# Patient Record
Sex: FEMALE | Race: WHITE | NOT HISPANIC OR LATINO | Employment: OTHER | URBAN - NONMETROPOLITAN AREA
[De-identification: names, ages, dates, MRNs, and addresses within clinical notes are randomized per-mention and may not be internally consistent; named-entity substitution may affect disease eponyms.]

---

## 2017-01-05 ENCOUNTER — PROCEDURE VISIT (OUTPATIENT)
Dept: CARDIOLOGY | Facility: CLINIC | Age: 47
End: 2017-01-05

## 2017-01-05 PROCEDURE — 93224 XTRNL ECG REC UP TO 48 HRS: CPT | Performed by: INTERNAL MEDICINE

## 2017-01-18 ENCOUNTER — OFFICE VISIT (OUTPATIENT)
Dept: CARDIOLOGY | Facility: CLINIC | Age: 47
End: 2017-01-18

## 2017-01-18 VITALS
BODY MASS INDEX: 41.78 KG/M2 | WEIGHT: 260 LBS | SYSTOLIC BLOOD PRESSURE: 168 MMHG | HEART RATE: 75 BPM | HEIGHT: 66 IN | OXYGEN SATURATION: 97 % | DIASTOLIC BLOOD PRESSURE: 110 MMHG

## 2017-01-18 DIAGNOSIS — I25.10 CORONARY ARTERY DISEASE INVOLVING NATIVE CORONARY ARTERY OF NATIVE HEART WITHOUT ANGINA PECTORIS: Primary | ICD-10-CM

## 2017-01-18 DIAGNOSIS — R00.1 SINUS BRADYCARDIA: ICD-10-CM

## 2017-01-18 DIAGNOSIS — F17.200 SMOKER: ICD-10-CM

## 2017-01-18 PROCEDURE — 99214 OFFICE O/P EST MOD 30 MIN: CPT | Performed by: INTERNAL MEDICINE

## 2017-01-18 RX ORDER — LISINOPRIL 20 MG/1
20 TABLET ORAL DAILY
COMMUNITY
End: 2019-02-28

## 2017-01-18 RX ORDER — CARVEDILOL 12.5 MG/1
12.5 TABLET ORAL 2 TIMES DAILY WITH MEALS
COMMUNITY
End: 2017-08-24 | Stop reason: DRUGHIGH

## 2017-01-18 NOTE — PROGRESS NOTES
"Cardiovascular Medicine   Yair Velarde M.D., Ph.D., [facility]      History of Present Illness  This is a 46 y.o. female with:    1. Sinus bradycardia, possibly symptomatic  A. Dr. Tucker recommended PPM  2. HTN  3. HLD  4. DM2  5. ASCAD  A. PCI, 2008 2nd inferior STEMI  6. RAYRAY  A. CPAP  7. Tobacco use      Bradycardia  Patient has a history of bradycardia.  She was seen by  an outside cardiologist.    She tells me that he recommended a PPM.  Unfortunately, she believes that he was \"rude\" and wanted to seek a second opinion. She did wear a Holter monitor. She isn't very clear about what reasons she might need a PPM. I do not actually see any history of bradycardia. I do not have her cardiologist's notes. She has no cardiac awareness. She did have a sleep study. She may have had some changes on that that was concerning.  I sent her for an echocardiogram, treadmill stress test and Holter monitor. TTE with severe LVH. Normal LVEF. Holter showed sinus.     ASCAD  Concerning her coronary artery disease, she is status post PCI in 2008 secondary to an acute STEMI of the inferior wall.  This was an RCA lesion. She is on ASA and Atorvastatin, Coreg.     HTN  Concerning the patient's hypertension, the patient is currently taking amlodipine and lisinopril.  The patient is medication compliant.  Denies side effects.  Patient's laboratory evaluations are followed closely by the PCP.  She saw nephrology today. They increased her Lisinopril and started her on Coreg.     HLD  Concerning the patient's hyperlipidemia, the patient remains on a statin.  They are tolerating this very well.  Denies side effects.  Specifically, the patient denies any prior history of asymptomatic LFT elevation, myositis or myalgias.  Patient's laboratory evaluations are followed closely by their PCP.    Review of Systems - History obtained from chart review and the patient  General ROS: negative  Respiratory ROS: positive for - " dyspnea  Cardiovascular ROS: no chest pain or dyspnea on exertion    family history includes COPD in her mother; Cancer in her mother; Coronary artery disease in her father and mother; Diabetes in her mother, other, and paternal aunt; Early death in her father; Hyperlipidemia in her other; Hypertension in her other; Kidney disease in her mother.     reports that she has been smoking.  She has never used smokeless tobacco. She reports that she uses illicit drugs. She reports that she does not drink alcohol.    Allergies   Allergen Reactions   • Penicillins Swelling     Of the throat   • Adhesive Tape Other (See Comments)     Silk tape- blisters   • Coconut Flavor Swelling   • Latex Hives         Current Outpatient Prescriptions:   •  amLODIPine (NORVASC) 10 MG tablet, , Disp: , Rfl:   •  aspirin 81 MG chewable tablet, Chew 81 mg daily., Disp: , Rfl:   •  atorvastatin (LIPITOR) 10 MG tablet, Take 10 mg by mouth daily. Half a tab q day, Disp: , Rfl:   •  Blood Glucose Monitoring Suppl (FREESTYLE LITE) device, , Disp: , Rfl:   •  FREESTYLE LITE test strip, , Disp: , Rfl:   •  gabapentin (NEURONTIN) 100 MG capsule, prn, Disp: , Rfl:   •  glipiZIDE (GLUCOTROL) 5 MG ER tablet, daily, Disp: , Rfl:   •  lisinopril (PRINIVIL,ZESTRIL) 5 MG tablet, , Disp: , Rfl:   •  sertraline (ZOLOFT) 50 MG tablet, daily, Disp: , Rfl:   •  thyroid 90 MG PO tablet, Take 1 tablet by mouth 2 (Two) Times a Day. Take one pill in the am and one around 4 pm, Disp: 60 tablet, Rfl: 11  •  traMADol (ULTRAM) 50 MG tablet, prn, Disp: , Rfl:   •  vitamin D (ERGOCALCIFEROL) 20170 UNITS capsule capsule, 1 time weekly, Disp: , Rfl:     Physical Exam:  Vitals:    01/18/17 1445   BP: (!) 168/110   Pulse: 75   SpO2: 97%     Body mass index is 41.97 kg/(m^2).    GEN: alert, appears stated age and cooperative  Body Habitus: well-nourished  HEENT: Head: Normocephalic, no lesions, without obvious abnormality.  Neck / Thyroid: Supple, no masses, nodes, nodules or  enlargement. No arcus senilis, xanthelasma or xanthomas. PERRL. Normal external ears. No drainage. No thyromegaly. Neck supple. No LAD. Trachea midline. Nose, normal.  JVP: 7 cm of water at 45 degrees HJR: absent      Carotid:  Upstroke: easily palpated bilaterally Volume: Normal.    Carotid Bruit:  None  Subclavian Bruit: Not present.    Lymph: No overt LAD.   Back: Normal.  Chest:  Normal Excursion: Good    I:E: Good  Pulmonary:clear to auscultation, no wheezes, rales or rhonchi, symmetric air entry. Equal chest excursion. Chest physical exam is normal. No tenderness.        Precordium:  No palpable heaves or thrusts. P2 is not palpable.   Peak:  normal size and placement Palpable S4: Not present.   Heart rate: normal  Heart Rhythm: regular     Heart Sounds: S1: normal intensity  S2: normal intensity  S3: absent   S4: absent  Opening Snap: absent  A2-OS:  N/A  Pericardial rub: absent    Ejection click: None      Murmurs: Systolic: none  Diastolic: none  Extremity: no edema, cyanosis  Trophic changes: None   Pallor on elevation: Absent.    Rubor on dependency: None  Neuro: CN II-XII grossly intact.     DATA REVIEWED:   ProceduresTTE, 6/2015  Normal LVEF, LVH, DD    TTE, 2016, normal  Holter, 2016, normal      Assessment/Plan     1. Sinus bradycardia, asymptomatic. No current indication for PPM.   Monitor for development of symptoms    2. HTN, essential.  HD BP noted to be moderately elevated today in office.  DASH; medication compliance  Continue current medications with increased Lisinopril; Start Coreg 12.5 mg PO BID    3. HLD.  Statin    4. ASCAD, s/p PCI in 2008. CCS 0.   ASA, Lipitor  Coreg    5. Cardiac Risk Assessment: ASCAD  Recommended ASA/Statin    6.  Tobacco status: Smoking  Urged cessation: 4 mins.      Plan for follow-up: in 6 months

## 2017-01-18 NOTE — PATIENT INSTRUCTIONS
Steps to Quit Smoking   Smoking tobacco can be harmful to your health and can affect almost every organ in your body. Smoking puts you, and those around you, at risk for developing many serious chronic diseases. Quitting smoking is difficult, but it is one of the best things that you can do for your health. It is never too late to quit.  WHAT ARE THE BENEFITS OF QUITTING SMOKING?  When you quit smoking, you lower your risk of developing serious diseases and conditions, such as:  · Lung cancer or lung disease, such as COPD.  · Heart disease.  · Stroke.  · Heart attack.  · Infertility.  · Osteoporosis and bone fractures.  Additionally, symptoms such as coughing, wheezing, and shortness of breath may get better when you quit. You may also find that you get sick less often because your body is stronger at fighting off colds and infections. If you are pregnant, quitting smoking can help to reduce your chances of having a baby of low birth weight.  HOW DO I GET READY TO QUIT?  When you decide to quit smoking, create a plan to make sure that you are successful. Before you quit:  · Pick a date to quit. Set a date within the next two weeks to give you time to prepare.  · Write down the reasons why you are quitting. Keep this list in places where you will see it often, such as on your bathroom mirror or in your car or wallet.  · Identify the people, places, things, and activities that make you want to smoke (triggers) and avoid them. Make sure to take these actions:    Throw away all cigarettes at home, at work, and in your car.    Throw away smoking accessories, such as ashtrays and lighters.    Clean your car and make sure to empty the ashtray.    Clean your home, including curtains and carpets.  · Tell your family, friends, and coworkers that you are quitting. Support from your loved ones can make quitting easier.  · Talk with your health care provider about your options for quitting smoking.  · Find out what treatment  "options are covered by your health insurance.  WHAT STRATEGIES CAN I USE TO QUIT SMOKING?   Talk with your healthcare provider about different strategies to quit smoking. Some strategies include:  · Quitting smoking altogether instead of gradually lessening how much you smoke over a period of time. Research shows that quitting \"cold turkey\" is more successful than gradually quitting.  · Attending in-person counseling to help you build problem-solving skills. You are more likely to have success in quitting if you attend several counseling sessions. Even short sessions of 10 minutes can be effective.  · Finding resources and support systems that can help you to quit smoking and remain smoke-free after you quit. These resources are most helpful when you use them often. They can include:    Online chats with a counselor.    Telephone quitlines.    Printed self-help materials.    Support groups or group counseling.    Text messaging programs.    Mobile phone applications.  · Taking medicines to help you quit smoking. (If you are pregnant or breastfeeding, talk with your health care provider first.) Some medicines contain nicotine and some do not. Both types of medicines help with cravings, but the medicines that include nicotine help to relieve withdrawal symptoms. Your health care provider may recommend:    Nicotine patches, gum, or lozenges.    Nicotine inhalers or sprays.    Non-nicotine medicine that is taken by mouth.  Talk with your health care provider about combining strategies, such as taking medicines while you are also receiving in-person counseling. Using these two strategies together makes you more likely to succeed in quitting than if you used either strategy on its own.  If you are pregnant or breastfeeding, talk with your health care provider about finding counseling or other support strategies to quit smoking. Do not take medicine to help you quit smoking unless told to do so by your health care " provider.  WHAT THINGS CAN I DO TO MAKE IT EASIER TO QUIT?  Quitting smoking might feel overwhelming at first, but there is a lot that you can do to make it easier. Take these important actions:  · Reach out to your family and friends and ask that they support and encourage you during this time. Call telephone quitlines, reach out to support groups, or work with a counselor for support.  · Ask people who smoke to avoid smoking around you.  · Avoid places that trigger you to smoke, such as bars, parties, or smoke-break areas at work.  · Spend time around people who do not smoke.  · Lessen stress in your life, because stress can be a smoking trigger for some people. To lessen stress, try:    Exercising regularly.    Deep-breathing exercises.    Yoga.    Meditating.    Performing a body scan. This involves closing your eyes, scanning your body from head to toe, and noticing which parts of your body are particularly tense. Purposefully relax the muscles in those areas.  · Download or purchase mobile phone or tablet apps (applications) that can help you stick to your quit plan by providing reminders, tips, and encouragement. There are many free apps, such as QuitGuide from the CDC (Centers for Disease Control and Prevention). You can find other support for quitting smoking (smoking cessation) through smokefree.gov and other websites.  HOW WILL I FEEL WHEN I QUIT SMOKING?  Within the first 24 hours of quitting smoking, you may start to feel some withdrawal symptoms. These symptoms are usually most noticeable 2-3 days after quitting, but they usually do not last beyond 2-3 weeks. Changes or symptoms that you might experience include:  · Mood swings.  · Restlessness, anxiety, or irritation.  · Difficulty concentrating.  · Dizziness.  · Strong cravings for sugary foods in addition to nicotine.  · Mild weight gain.  · Constipation.  · Nausea.  · Coughing or a sore throat.  · Changes in how your medicines work in your  body.  · A depressed mood.  · Difficulty sleeping (insomnia).  After the first 2-3 weeks of quitting, you may start to notice more positive results, such as:  · Improved sense of smell and taste.  · Decreased coughing and sore throat.  · Slower heart rate.  · Lower blood pressure.  · Clearer skin.  · The ability to breathe more easily.  · Fewer sick days.  Quitting smoking is very challenging for most people. Do not get discouraged if you are not successful the first time. Some people need to make many attempts to quit before they achieve long-term success. Do your best to stick to your quit plan, and talk with your health care provider if you have any questions or concerns.     This information is not intended to replace advice given to you by your health care provider. Make sure you discuss any questions you have with your health care provider.     Document Released: 12/12/2002 Document Revised: 05/03/2016 Document Reviewed: 05/03/2016  [employer] Interactive Patient Education ©2016 [employer]

## 2017-01-24 DIAGNOSIS — M51.36 DDD (DEGENERATIVE DISC DISEASE), LUMBAR: Primary | ICD-10-CM

## 2017-01-24 DIAGNOSIS — M54.16 LUMBAR RADICULOPATHY: ICD-10-CM

## 2017-01-26 ENCOUNTER — OFFICE VISIT (OUTPATIENT)
Dept: ENDOCRINOLOGY | Facility: CLINIC | Age: 47
End: 2017-01-26

## 2017-01-26 VITALS
HEART RATE: 71 BPM | BODY MASS INDEX: 41.78 KG/M2 | SYSTOLIC BLOOD PRESSURE: 128 MMHG | HEIGHT: 66 IN | WEIGHT: 260 LBS | DIASTOLIC BLOOD PRESSURE: 82 MMHG

## 2017-01-26 DIAGNOSIS — I10 ESSENTIAL HYPERTENSION: ICD-10-CM

## 2017-01-26 DIAGNOSIS — E11.9 CONTROLLED TYPE 2 DIABETES MELLITUS WITHOUT COMPLICATION, WITHOUT LONG-TERM CURRENT USE OF INSULIN (HCC): ICD-10-CM

## 2017-01-26 DIAGNOSIS — E89.0 POSTSURGICAL HYPOTHYROIDISM: Primary | ICD-10-CM

## 2017-01-26 DIAGNOSIS — Z72.0 TOBACCO ABUSE: ICD-10-CM

## 2017-01-26 DIAGNOSIS — E55.9 VITAMIN D DEFICIENCY: ICD-10-CM

## 2017-01-26 DIAGNOSIS — E78.49 OTHER HYPERLIPIDEMIA: ICD-10-CM

## 2017-01-26 DIAGNOSIS — E53.8 B12 DEFICIENCY: ICD-10-CM

## 2017-01-26 PROCEDURE — 99214 OFFICE O/P EST MOD 30 MIN: CPT | Performed by: NURSE PRACTITIONER

## 2017-01-26 RX ORDER — LEVOTHYROXINE AND LIOTHYRONINE 76; 18 UG/1; UG/1
120 TABLET ORAL 2 TIMES DAILY
Qty: 60 TABLET | Refills: 11 | Status: SHIPPED | OUTPATIENT
Start: 2017-01-26 | End: 2017-05-24 | Stop reason: DRUGHIGH

## 2017-01-26 NOTE — MR AVS SNAPSHOT
Rhea Sutton   1/26/2017 1:45 PM   Office Visit    Dept Phone:  [phone]   Encounter #:  [id number]    Provider:  DUC Pressley   Department:  [facility] ENDOCRINOLOGY                Your Full Care Plan              Today's Medication Changes          These changes are accurate as of: 1/26/17  2:01 PM.  If you have any questions, ask your nurse or doctor.               Medication(s)that have changed:     * Thyroid 90 MG tablet   Commonly known as:  ARMOUR   Take 1 tablet by mouth 2 (Two) Times a Day. Take one pill in the am and one around 4 pm   What changed:  Another medication with the same name was added. Make sure you understand how and when to take each.   Changed by:  DUC Pressley       * Thyroid 120 MG tablet   Commonly known as:  ARMOUR   Take 1 tablet by mouth 2 (Two) Times a Day.   What changed:  You were already taking a medication with the same name, and this prescription was added. Make sure you understand how and when to take each.   Changed by:  DUC Pressley       * Notice:  This list has 2 medication(s) that are the same as other medications prescribed for you. Read the directions carefully, and ask your doctor or other care provider to review them with you.         Where to Get Your Medications      These medications were sent to [facility] [address] [place] [address] [phone] [facility] [phone] [address] [address]     Phone:  [phone]     Thyroid 120 MG tablet                  Your Updated Medication List          This list is accurate as of: 1/26/17  2:01 PM.  Always use your most recent med list.                amLODIPine 10 MG tablet   Commonly known as:  NORVASC       aspirin 81 MG chewable tablet       carvedilol 12.5 MG tablet   Commonly known as:  COREG       FREESTYLE LITE device       FREESTYLE LITE test strip   Generic drug:  glucose  blood       gabapentin 100 MG capsule   Commonly known as:  NEURONTIN       glipiZIDE 5 MG ER tablet   Commonly known as:  GLUCOTROL       LIPITOR 10 MG tablet   Generic drug:  atorvastatin       lisinopril 20 MG tablet   Commonly known as:  PRINIVIL,ZESTRIL       sertraline 50 MG tablet   Commonly known as:  ZOLOFT       * Thyroid 90 MG tablet   Commonly known as:  ARMOUR   Take 1 tablet by mouth 2 (Two) Times a Day. Take one pill in the am and one around 4 pm       * Thyroid 120 MG tablet   Commonly known as:  ARMOUR   Take 1 tablet by mouth 2 (Two) Times a Day.       traMADol 50 MG tablet   Commonly known as:  ULTRAM       vitamin D 97050 UNITS capsule capsule   Commonly known as:  ERGOCALCIFEROL       * Notice:  This list has 2 medication(s) that are the same as other medications prescribed for you. Read the directions carefully, and ask your doctor or other care provider to review them with you.            We Performed the Following     CBC & Differential     Comprehensive Metabolic Panel     Hemoglobin A1c     TSH     Vitamin B12     Vitamin D 25 Hydroxy       You Were Diagnosed With        Codes Comments    Postsurgical hypothyroidism    -  Primary ICD-10-CM: E89.0  ICD-9-CM: 244.0     Essential hypertension     ICD-10-CM: I10  ICD-9-CM: 401.9     Vitamin D deficiency     ICD-10-CM: E55.9  ICD-9-CM: 268.9     Other hyperlipidemia     ICD-10-CM: E78.4  ICD-9-CM: 272.4     B12 deficiency     ICD-10-CM: E53.8  ICD-9-CM: 266.2     Controlled type 2 diabetes mellitus without complication, without long-term current use of insulin     ICD-10-CM: E11.9  ICD-9-CM: 250.00     Tobacco abuse     ICD-10-CM: Z72.0  ICD-9-CM: 305.1       Instructions     None    Patient Instructions History      Upcoming Appointments     Visit Type Date Time Department    FOLLOW UP 1/26/2017  1:45 PM [facility] ENDOCRINOLOGY [facility]    FOLLOW UP 2/20/2017  9:20 AM [facility] PAIN [facility]    OFFICE VISIT 7/19/2017  1:15 PM [facility] CARDIOLOGY [facility]      [facility]  "Signup     Our records indicate that you have declined [facility] signup. If you would like to sign up for [place], please email [email or web address] or call [phone] to obtain an activation code.             Other Info from Your Visit           Your Appointments     Feb 20, 2017  9:20 AM CST   Follow Up with Alfonso Bruce MD   [facility] PAIN MANAGEMENT (--)    [address]  [address] [place] [id number] [phone]           Arrive 15 minutes prior to appointment.            Jul 19, 2017  1:15 PM CDT   Office Visit with Yair Velarde MD PhD   [facility] CARDIOLOGY (--)    [address]   [facility] [address] [phone]           Arrive 15 minutes prior to appointment.              Allergies     Penicillins  Swelling    Of the throat    Adhesive Tape  Other (See Comments)    Silk tape- blisters    Coconut Flavor  Swelling    Latex  Hives      Reason for Visit     Hypothyroidism suzanna      Vital Signs     Blood Pressure Pulse Height Weight Body Mass Index Smoking Status    128/82 (BP Location: Left arm, Patient Position: Sitting, Cuff Size: Adult) 71 66\" (167.6 cm) 260 lb (118 kg) 41.97 kg/m2 Current Every Day Smoker      Problems and Diagnoses Noted     Controlled type 2 diabetes mellitus without complication, without long-term current use of insulin    High blood pressure    High cholesterol or triglycerides    Underactive thyroid    Vitamin D deficiency    Vitamin B12 deficiency        Tobacco abuse            "

## 2017-01-26 NOTE — PROGRESS NOTES
Subjective    Rhea Lesley Lesley is a 46 y.o. female. she is here today for follow-up.    History of Present Illness       45 year old female comes for follow up         #1 Hypothyroidism     Duration - 2013  ====================================  Timing - constant  ====================================  Quality - uncontrolled  ====================================  ====================================  Alleviating Factors      was on levothyroxine  ====================================  Aggravating Factor - noncompliance and now ran out off it for more than 1 month  ====================================  Severity - moderate     ================================================================     #2 Goiter---Total thyroidectomy JUly 14, 2015     Context - During physical exam , goiter was appreciated by Dr. Lockett  ====================================  Duration - May 2013  ====================================  Timing - not constant  ====================================  Quality - controlled  ====================================  Severity - moderate   ====================================  Symptoms - refers pressure sensation / dyspnea when supine, denies dysphagia.     US dated May 2012     Right Lobe 7.9 cm x 2.6 cm x 2.8 cm   Left Lobe 6.5 cm x 2.7 cm x 2.9 cm     Report states that there is a multinodular goiter        I personally reviewed US with Ms. Gallegos.  There are no nodules, Gland is heterogeneous characteristic of Hashimoto's     symptoms were progressing patient had a total thyroidectomy July 14, 2015     she is doing fine, still some hoarseness and tenderness     ==============================================================  Chronic Tobacco use  timing constant  quality states that now only smoking 3 cigarettes   severity - high associated CAD , COPD  aggravating factors -stress     HTN resolved             Evaluation history:  TSH   Date Value Ref Range Status   01/11/2017 234.00 (H) 0.46 - 4.68  uIU/ml Final     FREE T4   Date Value Ref Range Status   01/11/2017 0.14 (L) 0.78 - 2.19 ng/dl Final       Current medications:  Current Outpatient Prescriptions   Medication Sig Dispense Refill   • amLODIPine (NORVASC) 10 MG tablet      • aspirin 81 MG chewable tablet Chew 81 mg daily.     • atorvastatin (LIPITOR) 10 MG tablet Take 10 mg by mouth daily. Half a tab q day     • Blood Glucose Monitoring Suppl (FREESTYLE LITE) device      • carvedilol (COREG) 12.5 MG tablet Take 12.5 mg by mouth 2 (Two) Times a Day With Meals.     • FREESTYLE LITE test strip      • gabapentin (NEURONTIN) 100 MG capsule prn     • glipiZIDE (GLUCOTROL) 5 MG ER tablet daily     • lisinopril (PRINIVIL,ZESTRIL) 20 MG tablet Take 20 mg by mouth Daily.     • sertraline (ZOLOFT) 50 MG tablet daily     • Thyroid 120 MG PO tablet Take 1 tablet by mouth 2 (Two) Times a Day. 60 tablet 11   • thyroid 90 MG PO tablet Take 1 tablet by mouth 2 (Two) Times a Day. Take one pill in the am and one around 4 pm 60 tablet 11   • traMADol (ULTRAM) 50 MG tablet prn     • vitamin D (ERGOCALCIFEROL) 36786 UNITS capsule capsule 1 time weekly       No current facility-administered medications for this visit.        The following portions of the patient's history were reviewed and updated as appropriate:   Past Medical History   Diagnosis Date   • Acquired hypothyroidism    • Acute pharyngitis    • Encounter for health maintenance examination    • Essential hypertension    • Goiter      - total thyroidectomy July 2015   • Hashimoto's thyroiditis    • Headache    • Heart disease    • Hyperglycemia      , unspecified   • Kidney failure    • Low back pain    • Malaise and fatigue    • Otitis externa    • Postoperative hypothyroidism    • Tobacco dependence syndrome    • URI (upper respiratory infection)      Past Surgical History   Procedure Laterality Date   • Appendectomy     • Bowel resection       meckel resection   • Cholecystectomy     • Coronary stent placement      • Dilatation and curettage     • Total abdominal hysterectomy with salpingo oophorectomy     • Total thyroidectomy     • Cardiac catheterization       Family History   Problem Relation Age of Onset   • Diabetes Other    • Hyperlipidemia Other    • Hypertension Other    • Cancer Mother    • Diabetes Mother    • Coronary artery disease Mother    • Kidney disease Mother    • COPD Mother    • Coronary artery disease Father    • Early death Father    • Diabetes Paternal Aunt      OB History     No data available        Allergies   Allergen Reactions   • Penicillins Swelling     Of the throat   • Adhesive Tape Other (See Comments)     Silk tape- blisters   • Coconut Flavor Swelling   • Latex Hives     Social History     Social History   • Marital status: Legally      Spouse name: N/A   • Number of children: N/A   • Years of education: N/A     Social History Main Topics   • Smoking status: Current Every Day Smoker   • Smokeless tobacco: Never Used      Comment: 4 daily   • Alcohol use No   • Drug use: Yes      Comment: tramadol   • Sexual activity: Defer     Other Topics Concern   • None     Social History Narrative       Review of Systems  Review of Systems   Constitutional: Positive for fatigue and unexpected weight change. Negative for activity change, appetite change and diaphoresis.   HENT: Negative for congestion, dental problem, drooling, ear discharge, ear pain, facial swelling, sneezing, sore throat, tinnitus, trouble swallowing and voice change.    Eyes: Negative for photophobia, pain, discharge, redness, itching and visual disturbance.   Respiratory: Negative for apnea, cough, choking, chest tightness and shortness of breath.    Cardiovascular: Negative for chest pain, palpitations and leg swelling.   Gastrointestinal: Negative for abdominal distention, abdominal pain, constipation, diarrhea, nausea and vomiting.   Endocrine: Negative for cold intolerance, heat intolerance, polydipsia, polyphagia  "and polyuria.   Genitourinary: Negative for difficulty urinating, dysuria, frequency, hematuria and urgency.   Musculoskeletal: Negative for arthralgias, back pain, gait problem, joint swelling, myalgias, neck pain and neck stiffness.   Skin: Negative for color change, pallor, rash and wound.   Allergic/Immunologic: Negative for environmental allergies, food allergies and immunocompromised state.   Neurological: Negative for dizziness, tremors, facial asymmetry, weakness, light-headedness, numbness and headaches.   Hematological: Negative for adenopathy. Does not bruise/bleed easily.   Psychiatric/Behavioral: Negative for agitation, behavioral problems, confusion, decreased concentration and sleep disturbance.        Objective      Visit Vitals   • /82 (BP Location: Left arm, Patient Position: Sitting, Cuff Size: Adult)   • Pulse 71   • Ht 66\" (167.6 cm)   • Wt 260 lb (118 kg)   • BMI 41.97 kg/m2     Physical Exam   Constitutional: She is oriented to person, place, and time. She appears well-developed and well-nourished. No distress.   HENT:   Head: Normocephalic and atraumatic.   Right Ear: External ear normal.   Left Ear: External ear normal.   Nose: Nose normal.   Eyes: Conjunctivae and EOM are normal. Pupils are equal, round, and reactive to light.   Neck: Normal range of motion. Neck supple. No tracheal deviation present.   Thyroid surgically absent   Cardiovascular: Normal rate, regular rhythm and normal heart sounds.    No murmur heard.  Pulmonary/Chest: Effort normal and breath sounds normal. No respiratory distress. She has no wheezes.   Abdominal: Soft. Bowel sounds are normal. There is no tenderness. There is no rebound and no guarding.   Musculoskeletal: Normal range of motion. She exhibits no edema, tenderness or deformity.   Neurological: She is alert and oriented to person, place, and time. No cranial nerve deficit.   Skin: Skin is warm and dry. No rash noted.   Psychiatric: She has a normal " mood and affect. Her behavior is normal. Judgment and thought content normal.       Lab Review  Lab Results   Component Value Date    .00 (H) 01/11/2017     Lab Results   Component Value Date    FREET4 0.14 (L) 01/11/2017        Assessment/Plan      1. Postsurgical hypothyroidism    2. Essential hypertension    3. Vitamin D deficiency    4. Other hyperlipidemia    5. B12 deficiency    6. Controlled type 2 diabetes mellitus without complication, without long-term current use of insulin    7. Tobacco abuse    . This diagnosis was discussed and reviewed with the patient including the advantages of drug therapy.     1. Orders placed during this encounter include:  Orders Placed This Encounter   Procedures   • Comprehensive Metabolic Panel   • TSH   • Hemoglobin A1c   • Vitamin D 25 Hydroxy   • Vitamin B12       Medications prescribed:  Outpatient Encounter Prescriptions as of 1/26/2017   Medication Sig Dispense Refill   • amLODIPine (NORVASC) 10 MG tablet      • aspirin 81 MG chewable tablet Chew 81 mg daily.     • atorvastatin (LIPITOR) 10 MG tablet Take 10 mg by mouth daily. Half a tab q day     • Blood Glucose Monitoring Suppl (FREESTYLE LITE) device      • carvedilol (COREG) 12.5 MG tablet Take 12.5 mg by mouth 2 (Two) Times a Day With Meals.     • FREESTYLE LITE test strip      • gabapentin (NEURONTIN) 100 MG capsule prn     • glipiZIDE (GLUCOTROL) 5 MG ER tablet daily     • lisinopril (PRINIVIL,ZESTRIL) 20 MG tablet Take 20 mg by mouth Daily.     • sertraline (ZOLOFT) 50 MG tablet daily     • Thyroid 120 MG PO tablet Take 1 tablet by mouth 2 (Two) Times a Day. 60 tablet 11   • thyroid 90 MG PO tablet Take 1 tablet by mouth 2 (Two) Times a Day. Take one pill in the am and one around 4 pm 60 tablet 11   • traMADol (ULTRAM) 50 MG tablet prn     • vitamin D (ERGOCALCIFEROL) 95567 UNITS capsule capsule 1 time weekly       No facility-administered encounter medications on file as of 1/26/2017.          Plan  Details        Hashimoto's Thyroiditis leading to Hypothyroidism and Goiter---now postoperative hypothyroid     a. Hypothyroidism        I emphasized that if she misses one tablet one day she can take 2 tabs the next day.     Take on an empty stomach        taking 150mcgs one daily      Oct. 2015      TSH --267     states taking every day but taking with other medications      take on empty stomach , at least one hour before      need to change to brand name synthroid due to variable TSH levels      now on generic 300mcgs daily     insurance will not cover brand name     March 2016     TSH - 310     increase to 450mcgs one daily     will try for brand name again  check for celiac   states takes everyday does not miss  discussed with Dr. Pearce     June 2016     TSH - 288     states takes everyday does not miss a dose      keep brand name synthroid     discussed with Dr. Pearce      increase to 600mcgs daily      Oct. 2016     TSH - 282   FT4 - 0.07     Discussed with  and change to [place] thyroid 90 mcg one po BID     Jan. 2017    TSH - 234    Discussed with Dr. Pearce Keep armour thyroid increase to 120 mg BID     If does not work will try tirosint      total thyroidectomy     b. Goiter--removed due to enlarging gland and compressive symptoms      cytology - hashimoto's thyroiditis extensive, bilateral           -----------     May 2016     Creatinine 1.9     following with nephrology     Following with         ------     HTN -      On amlodipine     On lisinopril/hctz    On coreg      Following with cardiology      ===========     May 2016     bg 183     check HgbA1c      June 2016     HgbA1c% 6.5     Started on glyburide 5mg daily --by primary provider     No lows      Oct. 2016     HgbA1c % 6.2 from Oct. 2016      ===============     Sleep study was done -- she does have obstructive sleep apnea      Will be on CPAP this week     Preventive care:    Smoking    Discussed smoking cessation                  4. Return in about 10 weeks (around 4/6/2017) for Recheck.

## 2017-02-13 PROCEDURE — 80050 GENERAL HEALTH PANEL: CPT | Performed by: NURSE PRACTITIONER

## 2017-02-13 PROCEDURE — 85007 BL SMEAR W/DIFF WBC COUNT: CPT | Performed by: NURSE PRACTITIONER

## 2017-02-13 PROCEDURE — 82607 VITAMIN B-12: CPT | Performed by: NURSE PRACTITIONER

## 2017-02-13 PROCEDURE — 83036 HEMOGLOBIN GLYCOSYLATED A1C: CPT | Performed by: NURSE PRACTITIONER

## 2017-02-13 PROCEDURE — 36415 COLL VENOUS BLD VENIPUNCTURE: CPT | Performed by: NURSE PRACTITIONER

## 2017-02-13 PROCEDURE — 82306 VITAMIN D 25 HYDROXY: CPT | Performed by: NURSE PRACTITIONER

## 2017-02-14 ENCOUNTER — TRANSCRIBE ORDERS (OUTPATIENT)
Dept: LAB | Facility: CLINIC | Age: 47
End: 2017-02-14

## 2017-02-14 ENCOUNTER — LAB (OUTPATIENT)
Dept: LAB | Facility: CLINIC | Age: 47
End: 2017-02-14

## 2017-02-14 DIAGNOSIS — E11.9 DIABETES MELLITUS WITHOUT COMPLICATION (HCC): ICD-10-CM

## 2017-02-14 DIAGNOSIS — E55.9 UNSPECIFIED VITAMIN D DEFICIENCY: ICD-10-CM

## 2017-02-14 DIAGNOSIS — E55.9 UNSPECIFIED VITAMIN D DEFICIENCY: Primary | ICD-10-CM

## 2017-02-14 PROCEDURE — 82043 UR ALBUMIN QUANTITATIVE: CPT | Performed by: FAMILY MEDICINE

## 2017-02-14 PROCEDURE — 80061 LIPID PANEL: CPT | Performed by: FAMILY MEDICINE

## 2017-02-15 LAB
25(OH)D3 SERPL-MCNC: 19.8 NG/ML (ref 30–100)
ALBUMIN SERPL-MCNC: 3.7 G/DL (ref 3.4–4.8)
ALBUMIN UR-MCNC: 2.1 MG/L
ALBUMIN/GLOB SERPL: 1.2 G/DL (ref 1.1–1.8)
ALP SERPL-CCNC: 73 U/L (ref 38–126)
ALT SERPL W P-5'-P-CCNC: 45 U/L (ref 9–52)
ANION GAP SERPL CALCULATED.3IONS-SCNC: 7 MMOL/L (ref 5–15)
ARTICHOKE IGE QN: 191 MG/DL (ref 1–129)
AST SERPL-CCNC: 49 U/L (ref 14–36)
BASOPHILS # BLD AUTO: 0.08 10*3/MM3 (ref 0–0.2)
BASOPHILS NFR BLD AUTO: 0.7 % (ref 0–2)
BILIRUB SERPL-MCNC: 0.3 MG/DL (ref 0.2–1.3)
BUN BLD-MCNC: 13 MG/DL (ref 7–21)
BUN/CREAT SERPL: 9.2 (ref 7–25)
CALCIUM SPEC-SCNC: 8.5 MG/DL (ref 8.4–10.2)
CHLORIDE SERPL-SCNC: 102 MMOL/L (ref 95–110)
CHOLEST SERPL-MCNC: 295 MG/DL (ref 0–199)
CO2 SERPL-SCNC: 30 MMOL/L (ref 22–31)
CREAT BLD-MCNC: 1.42 MG/DL (ref 0.5–1)
DEPRECATED RDW RBC AUTO: 48 FL (ref 36.4–46.3)
EOSINOPHIL # BLD AUTO: 0.33 10*3/MM3 (ref 0–0.7)
EOSINOPHIL NFR BLD AUTO: 3.1 % (ref 0–7)
ERYTHROCYTE [DISTWIDTH] IN BLOOD BY AUTOMATED COUNT: 13.5 % (ref 11.5–14.5)
GFR SERPL CREATININE-BSD FRML MDRD: 40 ML/MIN/1.73 (ref 60–135)
GLOBULIN UR ELPH-MCNC: 3.1 GM/DL (ref 2.3–3.5)
GLUCOSE BLD-MCNC: 133 MG/DL (ref 60–100)
HBA1C MFR BLD: 6.38 % (ref 4–5.6)
HCT VFR BLD AUTO: 39.2 % (ref 35–45)
HDLC SERPL-MCNC: 68 MG/DL (ref 60–200)
HGB BLD-MCNC: 12.4 G/DL (ref 12–15.5)
IMM GRANULOCYTES # BLD: 0.16 10*3/MM3 (ref 0–0.02)
IMM GRANULOCYTES NFR BLD: 1.5 % (ref 0–0.5)
LDLC/HDLC SERPL: 3.05 {RATIO} (ref 0–3.22)
LYMPHOCYTES # BLD AUTO: 2.79 10*3/MM3 (ref 0.6–4.2)
LYMPHOCYTES NFR BLD AUTO: 25.8 % (ref 10–50)
MCH RBC QN AUTO: 30.5 PG (ref 26.5–34)
MCHC RBC AUTO-ENTMCNC: 31.6 G/DL (ref 31.4–36)
MCV RBC AUTO: 96.6 FL (ref 80–98)
MONOCYTES # BLD AUTO: 0.71 10*3/MM3 (ref 0–0.9)
MONOCYTES NFR BLD AUTO: 6.6 % (ref 0–12)
NEUTROPHILS # BLD AUTO: 6.73 10*3/MM3 (ref 2–8.6)
NEUTROPHILS NFR BLD AUTO: 62.3 % (ref 37–80)
PLAT MORPH BLD: NORMAL
PLATELET # BLD AUTO: 332 10*3/MM3 (ref 150–450)
PMV BLD AUTO: 10.8 FL (ref 8–12)
POTASSIUM BLD-SCNC: 4.1 MMOL/L (ref 3.5–5.1)
PROT SERPL-MCNC: 6.8 G/DL (ref 6.3–8.6)
RBC # BLD AUTO: 4.06 10*6/MM3 (ref 3.77–5.16)
RBC MORPH BLD: NORMAL
SODIUM BLD-SCNC: 139 MMOL/L (ref 137–145)
TRIGL SERPL-MCNC: 98 MG/DL (ref 20–199)
TSH SERPL DL<=0.05 MIU/L-ACNC: 232 MIU/ML (ref 0.46–4.68)
VIT B12 BLD-MCNC: 344 PG/ML (ref 239–931)
WBC MORPH BLD: NORMAL
WBC NRBC COR # BLD: 10.8 10*3/MM3 (ref 3.2–9.8)

## 2017-02-17 DIAGNOSIS — E55.9 VITAMIN D DEFICIENCY: Primary | ICD-10-CM

## 2017-02-17 DIAGNOSIS — R10.84 GENERALIZED ABDOMINAL PAIN: ICD-10-CM

## 2017-02-17 DIAGNOSIS — E89.0 POSTOPERATIVE HYPOTHYROIDISM: ICD-10-CM

## 2017-02-20 ENCOUNTER — OFFICE VISIT (OUTPATIENT)
Dept: PAIN MEDICINE | Facility: CLINIC | Age: 47
End: 2017-02-20

## 2017-02-20 VITALS
HEIGHT: 66 IN | DIASTOLIC BLOOD PRESSURE: 84 MMHG | BODY MASS INDEX: 41.75 KG/M2 | WEIGHT: 259.8 LBS | SYSTOLIC BLOOD PRESSURE: 138 MMHG

## 2017-02-20 DIAGNOSIS — M51.36 DDD (DEGENERATIVE DISC DISEASE), LUMBAR: ICD-10-CM

## 2017-02-20 DIAGNOSIS — M54.16 LUMBAR RADICULOPATHY: Primary | ICD-10-CM

## 2017-02-20 DIAGNOSIS — M54.2 CERVICALGIA: ICD-10-CM

## 2017-02-20 PROCEDURE — 99213 OFFICE O/P EST LOW 20 MIN: CPT | Performed by: NURSE PRACTITIONER

## 2017-02-20 RX ORDER — LEVOTHYROXINE, LIOTHYRONINE 38; 9 UG/1; UG/1
TABLET ORAL
COMMUNITY
Start: 2017-01-27 | End: 2017-05-24 | Stop reason: DRUGHIGH

## 2017-02-20 NOTE — PROGRESS NOTES
"Rhea Sutton is a 46 y.o. female.   1970    HPI:   Location: neck and lower back  Quality: stabbing and sharp  Severity: 10/10  Timing: constant  Alleviating: injection and biofreeze and otc patches  Aggravating: ambulating , increased activity and weather     Pt reports \"LESI was not covered from last visit related to lack of Physical Therapy\". Pt has been in Physical Therapy [facility] since FEB 14 ( finished next week). Pt sees her Nephrologists (Dr. Escalante)--Borderline Stage III Kidney disease. Informed patient, to discuss with Nephrologist steroid injections performed at pain management. Currently in Physical Therapy at [facility].  Patient will complete physical therapy as scheduled within the next week or week and a half.  Patient schedule return to pain management in 3 weeks approximately for reevaluation and we will discuss ordering lumbar epidural at the present time.      The following portions of the patient's history were reviewed by me and updated as appropriate: allergies, current medications, past family history, past medical history, past social history, past surgical history and problem list.    Past Medical History   Diagnosis Date   • Acquired hypothyroidism    • Acute pharyngitis    • Encounter for health maintenance examination    • Essential hypertension    • Goiter      - total thyroidectomy July 2015   • Hashimoto's thyroiditis    • Headache    • Heart disease    • Hyperglycemia      , unspecified   • Kidney failure    • Low back pain    • Malaise and fatigue    • Otitis externa    • Postoperative hypothyroidism    • Tobacco dependence syndrome    • URI (upper respiratory infection)        Social History     Social History   • Marital status: Legally      Spouse name: N/A   • Number of children: N/A   • Years of education: N/A     Occupational History   • Not on file.     Social History Main Topics   • Smoking status: Current Every Day Smoker   • " Smokeless tobacco: Never Used      Comment: 4 daily-- smoking cessation   • Alcohol use No   • Drug use: Yes      Comment: tramadol   • Sexual activity: Defer     Other Topics Concern   • Not on file     Social History Narrative       Family History   Problem Relation Age of Onset   • Diabetes Other    • Hyperlipidemia Other    • Hypertension Other    • Cancer Mother    • Diabetes Mother    • Coronary artery disease Mother    • Kidney disease Mother    • COPD Mother    • Coronary artery disease Father    • Early death Father    • Diabetes Paternal Aunt          Current Outpatient Prescriptions:   •  amLODIPine (NORVASC) 10 MG tablet, , Disp: , Rfl:   •  aspirin 81 MG chewable tablet, Chew 81 mg daily., Disp: , Rfl:   •  atorvastatin (LIPITOR) 10 MG tablet, Take 10 mg by mouth daily. Half a tab q day, Disp: , Rfl:   •  Blood Glucose Monitoring Suppl (FREESTYLE LITE) device, , Disp: , Rfl:   •  carvedilol (COREG) 12.5 MG tablet, Take 12.5 mg by mouth 2 (Two) Times a Day With Meals., Disp: , Rfl:   •  FREESTYLE LITE test strip, , Disp: , Rfl:   •  gabapentin (NEURONTIN) 100 MG capsule, prn, Disp: , Rfl:   •  glipiZIDE (GLUCOTROL) 5 MG ER tablet, daily, Disp: , Rfl:   •  lisinopril (PRINIVIL,ZESTRIL) 20 MG tablet, Take 20 mg by mouth Daily., Disp: , Rfl:   •  sertraline (ZOLOFT) 50 MG tablet, daily, Disp: , Rfl:   •  thyroid (ARMOUR THYROID) 180 MG tablet, Take 1 tablet by mouth 2 (Two) Times a Day., Disp: 60 tablet, Rfl: 6  •  Thyroid 120 MG PO tablet, Take 1 tablet by mouth 2 (Two) Times a Day., Disp: 60 tablet, Rfl: 11  •  thyroid 90 MG PO tablet, Take 1 tablet by mouth 2 (Two) Times a Day. Take one pill in the am and one around 4 pm, Disp: 60 tablet, Rfl: 11  •  traMADol (ULTRAM) 50 MG tablet, prn, Disp: , Rfl:   •  vitamin D (ERGOCALCIFEROL) 01288 UNITS capsule capsule, 1 time weekly, Disp: , Rfl:   •  NP THYROID 60 MG tablet, , Disp: , Rfl:     Allergies   Allergen Reactions   • Penicillins Swelling     Of the  throat   • Adhesive Tape Other (See Comments)     Silk tape- blisters   • Coconut Flavor Swelling   • Latex Hives         Review of Systems   Musculoskeletal: Positive for back pain and neck pain.     10 system review of systems was reviewed and negative except for above.    Physical Exam   Constitutional: She is oriented to person, place, and time. She appears well-developed and well-nourished.   Eyes: EOM are normal. Pupils are equal, round, and reactive to light.   Neck: Normal range of motion. No muscular tenderness present.   Cardiovascular: Normal heart sounds.    Musculoskeletal:        Lumbar back: She exhibits decreased range of motion ( flex 60 deg and ext 10 ext with miild loading bilateral ) and tenderness.   Neurological: She is alert and oriented to person, place, and time. No sensory deficit. Gait normal.   Reflex Scores:       Tricep reflexes are 2+ on the right side and 2+ on the left side.       Bicep reflexes are 2+ on the right side and 2+ on the left side.       Brachioradialis reflexes are 2+ on the right side and 2+ on the left side.       Patellar reflexes are 1+ on the right side and 1+ on the left side.       Achilles reflexes are 1+ on the right side and 1+ on the left side.  Skin: Skin is warm and dry.   Bronze skin coloration   Psychiatric: She has a normal mood and affect. Her behavior is normal. Judgment normal. She expresses no homicidal and no suicidal ideation. She expresses no suicidal plans and no homicidal plans.   Nursing note and vitals reviewed.      Rhea was seen today for neck pain and back pain.    Diagnoses and all orders for this visit:    Lumbar radiculopathy    DDD (degenerative disc disease), lumbar    Cervicalgia        Medication: None at this time. Upper and middle muscular tenderness diminished with Physical therapy. Pt will return to pain management after Physical completion to evaluate for LESI. DEANNA discussed and seek emergency treatment.     Interventional:  "Pt reports \"LESI was not covered from last visit related to lack of Physical Therapy\". Pt has been in Physical Therapy [facility] since FEB 14 ( finished next week). Pt sees her Nephrologists (Dr. Escalante)--Borderline Stage III Kidney disease. Informed patient, to discuss with Nephrologist steroid injections performed at pain management    Rehab: Currently in Physical Therapy at [facility].  Patient will complete physical therapy as scheduled within the next week or week and a half.  Patient schedule return to pain management in 3 weeks approximately for reevaluation and we will discuss ordering lumbar epidural at the present time.    Behavioral: No aberrant behavior noted. [facility] Report # [id number]  was reviewed and is consistent with stated history    Urine drug screen None at this time          This document has been electronically signed by DUC Luu on February 20, 2017 4:16 PM          This document has been electronically signed by DUC Luu on February 20, 2017 4:16 PM     "

## 2017-05-24 ENCOUNTER — OFFICE VISIT (OUTPATIENT)
Dept: PAIN MEDICINE | Facility: CLINIC | Age: 47
End: 2017-05-24

## 2017-05-24 VITALS
DIASTOLIC BLOOD PRESSURE: 90 MMHG | HEIGHT: 66 IN | SYSTOLIC BLOOD PRESSURE: 130 MMHG | WEIGHT: 266 LBS | BODY MASS INDEX: 42.75 KG/M2

## 2017-05-24 DIAGNOSIS — M51.36 DDD (DEGENERATIVE DISC DISEASE), LUMBAR: Primary | ICD-10-CM

## 2017-05-24 DIAGNOSIS — M54.16 LUMBAR RADICULOPATHY: ICD-10-CM

## 2017-05-24 PROCEDURE — 99212 OFFICE O/P EST SF 10 MIN: CPT | Performed by: PAIN MEDICINE

## 2017-06-08 ENCOUNTER — LAB (OUTPATIENT)
Dept: LAB | Facility: CLINIC | Age: 47
End: 2017-06-08

## 2017-06-08 ENCOUNTER — HOSPITAL ENCOUNTER (OUTPATIENT)
Dept: INTERVENTIONAL RADIOLOGY/VASCULAR | Facility: HOSPITAL | Age: 47
Discharge: HOME | End: 2017-06-08
Attending: PAIN MEDICINE | Admitting: PAIN MEDICINE

## 2017-06-08 ENCOUNTER — OFFICE VISIT (OUTPATIENT)
Dept: ENDOCRINOLOGY | Facility: CLINIC | Age: 47
End: 2017-06-08

## 2017-06-08 VITALS
SYSTOLIC BLOOD PRESSURE: 180 MMHG | DIASTOLIC BLOOD PRESSURE: 120 MMHG | RESPIRATION RATE: 20 BRPM | HEART RATE: 78 BPM | OXYGEN SATURATION: 96 %

## 2017-06-08 VITALS
HEIGHT: 66 IN | HEART RATE: 78 BPM | SYSTOLIC BLOOD PRESSURE: 132 MMHG | DIASTOLIC BLOOD PRESSURE: 78 MMHG | WEIGHT: 266 LBS | BODY MASS INDEX: 42.75 KG/M2

## 2017-06-08 DIAGNOSIS — E89.0 POSTSURGICAL HYPOTHYROIDISM: Primary | ICD-10-CM

## 2017-06-08 DIAGNOSIS — I10 ESSENTIAL HYPERTENSION: ICD-10-CM

## 2017-06-08 DIAGNOSIS — E11.65 UNCONTROLLED TYPE 2 DIABETES MELLITUS WITH COMPLICATION, UNSPECIFIED LONG TERM INSULIN USE STATUS: ICD-10-CM

## 2017-06-08 DIAGNOSIS — E11.8 UNCONTROLLED TYPE 2 DIABETES MELLITUS WITH COMPLICATION, UNSPECIFIED LONG TERM INSULIN USE STATUS: ICD-10-CM

## 2017-06-08 DIAGNOSIS — E89.0 POSTSURGICAL HYPOTHYROIDISM: ICD-10-CM

## 2017-06-08 DIAGNOSIS — M51.36 DDD (DEGENERATIVE DISC DISEASE), LUMBAR: ICD-10-CM

## 2017-06-08 DIAGNOSIS — E78.49 OTHER HYPERLIPIDEMIA: ICD-10-CM

## 2017-06-08 DIAGNOSIS — E55.9 VITAMIN D DEFICIENCY: ICD-10-CM

## 2017-06-08 DIAGNOSIS — M54.16 LUMBAR RADICULOPATHY: ICD-10-CM

## 2017-06-08 LAB
T3 SERPL-MCNC: 24.1 NG/DL (ref 97–169)
T4 FREE SERPL-MCNC: <0.07 NG/DL (ref 0.78–2.19)
TSH SERPL DL<=0.05 MIU/L-ACNC: 245 MIU/ML (ref 0.46–4.68)

## 2017-06-08 PROCEDURE — 25010000002 METHYLPREDNISOLONE PER 80 MG: Performed by: PAIN MEDICINE

## 2017-06-08 PROCEDURE — 62323 NJX INTERLAMINAR LMBR/SAC: CPT | Performed by: PAIN MEDICINE

## 2017-06-08 PROCEDURE — 82306 VITAMIN D 25 HYDROXY: CPT | Performed by: NURSE PRACTITIONER

## 2017-06-08 PROCEDURE — 84480 ASSAY TRIIODOTHYRONINE (T3): CPT | Performed by: NURSE PRACTITIONER

## 2017-06-08 PROCEDURE — 80050 GENERAL HEALTH PANEL: CPT | Performed by: NURSE PRACTITIONER

## 2017-06-08 PROCEDURE — 99214 OFFICE O/P EST MOD 30 MIN: CPT | Performed by: NURSE PRACTITIONER

## 2017-06-08 PROCEDURE — 83516 IMMUNOASSAY NONANTIBODY: CPT | Performed by: NURSE PRACTITIONER

## 2017-06-08 PROCEDURE — 82607 VITAMIN B-12: CPT | Performed by: NURSE PRACTITIONER

## 2017-06-08 PROCEDURE — 0 IOPAMIDOL 41 % SOLUTION: Performed by: PAIN MEDICINE

## 2017-06-08 PROCEDURE — 84439 ASSAY OF FREE THYROXINE: CPT | Performed by: NURSE PRACTITIONER

## 2017-06-08 RX ORDER — LIDOCAINE HYDROCHLORIDE 20 MG/ML
INJECTION, SOLUTION INFILTRATION; PERINEURAL
Status: COMPLETED | OUTPATIENT
Start: 2017-06-08 | End: 2017-06-08

## 2017-06-08 RX ORDER — METHYLPREDNISOLONE ACETATE 80 MG/ML
INJECTION, SUSPENSION INTRA-ARTICULAR; INTRALESIONAL; INTRAMUSCULAR; SOFT TISSUE
Status: COMPLETED | OUTPATIENT
Start: 2017-06-08 | End: 2017-06-08

## 2017-06-08 RX ADMIN — IOPAMIDOL 2 ML: 408 INJECTION, SOLUTION INTRATHECAL at 14:43

## 2017-06-08 RX ADMIN — METHYLPREDNISOLONE ACETATE 80 MG: 80 INJECTION, SUSPENSION INTRA-ARTICULAR; INTRALESIONAL; INTRAMUSCULAR; SOFT TISSUE at 14:43

## 2017-06-08 RX ADMIN — LIDOCAINE HYDROCHLORIDE 2 ML: 20 INJECTION, SOLUTION INFILTRATION; PERINEURAL at 14:42

## 2017-06-08 NOTE — PROGRESS NOTES
Subjective    Rhea Lesley is a 46 y.o. female. she is here today for follow-up.    History of Present Illness     History of Present Illness         45 year old female comes for follow up         #1 Hypothyroidism     Duration - 2013  ====================================  Timing - constant  ====================================  Quality - uncontrolled  ====================================  ====================================  Alleviating Factors      was on levothyroxine  ====================================  Aggravating Factor - noncompliance and now ran out off it for more than 1 month  ====================================  Severity - moderate     ================================================================     #2 Goiter---Total thyroidectomy JUly 14, 2015     Context - During physical exam , goiter was appreciated by Dr. Lockett  ====================================  Duration - May 2013  ====================================  Timing - not constant  ====================================  Quality - controlled  ====================================  Severity - moderate   ====================================  Symptoms - refers pressure sensation / dyspnea when supine, denies dysphagia.     US dated May 2012     Right Lobe 7.9 cm x 2.6 cm x 2.8 cm   Left Lobe 6.5 cm x 2.7 cm x 2.9 cm     Report states that there is a multinodular goiter        I personally reviewed US with Ms. Gallegos.  There are no nodules, Gland is heterogeneous characteristic of Hashimoto's     symptoms were progressing patient had a total thyroidectomy July 14, 2015     she is doing fine, still some hoarseness and tenderness     ==============================================================  Chronic Tobacco use  timing constant  quality states that now only smoking 3 cigarettes   severity - high associated CAD , COPD  aggravating factors -stress     HTN resolved        The following portions of the patient's history were reviewed and updated  as appropriate:   Past Medical History:   Diagnosis Date   • Acquired hypothyroidism    • Acute pharyngitis    • Encounter for health maintenance examination    • Essential hypertension    • Goiter     - total thyroidectomy July 2015   • Hashimoto's thyroiditis    • Headache    • Heart disease    • Hyperglycemia     , unspecified   • Kidney failure    • Low back pain    • Malaise and fatigue    • Otitis externa    • Postoperative hypothyroidism    • Tobacco dependence syndrome    • URI (upper respiratory infection)      Past Surgical History:   Procedure Laterality Date   • APPENDECTOMY     • CARDIAC CATHETERIZATION     • CHOLECYSTECTOMY     • COLON RESECTION      meckel resection   • CORONARY STENT PLACEMENT     • DILATATION AND CURETTAGE     • TOTAL ABDOMINAL HYSTERECTOMY WITH SALPINGO OOPHORECTOMY     • TOTAL THYROIDECTOMY       Family History   Problem Relation Age of Onset   • Diabetes Other    • Hyperlipidemia Other    • Hypertension Other    • Cancer Mother    • Diabetes Mother    • Coronary artery disease Mother    • Kidney disease Mother    • COPD Mother    • Coronary artery disease Father    • Early death Father    • Diabetes Paternal Aunt      OB History     No data available        Current Outpatient Prescriptions   Medication Sig Dispense Refill   • amLODIPine (NORVASC) 10 MG tablet      • aspirin 81 MG chewable tablet Chew 81 mg daily.     • atorvastatin (LIPITOR) 10 MG tablet Take 10 mg by mouth daily. Half a tab q day     • Blood Glucose Monitoring Suppl (FREESTYLE LITE) device      • carvedilol (COREG) 12.5 MG tablet Take 12.5 mg by mouth 2 (Two) Times a Day With Meals.     • FREESTYLE LITE test strip      • glipiZIDE (GLUCOTROL) 5 MG ER tablet daily     • lisinopril (PRINIVIL,ZESTRIL) 20 MG tablet Take 20 mg by mouth Daily.     • sertraline (ZOLOFT) 50 MG tablet daily     • thyroid (ARMOUR THYROID) 180 MG tablet Take 1 tablet by mouth 2 (Two) Times a Day. 60 tablet 6   • traMADol (ULTRAM) 50 MG  tablet prn       No current facility-administered medications for this visit.      Allergies   Allergen Reactions   • Penicillins Swelling     Of the throat   • Adhesive Tape Other (See Comments)     Silk tape- blisters   • Coconut Flavor Swelling   • Latex Hives     Social History     Social History   • Marital status: Legally      Spouse name: N/A   • Number of children: N/A   • Years of education: N/A     Social History Main Topics   • Smoking status: Current Every Day Smoker     Types: Cigarettes   • Smokeless tobacco: Never Used      Comment: 4 daily-- smoking cessation   • Alcohol use No   • Drug use: Yes      Comment: tramadol   • Sexual activity: Defer     Other Topics Concern   • None     Social History Narrative       Review of Systems  Review of Systems   Constitutional: Negative for activity change, appetite change, chills, diaphoresis and fatigue.   HENT: Negative for congestion, dental problem, drooling, ear discharge, ear pain, facial swelling, sneezing, sore throat, tinnitus, trouble swallowing and voice change.    Eyes: Negative for photophobia, pain, discharge, redness, itching and visual disturbance.   Respiratory: Negative for apnea, cough, choking, chest tightness and shortness of breath.    Cardiovascular: Negative for chest pain, palpitations and leg swelling.   Gastrointestinal: Negative for abdominal distention, abdominal pain, constipation, diarrhea, nausea and vomiting.   Endocrine: Negative for cold intolerance, heat intolerance, polydipsia, polyphagia and polyuria.   Genitourinary: Negative for difficulty urinating, dysuria, frequency, hematuria and urgency.   Musculoskeletal: Negative for arthralgias, back pain, gait problem, joint swelling, myalgias, neck pain and neck stiffness.   Skin: Negative for color change, pallor, rash and wound.   Allergic/Immunologic: Negative for environmental allergies, food allergies and immunocompromised state.   Neurological: Negative for  "dizziness, tremors, facial asymmetry, weakness, light-headedness, numbness and headaches.   Hematological: Negative for adenopathy. Does not bruise/bleed easily.   Psychiatric/Behavioral: Negative for agitation, behavioral problems, confusion, decreased concentration and sleep disturbance.        Objective    /78 (BP Location: Left arm, Patient Position: Sitting, Cuff Size: Adult)  Pulse 78  Ht 66\" (167.6 cm)  Wt 266 lb (121 kg)  BMI 42.93 kg/m2  Physical Exam   Constitutional: She is oriented to person, place, and time. She appears well-developed and well-nourished. No distress.   HENT:   Head: Normocephalic and atraumatic.   Right Ear: External ear normal.   Left Ear: External ear normal.   Nose: Nose normal.   Eyes: Conjunctivae and EOM are normal. Pupils are equal, round, and reactive to light.   Neck: Normal range of motion. Neck supple. No tracheal deviation present.   Thyroid surgically absent   Cardiovascular: Normal rate, regular rhythm and normal heart sounds.    No murmur heard.  Pulmonary/Chest: Effort normal and breath sounds normal. No respiratory distress. She has no wheezes.   Abdominal: Soft. Bowel sounds are normal. There is no tenderness. There is no rebound and no guarding.   Musculoskeletal: Normal range of motion. She exhibits no edema, tenderness or deformity.   Neurological: She is alert and oriented to person, place, and time. No cranial nerve deficit.   Skin: Skin is warm and dry. No rash noted.   Psychiatric: She has a normal mood and affect. Her behavior is normal. Judgment and thought content normal.       Lab Review  Glucose   Date Value   02/13/2017 133 mg/dL (H)   01/11/2017 127 mg/dl (H)   05/31/2016 183 mg/dl (H)   07/09/2015 173 mg/dl (H)     Sodium (mmol/L)   Date Value   02/13/2017 139   01/11/2017 141   05/31/2016 140   07/09/2015 138     Potassium (mmol/L)   Date Value   02/13/2017 4.1   01/11/2017 4.2   05/31/2016 3.9   07/09/2015 3.9     Chloride (mmol/L)   Date " Value   02/13/2017 102   01/11/2017 100   05/31/2016 97   07/09/2015 100     CO2 (mmol/L)   Date Value   02/13/2017 30.0   01/11/2017 29   05/31/2016 27   07/09/2015 27     BUN   Date Value   02/13/2017 13 mg/dL   01/11/2017 13 mg/dl   05/31/2016 12 mg/dl   07/09/2015 9 mg/dl     Creatinine   Date Value   02/13/2017 1.42 mg/dL (H)   01/11/2017 1.3 mg/dl (H)   05/31/2016 1.9 mg/dl (H)   07/09/2015 0.8 mg/dl     Hemoglobin A1C   Date Value   02/13/2017 6.38 % (H)   10/06/2016 6.2 %TotHgb (H)     Triglycerides (mg/dL)   Date Value   02/14/2017 98       Assessment/Plan      1. Postsurgical hypothyroidism    2. Uncontrolled type 2 diabetes mellitus with complication, unspecified long term insulin use status    3. Vitamin D deficiency    4. Essential hypertension    5. Other hyperlipidemia    .    Medications prescribed:  Outpatient Encounter Prescriptions as of 6/8/2017   Medication Sig Dispense Refill   • amLODIPine (NORVASC) 10 MG tablet      • aspirin 81 MG chewable tablet Chew 81 mg daily.     • atorvastatin (LIPITOR) 10 MG tablet Take 10 mg by mouth daily. Half a tab q day     • Blood Glucose Monitoring Suppl (FREESTYLE LITE) device      • carvedilol (COREG) 12.5 MG tablet Take 12.5 mg by mouth 2 (Two) Times a Day With Meals.     • FREESTYLE LITE test strip      • glipiZIDE (GLUCOTROL) 5 MG ER tablet daily     • lisinopril (PRINIVIL,ZESTRIL) 20 MG tablet Take 20 mg by mouth Daily.     • sertraline (ZOLOFT) 50 MG tablet daily     • thyroid (ARMOUR THYROID) 180 MG tablet Take 1 tablet by mouth 2 (Two) Times a Day. 60 tablet 6   • traMADol (ULTRAM) 50 MG tablet prn       No facility-administered encounter medications on file as of 6/8/2017.        Orders placed during this encounter include:  Orders Placed This Encounter   Procedures   • Comprehensive Metabolic Panel   • Hemoglobin A1c   • TSH   • Vitamin D 25 Hydroxy   • CBC & Differential     Order Specific Question:   Manual Differential     Answer:   No          Plan Details        Hashimoto's Thyroiditis leading to Hypothyroidism and Goiter---now postoperative hypothyroid     a. Hypothyroidism        I emphasized that if she misses one tablet one day she can take 2 tabs the next day.     Take on an empty stomach        taking 150mcgs one daily      Oct. 2015      TSH --267     states taking every day but taking with other medications      take on empty stomach , at least one hour before      need to change to brand name synthroid due to variable TSH levels      now on generic 300mcgs daily     insurance will not cover brand name     March 2016     TSH - 310     increase to 450mcgs one daily     will try for brand name again  check for celiac   states takes everyday does not miss  discussed with Dr. Pearce     June 2016     TSH - 288     states takes everyday does not miss a dose      keep brand name synthroid     discussed with Dr. Pearce      increase to 600mcgs daily      Oct. 2016     TSH - 282   FT4 - 0.07     Discussed with  and change to [place] thyroid 90 mcg one po BID      Jan. 2017     TSH - 234     Discussed with Dr. Pearce Keep armour thyroid increase to 120 mg BID      If does not work will try tirosint      total thyroidectomy     b. Goiter--removed due to enlarging gland and compressive symptoms      cytology - hashimoto's thyroiditis extensive, bilateral           Lab Results   Component Value Date    .000 (H) 02/13/2017     States has not been taking medicaiton s  -----------     May 2016     Creatinine 1.9     following with nephrology     Following with         ------     HTN -      On amlodipine     On lisinopril/hctz     On coreg      Following with cardiology      ===========     May 2016     bg 183     check HgbA1c      June 2016     HgbA1c% 6.5     Started on glyburide 5mg daily --by primary provider     No lows      Oct. 2016     HgbA1c % 6.2 from Oct. 2016     Lab Results   Component Value Date    HGBA1C 6.38 (H)  02/13/2017          ===============     Sleep study was done -- she does have obstructive sleep apnea      Now on CPAP     Preventive care:     Smoking     Discussed smoking cessation--trying to cut back             labs today and I will call with results        4. Follow-up: Return in about 2 months (around 8/8/2017) for Recheck.

## 2017-06-09 LAB
GLIADIN PEPTIDE IGA SER-ACNC: 4 UNITS (ref 0–19)
IGA SERPL-MCNC: 280 MG/DL (ref 87–352)
TTG IGA SER-ACNC: <2 U/ML (ref 0–3)

## 2017-06-12 ENCOUNTER — TELEPHONE (OUTPATIENT)
Dept: ENDOCRINOLOGY | Facility: CLINIC | Age: 47
End: 2017-06-12

## 2017-06-13 ENCOUNTER — TELEPHONE (OUTPATIENT)
Dept: PAIN MEDICINE | Facility: CLINIC | Age: 47
End: 2017-06-13

## 2017-06-14 ENCOUNTER — TELEPHONE (OUTPATIENT)
Dept: PAIN MEDICINE | Facility: CLINIC | Age: 47
End: 2017-06-14

## 2017-06-14 NOTE — TELEPHONE ENCOUNTER
Called yesterday and today regarding a message that the pt sent in and no one answered the phone either time.

## 2017-07-31 ENCOUNTER — OFFICE VISIT (OUTPATIENT)
Dept: PAIN MEDICINE | Facility: CLINIC | Age: 47
End: 2017-07-31

## 2017-07-31 VITALS
DIASTOLIC BLOOD PRESSURE: 82 MMHG | WEIGHT: 268.9 LBS | SYSTOLIC BLOOD PRESSURE: 138 MMHG | BODY MASS INDEX: 43.21 KG/M2 | HEIGHT: 66 IN

## 2017-07-31 DIAGNOSIS — M79.18 MYOFASCIAL PAIN: ICD-10-CM

## 2017-07-31 DIAGNOSIS — M54.16 LUMBAR RADICULOPATHY: Primary | ICD-10-CM

## 2017-07-31 DIAGNOSIS — M47.817 LUMBOSACRAL SPONDYLOSIS WITHOUT MYELOPATHY: ICD-10-CM

## 2017-07-31 PROCEDURE — 99213 OFFICE O/P EST LOW 20 MIN: CPT | Performed by: PAIN MEDICINE

## 2017-07-31 RX ORDER — TIZANIDINE 2 MG/1
2 TABLET ORAL 3 TIMES DAILY PRN
Qty: 90 TABLET | Refills: 1 | Status: SHIPPED | OUTPATIENT
Start: 2017-07-31 | End: 2017-08-30

## 2017-07-31 NOTE — PROGRESS NOTES
Rhea Sutton is a 46 y.o. female.   1970    HPI:   Location: neck and lower back  Quality: stabbing and sharp  Severity: 10/10  Timing: constant  Alleviating: injection and biofreeze and otc patches  Aggravating: ambulating , increased activity and weather     LESI did not help much.  Low back pain still bothersome.       The following portions of the patient's history were reviewed by me and updated as appropriate: allergies, current medications, past family history, past medical history, past social history, past surgical history and problem list.    Past Medical History:   Diagnosis Date   • Acquired hypothyroidism    • Acute pharyngitis    • Encounter for health maintenance examination    • Essential hypertension    • Goiter     - total thyroidectomy July 2015   • Hashimoto's thyroiditis    • Headache    • Heart disease    • Hyperglycemia     , unspecified   • Kidney failure    • Low back pain    • Malaise and fatigue    • Otitis externa    • Postoperative hypothyroidism    • Tobacco dependence syndrome    • URI (upper respiratory infection)        Social History     Social History   • Marital status:      Spouse name: N/A   • Number of children: N/A   • Years of education: N/A     Occupational History   • Not on file.     Social History Main Topics   • Smoking status: Current Every Day Smoker     Types: Cigarettes   • Smokeless tobacco: Never Used      Comment: 4 daily-- smoking cessation   • Alcohol use No   • Drug use: Yes      Comment: tramadol   • Sexual activity: Defer     Other Topics Concern   • Not on file     Social History Narrative       Family History   Problem Relation Age of Onset   • Diabetes Other    • Hyperlipidemia Other    • Hypertension Other    • Cancer Mother    • Diabetes Mother    • Coronary artery disease Mother    • Kidney disease Mother    • COPD Mother    • Coronary artery disease Father    • Early death Father    • Diabetes Paternal Aunt          Current Outpatient  Prescriptions:   •  amLODIPine (NORVASC) 10 MG tablet, , Disp: , Rfl:   •  aspirin 81 MG chewable tablet, Chew 81 mg daily., Disp: , Rfl:   •  atorvastatin (LIPITOR) 10 MG tablet, Take 10 mg by mouth daily. Half a tab q day, Disp: , Rfl:   •  Blood Glucose Monitoring Suppl (FREESTYLE LITE) device, , Disp: , Rfl:   •  carvedilol (COREG) 12.5 MG tablet, Take 12.5 mg by mouth 2 (Two) Times a Day With Meals., Disp: , Rfl:   •  FREESTYLE LITE test strip, , Disp: , Rfl:   •  glipiZIDE (GLUCOTROL) 5 MG ER tablet, daily, Disp: , Rfl:   •  lisinopril (PRINIVIL,ZESTRIL) 20 MG tablet, Take 20 mg by mouth Daily., Disp: , Rfl:   •  sertraline (ZOLOFT) 50 MG tablet, daily, Disp: , Rfl:   •  thyroid (ARMOUR THYROID) 180 MG tablet, Take 1 tablet by mouth 2 (Two) Times a Day., Disp: 60 tablet, Rfl: 6  •  traMADol (ULTRAM) 50 MG tablet, prn, Disp: , Rfl:   •  tiZANidine (ZANAFLEX) 2 MG tablet, Take 1 tablet by mouth 3 (Three) Times a Day As Needed for Muscle Spasms for up to 30 days., Disp: 90 tablet, Rfl: 1    Allergies   Allergen Reactions   • Penicillins Swelling     Of the throat   • Adhesive Tape Other (See Comments)     Silk tape- blisters   • Coconut Flavor Swelling   • Latex Hives       Review of Systems   Musculoskeletal: Positive for back pain and neck pain.   All other systems reviewed and are negative.    All systems reviewed and negative except for above.    Physical Exam   Constitutional: She appears well-developed and well-nourished. No distress.   Abdominal:   obese   Musculoskeletal:        Lumbar back: She exhibits tenderness.   Neurological: She is alert. She has normal strength.   Mod slr b   Psychiatric: She has a normal mood and affect. Her behavior is normal. Judgment normal.       Rhea was seen today for neck pain and back pain.    Diagnoses and all orders for this visit:    Lumbar radiculopathy    Myofascial pain  -     Inject Trigger Points, > 3    Lumbosacral spondylosis without myelopathy    Other  orders  -     tiZANidine (ZANAFLEX) 2 MG tablet; Take 1 tablet by mouth 3 (Three) Times a Day As Needed for Muscle Spasms for up to 30 days.        Medication: I'll start her on Zanaflex.  2 mg by mouth 3 times a day when necessary.  She was instructed to take only at night for a few times she takes to gauge side effects.    Interventional:  I have discussed the risks and benefits of the procedure with the patient.  Trigger point injections of the lumbar paraspinous musculature.  As well as the gluteus musculature.    Rehab: none at this time.  States she continues to do exercises learned in physical therapy.    Behavioral: No aberrant behavior noted. [facility] Report #[id number]  was reviewed and is consistent with stated history    Urine drug screen None at this time          This document has been electronically signed by Alfonso Bruce MD on July 31, 2017 11:16 AM

## 2017-08-17 LAB
25(OH)D3 SERPL-MCNC: 17.6 NG/ML (ref 30–100)
ALBUMIN SERPL-MCNC: 4 G/DL (ref 3.4–4.8)
ALBUMIN/GLOB SERPL: 1.4 G/DL (ref 1.1–1.8)
ALP SERPL-CCNC: 73 U/L (ref 38–126)
ALT SERPL W P-5'-P-CCNC: 46 U/L (ref 9–52)
ANION GAP SERPL CALCULATED.3IONS-SCNC: 10 MMOL/L (ref 5–15)
AST SERPL-CCNC: 45 U/L (ref 14–36)
BASOPHILS # BLD AUTO: 0.05 10*3/MM3 (ref 0–0.2)
BASOPHILS NFR BLD AUTO: 0.5 % (ref 0–2)
BILIRUB SERPL-MCNC: 0.5 MG/DL (ref 0.2–1.3)
BUN BLD-MCNC: 13 MG/DL (ref 7–21)
BUN/CREAT SERPL: 8.6 (ref 7–25)
CALCIUM SPEC-SCNC: 8.5 MG/DL (ref 8.4–10.2)
CHLORIDE SERPL-SCNC: 98 MMOL/L (ref 95–110)
CO2 SERPL-SCNC: 29 MMOL/L (ref 22–31)
CREAT BLD-MCNC: 1.52 MG/DL (ref 0.5–1)
DEPRECATED RDW RBC AUTO: 50.1 FL (ref 36.4–46.3)
EOSINOPHIL # BLD AUTO: 0.25 10*3/MM3 (ref 0–0.7)
EOSINOPHIL NFR BLD AUTO: 2.4 % (ref 0–7)
ERYTHROCYTE [DISTWIDTH] IN BLOOD BY AUTOMATED COUNT: 14.8 % (ref 11.5–14.5)
GFR SERPL CREATININE-BSD FRML MDRD: 37 ML/MIN/1.73 (ref 58–135)
GLOBULIN UR ELPH-MCNC: 2.9 GM/DL (ref 2.3–3.5)
GLUCOSE BLD-MCNC: 186 MG/DL (ref 60–100)
HBA1C MFR BLD: 6.6 % (ref 4–5.6)
HCT VFR BLD AUTO: 38.7 % (ref 35–45)
HGB BLD-MCNC: 12.9 G/DL (ref 12–15.5)
IMM GRANULOCYTES # BLD: 0.12 10*3/MM3 (ref 0–0.02)
IMM GRANULOCYTES NFR BLD: 1.2 % (ref 0–0.5)
LYMPHOCYTES # BLD AUTO: 2.8 10*3/MM3 (ref 0.6–4.2)
LYMPHOCYTES NFR BLD AUTO: 27.4 % (ref 10–50)
MCH RBC QN AUTO: 30.9 PG (ref 26.5–34)
MCHC RBC AUTO-ENTMCNC: 33.3 G/DL (ref 31.4–36)
MCV RBC AUTO: 92.6 FL (ref 80–98)
MONOCYTES # BLD AUTO: 0.32 10*3/MM3 (ref 0–0.9)
MONOCYTES NFR BLD AUTO: 3.1 % (ref 0–12)
NEUTROPHILS # BLD AUTO: 6.69 10*3/MM3 (ref 2–8.6)
NEUTROPHILS NFR BLD AUTO: 65.4 % (ref 37–80)
PLATELET # BLD AUTO: 284 10*3/MM3 (ref 150–450)
PMV BLD AUTO: 10.2 FL (ref 8–12)
POTASSIUM BLD-SCNC: 4 MMOL/L (ref 3.5–5.1)
PROT SERPL-MCNC: 6.9 G/DL (ref 6.3–8.6)
RBC # BLD AUTO: 4.18 10*6/MM3 (ref 3.77–5.16)
SODIUM BLD-SCNC: 137 MMOL/L (ref 137–145)
TSH SERPL DL<=0.05 MIU/L-ACNC: 237 MIU/ML (ref 0.46–4.68)
WBC NRBC COR # BLD: 10.23 10*3/MM3 (ref 3.2–9.8)

## 2017-08-17 PROCEDURE — 85025 COMPLETE CBC W/AUTO DIFF WBC: CPT | Performed by: NURSE PRACTITIONER

## 2017-08-17 PROCEDURE — 80053 COMPREHEN METABOLIC PANEL: CPT | Performed by: NURSE PRACTITIONER

## 2017-08-17 PROCEDURE — 80050 GENERAL HEALTH PANEL: CPT | Performed by: NURSE PRACTITIONER

## 2017-08-17 PROCEDURE — 82306 VITAMIN D 25 HYDROXY: CPT | Performed by: NURSE PRACTITIONER

## 2017-08-17 PROCEDURE — 36415 COLL VENOUS BLD VENIPUNCTURE: CPT | Performed by: NURSE PRACTITIONER

## 2017-08-17 PROCEDURE — 83036 HEMOGLOBIN GLYCOSYLATED A1C: CPT | Performed by: NURSE PRACTITIONER

## 2017-08-24 ENCOUNTER — OFFICE VISIT (OUTPATIENT)
Dept: ENDOCRINOLOGY | Facility: CLINIC | Age: 47
End: 2017-08-24

## 2017-08-24 VITALS
BODY MASS INDEX: 43.39 KG/M2 | HEIGHT: 66 IN | WEIGHT: 270 LBS | DIASTOLIC BLOOD PRESSURE: 88 MMHG | HEART RATE: 66 BPM | SYSTOLIC BLOOD PRESSURE: 142 MMHG

## 2017-08-24 DIAGNOSIS — E11.9 CONTROLLED TYPE 2 DIABETES MELLITUS WITHOUT COMPLICATION, WITHOUT LONG-TERM CURRENT USE OF INSULIN (HCC): ICD-10-CM

## 2017-08-24 DIAGNOSIS — E78.49 OTHER HYPERLIPIDEMIA: ICD-10-CM

## 2017-08-24 DIAGNOSIS — E55.9 VITAMIN D DEFICIENCY: ICD-10-CM

## 2017-08-24 DIAGNOSIS — I10 ESSENTIAL HYPERTENSION: ICD-10-CM

## 2017-08-24 DIAGNOSIS — E89.0 POSTSURGICAL HYPOTHYROIDISM: Primary | ICD-10-CM

## 2017-08-24 PROCEDURE — 99214 OFFICE O/P EST MOD 30 MIN: CPT | Performed by: NURSE PRACTITIONER

## 2017-08-24 RX ORDER — ATORVASTATIN CALCIUM 40 MG/1
40 TABLET, FILM COATED ORAL NIGHTLY
COMMUNITY
Start: 2017-08-18 | End: 2021-07-19

## 2017-08-24 RX ORDER — CARVEDILOL 25 MG/1
12.5 TABLET ORAL 2 TIMES DAILY
COMMUNITY
Start: 2017-08-03 | End: 2019-02-28

## 2017-08-24 NOTE — PROGRESS NOTES
Subjective    Rhea Lesley is a 46 y.o. female. she is here today for follow-up.    History of Present Illness       45 year old female comes for follow up         #1 Hypothyroidism     Duration - 2013  ====================================  Timing - constant  ====================================  Quality - uncontrolled  ====================================  ====================================  Alleviating Factors      was on levothyroxine  ====================================  Aggravating Factor - noncompliance and now ran out off it for more than 1 month  ====================================  Severity - moderate     ================================================================     #2 Goiter---Total thyroidectomy JUly 14, 2015     Context - During physical exam , goiter was appreciated by Dr. Lockett  ====================================  Duration - May 2013  ====================================  Timing - not constant  ====================================  Quality - controlled  ====================================  Severity - moderate   ====================================  Symptoms - refers pressure sensation / dyspnea when supine, denies dysphagia.     US dated May 2012     Right Lobe 7.9 cm x 2.6 cm x 2.8 cm   Left Lobe 6.5 cm x 2.7 cm x 2.9 cm     Report states that there is a multinodular goiter        I personally reviewed US with Ms. Gallegos.  There are no nodules, Gland is heterogeneous characteristic of Hashimoto's     symptoms were progressing patient had a total thyroidectomy July 14, 2015     she is doing fine, still some hoarseness and tenderness     ==============================================================  Chronic Tobacco use  timing constant  quality states that now only smoking 3 cigarettes   severity - high associated CAD , COPD  aggravating factors -stress     HTN resolved         The following portions of the patient's history were reviewed and updated as appropriate:   Past Medical  History:   Diagnosis Date   • Acquired hypothyroidism    • Acute pharyngitis    • Encounter for health maintenance examination    • Essential hypertension    • Goiter     - total thyroidectomy July 2015   • Hashimoto's thyroiditis    • Headache    • Heart disease    • Hyperglycemia     , unspecified   • Kidney failure    • Low back pain    • Malaise and fatigue    • Otitis externa    • Postoperative hypothyroidism    • Tobacco dependence syndrome    • URI (upper respiratory infection)      Past Surgical History:   Procedure Laterality Date   • APPENDECTOMY     • CARDIAC CATHETERIZATION     • CHOLECYSTECTOMY     • COLON RESECTION      meckel resection   • CORONARY STENT PLACEMENT     • DILATATION AND CURETTAGE     • TOTAL ABDOMINAL HYSTERECTOMY WITH SALPINGO OOPHORECTOMY     • TOTAL THYROIDECTOMY       Family History   Problem Relation Age of Onset   • Diabetes Other    • Hyperlipidemia Other    • Hypertension Other    • Cancer Mother    • Diabetes Mother    • Coronary artery disease Mother    • Kidney disease Mother    • COPD Mother    • Coronary artery disease Father    • Early death Father    • Diabetes Paternal Aunt      OB History     No data available        Current Outpatient Prescriptions   Medication Sig Dispense Refill   • amLODIPine (NORVASC) 10 MG tablet      • aspirin 81 MG chewable tablet Chew 81 mg daily.     • atorvastatin (LIPITOR) 10 MG tablet Take 10 mg by mouth daily. Half a tab q day     • atorvastatin (LIPITOR) 40 MG tablet      • Blood Glucose Monitoring Suppl (FREESTYLE LITE) device      • carvedilol (COREG) 25 MG tablet      • cholecalciferol (VITAMIN D3) 46794 units capsule Take 5 capsules by mouth 1 (One) Time Per Week. 50,000 units once weekly 4 capsule 11   • FREESTYLE LITE test strip      • glipiZIDE (GLUCOTROL) 5 MG ER tablet daily     • lisinopril (PRINIVIL,ZESTRIL) 20 MG tablet Take 20 mg by mouth Daily.     • sertraline (ZOLOFT) 50 MG tablet daily     • thyroid (ARMOUR THYROID) 180  MG tablet Take 1 tablet by mouth 2 (Two) Times a Day. 60 tablet 6   • tiZANidine (ZANAFLEX) 2 MG tablet Take 1 tablet by mouth 3 (Three) Times a Day As Needed for Muscle Spasms for up to 30 days. 90 tablet 1   • traMADol (ULTRAM) 50 MG tablet prn       No current facility-administered medications for this visit.      Allergies   Allergen Reactions   • Penicillins Swelling     Of the throat   • Adhesive Tape Other (See Comments)     Silk tape- blisters   • Coconut Flavor Swelling   • Latex Hives     Social History     Social History   • Marital status:      Spouse name: N/A   • Number of children: N/A   • Years of education: N/A     Social History Main Topics   • Smoking status: Current Every Day Smoker     Types: Cigarettes   • Smokeless tobacco: Never Used      Comment: 4 daily-- smoking cessation   • Alcohol use No   • Drug use: Yes      Comment: tramadol   • Sexual activity: Defer     Other Topics Concern   • None     Social History Narrative       Review of Systems  Review of Systems   Constitutional: Negative for activity change, appetite change, diaphoresis and fatigue.   HENT: Negative for congestion, dental problem, drooling, facial swelling, sneezing, sore throat, tinnitus, trouble swallowing and voice change.    Eyes: Negative for photophobia, pain, discharge, redness, itching and visual disturbance.   Respiratory: Negative for apnea, cough, choking, chest tightness and shortness of breath.    Cardiovascular: Negative for chest pain, palpitations and leg swelling.   Gastrointestinal: Negative for abdominal distention, abdominal pain, constipation, diarrhea, nausea and vomiting.   Endocrine: Negative for cold intolerance, heat intolerance, polydipsia, polyphagia and polyuria.   Genitourinary: Negative for difficulty urinating, dysuria, frequency, hematuria and urgency.   Musculoskeletal: Negative for arthralgias, back pain, gait problem, joint swelling, myalgias, neck pain and neck stiffness.  "  Skin: Negative for color change, pallor, rash and wound.   Allergic/Immunologic: Negative for environmental allergies, food allergies and immunocompromised state.   Neurological: Negative for dizziness, tremors, facial asymmetry, weakness, light-headedness, numbness and headaches.   Hematological: Negative for adenopathy. Does not bruise/bleed easily.   Psychiatric/Behavioral: Negative for agitation, behavioral problems, confusion and sleep disturbance.        Objective    /88 (BP Location: Left arm, Patient Position: Sitting, Cuff Size: Adult)  Pulse 66  Ht 66\" (167.6 cm)  Wt 270 lb (122 kg)  BMI 43.58 kg/m2  Physical Exam   Constitutional: She is oriented to person, place, and time. She appears well-developed and well-nourished. No distress.   HENT:   Head: Normocephalic and atraumatic.   Right Ear: External ear normal.   Left Ear: External ear normal.   Nose: Nose normal.   Eyes: Conjunctivae and EOM are normal. Pupils are equal, round, and reactive to light.   Neck: Normal range of motion. Neck supple. No tracheal deviation present. No thyromegaly present.   Cardiovascular: Normal rate, regular rhythm and normal heart sounds.    No murmur heard.  Pulmonary/Chest: Effort normal and breath sounds normal. No respiratory distress. She has no wheezes.   Abdominal: Soft. Bowel sounds are normal. There is no tenderness. There is no rebound and no guarding.   Musculoskeletal: Normal range of motion. She exhibits no edema, tenderness or deformity.   Neurological: She is alert and oriented to person, place, and time. No cranial nerve deficit.   Skin: Skin is warm and dry. No rash noted.   Psychiatric: She has a normal mood and affect. Her behavior is normal. Judgment and thought content normal.       Lab Review  Glucose (mg/dL)   Date Value   08/17/2017 186 (H)   06/08/2017 123 (H)   02/13/2017 133 (H)     Sodium (mmol/L)   Date Value   08/17/2017 137   06/08/2017 139   02/13/2017 139     Potassium (mmol/L) "   Date Value   08/17/2017 4.0   06/08/2017 3.9   02/13/2017 4.1     Chloride (mmol/L)   Date Value   08/17/2017 98   06/08/2017 98   02/13/2017 102     CO2 (mmol/L)   Date Value   08/17/2017 29.0   06/08/2017 30.0   02/13/2017 30.0     BUN (mg/dL)   Date Value   08/17/2017 13   06/08/2017 13   02/13/2017 13     Creatinine (mg/dL)   Date Value   08/17/2017 1.52 (H)   06/08/2017 1.51 (H)   02/13/2017 1.42 (H)     Hemoglobin A1C   Date Value   08/17/2017 6.6 % (H)   02/13/2017 6.38 % (H)   10/06/2016 6.2 %TotHgb (H)     Triglycerides (mg/dL)   Date Value   02/14/2017 98       Assessment/Plan      1. Postsurgical hypothyroidism    2. Controlled type 2 diabetes mellitus without complication, without long-term current use of insulin    3. Essential hypertension    4. Vitamin D deficiency    5. Other hyperlipidemia    .    Medications prescribed:  Outpatient Encounter Prescriptions as of 8/24/2017   Medication Sig Dispense Refill   • amLODIPine (NORVASC) 10 MG tablet      • aspirin 81 MG chewable tablet Chew 81 mg daily.     • atorvastatin (LIPITOR) 10 MG tablet Take 10 mg by mouth daily. Half a tab q day     • atorvastatin (LIPITOR) 40 MG tablet      • Blood Glucose Monitoring Suppl (FREESTYLE LITE) device      • carvedilol (COREG) 25 MG tablet      • cholecalciferol (VITAMIN D3) 11142 units capsule Take 5 capsules by mouth 1 (One) Time Per Week. 50,000 units once weekly 4 capsule 11   • FREESTYLE LITE test strip      • glipiZIDE (GLUCOTROL) 5 MG ER tablet daily     • lisinopril (PRINIVIL,ZESTRIL) 20 MG tablet Take 20 mg by mouth Daily.     • sertraline (ZOLOFT) 50 MG tablet daily     • thyroid (ARMOUR THYROID) 180 MG tablet Take 1 tablet by mouth 2 (Two) Times a Day. 60 tablet 6   • tiZANidine (ZANAFLEX) 2 MG tablet Take 1 tablet by mouth 3 (Three) Times a Day As Needed for Muscle Spasms for up to 30 days. 90 tablet 1   • traMADol (ULTRAM) 50 MG tablet prn     • [DISCONTINUED] carvedilol (COREG) 12.5 MG tablet Take 12.5  mg by mouth 2 (Two) Times a Day With Meals.       No facility-administered encounter medications on file as of 8/24/2017.        Orders placed during this encounter include:  Orders Placed This Encounter   Procedures   • Comprehensive Metabolic Panel   • TSH   • Celiac Panel Reflex To Titer   • CBC & Differential     Order Specific Question:   Manual Differential     Answer:   No     Plan Details        Hashimoto's Thyroiditis leading to Hypothyroidism and Goiter---now postoperative hypothyroid     a. Hypothyroidism        I emphasized that if she misses one tablet one day she can take 2 tabs the next day.     Take on an empty stomach        taking 150mcgs one daily      Oct. 2015      TSH --267     states taking every day but taking with other medications      take on empty stomach , at least one hour before      need to change to brand name synthroid due to variable TSH levels      now on generic 300mcgs daily     insurance will not cover brand name     March 2016     TSH - 310     increase to 450mcgs one daily     will try for brand name again  check for celiac   states takes everyday does not miss  discussed with Dr. Pearce     June 2016     TSH - 288     states takes everyday does not miss a dose      keep brand name synthroid     discussed with Dr. Pearce      increase to 600mcgs daily      Oct. 2016     TSH - 282   FT4 - 0.07     Discussed with  and change to [place] thyroid 90 mcg one po BID      Jan. 2017     TSH - 234     Discussed with Dr. Pearce Keep armour thyroid increase to 120 mg BID      If does not work will try tirosint      total thyroidectomy     b. Goiter--removed due to enlarging gland and compressive symptoms      cytology - hashimoto's thyroiditis extensive, bilateral                 Lab Results   Component Value Date     .000 (H) 02/13/2017      States has not been taking medicaiton s      Lab Results   Component Value Date    .000 (H) 08/17/2017     Does not want to stop  the armour    Explained importance of taking medication each day    Will continue armour 180 mg bid     If still no improvement she does agree to go back to the synthroid   -----------     May 2016     Creatinine 1.9     following with nephrology     Following with         ------     HTN -      On amlodipine     On lisinopril/hctz     On coreg 25 mg BID     Following with cardiology      ===========     May 2016     bg 183     check HgbA1c      June 2016     HgbA1c% 6.5     Started on glyburide 5mg daily --by primary provider     No lows      Oct. 2016     HgbA1c % 6.2 from Oct. 2016            Lab Results   Component Value Date     HGBA1C 6.38 (H) 02/13/2017            ===============     Sleep study was done -- she does have obstructive sleep apnea      Now on CPAP     Preventive care:     Smoking     Discussed smoking cessation--trying to cut back         Vitamin d def      August 2017    Vitamin d 17    Taking 50,000 units weekly           4. Follow-up: Return in about 7 weeks (around 10/12/2017).

## 2017-08-25 ENCOUNTER — DOCUMENTATION (OUTPATIENT)
Dept: PAIN MEDICINE | Facility: CLINIC | Age: 47
End: 2017-08-25

## 2017-08-25 NOTE — PROGRESS NOTES
"Pt procedure cancelled due to elevated BP and pt states \"I have a lot going on at home\".  Pt instructed to contact PCP if continues pt states she took BP medication today.  "

## 2017-09-05 ENCOUNTER — PROCEDURE VISIT (OUTPATIENT)
Dept: PAIN MEDICINE | Facility: CLINIC | Age: 47
End: 2017-09-05

## 2017-09-05 VITALS
DIASTOLIC BLOOD PRESSURE: 92 MMHG | BODY MASS INDEX: 43.18 KG/M2 | HEIGHT: 66 IN | WEIGHT: 268.7 LBS | SYSTOLIC BLOOD PRESSURE: 140 MMHG

## 2017-09-05 DIAGNOSIS — M79.18 MYOFASCIAL PAIN: Primary | ICD-10-CM

## 2017-09-05 PROCEDURE — 20553 NJX 1/MLT TRIGGER POINTS 3/>: CPT | Performed by: PAIN MEDICINE

## 2017-09-05 NOTE — PROGRESS NOTES
Trigger Point Injection     Pre-operative diagnosis: Myofascial Pain Syndrome.    Post-operative diagnosis: Myofacial Pain Syndrome    After risks and benefits were explained including bleeding, infection, worsening of the pain, damage to the area being injected, weakness, allergic reaction to medications, vascular injection, and nerve damage, signed consent was obtained.  All questions were answered.      The area of the trigger point was identified and the skin prepped chloroprep and allowed to dry.  Next, a 22 gauge 1.5 inch needle was placed in the area of the trigger point.  Once reproduction of the pain was elicited and negative aspiration confirmed, each trigger point was injected with 1ml of the medication listed below and the needle removed.      The patient tolerated the procedure well and there were no complications.      Medication used: 4mg of methylprednisolone per 1 ml of 1% lidocaine.    Trigger points injected: 6      Trigger point(s) location(s):   Left gluteus ×2, right gluteus ×3, right lumbar paraspinous ×1    ndc#[id number]

## 2017-09-28 ENCOUNTER — OFFICE VISIT (OUTPATIENT)
Dept: PAIN MEDICINE | Facility: CLINIC | Age: 47
End: 2017-09-28

## 2017-09-28 VITALS
WEIGHT: 272.1 LBS | HEIGHT: 66 IN | DIASTOLIC BLOOD PRESSURE: 88 MMHG | BODY MASS INDEX: 43.73 KG/M2 | SYSTOLIC BLOOD PRESSURE: 140 MMHG

## 2017-09-28 DIAGNOSIS — M54.50 CHRONIC BILATERAL LOW BACK PAIN WITHOUT SCIATICA: Primary | ICD-10-CM

## 2017-09-28 DIAGNOSIS — M79.18 MYOFASCIAL PAIN: ICD-10-CM

## 2017-09-28 DIAGNOSIS — G89.29 CHRONIC BILATERAL LOW BACK PAIN WITHOUT SCIATICA: Primary | ICD-10-CM

## 2017-09-28 PROCEDURE — 99213 OFFICE O/P EST LOW 20 MIN: CPT | Performed by: PAIN MEDICINE

## 2017-09-28 NOTE — PROGRESS NOTES
Rhea Sutton is a 46 y.o. female.   1970    HPI:   Location: neck and lower back  Quality: stabbing and sharp  Severity: 10/10  Timing: constant  Alleviating: biofreeze and otc patches  Aggravating: ambulating , increased activity and weather     Patient not complaining of leg pain much any longer.  Still having significant back pain.  States trigger point injections didn't help much.  Still there is anything else that can be done.  I reviewed the records and there is no plain film on record.  There is an MRI however.      The following portions of the patient's history were reviewed by me and updated as appropriate: allergies, current medications, past family history, past medical history, past social history, past surgical history and problem list.    Past Medical History:   Diagnosis Date   • Acquired hypothyroidism    • Acute pharyngitis    • Encounter for health maintenance examination    • Essential hypertension    • Goiter     - total thyroidectomy July 2015   • Hashimoto's thyroiditis    • Headache    • Heart disease    • Hyperglycemia     , unspecified   • Kidney failure    • Low back pain    • Malaise and fatigue    • Otitis externa    • Postoperative hypothyroidism    • Tobacco dependence syndrome    • URI (upper respiratory infection)        Social History     Social History   • Marital status:      Spouse name: N/A   • Number of children: N/A   • Years of education: N/A     Occupational History   • Not on file.     Social History Main Topics   • Smoking status: Current Every Day Smoker     Types: Cigarettes   • Smokeless tobacco: Never Used      Comment: 4 daily-- smoking cessation   • Alcohol use No   • Drug use: Yes      Comment: tramadol   • Sexual activity: Defer     Other Topics Concern   • Not on file     Social History Narrative       Family History   Problem Relation Age of Onset   • Diabetes Other    • Hyperlipidemia Other    • Hypertension Other    • Cancer Mother    •  Diabetes Mother    • Coronary artery disease Mother    • Kidney disease Mother    • COPD Mother    • Coronary artery disease Father    • Early death Father    • Diabetes Paternal Aunt          Current Outpatient Prescriptions:   •  amLODIPine (NORVASC) 10 MG tablet, , Disp: , Rfl:   •  aspirin 81 MG chewable tablet, Chew 81 mg daily., Disp: , Rfl:   •  atorvastatin (LIPITOR) 10 MG tablet, Take 10 mg by mouth daily. Half a tab q day, Disp: , Rfl:   •  atorvastatin (LIPITOR) 40 MG tablet, , Disp: , Rfl:   •  Blood Glucose Monitoring Suppl (FREESTYLE LITE) device, , Disp: , Rfl:   •  carvedilol (COREG) 25 MG tablet, 2 (Two) Times a Day., Disp: , Rfl:   •  cholecalciferol (VITAMIN D3) 52950 units capsule, Take 5 capsules by mouth 1 (One) Time Per Week. 50,000 units once weekly (Patient taking differently: Take 5,000 Units by mouth Daily. 50,000 units once weekly), Disp: 4 capsule, Rfl: 11  •  FREESTYLE LITE test strip, , Disp: , Rfl:   •  glipiZIDE (GLUCOTROL) 5 MG ER tablet, daily, Disp: , Rfl:   •  lisinopril (PRINIVIL,ZESTRIL) 20 MG tablet, Take 20 mg by mouth Daily., Disp: , Rfl:   •  sertraline (ZOLOFT) 50 MG tablet, daily, Disp: , Rfl:   •  thyroid (ARMOUR THYROID) 180 MG tablet, Take 1 tablet by mouth 2 (Two) Times a Day., Disp: 60 tablet, Rfl: 6  •  traMADol (ULTRAM) 50 MG tablet, prn, Disp: , Rfl:     Allergies   Allergen Reactions   • Penicillins Swelling     Of the throat   • Adhesive Tape Other (See Comments)     Silk tape- blisters   • Coconut Flavor Swelling   • Latex Hives       Review of Systems   Musculoskeletal: Positive for back pain (lower) and neck pain.   All other systems reviewed and are negative.    All systems reviewed and negative except for above.    Physical Exam   Constitutional: She appears well-developed and well-nourished. No distress.   Abdominal:   obese   Musculoskeletal:        Lumbar back: She exhibits decreased range of motion (45 degrees of flexion with 5° of extension with  bilateral facet loading today.) and tenderness.   Neurological: She is alert. She has normal strength.   Negative slr b   Psychiatric: She has a normal mood and affect. Her behavior is normal. Judgment normal.       Rhea was seen today for neck pain and back pain.    Diagnoses and all orders for this visit:    Chronic bilateral low back pain without sciatica  -     XR Spine Lumbar 4+ View; Future    Myofascial pain        Medication: None at this time.  I will order a compounded cream to apply to the affected area.  I will do not ibuprofen as patient states that she wants to avoid this as much as possible.  This is due to her kidney issues.    Interventional: none at this time.  I would like to review the low back plain films.  If there is any evidence of joint pathology is a treatable perform lumbar medial branch blocks.  We know seem to be able get much relief with trigger point injections or epidurals.    Rehab: none at this time has undergone physical therapy for her low back pain previously without much relief.  She states she still does these exercises at home.    Behavioral: No aberrant behavior noted. [facility] Report #[id number] 5/2/70  was reviewed and is consistent with stated history    Urine drug screen None at this time          This document has been electronically signed by Alfonso Bruce MD on September 28, 2017 8:24 AM

## 2017-10-12 ENCOUNTER — HOSPITAL ENCOUNTER (INPATIENT)
Facility: HOSPITAL | Age: 47
LOS: 3 days | Discharge: HOME | End: 2017-10-15
Attending: FAMILY MEDICINE | Admitting: FAMILY MEDICINE

## 2017-10-12 PROBLEM — I21.4 NSTEMI (NON-ST ELEVATED MYOCARDIAL INFARCTION) (HCC): Status: ACTIVE | Noted: 2017-10-12

## 2017-10-12 LAB
ALBUMIN SERPL-MCNC: 4 G/DL (ref 3.4–4.8)
ALBUMIN/GLOB SERPL: 1.2 G/DL (ref 1.1–1.8)
ALP SERPL-CCNC: 93 U/L (ref 38–126)
ALT SERPL W P-5'-P-CCNC: 144 U/L (ref 9–52)
AMPHET+METHAMPHET UR QL: NEGATIVE
ANION GAP SERPL CALCULATED.3IONS-SCNC: 10 MMOL/L (ref 5–15)
ARTICHOKE IGE QN: 127 MG/DL (ref 1–129)
AST SERPL-CCNC: 162 U/L (ref 14–36)
BARBITURATES UR QL SCN: NEGATIVE
BASOPHILS # BLD AUTO: 0.05 10*3/MM3 (ref 0–0.2)
BASOPHILS NFR BLD AUTO: 0.6 % (ref 0–2)
BENZODIAZ UR QL SCN: NEGATIVE
BILIRUB SERPL-MCNC: 0.5 MG/DL (ref 0.2–1.3)
BILIRUB UR QL STRIP: NEGATIVE
BUN BLD-MCNC: 11 MG/DL (ref 7–21)
BUN/CREAT SERPL: 10.6 (ref 7–25)
CALCIUM SPEC-SCNC: 8.5 MG/DL (ref 8.4–10.2)
CANNABINOIDS SERPL QL: NEGATIVE
CHLORIDE SERPL-SCNC: 102 MMOL/L (ref 95–110)
CHOLEST SERPL-MCNC: 208 MG/DL (ref 0–199)
CLARITY UR: CLEAR
CO2 SERPL-SCNC: 29 MMOL/L (ref 22–31)
COCAINE UR QL: NEGATIVE
COLOR UR: YELLOW
CREAT BLD-MCNC: 1.04 MG/DL (ref 0.5–1)
D-DIMER, QUANTITATIVE (MAD,POW, STR): 433 NG/ML (FEU) (ref 0–470)
DEPRECATED RDW RBC AUTO: 45.8 FL (ref 36.4–46.3)
EOSINOPHIL # BLD AUTO: 0.26 10*3/MM3 (ref 0–0.7)
EOSINOPHIL NFR BLD AUTO: 2.9 % (ref 0–7)
ERYTHROCYTE [DISTWIDTH] IN BLOOD BY AUTOMATED COUNT: 13.7 % (ref 11.5–14.5)
GFR SERPL CREATININE-BSD FRML MDRD: 57 ML/MIN/1.73 (ref 58–135)
GLOBULIN UR ELPH-MCNC: 3.3 GM/DL (ref 2.3–3.5)
GLUCOSE BLD-MCNC: 102 MG/DL (ref 60–100)
GLUCOSE BLDC GLUCOMTR-MCNC: 121 MG/DL (ref 70–130)
GLUCOSE UR STRIP-MCNC: NEGATIVE MG/DL
HCT VFR BLD AUTO: 35.9 % (ref 35–45)
HDLC SERPL-MCNC: 53 MG/DL (ref 60–200)
HGB BLD-MCNC: 11.8 G/DL (ref 12–15.5)
HGB UR QL STRIP.AUTO: NEGATIVE
HOLD SPECIMEN: NORMAL
IMM GRANULOCYTES # BLD: 0.08 10*3/MM3 (ref 0–0.02)
IMM GRANULOCYTES NFR BLD: 0.9 % (ref 0–0.5)
KETONES UR QL STRIP: NEGATIVE
LDLC/HDLC SERPL: 2.62 {RATIO} (ref 0–3.22)
LEUKOCYTE ESTERASE UR QL STRIP.AUTO: NEGATIVE
LYMPHOCYTES # BLD AUTO: 2.45 10*3/MM3 (ref 0.6–4.2)
LYMPHOCYTES NFR BLD AUTO: 27 % (ref 10–50)
MCH RBC QN AUTO: 30.3 PG (ref 26.5–34)
MCHC RBC AUTO-ENTMCNC: 32.9 G/DL (ref 31.4–36)
MCV RBC AUTO: 92.1 FL (ref 80–98)
METHADONE UR QL SCN: NEGATIVE
MONOCYTES # BLD AUTO: 0.86 10*3/MM3 (ref 0–0.9)
MONOCYTES NFR BLD AUTO: 9.5 % (ref 0–12)
NEUTROPHILS # BLD AUTO: 5.36 10*3/MM3 (ref 2–8.6)
NEUTROPHILS NFR BLD AUTO: 59.1 % (ref 37–80)
NITRITE UR QL STRIP: NEGATIVE
NT-PROBNP SERPL-MCNC: 512 PG/ML (ref 0–450)
OPIATES UR QL: POSITIVE
OXYCODONE UR QL SCN: NEGATIVE
PH UR STRIP.AUTO: 5.5 [PH] (ref 5–9)
PLATELET # BLD AUTO: 224 10*3/MM3 (ref 150–450)
PMV BLD AUTO: 9.8 FL (ref 8–12)
POTASSIUM BLD-SCNC: 3.7 MMOL/L (ref 3.5–5.1)
PROT SERPL-MCNC: 7.3 G/DL (ref 6.3–8.6)
PROT UR QL STRIP: NEGATIVE
RBC # BLD AUTO: 3.9 10*6/MM3 (ref 3.77–5.16)
SODIUM BLD-SCNC: 141 MMOL/L (ref 137–145)
SP GR UR STRIP: 1.02 (ref 1–1.03)
TRIGL SERPL-MCNC: 82 MG/DL (ref 20–199)
TROPONIN I SERPL-MCNC: 0.04 NG/ML
TROPONIN I SERPL-MCNC: 0.06 NG/ML
UROBILINOGEN UR QL STRIP: NORMAL
WBC NRBC COR # BLD: 9.06 10*3/MM3 (ref 3.2–9.8)
WHOLE BLOOD HOLD SPECIMEN: NORMAL

## 2017-10-12 PROCEDURE — 80307 DRUG TEST PRSMV CHEM ANLYZR: CPT | Performed by: FAMILY MEDICINE

## 2017-10-12 PROCEDURE — 81003 URINALYSIS AUTO W/O SCOPE: CPT | Performed by: FAMILY MEDICINE

## 2017-10-12 PROCEDURE — 85025 COMPLETE CBC W/AUTO DIFF WBC: CPT | Performed by: FAMILY MEDICINE

## 2017-10-12 PROCEDURE — 25010000002 HYDRALAZINE PER 20 MG: Performed by: FAMILY MEDICINE

## 2017-10-12 PROCEDURE — 93005 ELECTROCARDIOGRAM TRACING: CPT | Performed by: FAMILY MEDICINE

## 2017-10-12 PROCEDURE — 80061 LIPID PANEL: CPT | Performed by: FAMILY MEDICINE

## 2017-10-12 PROCEDURE — 85379 FIBRIN DEGRADATION QUANT: CPT | Performed by: FAMILY MEDICINE

## 2017-10-12 PROCEDURE — 93010 ELECTROCARDIOGRAM REPORT: CPT | Performed by: INTERNAL MEDICINE

## 2017-10-12 PROCEDURE — 25010000002 ONDANSETRON PER 1 MG: Performed by: FAMILY MEDICINE

## 2017-10-12 PROCEDURE — 82962 GLUCOSE BLOOD TEST: CPT

## 2017-10-12 PROCEDURE — 25010000002 HEPARIN (PORCINE) PER 1000 UNITS: Performed by: FAMILY MEDICINE

## 2017-10-12 PROCEDURE — 80053 COMPREHEN METABOLIC PANEL: CPT | Performed by: FAMILY MEDICINE

## 2017-10-12 PROCEDURE — 84484 ASSAY OF TROPONIN QUANT: CPT | Performed by: FAMILY MEDICINE

## 2017-10-12 PROCEDURE — 83880 ASSAY OF NATRIURETIC PEPTIDE: CPT | Performed by: FAMILY MEDICINE

## 2017-10-12 RX ORDER — SODIUM CHLORIDE 0.9 % (FLUSH) 0.9 %
1-10 SYRINGE (ML) INJECTION AS NEEDED
Status: DISCONTINUED | OUTPATIENT
Start: 2017-10-12 | End: 2017-10-15 | Stop reason: HOSPADM

## 2017-10-12 RX ORDER — NICOTINE POLACRILEX 4 MG
15 LOZENGE BUCCAL
Status: DISCONTINUED | OUTPATIENT
Start: 2017-10-12 | End: 2017-10-15 | Stop reason: HOSPADM

## 2017-10-12 RX ORDER — ONDANSETRON 2 MG/ML
4 INJECTION INTRAMUSCULAR; INTRAVENOUS EVERY 6 HOURS PRN
Status: DISCONTINUED | OUTPATIENT
Start: 2017-10-12 | End: 2017-10-15 | Stop reason: HOSPADM

## 2017-10-12 RX ORDER — MELATONIN
1000 DAILY
COMMUNITY

## 2017-10-12 RX ORDER — HEPARIN SODIUM 5000 [USP'U]/ML
5000 INJECTION, SOLUTION INTRAVENOUS; SUBCUTANEOUS EVERY 8 HOURS SCHEDULED
Status: DISCONTINUED | OUTPATIENT
Start: 2017-10-12 | End: 2017-10-15 | Stop reason: HOSPADM

## 2017-10-12 RX ORDER — PANTOPRAZOLE SODIUM 40 MG/1
40 TABLET, DELAYED RELEASE ORAL
Status: DISCONTINUED | OUTPATIENT
Start: 2017-10-13 | End: 2017-10-15 | Stop reason: HOSPADM

## 2017-10-12 RX ORDER — LISINOPRIL 20 MG/1
20 TABLET ORAL DAILY
Status: DISCONTINUED | OUTPATIENT
Start: 2017-10-12 | End: 2017-10-15 | Stop reason: HOSPADM

## 2017-10-12 RX ORDER — NICOTINE 21 MG/24HR
1 PATCH, TRANSDERMAL 24 HOURS TRANSDERMAL EVERY 24 HOURS
Status: DISCONTINUED | OUTPATIENT
Start: 2017-10-12 | End: 2017-10-15 | Stop reason: HOSPADM

## 2017-10-12 RX ORDER — HYDRALAZINE HYDROCHLORIDE 20 MG/ML
10 INJECTION INTRAMUSCULAR; INTRAVENOUS ONCE
Status: COMPLETED | OUTPATIENT
Start: 2017-10-12 | End: 2017-10-12

## 2017-10-12 RX ORDER — NALOXONE HCL 0.4 MG/ML
0.4 VIAL (ML) INJECTION
Status: DISCONTINUED | OUTPATIENT
Start: 2017-10-12 | End: 2017-10-14

## 2017-10-12 RX ORDER — MORPHINE SULFATE 10 MG/ML
1 INJECTION INTRAMUSCULAR; INTRAVENOUS; SUBCUTANEOUS EVERY 4 HOURS PRN
Status: DISCONTINUED | OUTPATIENT
Start: 2017-10-12 | End: 2017-10-14

## 2017-10-12 RX ORDER — DEXTROSE MONOHYDRATE 25 G/50ML
25 INJECTION, SOLUTION INTRAVENOUS
Status: DISCONTINUED | OUTPATIENT
Start: 2017-10-12 | End: 2017-10-15 | Stop reason: HOSPADM

## 2017-10-12 RX ORDER — SODIUM CHLORIDE 9 MG/ML
100 INJECTION, SOLUTION INTRAVENOUS CONTINUOUS
Status: DISCONTINUED | OUTPATIENT
Start: 2017-10-12 | End: 2017-10-13 | Stop reason: DRUGHIGH

## 2017-10-12 RX ORDER — ATORVASTATIN CALCIUM 40 MG/1
40 TABLET, FILM COATED ORAL NIGHTLY
Status: DISCONTINUED | OUTPATIENT
Start: 2017-10-12 | End: 2017-10-15 | Stop reason: HOSPADM

## 2017-10-12 RX ORDER — AMLODIPINE BESYLATE 10 MG/1
10 TABLET ORAL
Status: DISCONTINUED | OUTPATIENT
Start: 2017-10-12 | End: 2017-10-15 | Stop reason: HOSPADM

## 2017-10-12 RX ORDER — LEVOTHYROXINE AND LIOTHYRONINE 19; 4.5 UG/1; UG/1
180 TABLET ORAL 2 TIMES DAILY
Status: DISCONTINUED | OUTPATIENT
Start: 2017-10-12 | End: 2017-10-15 | Stop reason: HOSPADM

## 2017-10-12 RX ORDER — ASPIRIN 81 MG/1
81 TABLET, CHEWABLE ORAL DAILY
Status: DISCONTINUED | OUTPATIENT
Start: 2017-10-13 | End: 2017-10-15 | Stop reason: HOSPADM

## 2017-10-12 RX ORDER — MELATONIN
1000 DAILY
Status: DISCONTINUED | OUTPATIENT
Start: 2017-10-12 | End: 2017-10-15 | Stop reason: HOSPADM

## 2017-10-12 RX ORDER — NITROGLYCERIN 0.4 MG/1
0.4 TABLET SUBLINGUAL
Status: DISCONTINUED | OUTPATIENT
Start: 2017-10-12 | End: 2017-10-15 | Stop reason: HOSPADM

## 2017-10-12 RX ORDER — CARVEDILOL 25 MG/1
25 TABLET ORAL EVERY 12 HOURS SCHEDULED
Status: DISCONTINUED | OUTPATIENT
Start: 2017-10-12 | End: 2017-10-15 | Stop reason: HOSPADM

## 2017-10-12 RX ADMIN — LISINOPRIL 20 MG: 20 TABLET ORAL at 15:10

## 2017-10-12 RX ADMIN — SODIUM CHLORIDE 100 ML/HR: 9 INJECTION, SOLUTION INTRAVENOUS at 14:48

## 2017-10-12 RX ADMIN — HYDRALAZINE HYDROCHLORIDE 10 MG: 20 INJECTION INTRAMUSCULAR; INTRAVENOUS at 16:51

## 2017-10-12 RX ADMIN — CARVEDILOL 25 MG: 25 TABLET, FILM COATED ORAL at 20:11

## 2017-10-12 RX ADMIN — ATORVASTATIN CALCIUM 40 MG: 40 TABLET, FILM COATED ORAL at 20:11

## 2017-10-12 RX ADMIN — HEPARIN SODIUM 5000 UNITS: 5000 INJECTION, SOLUTION INTRAVENOUS; SUBCUTANEOUS at 14:51

## 2017-10-12 RX ADMIN — AMLODIPINE BESYLATE 10 MG: 10 TABLET ORAL at 15:10

## 2017-10-12 RX ADMIN — ONDANSETRON 4 MG: 2 INJECTION INTRAMUSCULAR; INTRAVENOUS at 19:06

## 2017-10-12 RX ADMIN — THYROID, PORCINE 180 MG: 30 TABLET ORAL at 17:34

## 2017-10-12 RX ADMIN — VITAMIN D, TAB 1000IU (100/BT) 1000 UNITS: 25 TAB at 15:10

## 2017-10-12 NOTE — PLAN OF CARE
Problem: Patient Care Overview (Adult)  Goal: Plan of Care Review  Outcome: Ongoing (interventions implemented as appropriate)    10/12/17 1735   Coping/Psychosocial Response Interventions   Plan Of Care Reviewed With patient   Patient Care Overview   Progress improving   Outcome Evaluation   Outcome Summary/Follow up Plan no c/o chest pain since admission       Goal: Adult Individualization and Mutuality  Outcome: Ongoing (interventions implemented as appropriate)    10/12/17 4885   Individualization   Patient Specific Preferences pt likes grape juice   Patient Specific Goals to go home asap   Patient Specific Interventions pt requires reassurance,          Problem: Acute Coronary Syndrome (ACS) (Adult)  Goal: Signs and Symptoms of Listed Potential Problems Will be Absent or Manageable (Acute Coronary Syndrome)  Outcome: Ongoing (interventions implemented as appropriate)    Problem: Pain, Chronic (Adult)  Goal: Identify Related Risk Factors and Signs and Symptoms  Outcome: Ongoing (interventions implemented as appropriate)  Goal: Acceptable Pain Control/Comfort Level  Outcome: Ongoing (interventions implemented as appropriate)

## 2017-10-12 NOTE — PAYOR COMM NOTE
"Rhea Davies (46 y.o. Female)     Date of Birth Social Security Number Address Home Phone MRN    1970  [address] [place] [address] [phone] [id number]    Shinto Marital Status          Catholic        Admission Date Admission Type Admitting Provider Attending Provider Department, Room/Bed    10/12/17 Urgent Matthew Shaw MD Iqbal, Syed Javed, MD [facility] STEPDOWN UNIT, 318/1    Discharge Date Discharge Disposition Discharge Destination                      Attending Provider: Matthew Shaw MD     Allergies:  Penicillins, Adhesive Tape, Coconut Flavor, Latex    Isolation:  None   Infection:  None   Code Status:  FULL    Ht:  67\" (170.2 cm)   Wt:  269 lb (122 kg)    Admission Cmt:  None   Principal Problem:  None                Active Insurance as of 10/12/2017     Primary Coverage     Payor Plan Insurance Group Employer/Plan Group    [employer] [place] [employer] [place]ICA[place]      Payor Plan Address Payor Plan Phone Number Effective From Effective To    [address] [address] [phone] 8/11/2016     [place] [address]       Subscriber Name Subscriber Birth Date Member ID       RHEA DAVIES 1970 [id number]                 Emergency Contacts      (Rel.) Home Phone Work Phone Mobile Phone    Danielle Machuca (Grandparent) [phone] -- --            History & Physical     No notes of this type exist for this encounter.        Emergency Department Notes     No notes of this type exist for this encounter.        Hospital Medications (active)       Dose Frequency Start End    amLODIPine (NORVASC) tablet 10 mg 10 mg Every 24 Hours Scheduled 10/12/2017     Sig - Route: Take 1 tablet by mouth Daily. - Oral    aspirin chewable tablet 81 mg 81 mg Daily 10/13/2017     Sig - Route: Chew 1 tablet Daily. - Oral    atorvastatin (LIPITOR) tablet 40 mg 40 mg Nightly 10/12/2017     Sig - Route: Take 1 tablet by mouth Every Night. - Oral    " "carvedilol (COREG) tablet 25 mg 25 mg Every 12 Hours Scheduled 10/12/2017     Sig - Route: Take 1 tablet by mouth Every 12 (Twelve) Hours. - Oral    cholecalciferol (VITAMIN D3) tablet 1,000 Units 1,000 Units Daily 10/12/2017     Sig - Route: Take 1 tablet by mouth Daily. - Oral    dextrose (D50W) solution 25 g 25 g Every 15 Minutes PRN 10/12/2017     Sig - Route: Infuse 50 mL into a venous catheter Every 15 (Fifteen) Minutes As Needed for Low Blood Sugar (Blood Sugar Less Than 70, Patient Has IV Access - Unresponsive, NPO or Unable To Safely Swallow). - Intravenous    dextrose (GLUTOSE) oral gel 15 g 15 g Every 15 Minutes PRN 10/12/2017     Sig - Route: Take 15 g by mouth Every 15 (Fifteen) Minutes As Needed for Low Blood Sugar (Blood Sugar Less Than 70, Patient Alert, Is Not NPO & Can Safely Swallow). - Oral    glucagon (human recombinant) (GLUCAGEN DIAGNOSTIC) injection 1 mg 1 mg Every 15 Minutes PRN 10/12/2017     Sig - Route: Inject 1 mg under the skin Every 15 (Fifteen) Minutes As Needed (Blood Glucose Less Than 70 - Patient Without IV Access - Unresponsive, NPO or Unable To Safely Swallow). - Subcutaneous    heparin (porcine) 5000 UNIT/ML injection 5,000 Units 5,000 Units Every 8 Hours Scheduled 10/12/2017     Sig - Route: Inject 1 mL under the skin Every 8 (Eight) Hours. - Subcutaneous    insulin aspart (novoLOG) injection 0-7 Units 0-7 Units 4 Times Daily Before Meals & Nightly 10/12/2017     Sig - Route: Inject 0-7 Units under the skin 4 (Four) Times a Day Before Meals & at Bedtime. - Subcutaneous    lisinopril (PRINIVIL,ZESTRIL) tablet 20 mg 20 mg Daily 10/12/2017     Sig - Route: Take 1 tablet by mouth Daily. - Oral    Morphine injection 1 mg 1 mg Every 4 Hours PRN 10/12/2017 10/22/2017    Sig - Route: Infuse 0.1 mL into a venous catheter Every 4 (Four) Hours As Needed for Moderate Pain . - Intravenous    Linked Group 1:  \"And\" Linked Group Details        naloxone (NARCAN) injection 0.4 mg 0.4 mg Every 5 " "Minutes PRN 10/12/2017     Sig - Route: Infuse 1 mL into a venous catheter Every 5 (Five) Minutes As Needed for Respiratory Depression. - Intravenous    Linked Group 1:  \"And\" Linked Group Details        nicotine (NICODERM CQ) 21 MG/24HR patch 1 patch 1 patch Every 24 Hours 10/12/2017     Sig - Route: Place 1 patch on the skin Daily. - Transdermal    nitroglycerin (NITROSTAT) SL tablet 0.4 mg 0.4 mg Every 5 Minutes PRN 10/12/2017     Sig - Route: Place 1 tablet under the tongue Every 5 (Five) Minutes As Needed for Chest Pain. - Sublingual    pantoprazole (PROTONIX) EC tablet 40 mg 40 mg Every Early Morning 10/13/2017     Sig - Route: Take 1 tablet by mouth Every Morning. - Oral    sodium chloride 0.9 % flush 1-10 mL 1-10 mL As Needed 10/12/2017     Sig - Route: Infuse 1-10 mL into a venous catheter As Needed for Line Care. - Intravenous    sodium chloride 0.9 % flush 1-10 mL 1-10 mL As Needed 10/12/2017     Sig - Route: Infuse 1-10 mL into a venous catheter As Needed for Line Care. - Intravenous    sodium chloride 0.9 % infusion 100 mL/hr Continuous 10/12/2017     Sig - Route: Infuse 100 mL/hr into a venous catheter Continuous. - Intravenous    Thyroid (ARMOUR) tablet 180 mg 180 mg 2 Times Daily 10/12/2017     Sig - Route: Take 6 tablets by mouth 2 (Two) Times a Day. - Oral          Orders (last 24 hrs)     Start     Ordered    10/13/17 0900  aspirin chewable tablet 81 mg  Daily      10/12/17 1415    10/13/17 0600  Magnesium  Morning Draw      10/12/17 1415    10/13/17 0600  pantoprazole (PROTONIX) EC tablet 40 mg  Every Early Morning      10/12/17 1415    10/12/17 2100  atorvastatin (LIPITOR) tablet 40 mg  Nightly      10/12/17 1415    10/12/17 2100  carvedilol (COREG) tablet 25 mg  Every 12 Hours Scheduled      10/12/17 1415    10/12/17 1800  Thyroid (ARMOUR) tablet 180 mg  2 Times Daily      10/12/17 1415    10/12/17 1730  insulin aspart (novoLOG) injection 0-7 Units  4 Times Daily Before Meals & Nightly      " 10/12/17 1415    10/12/17 1700  POC Glucose Fingerstick  4 Times Daily Before Meals & at Bedtime      10/12/17 1415    10/12/17 1604  POC Glucose Fingerstick  Once      10/12/17 1603    10/12/17 1600  Vital Signs  Every 4 Hours      10/12/17 1415    10/12/17 1527  NPO Diet  Diet Effective Now      10/12/17 1526    10/12/17 1504  D-dimer, Quantitative  STAT      10/12/17 1503    10/12/17 1500  amLODIPine (NORVASC) tablet 10 mg  Every 24 Hours Scheduled      10/12/17 1415    10/12/17 1500  lisinopril (PRINIVIL,ZESTRIL) tablet 20 mg  Daily      10/12/17 1415    10/12/17 1500  cholecalciferol (VITAMIN D3) tablet 1,000 Units  Daily      10/12/17 1415    10/12/17 1500  sodium chloride 0.9 % infusion  Continuous      10/12/17 1415    10/12/17 1500  heparin (porcine) 5000 UNIT/ML injection 5,000 Units  Every 8 Hours Scheduled      10/12/17 1415    10/12/17 1500  Strict Intake and Output  Every Hour      10/12/17 1415    10/12/17 1500  nicotine (NICODERM CQ) 21 MG/24HR patch 1 patch  Every 24 Hours      10/12/17 1415    10/12/17 1441  Extra Tubes  Once      10/12/17 1440    10/12/17 1441  Light Blue Top  PROCEDURE ONCE      10/12/17 1440    10/12/17 1441  Gold Top - SST  PROCEDURE ONCE      10/12/17 1440    10/12/17 1416  Remove & Replace Compression Stockings (TEDS) Daily  Daily      10/12/17 1415    10/12/17 1416  Daily Weights  Daily      10/12/17 1415    10/12/17 1416  CBC & Differential  Daily      10/12/17 1415    10/12/17 1416  Basic Metabolic Panel  Daily      10/12/17 1415    10/12/17 1416  CBC Auto Differential  PROCEDURE ONCE,   Status:  Canceled      10/12/17 1415    10/12/17 1416  BNP  STAT      10/12/17 1416    10/12/17 1416  ECG 12 Lead  STAT      10/12/17 1416    10/12/17 1415  CBC Auto Differential  STAT      10/12/17 1415    10/12/17 1415  Lipid Panel  STAT      10/12/17 1415    10/12/17 1415  Troponin  STAT,   Status:  Canceled      10/12/17 1415    10/12/17 1415  Urine Drug Screen - Urine, Clean Catch   STAT      10/12/17 1415    10/12/17 1415  Urinalysis With / Microscopic If Indicated - Urine, Clean Catch  STAT      10/12/17 1415    10/12/17 1415  Comprehensive Metabolic Panel  STAT      10/12/17 1415    10/12/17 1414  Morphine injection 1 mg  Every 4 Hours PRN      10/12/17 1415    10/12/17 1414  naloxone (NARCAN) injection 0.4 mg  Every 5 Minutes PRN      10/12/17 1415    10/12/17 1414  Place Sequential Compression Device  Once      10/12/17 1415    10/12/17 1414  Maintain Sequential Compression Device  Continuous      10/12/17 1415    10/12/17 1414  Place Venous Foot Pump  Once      10/12/17 1415    10/12/17 1414  Maintain Venous Foot Pump  Continuous      10/12/17 1415    10/12/17 1414  Cardiac Monitoring  Continuous      10/12/17 1415    10/12/17 1413  VTE Risk Assessment - Low Risk  Once      10/12/17 1415    10/12/17 1413  Full Code  Continuous      10/12/17 1415    10/12/17 1413  Place Compression Stockings (TEDs)  Once      10/12/17 1415    10/12/17 1412  Do NOT Hold Basal Insulin When Patient is NPO, Hold Bolus Dose if NPO  Continuous      10/12/17 1415    10/12/17 1412  Follow [facility] Hypoglycemia Protocol For Blood Glucose Less Than 70 mg/dL  Until Discontinued      10/12/17 1415    10/12/17 1412  Hypoglycemia Treatment - Alert Patient That is Not NPO and Can Safely Swallow  Until Discontinued     Comments:  Administer 4 oz Fruit Juice OR 4 oz Regular Soda OR 8 oz Milk OR 15-30 grams (1 tube) of Glucose Gel  Recheck Blood Glucose 15 Minutes After Ingestion, Repeat Treatment & Continue to Recheck Blood Sugar Every 15 Minutes Until Blood Glucose is 70 or Higher  Once Blood Glucose is 70 or Higher, Give Snack (Peanut Butter & Crackers) if Next Meal Will Be Given in More Than 60 Minutes, Provide Meal Tray As Soon As Possible    10/12/17 1415    10/12/17 1412  Hypoglycemia Treatment - Patient Has IV Access - Unresponsive, NPO or Unable To Safely Swallow  Until Discontinued     Comments:  Administer 25g D50W IV  Push (1 Whole Vial)  Recheck Blood Glucose 15 Minutes After Administration, if Blood Glucose Remains Less Than 70, Repeat Treatment   Recheck Blood Glucose 15 Minutes After 2nd Administration, if Blood Glucose Remains Less Than 70 After 2nd Dose of D50W Contact Provider for Further Treatment Orders & Consider Adding IVF With D5 for Maintenance    10/12/17 1415    10/12/17 1412  Hypoglycemia Treatment - Patient Without IV Access - Unresponsive, NPO or Unable To Safely Swallow  Until Discontinued     Comments:  Administer 1mg Glucagon SQ & Establish IV Access  Turn Patient on Side - Nausea / Vomiting May Occur  Recheck Blood Glucose 15 Minutes After Administration  If IV Access Has Not Been Established & Blood Glucose Remains Less Than 70, Repeat Treatment With Glucagon  If IV Access Established, Administer 25g D50W IV Push (1 Whole Vial)  Recheck Blood Glucose 15 Minutes After Administration of 2nd Medication, if Blood Glucose Remains Less Than 70, Contact Provider for Further Treatment Orders & Consider Adding IVF With D5 for Maintenance    10/12/17 1415    10/12/17 1412  Notify Physician For Unrelieved Chest Pain  Until Discontinued      10/12/17 1415    10/12/17 1412  Oxygen Therapy-  Continuous      10/12/17 1415    10/12/17 1412  Diet Regular; Cardiac  Diet Effective Now,   Status:  Canceled      10/12/17 1415    10/12/17 1412  Troponin  Now Then Every 6 Hours     Comments:  Perform 6 Hours After Last Troponin (If Done)    10/12/17 1415    10/12/17 1412  Nurse to Order Troponin As Needed For Unrelieved or New Chest Pain  Continuous     Comments:  Nurse to Order Troponin As Needed For Unrelieved or New Chest Pain    10/12/17 1415    10/12/17 1412  BNP  Once,   Status:  Canceled      10/12/17 1415    10/12/17 1412  Insert Peripheral IV  Once      10/12/17 1415    10/12/17 1412  Saline Lock & Maintain IV Access  Continuous      10/12/17 1415    10/12/17 1412  Insert Peripheral IV  Once      10/12/17 1415     10/12/17 1412  Saline Lock & Maintain IV Access  Continuous,   Status:  Canceled      10/12/17 1415    10/12/17 1412  Inpatient Admission  Once      10/12/17 1415    10/12/17 1411  nitroglycerin (NITROSTAT) SL tablet 0.4 mg  Every 5 Minutes PRN      10/12/17 1415    10/12/17 1411  dextrose (GLUTOSE) oral gel 15 g  Every 15 Minutes PRN      10/12/17 1415    10/12/17 1411  dextrose (D50W) solution 25 g  Every 15 Minutes PRN      10/12/17 1415    10/12/17 1411  glucagon (human recombinant) (GLUCAGEN DIAGNOSTIC) injection 1 mg  Every 15 Minutes PRN      10/12/17 1415    10/12/17 1411  sodium chloride 0.9 % flush 1-10 mL  As Needed      10/12/17 1415    10/12/17 1411  sodium chloride 0.9 % flush 1-10 mL  As Needed      10/12/17 1415    Unscheduled  ECG 12 Lead  As Needed     Comments:  Nurse to Release ECG Order for Unrelieved or New Chest Pain    10/12/17 1415    --  cholecalciferol (VITAMIN D3) 1000 units tablet  Daily      10/12/17 1354    --  Baclofen 2 % cream  3 Times Daily      10/12/17 1354          Physician Progress Notes (last 24 hours) (Notes from 10/11/2017  4:20 PM through 10/12/2017  4:20 PM)     No notes of this type exist for this encounter.        Consult Notes (last 24 hours) (Notes from 10/11/2017  4:20 PM through 10/12/2017  4:20 PM)     No notes of this type exist for this encounter.      Marybeth Olson RN [facility]  [phone]  Fax [phone]

## 2017-10-12 NOTE — H&P
[facility] Medicine Admission      Date of Admission: 10/12/2017      Primary Care Physician: [place] Abbott MD      No chief complaint on file.      HPI:  Patient is 46 years old with PMH of CAD , complains of chest pain that started last night . Pain is substernal in location, radiates to her L arm. Pain alleviated with nitroglycerin .  Patient denies fever or chills, headache , dizziness, visual changes, change in appetite ,palpitations, difficulty in breathing or cough, abdominal pain , N/V/D , blood in the stool or urine or urinary symptoms, weakness numbness or tingling in the extremities.    Past Medical History:   Past Medical History:   Diagnosis Date   • Acquired hypothyroidism    • Acute pharyngitis    • Encounter for health maintenance examination    • Essential hypertension    • Goiter     - total thyroidectomy July 2015   • Hashimoto's thyroiditis    • Headache    • Heart disease    • Hyperglycemia     , unspecified   • Kidney failure stage III   • Low back pain    • Malaise and fatigue    • Otitis externa    • Postoperative hypothyroidism    • Tobacco dependence syndrome    • URI (upper respiratory infection)        Past Surgical History:   Past Surgical History:   Procedure Laterality Date   • APPENDECTOMY     • CARDIAC CATHETERIZATION     • CHOLECYSTECTOMY     • COLON RESECTION      meckel resection   • CORONARY STENT PLACEMENT     • DILATATION AND CURETTAGE     • TOTAL ABDOMINAL HYSTERECTOMY WITH SALPINGO OOPHORECTOMY     • TOTAL THYROIDECTOMY         Family History:   Family History   Problem Relation Age of Onset   • Diabetes Other    • Hyperlipidemia Other    • Hypertension Other    • Cancer Mother    • Diabetes Mother    • Coronary artery disease Mother    • Kidney disease Mother    • COPD Mother    • Coronary artery disease Father    • Early death Father    • Diabetes Paternal Aunt        Social History:   Social History     Social History   •  Marital status:      Spouse name: N/A   • Number of children: N/A   • Years of education: N/A     Social History Main Topics   • Smoking status: Current Every Day Smoker     Packs/day: 0.50     Types: Cigarettes     Start date: 8/12/2015   • Smokeless tobacco: Never Used   • Alcohol use No   • Drug use: No   • Sexual activity: Defer     Other Topics Concern   • None     Social History Narrative       Allergies:   Allergies   Allergen Reactions   • Penicillins Swelling     Of the throat   • Adhesive Tape Other (See Comments)     Silk tape- blisters   • Coconut Flavor Swelling   • Latex Hives       Medications:   Prior to Admission medications    Medication Sig Start Date End Date Taking? Authorizing Provider   amLODIPine (NORVASC) 10 MG tablet  10/22/16  Yes Historical Provider, MD   aspirin 81 MG chewable tablet Chew 81 mg daily.   Yes Historical Provider, MD   atorvastatin (LIPITOR) 10 MG tablet Take 10 mg by mouth daily. Half a tab q day   Yes Historical Provider, MD   atorvastatin (LIPITOR) 40 MG tablet  8/18/17  Yes Historical Provider, MD   Baclofen 2 % cream Apply  topically 3 (Three) Times a Day. Baclofen, Neurontin, and Lidocaine compound cream to lower back TID for chronic back pain   Yes Historical Provider, MD   Blood Glucose Monitoring Suppl (FREESTYLE LITE) device  8/23/16  Yes Historical Provider, MD   carvedilol (COREG) 25 MG tablet 2 (Two) Times a Day. 8/3/17  Yes Historical Provider, MD   cholecalciferol (VITAMIN D3) 1000 units tablet Take 1,000 Units by mouth Daily.   Yes Historical Provider, MD   FREESTYLE LITE test strip  10/22/16  Yes Historical Provider, MD   glipiZIDE (GLUCOTROL) 5 MG ER tablet daily 10/22/16  Yes Historical Provider, MD   lisinopril (PRINIVIL,ZESTRIL) 20 MG tablet Take 20 mg by mouth Daily.   Yes Historical Provider, MD   thyroid (ARMOUR THYROID) 180 MG tablet Take 1 tablet by mouth 2 (Two) Times a Day. 2/17/17  Yes DUC [place]   cholecalciferol  (VITAMIN D3) 96856 units capsule Take 5 capsules by mouth 1 (One) Time Per Week. 50,000 units once weekly  Patient taking differently: Take 5,000 Units by mouth Daily. 50,000 units once weekly 8/24/17 8/24/18  DUC [place]   sertraline (ZOLOFT) 50 MG tablet As Needed. daily 10/22/16   Historical Provider, MD   traMADol (ULTRAM) 50 MG tablet prn 10/27/16   Historical Provider, MD       Review of Systems:    -Constitutional: Negative for activity change, appetite change, chills, diaphoresis, fatigue, fever and unexpected weight change.   -HENT: Negative for congestion, ear discharge, ear pain, hearing loss, rhinorrhea, sneezing, sore throat and trouble swallowing.    Eyes: Negative for photophobia, pain, discharge and visual disturbance.   -Respiratory: Negative for cough, chest tightness, shortness of breath and wheezing.    -Cardiovascular: Negative for chest pain and palpitations.   -Gastrointestinal: Negative for abdominal pain, blood in stool, diarrhea, nausea and vomiting.   -Endocrine: Negative for polydipsia and polyuria.   -Genitourinary: Negative for difficulty urinating, dysuria, frequency, hematuria and urgency.   -Skin: Negative for rash.   -Neurological: Negative for dizziness, syncope, weakness, light-headedness, numbness and headaches.   -Psychiatric/Behavioral: Negative for suicidal ideas.     Otherwise complete ROS is negative except as mentioned above.    Physical Exam:    Physical Exam     Temp:  [96.2 °F (35.7 °C)] 96.2 °F (35.7 °C)  Heart Rate:  [72-74] 72  Resp:  [16] 16  BP: (192)/(99) 192/99    -General:Patient is oriented to person, place, and time. Patient appears well-developed and well-nourished. No distress.   -HEENT: Head: Normocephalic and atraumatic. Right Ear: External ear normal. Left Ear: External ear normal. Nose: Nose normal. Mouth/Throat: Oropharynx is clear and moist.   Eyes: Conjunctivae and EOM are normal. Pupils are equal, round, and reactive to light. Right  eye exhibits no discharge. Left eye exhibits no discharge.   Neck: Normal range of motion. Neck supple. No JVD present. No tracheal deviation present. No thyromegaly present.   -CVS: Normal rate, regular rhythm, normal heart sounds and intact distal pulses.  Exam reveals no gallop and no friction rub.    No murmur heard.  -Pulmonary: Effort normal and breath sounds normal. No stridor. No respiratory distress. He has no wheezes. No rales or tenderness.   -Abdominal: Soft, Bowel sounds are normal. No distension and no mass. There is no tenderness. There is no rebound and no guarding. No hernia.   -Musc: No Cyanosis clubbing or edema.  -Lymph: No cervical adenopathy.   -Neuro: patient is alert and oriented to person, place, and time.Patient has normal reflexes. No cranial nerve deficit. Patient exhibits normal muscle tone , strength and sensation bilaterally. Coordination normal.   -Skin: Skin is warm. No rash noted. Patient is not diaphoretic. No erythema. No pallor.   -Psych: Patient  has a normal mood and affect. behavior is normal. Judgment and thought content normal. Denies suicidal ideations or plans.    Nursing note and vitals reviewed.    Results Reviewed:  I have personally reviewed current lab, radiology, and data and agree with results.  Lab Results (last 24 hours)     Procedure Component Value Units Date/Time    Troponin [[id number]] Collected:  10/12/17 1423    Specimen:  Blood Updated:  10/12/17 1440    Lipid Panel [[id number]] Collected:  10/12/17 1423    Specimen:  Blood Updated:  10/12/17 1440    BNP [[id number]] Collected:  10/12/17 1423    Specimen:  Blood Updated:  10/12/17 1440    Light Blue Top [[id number]] Collected:  10/12/17 1423    Specimen:  Blood Updated:  10/12/17 1440    Extra Tubes [[id number]] Collected:  10/12/17 1423    Specimen:  Blood from Blood, Venous Line Updated:  10/12/17 1441    Narrative:       The following orders were created for panel order Extra Tubes.  Procedure                                Abnormality         Status                     ---------                               -----------         ------                     Light Blue Top[[id number]]                                   In process                 Gold Top - SST[[id number]]                                   In process                   Please view results for these tests on the individual orders.    [facility] [[id number]] Collected:  10/12/17 1423    Specimen:  Blood Updated:  10/12/17 1441    CBC Auto Differential [[id number]]  (Abnormal) Collected:  10/12/17 1424    Specimen:  Blood Updated:  10/12/17 1450     WBC 9.06 10*3/mm3      RBC 3.90 10*6/mm3      Hemoglobin 11.8 (L) g/dL      Hematocrit 35.9 %      MCV 92.1 fL      MCH 30.3 pg      MCHC 32.9 g/dL      RDW 13.7 %      RDW-SD 45.8 fl      MPV 9.8 fL      Platelets 224 10*3/mm3      Neutrophil % 59.1 %      Lymphocyte % 27.0 %      Monocyte % 9.5 %      Eosinophil % 2.9 %      Basophil % 0.6 %      Immature Grans % 0.9 (H) %      Neutrophils, Absolute 5.36 10*3/mm3      Lymphocytes, Absolute 2.45 10*3/mm3      Monocytes, Absolute 0.86 10*3/mm3      Eosinophils, Absolute 0.26 10*3/mm3      Basophils, Absolute 0.05 10*3/mm3      Immature Grans, Absolute 0.08 (H) 10*3/mm3     Comprehensive Metabolic Panel [[id number]]  (Abnormal) Collected:  10/12/17 1423    Specimen:  Blood Updated:  10/12/17 1504     Glucose 102 (H) mg/dL      BUN 11 mg/dL      Creatinine 1.04 (H) mg/dL      Sodium 141 mmol/L      Potassium 3.7 mmol/L      Chloride 102 mmol/L      CO2 29.0 mmol/L      Calcium 8.5 mg/dL      Total Protein 7.3 g/dL      Albumin 4.00 g/dL      ALT (SGPT) 144 (H) U/L      AST (SGOT) 162 (H) U/L      Alkaline Phosphatase 93 U/L      Total Bilirubin 0.5 mg/dL      eGFR Non African Amer 57 (L) mL/min/1.73      Globulin 3.3 gm/dL      A/G Ratio 1.2 g/dL      BUN/Creatinine Ratio 10.6     Anion Gap 10.0 mmol/L         Imaging Results (last 24 hours)     ** No results found for  the last 24 hours. **          Assessment/Plan         Hospital Problem List     NSTEMI (non-ST elevated myocardial infarction)          Assessment / Plan  --chest pain   Trop 0.02--0.06 ( at [facility] ) 3rd trop at [facility] 0.05   EKG not acute changes   Serial trops  Tele  Cardiology consult ( input appreciated )    --hx of CAD  ASA ,lisnopril, and coreg    --HTN  Continue home meds :Norvasc, lisnopril, and coreg    --preDM  Home meds held  Insulin sliding scale    --hypothyrodisim  [place]     --depression  stable  Denies suicidal ideations or plans  Has been off medications     --tobacco smoking   Nicoderm patch    --obesity     --DVT prophlaxis   Heparin SC    I discussed the patients findings and my recommendations with the patient, and the patient verbalized understanding of the plan, risks and benefits of the plan.    This document has been electronically signed by Raúl Lemus MD on October 12, 2017 3:04 PM

## 2017-10-13 ENCOUNTER — PREP FOR SURGERY (OUTPATIENT)
Dept: OTHER | Facility: HOSPITAL | Age: 47
End: 2017-10-13

## 2017-10-13 DIAGNOSIS — I20.0 UNSTABLE ANGINA (HCC): Primary | ICD-10-CM

## 2017-10-13 LAB
ANION GAP SERPL CALCULATED.3IONS-SCNC: 10 MMOL/L (ref 5–15)
BACTERIA UR QL AUTO: ABNORMAL /HPF
BASOPHILS # BLD AUTO: 0.04 10*3/MM3 (ref 0–0.2)
BASOPHILS NFR BLD AUTO: 0.4 % (ref 0–2)
BILIRUB UR QL STRIP: NEGATIVE
BUN BLD-MCNC: 14 MG/DL (ref 7–21)
BUN/CREAT SERPL: 11.8 (ref 7–25)
CALCIUM SPEC-SCNC: 7.4 MG/DL (ref 8.4–10.2)
CHLORIDE SERPL-SCNC: 104 MMOL/L (ref 95–110)
CLARITY UR: CLEAR
CO2 SERPL-SCNC: 27 MMOL/L (ref 22–31)
COLOR UR: YELLOW
CREAT BLD-MCNC: 1.19 MG/DL (ref 0.5–1)
DEPRECATED RDW RBC AUTO: 47.4 FL (ref 36.4–46.3)
EOSINOPHIL # BLD AUTO: 0.19 10*3/MM3 (ref 0–0.7)
EOSINOPHIL NFR BLD AUTO: 2.1 % (ref 0–7)
ERYTHROCYTE [DISTWIDTH] IN BLOOD BY AUTOMATED COUNT: 13.8 % (ref 11.5–14.5)
GFR SERPL CREATININE-BSD FRML MDRD: 49 ML/MIN/1.73 (ref 58–135)
GLUCOSE BLD-MCNC: 138 MG/DL (ref 60–100)
GLUCOSE BLDC GLUCOMTR-MCNC: 159 MG/DL (ref 70–130)
GLUCOSE BLDC GLUCOMTR-MCNC: 173 MG/DL (ref 70–130)
GLUCOSE UR STRIP-MCNC: NEGATIVE MG/DL
HCT VFR BLD AUTO: 34 % (ref 35–45)
HGB BLD-MCNC: 11 G/DL (ref 12–15.5)
HGB UR QL STRIP.AUTO: ABNORMAL
HYALINE CASTS UR QL AUTO: ABNORMAL /LPF
IMM GRANULOCYTES # BLD: 0.07 10*3/MM3 (ref 0–0.02)
IMM GRANULOCYTES NFR BLD: 0.8 % (ref 0–0.5)
KETONES UR QL STRIP: NEGATIVE
LEUKOCYTE ESTERASE UR QL STRIP.AUTO: NEGATIVE
LYMPHOCYTES # BLD AUTO: 2.66 10*3/MM3 (ref 0.6–4.2)
LYMPHOCYTES NFR BLD AUTO: 29.4 % (ref 10–50)
MAGNESIUM SERPL-MCNC: 2 MG/DL (ref 1.6–2.3)
MCH RBC QN AUTO: 30.2 PG (ref 26.5–34)
MCHC RBC AUTO-ENTMCNC: 32.4 G/DL (ref 31.4–36)
MCV RBC AUTO: 93.4 FL (ref 80–98)
MONOCYTES # BLD AUTO: 0.75 10*3/MM3 (ref 0–0.9)
MONOCYTES NFR BLD AUTO: 8.3 % (ref 0–12)
NEUTROPHILS # BLD AUTO: 5.33 10*3/MM3 (ref 2–8.6)
NEUTROPHILS NFR BLD AUTO: 59 % (ref 37–80)
NITRITE UR QL STRIP: NEGATIVE
PH UR STRIP.AUTO: 5.5 [PH] (ref 5–9)
PLATELET # BLD AUTO: 221 10*3/MM3 (ref 150–450)
PMV BLD AUTO: 10.2 FL (ref 8–12)
POTASSIUM BLD-SCNC: 3.9 MMOL/L (ref 3.5–5.1)
PROT UR QL STRIP: NEGATIVE
RBC # BLD AUTO: 3.64 10*6/MM3 (ref 3.77–5.16)
RBC # UR: ABNORMAL /HPF
REF LAB TEST METHOD: ABNORMAL
SODIUM BLD-SCNC: 141 MMOL/L (ref 137–145)
SP GR UR STRIP: 1.06 (ref 1–1.03)
SQUAMOUS #/AREA URNS HPF: ABNORMAL /HPF
TROPONIN I SERPL-MCNC: 0.03 NG/ML
TROPONIN I SERPL-MCNC: 0.16 NG/ML
UROBILINOGEN UR QL STRIP: ABNORMAL
WBC NRBC COR # BLD: 9.04 10*3/MM3 (ref 3.2–9.8)
WBC UR QL AUTO: ABNORMAL /HPF

## 2017-10-13 PROCEDURE — 027034Z DILATION OF CORONARY ARTERY, ONE ARTERY WITH DRUG-ELUTING INTRALUMINAL DEVICE, PERCUTANEOUS APPROACH: ICD-10-PCS | Performed by: INTERNAL MEDICINE

## 2017-10-13 PROCEDURE — C1725 CATH, TRANSLUMIN NON-LASER: HCPCS | Performed by: INTERNAL MEDICINE

## 2017-10-13 PROCEDURE — 81001 URINALYSIS AUTO W/SCOPE: CPT | Performed by: INTERNAL MEDICINE

## 2017-10-13 PROCEDURE — 25010000002 BIVALIRUDIN PER 1 MG: Performed by: INTERNAL MEDICINE

## 2017-10-13 PROCEDURE — 4A023N7 MEASUREMENT OF CARDIAC SAMPLING AND PRESSURE, LEFT HEART, PERCUTANEOUS APPROACH: ICD-10-PCS | Performed by: INTERNAL MEDICINE

## 2017-10-13 PROCEDURE — C9600 PERC DRUG-EL COR STENT SING: HCPCS | Performed by: INTERNAL MEDICINE

## 2017-10-13 PROCEDURE — 99254 IP/OBS CNSLTJ NEW/EST MOD 60: CPT | Performed by: INTERNAL MEDICINE

## 2017-10-13 PROCEDURE — 0 IOPAMIDOL PER 1 ML: Performed by: INTERNAL MEDICINE

## 2017-10-13 PROCEDURE — 25010000002 MORPHINE PER 10 MG: Performed by: FAMILY MEDICINE

## 2017-10-13 PROCEDURE — C1874 STENT, COATED/COV W/DEL SYS: HCPCS | Performed by: INTERNAL MEDICINE

## 2017-10-13 PROCEDURE — 84484 ASSAY OF TROPONIN QUANT: CPT | Performed by: FAMILY MEDICINE

## 2017-10-13 PROCEDURE — 25010000002 HYDROMORPHONE PER 4 MG: Performed by: INTERNAL MEDICINE

## 2017-10-13 PROCEDURE — 25010000002 EPTIFIBATIDE PER 5 MG

## 2017-10-13 PROCEDURE — C1769 GUIDE WIRE: HCPCS | Performed by: INTERNAL MEDICINE

## 2017-10-13 PROCEDURE — 83735 ASSAY OF MAGNESIUM: CPT | Performed by: FAMILY MEDICINE

## 2017-10-13 PROCEDURE — 80048 BASIC METABOLIC PNL TOTAL CA: CPT | Performed by: FAMILY MEDICINE

## 2017-10-13 PROCEDURE — 82962 GLUCOSE BLOOD TEST: CPT

## 2017-10-13 PROCEDURE — C1894 INTRO/SHEATH, NON-LASER: HCPCS

## 2017-10-13 PROCEDURE — 85025 COMPLETE CBC W/AUTO DIFF WBC: CPT | Performed by: FAMILY MEDICINE

## 2017-10-13 PROCEDURE — C1887 CATHETER, GUIDING: HCPCS | Performed by: INTERNAL MEDICINE

## 2017-10-13 PROCEDURE — 25010000002 ONDANSETRON PER 1 MG: Performed by: INTERNAL MEDICINE

## 2017-10-13 PROCEDURE — 25010000002 MIDAZOLAM PER 1 MG: Performed by: INTERNAL MEDICINE

## 2017-10-13 PROCEDURE — B2011ZZ PLAIN RADIOGRAPHY OF MULTIPLE CORONARY ARTERIES USING LOW OSMOLAR CONTRAST: ICD-10-PCS | Performed by: INTERNAL MEDICINE

## 2017-10-13 PROCEDURE — 93458 L HRT ARTERY/VENTRICLE ANGIO: CPT | Performed by: INTERNAL MEDICINE

## 2017-10-13 PROCEDURE — C1894 INTRO/SHEATH, NON-LASER: HCPCS | Performed by: INTERNAL MEDICINE

## 2017-10-13 DEVICE — XIENCE ALPINE EVEROLIMUS ELUTING CORONARY STENT SYSTEM 3.00 MM X 38 MM / RAPID-EXCHANGE
Type: IMPLANTABLE DEVICE | Site: CORONARY | Status: FUNCTIONAL
Brand: XIENCE ALPINE

## 2017-10-13 RX ORDER — SODIUM CHLORIDE 9 MG/ML
INJECTION, SOLUTION INTRAVENOUS
Status: DISPENSED
Start: 2017-10-13 | End: 2017-10-13

## 2017-10-13 RX ORDER — ISOSORBIDE MONONITRATE 30 MG/1
30 TABLET, EXTENDED RELEASE ORAL
Status: DISCONTINUED | OUTPATIENT
Start: 2017-10-13 | End: 2017-10-15 | Stop reason: HOSPADM

## 2017-10-13 RX ORDER — ONDANSETRON 4 MG/1
4 TABLET, ORALLY DISINTEGRATING ORAL EVERY 6 HOURS PRN
Status: DISCONTINUED | OUTPATIENT
Start: 2017-10-13 | End: 2017-10-15 | Stop reason: HOSPADM

## 2017-10-13 RX ORDER — SODIUM CHLORIDE 0.9 % (FLUSH) 0.9 %
1-10 SYRINGE (ML) INJECTION AS NEEDED
Status: CANCELLED | OUTPATIENT
Start: 2017-10-13

## 2017-10-13 RX ORDER — NITROGLYCERIN 5 MG/ML
INJECTION, SOLUTION INTRAVENOUS AS NEEDED
Status: DISCONTINUED | OUTPATIENT
Start: 2017-10-13 | End: 2017-10-13 | Stop reason: HOSPADM

## 2017-10-13 RX ORDER — ONDANSETRON 4 MG/1
4 TABLET, FILM COATED ORAL EVERY 6 HOURS PRN
Status: DISCONTINUED | OUTPATIENT
Start: 2017-10-13 | End: 2017-10-15 | Stop reason: HOSPADM

## 2017-10-13 RX ORDER — MIDAZOLAM HYDROCHLORIDE 1 MG/ML
INJECTION INTRAMUSCULAR; INTRAVENOUS AS NEEDED
Status: DISCONTINUED | OUTPATIENT
Start: 2017-10-13 | End: 2017-10-13 | Stop reason: HOSPADM

## 2017-10-13 RX ORDER — SODIUM CHLORIDE 9 MG/ML
125 INJECTION, SOLUTION INTRAVENOUS CONTINUOUS
Status: DISPENSED | OUTPATIENT
Start: 2017-10-13 | End: 2017-10-14

## 2017-10-13 RX ORDER — ONDANSETRON 2 MG/ML
INJECTION INTRAMUSCULAR; INTRAVENOUS AS NEEDED
Status: DISCONTINUED | OUTPATIENT
Start: 2017-10-13 | End: 2017-10-13 | Stop reason: HOSPADM

## 2017-10-13 RX ORDER — LIDOCAINE HYDROCHLORIDE 20 MG/ML
INJECTION, SOLUTION INFILTRATION; PERINEURAL AS NEEDED
Status: DISCONTINUED | OUTPATIENT
Start: 2017-10-13 | End: 2017-10-13 | Stop reason: HOSPADM

## 2017-10-13 RX ORDER — ONDANSETRON 2 MG/ML
4 INJECTION INTRAMUSCULAR; INTRAVENOUS EVERY 6 HOURS PRN
Status: DISCONTINUED | OUTPATIENT
Start: 2017-10-13 | End: 2017-10-15 | Stop reason: HOSPADM

## 2017-10-13 RX ORDER — NALOXONE HCL 0.4 MG/ML
0.4 VIAL (ML) INJECTION
Status: DISCONTINUED | OUTPATIENT
Start: 2017-10-13 | End: 2017-10-14

## 2017-10-13 RX ORDER — SODIUM CHLORIDE 9 MG/ML
125 INJECTION, SOLUTION INTRAVENOUS CONTINUOUS
Status: CANCELLED | OUTPATIENT
Start: 2017-10-13

## 2017-10-13 RX ADMIN — TICAGRELOR 90 MG: 90 TABLET ORAL at 17:29

## 2017-10-13 RX ADMIN — THYROID, PORCINE 180 MG: 30 TABLET ORAL at 17:29

## 2017-10-13 RX ADMIN — SODIUM CHLORIDE 125 ML/HR: 900 INJECTION, SOLUTION INTRAVENOUS at 10:41

## 2017-10-13 RX ADMIN — CARVEDILOL 25 MG: 25 TABLET, FILM COATED ORAL at 10:36

## 2017-10-13 RX ADMIN — SODIUM CHLORIDE 125 ML/HR: 900 INJECTION, SOLUTION INTRAVENOUS at 15:17

## 2017-10-13 RX ADMIN — ISOSORBIDE MONONITRATE 30 MG: 30 TABLET, EXTENDED RELEASE ORAL at 12:01

## 2017-10-13 RX ADMIN — HYDROMORPHONE HYDROCHLORIDE 0.5 MG: 1 INJECTION, SOLUTION INTRAMUSCULAR; INTRAVENOUS; SUBCUTANEOUS at 12:30

## 2017-10-13 RX ADMIN — MORPHINE SULFATE 1 MG: 10 INJECTION INTRAVENOUS at 10:37

## 2017-10-13 RX ADMIN — ATORVASTATIN CALCIUM 40 MG: 40 TABLET, FILM COATED ORAL at 20:27

## 2017-10-13 RX ADMIN — LISINOPRIL 20 MG: 20 TABLET ORAL at 10:36

## 2017-10-13 RX ADMIN — AMLODIPINE BESYLATE 10 MG: 10 TABLET ORAL at 10:37

## 2017-10-13 RX ADMIN — CARVEDILOL 25 MG: 25 TABLET, FILM COATED ORAL at 20:27

## 2017-10-13 NOTE — CONSULTS
CARDIOLOGY CONSULTATION NOTE    Referring Provider: Matthew Shaw MD    Reason for Consultation: Evaluation of chest pain    Rhea Sutton  1970  46 y.o. female      HPI    Ms. Sutton is a 46-year-old  lady with history of hypertension, hyperlipidemia, hyperglycemia, hypothyroidism, obesity with previous history of coronary artery disease with inferior wall myocardial infarction in 2008 when she received a stent to RCA in [place].  She has long-standing history of tobacco abuse.  She has followed up with Dr. Velarde and last saw him about a year ago.  On the day of admission she woke up at about 5:30 in the morning with chest pain which was sharp and pressure-like in character and associated with some degree of shortness of breath.  She went [facility] where her troponins were noted to be borderline elevated and hence she was transferred to [facility] for further management.  She was given 3 sublingual nitroglycerin tablets after which her pain abated and she was pain-free at the time of my examination today morning.  Troponins have been borderline elevated and today it was 0.15.  The patient claims compliance with her medications.  She unfortunately continues to smoke.  We had a discussion about smoking cessation.  She denied any definite exertional chest pain but has had chronic back pain for which she is seen at the pain clinic.  She has been noted to have abnormal LFTs in the past and her AST and ALT were noted be elevated on this admission.  She denied any nausea, vomiting or diarrhea and there is no previous history of hepatitis.    SUBJECTIVE    Past Medical History:   Diagnosis Date   • Acquired hypothyroidism    • Acute pharyngitis    • Encounter for health maintenance examination    • Essential hypertension    • Goiter     - total thyroidectomy July 2015   • Hashimoto's thyroiditis    • Headache    • Heart disease    • Hyperglycemia     , unspecified   •  Kidney failure stage III   • Low back pain    • Malaise and fatigue    • Otitis externa    • Postoperative hypothyroidism    • Tobacco dependence syndrome    • URI (upper respiratory infection)          Past Surgical History:   Procedure Laterality Date   • APPENDECTOMY     • CARDIAC CATHETERIZATION     • CHOLECYSTECTOMY     • COLON RESECTION      meckel resection   • CORONARY STENT PLACEMENT     • DILATATION AND CURETTAGE     • TOTAL ABDOMINAL HYSTERECTOMY WITH SALPINGO OOPHORECTOMY     • TOTAL THYROIDECTOMY           Family History   Problem Relation Age of Onset   • Diabetes Other    • Hyperlipidemia Other    • Hypertension Other    • Cancer Mother    • Diabetes Mother    • Coronary artery disease Mother    • Kidney disease Mother    • COPD Mother    • Coronary artery disease Father    • Early death Father    • Diabetes Paternal Aunt          Social History     Social History   • Marital status:      Spouse name: N/A   • Number of children: N/A   • Years of education: N/A     Occupational History   • Not on file.     Social History Main Topics   • Smoking status: Current Every Day Smoker     Packs/day: 0.50     Types: Cigarettes     Start date: 8/12/2015   • Smokeless tobacco: Never Used   • Alcohol use No   • Drug use: No   • Sexual activity: Defer     Other Topics Concern   • Not on file     Social History Narrative         Current Facility-Administered Medications   Medication Dose Route Frequency Provider Last Rate Last Dose   • amLODIPine (NORVASC) tablet 10 mg  10 mg Oral Q24H Raúl Lemus MD   10 mg at 10/12/17 1510   • aspirin chewable tablet 81 mg  81 mg Oral Daily Raúl Lemus MD       • atorvastatin (LIPITOR) tablet 40 mg  40 mg Oral Nightly Raúl Lemus MD   40 mg at 10/12/17 2011   • carvedilol (COREG) tablet 25 mg  25 mg Oral Q12H Raúl Lemus MD   25 mg at 10/12/17 2011   • cholecalciferol (VITAMIN D3) tablet 1,000 Units  1,000 Units Oral Daily Raúl Lemus MD   1,000 Units at 10/12/17  "1510   • dextrose (D50W) solution 25 g  25 g Intravenous Q15 Min PRN Raúl Lemus MD       • dextrose (GLUTOSE) oral gel 15 g  15 g Oral Q15 Min PRN Raúl Lemus MD       • glucagon (human recombinant) (GLUCAGEN DIAGNOSTIC) injection 1 mg  1 mg Subcutaneous Q15 Min PRN Raúl Lemus MD       • heparin (porcine) 5000 UNIT/ML injection 5,000 Units  5,000 Units Subcutaneous Q8H Raúl Lemus MD   5,000 Units at 10/12/17 1451   • insulin aspart (novoLOG) injection 0-7 Units  0-7 Units Subcutaneous 4x Daily AC & at Bedtime Raúl Lemus MD       • lisinopril (PRINIVIL,ZESTRIL) tablet 20 mg  20 mg Oral Daily Raúl Lemus MD   20 mg at 10/12/17 1510   • Morphine injection 1 mg  1 mg Intravenous Q4H PRN Raúl Lemus MD        And   • naloxone (NARCAN) injection 0.4 mg  0.4 mg Intravenous Q5 Min PRN Raúl Lemus MD       • nicotine (NICODERM CQ) 21 MG/24HR patch 1 patch  1 patch Transdermal Q24H Raúl Lemus MD       • nitroglycerin (NITROSTAT) SL tablet 0.4 mg  0.4 mg Sublingual Q5 Min PRN Raúl Lemus MD       • ondansetron (ZOFRAN) injection 4 mg  4 mg Intravenous Q6H PRN Lenin Dickerson MD   4 mg at 10/12/17 1906   • pantoprazole (PROTONIX) EC tablet 40 mg  40 mg Oral Q AM Raúl Lemus MD       • sodium chloride 0.9 % flush 1-10 mL  1-10 mL Intravenous PRN Raúl Lemus MD       • sodium chloride 0.9 % flush 1-10 mL  1-10 mL Intravenous PRN Raúl Lemus MD       • sodium chloride 0.9 % infusion  100 mL/hr Intravenous Continuous Raúl Lemus  mL/hr at 10/12/17 1448 100 mL/hr at 10/12/17 1448   • Thyroid (ARMOUR) tablet 180 mg  180 mg Oral BID Raúl Lemus MD   180 mg at 10/12/17 1734         OBJECTIVE    /68 (BP Location: Right arm, Patient Position: Lying)  Pulse 77  Temp 97 °F (36.1 °C) (Temporal Artery )   Resp 18  Ht 67\" (170.2 cm)  Wt 270 lb 11.2 oz (123 kg)  LMP Comment: hysterectomy 2004  SpO2 96%  Breastfeeding? No  BMI 42.4 kg/m2      Review of Systems     Constitutional:  Denies " recent weight loss, weight gain, fever or chills, no change in exercise tolerance     HENT:  Denies any hearing loss, epistaxis, hoarseness, or difficulty speaking.     Eyes: Wears eyeglasses or contact lenses     Respiratory:  Dyspnea with exertion,no cough, wheezing, or hemoptysis.     Cardiovascular: See history of present illness    Gastrointestinal:  Denies change in bowel habits, dyspepsia, ulcer disease, hematochezia, or melena.  history of abnormal LFTs    Endocrine: Negative for cold intolerance, heat intolerance, polydipsia, polyphagia and polyuria.   Genitourinary: Negative.      Musculoskeletal: Chronic back pain    Skin:  Denies any change in hair or nails, rashes, or skin lesions.     Allergic/Immunologic: Negative.  Negative for environmental allergies, food allergies and immunocompromised state.     Neurological:  Denies any history of recurrent headaches, strokes, TIA, or seizure disorder.     Hematological: Denies any food allergies, seasonal allergies, bleeding disorders, or lymphadenopathy.     Psychiatric/Behavioral: Denies any history of depression or change in cognitive function.       Physical Exam     Constitutional: Cooperative, alert and oriented, well-developed,  in no acute distress.     HENT:   Head: Normocephalic, normal hair patterns, no masses or tenderness.  Ears, Nose, and Throat: No gross abnormalities. No pallor or cyanosis.   Eyes: EOMS intact, PERRL, conjunctivae and lids unremarkable. Fundoscopic exam and visual fields not performed.   Neck: No palpable masses or adenopathy, no thyromegaly, no JVD, carotid pulses are full and equal bilaterally and without  Bruits.     Cardiovascular: Regular rhythm, S1 and S2 normal, no S3 or S4.  No murmurs, gallops, or rubs detected.     Pulmonary/Chest: Chest: normal symmetry, no tenderness to palpation, normal respiratory excursion,no use of accessory muscles.            Pulmonary: Normal breath sounds. No rales or ronchi.    Abdominal:  Abdomen soft, bowel sounds normoactive, no masses, no hepatosplenomegaly, non-tender, no bruits.     Musculoskeletal: No deformities, clubbing, cyanosis, erythema, or edema observed. There are no spinal abnormalities noted.     Neurological: No gross motor or sensory deficits noted, Cranial nerves 2-12 normal. affect appropriate, oriented to time, person, place.     Skin: Warm and dry to the touch, no apparent skin lesions or masses noted.     Psychiatric: Normal mood and affect. Behavior is normal. Judgment and thought content normal.     RESULTS  Lab Results (last 24 hours)     Procedure Component Value Units Date/Time    CBC Auto Differential [[id number]]  (Abnormal) Collected:  10/12/17 1424    Specimen:  Blood Updated:  10/12/17 1450     WBC 9.06 10*3/mm3      RBC 3.90 10*6/mm3      Hemoglobin 11.8 (L) g/dL      Hematocrit 35.9 %      MCV 92.1 fL      MCH 30.3 pg      MCHC 32.9 g/dL      RDW 13.7 %      RDW-SD 45.8 fl      MPV 9.8 fL      Platelets 224 10*3/mm3      Neutrophil % 59.1 %      Lymphocyte % 27.0 %      Monocyte % 9.5 %      Eosinophil % 2.9 %      Basophil % 0.6 %      Immature Grans % 0.9 (H) %      Neutrophils, Absolute 5.36 10*3/mm3      Lymphocytes, Absolute 2.45 10*3/mm3      Monocytes, Absolute 0.86 10*3/mm3      Eosinophils, Absolute 0.26 10*3/mm3      Basophils, Absolute 0.05 10*3/mm3      Immature Grans, Absolute 0.08 (H) 10*3/mm3     Comprehensive Metabolic Panel [[id number]]  (Abnormal) Collected:  10/12/17 1423    Specimen:  Blood Updated:  10/12/17 1504     Glucose 102 (H) mg/dL      BUN 11 mg/dL      Creatinine 1.04 (H) mg/dL      Sodium 141 mmol/L      Potassium 3.7 mmol/L      Chloride 102 mmol/L      CO2 29.0 mmol/L      Calcium 8.5 mg/dL      Total Protein 7.3 g/dL      Albumin 4.00 g/dL      ALT (SGPT) 144 (H) U/L      AST (SGOT) 162 (H) U/L      Alkaline Phosphatase 93 U/L      Total Bilirubin 0.5 mg/dL      eGFR Non African Amer 57 (L) mL/min/1.73      Globulin 3.3 gm/dL      A/G  Ratio 1.2 g/dL      BUN/Creatinine Ratio 10.6     Anion Gap 10.0 mmol/L     Lipid Panel [[id number]]  (Abnormal) Collected:  10/12/17 1423    Specimen:  Blood Updated:  10/12/17 1514     Total Cholesterol 208 (H) mg/dL      Triglycerides 82 mg/dL      HDL Cholesterol 53 (L) mg/dL      LDL Cholesterol  127 mg/dL      LDL/HDL Ratio 2.62    Troponin [[id number]]  (Abnormal) Collected:  10/12/17 1423    Specimen:  Blood Updated:  10/12/17 1515     Troponin I 0.056 (H) ng/mL     BNP [[id number]]  (Abnormal) Collected:  10/12/17 1423    Specimen:  Blood Updated:  10/12/17 1515     proBNP 512.0 (H) pg/mL     Extra Tubes [[id number]] Collected:  10/12/17 1423    Specimen:  Blood from Blood, Venous Line Updated:  10/12/17 1531    Narrative:       The following orders were created for panel order Extra Tubes.  Procedure                               Abnormality         Status                     ---------                               -----------         ------                     Light Blue Top[[id number]]                                   Final result               Gold Top - SST[[id number]]                                   Final result                 Please view results for these tests on the individual orders.    Light Blue Top [[id number]] Collected:  10/12/17 1423    Specimen:  Blood Updated:  10/12/17 1531     Extra Tube hold for add-on      Auto resulted       Gold Top - SST [[id number]] Collected:  10/12/17 1423    Specimen:  Blood Updated:  10/12/17 1531     Extra Tube Hold for add-ons.      Auto resulted.       D-dimer, Quantitative [[id number]]  (Normal) Collected:  10/12/17 1423    Specimen:  Blood Updated:  10/12/17 1533     D-Dimer, Quantitative 433 ng/mL (FEU)     Narrative:       Dimer values <500 ng/ml FEU are FDA approved as aid in diagnosis of deep venous thrombosis and pulmonary embolism.  This test should not be used in an exclusion strategy with pretest probability alone.    A recent guideline regarding  diagnosis for pulmonary thomboembolism recommends an adjusted exclusion criterion of age x 10 ng/ml FEU for patients >50 years of age ([employer] Intern Med 2015; 163: 701-711).    POC Glucose Fingerstick [[id number]]  (Normal) Collected:  10/12/17 1541    Specimen:  Blood Updated:  10/12/17 1603     Glucose 121 mg/dL       RN NotifiedMeter: [id number]: [id number] MAGGIE CAUSEY       Urinalysis With / Microscopic If Indicated - Urine, Clean Catch [[id number]]  (Normal) Collected:  10/12/17 1844    Specimen:  Urine from Urine, Clean Catch Updated:  10/12/17 1850     Color, UA Yellow     Appearance, UA Clear     pH, UA 5.5     Specific Gravity, UA 1.018     Glucose, UA Negative     Ketones, UA Negative     Bilirubin, UA Negative     Blood, UA Negative     Protein, UA Negative     Leuk Esterase, UA Negative     Nitrite, UA Negative     Urobilinogen, UA 0.2 E.U./dL    Narrative:       Urine microscopic not indicated.    Urine Drug Screen - Urine, Clean Catch [[id number]]  (Abnormal) Collected:  10/12/17 1844    Specimen:  Urine from Urine, Clean Catch Updated:  10/12/17 1912     Amphetamine Screen, Urine Negative     Barbiturates Screen, Urine Negative     Benzodiazepine Screen, Urine Negative     Cocaine Screen, Urine Negative     Methadone Screen, Urine Negative     Opiate Screen Positive (A)     Oxycodone Screen, Urine Negative     THC, Screen, Urine Negative    Narrative:       Negative Thresholds For Drugs Screened in Urine:     Amphetamines          500 ng/ml  Barbiturates          200 ng/ml  Benzodiazepines       200 ng/ml  Cocaine               150 ng/ml  Methadone             300 ng/mL  Opiates               300 ng/mL  Oxycodone             100 ng/mL  THC                   20 ng/mL    The normal value for all drugs tested is negative. This report includes final unconfirmed screening results.  A positive result by this assay can be, at your request, sent to the Reference Lab for confirmation by gas  chromatography. Unconfirmed results must not be used for non-medical purposes, such as employment or legal testing. Clinical consideration should be applied to any drug of abuse test result, particularly when unconfirmed results are used.    Troponin [[id number]]  (Abnormal) Collected:  10/12/17 1935    Specimen:  Blood Updated:  10/12/17 2025     Troponin I 0.041 (H) ng/mL     Troponin [[id number]]  (Normal) Collected:  10/13/17 0156    Specimen:  Blood Updated:  10/13/17 0310     Troponin I 0.031 ng/mL     Troponin [[id number]] Collected:  10/13/17 0623    Specimen:  Blood Updated:  10/13/17 0706    CBC & Differential [[id number]] Collected:  10/13/17 0623    Specimen:  Blood Updated:  10/13/17 0712    Narrative:       The following orders were created for panel order CBC & Differential.  Procedure                               Abnormality         Status                     ---------                               -----------         ------                     CBC Auto Differential[[id number]]        Abnormal            Final result                 Please view results for these tests on the individual orders.    CBC Auto Differential [[id number]]  (Abnormal) Collected:  10/13/17 0623    Specimen:  Blood Updated:  10/13/17 0712     WBC 9.04 10*3/mm3      RBC 3.64 (L) 10*6/mm3      Hemoglobin 11.0 (L) g/dL      Hematocrit 34.0 (L) %      MCV 93.4 fL      MCH 30.2 pg      MCHC 32.4 g/dL      RDW 13.8 %      RDW-SD 47.4 (H) fl      MPV 10.2 fL      Platelets 221 10*3/mm3      Neutrophil % 59.0 %      Lymphocyte % 29.4 %      Monocyte % 8.3 %      Eosinophil % 2.1 %      Basophil % 0.4 %      Immature Grans % 0.8 (H) %      Neutrophils, Absolute 5.33 10*3/mm3      Lymphocytes, Absolute 2.66 10*3/mm3      Monocytes, Absolute 0.75 10*3/mm3      Eosinophils, Absolute 0.19 10*3/mm3      Basophils, Absolute 0.04 10*3/mm3      Immature Grans, Absolute 0.07 (H) 10*3/mm3     Magnesium [[id number]]  (Normal) Collected:  10/13/17 0623     Specimen:  Blood Updated:  10/13/17 0728     Magnesium 2.0 mg/dL     Basic Metabolic Panel [[id number]]  (Abnormal) Collected:  10/13/17 0623    Specimen:  Blood Updated:  10/13/17 0728     Glucose 138 (H) mg/dL      BUN 14 mg/dL      Creatinine 1.19 (H) mg/dL      Sodium 141 mmol/L      Potassium 3.9 mmol/L      Chloride 104 mmol/L      CO2 27.0 mmol/L      Calcium 7.4 (L) mg/dL      eGFR Non African Amer 49 (L) mL/min/1.73      BUN/Creatinine Ratio 11.8     Anion Gap 10.0 mmol/L               ASSESSMENT AND PLAN    Ms. Sutton has multiple risk factors for coronary artery disease and has presented with chest pain that is suspicious for unstable angina.  A small and STEMI cannot be ruled out.  The patient is pain-free at the present time and EKG showed sinus rhythm with evidence of old inferior wall myocardial infarction.  I discussed the different treatment options with her and the plan would be to proceed with a cardiac catheterization for definitive evaluation.  All risks benefits and alternate treatments have been explained to her and she will be an ASA class II and Mallampati class II.  Smoking cessation has been stressed.  Further recommendations will follow.    Mian Montana MD  10/13/2017  7:33 AM

## 2017-10-13 NOTE — H&P (VIEW-ONLY)
CARDIOLOGY CONSULTATION NOTE    Referring Provider: Matthew Shaw MD    Reason for Consultation: Evaluation of chest pain    Rhea Sutton  1970  46 y.o. female      HPI    Ms. Sutton is a 46-year-old  lady with history of hypertension, hyperlipidemia, hyperglycemia, hypothyroidism, obesity with previous history of coronary artery disease with inferior wall myocardial infarction in 2008 when she received a stent to RCA in [place].  She has long-standing history of tobacco abuse.  She has followed up with Dr. Velarde and last saw him about a year ago.  On the day of admission she woke up at about 5:30 in the morning with chest pain which was sharp and pressure-like in character and associated with some degree of shortness of breath.  She went [facility] where her troponins were noted to be borderline elevated and hence she was transferred to [facility] for further management.  She was given 3 sublingual nitroglycerin tablets after which her pain abated and she was pain-free at the time of my examination today morning.  Troponins have been borderline elevated and today it was 0.15.  The patient claims compliance with her medications.  She unfortunately continues to smoke.  We had a discussion about smoking cessation.  She denied any definite exertional chest pain but has had chronic back pain for which she is seen at the pain clinic.  She has been noted to have abnormal LFTs in the past and her AST and ALT were noted be elevated on this admission.  She denied any nausea, vomiting or diarrhea and there is no previous history of hepatitis.    SUBJECTIVE    Past Medical History:   Diagnosis Date   • Acquired hypothyroidism    • Acute pharyngitis    • Encounter for health maintenance examination    • Essential hypertension    • Goiter     - total thyroidectomy July 2015   • Hashimoto's thyroiditis    • Headache    • Heart disease    • Hyperglycemia     , unspecified   •  Kidney failure stage III   • Low back pain    • Malaise and fatigue    • Otitis externa    • Postoperative hypothyroidism    • Tobacco dependence syndrome    • URI (upper respiratory infection)          Past Surgical History:   Procedure Laterality Date   • APPENDECTOMY     • CARDIAC CATHETERIZATION     • CHOLECYSTECTOMY     • COLON RESECTION      meckel resection   • CORONARY STENT PLACEMENT     • DILATATION AND CURETTAGE     • TOTAL ABDOMINAL HYSTERECTOMY WITH SALPINGO OOPHORECTOMY     • TOTAL THYROIDECTOMY           Family History   Problem Relation Age of Onset   • Diabetes Other    • Hyperlipidemia Other    • Hypertension Other    • Cancer Mother    • Diabetes Mother    • Coronary artery disease Mother    • Kidney disease Mother    • COPD Mother    • Coronary artery disease Father    • Early death Father    • Diabetes Paternal Aunt          Social History     Social History   • Marital status:      Spouse name: N/A   • Number of children: N/A   • Years of education: N/A     Occupational History   • Not on file.     Social History Main Topics   • Smoking status: Current Every Day Smoker     Packs/day: 0.50     Types: Cigarettes     Start date: 8/12/2015   • Smokeless tobacco: Never Used   • Alcohol use No   • Drug use: No   • Sexual activity: Defer     Other Topics Concern   • Not on file     Social History Narrative         Current Facility-Administered Medications   Medication Dose Route Frequency Provider Last Rate Last Dose   • amLODIPine (NORVASC) tablet 10 mg  10 mg Oral Q24H Raúl Lemus MD   10 mg at 10/12/17 1510   • aspirin chewable tablet 81 mg  81 mg Oral Daily Raúl Lemus MD       • atorvastatin (LIPITOR) tablet 40 mg  40 mg Oral Nightly Raúl Lemus MD   40 mg at 10/12/17 2011   • carvedilol (COREG) tablet 25 mg  25 mg Oral Q12H Raúl Lemus MD   25 mg at 10/12/17 2011   • cholecalciferol (VITAMIN D3) tablet 1,000 Units  1,000 Units Oral Daily Raúl Lemus MD   1,000 Units at 10/12/17  "1510   • dextrose (D50W) solution 25 g  25 g Intravenous Q15 Min PRN Raúl Lemus MD       • dextrose (GLUTOSE) oral gel 15 g  15 g Oral Q15 Min PRN Raúl Lemus MD       • glucagon (human recombinant) (GLUCAGEN DIAGNOSTIC) injection 1 mg  1 mg Subcutaneous Q15 Min PRN Raúl Lemus MD       • heparin (porcine) 5000 UNIT/ML injection 5,000 Units  5,000 Units Subcutaneous Q8H Raúl Lemus MD   5,000 Units at 10/12/17 1451   • insulin aspart (novoLOG) injection 0-7 Units  0-7 Units Subcutaneous 4x Daily AC & at Bedtime Raúl Lemus MD       • lisinopril (PRINIVIL,ZESTRIL) tablet 20 mg  20 mg Oral Daily Raúl Lemus MD   20 mg at 10/12/17 1510   • Morphine injection 1 mg  1 mg Intravenous Q4H PRN Raúl Lemus MD        And   • naloxone (NARCAN) injection 0.4 mg  0.4 mg Intravenous Q5 Min PRN Raúl Lemus MD       • nicotine (NICODERM CQ) 21 MG/24HR patch 1 patch  1 patch Transdermal Q24H Raúl Lemus MD       • nitroglycerin (NITROSTAT) SL tablet 0.4 mg  0.4 mg Sublingual Q5 Min PRN Raúl Lemus MD       • ondansetron (ZOFRAN) injection 4 mg  4 mg Intravenous Q6H PRN Lenin Dickerson MD   4 mg at 10/12/17 1906   • pantoprazole (PROTONIX) EC tablet 40 mg  40 mg Oral Q AM Raúl Lemus MD       • sodium chloride 0.9 % flush 1-10 mL  1-10 mL Intravenous PRN Raúl Lemus MD       • sodium chloride 0.9 % flush 1-10 mL  1-10 mL Intravenous PRN Raúl Lemus MD       • sodium chloride 0.9 % infusion  100 mL/hr Intravenous Continuous Raúl Lemus  mL/hr at 10/12/17 1448 100 mL/hr at 10/12/17 1448   • Thyroid (ARMOUR) tablet 180 mg  180 mg Oral BID Raúl Lemus MD   180 mg at 10/12/17 1734         OBJECTIVE    /68 (BP Location: Right arm, Patient Position: Lying)  Pulse 77  Temp 97 °F (36.1 °C) (Temporal Artery )   Resp 18  Ht 67\" (170.2 cm)  Wt 270 lb 11.2 oz (123 kg)  LMP Comment: hysterectomy 2004  SpO2 96%  Breastfeeding? No  BMI 42.4 kg/m2      Review of Systems     Constitutional:  Denies " recent weight loss, weight gain, fever or chills, no change in exercise tolerance     HENT:  Denies any hearing loss, epistaxis, hoarseness, or difficulty speaking.     Eyes: Wears eyeglasses or contact lenses     Respiratory:  Dyspnea with exertion,no cough, wheezing, or hemoptysis.     Cardiovascular: See history of present illness    Gastrointestinal:  Denies change in bowel habits, dyspepsia, ulcer disease, hematochezia, or melena.  history of abnormal LFTs    Endocrine: Negative for cold intolerance, heat intolerance, polydipsia, polyphagia and polyuria.   Genitourinary: Negative.      Musculoskeletal: Chronic back pain    Skin:  Denies any change in hair or nails, rashes, or skin lesions.     Allergic/Immunologic: Negative.  Negative for environmental allergies, food allergies and immunocompromised state.     Neurological:  Denies any history of recurrent headaches, strokes, TIA, or seizure disorder.     Hematological: Denies any food allergies, seasonal allergies, bleeding disorders, or lymphadenopathy.     Psychiatric/Behavioral: Denies any history of depression or change in cognitive function.       Physical Exam     Constitutional: Cooperative, alert and oriented, well-developed,  in no acute distress.     HENT:   Head: Normocephalic, normal hair patterns, no masses or tenderness.  Ears, Nose, and Throat: No gross abnormalities. No pallor or cyanosis.   Eyes: EOMS intact, PERRL, conjunctivae and lids unremarkable. Fundoscopic exam and visual fields not performed.   Neck: No palpable masses or adenopathy, no thyromegaly, no JVD, carotid pulses are full and equal bilaterally and without  Bruits.     Cardiovascular: Regular rhythm, S1 and S2 normal, no S3 or S4.  No murmurs, gallops, or rubs detected.     Pulmonary/Chest: Chest: normal symmetry, no tenderness to palpation, normal respiratory excursion,no use of accessory muscles.            Pulmonary: Normal breath sounds. No rales or ronchi.    Abdominal:  Abdomen soft, bowel sounds normoactive, no masses, no hepatosplenomegaly, non-tender, no bruits.     Musculoskeletal: No deformities, clubbing, cyanosis, erythema, or edema observed. There are no spinal abnormalities noted.     Neurological: No gross motor or sensory deficits noted, Cranial nerves 2-12 normal. affect appropriate, oriented to time, person, place.     Skin: Warm and dry to the touch, no apparent skin lesions or masses noted.     Psychiatric: Normal mood and affect. Behavior is normal. Judgment and thought content normal.     RESULTS  Lab Results (last 24 hours)     Procedure Component Value Units Date/Time    CBC Auto Differential [[id number]]  (Abnormal) Collected:  10/12/17 1424    Specimen:  Blood Updated:  10/12/17 1450     WBC 9.06 10*3/mm3      RBC 3.90 10*6/mm3      Hemoglobin 11.8 (L) g/dL      Hematocrit 35.9 %      MCV 92.1 fL      MCH 30.3 pg      MCHC 32.9 g/dL      RDW 13.7 %      RDW-SD 45.8 fl      MPV 9.8 fL      Platelets 224 10*3/mm3      Neutrophil % 59.1 %      Lymphocyte % 27.0 %      Monocyte % 9.5 %      Eosinophil % 2.9 %      Basophil % 0.6 %      Immature Grans % 0.9 (H) %      Neutrophils, Absolute 5.36 10*3/mm3      Lymphocytes, Absolute 2.45 10*3/mm3      Monocytes, Absolute 0.86 10*3/mm3      Eosinophils, Absolute 0.26 10*3/mm3      Basophils, Absolute 0.05 10*3/mm3      Immature Grans, Absolute 0.08 (H) 10*3/mm3     Comprehensive Metabolic Panel [[id number]]  (Abnormal) Collected:  10/12/17 1423    Specimen:  Blood Updated:  10/12/17 1504     Glucose 102 (H) mg/dL      BUN 11 mg/dL      Creatinine 1.04 (H) mg/dL      Sodium 141 mmol/L      Potassium 3.7 mmol/L      Chloride 102 mmol/L      CO2 29.0 mmol/L      Calcium 8.5 mg/dL      Total Protein 7.3 g/dL      Albumin 4.00 g/dL      ALT (SGPT) 144 (H) U/L      AST (SGOT) 162 (H) U/L      Alkaline Phosphatase 93 U/L      Total Bilirubin 0.5 mg/dL      eGFR Non African Amer 57 (L) mL/min/1.73      Globulin 3.3 gm/dL      A/G  Ratio 1.2 g/dL      BUN/Creatinine Ratio 10.6     Anion Gap 10.0 mmol/L     Lipid Panel [[id number]]  (Abnormal) Collected:  10/12/17 1423    Specimen:  Blood Updated:  10/12/17 1514     Total Cholesterol 208 (H) mg/dL      Triglycerides 82 mg/dL      HDL Cholesterol 53 (L) mg/dL      LDL Cholesterol  127 mg/dL      LDL/HDL Ratio 2.62    Troponin [[id number]]  (Abnormal) Collected:  10/12/17 1423    Specimen:  Blood Updated:  10/12/17 1515     Troponin I 0.056 (H) ng/mL     BNP [[id number]]  (Abnormal) Collected:  10/12/17 1423    Specimen:  Blood Updated:  10/12/17 1515     proBNP 512.0 (H) pg/mL     Extra Tubes [[id number]] Collected:  10/12/17 1423    Specimen:  Blood from Blood, Venous Line Updated:  10/12/17 1531    Narrative:       The following orders were created for panel order Extra Tubes.  Procedure                               Abnormality         Status                     ---------                               -----------         ------                     Light Blue Top[[id number]]                                   Final result               Gold Top - SST[[id number]]                                   Final result                 Please view results for these tests on the individual orders.    Light Blue Top [[id number]] Collected:  10/12/17 1423    Specimen:  Blood Updated:  10/12/17 1531     Extra Tube hold for add-on      Auto resulted       Gold Top - SST [[id number]] Collected:  10/12/17 1423    Specimen:  Blood Updated:  10/12/17 1531     Extra Tube Hold for add-ons.      Auto resulted.       D-dimer, Quantitative [[id number]]  (Normal) Collected:  10/12/17 1423    Specimen:  Blood Updated:  10/12/17 1533     D-Dimer, Quantitative 433 ng/mL (FEU)     Narrative:       Dimer values <500 ng/ml FEU are FDA approved as aid in diagnosis of deep venous thrombosis and pulmonary embolism.  This test should not be used in an exclusion strategy with pretest probability alone.    A recent guideline regarding  diagnosis for pulmonary thomboembolism recommends an adjusted exclusion criterion of age x 10 ng/ml FEU for patients >50 years of age ([employer]ari Intern Med 2015; 163: 701-711).    POC Glucose Fingerstick [[id number]]  (Normal) Collected:  10/12/17 1541    Specimen:  Blood Updated:  10/12/17 1603     Glucose 121 mg/dL       RN NotifiedMeter: [id number]: [id number] MAGGIE CAUSEY       Urinalysis With / Microscopic If Indicated - Urine, Clean Catch [[id number]]  (Normal) Collected:  10/12/17 1844    Specimen:  Urine from Urine, Clean Catch Updated:  10/12/17 1850     Color, UA Yellow     Appearance, UA Clear     pH, UA 5.5     Specific Gravity, UA 1.018     Glucose, UA Negative     Ketones, UA Negative     Bilirubin, UA Negative     Blood, UA Negative     Protein, UA Negative     Leuk Esterase, UA Negative     Nitrite, UA Negative     Urobilinogen, UA 0.2 E.U./dL    Narrative:       Urine microscopic not indicated.    Urine Drug Screen - Urine, Clean Catch [[id number]]  (Abnormal) Collected:  10/12/17 1844    Specimen:  Urine from Urine, Clean Catch Updated:  10/12/17 1912     Amphetamine Screen, Urine Negative     Barbiturates Screen, Urine Negative     Benzodiazepine Screen, Urine Negative     Cocaine Screen, Urine Negative     Methadone Screen, Urine Negative     Opiate Screen Positive (A)     Oxycodone Screen, Urine Negative     THC, Screen, Urine Negative    Narrative:       Negative Thresholds For Drugs Screened in Urine:     Amphetamines          500 ng/ml  Barbiturates          200 ng/ml  Benzodiazepines       200 ng/ml  Cocaine               150 ng/ml  Methadone             300 ng/mL  Opiates               300 ng/mL  Oxycodone             100 ng/mL  THC                   20 ng/mL    The normal value for all drugs tested is negative. This report includes final unconfirmed screening results.  A positive result by this assay can be, at your request, sent to the Reference Lab for confirmation by gas  chromatography. Unconfirmed results must not be used for non-medical purposes, such as employment or legal testing. Clinical consideration should be applied to any drug of abuse test result, particularly when unconfirmed results are used.    Troponin [[id number]]  (Abnormal) Collected:  10/12/17 1935    Specimen:  Blood Updated:  10/12/17 2025     Troponin I 0.041 (H) ng/mL     Troponin [[id number]]  (Normal) Collected:  10/13/17 0156    Specimen:  Blood Updated:  10/13/17 0310     Troponin I 0.031 ng/mL     Troponin [[id number]] Collected:  10/13/17 0623    Specimen:  Blood Updated:  10/13/17 0706    CBC & Differential [[id number]] Collected:  10/13/17 0623    Specimen:  Blood Updated:  10/13/17 0712    Narrative:       The following orders were created for panel order CBC & Differential.  Procedure                               Abnormality         Status                     ---------                               -----------         ------                     CBC Auto Differential[[id number]]        Abnormal            Final result                 Please view results for these tests on the individual orders.    CBC Auto Differential [[id number]]  (Abnormal) Collected:  10/13/17 0623    Specimen:  Blood Updated:  10/13/17 0712     WBC 9.04 10*3/mm3      RBC 3.64 (L) 10*6/mm3      Hemoglobin 11.0 (L) g/dL      Hematocrit 34.0 (L) %      MCV 93.4 fL      MCH 30.2 pg      MCHC 32.4 g/dL      RDW 13.8 %      RDW-SD 47.4 (H) fl      MPV 10.2 fL      Platelets 221 10*3/mm3      Neutrophil % 59.0 %      Lymphocyte % 29.4 %      Monocyte % 8.3 %      Eosinophil % 2.1 %      Basophil % 0.4 %      Immature Grans % 0.8 (H) %      Neutrophils, Absolute 5.33 10*3/mm3      Lymphocytes, Absolute 2.66 10*3/mm3      Monocytes, Absolute 0.75 10*3/mm3      Eosinophils, Absolute 0.19 10*3/mm3      Basophils, Absolute 0.04 10*3/mm3      Immature Grans, Absolute 0.07 (H) 10*3/mm3     Magnesium [[id number]]  (Normal) Collected:  10/13/17 0623     Specimen:  Blood Updated:  10/13/17 0728     Magnesium 2.0 mg/dL     Basic Metabolic Panel [[id number]]  (Abnormal) Collected:  10/13/17 0623    Specimen:  Blood Updated:  10/13/17 0728     Glucose 138 (H) mg/dL      BUN 14 mg/dL      Creatinine 1.19 (H) mg/dL      Sodium 141 mmol/L      Potassium 3.9 mmol/L      Chloride 104 mmol/L      CO2 27.0 mmol/L      Calcium 7.4 (L) mg/dL      eGFR Non African Amer 49 (L) mL/min/1.73      BUN/Creatinine Ratio 11.8     Anion Gap 10.0 mmol/L               ASSESSMENT AND PLAN    Ms. Sutton has multiple risk factors for coronary artery disease and has presented with chest pain that is suspicious for unstable angina.  A small and STEMI cannot be ruled out.  The patient is pain-free at the present time and EKG showed sinus rhythm with evidence of old inferior wall myocardial infarction.  I discussed the different treatment options with her and the plan would be to proceed with a cardiac catheterization for definitive evaluation.  All risks benefits and alternate treatments have been explained to her and she will be an ASA class II and Mallampati class II.  Smoking cessation has been stressed.  Further recommendations will follow.    Mian Montana MD  10/13/2017  7:33 AM

## 2017-10-13 NOTE — PAYOR COMM NOTE
"Chris Davies (46 y.o. Female)     Date of Birth Social Security Number Address Home Phone MRN    1970  [address] [place] [address] [phone] [id number]    Rastafarian Marital Status          Tenriism        Admission Date Admission Type Admitting Provider Attending Provider Department, Room/Bed    10/12/17 Urgent Matthew Shaw MD Iqbal, Syed Javed, MD [facility] STEPDOWN UNIT, 318/1    Discharge Date Discharge Disposition Discharge Destination                      Attending Provider: Matthew Shaw MD     Allergies:  Penicillins, Adhesive Tape, Coconut Flavor, Latex    Isolation:  None   Infection:  None   Code Status:  FULL    Ht:  67.01\" (170.2 cm)   Wt:  271 lb 2.7 oz (123 kg)    Admission Cmt:  None   Principal Problem:  None                Active Insurance as of 10/12/2017     Primary Coverage     Payor Plan Insurance Group Employer/Plan Group    [employer] [place] [employer] [place]ICA[place]      Payor Plan Address Payor Plan Phone Number Effective From Effective To    [address] [address] [phone] 8/11/2016     [place] [address]       Subscriber Name Subscriber Birth Date Member ID       CHRIS DAVIES 1970 [id number]                 Emergency Contacts      (Rel.) Home Phone Work Phone Mobile Phone    Danielle Machuca (Grandparent) [phone] -- --               History & Physical      Raúl Lemus MD at 10/12/2017  3:04 PM                [facility] Admission      Date of Admission: 10/12/2017      Primary Care Physician: Anderson Abbott MD      No chief complaint on file.      HPI:  Patient is 46 years old with PMH of CAD , complains of chest pain that started last night . Pain is substernal in location, radiates to her L arm. Pain alleviated with nitroglycerin .  Patient denies fever or chills, headache , dizziness, visual changes, change in appetite ,palpitations, difficulty in " breathing or cough, abdominal pain , N/V/D , blood in the stool or urine or urinary symptoms, weakness numbness or tingling in the extremities.    Past Medical History:   Past Medical History:   Diagnosis Date   • Acquired hypothyroidism    • Acute pharyngitis    • Encounter for health maintenance examination    • Essential hypertension    • Goiter     - total thyroidectomy July 2015   • Hashimoto's thyroiditis    • Headache    • Heart disease    • Hyperglycemia     , unspecified   • Kidney failure stage III   • Low back pain    • Malaise and fatigue    • Otitis externa    • Postoperative hypothyroidism    • Tobacco dependence syndrome    • URI (upper respiratory infection)        Past Surgical History:   Past Surgical History:   Procedure Laterality Date   • APPENDECTOMY     • CARDIAC CATHETERIZATION     • CHOLECYSTECTOMY     • COLON RESECTION      meckel resection   • CORONARY STENT PLACEMENT     • DILATATION AND CURETTAGE     • TOTAL ABDOMINAL HYSTERECTOMY WITH SALPINGO OOPHORECTOMY     • TOTAL THYROIDECTOMY         Family History:   Family History   Problem Relation Age of Onset   • Diabetes Other    • Hyperlipidemia Other    • Hypertension Other    • Cancer Mother    • Diabetes Mother    • Coronary artery disease Mother    • Kidney disease Mother    • COPD Mother    • Coronary artery disease Father    • Early death Father    • Diabetes Paternal Aunt        Social History:   Social History     Social History   • Marital status:      Spouse name: N/A   • Number of children: N/A   • Years of education: N/A     Social History Main Topics   • Smoking status: Current Every Day Smoker     Packs/day: 0.50     Types: Cigarettes     Start date: 8/12/2015   • Smokeless tobacco: Never Used   • Alcohol use No   • Drug use: No   • Sexual activity: Defer     Other Topics Concern   • None     Social History Narrative       Allergies:   Allergies   Allergen Reactions   • Penicillins Swelling     Of the throat   •  Adhesive Tape Other (See Comments)     Silk tape- blisters   • Coconut Flavor Swelling   • Latex Hives       Medications:   Prior to Admission medications    Medication Sig Start Date End Date Taking? Authorizing Provider   amLODIPine (NORVASC) 10 MG tablet  10/22/16  Yes Historical Provider, MD   aspirin 81 MG chewable tablet Chew 81 mg daily.   Yes Historical Provider, MD   atorvastatin (LIPITOR) 10 MG tablet Take 10 mg by mouth daily. Half a tab q day   Yes Historical Provider, MD   atorvastatin (LIPITOR) 40 MG tablet  8/18/17  Yes Historical Provider, MD   Baclofen 2 % cream Apply  topically 3 (Three) Times a Day. Baclofen, Neurontin, and Lidocaine compound cream to lower back TID for chronic back pain   Yes Historical Provider, MD   Blood Glucose Monitoring Suppl (FREESTYLE LITE) device  8/23/16  Yes Historical Provider, MD   carvedilol (COREG) 25 MG tablet 2 (Two) Times a Day. 8/3/17  Yes Historical Provider, MD   cholecalciferol (VITAMIN D3) 1000 units tablet Take 1,000 Units by mouth Daily.   Yes Historical Provider, MD   FREESTYLE LITE test strip  10/22/16  Yes Historical Provider, MD   glipiZIDE (GLUCOTROL) 5 MG ER tablet daily 10/22/16  Yes Historical Provider, MD   lisinopril (PRINIVIL,ZESTRIL) 20 MG tablet Take 20 mg by mouth Daily.   Yes Historical Provider, MD   thyroid (ARMOUR THYROID) 180 MG tablet Take 1 tablet by mouth 2 (Two) Times a Day. 2/17/17  Yes DUC Pressley   cholecalciferol (VITAMIN D3) 60040 units capsule Take 5 capsules by mouth 1 (One) Time Per Week. 50,000 units once weekly  Patient taking differently: Take 5,000 Units by mouth Daily. 50,000 units once weekly 8/24/17 8/24/18  DUC Pressley   sertraline (ZOLOFT) 50 MG tablet As Needed. daily 10/22/16   Historical Provider, MD   traMADol (ULTRAM) 50 MG tablet prn 10/27/16   Historical Provider, MD       Review of Systems:    -Constitutional: Negative for activity change, appetite change, chills, diaphoresis,  fatigue, fever and unexpected weight change.   -HENT: Negative for congestion, ear discharge, ear pain, hearing loss, rhinorrhea, sneezing, sore throat and trouble swallowing.    Eyes: Negative for photophobia, pain, discharge and visual disturbance.   -Respiratory: Negative for cough, chest tightness, shortness of breath and wheezing.    -Cardiovascular: Negative for chest pain and palpitations.   -Gastrointestinal: Negative for abdominal pain, blood in stool, diarrhea, nausea and vomiting.   -Endocrine: Negative for polydipsia and polyuria.   -Genitourinary: Negative for difficulty urinating, dysuria, frequency, hematuria and urgency.   -Skin: Negative for rash.   -Neurological: Negative for dizziness, syncope, weakness, light-headedness, numbness and headaches.   -Psychiatric/Behavioral: Negative for suicidal ideas.     Otherwise complete ROS is negative except as mentioned above.    Physical Exam:    Physical Exam     Temp:  [96.2 °F (35.7 °C)] 96.2 °F (35.7 °C)  Heart Rate:  [72-74] 72  Resp:  [16] 16  BP: (192)/(99) 192/99    -General:Patient is oriented to person, place, and time. Patient appears well-developed and well-nourished. No distress.   -HEENT: Head: Normocephalic and atraumatic. Right Ear: External ear normal. Left Ear: External ear normal. Nose: Nose normal. Mouth/Throat: Oropharynx is clear and moist.   Eyes: Conjunctivae and EOM are normal. Pupils are equal, round, and reactive to light. Right eye exhibits no discharge. Left eye exhibits no discharge.   Neck: Normal range of motion. Neck supple. No JVD present. No tracheal deviation present. No thyromegaly present.   -CVS: Normal rate, regular rhythm, normal heart sounds and intact distal pulses.  Exam reveals no gallop and no friction rub.    No murmur heard.  -Pulmonary: Effort normal and breath sounds normal. No stridor. No respiratory distress. He has no wheezes. No rales or tenderness.   -Abdominal: Soft, Bowel sounds are normal. No  distension and no mass. There is no tenderness. There is no rebound and no guarding. No hernia.   -Musc: No Cyanosis clubbing or edema.  -Lymph: No cervical adenopathy.   -Neuro: patient is alert and oriented to person, place, and time.Patient has normal reflexes. No cranial nerve deficit. Patient exhibits normal muscle tone , strength and sensation bilaterally. Coordination normal.   -Skin: Skin is warm. No rash noted. Patient is not diaphoretic. No erythema. No pallor.   -Psych: Patient  has a normal mood and affect. behavior is normal. Judgment and thought content normal. Denies suicidal ideations or plans.    Nursing note and vitals reviewed.    Results Reviewed:  I have personally reviewed current lab, radiology, and data and agree with results.  Lab Results (last 24 hours)     Procedure Component Value Units Date/Time    Troponin [[id number]] Collected:  10/12/17 1423    Specimen:  Blood Updated:  10/12/17 1440    Lipid Panel [[id number]] Collected:  10/12/17 1423    Specimen:  Blood Updated:  10/12/17 1440    BNP [[id number]] Collected:  10/12/17 1423    Specimen:  Blood Updated:  10/12/17 1440    Light Blue Top [[id number]] Collected:  10/12/17 1423    Specimen:  Blood Updated:  10/12/17 1440    Extra Tubes [[id number]] Collected:  10/12/17 1423    Specimen:  Blood from Blood, Venous Line Updated:  10/12/17 1441    Narrative:       The following orders were created for panel order Extra Tubes.  Procedure                               Abnormality         Status                     ---------                               -----------         ------                     Light Blue Top[[id number]]                                   In process                 Gold Top - SST[[id number]]                                   In process                   Please view results for these tests on the individual orders.    Gold Top - SST [[id number]] Collected:  10/12/17 1423    Specimen:  Blood Updated:  10/12/17 1441    CBC Auto  Differential [[id number]]  (Abnormal) Collected:  10/12/17 1424    Specimen:  Blood Updated:  10/12/17 1450     WBC 9.06 10*3/mm3      RBC 3.90 10*6/mm3      Hemoglobin 11.8 (L) g/dL      Hematocrit 35.9 %      MCV 92.1 fL      MCH 30.3 pg      MCHC 32.9 g/dL      RDW 13.7 %      RDW-SD 45.8 fl      MPV 9.8 fL      Platelets 224 10*3/mm3      Neutrophil % 59.1 %      Lymphocyte % 27.0 %      Monocyte % 9.5 %      Eosinophil % 2.9 %      Basophil % 0.6 %      Immature Grans % 0.9 (H) %      Neutrophils, Absolute 5.36 10*3/mm3      Lymphocytes, Absolute 2.45 10*3/mm3      Monocytes, Absolute 0.86 10*3/mm3      Eosinophils, Absolute 0.26 10*3/mm3      Basophils, Absolute 0.05 10*3/mm3      Immature Grans, Absolute 0.08 (H) 10*3/mm3     Comprehensive Metabolic Panel [[id number]]  (Abnormal) Collected:  10/12/17 1423    Specimen:  Blood Updated:  10/12/17 1504     Glucose 102 (H) mg/dL      BUN 11 mg/dL      Creatinine 1.04 (H) mg/dL      Sodium 141 mmol/L      Potassium 3.7 mmol/L      Chloride 102 mmol/L      CO2 29.0 mmol/L      Calcium 8.5 mg/dL      Total Protein 7.3 g/dL      Albumin 4.00 g/dL      ALT (SGPT) 144 (H) U/L      AST (SGOT) 162 (H) U/L      Alkaline Phosphatase 93 U/L      Total Bilirubin 0.5 mg/dL      eGFR Non African Amer 57 (L) mL/min/1.73      Globulin 3.3 gm/dL      A/G Ratio 1.2 g/dL      BUN/Creatinine Ratio 10.6     Anion Gap 10.0 mmol/L         Imaging Results (last 24 hours)     ** No results found for the last 24 hours. **          Assessment/Plan         Hospital Problem List     NSTEMI (non-ST elevated myocardial infarction)          Assessment / Plan  --chest pain   Trop 0.02--0.06 ( at [facility] ) 3rd trop at [facility] 0.05   EKG not acute changes   Serial trops  Tele  Cardiology consult ( input appreciated )    --hx of CAD  ASA ,lisnopril, and coreg    --HTN  Continue home meds :Norvasc, lisnopril, and coreg    --preDM  Home meds held  Insulin sliding  scale    --hypothyrodisim  McConnell     --depression  stable  Denies suicidal ideations or plans  Has been off medications     --tobacco smoking   Nicoderm patch    --obesity     --DVT prophlaxis   Heparin SC    I discussed the patients findings and my recommendations with the patient, and the patient verbalized understanding of the plan, risks and benefits of the plan.    This document has been electronically signed by Raúl Lemus MD on October 12, 2017 3:04 PM       Electronically signed by Raúl Lemus MD at 10/12/2017  4:49 PM      Mian Montana MD at 10/13/2017  7:33 AM          CARDIOLOGY CONSULTATION NOTE    Referring Provider: Matthew Shaw MD    Reason for Consultation: Evaluation of chest pain    Rhea Sutton  1970  46 y.o. female      HPI    Ms. Sutton is a 46-year-old  lady with history of hypertension, hyperlipidemia, hyperglycemia, hypothyroidism, obesity with previous history of coronary artery disease with inferior wall myocardial infarction in 2008 when she received a stent to RCA in [place].  She has long-standing history of tobacco abuse.  She has followed up with Dr. Velarde and last saw him about a year ago.  On the day of admission she woke up at about 5:30 in the morning with chest pain which was sharp and pressure-like in character and associated with some degree of shortness of breath.  She went [facility] where her troponins were noted to be borderline elevated and hence she was transferred to [facility] for further management.  She was given 3 sublingual nitroglycerin tablets after which her pain abated and she was pain-free at the time of my examination today morning.  Troponins have been borderline elevated and today it was 0.15.  The patient claims compliance with her medications.  She unfortunately continues to smoke.  We had a discussion about smoking cessation.  She denied any definite exertional chest pain but has had  chronic back pain for which she is seen at the pain clinic.  She has been noted to have abnormal LFTs in the past and her AST and ALT were noted be elevated on this admission.  She denied any nausea, vomiting or diarrhea and there is no previous history of hepatitis.    SUBJECTIVE    Past Medical History:   Diagnosis Date   • Acquired hypothyroidism    • Acute pharyngitis    • Encounter for health maintenance examination    • Essential hypertension    • Goiter     - total thyroidectomy July 2015   • Hashimoto's thyroiditis    • Headache    • Heart disease    • Hyperglycemia     , unspecified   • Kidney failure stage III   • Low back pain    • Malaise and fatigue    • Otitis externa    • Postoperative hypothyroidism    • Tobacco dependence syndrome    • URI (upper respiratory infection)          Past Surgical History:   Procedure Laterality Date   • APPENDECTOMY     • CARDIAC CATHETERIZATION     • CHOLECYSTECTOMY     • COLON RESECTION      meckel resection   • CORONARY STENT PLACEMENT     • DILATATION AND CURETTAGE     • TOTAL ABDOMINAL HYSTERECTOMY WITH SALPINGO OOPHORECTOMY     • TOTAL THYROIDECTOMY           Family History   Problem Relation Age of Onset   • Diabetes Other    • Hyperlipidemia Other    • Hypertension Other    • Cancer Mother    • Diabetes Mother    • Coronary artery disease Mother    • Kidney disease Mother    • COPD Mother    • Coronary artery disease Father    • Early death Father    • Diabetes Paternal Aunt          Social History     Social History   • Marital status:      Spouse name: N/A   • Number of children: N/A   • Years of education: N/A     Occupational History   • Not on file.     Social History Main Topics   • Smoking status: Current Every Day Smoker     Packs/day: 0.50     Types: Cigarettes     Start date: 8/12/2015   • Smokeless tobacco: Never Used   • Alcohol use No   • Drug use: No   • Sexual activity: Defer     Other Topics Concern   • Not on file     Social History  Narrative         Current Facility-Administered Medications   Medication Dose Route Frequency Provider Last Rate Last Dose   • amLODIPine (NORVASC) tablet 10 mg  10 mg Oral Q24H Raúl Lemus MD   10 mg at 10/12/17 1510   • aspirin chewable tablet 81 mg  81 mg Oral Daily Raúl Lemus MD       • atorvastatin (LIPITOR) tablet 40 mg  40 mg Oral Nightly Raúl Lemus MD   40 mg at 10/12/17 2011   • carvedilol (COREG) tablet 25 mg  25 mg Oral Q12H Raúl Lemus MD   25 mg at 10/12/17 2011   • cholecalciferol (VITAMIN D3) tablet 1,000 Units  1,000 Units Oral Daily Raúl Lemus MD   1,000 Units at 10/12/17 1510   • dextrose (D50W) solution 25 g  25 g Intravenous Q15 Min PRN Raúl Lemus MD       • dextrose (GLUTOSE) oral gel 15 g  15 g Oral Q15 Min PRN Raúl Lemus MD       • glucagon (human recombinant) (GLUCAGEN DIAGNOSTIC) injection 1 mg  1 mg Subcutaneous Q15 Min PRN Raúl Lemus MD       • heparin (porcine) 5000 UNIT/ML injection 5,000 Units  5,000 Units Subcutaneous Q8H Raúl Lemus MD   5,000 Units at 10/12/17 1451   • insulin aspart (novoLOG) injection 0-7 Units  0-7 Units Subcutaneous 4x Daily AC & at Bedtime Raúl Lemus MD       • lisinopril (PRINIVIL,ZESTRIL) tablet 20 mg  20 mg Oral Daily Raúl Lemus MD   20 mg at 10/12/17 1510   • Morphine injection 1 mg  1 mg Intravenous Q4H PRN Raúl Lemus MD        And   • naloxone (NARCAN) injection 0.4 mg  0.4 mg Intravenous Q5 Min PRN Raúl Lemus MD       • nicotine (NICODERM CQ) 21 MG/24HR patch 1 patch  1 patch Transdermal Q24H Raúl Lemus MD       • nitroglycerin (NITROSTAT) SL tablet 0.4 mg  0.4 mg Sublingual Q5 Min PRN Raúl Lemus MD       • ondansetron (ZOFRAN) injection 4 mg  4 mg Intravenous Q6H PRN Lenin Dickerson MD   4 mg at 10/12/17 1906   • pantoprazole (PROTONIX) EC tablet 40 mg  40 mg Oral Q AM Raúl Lemus MD       • sodium chloride 0.9 % flush 1-10 mL  1-10 mL Intravenous PRN Raúl Lemus MD       • sodium chloride 0.9 % flush 1-10 mL   "1-10 mL Intravenous PRN Raúl Lemus MD       • sodium chloride 0.9 % infusion  100 mL/hr Intravenous Continuous Raúl Lemus  mL/hr at 10/12/17 1448 100 mL/hr at 10/12/17 1448   • Thyroid (ARMOUR) tablet 180 mg  180 mg Oral BID Raúl Lemus MD   180 mg at 10/12/17 1734         OBJECTIVE    /68 (BP Location: Right arm, Patient Position: Lying)  Pulse 77  Temp 97 °F (36.1 °C) (Temporal Artery )   Resp 18  Ht 67\" (170.2 cm)  Wt 270 lb 11.2 oz (123 kg)  LMP Comment: hysterectomy 2004  SpO2 96%  Breastfeeding? No  BMI 42.4 kg/m2      Review of Systems     Constitutional:  Denies recent weight loss, weight gain, fever or chills, no change in exercise tolerance     HENT:  Denies any hearing loss, epistaxis, hoarseness, or difficulty speaking.     Eyes: Wears eyeglasses or contact lenses     Respiratory:  Dyspnea with exertion,no cough, wheezing, or hemoptysis.     Cardiovascular: See history of present illness    Gastrointestinal:  Denies change in bowel habits, dyspepsia, ulcer disease, hematochezia, or melena.  history of abnormal LFTs    Endocrine: Negative for cold intolerance, heat intolerance, polydipsia, polyphagia and polyuria.   Genitourinary: Negative.      Musculoskeletal: Chronic back pain    Skin:  Denies any change in hair or nails, rashes, or skin lesions.     Allergic/Immunologic: Negative.  Negative for environmental allergies, food allergies and immunocompromised state.     Neurological:  Denies any history of recurrent headaches, strokes, TIA, or seizure disorder.     Hematological: Denies any food allergies, seasonal allergies, bleeding disorders, or lymphadenopathy.     Psychiatric/Behavioral: Denies any history of depression or change in cognitive function.       Physical Exam     Constitutional: Cooperative, alert and oriented, well-developed,  in no acute distress.     HENT:   Head: Normocephalic, normal hair patterns, no masses or tenderness.  Ears, Nose, and Throat: No " gross abnormalities. No pallor or cyanosis.   Eyes: EOMS intact, PERRL, conjunctivae and lids unremarkable. Fundoscopic exam and visual fields not performed.   Neck: No palpable masses or adenopathy, no thyromegaly, no JVD, carotid pulses are full and equal bilaterally and without  Bruits.     Cardiovascular: Regular rhythm, S1 and S2 normal, no S3 or S4.  No murmurs, gallops, or rubs detected.     Pulmonary/Chest: Chest: normal symmetry, no tenderness to palpation, normal respiratory excursion,no use of accessory muscles.            Pulmonary: Normal breath sounds. No rales or ronchi.    Abdominal: Abdomen soft, bowel sounds normoactive, no masses, no hepatosplenomegaly, non-tender, no bruits.     Musculoskeletal: No deformities, clubbing, cyanosis, erythema, or edema observed. There are no spinal abnormalities noted.     Neurological: No gross motor or sensory deficits noted, Cranial nerves 2-12 normal. affect appropriate, oriented to time, person, place.     Skin: Warm and dry to the touch, no apparent skin lesions or masses noted.     Psychiatric: Normal mood and affect. Behavior is normal. Judgment and thought content normal.     RESULTS  Lab Results (last 24 hours)     Procedure Component Value Units Date/Time    CBC Auto Differential [[id number]]  (Abnormal) Collected:  10/12/17 1424    Specimen:  Blood Updated:  10/12/17 1450     WBC 9.06 10*3/mm3      RBC 3.90 10*6/mm3      Hemoglobin 11.8 (L) g/dL      Hematocrit 35.9 %      MCV 92.1 fL      MCH 30.3 pg      MCHC 32.9 g/dL      RDW 13.7 %      RDW-SD 45.8 fl      MPV 9.8 fL      Platelets 224 10*3/mm3      Neutrophil % 59.1 %      Lymphocyte % 27.0 %      Monocyte % 9.5 %      Eosinophil % 2.9 %      Basophil % 0.6 %      Immature Grans % 0.9 (H) %      Neutrophils, Absolute 5.36 10*3/mm3      Lymphocytes, Absolute 2.45 10*3/mm3      Monocytes, Absolute 0.86 10*3/mm3      Eosinophils, Absolute 0.26 10*3/mm3      Basophils, Absolute 0.05 10*3/mm3       Immature Grans, Absolute 0.08 (H) 10*3/mm3     Comprehensive Metabolic Panel [[id number]]  (Abnormal) Collected:  10/12/17 1423    Specimen:  Blood Updated:  10/12/17 1504     Glucose 102 (H) mg/dL      BUN 11 mg/dL      Creatinine 1.04 (H) mg/dL      Sodium 141 mmol/L      Potassium 3.7 mmol/L      Chloride 102 mmol/L      CO2 29.0 mmol/L      Calcium 8.5 mg/dL      Total Protein 7.3 g/dL      Albumin 4.00 g/dL      ALT (SGPT) 144 (H) U/L      AST (SGOT) 162 (H) U/L      Alkaline Phosphatase 93 U/L      Total Bilirubin 0.5 mg/dL      eGFR Non African Amer 57 (L) mL/min/1.73      Globulin 3.3 gm/dL      A/G Ratio 1.2 g/dL      BUN/Creatinine Ratio 10.6     Anion Gap 10.0 mmol/L     Lipid Panel [[id number]]  (Abnormal) Collected:  10/12/17 1423    Specimen:  Blood Updated:  10/12/17 1514     Total Cholesterol 208 (H) mg/dL      Triglycerides 82 mg/dL      HDL Cholesterol 53 (L) mg/dL      LDL Cholesterol  127 mg/dL      LDL/HDL Ratio 2.62    Troponin [[id number]]  (Abnormal) Collected:  10/12/17 1423    Specimen:  Blood Updated:  10/12/17 1515     Troponin I 0.056 (H) ng/mL     BNP [[id number]]  (Abnormal) Collected:  10/12/17 1423    Specimen:  Blood Updated:  10/12/17 1515     proBNP 512.0 (H) pg/mL     Extra Tubes [[id number]] Collected:  10/12/17 1423    Specimen:  Blood from Blood, Venous Line Updated:  10/12/17 1531    Narrative:       The following orders were created for panel order Extra Tubes.  Procedure                               Abnormality         Status                     ---------                               -----------         ------                     Light Blue Top[[id number]]                                   Final result               Gold Top - SST[[id number]]                                   Final result                 Please view results for these tests on the individual orders.    Light Blue Top [[id number]] Collected:  10/12/17 1423    Specimen:  Blood Updated:  10/12/17 1531     Extra Tube  hold for add-on      Auto resulted       Gold Top - SST [[id number]] Collected:  10/12/17 1423    Specimen:  Blood Updated:  10/12/17 1531     Extra Tube Hold for add-ons.      Auto resulted.       D-dimer, Quantitative [[id number]]  (Normal) Collected:  10/12/17 1423    Specimen:  Blood Updated:  10/12/17 1533     D-Dimer, Quantitative 433 ng/mL (FEU)     Narrative:       Dimer values <500 ng/ml FEU are FDA approved as aid in diagnosis of deep venous thrombosis and pulmonary embolism.  This test should not be used in an exclusion strategy with pretest probability alone.    A recent guideline regarding diagnosis for pulmonary thomboembolism recommends an adjusted exclusion criterion of age x 10 ng/ml FEU for patients >50 years of age ([employer] Intern Med 2015; 163: 701-711).    POC Glucose Fingerstick [[id number]]  (Normal) Collected:  10/12/17 1541    Specimen:  Blood Updated:  10/12/17 1603     Glucose 121 mg/dL       RN NotifiedMeter: [id number]: [id number] MAGGIE CAUSEY       Urinalysis With / Microscopic If Indicated - Urine, Clean Catch [[id number]]  (Normal) Collected:  10/12/17 1844    Specimen:  Urine from Urine, Clean Catch Updated:  10/12/17 1850     Color, UA Yellow     Appearance, UA Clear     pH, UA 5.5     Specific Gravity, UA 1.018     Glucose, UA Negative     Ketones, UA Negative     Bilirubin, UA Negative     Blood, UA Negative     Protein, UA Negative     Leuk Esterase, UA Negative     Nitrite, UA Negative     Urobilinogen, UA 0.2 E.U./dL    Narrative:       Urine microscopic not indicated.    Urine Drug Screen - Urine, Clean Catch [[id number]]  (Abnormal) Collected:  10/12/17 1844    Specimen:  Urine from Urine, Clean Catch Updated:  10/12/17 1912     Amphetamine Screen, Urine Negative     Barbiturates Screen, Urine Negative     Benzodiazepine Screen, Urine Negative     Cocaine Screen, Urine Negative     Methadone Screen, Urine Negative     Opiate Screen Positive (A)     Oxycodone Screen, Urine  Negative     THC, Screen, Urine Negative    Narrative:       Negative Thresholds For Drugs Screened in Urine:     Amphetamines          500 ng/ml  Barbiturates          200 ng/ml  Benzodiazepines       200 ng/ml  Cocaine               150 ng/ml  Methadone             300 ng/mL  Opiates               300 ng/mL  Oxycodone             100 ng/mL  THC                   20 ng/mL    The normal value for all drugs tested is negative. This report includes final unconfirmed screening results.  A positive result by this assay can be, at your request, sent to the Reference Lab for confirmation by gas chromatography. Unconfirmed results must not be used for non-medical purposes, such as employment or legal testing. Clinical consideration should be applied to any drug of abuse test result, particularly when unconfirmed results are used.    Troponin [[id number]]  (Abnormal) Collected:  10/12/17 1935    Specimen:  Blood Updated:  10/12/17 2025     Troponin I 0.041 (H) ng/mL     Troponin [[id number]]  (Normal) Collected:  10/13/17 0156    Specimen:  Blood Updated:  10/13/17 0310     Troponin I 0.031 ng/mL     Troponin [[id number]] Collected:  10/13/17 0623    Specimen:  Blood Updated:  10/13/17 0706    CBC & Differential [[id number]] Collected:  10/13/17 0623    Specimen:  Blood Updated:  10/13/17 0712    Narrative:       The following orders were created for panel order CBC & Differential.  Procedure                               Abnormality         Status                     ---------                               -----------         ------                     CBC Auto Differential[[id number]]        Abnormal            Final result                 Please view results for these tests on the individual orders.    CBC Auto Differential [[id number]]  (Abnormal) Collected:  10/13/17 0623    Specimen:  Blood Updated:  10/13/17 0712     WBC 9.04 10*3/mm3      RBC 3.64 (L) 10*6/mm3      Hemoglobin 11.0 (L) g/dL      Hematocrit 34.0 (L) %       MCV 93.4 fL      MCH 30.2 pg      MCHC 32.4 g/dL      RDW 13.8 %      RDW-SD 47.4 (H) fl      MPV 10.2 fL      Platelets 221 10*3/mm3      Neutrophil % 59.0 %      Lymphocyte % 29.4 %      Monocyte % 8.3 %      Eosinophil % 2.1 %      Basophil % 0.4 %      Immature Grans % 0.8 (H) %      Neutrophils, Absolute 5.33 10*3/mm3      Lymphocytes, Absolute 2.66 10*3/mm3      Monocytes, Absolute 0.75 10*3/mm3      Eosinophils, Absolute 0.19 10*3/mm3      Basophils, Absolute 0.04 10*3/mm3      Immature Grans, Absolute 0.07 (H) 10*3/mm3     Magnesium [[id number]]  (Normal) Collected:  10/13/17 0623    Specimen:  Blood Updated:  10/13/17 0728     Magnesium 2.0 mg/dL     Basic Metabolic Panel [[id number]]  (Abnormal) Collected:  10/13/17 0623    Specimen:  Blood Updated:  10/13/17 0728     Glucose 138 (H) mg/dL      BUN 14 mg/dL      Creatinine 1.19 (H) mg/dL      Sodium 141 mmol/L      Potassium 3.9 mmol/L      Chloride 104 mmol/L      CO2 27.0 mmol/L      Calcium 7.4 (L) mg/dL      eGFR Non African Amer 49 (L) mL/min/1.73      BUN/Creatinine Ratio 11.8     Anion Gap 10.0 mmol/L               ASSESSMENT AND PLAN    Ms. Sutton has multiple risk factors for coronary artery disease and has presented with chest pain that is suspicious for unstable angina.  A small and STEMI cannot be ruled out.  The patient is pain-free at the present time and EKG showed sinus rhythm with evidence of old inferior wall myocardial infarction.  I discussed the different treatment options with her and the plan would be to proceed with a cardiac catheterization for definitive evaluation.  All risks benefits and alternate treatments have been explained to her and she will be an ASA class II and Mallampati class II.  Smoking cessation has been stressed.  Further recommendations will follow.    Mian Montana MD  10/13/2017  7:33 AM                 Electronically signed by Mian Montana MD at 10/13/2017  7:44 AM      Mian Marroquin  MD Rubén at 10/13/2017  7:44 AM            H&P reviewed. The patient was examined and there are no changes to the H&P.         Electronically signed by Mian Montana MD at 10/13/2017  7:44 AM    Source Note             CARDIOLOGY CONSULTATION NOTE    Referring Provider: Matthew Shaw MD    Reason for Consultation: Evaluation of chest pain    Rhea Sutton  1970  46 y.o. female      HPI    Ms. Sutton is a 46-year-old  lady with history of hypertension, hyperlipidemia, hyperglycemia, hypothyroidism, obesity with previous history of coronary artery disease with inferior wall myocardial infarction in 2008 when she received a stent to RCA in [place].  She has long-standing history of tobacco abuse.  She has followed up with Dr. Velarde and last saw him about a year ago.  On the day of admission she woke up at about 5:30 in the morning with chest pain which was sharp and pressure-like in character and associated with some degree of shortness of breath.  She went [facility] where her troponins were noted to be borderline elevated and hence she was transferred to [facility] for further management.  She was given 3 sublingual nitroglycerin tablets after which her pain abated and she was pain-free at the time of my examination today morning.  Troponins have been borderline elevated and today it was 0.15.  The patient claims compliance with her medications.  She unfortunately continues to smoke.  We had a discussion about smoking cessation.  She denied any definite exertional chest pain but has had chronic back pain for which she is seen at the pain clinic.  She has been noted to have abnormal LFTs in the past and her AST and ALT were noted be elevated on this admission.  She denied any nausea, vomiting or diarrhea and there is no previous history of hepatitis.    SUBJECTIVE    Past Medical History:   Diagnosis Date   • Acquired hypothyroidism    • Acute  pharyngitis    • Encounter for health maintenance examination    • Essential hypertension    • Goiter     - total thyroidectomy July 2015   • Hashimoto's thyroiditis    • Headache    • Heart disease    • Hyperglycemia     , unspecified   • Kidney failure stage III   • Low back pain    • Malaise and fatigue    • Otitis externa    • Postoperative hypothyroidism    • Tobacco dependence syndrome    • URI (upper respiratory infection)          Past Surgical History:   Procedure Laterality Date   • APPENDECTOMY     • CARDIAC CATHETERIZATION     • CHOLECYSTECTOMY     • COLON RESECTION      meckel resection   • CORONARY STENT PLACEMENT     • DILATATION AND CURETTAGE     • TOTAL ABDOMINAL HYSTERECTOMY WITH SALPINGO OOPHORECTOMY     • TOTAL THYROIDECTOMY           Family History   Problem Relation Age of Onset   • Diabetes Other    • Hyperlipidemia Other    • Hypertension Other    • Cancer Mother    • Diabetes Mother    • Coronary artery disease Mother    • Kidney disease Mother    • COPD Mother    • Coronary artery disease Father    • Early death Father    • Diabetes Paternal Aunt          Social History     Social History   • Marital status:      Spouse name: N/A   • Number of children: N/A   • Years of education: N/A     Occupational History   • Not on file.     Social History Main Topics   • Smoking status: Current Every Day Smoker     Packs/day: 0.50     Types: Cigarettes     Start date: 8/12/2015   • Smokeless tobacco: Never Used   • Alcohol use No   • Drug use: No   • Sexual activity: Defer     Other Topics Concern   • Not on file     Social History Narrative         Current Facility-Administered Medications   Medication Dose Route Frequency Provider Last Rate Last Dose   • amLODIPine (NORVASC) tablet 10 mg  10 mg Oral Q24H Raúl Lemus MD   10 mg at 10/12/17 1510   • aspirin chewable tablet 81 mg  81 mg Oral Daily Raúl Lemus MD       • atorvastatin (LIPITOR) tablet 40 mg  40 mg Oral Nightly Raúl Lemus  MD   40 mg at 10/12/17 2011   • carvedilol (COREG) tablet 25 mg  25 mg Oral Q12H Raúl Lemus MD   25 mg at 10/12/17 2011   • cholecalciferol (VITAMIN D3) tablet 1,000 Units  1,000 Units Oral Daily Raúl Lemus MD   1,000 Units at 10/12/17 1510   • dextrose (D50W) solution 25 g  25 g Intravenous Q15 Min PRN Raúl Lemus MD       • dextrose (GLUTOSE) oral gel 15 g  15 g Oral Q15 Min PRN Raúl Lemus MD       • glucagon (human recombinant) (GLUCAGEN DIAGNOSTIC) injection 1 mg  1 mg Subcutaneous Q15 Min PRN Raúl Lemus MD       • heparin (porcine) 5000 UNIT/ML injection 5,000 Units  5,000 Units Subcutaneous Q8H Raúl Lemus MD   5,000 Units at 10/12/17 1451   • insulin aspart (novoLOG) injection 0-7 Units  0-7 Units Subcutaneous 4x Daily AC & at Bedtime Raúl Lemus MD       • lisinopril (PRINIVIL,ZESTRIL) tablet 20 mg  20 mg Oral Daily Raúl Lemus MD   20 mg at 10/12/17 1510   • Morphine injection 1 mg  1 mg Intravenous Q4H PRN Raúl Lemus MD        And   • naloxone (NARCAN) injection 0.4 mg  0.4 mg Intravenous Q5 Min PRN Raúl Lemus MD       • nicotine (NICODERM CQ) 21 MG/24HR patch 1 patch  1 patch Transdermal Q24H Raúl Lemus MD       • nitroglycerin (NITROSTAT) SL tablet 0.4 mg  0.4 mg Sublingual Q5 Min PRN Raúl Lemus MD       • ondansetron (ZOFRAN) injection 4 mg  4 mg Intravenous Q6H PRN Lenin Dickerson MD   4 mg at 10/12/17 1906   • pantoprazole (PROTONIX) EC tablet 40 mg  40 mg Oral Q AM Raúl Lemus MD       • sodium chloride 0.9 % flush 1-10 mL  1-10 mL Intravenous PRN Raúl Lemus MD       • sodium chloride 0.9 % flush 1-10 mL  1-10 mL Intravenous PRN Raúl Lemus MD       • sodium chloride 0.9 % infusion  100 mL/hr Intravenous Continuous Raúl Lemus  mL/hr at 10/12/17 1448 100 mL/hr at 10/12/17 1448   • Thyroid (ARMOUR) tablet 180 mg  180 mg Oral BID Raúl Lemus MD   180 mg at 10/12/17 1734         OBJECTIVE    /68 (BP Location: Right arm, Patient Position: Lying)   "Pulse 77  Temp 97 °F (36.1 °C) (Temporal Artery )   Resp 18  Ht 67\" (170.2 cm)  Wt 270 lb 11.2 oz (123 kg)  LMP Comment: hysterectomy 2004  SpO2 96%  Breastfeeding? No  BMI 42.4 kg/m2      Review of Systems     Constitutional:  Denies recent weight loss, weight gain, fever or chills, no change in exercise tolerance     HENT:  Denies any hearing loss, epistaxis, hoarseness, or difficulty speaking.     Eyes: Wears eyeglasses or contact lenses     Respiratory:  Dyspnea with exertion,no cough, wheezing, or hemoptysis.     Cardiovascular: See history of present illness    Gastrointestinal:  Denies change in bowel habits, dyspepsia, ulcer disease, hematochezia, or melena.  history of abnormal LFTs    Endocrine: Negative for cold intolerance, heat intolerance, polydipsia, polyphagia and polyuria.   Genitourinary: Negative.      Musculoskeletal: Chronic back pain    Skin:  Denies any change in hair or nails, rashes, or skin lesions.     Allergic/Immunologic: Negative.  Negative for environmental allergies, food allergies and immunocompromised state.     Neurological:  Denies any history of recurrent headaches, strokes, TIA, or seizure disorder.     Hematological: Denies any food allergies, seasonal allergies, bleeding disorders, or lymphadenopathy.     Psychiatric/Behavioral: Denies any history of depression or change in cognitive function.       Physical Exam     Constitutional: Cooperative, alert and oriented, well-developed,  in no acute distress.     HENT:   Head: Normocephalic, normal hair patterns, no masses or tenderness.  Ears, Nose, and Throat: No gross abnormalities. No pallor or cyanosis.   Eyes: EOMS intact, PERRL, conjunctivae and lids unremarkable. Fundoscopic exam and visual fields not performed.   Neck: No palpable masses or adenopathy, no thyromegaly, no JVD, carotid pulses are full and equal bilaterally and without  Bruits.     Cardiovascular: Regular rhythm, S1 and S2 normal, no S3 or S4.  No " murmurs, gallops, or rubs detected.     Pulmonary/Chest: Chest: normal symmetry, no tenderness to palpation, normal respiratory excursion,no use of accessory muscles.            Pulmonary: Normal breath sounds. No rales or ronchi.    Abdominal: Abdomen soft, bowel sounds normoactive, no masses, no hepatosplenomegaly, non-tender, no bruits.     Musculoskeletal: No deformities, clubbing, cyanosis, erythema, or edema observed. There are no spinal abnormalities noted.     Neurological: No gross motor or sensory deficits noted, Cranial nerves 2-12 normal. affect appropriate, oriented to time, person, place.     Skin: Warm and dry to the touch, no apparent skin lesions or masses noted.     Psychiatric: Normal mood and affect. Behavior is normal. Judgment and thought content normal.     RESULTS  Lab Results (last 24 hours)     Procedure Component Value Units Date/Time    CBC Auto Differential [[id number]]  (Abnormal) Collected:  10/12/17 1424    Specimen:  Blood Updated:  10/12/17 1450     WBC 9.06 10*3/mm3      RBC 3.90 10*6/mm3      Hemoglobin 11.8 (L) g/dL      Hematocrit 35.9 %      MCV 92.1 fL      MCH 30.3 pg      MCHC 32.9 g/dL      RDW 13.7 %      RDW-SD 45.8 fl      MPV 9.8 fL      Platelets 224 10*3/mm3      Neutrophil % 59.1 %      Lymphocyte % 27.0 %      Monocyte % 9.5 %      Eosinophil % 2.9 %      Basophil % 0.6 %      Immature Grans % 0.9 (H) %      Neutrophils, Absolute 5.36 10*3/mm3      Lymphocytes, Absolute 2.45 10*3/mm3      Monocytes, Absolute 0.86 10*3/mm3      Eosinophils, Absolute 0.26 10*3/mm3      Basophils, Absolute 0.05 10*3/mm3      Immature Grans, Absolute 0.08 (H) 10*3/mm3     Comprehensive Metabolic Panel [[id number]]  (Abnormal) Collected:  10/12/17 1423    Specimen:  Blood Updated:  10/12/17 1504     Glucose 102 (H) mg/dL      BUN 11 mg/dL      Creatinine 1.04 (H) mg/dL      Sodium 141 mmol/L      Potassium 3.7 mmol/L      Chloride 102 mmol/L      CO2 29.0 mmol/L      Calcium 8.5 mg/dL       Total Protein 7.3 g/dL      Albumin 4.00 g/dL      ALT (SGPT) 144 (H) U/L      AST (SGOT) 162 (H) U/L      Alkaline Phosphatase 93 U/L      Total Bilirubin 0.5 mg/dL      eGFR Non African Amer 57 (L) mL/min/1.73      Globulin 3.3 gm/dL      A/G Ratio 1.2 g/dL      BUN/Creatinine Ratio 10.6     Anion Gap 10.0 mmol/L     Lipid Panel [[id number]]  (Abnormal) Collected:  10/12/17 1423    Specimen:  Blood Updated:  10/12/17 1514     Total Cholesterol 208 (H) mg/dL      Triglycerides 82 mg/dL      HDL Cholesterol 53 (L) mg/dL      LDL Cholesterol  127 mg/dL      LDL/HDL Ratio 2.62    Troponin [[id number]]  (Abnormal) Collected:  10/12/17 1423    Specimen:  Blood Updated:  10/12/17 1515     Troponin I 0.056 (H) ng/mL     BNP [[id number]]  (Abnormal) Collected:  10/12/17 1423    Specimen:  Blood Updated:  10/12/17 1515     proBNP 512.0 (H) pg/mL     Extra Tubes [[id number]] Collected:  10/12/17 1423    Specimen:  Blood from Blood, Venous Line Updated:  10/12/17 1531    Narrative:       The following orders were created for panel order Extra Tubes.  Procedure                               Abnormality         Status                     ---------                               -----------         ------                     Light Blue Top[[id number]]                                   Final result               Gold Top - SST[[id number]]                                   Final result                 Please view results for these tests on the individual orders.    Light Blue Top [[id number]] Collected:  10/12/17 1423    Specimen:  Blood Updated:  10/12/17 1531     Extra Tube hold for add-on      Auto resulted       Gold Top - SST [[id number]] Collected:  10/12/17 1423    Specimen:  Blood Updated:  10/12/17 1531     Extra Tube Hold for add-ons.      Auto resulted.       D-dimer, Quantitative [[id number]]  (Normal) Collected:  10/12/17 1423    Specimen:  Blood Updated:  10/12/17 1533     D-Dimer, Quantitative 433 ng/mL (FEU)     Narrative:        Dimer values <500 ng/ml FEU are FDA approved as aid in diagnosis of deep venous thrombosis and pulmonary embolism.  This test should not be used in an exclusion strategy with pretest probability alone.    A recent guideline regarding diagnosis for pulmonary thomboembolism recommends an adjusted exclusion criterion of age x 10 ng/ml FEU for patients >50 years of age ([employer] Intern Med 2015; 163: 701-711).    POC Glucose Fingerstick [[id number]]  (Normal) Collected:  10/12/17 1541    Specimen:  Blood Updated:  10/12/17 1603     Glucose 121 mg/dL       RN NotifiedMeter: [id number]: [id number] MAGGIE CAUSEY       Urinalysis With / Microscopic If Indicated - Urine, Clean Catch [[id number]]  (Normal) Collected:  10/12/17 1844    Specimen:  Urine from Urine, Clean Catch Updated:  10/12/17 1850     Color, UA Yellow     Appearance, UA Clear     pH, UA 5.5     Specific Gravity, UA 1.018     Glucose, UA Negative     Ketones, UA Negative     Bilirubin, UA Negative     Blood, UA Negative     Protein, UA Negative     Leuk Esterase, UA Negative     Nitrite, UA Negative     Urobilinogen, UA 0.2 E.U./dL    Narrative:       Urine microscopic not indicated.    Urine Drug Screen - Urine, Clean Catch [[id number]]  (Abnormal) Collected:  10/12/17 1844    Specimen:  Urine from Urine, Clean Catch Updated:  10/12/17 1912     Amphetamine Screen, Urine Negative     Barbiturates Screen, Urine Negative     Benzodiazepine Screen, Urine Negative     Cocaine Screen, Urine Negative     Methadone Screen, Urine Negative     Opiate Screen Positive (A)     Oxycodone Screen, Urine Negative     THC, Screen, Urine Negative    Narrative:       Negative Thresholds For Drugs Screened in Urine:     Amphetamines          500 ng/ml  Barbiturates          200 ng/ml  Benzodiazepines       200 ng/ml  Cocaine               150 ng/ml  Methadone             300 ng/mL  Opiates               300 ng/mL  Oxycodone             100 ng/mL  THC                   20  ng/mL    The normal value for all drugs tested is negative. This report includes final unconfirmed screening results.  A positive result by this assay can be, at your request, sent to the Reference Lab for confirmation by gas chromatography. Unconfirmed results must not be used for non-medical purposes, such as employment or legal testing. Clinical consideration should be applied to any drug of abuse test result, particularly when unconfirmed results are used.    Troponin [[id number]]  (Abnormal) Collected:  10/12/17 1935    Specimen:  Blood Updated:  10/12/17 2025     Troponin I 0.041 (H) ng/mL     Troponin [[id number]]  (Normal) Collected:  10/13/17 0156    Specimen:  Blood Updated:  10/13/17 0310     Troponin I 0.031 ng/mL     Troponin [[id number]] Collected:  10/13/17 0623    Specimen:  Blood Updated:  10/13/17 0706    CBC & Differential [[id number]] Collected:  10/13/17 0623    Specimen:  Blood Updated:  10/13/17 0712    Narrative:       The following orders were created for panel order CBC & Differential.  Procedure                               Abnormality         Status                     ---------                               -----------         ------                     CBC Auto Differential[[id number]]        Abnormal            Final result                 Please view results for these tests on the individual orders.    CBC Auto Differential [[id number]]  (Abnormal) Collected:  10/13/17 0623    Specimen:  Blood Updated:  10/13/17 0712     WBC 9.04 10*3/mm3      RBC 3.64 (L) 10*6/mm3      Hemoglobin 11.0 (L) g/dL      Hematocrit 34.0 (L) %      MCV 93.4 fL      MCH 30.2 pg      MCHC 32.4 g/dL      RDW 13.8 %      RDW-SD 47.4 (H) fl      MPV 10.2 fL      Platelets 221 10*3/mm3      Neutrophil % 59.0 %      Lymphocyte % 29.4 %      Monocyte % 8.3 %      Eosinophil % 2.1 %      Basophil % 0.4 %      Immature Grans % 0.8 (H) %      Neutrophils, Absolute 5.33 10*3/mm3      Lymphocytes, Absolute 2.66 10*3/mm3       Monocytes, Absolute 0.75 10*3/mm3      Eosinophils, Absolute 0.19 10*3/mm3      Basophils, Absolute 0.04 10*3/mm3      Immature Grans, Absolute 0.07 (H) 10*3/mm3     Magnesium [[id number]]  (Normal) Collected:  10/13/17 0623    Specimen:  Blood Updated:  10/13/17 0728     Magnesium 2.0 mg/dL     Basic Metabolic Panel [[id number]]  (Abnormal) Collected:  10/13/17 0623    Specimen:  Blood Updated:  10/13/17 0728     Glucose 138 (H) mg/dL      BUN 14 mg/dL      Creatinine 1.19 (H) mg/dL      Sodium 141 mmol/L      Potassium 3.9 mmol/L      Chloride 104 mmol/L      CO2 27.0 mmol/L      Calcium 7.4 (L) mg/dL      eGFR Non African Amer 49 (L) mL/min/1.73      BUN/Creatinine Ratio 11.8     Anion Gap 10.0 mmol/L               ASSESSMENT AND PLAN    Ms. Sutton has multiple risk factors for coronary artery disease and has presented with chest pain that is suspicious for unstable angina.  A small and STEMI cannot be ruled out.  The patient is pain-free at the present time and EKG showed sinus rhythm with evidence of old inferior wall myocardial infarction.  I discussed the different treatment options with her and the plan would be to proceed with a cardiac catheterization for definitive evaluation.  All risks benefits and alternate treatments have been explained to her and she will be an ASA class II and Mallampati class II.  Smoking cessation has been stressed.  Further recommendations will follow.    Mian Montana MD  10/13/2017  7:33 AM                  Electronically signed by Mian Montana MD at 10/13/2017  7:44 AM              Emergency Department Notes     No notes of this type exist for this encounter.        Hospital Medications (active)       Dose Frequency Start End    amLODIPine (NORVASC) tablet 10 mg 10 mg Every 24 Hours Scheduled 10/12/2017     Sig - Route: Take 1 tablet by mouth Daily. - Oral    aspirin chewable tablet 81 mg 81 mg Daily 10/13/2017     Sig - Route: Chew 1 tablet Daily. -  "Oral    atorvastatin (LIPITOR) tablet 40 mg 40 mg Nightly 10/12/2017     Sig - Route: Take 1 tablet by mouth Every Night. - Oral    carvedilol (COREG) tablet 25 mg 25 mg Every 12 Hours Scheduled 10/12/2017     Sig - Route: Take 1 tablet by mouth Every 12 (Twelve) Hours. - Oral    cholecalciferol (VITAMIN D3) tablet 1,000 Units 1,000 Units Daily 10/12/2017     Sig - Route: Take 1 tablet by mouth Daily. - Oral    dextrose (D50W) solution 25 g 25 g Every 15 Minutes PRN 10/12/2017     Sig - Route: Infuse 50 mL into a venous catheter Every 15 (Fifteen) Minutes As Needed for Low Blood Sugar (Blood Sugar Less Than 70, Patient Has IV Access - Unresponsive, NPO or Unable To Safely Swallow). - Intravenous    dextrose (GLUTOSE) oral gel 15 g 15 g Every 15 Minutes PRN 10/12/2017     Sig - Route: Take 15 g by mouth Every 15 (Fifteen) Minutes As Needed for Low Blood Sugar (Blood Sugar Less Than 70, Patient Alert, Is Not NPO & Can Safely Swallow). - Oral    glucagon (human recombinant) (GLUCAGEN DIAGNOSTIC) injection 1 mg 1 mg Every 15 Minutes PRN 10/12/2017     Sig - Route: Inject 1 mg under the skin Every 15 (Fifteen) Minutes As Needed (Blood Glucose Less Than 70 - Patient Without IV Access - Unresponsive, NPO or Unable To Safely Swallow). - Subcutaneous    heparin (porcine) 5000 UNIT/ML injection 5,000 Units 5,000 Units Every 8 Hours Scheduled 10/12/2017     Sig - Route: Inject 1 mL under the skin Every 8 (Eight) Hours. - Subcutaneous    hydrALAZINE (APRESOLINE) injection 10 mg 10 mg Once 10/12/2017 10/12/2017    Sig - Route: Infuse 0.5 mL into a venous catheter 1 (One) Time. - Intravenous    HYDROmorphone (DILAUDID) injection 0.5 mg 0.5 mg Every 2 Hours PRN 10/13/2017 10/23/2017    Sig - Route: Infuse 0.5 mL into a venous catheter Every 2 (Two) Hours As Needed for Severe Pain . - Intravenous    Linked Group 1:  \"And\" Linked Group Details        insulin aspart (novoLOG) injection 0-7 Units 0-7 Units 4 Times Daily Before Meals & " "Nightly 10/12/2017     Sig - Route: Inject 0-7 Units under the skin 4 (Four) Times a Day Before Meals & at Bedtime. - Subcutaneous    isosorbide mononitrate (IMDUR) 24 hr tablet 30 mg 30 mg Every 24 Hours Scheduled 10/13/2017     Sig - Route: Take 1 tablet by mouth Daily. - Oral    lisinopril (PRINIVIL,ZESTRIL) tablet 20 mg 20 mg Daily 10/12/2017     Sig - Route: Take 1 tablet by mouth Daily. - Oral    Morphine injection 1 mg 1 mg Every 4 Hours PRN 10/12/2017 10/22/2017    Sig - Route: Infuse 0.1 mL into a venous catheter Every 4 (Four) Hours As Needed for Moderate Pain . - Intravenous    Linked Group 2:  \"And\" Linked Group Details        naloxone (NARCAN) injection 0.4 mg 0.4 mg Every 5 Minutes PRN 10/12/2017     Sig - Route: Infuse 1 mL into a venous catheter Every 5 (Five) Minutes As Needed for Respiratory Depression. - Intravenous    Linked Group 2:  \"And\" Linked Group Details        naloxone (NARCAN) injection 0.4 mg 0.4 mg Every 5 Minutes PRN 10/13/2017     Sig - Route: Infuse 1 mL into a venous catheter Every 5 (Five) Minutes As Needed for Respiratory Depression. - Intravenous    Linked Group 1:  \"And\" Linked Group Details        nicotine (NICODERM CQ) 21 MG/24HR patch 1 patch 1 patch Every 24 Hours 10/12/2017     Sig - Route: Place 1 patch on the skin Daily. - Transdermal    nitroglycerin (NITROSTAT) SL tablet 0.4 mg 0.4 mg Every 5 Minutes PRN 10/12/2017     Sig - Route: Place 1 tablet under the tongue Every 5 (Five) Minutes As Needed for Chest Pain. - Sublingual    ondansetron (ZOFRAN) injection 4 mg 4 mg Every 6 Hours PRN 10/12/2017     Sig - Route: Infuse 2 mL into a venous catheter Every 6 (Six) Hours As Needed for Nausea or Vomiting. - Intravenous    ondansetron (ZOFRAN) injection 4 mg 4 mg Every 6 Hours PRN 10/13/2017     Sig - Route: Infuse 2 mL into a venous catheter Every 6 (Six) Hours As Needed for Nausea or Vomiting. - Intravenous    Linked Group 3:  \"Or\" Linked Group Details        ondansetron " "(ZOFRAN) tablet 4 mg 4 mg Every 6 Hours PRN 10/13/2017     Sig - Route: Take 1 tablet by mouth Every 6 (Six) Hours As Needed for Nausea or Vomiting. - Oral    Linked Group 3:  \"Or\" Linked Group Details        ondansetron ODT (ZOFRAN-ODT) disintegrating tablet 4 mg 4 mg Every 6 Hours PRN 10/13/2017     Sig - Route: Take 1 tablet by mouth Every 6 (Six) Hours As Needed for Nausea or Vomiting. - Oral    Linked Group 3:  \"Or\" Linked Group Details        pantoprazole (PROTONIX) EC tablet 40 mg 40 mg Every Early Morning 10/13/2017     Sig - Route: Take 1 tablet by mouth Every Morning. - Oral    sodium chloride 0.9 % flush 1-10 mL 1-10 mL As Needed 10/12/2017     Sig - Route: Infuse 1-10 mL into a venous catheter As Needed for Line Care. - Intravenous    sodium chloride 0.9 % flush 1-10 mL 1-10 mL As Needed 10/12/2017     Sig - Route: Infuse 1-10 mL into a venous catheter As Needed for Line Care. - Intravenous    sodium chloride 0.9 % infusion 125 mL/hr Continuous 10/13/2017 10/14/2017    Sig - Route: Infuse 125 mL/hr into a venous catheter Continuous. - Intravenous    Thyroid (ARMOUR) tablet 180 mg 180 mg 2 Times Daily 10/12/2017     Sig - Route: Take 6 tablets by mouth 2 (Two) Times a Day. - Oral    ticagrelor (BRILINTA) tablet 90 mg 90 mg 2 Times Daily 10/13/2017     Sig - Route: Take 1 tablet by mouth 2 (Two) Times a Day. - Oral    bivalirudin (ANGIOMAX) 250 mg in sodium chloride 0.9 % 50 mL (5 mg/mL) infusion (Discontinued) 250 mg Continuous PRN 10/13/2017 10/13/2017    Si mg Continuous As Needed.    Reason for Discontinue: Patient Discharge    bivalirudin (ANGIOMAX) bolus from bag (Discontinued)  As Needed 10/13/2017 10/13/2017    Sig: As Needed.    Reason for Discontinue: Patient Discharge    eptifibatide (INTEGRILIN) bolus from bottle (Discontinued)  As Needed 10/13/2017 10/13/2017    Sig: As Needed.    Reason for Discontinue: Patient Discharge    HYDROmorphone (DILAUDID) injection (Discontinued)  As Needed " 10/13/2017 10/13/2017    Sig: As Needed.    Reason for Discontinue: Patient Discharge    iopamidol (ISOVUE-370) 76 % injection (Discontinued)  As Needed 10/13/2017 10/13/2017    Sig: As Needed.    Reason for Discontinue: Patient Discharge    lidocaine (XYLOCAINE) 2% injection (Discontinued)  As Needed 10/13/2017 10/13/2017    Sig: As Needed.    Reason for Discontinue: Patient Discharge    midazolam (VERSED) injection (Discontinued)  As Needed 10/13/2017 10/13/2017    Sig: As Needed.    Reason for Discontinue: Patient Discharge    nitroglycerin (TRIDIL) injection (Discontinued)  As Needed 10/13/2017 10/13/2017    Sig: As Needed.    Reason for Discontinue: Patient Discharge    ondansetron (ZOFRAN) injection (Discontinued)  As Needed 10/13/2017 10/13/2017    Sig: As Needed.    Reason for Discontinue: Patient Discharge    sodium chloride 0.9 % infusion (Discontinued) 100 mL/hr Continuous 10/12/2017 10/13/2017    Sig - Route: Infuse 100 mL/hr into a venous catheter Continuous. - Intravenous    Reason for Discontinue: Dose adjustment    ticagrelor (BRILINTA) tablet (Discontinued)  As Needed 10/13/2017 10/13/2017    Sig: As Needed.    Reason for Discontinue: Patient Discharge          Orders (last 24 hrs)     Start     Ordered    10/14/17 0600  CBC (No Diff)  Morning Draw      10/13/17 0931    10/14/17 0600  Basic Metabolic Panel  Morning Draw      10/13/17 0931    10/14/17 0600  Hemoglobin A1c  Morning Draw      10/13/17 0931    10/14/17 0600  Lipid Panel  Morning Draw     Comments:  Fasting    10/13/17 0931    10/13/17 1800  ticagrelor (BRILINTA) tablet 90 mg  2 Times Daily      10/13/17 0931    10/13/17 1158  POC Glucose Fingerstick  Once      10/13/17 1157    10/13/17 1033  Urinalysis, Microscopic Only - Urine, Clean Catch  Once      10/13/17 1032    10/13/17 1015  sodium chloride 0.9 % infusion  Continuous      10/13/17 0931    10/13/17 1015  isosorbide mononitrate (IMDUR) 24 hr tablet 30 mg  Every 24 Hours Scheduled       10/13/17 0931    10/13/17 0932  Full Code  Continuous      10/13/17 0931    10/13/17 0932  Obtain STAT EKG during chest pain. Notify MD of any change in rhythm, ST segments or complaints of chest pain.  Until Discontinued      10/13/17 0931    10/13/17 0932  Encourage fluids  Until Discontinued      10/13/17 0931    10/13/17 0932  Verify Discontinuation of enoxaparin (LOVENOX) and / or heparin  Once      10/13/17 0931    10/13/17 0932  Notify MD if platelet count is less than 100,000, is less than 1/2 baseline, or if Hgb drops by more than 3mg/dl.  Until Discontinued      10/13/17 0931    10/13/17 0932  Notify MD of hypotension (SBP less than 95), bleeding, or dysrythmia and follow Sheath Removal Policy if needed.  Continuous      10/13/17 0931    10/13/17 0932  Oxygen Therapy- Nasal Cannula; Titrate for SPO2: equal to or greater than, 92%, per policy  Continuous      10/13/17 0931    10/13/17 0932  Diet Regular; Consistent Carbohydrate, Cardiac  Diet Effective Now      10/13/17 0931    10/13/17 0932  Advance Diet as Tolerated  Until Discontinued      10/13/17 0931    10/13/17 0932  Assess Puncture Site, Vital Signs, Distal Pulses & Observe Site for Bleeding or Swelling  Per Hospital Policy     Comments:  Every 15 Minutes x4, Every 30 Minutes x4, & Every 1 Hour x2    10/13/17 0931    10/13/17 0932  Remove Femoral / Brachial Sheaths  Once      10/13/17 0931    10/13/17 0932  Post Sheath Removal - Keep Patient Flat with Affected Extremity Straight, HOB May be Elevated 30 Degrees or Less After:  Until Discontinued      10/13/17 0931    10/13/17 0932  Strict Bedrest  Until Discontinued      10/13/17 0931    10/13/17 0931  HYDROmorphone (DILAUDID) injection 0.5 mg  Every 2 Hours PRN      10/13/17 0931    10/13/17 0931  naloxone (NARCAN) injection 0.4 mg  Every 5 Minutes PRN      10/13/17 0931    10/13/17 0931  ondansetron (ZOFRAN) tablet 4 mg  Every 6 Hours PRN      10/13/17 0931    10/13/17 0931  ondansetron ODT  (ZOFRAN-ODT) disintegrating tablet 4 mg  Every 6 Hours PRN      10/13/17 0931    10/13/17 0931  ondansetron (ZOFRAN) injection 4 mg  Every 6 Hours PRN      10/13/17 0931    10/13/17 0930  Urinalysis With / Culture If Indicated - Urine, Clean Catch  STAT      10/13/17 0932    10/13/17 0916  ticagrelor (BRILINTA) tablet  As Needed,   Status:  Discontinued      10/13/17 0916    10/13/17 0912  iopamidol (ISOVUE-370) 76 % injection  As Needed,   Status:  Discontinued      10/13/17 0914    10/13/17 0905  ondansetron (ZOFRAN) injection  As Needed,   Status:  Discontinued      10/13/17 0905    10/13/17 0903  nitroglycerin (TRIDIL) injection  As Needed,   Status:  Discontinued      10/13/17 0903    10/13/17 0900  aspirin chewable tablet 81 mg  Daily      10/12/17 1415    10/13/17 0845  eptifibatide (INTEGRILIN) bolus from bottle  As Needed,   Status:  Discontinued      10/13/17 0848    10/13/17 0840  bivalirudin (ANGIOMAX) 250 mg in sodium chloride 0.9 % 50 mL (5 mg/mL) infusion  Continuous PRN,   Status:  Discontinued      10/13/17 0848    10/13/17 0838  bivalirudin (ANGIOMAX) bolus from bag  As Needed,   Status:  Discontinued      10/13/17 0848    10/13/17 0817  lidocaine (XYLOCAINE) 2% injection  As Needed,   Status:  Discontinued      10/13/17 0818    10/13/17 0817  HYDROmorphone (DILAUDID) injection  As Needed,   Status:  Discontinued      10/13/17 0817    10/13/17 0817  midazolam (VERSED) injection  As Needed,   Status:  Discontinued      10/13/17 0817    10/13/17 0600  Magnesium  Morning Draw      10/12/17 1415    10/13/17 0600  pantoprazole (PROTONIX) EC tablet 40 mg  Every Early Morning      10/12/17 1415    10/13/17 0600  Troponin  Morning Draw      10/12/17 1648    10/13/17 0600  CBC Auto Differential  PROCEDURE ONCE      10/13/17 0001    10/13/17 0000  Adult Transthoracic Echo Complete W/ Cont if Necessary Per Protocol  Once      10/12/17 1648    10/12/17 2100  atorvastatin (LIPITOR) tablet 40 mg  Nightly       10/12/17 1415    10/12/17 2100  carvedilol (COREG) tablet 25 mg  Every 12 Hours Scheduled      10/12/17 1415    10/12/17 1857  ondansetron (ZOFRAN) injection 4 mg  Every 6 Hours PRN      10/12/17 1857    10/12/17 1800  Thyroid (ARMOUR) tablet 180 mg  2 Times Daily      10/12/17 1415    10/12/17 1758  Inpatient Consult to Cardiology  Once     Specialty:  Cardiology  Provider:  Mian Montana MD    10/12/17 1801    10/12/17 1730  insulin aspart (novoLOG) injection 0-7 Units  4 Times Daily Before Meals & Nightly      10/12/17 1415    10/12/17 1730  hydrALAZINE (APRESOLINE) injection 10 mg  Once      10/12/17 1645    10/12/17 1700  POC Glucose Fingerstick  4 Times Daily Before Meals & at Bedtime      10/12/17 1415    10/12/17 1604  POC Glucose Fingerstick  Once      10/12/17 1603    10/12/17 1600  Vital Signs  Every 4 Hours      10/12/17 1415    10/12/17 1527  NPO Diet  Diet Effective Now,   Status:  Canceled      10/12/17 1526    10/12/17 1504  D-dimer, Quantitative  STAT      10/12/17 1503    10/12/17 1500  amLODIPine (NORVASC) tablet 10 mg  Every 24 Hours Scheduled      10/12/17 1415    10/12/17 1500  lisinopril (PRINIVIL,ZESTRIL) tablet 20 mg  Daily      10/12/17 1415    10/12/17 1500  cholecalciferol (VITAMIN D3) tablet 1,000 Units  Daily      10/12/17 1415    10/12/17 1500  sodium chloride 0.9 % infusion  Continuous,   Status:  Discontinued      10/12/17 1415    10/12/17 1500  heparin (porcine) 5000 UNIT/ML injection 5,000 Units  Every 8 Hours Scheduled      10/12/17 1415    10/12/17 1500  Strict Intake and Output  Every Hour      10/12/17 1415    10/12/17 1500  nicotine (NICODERM CQ) 21 MG/24HR patch 1 patch  Every 24 Hours      10/12/17 1415    10/12/17 1441  Extra Tubes  Once      10/12/17 1440    10/12/17 1441  Light Blue Top  PROCEDURE ONCE      10/12/17 1440    10/12/17 1441  Gold Top - SST  PROCEDURE ONCE      10/12/17 1440    10/12/17 1416  Remove & Replace Compression Stockings (TEDS) Daily   Daily      10/12/17 1415    10/12/17 1416  Daily Weights  Daily      10/12/17 1415    10/12/17 1416  CBC & Differential  Daily      10/12/17 1415    10/12/17 1416  Basic Metabolic Panel  Daily      10/12/17 1415    10/12/17 1416  CBC Auto Differential  PROCEDURE ONCE,   Status:  Canceled      10/12/17 1415    10/12/17 1416  BNP  STAT      10/12/17 1416    10/12/17 1416  ECG 12 Lead  STAT      10/12/17 1416    10/12/17 1415  CBC Auto Differential  STAT      10/12/17 1415    10/12/17 1415  Lipid Panel  STAT      10/12/17 1415    10/12/17 1415  Troponin  STAT,   Status:  Canceled      10/12/17 1415    10/12/17 1415  Urine Drug Screen - Urine, Clean Catch  STAT      10/12/17 1415    10/12/17 1415  Urinalysis With / Microscopic If Indicated - Urine, Clean Catch  STAT      10/12/17 1415    10/12/17 1415  Comprehensive Metabolic Panel  STAT      10/12/17 1415    10/12/17 1414  Morphine injection 1 mg  Every 4 Hours PRN      10/12/17 1415    10/12/17 1414  naloxone (NARCAN) injection 0.4 mg  Every 5 Minutes PRN      10/12/17 1415    10/12/17 1414  Place Sequential Compression Device  Once      10/12/17 1415    10/12/17 1414  Maintain Sequential Compression Device  Continuous      10/12/17 1415    10/12/17 1414  Place Venous Foot Pump  Once      10/12/17 1415    10/12/17 1414  Maintain Venous Foot Pump  Continuous      10/12/17 1415    10/12/17 1414  Cardiac Monitoring  Continuous      10/12/17 1415    10/12/17 1413  VTE Risk Assessment - Low Risk  Once      10/12/17 1415    10/12/17 1413  Full Code  Continuous,   Status:  Canceled      10/12/17 1415    10/12/17 1413  Place Compression Stockings (TEDs)  Once      10/12/17 1415    10/12/17 1412  Do NOT Hold Basal Insulin When Patient is NPO, Hold Bolus Dose if NPO  Continuous      10/12/17 1415    10/12/17 1412  Follow [facility] Hypoglycemia Protocol For Blood Glucose Less Than 70 mg/dL  Until Discontinued      10/12/17 1415    10/12/17 1412  Hypoglycemia Treatment - Alert Patient  That is Not NPO and Can Safely Swallow  Until Discontinued     Comments:  Administer 4 oz Fruit Juice OR 4 oz Regular Soda OR 8 oz Milk OR 15-30 grams (1 tube) of Glucose Gel  Recheck Blood Glucose 15 Minutes After Ingestion, Repeat Treatment & Continue to Recheck Blood Sugar Every 15 Minutes Until Blood Glucose is 70 or Higher  Once Blood Glucose is 70 or Higher, Give Snack (Peanut Butter & Crackers) if Next Meal Will Be Given in More Than 60 Minutes, Provide Meal Tray As Soon As Possible    10/12/17 1415    10/12/17 1412  Hypoglycemia Treatment - Patient Has IV Access - Unresponsive, NPO or Unable To Safely Swallow  Until Discontinued     Comments:  Administer 25g D50W IV Push (1 Whole Vial)  Recheck Blood Glucose 15 Minutes After Administration, if Blood Glucose Remains Less Than 70, Repeat Treatment   Recheck Blood Glucose 15 Minutes After 2nd Administration, if Blood Glucose Remains Less Than 70 After 2nd Dose of D50W Contact Provider for Further Treatment Orders & Consider Adding IVF With D5 for Maintenance    10/12/17 1415    10/12/17 1412  Hypoglycemia Treatment - Patient Without IV Access - Unresponsive, NPO or Unable To Safely Swallow  Until Discontinued     Comments:  Administer 1mg Glucagon SQ & Establish IV Access  Turn Patient on Side - Nausea / Vomiting May Occur  Recheck Blood Glucose 15 Minutes After Administration  If IV Access Has Not Been Established & Blood Glucose Remains Less Than 70, Repeat Treatment With Glucagon  If IV Access Established, Administer 25g D50W IV Push (1 Whole Vial)  Recheck Blood Glucose 15 Minutes After Administration of 2nd Medication, if Blood Glucose Remains Less Than 70, Contact Provider for Further Treatment Orders & Consider Adding IVF With D5 for Maintenance    10/12/17 1415    10/12/17 1412  Notify Physician For Unrelieved Chest Pain  Until Discontinued      10/12/17 1415    10/12/17 1412  Oxygen Therapy-  Continuous      10/12/17 1415    10/12/17 1412  Diet  Regular; Cardiac  Diet Effective Now,   Status:  Canceled      10/12/17 1415    10/12/17 1412  Troponin  Now Then Every 6 Hours     Comments:  Perform 6 Hours After Last Troponin (If Done)    10/12/17 1415    10/12/17 1412  Nurse to Order Troponin As Needed For Unrelieved or New Chest Pain  Continuous     Comments:  Nurse to Order Troponin As Needed For Unrelieved or New Chest Pain    10/12/17 1415    10/12/17 1412  BNP  Once,   Status:  Canceled      10/12/17 1415    10/12/17 1412  Insert Peripheral IV  Once      10/12/17 1415    10/12/17 1412  Saline Lock & Maintain IV Access  Continuous      10/12/17 1415    10/12/17 1412  Insert Peripheral IV  Once      10/12/17 1415    10/12/17 1412  Saline Lock & Maintain IV Access  Continuous,   Status:  Canceled      10/12/17 1415    10/12/17 1412  Inpatient Admission  Once      10/12/17 1415    10/12/17 1411  nitroglycerin (NITROSTAT) SL tablet 0.4 mg  Every 5 Minutes PRN      10/12/17 1415    10/12/17 1411  dextrose (GLUTOSE) oral gel 15 g  Every 15 Minutes PRN      10/12/17 1415    10/12/17 1411  dextrose (D50W) solution 25 g  Every 15 Minutes PRN      10/12/17 1415    10/12/17 1411  glucagon (human recombinant) (GLUCAGEN DIAGNOSTIC) injection 1 mg  Every 15 Minutes PRN      10/12/17 1415    10/12/17 1411  sodium chloride 0.9 % flush 1-10 mL  As Needed      10/12/17 1415    10/12/17 1411  sodium chloride 0.9 % flush 1-10 mL  As Needed      10/12/17 1415    Unscheduled  ECG 12 Lead  As Needed     Comments:  Nurse to Release ECG Order for Unrelieved or New Chest Pain    10/12/17 1415    Unscheduled  Vital Signs  As Needed      10/13/17 0931    Unscheduled  Check distal extremity for warmth, color, sensation and pulses with each vital sign and site check.  As Needed      10/13/17 0931    Unscheduled  Change site dressing  As Needed      10/13/17 0931    Unscheduled  Apply Femostop  As Needed     Comments:  For Groin Bleeding, Notify MD or PA If Applied    10/13/17 0931    --   cholecalciferol (VITAMIN D3) 1000 units tablet  Daily      10/12/17 1354    --  Baclofen 2 % cream  3 Times Daily      10/12/17 1354          Physician Progress Notes (last 24 hours) (Notes from 10/12/2017  1:12 PM through 10/13/2017  1:12 PM)     No notes of this type exist for this encounter.           Consult Notes (last 24 hours) (Notes from 10/12/2017  1:12 PM through 10/13/2017  1:12 PM)      Mian Montana MD at 10/13/2017  7:33 AM  Version 1 of 1     Consult Orders:    1. Inpatient Consult to Cardiology [[id number]] ordered by Raúl Lemus MD at 10/12/17 1801                CARDIOLOGY CONSULTATION NOTE    Referring Provider: Matthew Shaw MD    Reason for Consultation: Evaluation of chest pain    Rhea Sutton  1970  46 y.o. female      HPI    Ms. Sutton is a 46-year-old  lady with history of hypertension, hyperlipidemia, hyperglycemia, hypothyroidism, obesity with previous history of coronary artery disease with inferior wall myocardial infarction in 2008 when she received a stent to RCA in [place].  She has long-standing history of tobacco abuse.  She has followed up with Dr. Velarde and last saw him about a year ago.  On the day of admission she woke up at about 5:30 in the morning with chest pain which was sharp and pressure-like in character and associated with some degree of shortness of breath.  She went Psychiatric where her troponins were noted to be borderline elevated and hence she was transferred to [facility] for [facility] management.  She was given 3 sublingual nitroglycerin tablets after which her pain abated and she was pain-free at the time of my examination today morning.  Troponins have been borderline elevated and today it was 0.15.  The patient claims compliance with her medications.  She unfortunately continues to smoke.  We had a discussion about smoking cessation.  She denied any definite exertional chest pain but has had  "1-10 mL Intravenous PRN Raúl Lemus MD       • sodium chloride 0.9 % infusion  100 mL/hr Intravenous Continuous Raúl Lemsu  mL/hr at 10/12/17 1448 100 mL/hr at 10/12/17 1448   • Thyroid (ARMOUR) tablet 180 mg  180 mg Oral BID Raúl Lemus MD   180 mg at 10/12/17 1734         OBJECTIVE    /68 (BP Location: Right arm, Patient Position: Lying)  Pulse 77  Temp 97 °F (36.1 °C) (Temporal Artery )   Resp 18  Ht 67\" (170.2 cm)  Wt 270 lb 11.2 oz (123 kg)  LMP Comment: hysterectomy 2004  SpO2 96%  Breastfeeding? No  BMI 42.4 kg/m2      Review of Systems     Constitutional:  Denies recent weight loss, weight gain, fever or chills, no change in exercise tolerance     HENT:  Denies any hearing loss, epistaxis, hoarseness, or difficulty speaking.     Eyes: Wears eyeglasses or contact lenses     Respiratory:  Dyspnea with exertion,no cough, wheezing, or hemoptysis.     Cardiovascular: See history of present illness    Gastrointestinal:  Denies change in bowel habits, dyspepsia, ulcer disease, hematochezia, or melena.  history of abnormal LFTs    Endocrine: Negative for cold intolerance, heat intolerance, polydipsia, polyphagia and polyuria.   Genitourinary: Negative.      Musculoskeletal: Chronic back pain    Skin:  Denies any change in hair or nails, rashes, or skin lesions.     Allergic/Immunologic: Negative.  Negative for environmental allergies, food allergies and immunocompromised state.     Neurological:  Denies any history of recurrent headaches, strokes, TIA, or seizure disorder.     Hematological: Denies any food allergies, seasonal allergies, bleeding disorders, or lymphadenopathy.     Psychiatric/Behavioral: Denies any history of depression or change in cognitive function.       Physical Exam     Constitutional: Cooperative, alert and oriented, well-developed,  in no acute distress.     HENT:   Head: Normocephalic, normal hair patterns, no masses or tenderness.  Ears, Nose, and Throat: No " gross abnormalities. No pallor or cyanosis.   Eyes: EOMS intact, PERRL, conjunctivae and lids unremarkable. Fundoscopic exam and visual fields not performed.   Neck: No palpable masses or adenopathy, no thyromegaly, no JVD, carotid pulses are full and equal bilaterally and without  Bruits.     Cardiovascular: Regular rhythm, S1 and S2 normal, no S3 or S4.  No murmurs, gallops, or rubs detected.     Pulmonary/Chest: Chest: normal symmetry, no tenderness to palpation, normal respiratory excursion,no use of accessory muscles.            Pulmonary: Normal breath sounds. No rales or ronchi.    Abdominal: Abdomen soft, bowel sounds normoactive, no masses, no hepatosplenomegaly, non-tender, no bruits.     Musculoskeletal: No deformities, clubbing, cyanosis, erythema, or edema observed. There are no spinal abnormalities noted.     Neurological: No gross motor or sensory deficits noted, Cranial nerves 2-12 normal. affect appropriate, oriented to time, person, place.     Skin: Warm and dry to the touch, no apparent skin lesions or masses noted.     Psychiatric: Normal mood and affect. Behavior is normal. Judgment and thought content normal.     RESULTS  Lab Results (last 24 hours)     Procedure Component Value Units Date/Time    CBC Auto Differential [[id number]]  (Abnormal) Collected:  10/12/17 1424    Specimen:  Blood Updated:  10/12/17 1450     WBC 9.06 10*3/mm3      RBC 3.90 10*6/mm3      Hemoglobin 11.8 (L) g/dL      Hematocrit 35.9 %      MCV 92.1 fL      MCH 30.3 pg      MCHC 32.9 g/dL      RDW 13.7 %      RDW-SD 45.8 fl      MPV 9.8 fL      Platelets 224 10*3/mm3      Neutrophil % 59.1 %      Lymphocyte % 27.0 %      Monocyte % 9.5 %      Eosinophil % 2.9 %      Basophil % 0.6 %      Immature Grans % 0.9 (H) %      Neutrophils, Absolute 5.36 10*3/mm3      Lymphocytes, Absolute 2.45 10*3/mm3      Monocytes, Absolute 0.86 10*3/mm3      Eosinophils, Absolute 0.26 10*3/mm3      Basophils, Absolute 0.05 10*3/mm3       Immature Grans, Absolute 0.08 (H) 10*3/mm3     Comprehensive Metabolic Panel [[id number]]  (Abnormal) Collected:  10/12/17 1423    Specimen:  Blood Updated:  10/12/17 1504     Glucose 102 (H) mg/dL      BUN 11 mg/dL      Creatinine 1.04 (H) mg/dL      Sodium 141 mmol/L      Potassium 3.7 mmol/L      Chloride 102 mmol/L      CO2 29.0 mmol/L      Calcium 8.5 mg/dL      Total Protein 7.3 g/dL      Albumin 4.00 g/dL      ALT (SGPT) 144 (H) U/L      AST (SGOT) 162 (H) U/L      Alkaline Phosphatase 93 U/L      Total Bilirubin 0.5 mg/dL      eGFR Non African Amer 57 (L) mL/min/1.73      Globulin 3.3 gm/dL      A/G Ratio 1.2 g/dL      BUN/Creatinine Ratio 10.6     Anion Gap 10.0 mmol/L     Lipid Panel [[id number]]  (Abnormal) Collected:  10/12/17 1423    Specimen:  Blood Updated:  10/12/17 1514     Total Cholesterol 208 (H) mg/dL      Triglycerides 82 mg/dL      HDL Cholesterol 53 (L) mg/dL      LDL Cholesterol  127 mg/dL      LDL/HDL Ratio 2.62    Troponin [[id number]]  (Abnormal) Collected:  10/12/17 1423    Specimen:  Blood Updated:  10/12/17 1515     Troponin I 0.056 (H) ng/mL     BNP [[id number]]  (Abnormal) Collected:  10/12/17 1423    Specimen:  Blood Updated:  10/12/17 1515     proBNP 512.0 (H) pg/mL     Extra Tubes [[id number]] Collected:  10/12/17 1423    Specimen:  Blood from Blood, Venous Line Updated:  10/12/17 1531    Narrative:       The following orders were created for panel order Extra Tubes.  Procedure                               Abnormality         Status                     ---------                               -----------         ------                     Light Blue Top[[id number]]                                   Final result               Gold Top - SST[[id number]]                                   Final result                 Please view results for these tests on the individual orders.    Light Blue Top [[id number]] Collected:  10/12/17 1423    Specimen:  Blood Updated:  10/12/17 1531     Extra Tube  hold for add-on      Auto resulted       Gold Top - SST [[id number]] Collected:  10/12/17 1423    Specimen:  Blood Updated:  10/12/17 1531     Extra Tube Hold for add-ons.      Auto resulted.       D-dimer, Quantitative [[id number]]  (Normal) Collected:  10/12/17 1423    Specimen:  Blood Updated:  10/12/17 1533     D-Dimer, Quantitative 433 ng/mL (FEU)     Narrative:       Dimer values <500 ng/ml FEU are FDA approved as aid in diagnosis of deep venous thrombosis and pulmonary embolism.  This test should not be used in an exclusion strategy with pretest probability alone.    A recent guideline regarding diagnosis for pulmonary thomboembolism recommends an adjusted exclusion criterion of age x 10 ng/ml FEU for patients >50 years of age ([employer] Intern Med 2015; 163: 701-711).    POC Glucose Fingerstick [[id number]]  (Normal) Collected:  10/12/17 1541    Specimen:  Blood Updated:  10/12/17 1603     Glucose 121 mg/dL       RN NotifiedMeter: [id number]: [id number] MAGGIE CAUSEY       Urinalysis With / Microscopic If Indicated - Urine, Clean Catch [[id number]]  (Normal) Collected:  10/12/17 1844    Specimen:  Urine from Urine, Clean Catch Updated:  10/12/17 1850     Color, UA Yellow     Appearance, UA Clear     pH, UA 5.5     Specific Gravity, UA 1.018     Glucose, UA Negative     Ketones, UA Negative     Bilirubin, UA Negative     Blood, UA Negative     Protein, UA Negative     Leuk Esterase, UA Negative     Nitrite, UA Negative     Urobilinogen, UA 0.2 E.U./dL    Narrative:       Urine microscopic not indicated.    Urine Drug Screen - Urine, Clean Catch [[id number]]  (Abnormal) Collected:  10/12/17 1844    Specimen:  Urine from Urine, Clean Catch Updated:  10/12/17 1912     Amphetamine Screen, Urine Negative     Barbiturates Screen, Urine Negative     Benzodiazepine Screen, Urine Negative     Cocaine Screen, Urine Negative     Methadone Screen, Urine Negative     Opiate Screen Positive (A)     Oxycodone Screen, Urine  Negative     THC, Screen, Urine Negative    Narrative:       Negative Thresholds For Drugs Screened in Urine:     Amphetamines          500 ng/ml  Barbiturates          200 ng/ml  Benzodiazepines       200 ng/ml  Cocaine               150 ng/ml  Methadone             300 ng/mL  Opiates               300 ng/mL  Oxycodone             100 ng/mL  THC                   20 ng/mL    The normal value for all drugs tested is negative. This report includes final unconfirmed screening results.  A positive result by this assay can be, at your request, sent to the Reference Lab for confirmation by gas chromatography. Unconfirmed results must not be used for non-medical purposes, such as employment or legal testing. Clinical consideration should be applied to any drug of abuse test result, particularly when unconfirmed results are used.    Troponin [[id number]]  (Abnormal) Collected:  10/12/17 1935    Specimen:  Blood Updated:  10/12/17 2025     Troponin I 0.041 (H) ng/mL     Troponin [[id number]]  (Normal) Collected:  10/13/17 0156    Specimen:  Blood Updated:  10/13/17 0310     Troponin I 0.031 ng/mL     Troponin [[id number]] Collected:  10/13/17 0623    Specimen:  Blood Updated:  10/13/17 0706    CBC & Differential [[id number]] Collected:  10/13/17 0623    Specimen:  Blood Updated:  10/13/17 0712    Narrative:       The following orders were created for panel order CBC & Differential.  Procedure                               Abnormality         Status                     ---------                               -----------         ------                     CBC Auto Differential[[id number]]        Abnormal            Final result                 Please view results for these tests on the individual orders.    CBC Auto Differential [[id number]]  (Abnormal) Collected:  10/13/17 0623    Specimen:  Blood Updated:  10/13/17 0712     WBC 9.04 10*3/mm3      RBC 3.64 (L) 10*6/mm3      Hemoglobin 11.0 (L) g/dL      Hematocrit 34.0 (L) %       MCV 93.4 fL      MCH 30.2 pg      MCHC 32.4 g/dL      RDW 13.8 %      RDW-SD 47.4 (H) fl      MPV 10.2 fL      Platelets 221 10*3/mm3      Neutrophil % 59.0 %      Lymphocyte % 29.4 %      Monocyte % 8.3 %      Eosinophil % 2.1 %      Basophil % 0.4 %      Immature Grans % 0.8 (H) %      Neutrophils, Absolute 5.33 10*3/mm3      Lymphocytes, Absolute 2.66 10*3/mm3      Monocytes, Absolute 0.75 10*3/mm3      Eosinophils, Absolute 0.19 10*3/mm3      Basophils, Absolute 0.04 10*3/mm3      Immature Grans, Absolute 0.07 (H) 10*3/mm3     Magnesium [[id number]]  (Normal) Collected:  10/13/17 0623    Specimen:  Blood Updated:  10/13/17 0728     Magnesium 2.0 mg/dL     Basic Metabolic Panel [[id number]]  (Abnormal) Collected:  10/13/17 0623    Specimen:  Blood Updated:  10/13/17 0728     Glucose 138 (H) mg/dL      BUN 14 mg/dL      Creatinine 1.19 (H) mg/dL      Sodium 141 mmol/L      Potassium 3.9 mmol/L      Chloride 104 mmol/L      CO2 27.0 mmol/L      Calcium 7.4 (L) mg/dL      eGFR Non African Amer 49 (L) mL/min/1.73      BUN/Creatinine Ratio 11.8     Anion Gap 10.0 mmol/L               ASSESSMENT AND PLAN    Ms. Sutton has multiple risk factors for coronary artery disease and has presented with chest pain that is suspicious for unstable angina.  A small and STEMI cannot be ruled out.  The patient is pain-free at the present time and EKG showed sinus rhythm with evidence of old inferior wall myocardial infarction.  I discussed the different treatment options with her and the plan would be to proceed with a cardiac catheterization for definitive evaluation.  All risks benefits and alternate treatments have been explained to her and she will be an ASA class II and Mallampati class II.  Smoking cessation has been stressed.  Further recommendations will follow.    Mian Montana MD  10/13/2017  7:33 AM                 Electronically signed by Mian Montana MD at 10/13/2017  7:44 AM      Marybeth Olson RN  ASH  [facility]  [phone]  Fax [phone]

## 2017-10-13 NOTE — PLAN OF CARE
Problem: Acute Coronary Syndrome (ACS) (Adult)  Goal: Signs and Symptoms of Listed Potential Problems Will be Absent or Manageable (Acute Coronary Syndrome)  Outcome: Ongoing (interventions implemented as appropriate)    10/12/17 5214   Acute Coronary Syndrome (ACS)   Problems Assessed (Acute Coronary Syndrome (ACS)) all   Problems Present (Acute Coronary Syndrome (ACS)) situational response

## 2017-10-13 NOTE — PLAN OF CARE
Problem: Patient Care Overview (Adult)  Goal: Plan of Care Review  Outcome: Ongoing (interventions implemented as appropriate)  Making Lifestyle Choices for a Healthier Weight used to provide Wt Loss diet ed and diet copy given.    10/13/17 1222   Coping/Psychosocial Response Interventions   Plan Of Care Reviewed With patient   Patient Care Overview   Progress no change   Outcome Evaluation   Outcome Summary/Follow up Plan initial assessment

## 2017-10-14 ENCOUNTER — APPOINTMENT (OUTPATIENT)
Dept: CARDIOLOGY | Facility: HOSPITAL | Age: 47
End: 2017-10-14
Attending: INTERNAL MEDICINE

## 2017-10-14 LAB
ANION GAP SERPL CALCULATED.3IONS-SCNC: 8 MMOL/L (ref 5–15)
ARTICHOKE IGE QN: 79 MG/DL (ref 1–129)
BASOPHILS # BLD AUTO: 0.03 10*3/MM3 (ref 0–0.2)
BASOPHILS NFR BLD AUTO: 0.2 % (ref 0–2)
BUN BLD-MCNC: 13 MG/DL (ref 7–21)
BUN/CREAT SERPL: 11.5 (ref 7–25)
CALCIUM SPEC-SCNC: 7 MG/DL (ref 8.4–10.2)
CHLORIDE SERPL-SCNC: 106 MMOL/L (ref 95–110)
CHOLEST SERPL-MCNC: 141 MG/DL (ref 0–199)
CO2 SERPL-SCNC: 26 MMOL/L (ref 22–31)
CREAT BLD-MCNC: 1.13 MG/DL (ref 0.5–1)
DEPRECATED RDW RBC AUTO: 47.8 FL (ref 36.4–46.3)
EOSINOPHIL # BLD AUTO: 0.2 10*3/MM3 (ref 0–0.7)
EOSINOPHIL NFR BLD AUTO: 1.6 % (ref 0–7)
ERYTHROCYTE [DISTWIDTH] IN BLOOD BY AUTOMATED COUNT: 14 % (ref 11.5–14.5)
GFR SERPL CREATININE-BSD FRML MDRD: 52 ML/MIN/1.73 (ref 58–135)
GLUCOSE BLD-MCNC: 138 MG/DL (ref 60–100)
GLUCOSE BLDC GLUCOMTR-MCNC: 118 MG/DL (ref 70–130)
GLUCOSE BLDC GLUCOMTR-MCNC: 162 MG/DL (ref 70–130)
GLUCOSE BLDC GLUCOMTR-MCNC: 181 MG/DL (ref 70–130)
HCT VFR BLD AUTO: 32 % (ref 35–45)
HDLC SERPL-MCNC: 38 MG/DL (ref 60–200)
HGB BLD-MCNC: 10.3 G/DL (ref 12–15.5)
IMM GRANULOCYTES # BLD: 0.09 10*3/MM3 (ref 0–0.02)
IMM GRANULOCYTES NFR BLD: 0.7 % (ref 0–0.5)
LDLC/HDLC SERPL: 2.37 {RATIO} (ref 0–3.22)
LYMPHOCYTES # BLD AUTO: 2.67 10*3/MM3 (ref 0.6–4.2)
LYMPHOCYTES NFR BLD AUTO: 21.4 % (ref 10–50)
MCH RBC QN AUTO: 30.1 PG (ref 26.5–34)
MCHC RBC AUTO-ENTMCNC: 32.2 G/DL (ref 31.4–36)
MCV RBC AUTO: 93.6 FL (ref 80–98)
MONOCYTES # BLD AUTO: 1.05 10*3/MM3 (ref 0–0.9)
MONOCYTES NFR BLD AUTO: 8.4 % (ref 0–12)
NEUTROPHILS # BLD AUTO: 8.42 10*3/MM3 (ref 2–8.6)
NEUTROPHILS NFR BLD AUTO: 67.7 % (ref 37–80)
PLATELET # BLD AUTO: 177 10*3/MM3 (ref 150–450)
PMV BLD AUTO: 9.8 FL (ref 8–12)
POTASSIUM BLD-SCNC: 3.5 MMOL/L (ref 3.5–5.1)
RBC # BLD AUTO: 3.42 10*6/MM3 (ref 3.77–5.16)
SODIUM BLD-SCNC: 140 MMOL/L (ref 137–145)
TRIGL SERPL-MCNC: 64 MG/DL (ref 20–199)
WBC NRBC COR # BLD: 12.46 10*3/MM3 (ref 3.2–9.8)

## 2017-10-14 PROCEDURE — 83036 HEMOGLOBIN GLYCOSYLATED A1C: CPT | Performed by: INTERNAL MEDICINE

## 2017-10-14 PROCEDURE — 80048 BASIC METABOLIC PNL TOTAL CA: CPT | Performed by: FAMILY MEDICINE

## 2017-10-14 PROCEDURE — 80061 LIPID PANEL: CPT | Performed by: INTERNAL MEDICINE

## 2017-10-14 PROCEDURE — 82962 GLUCOSE BLOOD TEST: CPT

## 2017-10-14 PROCEDURE — 85025 COMPLETE CBC W/AUTO DIFF WBC: CPT | Performed by: FAMILY MEDICINE

## 2017-10-14 PROCEDURE — 93306 TTE W/DOPPLER COMPLETE: CPT

## 2017-10-14 RX ORDER — HYDROCODONE BITARTRATE AND ACETAMINOPHEN 10; 325 MG/1; MG/1
1 TABLET ORAL EVERY 6 HOURS PRN
Status: DISCONTINUED | OUTPATIENT
Start: 2017-10-14 | End: 2017-10-15 | Stop reason: HOSPADM

## 2017-10-14 RX ADMIN — ISOSORBIDE MONONITRATE 30 MG: 30 TABLET, EXTENDED RELEASE ORAL at 09:02

## 2017-10-14 RX ADMIN — ATORVASTATIN CALCIUM 40 MG: 40 TABLET, FILM COATED ORAL at 20:12

## 2017-10-14 RX ADMIN — THYROID, PORCINE 180 MG: 30 TABLET ORAL at 09:01

## 2017-10-14 RX ADMIN — CARVEDILOL 25 MG: 25 TABLET, FILM COATED ORAL at 20:12

## 2017-10-14 RX ADMIN — LISINOPRIL 20 MG: 20 TABLET ORAL at 09:01

## 2017-10-14 RX ADMIN — HYDROCODONE BITARTRATE AND ACETAMINOPHEN 1 TABLET: 10; 325 TABLET ORAL at 20:12

## 2017-10-14 RX ADMIN — TICAGRELOR 90 MG: 90 TABLET ORAL at 17:51

## 2017-10-14 RX ADMIN — CARVEDILOL 25 MG: 25 TABLET, FILM COATED ORAL at 09:02

## 2017-10-14 RX ADMIN — VITAMIN D, TAB 1000IU (100/BT) 1000 UNITS: 25 TAB at 09:04

## 2017-10-14 RX ADMIN — HYDROCODONE BITARTRATE AND ACETAMINOPHEN 1 TABLET: 10; 325 TABLET ORAL at 00:14

## 2017-10-14 RX ADMIN — ASPIRIN 81 MG 81 MG: 81 TABLET ORAL at 09:01

## 2017-10-14 RX ADMIN — TICAGRELOR 90 MG: 90 TABLET ORAL at 09:02

## 2017-10-14 RX ADMIN — THYROID, PORCINE 180 MG: 30 TABLET ORAL at 17:51

## 2017-10-14 RX ADMIN — AMLODIPINE BESYLATE 10 MG: 10 TABLET ORAL at 09:02

## 2017-10-14 NOTE — PLAN OF CARE
Problem: Patient Care Overview (Adult)  Goal: Plan of Care Review  Outcome: Ongoing (interventions implemented as appropriate)    10/14/17 1702   Outcome Evaluation   Outcome Summary/Follow up Plan PTCI - stent to circumflex X 1; post op day 1. Pt has exhibited no s/s of ACS and site of arterial access appears to be WDL. Pt waiting to be seen by cardiology, I would anticipate patient to be discharged this evening or in AM once evaluated.       Goal: Adult Individualization and Mutuality  Outcome: Ongoing (interventions implemented as appropriate)  Goal: Discharge Needs Assessment  Outcome: Ongoing (interventions implemented as appropriate)    10/13/17 1914   Discharge Needs Assessment   Concerns To Be Addressed no discharge needs identified   Readmission Within The Last 30 Days no previous admission in last 30 days   Equipment Needed After Discharge none   Discharge Disposition still a patient   Current Health   Anticipated Changes Related to Illness none   Self-Care   Equipment Currently Used at Home none   Living Environment   Transportation Available car         Problem: Acute Coronary Syndrome (ACS) (Adult)  Goal: Signs and Symptoms of Listed Potential Problems Will be Absent or Manageable (Acute Coronary Syndrome)  Outcome: Ongoing (interventions implemented as appropriate)    10/14/17 1702   Acute Coronary Syndrome (ACS)   Problems Assessed (Acute Coronary Syndrome (ACS)) all   Problems Present (Acute Coronary Syndrome (ACS)) none         Problem: Pain, Chronic (Adult)  Goal: Acceptable Pain Control/Comfort Level  Outcome: Ongoing (interventions implemented as appropriate)    10/14/17 1702   Pain, Chronic (Adult)   Acceptable Pain Control/Comfort Level making progress toward outcome

## 2017-10-14 NOTE — PROGRESS NOTES
[facility] Medicine Services  INPATIENT PROGRESS NOTE    Length of Stay: 2  Date of Admission: 10/12/2017  Primary Care Physician: Anderson Abbott MD    Subjective   Chief Complaint: Chest pain   HPI:  Patient states no overnight events. No CP or SOA reported.     Review of Systems   Constitutional: Negative for chills and fever.   Respiratory: Negative for shortness of breath.    Cardiovascular: Negative for chest pain.   Gastrointestinal: Negative for abdominal pain, diarrhea, nausea and vomiting.   Genitourinary: Negative for dysuria.   Neurological: Negative for dizziness.        All pertinent negatives and positives are as above. All other systems have been reviewed and are negative unless otherwise stated.     Objective    Temp:  [97 °F (36.1 °C)-100.2 °F (37.9 °C)] 97.1 °F (36.2 °C)  Heart Rate:  [70-81] 74  Resp:  [16-20] 18  BP: ()/(55-71) 106/66    Physical Exam   Constitutional: She is oriented to person, place, and time. She appears well-developed and well-nourished.   Cardiovascular: Normal rate, regular rhythm and normal heart sounds.  Exam reveals no gallop and no friction rub.    No murmur heard.  Pulmonary/Chest: Effort normal and breath sounds normal. No respiratory distress. She has no wheezes. She has no rales.   Abdominal: Soft. Bowel sounds are normal. There is no tenderness.   Musculoskeletal: Normal range of motion. She exhibits no edema.   Neurological: She is alert and oriented to person, place, and time.   Skin: Skin is warm and dry.   Psychiatric: She has a normal mood and affect. Her behavior is normal.   Vitals reviewed.          Results Review:  I have reviewed the labs, radiology results, and diagnostic studies.    Laboratory Data:     Results from last 7 days  Lab Units 10/14/17  0655 10/13/17  0623 10/12/17  1423   SODIUM mmol/L 140 141 141   POTASSIUM mmol/L 3.5 3.9 3.7   CHLORIDE mmol/L 106 104 102   CO2 mmol/L 26.0 27.0 29.0    BUN mg/dL 13 14 11   CREATININE mg/dL 1.13* 1.19* 1.04*   GLUCOSE mg/dL 138* 138* 102*   CALCIUM mg/dL 7.0* 7.4* 8.5   BILIRUBIN mg/dL  --   --  0.5   ALK PHOS U/L  --   --  93   ALT (SGPT) U/L  --   --  144*   AST (SGOT) U/L  --   --  162*   ANION GAP mmol/L 8.0 10.0 10.0     Estimated Creatinine Clearance: 84.7 mL/min (by C-G formula based on Cr of 1.13).    Results from last 7 days  Lab Units 10/13/17  0623   MAGNESIUM mg/dL 2.0           Results from last 7 days  Lab Units 10/14/17  0655 10/13/17  0623 10/12/17  1424   WBC 10*3/mm3 12.46* 9.04 9.06   HEMOGLOBIN g/dL 10.3* 11.0* 11.8*   HEMATOCRIT % 32.0* 34.0* 35.9   PLATELETS 10*3/mm3 177 221 224           Culture Data:   No results found for: BLOODCX  No results found for: URINECX  No results found for: RESPCX  No results found for: WOUNDCX  No results found for: STOOLCX  No components found for: BODYFLD    Radiology Data:   Imaging Results (last 24 hours)     ** No results found for the last 24 hours. **          I have reviewed the patient current medications.     Assessment/Plan     Hospital Problem List     NSTEMI (non-ST elevated myocardial infarction)        1) NSTEMI - s/p cardiac cath. Continue Brilinta, ASA, Lipitor, Coreg, Lisinopril, Imdur. Encourage to get OOB to ambulate when able.   2) Type II Diabetes - Insulin sliding scale               Discharge Planning: I expect patient to be discharged to home in 1-2 days.    Erasmo Anderson MD   10/14/17   5:27 PM

## 2017-10-14 NOTE — PLAN OF CARE
Problem: Patient Care Overview (Adult)  Goal: Plan of Care Review  Outcome: Ongoing (interventions implemented as appropriate)    10/13/17 1914   Coping/Psychosocial Response Interventions   Plan Of Care Reviewed With patient   Patient Care Overview   Progress improving   Outcome Evaluation   Outcome Summary/Follow up Plan pt underwent PTCI - stent to circumflex X 1. Patient's arterial sheath discontinued at approximately 1300 after 25 minutes of manual pressure. Pt has completed bedrest and has ambulated in hallway with no symptoms noted after activity. Patient started on Brilinta and given coupon for prescription as well as stent card and patient information on stent placed. continue to monitor for s/s of ACS and monitor site of arterial access for s/s of infection.       Goal: Adult Individualization and Mutuality  Outcome: Ongoing (interventions implemented as appropriate)    10/12/17 1736   Individualization   Patient Specific Preferences pt likes grape juice       Goal: Discharge Needs Assessment  Outcome: Ongoing (interventions implemented as appropriate)    10/13/17 1914   Discharge Needs Assessment   Concerns To Be Addressed no discharge needs identified   Readmission Within The Last 30 Days no previous admission in last 30 days   Equipment Needed After Discharge none   Discharge Disposition still a patient   Current Health   Anticipated Changes Related to Illness none   Self-Care   Equipment Currently Used at Home none   Living Environment   Transportation Available car         Problem: Acute Coronary Syndrome (ACS) (Adult)  Goal: Signs and Symptoms of Listed Potential Problems Will be Absent or Manageable (Acute Coronary Syndrome)  Outcome: Ongoing (interventions implemented as appropriate)    10/13/17 1914   Acute Coronary Syndrome (ACS)   Problems Assessed (Acute Coronary Syndrome (ACS)) all   Problems Present (Acute Coronary Syndrome (ACS)) none         Problem: Pain, Chronic (Adult)  Goal: Identify  Related Risk Factors and Signs and Symptoms  Outcome: Outcome(s) achieved Date Met:  10/13/17  Goal: Acceptable Pain Control/Comfort Level  Outcome: Ongoing (interventions implemented as appropriate)    10/13/17 1914   Pain, Chronic (Adult)   Acceptable Pain Control/Comfort Level making progress toward outcome

## 2017-10-14 NOTE — PROGRESS NOTES
[facility] Medicine Services  INPATIENT PROGRESS NOTE    Length of Stay: 1  Date of Admission: 10/12/2017  Primary Care Physician: Anderson Abbott MD    Subjective   Chief Complaint: Chest pain   HPI:  Patient underwent chest pain with stent today. Doing better. No current issues.     Review of Systems   Constitutional: Negative for chills and fever.   Respiratory: Negative for shortness of breath.    Cardiovascular: Negative for chest pain.   Gastrointestinal: Negative for abdominal pain, diarrhea, nausea and vomiting.   Genitourinary: Negative for dysuria.   Neurological: Negative for dizziness.        All pertinent negatives and positives are as above. All other systems have been reviewed and are negative unless otherwise stated.     Objective    Temp:  [97 °F (36.1 °C)-97.2 °F (36.2 °C)] 97.2 °F (36.2 °C)  Heart Rate:  [64-98] 78  Resp:  [18-20] 18  BP: ()/(36-85) 102/56  Arterial Line BP: (110-160)/(64-88) 110/64    Physical Exam   Constitutional: She is oriented to person, place, and time. She appears well-developed and well-nourished.   Cardiovascular: Normal rate, regular rhythm and normal heart sounds.  Exam reveals no gallop and no friction rub.    No murmur heard.  Pulmonary/Chest: Effort normal and breath sounds normal. No respiratory distress. She has no wheezes. She has no rales.   Abdominal: Soft. Bowel sounds are normal. There is no tenderness.   Musculoskeletal: Normal range of motion. She exhibits no edema.   Neurological: She is alert and oriented to person, place, and time.   Skin: Skin is warm and dry.   Psychiatric: She has a normal mood and affect. Her behavior is normal.   Vitals reviewed.          Results Review:  I have reviewed the labs, radiology results, and diagnostic studies.    Laboratory Data:     Results from last 7 days  Lab Units 10/13/17  0623 10/12/17  1423   SODIUM mmol/L 141 141   POTASSIUM mmol/L 3.9 3.7   CHLORIDE mmol/L  104 102   CO2 mmol/L 27.0 29.0   BUN mg/dL 14 11   CREATININE mg/dL 1.19* 1.04*   GLUCOSE mg/dL 138* 102*   CALCIUM mg/dL 7.4* 8.5   BILIRUBIN mg/dL  --  0.5   ALK PHOS U/L  --  93   ALT (SGPT) U/L  --  144*   AST (SGOT) U/L  --  162*   ANION GAP mmol/L 10.0 10.0     Estimated Creatinine Clearance: 80.4 mL/min (by C-G formula based on Cr of 1.19).    Results from last 7 days  Lab Units 10/13/17  0623   MAGNESIUM mg/dL 2.0           Results from last 7 days  Lab Units 10/13/17  0623 10/12/17  1424   WBC 10*3/mm3 9.04 9.06   HEMOGLOBIN g/dL 11.0* 11.8*   HEMATOCRIT % 34.0* 35.9   PLATELETS 10*3/mm3 221 224           Culture Data:   No results found for: BLOODCX  No results found for: URINECX  No results found for: RESPCX  No results found for: WOUNDCX  No results found for: STOOLCX  No components found for: BODYFLD    Radiology Data:   Imaging Results (last 24 hours)     ** No results found for the last 24 hours. **          I have reviewed the patient current medications.     Assessment/Plan     Hospital Problem List     NSTEMI (non-ST elevated myocardial infarction)        1) NSTEMI - s/p cardiac cath. Continue Brilinta, ASA, Lipitor, Coreg, Lisinopril, Imdur.   2) Type II Diabetes - Insulin sliding scale               Discharge Planning: I expect patient to be discharged to home in 1-2 days.    Erasmo Anderson MD   10/13/17   7:24 PM

## 2017-10-14 NOTE — PLAN OF CARE
Problem: Acute Coronary Syndrome (ACS) (Adult)  Goal: Signs and Symptoms of Listed Potential Problems Will be Absent or Manageable (Acute Coronary Syndrome)  Outcome: Ongoing (interventions implemented as appropriate)    10/13/17 1914   Acute Coronary Syndrome (ACS)   Problems Assessed (Acute Coronary Syndrome (ACS)) all   Problems Present (Acute Coronary Syndrome (ACS)) none

## 2017-10-15 VITALS
BODY MASS INDEX: 42.53 KG/M2 | HEIGHT: 67 IN | SYSTOLIC BLOOD PRESSURE: 107 MMHG | OXYGEN SATURATION: 97 % | HEART RATE: 70 BPM | RESPIRATION RATE: 18 BRPM | WEIGHT: 271 LBS | DIASTOLIC BLOOD PRESSURE: 65 MMHG | TEMPERATURE: 96.8 F

## 2017-10-15 LAB
ANION GAP SERPL CALCULATED.3IONS-SCNC: 10 MMOL/L (ref 5–15)
BASOPHILS # BLD AUTO: 0.03 10*3/MM3 (ref 0–0.2)
BASOPHILS NFR BLD AUTO: 0.2 % (ref 0–2)
BH CV ECHO MEAS - ACS: 2.1 CM
BH CV ECHO MEAS - AO MAX PG (FULL): 4.6 MMHG
BH CV ECHO MEAS - AO MAX PG: 12.7 MMHG
BH CV ECHO MEAS - AO MEAN PG (FULL): 3 MMHG
BH CV ECHO MEAS - AO MEAN PG: 8.1 MMHG
BH CV ECHO MEAS - AO ROOT AREA: 7.3 CM^2
BH CV ECHO MEAS - AO ROOT DIAM: 3 CM
BH CV ECHO MEAS - AO V2 MAX: 178.4 CM/SEC
BH CV ECHO MEAS - AO V2 MEAN: 135.9 CM/SEC
BH CV ECHO MEAS - AO V2 VTI: 32.1 CM
BH CV ECHO MEAS - AVA(I,A): 2.5 CM^2
BH CV ECHO MEAS - AVA(I,D): 2.5 CM^2
BH CV ECHO MEAS - AVA(V,A): 2.3 CM^2
BH CV ECHO MEAS - AVA(V,D): 2.3 CM^2
BH CV ECHO MEAS - EDV(CUBED): 136.5 ML
BH CV ECHO MEAS - EDV(TEICH): 126.6 ML
BH CV ECHO MEAS - EF(CUBED): 69.4 %
BH CV ECHO MEAS - EF(TEICH): 60.7 %
BH CV ECHO MEAS - ESV(CUBED): 41.8 ML
BH CV ECHO MEAS - ESV(TEICH): 49.8 ML
BH CV ECHO MEAS - FS: 32.6 %
BH CV ECHO MEAS - IVS/LVPW: 0.94
BH CV ECHO MEAS - IVSD: 0.86 CM
BH CV ECHO MEAS - LA DIMENSION: 3.9 CM
BH CV ECHO MEAS - LA/AO: 1.3
BH CV ECHO MEAS - LV MASS(C)D: 163.4 GRAMS
BH CV ECHO MEAS - LV MAX PG: 8.1 MMHG
BH CV ECHO MEAS - LV MEAN PG: 5.2 MMHG
BH CV ECHO MEAS - LV V1 MAX: 142.2 CM/SEC
BH CV ECHO MEAS - LV V1 MEAN: 106.9 CM/SEC
BH CV ECHO MEAS - LV V1 VTI: 27.9 CM
BH CV ECHO MEAS - LVIDD: 5.1 CM
BH CV ECHO MEAS - LVIDS: 3.5 CM
BH CV ECHO MEAS - LVOT AREA (M): 2.8 CM^2
BH CV ECHO MEAS - LVOT AREA: 2.9 CM^2
BH CV ECHO MEAS - LVOT DIAM: 1.9 CM
BH CV ECHO MEAS - LVPWD: 0.92 CM
BH CV ECHO MEAS - MR MAX PG: 60.6 MMHG
BH CV ECHO MEAS - MR MAX VEL: 389.3 CM/SEC
BH CV ECHO MEAS - MV A MAX VEL: 71.7 CM/SEC
BH CV ECHO MEAS - MV DEC SLOPE: 803.9 CM/SEC^2
BH CV ECHO MEAS - MV E MAX VEL: 96.3 CM/SEC
BH CV ECHO MEAS - MV E/A: 1.3
BH CV ECHO MEAS - MV MAX PG: 5.5 MMHG
BH CV ECHO MEAS - MV MEAN PG: 2.4 MMHG
BH CV ECHO MEAS - MV P1/2T MAX VEL: 114.7 CM/SEC
BH CV ECHO MEAS - MV P1/2T: 41.8 MSEC
BH CV ECHO MEAS - MV V2 MAX: 117.4 CM/SEC
BH CV ECHO MEAS - MV V2 MEAN: 74.1 CM/SEC
BH CV ECHO MEAS - MV V2 VTI: 33.2 CM
BH CV ECHO MEAS - MVA P1/2T LCG: 1.9 CM^2
BH CV ECHO MEAS - MVA(P1/2T): 5.3 CM^2
BH CV ECHO MEAS - MVA(VTI): 2.4 CM^2
BH CV ECHO MEAS - PA MAX PG: 3 MMHG
BH CV ECHO MEAS - PA V2 MAX: 86.9 CM/SEC
BH CV ECHO MEAS - RAP SYSTOLE: 5 MMHG
BH CV ECHO MEAS - RVDD: 2.6 CM
BH CV ECHO MEAS - RVSP: 29.3 MMHG
BH CV ECHO MEAS - SV(AO): 232.8 ML
BH CV ECHO MEAS - SV(CUBED): 94.8 ML
BH CV ECHO MEAS - SV(LVOT): 81.2 ML
BH CV ECHO MEAS - SV(TEICH): 76.8 ML
BH CV ECHO MEAS - TR MAX VEL: 245.8 CM/SEC
BUN BLD-MCNC: 13 MG/DL (ref 7–21)
BUN/CREAT SERPL: 12.7 (ref 7–25)
CALCIUM SPEC-SCNC: 7.3 MG/DL (ref 8.4–10.2)
CHLORIDE SERPL-SCNC: 106 MMOL/L (ref 95–110)
CO2 SERPL-SCNC: 25 MMOL/L (ref 22–31)
CREAT BLD-MCNC: 1.02 MG/DL (ref 0.5–1)
DEPRECATED RDW RBC AUTO: 45.8 FL (ref 36.4–46.3)
EOSINOPHIL # BLD AUTO: 0.22 10*3/MM3 (ref 0–0.7)
EOSINOPHIL NFR BLD AUTO: 1.8 % (ref 0–7)
ERYTHROCYTE [DISTWIDTH] IN BLOOD BY AUTOMATED COUNT: 13.7 % (ref 11.5–14.5)
GFR SERPL CREATININE-BSD FRML MDRD: 58 ML/MIN/1.73 (ref 58–135)
GLUCOSE BLD-MCNC: 133 MG/DL (ref 60–100)
GLUCOSE BLDC GLUCOMTR-MCNC: 207 MG/DL (ref 70–130)
HCT VFR BLD AUTO: 32.2 % (ref 35–45)
HGB BLD-MCNC: 10.4 G/DL (ref 12–15.5)
IMM GRANULOCYTES # BLD: 0.12 10*3/MM3 (ref 0–0.02)
IMM GRANULOCYTES NFR BLD: 1 % (ref 0–0.5)
LYMPHOCYTES # BLD AUTO: 2.29 10*3/MM3 (ref 0.6–4.2)
LYMPHOCYTES NFR BLD AUTO: 19 % (ref 10–50)
MCH RBC QN AUTO: 29.8 PG (ref 26.5–34)
MCHC RBC AUTO-ENTMCNC: 32.3 G/DL (ref 31.4–36)
MCV RBC AUTO: 92.3 FL (ref 80–98)
MONOCYTES # BLD AUTO: 1.04 10*3/MM3 (ref 0–0.9)
MONOCYTES NFR BLD AUTO: 8.6 % (ref 0–12)
NEUTROPHILS # BLD AUTO: 8.34 10*3/MM3 (ref 2–8.6)
NEUTROPHILS NFR BLD AUTO: 69.4 % (ref 37–80)
PLATELET # BLD AUTO: 178 10*3/MM3 (ref 150–450)
PMV BLD AUTO: 10 FL (ref 8–12)
POTASSIUM BLD-SCNC: 3.4 MMOL/L (ref 3.5–5.1)
RBC # BLD AUTO: 3.49 10*6/MM3 (ref 3.77–5.16)
SODIUM BLD-SCNC: 141 MMOL/L (ref 137–145)
WBC NRBC COR # BLD: 12.04 10*3/MM3 (ref 3.2–9.8)

## 2017-10-15 PROCEDURE — 82962 GLUCOSE BLOOD TEST: CPT

## 2017-10-15 PROCEDURE — 85025 COMPLETE CBC W/AUTO DIFF WBC: CPT | Performed by: FAMILY MEDICINE

## 2017-10-15 PROCEDURE — 80048 BASIC METABOLIC PNL TOTAL CA: CPT | Performed by: FAMILY MEDICINE

## 2017-10-15 RX ORDER — POTASSIUM CHLORIDE 750 MG/1
40 CAPSULE, EXTENDED RELEASE ORAL ONCE
Status: COMPLETED | OUTPATIENT
Start: 2017-10-15 | End: 2017-10-15

## 2017-10-15 RX ORDER — ISOSORBIDE MONONITRATE 30 MG/1
30 TABLET, EXTENDED RELEASE ORAL
Qty: 30 TABLET | Refills: 0 | Status: SHIPPED | OUTPATIENT
Start: 2017-10-16 | End: 2017-11-15 | Stop reason: SDUPTHER

## 2017-10-15 RX ADMIN — HYDROCODONE BITARTRATE AND ACETAMINOPHEN 1 TABLET: 10; 325 TABLET ORAL at 09:19

## 2017-10-15 RX ADMIN — VITAMIN D, TAB 1000IU (100/BT) 1000 UNITS: 25 TAB at 09:19

## 2017-10-15 RX ADMIN — ISOSORBIDE MONONITRATE 30 MG: 30 TABLET, EXTENDED RELEASE ORAL at 09:19

## 2017-10-15 RX ADMIN — LISINOPRIL 20 MG: 20 TABLET ORAL at 09:19

## 2017-10-15 RX ADMIN — ASPIRIN 81 MG 81 MG: 81 TABLET ORAL at 09:19

## 2017-10-15 RX ADMIN — TICAGRELOR 90 MG: 90 TABLET ORAL at 09:19

## 2017-10-15 RX ADMIN — THYROID, PORCINE 180 MG: 30 TABLET ORAL at 09:18

## 2017-10-15 RX ADMIN — AMLODIPINE BESYLATE 10 MG: 10 TABLET ORAL at 09:19

## 2017-10-15 RX ADMIN — POTASSIUM CHLORIDE 40 MEQ: 750 CAPSULE, EXTENDED RELEASE ORAL at 10:30

## 2017-10-15 RX ADMIN — CARVEDILOL 25 MG: 25 TABLET, FILM COATED ORAL at 09:19

## 2017-10-15 NOTE — PLAN OF CARE
Problem: Patient Care Overview (Adult)  Goal: Plan of Care Review  Outcome: Ongoing (interventions implemented as appropriate)    10/15/17 0608   Coping/Psychosocial Response Interventions   Plan Of Care Reviewed With patient   Patient Care Overview   Progress no change       Goal: Adult Individualization and Mutuality  Outcome: Ongoing (interventions implemented as appropriate)  Goal: Discharge Needs Assessment  Outcome: Ongoing (interventions implemented as appropriate)    Problem: Acute Coronary Syndrome (ACS) (Adult)  Goal: Signs and Symptoms of Listed Potential Problems Will be Absent or Manageable (Acute Coronary Syndrome)  Outcome: Ongoing (interventions implemented as appropriate)    Problem: Pain, Chronic (Adult)  Goal: Acceptable Pain Control/Comfort Level  Outcome: Ongoing (interventions implemented as appropriate)

## 2017-10-15 NOTE — DISCHARGE INSTR - APPOINTMENTS
Office will call with scheduled appointment time  Follow up with Anderson Abbott MD  Family Medicine  [address] [address] [phone]    Office will call with scheduled appointment time  Follow up with SUDHA Montana MD  Cardiology  [address], [address]  [place] [address] [phone]

## 2017-10-15 NOTE — DISCHARGE SUMMARY
"    [facility] Medicine Services  DISCHARGE SUMMARY       Date of Admission: 10/12/2017  Date of Discharge:  10/15/2017  Primary Care Physician: Anderson Abbott MD    Presenting Problem/History of Present Illness:  NSTEMI (non-ST elevated myocardial infarction) [I21.4]  Unstable angina [I20.0]       Final Discharge Diagnoses:  Hospital Problem List     NSTEMI (non-ST elevated myocardial infarction)          Consults:   Consults     Date and Time Order Name Status Description    10/12/2017 1801 Inpatient Consult to Cardiology Completed           Procedures Performed: Procedure(s):  Left Heart Cath, PCI if indicated  Percutaneous Coronary Intervention                Pertinent Test Results: None    Chief Complaint on Day of Discharge: Chest pain     Hospital Course:  The patient is a 46 y.o. female who presented to [facility] with chest pain, found to have NSTEMI. Patient was started on guideline-direct medical therapy, and Cardiology was consulted. She was taken for a cardiac catheterization with stent placement. After stent placement, patient was feeling better. She was ambulating well without issues. No reports of SOA or CP. On day of discharge, her vital signs were stable and she would be ready for discharge home.       Condition on Discharge: Good     Physical Exam on Discharge:  /65 (BP Location: Right arm)  Pulse 70  Temp 96.8 °F (36 °C) (Temporal Artery )   Resp 18  Ht 67.01\" (170.2 cm)  Wt 271 lb (123 kg)  LMP Comment: hysterectomy 2004  SpO2 97%  Breastfeeding? No  BMI 42.43 kg/m2  Physical Exam   Constitutional: She is oriented to person, place, and time. She appears well-developed and well-nourished.   Cardiovascular: Normal rate, regular rhythm and normal heart sounds.  Exam reveals no gallop and no friction rub.    No murmur heard.  Pulmonary/Chest: Effort normal and breath sounds normal. No respiratory distress. She has no " wheezes. She has no rales.   Abdominal: Soft. Bowel sounds are normal. There is no tenderness.   Musculoskeletal: Normal range of motion. She exhibits no edema.   Neurological: She is alert and oriented to person, place, and time.   Skin: Skin is warm and dry.   Psychiatric: She has a normal mood and affect. Her behavior is normal.   Vitals reviewed.        Discharge Disposition:  Home or Self Care    Discharge Medications:   Rhea Sutton   Home Medication Instructions HUANG:[id number]    Printed on:10/15/17 6381   Medication Information                      amLODIPine (NORVASC) 10 MG tablet               aspirin 81 MG chewable tablet  Chew 81 mg daily.             atorvastatin (LIPITOR) 10 MG tablet  Take 10 mg by mouth daily. Half a tab q day             atorvastatin (LIPITOR) 40 MG tablet               Baclofen 2 % cream  Apply  topically 3 (Three) Times a Day. Baclofen, Neurontin, and Lidocaine compound cream to lower back TID for chronic back pain             Blood Glucose Monitoring Suppl (FREESTYLE LITE) device               carvedilol (COREG) 25 MG tablet  2 (Two) Times a Day.             cholecalciferol (VITAMIN D3) 1000 units tablet  Take 1,000 Units by mouth Daily.             cholecalciferol (VITAMIN D3) 03729 units capsule  Take 5 capsules by mouth 1 (One) Time Per Week. 50,000 units once weekly             FREESTYLE LITE test strip               glipiZIDE (GLUCOTROL) 5 MG ER tablet  daily             isosorbide mononitrate (IMDUR) 30 MG 24 hr tablet  Take 1 tablet by mouth Daily.             lisinopril (PRINIVIL,ZESTRIL) 20 MG tablet  Take 20 mg by mouth Daily.             sertraline (ZOLOFT) 50 MG tablet  As Needed. daily             thyroid (ARMOUR THYROID) 180 MG tablet  Take 1 tablet by mouth 2 (Two) Times a Day.             ticagrelor (BRILINTA) 90 MG tablet tablet  Take 1 tablet by mouth 2 (Two) Times a Day.             traMADol (ULTRAM) 50 MG tablet  prn                 Discharge Diet:    Diet Instructions     Diet: Cardiac       Discharge Diet:  Cardiac                 Activity at Discharge:   Activity Instructions     Activity as Tolerated               Additional Activity Instructions:      Refer to Coronary Angiogram with stent, care after worksheet for activity restrictions/guidelines                  Discharge Care Plan/Instructions: F/u with cardiology in 4 weeks    Follow-up Appointments:   Future Appointments  Date Time Provider Department Center   10/24/2017 11:10 AM Alfonso Bruce MD [facility] None   11/2/2017 10:15 AM DUC [facility] None       Test Results Pending at Discharge:  Order Current Status    Hemoglobin A1c In process          Erasmo Anderson MD  10/15/17  1:43 PM    Time: Greater than 30 minutes spent on discharge planning.

## 2017-10-16 LAB — HBA1C MFR BLD: 6.7 % (ref 4–5.6)

## 2017-10-24 ENCOUNTER — APPOINTMENT (OUTPATIENT)
Dept: GENERAL RADIOLOGY | Facility: HOSPITAL | Age: 47
End: 2017-10-24

## 2017-10-24 ENCOUNTER — APPOINTMENT (OUTPATIENT)
Dept: CT IMAGING | Facility: HOSPITAL | Age: 47
End: 2017-10-24

## 2017-10-24 ENCOUNTER — HOSPITAL ENCOUNTER (EMERGENCY)
Facility: HOSPITAL | Age: 47
Discharge: LEFT BEFORE DISCHARGE | End: 2017-10-24
Attending: EMERGENCY MEDICINE | Admitting: EMERGENCY MEDICINE

## 2017-10-24 VITALS
BODY MASS INDEX: 40.34 KG/M2 | TEMPERATURE: 97.8 F | HEIGHT: 66 IN | SYSTOLIC BLOOD PRESSURE: 129 MMHG | HEART RATE: 83 BPM | DIASTOLIC BLOOD PRESSURE: 84 MMHG | RESPIRATION RATE: 16 BRPM | WEIGHT: 251 LBS | OXYGEN SATURATION: 95 %

## 2017-10-24 DIAGNOSIS — R11.10 VOMITING, INTRACTABILITY OF VOMITING NOT SPECIFIED, PRESENCE OF NAUSEA NOT SPECIFIED, UNSPECIFIED VOMITING TYPE: ICD-10-CM

## 2017-10-24 DIAGNOSIS — R79.89 ELEVATED LFTS: ICD-10-CM

## 2017-10-24 DIAGNOSIS — R55 SYNCOPE AND COLLAPSE: Primary | ICD-10-CM

## 2017-10-24 LAB
ALBUMIN SERPL-MCNC: 3.8 G/DL (ref 3.4–4.8)
ALBUMIN/GLOB SERPL: 1 G/DL (ref 1.1–1.8)
ALP SERPL-CCNC: 87 U/L (ref 38–126)
ALT SERPL W P-5'-P-CCNC: 90 U/L (ref 9–52)
ANION GAP SERPL CALCULATED.3IONS-SCNC: 14 MMOL/L (ref 5–15)
AST SERPL-CCNC: 88 U/L (ref 14–36)
BACTERIA UR QL AUTO: ABNORMAL /HPF
BASOPHILS # BLD AUTO: 0.04 10*3/MM3 (ref 0–0.2)
BASOPHILS NFR BLD AUTO: 0.4 % (ref 0–2)
BILIRUB SERPL-MCNC: 0.3 MG/DL (ref 0.2–1.3)
BILIRUB UR QL STRIP: NEGATIVE
BUN BLD-MCNC: 16 MG/DL (ref 7–21)
BUN/CREAT SERPL: 16 (ref 7–25)
CALCIUM SPEC-SCNC: 9.5 MG/DL (ref 8.4–10.2)
CHLORIDE SERPL-SCNC: 102 MMOL/L (ref 95–110)
CK MB SERPL-CCNC: 0.31 NG/ML (ref 0–5)
CK SERPL-CCNC: 55 U/L (ref 30–135)
CLARITY UR: CLEAR
CO2 SERPL-SCNC: 26 MMOL/L (ref 22–31)
COLOR UR: YELLOW
CREAT BLD-MCNC: 1 MG/DL (ref 0.5–1)
D-DIMER, QUANTITATIVE (MAD,POW, STR): 826 NG/ML (FEU) (ref 0–470)
DEPRECATED RDW RBC AUTO: 42.8 FL (ref 36.4–46.3)
EOSINOPHIL # BLD AUTO: 0.18 10*3/MM3 (ref 0–0.7)
EOSINOPHIL NFR BLD AUTO: 1.6 % (ref 0–7)
ERYTHROCYTE [DISTWIDTH] IN BLOOD BY AUTOMATED COUNT: 13 % (ref 11.5–14.5)
GFR SERPL CREATININE-BSD FRML MDRD: 60 ML/MIN/1.73 (ref 58–135)
GLOBULIN UR ELPH-MCNC: 3.8 GM/DL (ref 2.3–3.5)
GLUCOSE BLD-MCNC: 194 MG/DL (ref 60–100)
GLUCOSE UR STRIP-MCNC: NEGATIVE MG/DL
HCG SERPL QL: NEGATIVE
HCT VFR BLD AUTO: 33.5 % (ref 35–45)
HGB BLD-MCNC: 11.1 G/DL (ref 12–15.5)
HGB UR QL STRIP.AUTO: ABNORMAL
HOLD SPECIMEN: NORMAL
HOLD SPECIMEN: NORMAL
HYALINE CASTS UR QL AUTO: ABNORMAL /LPF
IMM GRANULOCYTES # BLD: 0.15 10*3/MM3 (ref 0–0.02)
IMM GRANULOCYTES NFR BLD: 1.4 % (ref 0–0.5)
KETONES UR QL STRIP: NEGATIVE
LEUKOCYTE ESTERASE UR QL STRIP.AUTO: NEGATIVE
LIPASE SERPL-CCNC: 161 U/L (ref 23–300)
LYMPHOCYTES # BLD AUTO: 2.45 10*3/MM3 (ref 0.6–4.2)
LYMPHOCYTES NFR BLD AUTO: 22.2 % (ref 10–50)
MCH RBC QN AUTO: 29.6 PG (ref 26.5–34)
MCHC RBC AUTO-ENTMCNC: 33.1 G/DL (ref 31.4–36)
MCV RBC AUTO: 89.3 FL (ref 80–98)
MONOCYTES # BLD AUTO: 0.87 10*3/MM3 (ref 0–0.9)
MONOCYTES NFR BLD AUTO: 7.9 % (ref 0–12)
NEUTROPHILS # BLD AUTO: 7.34 10*3/MM3 (ref 2–8.6)
NEUTROPHILS NFR BLD AUTO: 66.5 % (ref 37–80)
NITRITE UR QL STRIP: NEGATIVE
PH UR STRIP.AUTO: 6 [PH] (ref 5–9)
PLATELET # BLD AUTO: 376 10*3/MM3 (ref 150–450)
PMV BLD AUTO: 9.8 FL (ref 8–12)
POTASSIUM BLD-SCNC: 4.2 MMOL/L (ref 3.5–5.1)
PROT SERPL-MCNC: 7.6 G/DL (ref 6.3–8.6)
PROT UR QL STRIP: NEGATIVE
RBC # BLD AUTO: 3.75 10*6/MM3 (ref 3.77–5.16)
RBC # UR: ABNORMAL /HPF
REF LAB TEST METHOD: ABNORMAL
SODIUM BLD-SCNC: 142 MMOL/L (ref 137–145)
SP GR UR STRIP: 1.02 (ref 1–1.03)
SQUAMOUS #/AREA URNS HPF: ABNORMAL /HPF
TROPONIN I SERPL-MCNC: <0.012 NG/ML
UROBILINOGEN UR QL STRIP: ABNORMAL
WBC NRBC COR # BLD: 11.03 10*3/MM3 (ref 3.2–9.8)
WBC UR QL AUTO: ABNORMAL /HPF
WHOLE BLOOD HOLD SPECIMEN: NORMAL
WHOLE BLOOD HOLD SPECIMEN: NORMAL

## 2017-10-24 PROCEDURE — 93005 ELECTROCARDIOGRAM TRACING: CPT

## 2017-10-24 PROCEDURE — 99285 EMERGENCY DEPT VISIT HI MDM: CPT

## 2017-10-24 PROCEDURE — 0 IOPAMIDOL PER 1 ML: Performed by: EMERGENCY MEDICINE

## 2017-10-24 PROCEDURE — 96361 HYDRATE IV INFUSION ADD-ON: CPT

## 2017-10-24 PROCEDURE — 85379 FIBRIN DEGRADATION QUANT: CPT | Performed by: EMERGENCY MEDICINE

## 2017-10-24 PROCEDURE — 83690 ASSAY OF LIPASE: CPT | Performed by: EMERGENCY MEDICINE

## 2017-10-24 PROCEDURE — 82550 ASSAY OF CK (CPK): CPT | Performed by: EMERGENCY MEDICINE

## 2017-10-24 PROCEDURE — 84703 CHORIONIC GONADOTROPIN ASSAY: CPT | Performed by: EMERGENCY MEDICINE

## 2017-10-24 PROCEDURE — 82553 CREATINE MB FRACTION: CPT | Performed by: EMERGENCY MEDICINE

## 2017-10-24 PROCEDURE — 96360 HYDRATION IV INFUSION INIT: CPT

## 2017-10-24 PROCEDURE — 80053 COMPREHEN METABOLIC PANEL: CPT | Performed by: EMERGENCY MEDICINE

## 2017-10-24 PROCEDURE — 85025 COMPLETE CBC W/AUTO DIFF WBC: CPT | Performed by: EMERGENCY MEDICINE

## 2017-10-24 PROCEDURE — 81015 MICROSCOPIC EXAM OF URINE: CPT | Performed by: EMERGENCY MEDICINE

## 2017-10-24 PROCEDURE — 71010 HC CHEST PA OR AP: CPT

## 2017-10-24 PROCEDURE — 81003 URINALYSIS AUTO W/O SCOPE: CPT | Performed by: EMERGENCY MEDICINE

## 2017-10-24 PROCEDURE — 93010 ELECTROCARDIOGRAM REPORT: CPT | Performed by: INTERNAL MEDICINE

## 2017-10-24 PROCEDURE — 84484 ASSAY OF TROPONIN QUANT: CPT | Performed by: EMERGENCY MEDICINE

## 2017-10-24 PROCEDURE — 71275 CT ANGIOGRAPHY CHEST: CPT

## 2017-10-24 PROCEDURE — 87086 URINE CULTURE/COLONY COUNT: CPT | Performed by: EMERGENCY MEDICINE

## 2017-10-24 RX ORDER — LEVOFLOXACIN 500 MG/1
500 TABLET, FILM COATED ORAL ONCE
Status: DISCONTINUED | OUTPATIENT
Start: 2017-10-24 | End: 2017-10-24

## 2017-10-24 RX ORDER — SODIUM CHLORIDE 0.9 % (FLUSH) 0.9 %
10 SYRINGE (ML) INJECTION AS NEEDED
Status: DISCONTINUED | OUTPATIENT
Start: 2017-10-24 | End: 2017-10-24 | Stop reason: HOSPADM

## 2017-10-24 RX ORDER — SODIUM CHLORIDE 9 MG/ML
125 INJECTION, SOLUTION INTRAVENOUS CONTINUOUS
Status: DISCONTINUED | OUTPATIENT
Start: 2017-10-24 | End: 2017-10-24 | Stop reason: HOSPADM

## 2017-10-24 RX ORDER — ONDANSETRON 4 MG/1
4 TABLET, ORALLY DISINTEGRATING ORAL EVERY 8 HOURS PRN
Qty: 10 TABLET | Refills: 0 | Status: SHIPPED | OUTPATIENT
Start: 2017-10-24 | End: 2017-11-13

## 2017-10-24 RX ADMIN — SODIUM CHLORIDE 125 ML/HR: 9 INJECTION, SOLUTION INTRAVENOUS at 16:06

## 2017-10-24 RX ADMIN — IOPAMIDOL 72 ML: 755 INJECTION, SOLUTION INTRAVENOUS at 16:27

## 2017-10-24 RX ADMIN — SODIUM CHLORIDE 1000 ML: 9 INJECTION, SOLUTION INTRAVENOUS at 14:28

## 2017-10-24 NOTE — DISCHARGE INSTRUCTIONS
Return ED headache, chest pain, shortness of air, syncope, palpitations, worse condition, other concerns

## 2017-10-24 NOTE — ED PROVIDER NOTES
Subjective   HPI Comments: 45yo female pmh significant htn/hyperlipidemia/cad/hypothyroid presents ED c/o witnessed syncopal episode.  Pt reports felt lightheaded/warm then sustained syncopal episode while in seated position with nausea/vomitus x1 upon resumed consciousness.  Pt denies physical c/o at time of exam.  ROS neg headache/chest pain/soa/abd pain/seizure.    Patient is a 46 y.o. female presenting with syncope.   Syncope   Episode history:  Single  Most recent episode:  Today  Chronicity:  New  Witnessed: yes    Relieved by:  Nothing  Worsened by:  Nothing  Associated symptoms: nausea and vomiting    Associated symptoms: no chest pain and no confusion        Review of Systems   Constitutional: Negative.    HENT: Negative.    Eyes: Negative.    Respiratory: Negative.    Cardiovascular: Positive for syncope. Negative for chest pain.   Gastrointestinal: Positive for nausea and vomiting.   Genitourinary: Negative for dysuria.   Musculoskeletal: Negative.    Neurological: Positive for syncope.   Hematological: Negative for adenopathy.   Psychiatric/Behavioral: Negative for confusion.       Past Medical History:   Diagnosis Date   • Acquired hypothyroidism    • Acute pharyngitis    • Encounter for health maintenance examination    • Essential hypertension    • Goiter     - total thyroidectomy July 2015   • Hashimoto's thyroiditis    • Headache    • Heart disease    • Hyperglycemia     , unspecified   • Kidney failure stage III   • Low back pain    • Malaise and fatigue    • Otitis externa    • Postoperative hypothyroidism    • Tobacco dependence syndrome    • URI (upper respiratory infection)        Allergies   Allergen Reactions   • Penicillins Swelling     Of the throat   • Adhesive Tape Other (See Comments)     Silk tape- blisters   • Coconut Flavor Swelling   • Latex Hives       Past Surgical History:   Procedure Laterality Date   • APPENDECTOMY     • CARDIAC CATHETERIZATION     • CARDIAC CATHETERIZATION N/A  10/13/2017    Procedure: Left Heart Cath, PCI if indicated;  Surgeon: Mian Montana MD;  Location: [facility] CATH INVASIVE LOCATION;  Service:    • CHOLECYSTECTOMY     • COLON RESECTION      meckel resection   • CORONARY STENT PLACEMENT     • DILATATION AND CURETTAGE     • VT RT/LT HEART CATHETERS N/A 10/13/2017    Procedure: Percutaneous Coronary Intervention;  Surgeon: Mian Montana MD;  Location: [facility] CATH INVASIVE LOCATION;  Service: Cardiovascular   • TOTAL ABDOMINAL HYSTERECTOMY WITH SALPINGO OOPHORECTOMY     • TOTAL THYROIDECTOMY         Family History   Problem Relation Age of Onset   • Diabetes Other    • Hyperlipidemia Other    • Hypertension Other    • Cancer Mother    • Diabetes Mother    • Coronary artery disease Mother    • Kidney disease Mother    • COPD Mother    • Coronary artery disease Father    • Early death Father    • Diabetes Paternal Aunt        Social History     Social History   • Marital status:      Spouse name: N/A   • Number of children: N/A   • Years of education: N/A     Social History Main Topics   • Smoking status: Former Smoker     Packs/day: 0.00     Types: Cigarettes     Start date: 8/12/2015   • Smokeless tobacco: Never Used      Comment: quit on 10/11/17   • Alcohol use No   • Drug use: No   • Sexual activity: Defer     Other Topics Concern   • None     Social History Narrative   • None           Objective   Physical Exam   Constitutional: She is oriented to person, place, and time. She appears well-developed and well-nourished.   HENT:   Head: Normocephalic and atraumatic.   Right Ear: External ear normal.   Left Ear: External ear normal.   Nose: Nose normal.   Mouth/Throat: Oropharynx is clear and moist.   Eyes: EOM are normal. Pupils are equal, round, and reactive to light.   Neck: Neck supple. No JVD present. No tracheal deviation present.   Cardiovascular: Normal rate, regular rhythm, normal heart sounds and intact distal pulses.  Exam  reveals no gallop and no friction rub.    No murmur heard.  Pulmonary/Chest: Effort normal and breath sounds normal. She has no wheezes. She has no rales.   Abdominal: Soft. Bowel sounds are normal. There is no tenderness. There is no rebound and no guarding.   Musculoskeletal: She exhibits no edema or tenderness.   Lymphadenopathy:     She has no cervical adenopathy.   Neurological: She is alert and oriented to person, place, and time. She has normal strength. No cranial nerve deficit or sensory deficit. Coordination normal. GCS eye subscore is 4. GCS verbal subscore is 5. GCS motor subscore is 6.   Skin: Skin is warm and dry.   Nursing note and vitals reviewed.      ECG 12 Lead    Date/Time: 10/24/2017 3:51 PM  Performed by: MORTEZA BACH  Authorized by: MORTEZA BACH   Interpreted by physician  Rhythm: sinus rhythm  Rate: normal  BPM: 79  QRS axis: normal  Conduction: conduction normal  ST Segments: ST segments normal  T depression: III and aVF  Clinical impression: abnormal ECG               ED Course  ED Course   Comment By Time   Pt requests discharge AMA. Risks discussed including arrhythmia/mi/death. Pt verbalized understanding. Demonstrates decisional capacity. Stable discharge. Morteza Bach MD 10/24 1723      Labs Reviewed   COMPREHENSIVE METABOLIC PANEL - Abnormal; Notable for the following:        Result Value    Glucose 194 (*)     ALT (SGPT) 90 (*)     AST (SGOT) 88 (*)     Globulin 3.8 (*)     A/G Ratio 1.0 (*)     All other components within normal limits   URINALYSIS W/ CULTURE IF INDICATED - Abnormal; Notable for the following:     Blood, UA Trace (*)     All other components within normal limits   CBC WITH AUTO DIFFERENTIAL - Abnormal; Notable for the following:     WBC 11.03 (*)     RBC 3.75 (*)     Hemoglobin 11.1 (*)     Hematocrit 33.5 (*)     Immature Grans % 1.4 (*)     Immature Grans, Absolute 0.15 (*)     All other components within normal limits   D-DIMER, QUANTITATIVE - Abnormal;  Notable for the following:     D-Dimer, Quantitative 826 (*)     All other components within normal limits    Narrative:     Dimer values <500 ng/ml FEU are FDA approved as aid in diagnosis of deep venous thrombosis and pulmonary embolism.  This test should not be used in an exclusion strategy with pretest probability alone.    A recent guideline regarding diagnosis for pulmonary thomboembolism recommends an adjusted exclusion criterion of age x 10 ng/ml FEU for patients >50 years of age (Chari Intern Med 2015; 163: 701-711).   URINALYSIS, MICROSCOPIC ONLY - Abnormal; Notable for the following:     RBC, UA 0-2 (*)     Bacteria, UA Trace (*)     All other components within normal limits   LIPASE - Normal   HCG, SERUM, QUALITATIVE - Normal   TROPONIN (IN-HOUSE) - Normal   CK - Normal   CK MB - Normal   URINE CULTURE   RAINBOW DRAW    Narrative:     The following orders were created for panel order [place] Draw.  Procedure                               Abnormality         Status                     ---------                               -----------         ------                     Light Blue Top[[id number]]                                   Final result               Green Top (Gel)[[id number]]                                  Final result               Lavender Top[[id number]]                                     Final result               Gold Top - SST[[id number]]                                   Final result                 Please view results for these tests on the individual orders.   TROPONIN (IN-HOUSE)   CBC AND DIFFERENTIAL    Narrative:     The following orders were created for panel order CBC & Differential.  Procedure                               Abnormality         Status                     ---------                               -----------         ------                     CBC Auto Differential[[id number]]        Abnormal            Final result                 Please view results for these tests on the  individual orders.   LIGHT BLUE TOP   GREEN TOP   LAVENDER TOP   GOLD TOP - SST     Xr Chest 1 View    Result Date: 10/24/2017  Narrative: Chest single view. CLINICAL INDICATION: Shortness of breath. Nausea vomiting, weakness COMPARISON: None FINDINGS: Cardiac silhouette is normal in size. Pulmonary vascularity is unremarkable. No focal infiltrate or consolidation.  No pleural effusion.  No pneumothorax.     Impression: CONCLUSION: No evidence of active disease. Electronically signed by:  Barrie Little MD  10/24/2017 2:00 PM CDT Workstation: [id number]    Ct Angiogram Chest With Contrast    Result Date: 10/24/2017  Narrative: CT chest with contrast. CTA thorax. Pulmonary embolism study. CLINICAL INDICATION: Syncope. Elevated d-dimer.   COMPARISON: Rest x-ray   TECHNIQUE: Nonionic IV contrast. 72 mL of Isovue-370 Helical scanning with axial and coronal reformations. Soft tissue, lung, liver, and bone windows reviewed. Computer generated 3-D images CT angiography including MIP images are obtained. This exam was performed according to our departmental dose-optimization program, which includes automated exposure control, adjustment of the mA and/or kV according to patient size and/or use of iterative reconstruction technique. CHEST CT FINDINGS:  No evidence for pulmonary emboli.    No evidence of pathologically enlarged nodes. No dense consolidation or masses.    No evidence of pleural fluid. The adrenal glands are normal size. Remainder of visualized upper abdomen is grossly unremarkable. . Bones unremarkable for age.     Impression: CONCLUSION: No evidence for pulmonary emboli. Normal exam.    Electronically signed by:  Barrie Little MD  10/24/2017 5:01 PM CDT Workstation: [id number]    Xr Spine Lumbar 4+ View    Result Date: 9/29/2017  Narrative: LUMBAR SPINE SERIES INDICATION FOR PROCEDURE:  back pain, M54.5 Low back pain G89.29 Other chronic pain COMPARISON:  None available TECHNIQUE:  AP, lateral, bilateral oblique and  cone-down radiographs of the lumbar spine are submitted for interpretation. FINDINGS:  Patient has four nonrib-bearing lumbar vertebral bodies.  There is a normal lumbar lordosis. Alignment is normal. The lumbar vertebral bodies have normal height. There is no evidence of acute fracture. There is disc space narrowing at L5-S1. The remaining vertebral disc spaces appear to be within normal limits.. The sacroiliac joints are within normal limits. There are surgical clips in the right upper quadrant of the abdomen.     Impression: CONCLUSION: Degenerative disc space narrowing at L5-S1. Electronically signed by:  Igor Hart MD  9/29/2017 6:04 PM CDT Workstation: [id number]                  MDM    Final diagnoses:   Syncope and collapse   Vomiting, intractability of vomiting not specified, presence of nausea not specified, unspecified vomiting type   Elevated LFTs            Matthew Lyon MD  10/24/17 9445

## 2017-10-25 LAB
ALBUMIN SERPL-MCNC: 3.8 G/DL (ref 3.4–4.8)
ALBUMIN/GLOB SERPL: 1.2 G/DL (ref 1.1–1.8)
ALP SERPL-CCNC: 88 U/L (ref 38–126)
ALT SERPL W P-5'-P-CCNC: 84 U/L (ref 9–52)
ANION GAP SERPL CALCULATED.3IONS-SCNC: 16 MMOL/L (ref 5–15)
AST SERPL-CCNC: 52 U/L (ref 14–36)
BACTERIA SPEC AEROBE CULT: NORMAL
BASOPHILS # BLD AUTO: 0.03 10*3/MM3 (ref 0–0.2)
BASOPHILS NFR BLD AUTO: 0.3 % (ref 0–2)
BILIRUB SERPL-MCNC: 0.2 MG/DL (ref 0.2–1.3)
BUN BLD-MCNC: 15 MG/DL (ref 7–21)
BUN/CREAT SERPL: 17.2 (ref 7–25)
CALCIUM SPEC-SCNC: 9.6 MG/DL (ref 8.4–10.2)
CHLORIDE SERPL-SCNC: 103 MMOL/L (ref 95–110)
CO2 SERPL-SCNC: 24 MMOL/L (ref 22–31)
CREAT BLD-MCNC: 0.87 MG/DL (ref 0.5–1)
DEPRECATED RDW RBC AUTO: 44.6 FL (ref 36.4–46.3)
EOSINOPHIL # BLD AUTO: 0.19 10*3/MM3 (ref 0–0.7)
EOSINOPHIL NFR BLD AUTO: 1.9 % (ref 0–7)
ERYTHROCYTE [DISTWIDTH] IN BLOOD BY AUTOMATED COUNT: 13.4 % (ref 11.5–14.5)
GFR SERPL CREATININE-BSD FRML MDRD: 70 ML/MIN/1.73 (ref 58–135)
GLOBULIN UR ELPH-MCNC: 3.3 GM/DL (ref 2.3–3.5)
GLUCOSE BLD-MCNC: 207 MG/DL (ref 60–100)
HCT VFR BLD AUTO: 35.9 % (ref 35–45)
HGB BLD-MCNC: 11.4 G/DL (ref 12–15.5)
IMM GRANULOCYTES # BLD: 0.09 10*3/MM3 (ref 0–0.02)
IMM GRANULOCYTES NFR BLD: 0.9 % (ref 0–0.5)
LYMPHOCYTES # BLD AUTO: 1.74 10*3/MM3 (ref 0.6–4.2)
LYMPHOCYTES NFR BLD AUTO: 17.1 % (ref 10–50)
MCH RBC QN AUTO: 29.2 PG (ref 26.5–34)
MCHC RBC AUTO-ENTMCNC: 31.8 G/DL (ref 31.4–36)
MCV RBC AUTO: 92.1 FL (ref 80–98)
MONOCYTES # BLD AUTO: 0.56 10*3/MM3 (ref 0–0.9)
MONOCYTES NFR BLD AUTO: 5.5 % (ref 0–12)
NEUTROPHILS # BLD AUTO: 7.54 10*3/MM3 (ref 2–8.6)
NEUTROPHILS NFR BLD AUTO: 74.3 % (ref 37–80)
NRBC BLD MANUAL-RTO: 0 /100 WBC (ref 0–0)
PLATELET # BLD AUTO: 369 10*3/MM3 (ref 150–450)
PMV BLD AUTO: 10.1 FL (ref 8–12)
POTASSIUM BLD-SCNC: 4.7 MMOL/L (ref 3.5–5.1)
PROT SERPL-MCNC: 7.1 G/DL (ref 6.3–8.6)
RBC # BLD AUTO: 3.9 10*6/MM3 (ref 3.77–5.16)
SODIUM BLD-SCNC: 143 MMOL/L (ref 137–145)
TSH SERPL DL<=0.05 MIU/L-ACNC: 0.37 MIU/ML (ref 0.46–4.68)
WBC NRBC COR # BLD: 10.15 10*3/MM3 (ref 3.2–9.8)

## 2017-10-25 PROCEDURE — 83516 IMMUNOASSAY NONANTIBODY: CPT | Performed by: NURSE PRACTITIONER

## 2017-10-25 PROCEDURE — 36415 COLL VENOUS BLD VENIPUNCTURE: CPT | Performed by: FAMILY MEDICINE

## 2017-10-25 PROCEDURE — 80050 GENERAL HEALTH PANEL: CPT | Performed by: NURSE PRACTITIONER

## 2017-10-26 LAB
GLIADIN PEPTIDE IGA SER-ACNC: 5 UNITS (ref 0–19)
IGA SERPL-MCNC: 291 MG/DL (ref 87–352)
TTG IGA SER-ACNC: <2 U/ML (ref 0–3)

## 2017-11-13 ENCOUNTER — OFFICE VISIT (OUTPATIENT)
Dept: ENDOCRINOLOGY | Facility: CLINIC | Age: 47
End: 2017-11-13

## 2017-11-13 ENCOUNTER — OFFICE VISIT (OUTPATIENT)
Dept: PAIN MEDICINE | Facility: CLINIC | Age: 47
End: 2017-11-13

## 2017-11-13 VITALS
SYSTOLIC BLOOD PRESSURE: 150 MMHG | OXYGEN SATURATION: 98 % | BODY MASS INDEX: 39.29 KG/M2 | HEIGHT: 66 IN | DIASTOLIC BLOOD PRESSURE: 82 MMHG | WEIGHT: 244.5 LBS | HEART RATE: 86 BPM

## 2017-11-13 VITALS
WEIGHT: 244.9 LBS | BODY MASS INDEX: 39.36 KG/M2 | DIASTOLIC BLOOD PRESSURE: 84 MMHG | SYSTOLIC BLOOD PRESSURE: 124 MMHG | HEIGHT: 66 IN

## 2017-11-13 DIAGNOSIS — M79.18 MYOFASCIAL PAIN: ICD-10-CM

## 2017-11-13 DIAGNOSIS — E78.49 OTHER HYPERLIPIDEMIA: ICD-10-CM

## 2017-11-13 DIAGNOSIS — E11.9 CONTROLLED TYPE 2 DIABETES MELLITUS WITHOUT COMPLICATION, WITHOUT LONG-TERM CURRENT USE OF INSULIN (HCC): ICD-10-CM

## 2017-11-13 DIAGNOSIS — G89.29 CHRONIC BILATERAL LOW BACK PAIN WITHOUT SCIATICA: Primary | ICD-10-CM

## 2017-11-13 DIAGNOSIS — M54.50 CHRONIC BILATERAL LOW BACK PAIN WITHOUT SCIATICA: Primary | ICD-10-CM

## 2017-11-13 DIAGNOSIS — E55.9 VITAMIN D DEFICIENCY: ICD-10-CM

## 2017-11-13 DIAGNOSIS — I10 ESSENTIAL HYPERTENSION: ICD-10-CM

## 2017-11-13 DIAGNOSIS — E89.0 POSTSURGICAL HYPOTHYROIDISM: Primary | ICD-10-CM

## 2017-11-13 PROCEDURE — 99213 OFFICE O/P EST LOW 20 MIN: CPT | Performed by: PAIN MEDICINE

## 2017-11-13 PROCEDURE — 99214 OFFICE O/P EST MOD 30 MIN: CPT | Performed by: NURSE PRACTITIONER

## 2017-11-13 RX ORDER — SULFAMETHOXAZOLE AND TRIMETHOPRIM 800; 160 MG/1; MG/1
1 TABLET ORAL 2 TIMES DAILY
COMMUNITY
End: 2017-11-15

## 2017-11-13 RX ORDER — METRONIDAZOLE 500 MG/1
TABLET ORAL
COMMUNITY
Start: 2017-11-02 | End: 2017-11-15

## 2017-11-13 RX ORDER — HYDROCODONE BITARTRATE AND ACETAMINOPHEN 5; 325 MG/1; MG/1
1 TABLET ORAL EVERY 6 HOURS PRN
COMMUNITY
End: 2019-02-28

## 2017-11-13 NOTE — PROGRESS NOTES
Rhea Sutton is a 47 y.o. female.   1970    HPI:   Location: neck and lower back  Quality: stabbing and sharp  Severity: 10/10  Timing: constant  Alleviating: biofreeze and otc patches  Aggravating: standing , increased activity and weather     Pt passed out last visit prior to being seen.  EMS was activated and she was taken to ED.  Had Cath/stent the week before that.  They thought she had an dysrhythmia.  A week later she was admitted with diverticulitis.        The following portions of the patient's history were reviewed by me and updated as appropriate: allergies, current medications, past family history, past medical history, past social history, past surgical history and problem list.    Past Medical History:   Diagnosis Date   • Acquired hypothyroidism    • Acute pharyngitis    • Encounter for health maintenance examination    • Essential hypertension    • Goiter     - total thyroidectomy July 2015   • Hashimoto's thyroiditis    • Headache    • Heart disease    • Hyperglycemia     , unspecified   • Kidney failure stage III   • Low back pain    • Malaise and fatigue    • Otitis externa    • Postoperative hypothyroidism    • Tobacco dependence syndrome    • URI (upper respiratory infection)        Social History     Social History   • Marital status:      Spouse name: N/A   • Number of children: N/A   • Years of education: N/A     Occupational History   • Not on file.     Social History Main Topics   • Smoking status: Former Smoker     Packs/day: 0.00     Types: Cigarettes     Start date: 8/12/2015   • Smokeless tobacco: Never Used      Comment: quit on 10/11/17   • Alcohol use No   • Drug use: No   • Sexual activity: Defer     Other Topics Concern   • Not on file     Social History Narrative       Family History   Problem Relation Age of Onset   • Diabetes Other    • Hyperlipidemia Other    • Hypertension Other    • Cancer Mother    • Diabetes Mother    • Coronary artery disease Mother     • Kidney disease Mother    • COPD Mother    • Coronary artery disease Father    • Early death Father    • Diabetes Paternal Aunt          Current Outpatient Prescriptions:   •  sulfamethoxazole-trimethoprim (BACTRIM DS,SEPTRA DS) 800-160 MG per tablet, Take 1 tablet by mouth 2 (Two) Times a Day., Disp: , Rfl:   •  amLODIPine (NORVASC) 10 MG tablet, , Disp: , Rfl:   •  aspirin 81 MG chewable tablet, Chew 81 mg daily., Disp: , Rfl:   •  atorvastatin (LIPITOR) 10 MG tablet, Take 10 mg by mouth daily. Half a tab q day, Disp: , Rfl:   •  atorvastatin (LIPITOR) 40 MG tablet, , Disp: , Rfl:   •  Baclofen 2 % cream, Apply  topically 3 (Three) Times a Day. Baclofen, Neurontin, and Lidocaine compound cream to lower back TID for chronic back pain, Disp: , Rfl:   •  Blood Glucose Monitoring Suppl (FREESTYLE LITE) device, , Disp: , Rfl:   •  carvedilol (COREG) 25 MG tablet, 12.5 mg 2 (Two) Times a Day., Disp: , Rfl:   •  cholecalciferol (VITAMIN D3) 1000 units tablet, Take 1,000 Units by mouth Daily. 5 capsules daily, Disp: , Rfl:   •  cholecalciferol (VITAMIN D3) 84780 units capsule, Take 5 capsules by mouth 1 (One) Time Per Week. 50,000 units once weekly (Patient taking differently: Take 5,000 Units by mouth Daily. 50,000 units once weekly), Disp: 4 capsule, Rfl: 11  •  FREESTYLE LITE test strip, , Disp: , Rfl:   •  glipiZIDE (GLUCOTROL) 5 MG ER tablet, Take 5 mg by mouth Daily. daily, Disp: , Rfl:   •  isosorbide mononitrate (IMDUR) 30 MG 24 hr tablet, Take 1 tablet by mouth Daily., Disp: 30 tablet, Rfl: 0  •  lisinopril (PRINIVIL,ZESTRIL) 20 MG tablet, Take 20 mg by mouth Daily., Disp: , Rfl:   •  metroNIDAZOLE (FLAGYL) 500 MG tablet, , Disp: , Rfl:   •  ondansetron ODT (ZOFRAN-ODT) 4 MG disintegrating tablet, Take 1 tablet by mouth Every 8 (Eight) Hours As Needed for Nausea or Vomiting., Disp: 10 tablet, Rfl: 0  •  sertraline (ZOLOFT) 50 MG tablet, As Needed. daily, Disp: , Rfl:   •  thyroid (ARMOUR THYROID) 180 MG  tablet, Take 1 tablet by mouth 2 (Two) Times a Day., Disp: 60 tablet, Rfl: 6  •  ticagrelor (BRILINTA) 90 MG tablet tablet, Take 1 tablet by mouth 2 (Two) Times a Day., Disp: 60 tablet, Rfl: 0  •  traMADol (ULTRAM) 50 MG tablet, prn, Disp: , Rfl:     Allergies   Allergen Reactions   • Penicillins Swelling     Of the throat   • Adhesive Tape Other (See Comments)     Silk tape- blisters   • Coconut Flavor Swelling   • Latex Hives       Review of Systems   Musculoskeletal: Positive for back pain (lower) and neck pain.   All other systems reviewed and are negative.    All systems reviewed and negative except for above.    Physical Exam   Constitutional: She appears well-developed and well-nourished. No distress.   Abdominal:   obese   Musculoskeletal:        Lumbar back: She exhibits decreased range of motion (30 degrees of flexion with 5° of extension with bilateral facet loading) and tenderness.   Neurological: She is alert. She has normal strength.   Negative slr b   Psychiatric: She has a normal mood and affect. Her behavior is normal. Judgment normal.       Rhea was seen today for neck pain and back pain.    Diagnoses and all orders for this visit:    Chronic bilateral low back pain without sciatica    Myofascial pain        Medication: None at this time.  Compounded cream not helping.     Interventional: none at this time, injections no help for back pain, did help leg pain however.     Rehab: none at this time    Behavioral: No aberrant behavior noted. [facility] Report #[id number]  was reviewed and is consistent with stated history.  Pt has quit smoking.  Losing wt while trying.     Urine drug screen None at this time          This document has been electronically signed by Alfonso Bruce MD on November 13, 2017 10:26 AM

## 2017-11-13 NOTE — PROGRESS NOTES
Subjective    Rhea Migdalia Sutton is a 47 y.o. female. she is here today for follow-up.    History of Present Illness           47 year old female comes for follow up         #1 Hypothyroidism     Duration - 2013  ====================================  Timing - constant  ====================================  Quality - uncontrolled  ====================================  ====================================  Alleviating Factors      was on levothyroxine  ====================================  Aggravating Factor - noncompliance and now ran out off it for more than 1 month  ====================================  Severity - moderate     ================================================================     #2 Goiter---Total thyroidectomy JUly 14, 2015     Context - During physical exam , goiter was appreciated by Dr. Lockett  ====================================  Duration - May 2013  ====================================  Timing - not constant  ====================================  Quality - controlled  ====================================  Severity - moderate   ====================================  Symptoms - refers pressure sensation / dyspnea when supine, denies dysphagia.     US dated May 2012     Right Lobe 7.9 cm x 2.6 cm x 2.8 cm   Left Lobe 6.5 cm x 2.7 cm x 2.9 cm     Report states that there is a multinodular goiter        I personally reviewed US with Ms. Gallegos.  There are no nodules, Gland is heterogeneous characteristic of Hashimoto's     symptoms were progressing patient had a total thyroidectomy July 14, 2015     she is doing fine, still some hoarseness and tenderness     ==============================================================  Chronic Tobacco use  timing constant  quality states that now only smoking 3 cigarettes   severity - high associated CAD , COPD  aggravating factors -stress     HTN resolved        ---------------------    Patient has had a stent placed since last visit          The following  portions of the patient's history were reviewed and updated as appropriate:   Past Medical History:   Diagnosis Date   • Acquired hypothyroidism    • Acute pharyngitis    • Encounter for health maintenance examination    • Essential hypertension    • Goiter     - total thyroidectomy July 2015   • Hashimoto's thyroiditis    • Headache    • Heart disease    • Hyperglycemia     , unspecified   • Kidney failure stage III   • Low back pain    • Malaise and fatigue    • Otitis externa    • Postoperative hypothyroidism    • Tobacco dependence syndrome    • URI (upper respiratory infection)      Past Surgical History:   Procedure Laterality Date   • APPENDECTOMY     • CARDIAC CATHETERIZATION     • CARDIAC CATHETERIZATION N/A 10/13/2017    Procedure: Left Heart Cath, PCI if indicated;  Surgeon: Mian Montana MD;  Location: [facility] INVASIVE LOCATION;  Service:    • CHOLECYSTECTOMY     • COLON RESECTION      meckel resection   • CORONARY STENT PLACEMENT     • DILATATION AND CURETTAGE     • FL RT/LT HEART CATHETERS N/A 10/13/2017    Procedure: Percutaneous Coronary Intervention;  Surgeon: Mian Montana MD;  Location:  [facility] INVASIVE LOCATION;  Service: Cardiovascular   • TOTAL ABDOMINAL HYSTERECTOMY WITH SALPINGO OOPHORECTOMY     • TOTAL THYROIDECTOMY       Family History   Problem Relation Age of Onset   • Diabetes Other    • Hyperlipidemia Other    • Hypertension Other    • Cancer Mother    • Diabetes Mother    • Coronary artery disease Mother    • Kidney disease Mother    • COPD Mother    • Coronary artery disease Father    • Early death Father    • Diabetes Paternal Aunt      OB History     No data available        Current Outpatient Prescriptions   Medication Sig Dispense Refill   • aspirin 81 MG chewable tablet Chew 81 mg daily.     • atorvastatin (LIPITOR) 40 MG tablet      • Baclofen 2 % cream Apply  topically 3 (Three) Times a Day. Baclofen, Neurontin, and Lidocaine compound cream to  lower back TID for chronic back pain     • Blood Glucose Monitoring Suppl (FREESTYLE LITE) device      • carvedilol (COREG) 25 MG tablet 12.5 mg 2 (Two) Times a Day.     • cholecalciferol (VITAMIN D3) 1000 units tablet Take 1,000 Units by mouth Daily. 5 capsules daily     • FREESTYLE LITE test strip      • glipiZIDE (GLUCOTROL) 5 MG ER tablet Take 5 mg by mouth Daily. daily     • HYDROcodone-acetaminophen (NORCO) 5-325 MG per tablet Take 1 tablet by mouth Every 6 (Six) Hours As Needed.     • isosorbide mononitrate (IMDUR) 30 MG 24 hr tablet Take 1 tablet by mouth Daily. 30 tablet 0   • lisinopril (PRINIVIL,ZESTRIL) 20 MG tablet Take 20 mg by mouth Daily.     • metroNIDAZOLE (FLAGYL) 500 MG tablet      • sertraline (ZOLOFT) 50 MG tablet As Needed. daily     • sulfamethoxazole-trimethoprim (BACTRIM DS,SEPTRA DS) 800-160 MG per tablet Take 1 tablet by mouth 2 (Two) Times a Day.     • thyroid (ARMOUR THYROID) 180 MG tablet Take 1 tablet by mouth 2 (Two) Times a Day. 60 tablet 6   • ticagrelor (BRILINTA) 90 MG tablet tablet Take 1 tablet by mouth 2 (Two) Times a Day. 60 tablet 0     No current facility-administered medications for this visit.      Allergies   Allergen Reactions   • Penicillins Swelling     Of the throat   • Adhesive Tape Other (See Comments)     Silk tape- blisters   • Ciprofloxacin    • Coconut Flavor Swelling   • Latex Hives     Social History     Social History   • Marital status:      Spouse name: N/A   • Number of children: N/A   • Years of education: N/A     Social History Main Topics   • Smoking status: Former Smoker     Packs/day: 0.00     Types: Cigarettes     Start date: 8/12/2015   • Smokeless tobacco: Never Used      Comment: quit on 10/11/17   • Alcohol use No   • Drug use: No   • Sexual activity: Defer     Other Topics Concern   • None     Social History Narrative       Review of Systems  Review of Systems   Constitutional: Negative for activity change, appetite change, diaphoresis  "and fatigue.   HENT: Negative for congestion, dental problem, drooling, facial swelling, sneezing, sore throat, tinnitus, trouble swallowing and voice change.    Eyes: Negative for photophobia, pain, discharge, redness, itching and visual disturbance.   Respiratory: Negative for apnea, cough, choking, chest tightness and shortness of breath.    Cardiovascular: Negative for chest pain, palpitations and leg swelling.   Gastrointestinal: Negative for abdominal distention, abdominal pain, constipation, diarrhea, nausea and vomiting.   Endocrine: Negative for cold intolerance, heat intolerance, polydipsia, polyphagia and polyuria.   Genitourinary: Negative for difficulty urinating, dysuria, frequency, hematuria and urgency.   Musculoskeletal: Negative for arthralgias, back pain, gait problem, joint swelling, myalgias, neck pain and neck stiffness.   Skin: Negative for color change, pallor, rash and wound.   Allergic/Immunologic: Negative for environmental allergies, food allergies and immunocompromised state.   Neurological: Negative for dizziness, tremors, facial asymmetry, weakness, light-headedness, numbness and headaches.   Hematological: Negative for adenopathy. Does not bruise/bleed easily.   Psychiatric/Behavioral: Negative for agitation, behavioral problems, confusion and sleep disturbance.        Objective    /82 (BP Location: Left arm, Patient Position: Sitting)  Pulse 86  Ht 66\" (167.6 cm)  Wt 244 lb 8 oz (111 kg)  SpO2 98%  BMI 39.46 kg/m2  Physical Exam   Constitutional: She is oriented to person, place, and time. She appears well-developed and well-nourished. No distress.   HENT:   Head: Normocephalic and atraumatic.   Right Ear: External ear normal.   Left Ear: External ear normal.   Nose: Nose normal.   Eyes: Conjunctivae and EOM are normal. Pupils are equal, round, and reactive to light.   Neck: Normal range of motion. Neck supple. No tracheal deviation present.   Thyroid surgically absent "   Cardiovascular: Normal rate, regular rhythm and normal heart sounds.    No murmur heard.  Pulmonary/Chest: Effort normal and breath sounds normal. No respiratory distress. She has no wheezes.   Abdominal: Soft. Bowel sounds are normal. There is no tenderness. There is no rebound and no guarding.   Musculoskeletal: Normal range of motion. She exhibits no edema, tenderness or deformity.   Neurological: She is alert and oriented to person, place, and time. No cranial nerve deficit.   Skin: Skin is warm and dry. No rash noted.   Psychiatric: She has a normal mood and affect. Her behavior is normal. Judgment and thought content normal.       Lab Review  Glucose (mg/dL)   Date Value   10/25/2017 207 (H)   10/24/2017 194 (H)   10/15/2017 133 (H)     Sodium (mmol/L)   Date Value   10/25/2017 143   10/24/2017 142   10/15/2017 141     Potassium (mmol/L)   Date Value   10/25/2017 4.7   10/24/2017 4.2   10/15/2017 3.4 (L)     Chloride (mmol/L)   Date Value   10/25/2017 103   10/24/2017 102   10/15/2017 106     CO2 (mmol/L)   Date Value   10/25/2017 24.0   10/24/2017 26.0   10/15/2017 25.0     BUN (mg/dL)   Date Value   10/25/2017 15   10/24/2017 16   10/15/2017 13     Creatinine (mg/dL)   Date Value   10/25/2017 0.87   10/24/2017 1.00   10/15/2017 1.02 (H)     Hemoglobin A1C (%)   Date Value   10/14/2017 6.7 (H)   08/17/2017 6.6 (H)   02/13/2017 6.38 (H)     Triglycerides (mg/dL)   Date Value   10/14/2017 64   10/12/2017 82   02/14/2017 98       Assessment/Plan      1. Postsurgical hypothyroidism    2. Controlled type 2 diabetes mellitus without complication, without long-term current use of insulin    3. Vitamin D deficiency    4. Essential hypertension    5. Other hyperlipidemia    .    Medications prescribed:  Outpatient Encounter Prescriptions as of 11/13/2017   Medication Sig Dispense Refill   • aspirin 81 MG chewable tablet Chew 81 mg daily.     • atorvastatin (LIPITOR) 40 MG tablet      • Baclofen 2 % cream Apply   topically 3 (Three) Times a Day. Baclofen, Neurontin, and Lidocaine compound cream to lower back TID for chronic back pain     • Blood Glucose Monitoring Suppl (FREESTYLE LITE) device      • carvedilol (COREG) 25 MG tablet 12.5 mg 2 (Two) Times a Day.     • cholecalciferol (VITAMIN D3) 1000 units tablet Take 1,000 Units by mouth Daily. 5 capsules daily     • FREESTYLE LITE test strip      • glipiZIDE (GLUCOTROL) 5 MG ER tablet Take 5 mg by mouth Daily. daily     • HYDROcodone-acetaminophen (NORCO) 5-325 MG per tablet Take 1 tablet by mouth Every 6 (Six) Hours As Needed.     • isosorbide mononitrate (IMDUR) 30 MG 24 hr tablet Take 1 tablet by mouth Daily. 30 tablet 0   • lisinopril (PRINIVIL,ZESTRIL) 20 MG tablet Take 20 mg by mouth Daily.     • metroNIDAZOLE (FLAGYL) 500 MG tablet      • sertraline (ZOLOFT) 50 MG tablet As Needed. daily     • sulfamethoxazole-trimethoprim (BACTRIM DS,SEPTRA DS) 800-160 MG per tablet Take 1 tablet by mouth 2 (Two) Times a Day.     • thyroid (ARMOUR THYROID) 180 MG tablet Take 1 tablet by mouth 2 (Two) Times a Day. 60 tablet 6   • ticagrelor (BRILINTA) 90 MG tablet tablet Take 1 tablet by mouth 2 (Two) Times a Day. 60 tablet 0   • [DISCONTINUED] amLODIPine (NORVASC) 10 MG tablet      • [DISCONTINUED] atorvastatin (LIPITOR) 10 MG tablet Take 10 mg by mouth daily. Half a tab q day     • [DISCONTINUED] cholecalciferol (VITAMIN D3) 66975 units capsule Take 5 capsules by mouth 1 (One) Time Per Week. 50,000 units once weekly (Patient taking differently: Take 5,000 Units by mouth Daily. 50,000 units once weekly) 4 capsule 11   • [DISCONTINUED] ondansetron ODT (ZOFRAN-ODT) 4 MG disintegrating tablet Take 1 tablet by mouth Every 8 (Eight) Hours As Needed for Nausea or Vomiting. 10 tablet 0   • [DISCONTINUED] traMADol (ULTRAM) 50 MG tablet prn       No facility-administered encounter medications on file as of 11/13/2017.        Orders placed during this encounter include:  Orders Placed This  Encounter   Procedures   • TSH     Plan Details        Hashimoto's Thyroiditis leading to Hypothyroidism and Goiter---now postoperative hypothyroid     a. Hypothyroidism        I emphasized that if she misses one tablet one day she can take 2 tabs the next day.     Take on an empty stomach        taking 150mcgs one daily      Oct. 2015      TSH --267     states taking every day but taking with other medications      take on empty stomach , at least one hour before      need to change to brand name synthroid due to variable TSH levels      now on generic 300mcgs daily     insurance will not cover brand name     March 2016     TSH - 310     increase to 450mcgs one daily     will try for brand name again  check for celiac   states takes everyday does not miss  discussed with Dr. Pearce     June 2016     TSH - 288     states takes everyday does not miss a dose      keep brand name synthroid     discussed with Dr. Pearce      increase to 600mcgs daily      Oct. 2016     TSH - 282   FT4 - 0.07     Discussed with  and change to Todd thyroid 90 mcg one po BID      Jan. 2017     TSH - 234     Discussed with Dr. Pearce Keep armour thyroid increase to 120 mg BID      If does not work will try tirosint      total thyroidectomy     b. Goiter--removed due to enlarging gland and compressive symptoms      cytology - hashimoto's thyroiditis extensive, bilateral                     Lab Results   Component Value Date     .000 (H) 02/13/2017      States has not been taking medicaiton s              Lab Results   Component Value Date     .000 (H) 08/17/2017      Does not want to stop the armour     Explained importance of taking medication each day     Will continue armour 180 mg bid      If still no improvement she does agree to go back to the synthroid       Lab Results   Component Value Date    TSH 0.370 (L) 10/25/2017     Keep armour thyroid 180 mg BID except Sunday take one in the am and 1/2 in the  afternoon    Labs in 4 weeks     -----     May 2016     Creatinine 1.9     following with nephrology     Following with     Oct. 2017    Creatinine 0.87      ------     HTN -      On amlodipine     On lisinopril/hctz     On coreg 25 mg BID     Following with cardiology      ===========     May 2016     bg 183     check HgbA1c      June 2016     HgbA1c% 6.5     Started on glyburide 5mg daily --by primary provider     No lows              Lab Results   Component Value Date    HGBA1C 6.7 (H) 10/14/2017          ===============     Sleep study was done -- she does have obstructive sleep apnea      Now on CPAP     Preventive care:     Smoking     Discussed smoking cessation--trying to cut back         Vitamin d def        August 2017     Vitamin d 17     Taking 50,000 units weekly         4. Follow-up: Return in about 3 months (around 2/13/2018) for Recheck.

## 2017-11-15 ENCOUNTER — OFFICE VISIT (OUTPATIENT)
Dept: CARDIOLOGY | Facility: CLINIC | Age: 47
End: 2017-11-15

## 2017-11-15 VITALS
SYSTOLIC BLOOD PRESSURE: 130 MMHG | DIASTOLIC BLOOD PRESSURE: 80 MMHG | HEIGHT: 67 IN | BODY MASS INDEX: 38.61 KG/M2 | WEIGHT: 246 LBS | HEART RATE: 82 BPM

## 2017-11-15 DIAGNOSIS — I20.0 UNSTABLE ANGINA (HCC): ICD-10-CM

## 2017-11-15 DIAGNOSIS — I21.4 NSTEMI (NON-ST ELEVATED MYOCARDIAL INFARCTION) (HCC): ICD-10-CM

## 2017-11-15 DIAGNOSIS — I10 ESSENTIAL HYPERTENSION: Primary | ICD-10-CM

## 2017-11-15 DIAGNOSIS — E78.49 OTHER HYPERLIPIDEMIA: ICD-10-CM

## 2017-11-15 PROCEDURE — 99214 OFFICE O/P EST MOD 30 MIN: CPT | Performed by: INTERNAL MEDICINE

## 2017-11-15 RX ORDER — ISOSORBIDE MONONITRATE 30 MG/1
30 TABLET, EXTENDED RELEASE ORAL
Qty: 30 TABLET | Refills: 6 | Status: SHIPPED | OUTPATIENT
Start: 2017-11-15 | End: 2019-01-07 | Stop reason: HOSPADM

## 2017-11-15 RX ORDER — NITROGLYCERIN 0.4 MG/1
TABLET SUBLINGUAL
Qty: 30 TABLET | Refills: 3 | Status: SHIPPED | OUTPATIENT
Start: 2017-11-15 | End: 2020-12-23 | Stop reason: SDUPTHER

## 2017-11-15 RX ORDER — NITROGLYCERIN 0.4 MG/1
TABLET SUBLINGUAL
Qty: 30 TABLET | Refills: 11 | Status: SHIPPED | OUTPATIENT
Start: 2017-11-15 | End: 2017-11-15 | Stop reason: SDUPTHER

## 2017-11-15 NOTE — PROGRESS NOTES
Rhea Sutton  47 y.o. female    11/15/2017  1. Essential hypertension    2. Other hyperlipidemia    3. NSTEMI (non-ST elevated myocardial infarction)    4. Unstable angina        History of Present Illness  Ms. Sutton is a 47-year-old  lady who was admitted to [facility] with chest pain and ruled in for a non-ST segment elevation myocardial infarction.  She underwent cardiac catheterization which showed the following findings:    #1 Critical lesion noted in the mid circumflex coronary artery (90% followed by 80%) and successful PCI with PTCA and placement of 3.0 x 38 mm xience Alpine stent and reduction in stenosis to 0%  #2 Previously placed stent in the right coronary artery was patent but multiple mild to moderate lesions noted in the ostial, proximal and mid Right Coronary Artery and 80% stenosis noted in a small caliber PDA less than 2 mm in diameter.  #3 LAD was a small to medium caliber vessel with mild to moderate disease which was diffuse.  #4.  Overall normal LV systolic function with an EF of 55% with mild inferior wall hypokinesis    The patient has a reasonably well from a cardiac standpoint but did have an episode of dizziness which she was seen in the emergency room at [facility] and was seen for diverticulitis at [facility].  These issues seem to have resolved.  Her blood pressure was in the normal range today.  She has not had any recurrence of chest pain.  She has quit smoking and it is a great achievement for.  Clinical exam today did not reveal any signs of angina or congestive heart failure.  She has been compliant with her medications.            SUBJECTIVE    Allergies   Allergen Reactions   • Penicillins Swelling     Of the throat   • Adhesive Tape Other (See Comments)     Silk tape- blisters   • Ciprofloxacin    • Coconut Flavor Swelling   • Latex Hives         Past Medical History:   Diagnosis Date   • Acquired hypothyroidism    • Acute pharyngitis     • Encounter for health maintenance examination    • Essential hypertension    • Goiter     - total thyroidectomy July 2015   • Hashimoto's thyroiditis    • Headache    • Heart disease    • Hyperglycemia     , unspecified   • Kidney failure stage III   • Low back pain    • Malaise and fatigue    • Otitis externa    • Postoperative hypothyroidism    • Tobacco dependence syndrome    • URI (upper respiratory infection)          Past Surgical History:   Procedure Laterality Date   • APPENDECTOMY     • CARDIAC CATHETERIZATION     • CARDIAC CATHETERIZATION N/A 10/13/2017    Procedure: Left Heart Cath, PCI if indicated;  Surgeon: Mian Montana MD;  Location: [facility] INVASIVE LOCATION;  Service:    • CHOLECYSTECTOMY     • COLON RESECTION      meckel resection   • CORONARY STENT PLACEMENT     • DILATATION AND CURETTAGE     • MI RT/LT HEART CATHETERS N/A 10/13/2017    Procedure: Percutaneous Coronary Intervention;  Surgeon: Mian Montana MD;  Location: [facility] INVASIVE LOCATION;  Service: Cardiovascular   • TOTAL ABDOMINAL HYSTERECTOMY WITH SALPINGO OOPHORECTOMY     • TOTAL THYROIDECTOMY           Family History   Problem Relation Age of Onset   • Diabetes Other    • Hyperlipidemia Other    • Hypertension Other    • Cancer Mother    • Diabetes Mother    • Coronary artery disease Mother    • Kidney disease Mother    • COPD Mother    • Coronary artery disease Father    • Early death Father    • Diabetes Paternal Aunt          Social History     Social History   • Marital status:      Spouse name: N/A   • Number of children: N/A   • Years of education: N/A     Occupational History   • Not on file.     Social History Main Topics   • Smoking status: Former Smoker     Packs/day: 0.00     Types: Cigarettes     Start date: 8/12/2015   • Smokeless tobacco: Never Used      Comment: quit on 10/11/17   • Alcohol use No   • Drug use: No   • Sexual activity: Defer     Other Topics Concern   • Not  "on file     Social History Narrative         Current Outpatient Prescriptions   Medication Sig Dispense Refill   • aspirin 81 MG chewable tablet Chew 81 mg daily.     • atorvastatin (LIPITOR) 40 MG tablet      • Baclofen 2 % cream Apply  topically 3 (Three) Times a Day. Baclofen, Neurontin, and Lidocaine compound cream to lower back TID for chronic back pain     • Blood Glucose Monitoring Suppl (FREESTYLE LITE) device      • carvedilol (COREG) 25 MG tablet 12.5 mg 2 (Two) Times a Day.     • cholecalciferol (VITAMIN D3) 1000 units tablet Take 1,000 Units by mouth Daily. 5 capsules daily     • FREESTYLE LITE test strip      • glipiZIDE (GLUCOTROL) 5 MG ER tablet Take 5 mg by mouth Daily. daily     • HYDROcodone-acetaminophen (NORCO) 5-325 MG per tablet Take 1 tablet by mouth Every 6 (Six) Hours As Needed.     • isosorbide mononitrate (IMDUR) 30 MG 24 hr tablet Take 1 tablet by mouth Daily. 30 tablet 6   • lisinopril (PRINIVIL,ZESTRIL) 20 MG tablet Take 20 mg by mouth Daily.     • nitroglycerin (NITROSTAT) 0.4 MG SL tablet 1 under the tongue as needed for angina, may repeat q5mins for up three doses 30 tablet 3   • sertraline (ZOLOFT) 50 MG tablet As Needed. daily     • thyroid (ARMOUR THYROID) 180 MG tablet Take 1 tablet by mouth 2 (Two) Times a Day. 60 tablet 6   • ticagrelor (BRILINTA) 90 MG tablet tablet Take 1 tablet by mouth 2 (Two) Times a Day. 60 tablet 6     No current facility-administered medications for this visit.          OBJECTIVE    /80  Pulse 82  Ht 67\" (170.2 cm)  Wt 246 lb (112 kg)  BMI 38.53 kg/m2        Review of Systems     Constitutional:  Denies recent weight loss, weight gain, fever or chills, no change in exercise tolerance     HENT:  Denies any hearing loss, epistaxis, hoarseness, or difficulty speaking.     Eyes: Wears eyeglasses or contact lenses     Respiratory:  Denies dyspnea with exertion,no cough, wheezing, or hemoptysis.     Cardiovascular: Negative for palpations, chest " pain, orthopnea    Gastrointestinal: Recent history of diverticulitis.  Had an allergic reaction to Levaquin.    Endocrine: Negative for cold intolerance, heat intolerance, polydipsia, polyphagia and polyuria.     Genitourinary: Negative.      Musculoskeletal: Denies any history of arthritic symptoms or back problems     Skin:  Denies any change in hair or nails, rashes, or skin lesions.     Allergic/Immunologic: Negative.  Negative for environmental allergies, food allergies and immunocompromised state.     Neurological:  Denies any history of recurrent headaches, strokes, TIA, or seizure disorder.     Hematological: Denies any food allergies, seasonal allergies, bleeding disorders, or lymphadenopathy.     Psychiatric/Behavioral: Denies any history of depression, substance abuse, or change in cognitive function.         Physical Exam     Constitutional: Cooperative, alert and oriented, well-developed,  in no acute distress.     HENT:   Head: Normocephalic, normal hair patterns, no masses or tenderness.  Ears, Nose, and Throat: No gross abnormalities. No pallor or cyanosis.   Eyes: EOMS intact, PERRL, conjunctivae and lids unremarkable. Fundoscopic exam and visual fields not performed.   Neck: No palpable masses or adenopathy, no thyromegaly, no JVD, carotid pulses are full and equal bilaterally and without  Bruits.     Cardiovascular: Regular rhythm, S1 and S2 normal, no S3 or S4.  No murmurs, gallops, or rubs detected.     Pulmonary/Chest: Chest: normal symmetry, no tenderness to palpation, normal respiratory excursion, no use of accessory muscles.            Pulmonary: Normal breath sounds. No rales or ronchi.    Abdominal: Abdomen soft, bowel sounds normoactive, no masses, no hepatosplenomegaly, non-tender, no bruits.     Musculoskeletal: No deformities, clubbing, cyanosis, erythema, or edema observed. Normal muscle strength and tone. Pulses full and equal in all extremities, no bruits auscultated.      Neurological: No gross motor or sensory deficits noted, affect appropriate, oriented to time, person, place.     Skin: Warm and dry to the touch, no apparent skin lesions or masses noted.     Psychiatric: She has a normal mood and affect. Her behavior is normal. Judgment and thought content normal.         Procedures      Lab Results   Component Value Date    WBC 10.15 (H) 10/25/2017    HGB 11.4 (L) 10/25/2017    HCT 35.9 10/25/2017    MCV 92.1 10/25/2017     10/25/2017     Lab Results   Component Value Date    GLUCOSE 207 (H) 10/25/2017    BUN 15 10/25/2017    CREATININE 0.87 10/25/2017    EGFRIFNONA 70 10/25/2017    BCR 17.2 10/25/2017    CO2 24.0 10/25/2017    CALCIUM 9.6 10/25/2017    ALBUMIN 3.80 10/25/2017    LABIL2 1.2 10/25/2017    AST 52 (H) 10/25/2017    ALT 84 (H) 10/25/2017     Lab Results   Component Value Date    CHOL 141 10/14/2017    CHOL 208 (H) 10/12/2017    CHOL 295 (H) 02/14/2017     Lab Results   Component Value Date    TRIG 64 10/14/2017    TRIG 82 10/12/2017    TRIG 98 02/14/2017     Lab Results   Component Value Date    HDL 38 (L) 10/14/2017    HDL 53 (L) 10/12/2017    HDL 68 02/14/2017     No results found for: LDLCALC  No results found for: LDL  No results found for: HDLLDLRATIO  No components found for: CHOLHDL  Lab Results   Component Value Date    HGBA1C 6.7 (H) 10/14/2017     Lab Results   Component Value Date    TSH 0.370 (L) 10/25/2017    [id number] 24.1 (L) 06/08/2017           ASSESSMENT AND PLAN    Ms. Sutton is stable with regards to her heart with no definite evidence of angina, arrhythmia or congestive heart failure.  Her present antiplatelet therapy with aspirin and Alimta, lipid-lowering therapy with atorvastatin, antianginal therapy with isosorbide mononitrate, antihypertensive therapy with lisinopril and Coreg have been continued.    Diagnoses and all orders for this visit:    Essential hypertension    Other hyperlipidemia    NSTEMI (non-ST elevated myocardial  infarction)    Unstable angina        Mian Montana MD  11/15/2017  11:18 AM

## 2018-03-05 ENCOUNTER — TRANSCRIBE ORDERS (OUTPATIENT)
Dept: LAB | Facility: CLINIC | Age: 48
End: 2018-03-05

## 2018-03-05 ENCOUNTER — LAB (OUTPATIENT)
Dept: LAB | Facility: CLINIC | Age: 48
End: 2018-03-05

## 2018-03-05 DIAGNOSIS — E11.9 DIABETES MELLITUS WITHOUT COMPLICATION (HCC): Primary | ICD-10-CM

## 2018-03-05 DIAGNOSIS — E11.9 DIABETES MELLITUS WITHOUT COMPLICATION (HCC): ICD-10-CM

## 2018-03-05 PROCEDURE — 80061 LIPID PANEL: CPT | Performed by: FAMILY MEDICINE

## 2018-03-05 PROCEDURE — 83036 HEMOGLOBIN GLYCOSYLATED A1C: CPT | Performed by: FAMILY MEDICINE

## 2018-03-05 PROCEDURE — 80050 GENERAL HEALTH PANEL: CPT | Performed by: FAMILY MEDICINE

## 2018-03-05 PROCEDURE — 36415 COLL VENOUS BLD VENIPUNCTURE: CPT | Performed by: FAMILY MEDICINE

## 2018-03-06 LAB
ALBUMIN SERPL-MCNC: 4.4 G/DL (ref 3.4–4.8)
ALBUMIN/GLOB SERPL: 1.2 G/DL (ref 1.1–1.8)
ALP SERPL-CCNC: 91 U/L (ref 38–126)
ALT SERPL W P-5'-P-CCNC: 65 U/L (ref 9–52)
ANION GAP SERPL CALCULATED.3IONS-SCNC: 15 MMOL/L (ref 5–15)
AST SERPL-CCNC: 76 U/L (ref 14–36)
BASOPHILS # BLD AUTO: 0.07 10*3/MM3 (ref 0–0.2)
BASOPHILS NFR BLD AUTO: 0.7 % (ref 0–2)
BILIRUB SERPL-MCNC: 0.2 MG/DL (ref 0.2–1.3)
BUN BLD-MCNC: 11 MG/DL (ref 7–21)
BUN/CREAT SERPL: 7.1 (ref 7–25)
CALCIUM SPEC-SCNC: 9.5 MG/DL (ref 8.4–10.2)
CHLORIDE SERPL-SCNC: 96 MMOL/L (ref 95–110)
CHOLEST SERPL-MCNC: 382 MG/DL (ref 0–199)
CO2 SERPL-SCNC: 31 MMOL/L (ref 22–31)
CREAT BLD-MCNC: 1.54 MG/DL (ref 0.5–1)
DEPRECATED RDW RBC AUTO: 57.1 FL (ref 36.4–46.3)
EOSINOPHIL # BLD AUTO: 0.41 10*3/MM3 (ref 0–0.7)
EOSINOPHIL NFR BLD AUTO: 3.8 % (ref 0–7)
ERYTHROCYTE [DISTWIDTH] IN BLOOD BY AUTOMATED COUNT: 17.3 % (ref 11.5–14.5)
GFR SERPL CREATININE-BSD FRML MDRD: 36 ML/MIN/1.73 (ref 58–135)
GLOBULIN UR ELPH-MCNC: 3.8 GM/DL (ref 2.3–3.5)
GLUCOSE BLD-MCNC: 135 MG/DL (ref 60–100)
HBA1C MFR BLD: 8.1 % (ref 4–5.6)
HCT VFR BLD AUTO: 42.4 % (ref 35–45)
HDLC SERPL-MCNC: 81 MG/DL (ref 60–200)
HGB BLD-MCNC: 13.5 G/DL (ref 12–15.5)
IMM GRANULOCYTES # BLD: 0.1 10*3/MM3 (ref 0–0.02)
IMM GRANULOCYTES NFR BLD: 0.9 % (ref 0–0.5)
LDLC SERPL CALC-MCNC: 270 MG/DL (ref 0–129)
LDLC/HDLC SERPL: 3.34 {RATIO} (ref 0–3.22)
LYMPHOCYTES # BLD AUTO: 3.19 10*3/MM3 (ref 0.6–4.2)
LYMPHOCYTES NFR BLD AUTO: 29.9 % (ref 10–50)
MCH RBC QN AUTO: 28.4 PG (ref 26.5–34)
MCHC RBC AUTO-ENTMCNC: 31.8 G/DL (ref 31.4–36)
MCV RBC AUTO: 89.3 FL (ref 80–98)
MONOCYTES # BLD AUTO: 0.53 10*3/MM3 (ref 0–0.9)
MONOCYTES NFR BLD AUTO: 5 % (ref 0–12)
NEUTROPHILS # BLD AUTO: 6.38 10*3/MM3 (ref 2–8.6)
NEUTROPHILS NFR BLD AUTO: 59.7 % (ref 37–80)
PLATELET # BLD AUTO: 263 10*3/MM3 (ref 150–450)
PMV BLD AUTO: 10.9 FL (ref 8–12)
POTASSIUM BLD-SCNC: 4.1 MMOL/L (ref 3.5–5.1)
PROT SERPL-MCNC: 8.2 G/DL (ref 6.3–8.6)
RBC # BLD AUTO: 4.75 10*6/MM3 (ref 3.77–5.16)
SODIUM BLD-SCNC: 142 MMOL/L (ref 137–145)
TRIGL SERPL-MCNC: 154 MG/DL (ref 20–199)
TSH SERPL DL<=0.05 MIU/L-ACNC: >100 MIU/ML (ref 0.46–4.68)
VLDLC SERPL-MCNC: 30.8 MG/DL
WBC NRBC COR # BLD: 10.68 10*3/MM3 (ref 3.2–9.8)

## 2018-03-13 ENCOUNTER — OFFICE VISIT (OUTPATIENT)
Dept: ENDOCRINOLOGY | Facility: CLINIC | Age: 48
End: 2018-03-13

## 2018-03-13 VITALS
BODY MASS INDEX: 44.2 KG/M2 | DIASTOLIC BLOOD PRESSURE: 90 MMHG | HEIGHT: 66 IN | SYSTOLIC BLOOD PRESSURE: 138 MMHG | HEART RATE: 72 BPM | WEIGHT: 275 LBS

## 2018-03-13 DIAGNOSIS — E11.8 UNCONTROLLED TYPE 2 DIABETES MELLITUS WITH COMPLICATION, UNSPECIFIED LONG TERM INSULIN USE STATUS: ICD-10-CM

## 2018-03-13 DIAGNOSIS — E55.9 VITAMIN D DEFICIENCY: ICD-10-CM

## 2018-03-13 DIAGNOSIS — E78.49 OTHER HYPERLIPIDEMIA: ICD-10-CM

## 2018-03-13 DIAGNOSIS — E89.0 POSTSURGICAL HYPOTHYROIDISM: Primary | ICD-10-CM

## 2018-03-13 DIAGNOSIS — I10 ESSENTIAL HYPERTENSION: ICD-10-CM

## 2018-03-13 DIAGNOSIS — E11.65 UNCONTROLLED TYPE 2 DIABETES MELLITUS WITH COMPLICATION, UNSPECIFIED LONG TERM INSULIN USE STATUS: ICD-10-CM

## 2018-03-13 PROCEDURE — 99214 OFFICE O/P EST MOD 30 MIN: CPT | Performed by: NURSE PRACTITIONER

## 2018-03-13 NOTE — PROGRESS NOTES
Subjective    Rheaabbi Sutton is a 47 y.o. female. she is here today for follow-up.    History of Present Illness       47 year old female comes for follow up     Patient has been out of all medications for 2 months         #1 Hypothyroidism     Duration - 2013  ====================================  Timing - constant  ====================================  Quality - uncontrolled  ====================================  ====================================  Alleviating Factors      was on levothyroxine  ====================================  Aggravating Factor - noncompliance and now ran out off it for more than 1 month  ====================================  Severity - moderate     ================================================================     #2 Goiter---Total thyroidectomy JUly 14, 2015     Context - During physical exam , goiter was appreciated by Dr. Lockett  ====================================  Duration - May 2013  ====================================  Timing - not constant  ====================================  Quality - controlled  ====================================  Severity - moderate   ====================================  Symptoms - refers pressure sensation / dyspnea when supine, denies dysphagia.     US dated May 2012     Right Lobe 7.9 cm x 2.6 cm x 2.8 cm   Left Lobe 6.5 cm x 2.7 cm x 2.9 cm     Report states that there is a multinodular goiter        I personally reviewed US with Ms. Gallegos.  There are no nodules, Gland is heterogeneous characteristic of Hashimoto's     symptoms were progressing patient had a total thyroidectomy July 14, 2015     she is doing fine, still some hoarseness and tenderness     ==============================================================  Chronic Tobacco use  timing constant  quality states that now only smoking 3 cigarettes   severity - high associated CAD , COPD  aggravating factors -stress     HTN resolved           ---------------------     Patient has had  a stent placed since last visit            The following portions of the patient's history were reviewed and updated as appropriate:   Past Medical History:   Diagnosis Date   • Acquired hypothyroidism    • Acute pharyngitis    • Encounter for health maintenance examination    • Essential hypertension    • Goiter     - total thyroidectomy July 2015   • Hashimoto's thyroiditis    • Headache    • Heart disease    • Hyperglycemia     , unspecified   • Kidney failure stage III   • Low back pain    • Malaise and fatigue    • Otitis externa    • Postoperative hypothyroidism    • Tobacco dependence syndrome    • URI (upper respiratory infection)      Past Surgical History:   Procedure Laterality Date   • APPENDECTOMY     • CARDIAC CATHETERIZATION     • CARDIAC CATHETERIZATION N/A 10/13/2017    Procedure: Left Heart Cath, PCI if indicated;  Surgeon: Mian Montana MD;  Location: [facility] INVASIVE LOCATION;  Service:    • CHOLECYSTECTOMY     • COLON RESECTION      meckel resection   • CORONARY STENT PLACEMENT     • DILATATION AND CURETTAGE     • WA RT/LT HEART CATHETERS N/A 10/13/2017    Procedure: Percutaneous Coronary Intervention;  Surgeon: Mian Montana MD;  Location: [facility] INVASIVE LOCATION;  Service: Cardiovascular   • TOTAL ABDOMINAL HYSTERECTOMY WITH SALPINGO OOPHORECTOMY     • TOTAL THYROIDECTOMY       Family History   Problem Relation Age of Onset   • Diabetes Other    • Hyperlipidemia Other    • Hypertension Other    • Cancer Mother    • Diabetes Mother    • Coronary artery disease Mother    • Kidney disease Mother    • COPD Mother    • Coronary artery disease Father    • Early death Father    • Diabetes Paternal Aunt      OB History     No data available        Current Outpatient Prescriptions   Medication Sig Dispense Refill   • aspirin 81 MG chewable tablet Chew 81 mg daily.     • atorvastatin (LIPITOR) 40 MG tablet      • Baclofen 2 % cream Apply  topically 3 (Three) Times a  Day. Baclofen, Neurontin, and Lidocaine compound cream to lower back TID for chronic back pain     • Blood Glucose Monitoring Suppl (FREESTYLE LITE) device      • carvedilol (COREG) 25 MG tablet 12.5 mg 2 (Two) Times a Day.     • cholecalciferol (VITAMIN D3) 1000 units tablet Take 1,000 Units by mouth Daily. 5 capsules daily     • FREESTYLE LITE test strip      • glipiZIDE (GLUCOTROL) 5 MG ER tablet Take 5 mg by mouth Daily. daily     • HYDROcodone-acetaminophen (NORCO) 5-325 MG per tablet Take 1 tablet by mouth Every 6 (Six) Hours As Needed.     • isosorbide mononitrate (IMDUR) 30 MG 24 hr tablet Take 1 tablet by mouth Daily. 30 tablet 6   • lisinopril (PRINIVIL,ZESTRIL) 20 MG tablet Take 20 mg by mouth Daily.     • nitroglycerin (NITROSTAT) 0.4 MG SL tablet 1 under the tongue as needed for angina, may repeat q5mins for up three doses 30 tablet 3   • sertraline (ZOLOFT) 50 MG tablet As Needed. daily     • thyroid (ARMOUR THYROID) 180 MG tablet Take 1 tablet by mouth 2 (Two) Times a Day. 60 tablet 6   • ticagrelor (BRILINTA) 90 MG tablet tablet Take 1 tablet by mouth 2 (Two) Times a Day. 60 tablet 6     No current facility-administered medications for this visit.      Allergies   Allergen Reactions   • Penicillins Swelling     Of the throat   • Adhesive Tape Other (See Comments)     Silk tape- blisters   • Ciprofloxacin    • Coconut Flavor Swelling   • Latex Hives     Social History     Social History   • Marital status:      Social History Main Topics   • Smoking status: Former Smoker     Packs/day: 0.00     Types: Cigarettes     Start date: 8/12/2015   • Smokeless tobacco: Never Used      Comment: quit on 10/11/17   • Alcohol use No   • Drug use: No   • Sexual activity: Defer     Other Topics Concern   • Not on file       Review of Systems  Review of Systems   Constitutional: Negative for activity change, appetite change, diaphoresis and fatigue.   HENT: Negative for facial swelling, sneezing, sore throat,  "tinnitus, trouble swallowing and voice change.    Eyes: Negative for photophobia, pain, discharge, redness, itching and visual disturbance.   Respiratory: Negative for apnea, cough, choking, chest tightness and shortness of breath.    Cardiovascular: Negative for chest pain, palpitations and leg swelling.   Gastrointestinal: Negative for abdominal distention, abdominal pain, constipation, diarrhea, nausea and vomiting.   Endocrine: Negative for cold intolerance, heat intolerance, polydipsia, polyphagia and polyuria.   Genitourinary: Negative for difficulty urinating, dysuria, frequency, hematuria and urgency.   Musculoskeletal: Negative for arthralgias, back pain, gait problem, joint swelling, myalgias, neck pain and neck stiffness.   Skin: Negative for color change, pallor, rash and wound.   Neurological: Negative for dizziness, tremors, weakness, light-headedness, numbness and headaches.   Hematological: Negative for adenopathy. Does not bruise/bleed easily.   Psychiatric/Behavioral: Negative for behavioral problems, confusion and sleep disturbance.        Objective    /90 (BP Location: Right arm, Patient Position: Sitting, Cuff Size: Adult)   Pulse 72   Ht 167.6 cm (66\")   Wt 125 kg (275 lb)   BMI 44.39 kg/m²   Physical Exam   Constitutional: She is oriented to person, place, and time. She appears well-developed and well-nourished. No distress.   HENT:   Head: Normocephalic and atraumatic.   Right Ear: External ear normal.   Left Ear: External ear normal.   Nose: Nose normal.   Eyes: Conjunctivae and EOM are normal. Pupils are equal, round, and reactive to light.   Neck: Normal range of motion. Neck supple. No tracheal deviation present.   Thyroid surgically absent   Cardiovascular: Normal rate, regular rhythm and normal heart sounds.    No murmur heard.  Pulmonary/Chest: Effort normal and breath sounds normal. No respiratory distress. She has no wheezes.   Abdominal: Soft. Bowel sounds are normal. " There is no tenderness. There is no rebound and no guarding.   Musculoskeletal: Normal range of motion. She exhibits no edema, tenderness or deformity.   Neurological: She is alert and oriented to person, place, and time. No cranial nerve deficit.   Skin: Skin is warm and dry. No rash noted.   Psychiatric: She has a normal mood and affect. Her behavior is normal. Judgment and thought content normal.       Lab Review  Glucose (mg/dL)   Date Value   03/05/2018 135 (H)   10/25/2017 207 (H)   10/24/2017 194 (H)     Sodium (mmol/L)   Date Value   03/05/2018 142   10/25/2017 143   10/24/2017 142     Potassium (mmol/L)   Date Value   03/05/2018 4.1   10/25/2017 4.7   10/24/2017 4.2     Chloride (mmol/L)   Date Value   03/05/2018 96   10/25/2017 103   10/24/2017 102     CO2 (mmol/L)   Date Value   03/05/2018 31.0   10/25/2017 24.0   10/24/2017 26.0     BUN (mg/dL)   Date Value   03/05/2018 11   10/25/2017 15   10/24/2017 16     Creatinine (mg/dL)   Date Value   03/05/2018 1.54 (H)   10/25/2017 0.87   10/24/2017 1.00     Hemoglobin A1C (%)   Date Value   03/05/2018 8.1 (H)   10/14/2017 6.7 (H)   08/17/2017 6.6 (H)     Triglycerides (mg/dL)   Date Value   03/05/2018 154   10/14/2017 64   10/12/2017 82     LDL Cholesterol  (mg/dL)   Date Value   03/05/2018 270 (H)   10/14/2017 79   10/12/2017 127   02/14/2017 191 (H)       Assessment/Plan      1. Postsurgical hypothyroidism    2. Uncontrolled type 2 diabetes mellitus with complication, unspecified long term insulin use status    3. Vitamin D deficiency    4. Essential hypertension    5. Other hyperlipidemia    .    Medications prescribed:  Outpatient Encounter Prescriptions as of 3/13/2018   Medication Sig Dispense Refill   • aspirin 81 MG chewable tablet Chew 81 mg daily.     • atorvastatin (LIPITOR) 40 MG tablet      • Baclofen 2 % cream Apply  topically 3 (Three) Times a Day. Baclofen, Neurontin, and Lidocaine compound cream to lower back TID for chronic back pain     •  Blood Glucose Monitoring Suppl (FREESTYLE LITE) device      • carvedilol (COREG) 25 MG tablet 12.5 mg 2 (Two) Times a Day.     • cholecalciferol (VITAMIN D3) 1000 units tablet Take 1,000 Units by mouth Daily. 5 capsules daily     • FREESTYLE LITE test strip      • glipiZIDE (GLUCOTROL) 5 MG ER tablet Take 5 mg by mouth Daily. daily     • HYDROcodone-acetaminophen (NORCO) 5-325 MG per tablet Take 1 tablet by mouth Every 6 (Six) Hours As Needed.     • isosorbide mononitrate (IMDUR) 30 MG 24 hr tablet Take 1 tablet by mouth Daily. 30 tablet 6   • lisinopril (PRINIVIL,ZESTRIL) 20 MG tablet Take 20 mg by mouth Daily.     • nitroglycerin (NITROSTAT) 0.4 MG SL tablet 1 under the tongue as needed for angina, may repeat q5mins for up three doses 30 tablet 3   • sertraline (ZOLOFT) 50 MG tablet As Needed. daily     • thyroid (ARMOUR THYROID) 180 MG tablet Take 1 tablet by mouth 2 (Two) Times a Day. 60 tablet 6   • ticagrelor (BRILINTA) 90 MG tablet tablet Take 1 tablet by mouth 2 (Two) Times a Day. 60 tablet 6     No facility-administered encounter medications on file as of 3/13/2018.        Orders placed during this encounter include:  Orders Placed This Encounter   Procedures   • TSH     Plan Details       Out of all medications      Hashimoto's Thyroiditis leading to Hypothyroidism and Goiter---now postoperative hypothyroid     a. Hypothyroidism        I emphasized that if she misses one tablet one day she can take 2 tabs the next day.             Oct. 2016     TSH - 282   FT4 - 0.07     Discussed with  and change to [place] thyroid 90 mcg one po BID      Jan. 2017     TSH - 234     Discussed with Dr. Pearce Keep armour thyroid increase to 120 mg BID      If does not work will try tirosint      total thyroidectomy     b. Goiter--removed due to enlarging gland and compressive symptoms      cytology - hashimoto's thyroiditis extensive, bilateral                     Lab Results   Component Value Date     .000 (H)  02/13/2017      States has not been taking medicaiton s                  Lab Results   Component Value Date     .000 (H) 08/17/2017      Does not want to stop the armour     Explained importance of taking medication each day     Will continue armour 180 mg bid      If still no improvement she does agree to go back to the synthroid               Lab Results   Component Value Date     TSH 0.370 (L) 10/25/2017      Keep armour thyroid 180 mg BID except Sunday take one in the am and 1/2 in the afternoon     Lab Results   Component Value Date    TSH >100.000 (H) 03/05/2018     Out of medications     Repeat in 2 months   -----     May 2016     Creatinine 1.9     following with nephrology     Following with      Oct. 2017     Creatinine 0.87      ------     HTN -      On amlodipine     On lisinopril/hctz     On coreg 25 mg BID     Following with cardiology      ===========     May 2016     bg 183     check HgbA1c      June 2016     HgbA1c% 6.5     Started on glyburide 5mg daily --by primary provider     No lows                     Lab Results   Component Value Date     HGBA1C 6.7 (H) 10/14/2017         Lab Results   Component Value Date    HGBA1C 8.1 (H) 03/05/2018     Out of medication     Restart   ===============     Sleep study was done -- she does have obstructive sleep apnea      Now on CPAP     Preventive care:     Smoking     Discussed smoking cessation--trying to cut back         Vitamin d def        August 2017     Vitamin d 17     Taking 50,000 units weekly         Restart all medications -- labs in 6 to 8 weeks and I will call     4. Follow-up: Return in about 4 months (around 7/13/2018).

## 2018-03-27 RX ORDER — THYROID 180 MG
TABLET ORAL
Qty: 60 TABLET | Refills: 6 | Status: SHIPPED | OUTPATIENT
Start: 2018-03-27 | End: 2019-01-07 | Stop reason: HOSPADM

## 2018-06-06 ENCOUNTER — LAB (OUTPATIENT)
Dept: LAB | Facility: HOSPITAL | Age: 48
End: 2018-06-06

## 2018-06-06 ENCOUNTER — DOCUMENTATION (OUTPATIENT)
Dept: CARDIOLOGY | Facility: CLINIC | Age: 48
End: 2018-06-06

## 2018-06-06 ENCOUNTER — OFFICE VISIT (OUTPATIENT)
Dept: CARDIOLOGY | Facility: CLINIC | Age: 48
End: 2018-06-06

## 2018-06-06 VITALS
HEART RATE: 67 BPM | WEIGHT: 278.1 LBS | BODY MASS INDEX: 44.69 KG/M2 | SYSTOLIC BLOOD PRESSURE: 148 MMHG | OXYGEN SATURATION: 99 % | DIASTOLIC BLOOD PRESSURE: 100 MMHG | HEIGHT: 66 IN

## 2018-06-06 DIAGNOSIS — E78.49 OTHER HYPERLIPIDEMIA: ICD-10-CM

## 2018-06-06 DIAGNOSIS — I10 ESSENTIAL HYPERTENSION: Primary | ICD-10-CM

## 2018-06-06 DIAGNOSIS — I21.4 NSTEMI (NON-ST ELEVATED MYOCARDIAL INFARCTION) (HCC): ICD-10-CM

## 2018-06-06 LAB
ARTICHOKE IGE QN: 184 MG/DL (ref 1–129)
CHOLEST SERPL-MCNC: 278 MG/DL (ref 0–199)
HDLC SERPL-MCNC: 67 MG/DL (ref 60–200)
LDLC/HDLC SERPL: 2.7 {RATIO} (ref 0–3.22)
TRIGL SERPL-MCNC: 149 MG/DL (ref 20–199)

## 2018-06-06 PROCEDURE — 99214 OFFICE O/P EST MOD 30 MIN: CPT | Performed by: INTERNAL MEDICINE

## 2018-06-06 PROCEDURE — 80061 LIPID PANEL: CPT

## 2018-06-06 PROCEDURE — 36415 COLL VENOUS BLD VENIPUNCTURE: CPT

## 2018-06-06 NOTE — PROGRESS NOTES
Pt given repatha shot per MD order.  All instructions reviewed with pt and instruction sheet given.  Alcohol swabs given.  Pt appropriately cleaned site and injected medication into abdomen.  Date placed on box and explained pt to give self shot every two weeks.

## 2018-06-06 NOTE — PROGRESS NOTES
Rhea Sutton  47 y.o. female    06/06/2018  1. Essential hypertension    2. Other hyperlipidemia    3. NSTEMI (non-ST elevated myocardial infarction)        History of Present Illness    Ms. Sutton is a 47-year-old  lady who was admitted to [facility] with [facility] pain and ruled in for a non-ST segment elevation myocardial infarction In October 2017.  She underwent cardiac catheterization which showed the following findings:  #1 Critical lesion noted in the mid circumflex coronary artery (90% followed by 80%) and successful PCI with PTCA and placement of 3.0 x 38 mm xience Alpine stent and reduction in stenosis to 0%  #2 Previously placed stent in the right coronary artery was patent but multiple mild to moderate lesions noted in the ostial, proximal and mid Right Coronary Artery and 80% stenosis noted in a small caliber PDA less than 2 mm in diameter.  #3 LAD was a small to medium caliber vessel with mild to moderate disease which was diffuse.  #4.  Overall normal LV systolic function with an EF of 55% with mild inferior wall hypokinesis     The patient denied any chest pain or shortness of breath and has been compliant with her medications.  Her blood pressure was noted to be elevated initially but when I rechecked it was 130/85 mmHg.  She had lab work in March 2018 which showed a total cholesterol of 382 and LDL of 270.  Clinical exam did not reveal any signs of congestive heart failure.    SUBJECTIVE    Allergies   Allergen Reactions   • Penicillins Swelling     Of the throat   • Adhesive Tape Other (See Comments)     Silk tape- blisters   • Ciprofloxacin    • Coconut Flavor Swelling   • Latex Hives         Past Medical History:   Diagnosis Date   • Acquired hypothyroidism    • Acute pharyngitis    • Encounter for health maintenance examination    • Essential hypertension    • Goiter     - total thyroidectomy July 2015   • Hashimoto's thyroiditis    • Headache    • Heart disease    •  Hyperglycemia     , unspecified   • Kidney failure stage III   • Low back pain    • Malaise and fatigue    • Otitis externa    • Postoperative hypothyroidism    • Tobacco dependence syndrome    • URI (upper respiratory infection)          Past Surgical History:   Procedure Laterality Date   • APPENDECTOMY     • CARDIAC CATHETERIZATION     • CARDIAC CATHETERIZATION N/A 10/13/2017    Procedure: Left Heart Cath, PCI if indicated;  Surgeon: Mian Montana MD;  Location: [facility] INVASIVE LOCATION;  Service:    • CHOLECYSTECTOMY     • COLON RESECTION      meckel resection   • CORONARY STENT PLACEMENT     • DILATATION AND CURETTAGE     • HI RT/LT HEART CATHETERS N/A 10/13/2017    Procedure: Percutaneous Coronary Intervention;  Surgeon: Mian Montana MD;  Location: [facility] INVASIVE LOCATION;  Service: Cardiovascular   • TOTAL ABDOMINAL HYSTERECTOMY WITH SALPINGO OOPHORECTOMY     • TOTAL THYROIDECTOMY           Family History   Problem Relation Age of Onset   • Diabetes Other    • Hyperlipidemia Other    • Hypertension Other    • Cancer Mother    • Diabetes Mother    • Coronary artery disease Mother    • Kidney disease Mother    • COPD Mother    • Coronary artery disease Father    • Early death Father    • Diabetes Paternal Aunt          Social History     Social History   • Marital status:      Spouse name: N/A   • Number of children: N/A   • Years of education: N/A     Occupational History   • Not on file.     Social History Main Topics   • Smoking status: Former Smoker     Packs/day: 0.00     Types: Cigarettes     Start date: 8/12/2015   • Smokeless tobacco: Never Used      Comment: quit on 10/11/17   • Alcohol use No   • Drug use: No   • Sexual activity: Defer     Other Topics Concern   • Not on file     Social History Narrative   • No narrative on file         Current Outpatient Prescriptions   Medication Sig Dispense Refill   • ARMOUR THYROID 180 MG tablet TAKE ONE TABLET BY MOUTH  "TWICE DAILY 60 tablet 6   • aspirin 81 MG chewable tablet Chew 81 mg daily.     • atorvastatin (LIPITOR) 40 MG tablet      • Baclofen 2 % cream Apply  topically 3 (Three) Times a Day. Baclofen, Neurontin, and Lidocaine compound cream to lower back TID for chronic back pain     • Blood Glucose Monitoring Suppl (FREESTYLE LITE) device      • carvedilol (COREG) 25 MG tablet 12.5 mg 2 (Two) Times a Day.     • cholecalciferol (VITAMIN D3) 1000 units tablet Take 1,000 Units by mouth Daily. 5 capsules daily     • glipiZIDE (GLUCOTROL) 5 MG ER tablet Take 5 mg by mouth Daily. daily     • HYDROcodone-acetaminophen (NORCO) 5-325 MG per tablet Take 1 tablet by mouth Every 6 (Six) Hours As Needed.     • isosorbide mononitrate (IMDUR) 30 MG 24 hr tablet Take 1 tablet by mouth Daily. 30 tablet 6   • lisinopril (PRINIVIL,ZESTRIL) 20 MG tablet Take 20 mg by mouth Daily.     • nitroglycerin (NITROSTAT) 0.4 MG SL tablet 1 under the tongue as needed for angina, may repeat q5mins for up three doses 30 tablet 3   • sertraline (ZOLOFT) 50 MG tablet As Needed. daily     • thyroid (ARMOUR THYROID) 180 MG tablet Take one tablet mon thru sat twide a day and half tab on sunday 60 tablet 5   • ticagrelor (BRILINTA) 90 MG tablet tablet Take 1 tablet by mouth 2 (Two) Times a Day. 60 tablet 6   • FREESTYLE LITE test strip        No current facility-administered medications for this visit.          OBJECTIVE    /100 (BP Location: Left arm, Patient Position: Sitting)   Pulse 67   Ht 167.6 cm (65.98\")   Wt 126 kg (278 lb 1.6 oz)   SpO2 99%   BMI 44.91 kg/m²         Review of Systems     Constitutional:  Denies recent weight loss, weight gain, fever or chills, no change in exercise tolerance     HENT:  Denies any hearing loss, epistaxis, hoarseness, or difficulty speaking.     Eyes: Wears eyeglasses or contact lenses     Respiratory:  Denies dyspnea with exertion,no cough, wheezing, or hemoptysis.     Cardiovascular: Negative for " palpations, chest pain, orthopnea, PND, peripheral edema, syncope, or claudication.     Gastrointestinal:  Denies change in bowel habits, dyspepsia, ulcer disease, hematochezia, or melena.     Endocrine: Negative for cold intolerance, heat intolerance, polydipsia, polyphagia and polyuria.     Genitourinary: Negative.      Musculoskeletal: Denies any history of arthritic symptoms or back problems     Skin:  Denies any change in hair or nails, rashes, or skin lesions.     Allergic/Immunologic: Negative.  Negative for environmental allergies, food allergies and immunocompromised state.     Neurological:  Denies any history of recurrent headaches, strokes, TIA, or seizure disorder.     Hematological: Denies any food allergies, seasonal allergies, bleeding disorders, or lymphadenopathy.     Psychiatric/Behavioral: Denies any history of depression, substance abuse, or change in cognitive function.         Physical Exam     Constitutional: Cooperative, alert and oriented, in no acute distress.     HENT:   Head: Normocephalic, normal hair patterns, no masses or tenderness.  Ears, Nose, and Throat: No gross abnormalities. No pallor or cyanosis.  Eyes: EOMS intact, PERRL, conjunctivae and lids unremarkable. Fundoscopic exam and visual fields not performed.   Neck: No palpable masses or adenopathy, no thyromegaly, no JVD, carotid pulses are full and equal bilaterally and without  Bruits.     Cardiovascular: Regular rhythm, S1 and S2 normal, no S3 or S4. No murmurs, gallops, or rubs detected.     Pulmonary/Chest: Chest: normal symmetry, normal respiratory excursion, no intercostal retraction, no use of accessory muscles.            Pulmonary: Normal breath sounds. No rales or ronchi.    Abdominal: Abdomen soft, bowel sounds normoactive, no masses, no hepatosplenomegaly, non-tender, no bruits.     Musculoskeletal: No deformities, clubbing, cyanosis, erythema, or edema observed.     Neurological: No gross motor or sensory  deficits noted, affect appropriate, oriented to time, person, place.     Skin: Warm and dry to the touch, no apparent skin lesions or masses noted.     Psychiatric: She has a normal mood and affect. Her behavior is normal. Judgment and thought content normal.         Procedures      Lab Results   Component Value Date    WBC 10.68 (H) 03/05/2018    HGB 13.5 03/05/2018    HCT 42.4 03/05/2018    MCV 89.3 03/05/2018     03/05/2018     Lab Results   Component Value Date    GLUCOSE 135 (H) 03/05/2018    BUN 11 03/05/2018    CREATININE 1.54 (H) 03/05/2018    EGFRIFNONA 36 (L) 03/05/2018    BCR 7.1 03/05/2018    CO2 31.0 03/05/2018    CALCIUM 9.5 03/05/2018    ALBUMIN 4.40 03/05/2018    LABIL2 1.2 03/05/2018    AST 76 (H) 03/05/2018    ALT 65 (H) 03/05/2018     Lab Results   Component Value Date    CHOL 382 (H) 03/05/2018    CHOL 141 10/14/2017    CHOL 208 (H) 10/12/2017     Lab Results   Component Value Date    TRIG 154 03/05/2018    TRIG 64 10/14/2017    TRIG 82 10/12/2017     Lab Results   Component Value Date    HDL 81 03/05/2018    HDL 38 (L) 10/14/2017    HDL 53 (L) 10/12/2017     No components found for: LDLCALC  Lab Results   Component Value Date     (H) 03/05/2018    LDL 79 10/14/2017     10/12/2017     No results found for: HDLLDLRATIO  No components found for: CHOLHDL  Lab Results   Component Value Date    HGBA1C 8.1 (H) 03/05/2018     Lab Results   Component Value Date    TSH >100.000 (H) 03/05/2018    W5XWGCA 24.1 (L) 06/08/2017           ASSESSMENT AND PLAN  Ms. Sutton has no clinical evidence of progression of coronary artery disease.  No signs of arrhythmia or congestive heart failure was noted.  Her LDL is markedly elevated in spite of being on statins and I believe that she would benefit from Repatha.  As has been recommended and we will give her test dose today.  Her TSH is also markedly elevated and she would benefit from seeing an Endocrinologist.  Antiplatelet therapy with aspirin  and Brilinta, antihypertensive therapy with Coreg, lisinopril and antianginal therapy with isosorbide mononitrate have been continued.    Rhea was seen today for follow-up.    Diagnoses and all orders for this visit:    Essential hypertension    Other hyperlipidemia    NSTEMI (non-ST elevated myocardial infarction)        Mian Montana MD  6/6/2018  9:13 AM

## 2018-07-17 PROCEDURE — 84443 ASSAY THYROID STIM HORMONE: CPT | Performed by: NURSE PRACTITIONER

## 2018-07-17 PROCEDURE — 36415 COLL VENOUS BLD VENIPUNCTURE: CPT | Performed by: FAMILY MEDICINE

## 2018-07-18 LAB — TSH SERPL DL<=0.05 MIU/L-ACNC: >100 MIU/ML (ref 0.46–4.68)

## 2018-07-23 ENCOUNTER — OFFICE VISIT (OUTPATIENT)
Dept: ENDOCRINOLOGY | Facility: CLINIC | Age: 48
End: 2018-07-23

## 2018-07-23 VITALS
DIASTOLIC BLOOD PRESSURE: 98 MMHG | HEIGHT: 66 IN | BODY MASS INDEX: 44.52 KG/M2 | SYSTOLIC BLOOD PRESSURE: 158 MMHG | HEART RATE: 91 BPM | WEIGHT: 277 LBS

## 2018-07-23 DIAGNOSIS — E11.9 CONTROLLED TYPE 2 DIABETES MELLITUS WITHOUT COMPLICATION, WITHOUT LONG-TERM CURRENT USE OF INSULIN (HCC): ICD-10-CM

## 2018-07-23 DIAGNOSIS — E78.49 OTHER HYPERLIPIDEMIA: ICD-10-CM

## 2018-07-23 DIAGNOSIS — E89.0 POSTSURGICAL HYPOTHYROIDISM: Primary | ICD-10-CM

## 2018-07-23 DIAGNOSIS — I10 ESSENTIAL HYPERTENSION: ICD-10-CM

## 2018-07-23 DIAGNOSIS — E55.9 VITAMIN D DEFICIENCY: ICD-10-CM

## 2018-07-23 PROCEDURE — 99214 OFFICE O/P EST MOD 30 MIN: CPT | Performed by: NURSE PRACTITIONER

## 2018-07-23 NOTE — PROGRESS NOTES
Subjective    Rheaabbi Sutton is a 47 y.o. female. she is here today for follow-up.    History of Present Illness       47 year old female comes for follow up      Patient has been out of all medications for 2 months         #1 Hypothyroidism     Duration - 2013  ====================================  Timing - constant  ====================================  Quality - uncontrolled  ====================================  ====================================  Alleviating Factors      was on levothyroxine  ====================================  Aggravating Factor - noncompliance and now ran out off it for more than 1 month  ====================================  Severity - moderate     ================================================================     #2 Goiter---Total thyroidectomy JUly 14, 2015     Context - During physical exam , goiter was appreciated by Dr. Lockett  ====================================  Duration - May 2013  ====================================  Timing - not constant  ====================================  Quality - controlled  ====================================  Severity - moderate   ====================================  Symptoms - refers pressure sensation / dyspnea when supine, denies dysphagia.     US dated May 2012     Right Lobe 7.9 cm x 2.6 cm x 2.8 cm   Left Lobe 6.5 cm x 2.7 cm x 2.9 cm     Report states that there is a multinodular goiter        I personally reviewed US with Ms. Gallegos.  There are no nodules, Gland is heterogeneous characteristic of Hashimoto's     symptoms were progressing patient had a total thyroidectomy July 14, 2015     she is doing fine, still some hoarseness and tenderness     ==============================================================  Chronic Tobacco use  timing constant  quality states that now only smoking 3 cigarettes   severity - high associated CAD , COPD  aggravating factors -stress     HTN resolved           ---------------------  Diabetes Mellitus  type 2    Timing constant    Diagnosed 2016    Quality not controlled                 The following portions of the patient's history were reviewed and updated as appropriate:   Past Medical History:   Diagnosis Date   • Acquired hypothyroidism    • Acute pharyngitis    • Encounter for health maintenance examination    • Essential hypertension    • Goiter     - total thyroidectomy July 2015   • Hashimoto's thyroiditis    • Headache    • Heart disease    • Hyperglycemia     , unspecified   • Kidney failure stage III   • Low back pain    • Malaise and fatigue    • Otitis externa    • Postoperative hypothyroidism    • Tobacco dependence syndrome    • URI (upper respiratory infection)      Past Surgical History:   Procedure Laterality Date   • APPENDECTOMY     • CARDIAC CATHETERIZATION     • CARDIAC CATHETERIZATION N/A 10/13/2017    Procedure: Left Heart Cath, PCI if indicated;  Surgeon: Mian Montana MD;  Location: [facility] INVASIVE LOCATION;  Service:    • CHOLECYSTECTOMY     • COLON RESECTION      meckel resection   • CORONARY STENT PLACEMENT     • DILATATION AND CURETTAGE     • RI RT/LT HEART CATHETERS N/A 10/13/2017    Procedure: Percutaneous Coronary Intervention;  Surgeon: Mian Montana MD;  Location: [facility] INVASIVE LOCATION;  Service: Cardiovascular   • TOTAL ABDOMINAL HYSTERECTOMY WITH SALPINGO OOPHORECTOMY     • TOTAL THYROIDECTOMY       Family History   Problem Relation Age of Onset   • Diabetes Other    • Hyperlipidemia Other    • Hypertension Other    • Cancer Mother    • Diabetes Mother    • Coronary artery disease Mother    • Kidney disease Mother    • COPD Mother    • Coronary artery disease Father    • Early death Father    • Diabetes Paternal Aunt      OB History     No data available        Current Outpatient Prescriptions   Medication Sig Dispense Refill   • ARMOUR THYROID 180 MG tablet TAKE ONE TABLET BY MOUTH TWICE DAILY 60 tablet 6   • aspirin 81 MG chewable tablet  Chew 81 mg daily.     • atorvastatin (LIPITOR) 40 MG tablet      • Baclofen 2 % cream Apply  topically 3 (Three) Times a Day. Baclofen, Neurontin, and Lidocaine compound cream to lower back TID for chronic back pain     • Blood Glucose Monitoring Suppl (FREESTYLE LITE) device      • carvedilol (COREG) 25 MG tablet 12.5 mg 2 (Two) Times a Day.     • cholecalciferol (VITAMIN D3) 1000 units tablet Take 1,000 Units by mouth Daily. 5 capsules daily     • Evolocumab 140 MG/ML solution auto-injector Inject 1 mL under the skin Every 14 (Fourteen) Days. 2 mL 0   • FREESTYLE LITE test strip      • glipiZIDE (GLUCOTROL) 5 MG ER tablet Take 5 mg by mouth Daily. daily     • HYDROcodone-acetaminophen (NORCO) 5-325 MG per tablet Take 1 tablet by mouth Every 6 (Six) Hours As Needed.     • isosorbide mononitrate (IMDUR) 30 MG 24 hr tablet Take 1 tablet by mouth Daily. 30 tablet 6   • lisinopril (PRINIVIL,ZESTRIL) 20 MG tablet Take 20 mg by mouth Daily.     • nitroglycerin (NITROSTAT) 0.4 MG SL tablet 1 under the tongue as needed for angina, may repeat q5mins for up three doses 30 tablet 3   • sertraline (ZOLOFT) 50 MG tablet As Needed. daily     • thyroid (ARMOUR THYROID) 180 MG tablet Take one tablet mon thru sat twide a day and half tab on sunday 60 tablet 5   • ticagrelor (BRILINTA) 90 MG tablet tablet Take 1 tablet by mouth 2 (Two) Times a Day. 60 tablet 6     No current facility-administered medications for this visit.      Allergies   Allergen Reactions   • Penicillins Swelling     Of the throat   • Adhesive Tape Other (See Comments)     Silk tape- blisters   • Ciprofloxacin    • Coconut Flavor Swelling   • Latex Hives     Social History     Social History   • Marital status:      Social History Main Topics   • Smoking status: Former Smoker     Packs/day: 0.00     Types: Cigarettes     Start date: 8/12/2015   • Smokeless tobacco: Never Used      Comment: quit on 10/11/17   • Alcohol use No   • Drug use: No   • Sexual  "activity: Defer     Other Topics Concern   • Not on file       Review of Systems  Review of Systems   Constitutional: Positive for fatigue. Negative for activity change, appetite change and diaphoresis.   HENT: Negative for facial swelling, sneezing, sore throat, tinnitus, trouble swallowing and voice change.    Eyes: Negative for photophobia, pain, discharge, redness, itching and visual disturbance.   Respiratory: Negative for apnea, cough, choking, chest tightness and shortness of breath.    Cardiovascular: Negative for chest pain, palpitations and leg swelling.   Gastrointestinal: Negative for abdominal distention, abdominal pain, constipation, diarrhea, nausea and vomiting.   Endocrine: Negative for cold intolerance, heat intolerance, polydipsia, polyphagia and polyuria.   Genitourinary: Negative for difficulty urinating, dysuria, frequency, hematuria and urgency.   Musculoskeletal: Negative for arthralgias, back pain, gait problem, joint swelling, myalgias, neck pain and neck stiffness.   Skin: Negative for color change, pallor, rash and wound.   Neurological: Negative for dizziness, tremors, weakness, light-headedness, numbness and headaches.   Hematological: Negative for adenopathy. Does not bruise/bleed easily.   Psychiatric/Behavioral: Negative for behavioral problems, confusion and sleep disturbance.        Objective    /98 (BP Location: Left arm, Patient Position: Sitting, Cuff Size: Adult)   Pulse 91   Ht 167.6 cm (66\")   Wt 126 kg (277 lb)   BMI 44.71 kg/m²   Physical Exam   Constitutional: She is oriented to person, place, and time. She appears well-developed and well-nourished. No distress.   HENT:   Head: Normocephalic and atraumatic.   Right Ear: External ear normal.   Left Ear: External ear normal.   Nose: Nose normal.   Eyes: Pupils are equal, round, and reactive to light. Conjunctivae and EOM are normal.   Neck: Normal range of motion. Neck supple. No tracheal deviation present. "   Thyroid surgically absent    Cardiovascular: Normal rate, regular rhythm and normal heart sounds.    No murmur heard.  Pulmonary/Chest: Effort normal and breath sounds normal. No respiratory distress. She has no wheezes.   Abdominal: Soft. Bowel sounds are normal. There is no tenderness. There is no rebound and no guarding.   Musculoskeletal: Normal range of motion. She exhibits no edema, tenderness or deformity.   Neurological: She is alert and oriented to person, place, and time. No cranial nerve deficit.   Skin: Skin is warm and dry. No rash noted.   Psychiatric: She has a normal mood and affect. Her behavior is normal. Judgment and thought content normal.       Lab Review  Glucose (mg/dL)   Date Value   03/05/2018 135 (H)   10/25/2017 207 (H)   10/24/2017 194 (H)     Sodium (mmol/L)   Date Value   03/05/2018 142   10/25/2017 143   10/24/2017 142     Potassium (mmol/L)   Date Value   03/05/2018 4.1   10/25/2017 4.7   10/24/2017 4.2     Chloride (mmol/L)   Date Value   03/05/2018 96   10/25/2017 103   10/24/2017 102     CO2 (mmol/L)   Date Value   03/05/2018 31.0   10/25/2017 24.0   10/24/2017 26.0     BUN (mg/dL)   Date Value   03/05/2018 11   10/25/2017 15   10/24/2017 16     Creatinine (mg/dL)   Date Value   03/05/2018 1.54 (H)   10/25/2017 0.87   10/24/2017 1.00     Hemoglobin A1C (%)   Date Value   03/05/2018 8.1 (H)   10/14/2017 6.7 (H)   08/17/2017 6.6 (H)     Triglycerides (mg/dL)   Date Value   06/06/2018 149   03/05/2018 154   10/14/2017 64     LDL Cholesterol  (mg/dL)   Date Value   06/06/2018 184 (H)   03/05/2018 270 (H)   10/14/2017 79   10/12/2017 127       Assessment/Plan      1. Postsurgical hypothyroidism    2. Controlled type 2 diabetes mellitus without complication, without long-term current use of insulin (CMS/[facility])    3. Vitamin D deficiency    4. Essential hypertension    5. Other hyperlipidemia    .    Medications prescribed:  Outpatient Encounter Prescriptions as of 7/23/2018   Medication  Sig Dispense Refill   • ARMOUR THYROID 180 MG tablet TAKE ONE TABLET BY MOUTH TWICE DAILY 60 tablet 6   • aspirin 81 MG chewable tablet Chew 81 mg daily.     • atorvastatin (LIPITOR) 40 MG tablet      • Baclofen 2 % cream Apply  topically 3 (Three) Times a Day. Baclofen, Neurontin, and Lidocaine compound cream to lower back TID for chronic back pain     • Blood Glucose Monitoring Suppl (FREESTYLE LITE) device      • carvedilol (COREG) 25 MG tablet 12.5 mg 2 (Two) Times a Day.     • cholecalciferol (VITAMIN D3) 1000 units tablet Take 1,000 Units by mouth Daily. 5 capsules daily     • Evolocumab 140 MG/ML solution auto-injector Inject 1 mL under the skin Every 14 (Fourteen) Days. 2 mL 0   • FREESTYLE LITE test strip      • glipiZIDE (GLUCOTROL) 5 MG ER tablet Take 5 mg by mouth Daily. daily     • HYDROcodone-acetaminophen (NORCO) 5-325 MG per tablet Take 1 tablet by mouth Every 6 (Six) Hours As Needed.     • isosorbide mononitrate (IMDUR) 30 MG 24 hr tablet Take 1 tablet by mouth Daily. 30 tablet 6   • lisinopril (PRINIVIL,ZESTRIL) 20 MG tablet Take 20 mg by mouth Daily.     • nitroglycerin (NITROSTAT) 0.4 MG SL tablet 1 under the tongue as needed for angina, may repeat q5mins for up three doses 30 tablet 3   • sertraline (ZOLOFT) 50 MG tablet As Needed. daily     • thyroid (ARMOUR THYROID) 180 MG tablet Take one tablet mon thru sat twide a day and half tab on sunday 60 tablet 5   • ticagrelor (BRILINTA) 90 MG tablet tablet Take 1 tablet by mouth 2 (Two) Times a Day. 60 tablet 6     No facility-administered encounter medications on file as of 7/23/2018.        Orders placed during this encounter include:  Orders Placed This Encounter   Procedures   • TSH   • T4, Free     Plan Details          Hashimoto's Thyroiditis leading to Hypothyroidism and Goiter---now postoperative hypothyroid     a. Hypothyroidism        I emphasized that if she misses one tablet one day she can take 2 tabs the next day.              Oct.  2016     TSH - 282   FT4 - 0.07     Discussed with  and change to [place] thyroid 90 mcg one po BID      Jan. 2017     TSH - 234     Discussed with Dr. Pearce Keep armour thyroid increase to 120 mg BID      If does not work will try tirosint      total thyroidectomy     b. Goiter--removed due to enlarging gland and compressive symptoms      cytology - hashimoto's thyroiditis extensive, bilateral                     Lab Results   Component Value Date     .000 (H) 02/13/2017      States has not been taking medicaiton s                  Lab Results   Component Value Date     .000 (H) 08/17/2017      Does not want to stop the armour     Explained importance of taking medication each day     Will continue armour 180 mg bid      If still no improvement she does agree to go back to the synthroid                   Lab Results   Component Value Date     TSH 0.370 (L) 10/25/2017      Keep armour thyroid 180 mg BID except Sunday take one in the am and 1/2 in the afternoon           Lab Results   Component Value Date     TSH >100.000 (H) 03/05/2018      Out of medications      Repeat in 2 months     Lab Results   Component Value Date    TSH >100.000 (H) 07/17/2018     Repeat in one month    Missing medication  -----     May 2016     Creatinine 1.9     following with nephrology     Following with      Oct. 2017     Creatinine 0.87      ------     HTN -      On amlodipine     On lisinopril/hctz     On coreg 25 mg BID     Following with cardiology     Lipid management     Component      Latest Ref Rng & Units 3/5/2018   Total Cholesterol      0 - 199 mg/dL 382 (H)   Triglycerides      20 - 199 mg/dL 154   HDL Cholesterol      60 - 200 mg/dL 81   LDL Cholesterol       0 - 129 mg/dL 270 (H)   VLDL Cholesterol      mg/dL 30.8   LDL/HDL Ratio      0.00 - 3.22 3.34 (H)      Lipitor 40 mg qhs stopped     Started on repatha by cardiologist      ===========     May 2016     bg 183     check HgbA1c      June  2016     HgbA1c% 6.5     Started on glyburide 5mg daily --by primary provider     No lows                         Lab Results   Component Value Date     HGBA1C 6.7 (H) 10/14/2017               Lab Results   Component Value Date     HGBA1C 8.1 (H) 03/05/2018        ===============     Sleep study was done -- she does have obstructive sleep apnea      Now on CPAP     Preventive care:     Smoking     Discussed smoking cessation--trying to cut back         Vitamin d def        August 2017     Vitamin d 17     Taking 50,000 units weekly               4. Follow-up: Return in about 3 months (around 10/23/2018) for Recheck.

## 2018-07-27 ENCOUNTER — DOCUMENTATION (OUTPATIENT)
Dept: CARDIOLOGY | Facility: CLINIC | Age: 48
End: 2018-07-27

## 2018-07-27 ENCOUNTER — TELEPHONE (OUTPATIENT)
Dept: CARDIOLOGY | Facility: CLINIC | Age: 48
End: 2018-07-27

## 2018-07-27 NOTE — PROGRESS NOTES
Received a PA for Repatha , filled out the questionnaire on covermymeds, states we will have a response in 5 business days.

## 2018-07-27 NOTE — TELEPHONE ENCOUNTER
"7/26/18 1130   Patient had inquired about her Repatha prescription and stated she has not received it. I informed her that I would contact the specialty pharmacy and see what happened. [facility] in [place] stated they could not fill her Repatha due to they could not bill her insurance, [place] Medicaid. I was informed they called and \"talked to someone.\"  I could not find documentation in the chart. Contacted [facility] in [place], [place] and informed them of the situation. [facility] patient information was faxed.     7/27/18 1000     Spoke with [facility] rep today and they are processing the prescription and will call the patient when it is ready. Called patient to let her know her prescription was being processed by [facility] and they will call her to let her know when its ready. Patient stated her last prescription was sent to a [place] pharmacy. No documentation in the chart. Patient will attempt to find out what pharmacy in [place] so we can contact them. Instructed patient to call if anymore questions or issues arise. Patient voiced understanding.  "

## 2018-07-30 ENCOUNTER — DOCUMENTATION (OUTPATIENT)
Dept: CARDIOLOGY | Facility: CLINIC | Age: 48
End: 2018-07-30

## 2018-07-30 NOTE — PROGRESS NOTES
Called to speak to patient , I will need her signature, her repatha was denied and need an approval to start an appeal .

## 2018-08-03 ENCOUNTER — DOCUMENTATION (OUTPATIENT)
Dept: CARDIOLOGY | Facility: CLINIC | Age: 48
End: 2018-08-03

## 2018-08-03 NOTE — PROGRESS NOTES
Called Covermymeds to see the status on PA, they have not viewed the request yet. Said should be up to - business days.

## 2018-08-06 ENCOUNTER — DOCUMENTATION (OUTPATIENT)
Dept: CARDIOLOGY | Facility: CLINIC | Age: 48
End: 2018-08-06

## 2018-12-13 ENCOUNTER — OFFICE VISIT (OUTPATIENT)
Dept: ENDOCRINOLOGY | Facility: CLINIC | Age: 48
End: 2018-12-13

## 2018-12-13 ENCOUNTER — OFFICE VISIT (OUTPATIENT)
Dept: CARDIOLOGY | Facility: CLINIC | Age: 48
End: 2018-12-13

## 2018-12-13 VITALS
BODY MASS INDEX: 42.27 KG/M2 | WEIGHT: 263 LBS | HEART RATE: 71 BPM | DIASTOLIC BLOOD PRESSURE: 84 MMHG | OXYGEN SATURATION: 99 % | SYSTOLIC BLOOD PRESSURE: 142 MMHG | HEIGHT: 66 IN

## 2018-12-13 VITALS
BODY MASS INDEX: 42.27 KG/M2 | WEIGHT: 263 LBS | HEIGHT: 66 IN | HEART RATE: 71 BPM | DIASTOLIC BLOOD PRESSURE: 84 MMHG | SYSTOLIC BLOOD PRESSURE: 142 MMHG

## 2018-12-13 DIAGNOSIS — E78.49 OTHER HYPERLIPIDEMIA: ICD-10-CM

## 2018-12-13 DIAGNOSIS — I10 ESSENTIAL HYPERTENSION: Primary | ICD-10-CM

## 2018-12-13 DIAGNOSIS — E55.9 VITAMIN D DEFICIENCY: ICD-10-CM

## 2018-12-13 DIAGNOSIS — I25.10 CORONARY ARTERY DISEASE INVOLVING NATIVE CORONARY ARTERY OF NATIVE HEART WITHOUT ANGINA PECTORIS: ICD-10-CM

## 2018-12-13 DIAGNOSIS — E89.0 POSTSURGICAL HYPOTHYROIDISM: ICD-10-CM

## 2018-12-13 DIAGNOSIS — IMO0002 UNCONTROLLED TYPE 2 DIABETES MELLITUS WITH COMPLICATION: ICD-10-CM

## 2018-12-13 PROCEDURE — 99214 OFFICE O/P EST MOD 30 MIN: CPT | Performed by: INTERNAL MEDICINE

## 2018-12-13 PROCEDURE — 99214 OFFICE O/P EST MOD 30 MIN: CPT | Performed by: NURSE PRACTITIONER

## 2018-12-13 NOTE — PROGRESS NOTES
Subjective    Rheaabbi Sutton is a 48 y.o. female. she is here today for follow-up.    History of Present Illness       47 year old female comes for follow up      Patient has been out of all medications for 2 months         #1 Hypothyroidism     Duration - 2013  ====================================  Timing - constant  ====================================  Quality - uncontrolled  ====================================  ====================================  Alleviating Factors      was on levothyroxine  ====================================  Aggravating Factor - noncompliance and now ran out off it for more than 1 month  ====================================  Severity - moderate     ================================================================     #2 Goiter---Total thyroidectomy JUly 14, 2015     Context - During physical exam , goiter was appreciated by Dr. Lockett  ====================================  Duration - May 2013  ====================================  Timing - not constant  ====================================  Quality - controlled  ====================================  Severity - moderate   ====================================  Symptoms - refers pressure sensation / dyspnea when supine, denies dysphagia.     US dated May 2012     Right Lobe 7.9 cm x 2.6 cm x 2.8 cm   Left Lobe 6.5 cm x 2.7 cm x 2.9 cm     Report states that there is a multinodular goiter        I personally reviewed US with Ms. Gallegos.  There are no nodules, Gland is heterogeneous characteristic of Hashimoto's     symptoms were progressing patient had a total thyroidectomy July 14, 2015     she is doing fine, still some hoarseness and tenderness     ==============================================================  Chronic Tobacco use  timing constant  quality states that now only smoking 3 cigarettes   severity - high associated CAD , COPD  aggravating factors -stress     HTN resolved           ---------------------  Diabetes Mellitus  type 2     Timing constant     Diagnosed 2016     Quality not controlled          The following portions of the patient's history were reviewed and updated as appropriate:   Past Medical History:   Diagnosis Date   • Acquired hypothyroidism    • Acute pharyngitis    • Encounter for health maintenance examination    • Essential hypertension    • Goiter     - total thyroidectomy July 2015   • Hashimoto's thyroiditis    • Headache    • Heart disease    • Hyperglycemia     , unspecified   • Kidney failure stage III   • Low back pain    • Malaise and fatigue    • Otitis externa    • Postoperative hypothyroidism    • Tobacco dependence syndrome    • URI (upper respiratory infection)      Past Surgical History:   Procedure Laterality Date   • APPENDECTOMY     • CARDIAC CATHETERIZATION     • CHOLECYSTECTOMY     • COLON RESECTION      meckel resection   • CORONARY STENT PLACEMENT     • DILATATION AND CURETTAGE     • TOTAL ABDOMINAL HYSTERECTOMY WITH SALPINGO OOPHORECTOMY     • TOTAL THYROIDECTOMY       Family History   Problem Relation Age of Onset   • Diabetes Other    • Hyperlipidemia Other    • Hypertension Other    • Cancer Mother    • Diabetes Mother    • Coronary artery disease Mother    • Kidney disease Mother    • COPD Mother    • Coronary artery disease Father    • Early death Father    • Diabetes Paternal Aunt      OB History     No data available        Current Outpatient Medications   Medication Sig Dispense Refill   • ARMOUR THYROID 180 MG tablet TAKE ONE TABLET BY MOUTH TWICE DAILY 60 tablet 6   • aspirin 81 MG chewable tablet Chew 81 mg daily.     • atorvastatin (LIPITOR) 40 MG tablet      • Baclofen 2 % cream Apply  topically 3 (Three) Times a Day. Baclofen, Neurontin, and Lidocaine compound cream to lower back TID for chronic back pain     • Blood Glucose Monitoring Suppl (FREESTYLE LITE) device      • carvedilol (COREG) 25 MG tablet 12.5 mg 2 (Two) Times a Day.     • cholecalciferol (VITAMIN D3) 1000 units  tablet Take 1,000 Units by mouth Daily. 5 capsules daily     • Evolocumab 140 MG/ML solution auto-injector Inject 1 mL under the skin into the appropriate area as directed Every 14 (Fourteen) Days. 2 mL 0   • FREESTYLE LITE test strip      • glipiZIDE (GLUCOTROL) 5 MG ER tablet Take 5 mg by mouth Daily. daily     • HYDROcodone-acetaminophen (NORCO) 5-325 MG per tablet Take 1 tablet by mouth Every 6 (Six) Hours As Needed.     • isosorbide mononitrate (IMDUR) 30 MG 24 hr tablet Take 1 tablet by mouth Daily. 30 tablet 6   • lisinopril (PRINIVIL,ZESTRIL) 20 MG tablet Take 20 mg by mouth Daily.     • nitroglycerin (NITROSTAT) 0.4 MG SL tablet 1 under the tongue as needed for angina, may repeat q5mins for up three doses 30 tablet 3   • sertraline (ZOLOFT) 50 MG tablet As Needed. daily     • thyroid (ARMOUR THYROID) 180 MG tablet Take one tablet mon thru sat twide a day and half tab on sunday 60 tablet 5   • ticagrelor (BRILINTA) 90 MG tablet tablet Take 1 tablet by mouth 2 (Two) Times a Day. 60 tablet 6     No current facility-administered medications for this visit.      Allergies   Allergen Reactions   • Penicillins Swelling     Of the throat   • Adhesive Tape Other (See Comments)     Silk tape- blisters   • Ciprofloxacin    • Coconut Flavor Swelling   • Latex Hives     Social History     Socioeconomic History   • Marital status:      Spouse name: Not on file   • Number of children: Not on file   • Years of education: Not on file   • Highest education level: Not on file   Tobacco Use   • Smoking status: Former Smoker     Packs/day: 0.00     Types: Cigarettes     Start date: 8/12/2015   • Smokeless tobacco: Never Used   • Tobacco comment: quit on 10/11/17   Substance and Sexual Activity   • Alcohol use: No   • Drug use: No   • Sexual activity: Defer       Review of Systems  Review of Systems   Constitutional: Negative for activity change, appetite change, diaphoresis and fatigue.   HENT: Negative for facial  "swelling, sneezing, sore throat, tinnitus, trouble swallowing and voice change.    Eyes: Negative for photophobia, pain, discharge, redness, itching and visual disturbance.   Respiratory: Negative for apnea, cough, choking, chest tightness and shortness of breath.    Cardiovascular: Negative for chest pain, palpitations and leg swelling.   Gastrointestinal: Negative for abdominal distention, abdominal pain, constipation, diarrhea, nausea and vomiting.   Endocrine: Negative for cold intolerance, heat intolerance, polydipsia, polyphagia and polyuria.   Genitourinary: Negative for difficulty urinating, dysuria, frequency, hematuria and urgency.   Musculoskeletal: Negative for arthralgias, back pain, gait problem, joint swelling, myalgias, neck pain and neck stiffness.   Skin: Negative for color change, pallor, rash and wound.   Neurological: Negative for dizziness, tremors, weakness, light-headedness, numbness and headaches.   Hematological: Negative for adenopathy. Does not bruise/bleed easily.   Psychiatric/Behavioral: Negative for behavioral problems, confusion and sleep disturbance.        Objective    /84 (BP Location: Left arm, Patient Position: Sitting, Cuff Size: Adult)   Pulse 71   Ht 167.6 cm (66\")   Wt 119 kg (263 lb)   BMI 42.45 kg/m²   Physical Exam   Constitutional: She is oriented to person, place, and time. She appears well-developed and well-nourished. No distress.   HENT:   Head: Normocephalic and atraumatic.   Right Ear: External ear normal.   Left Ear: External ear normal.   Nose: Nose normal.   Eyes: Conjunctivae and EOM are normal. Pupils are equal, round, and reactive to light.   Neck: Normal range of motion. Neck supple. No tracheal deviation present.   Thyroid surgically absent   Cardiovascular: Normal rate, regular rhythm and normal heart sounds.   No murmur heard.  Pulmonary/Chest: Effort normal and breath sounds normal. No respiratory distress. She has no wheezes.   Abdominal: " Soft. Bowel sounds are normal. There is no tenderness. There is no rebound and no guarding.   Musculoskeletal: Normal range of motion. She exhibits no edema, tenderness or deformity.   Neurological: She is alert and oriented to person, place, and time. No cranial nerve deficit.   Skin: Skin is warm and dry. No rash noted.   Psychiatric: She has a normal mood and affect. Her behavior is normal. Judgment and thought content normal.       Lab Review  Glucose (mg/dL)   Date Value   03/05/2018 135 (H)   10/25/2017 207 (H)   10/24/2017 194 (H)     Sodium (mmol/L)   Date Value   03/05/2018 142   10/25/2017 143   10/24/2017 142     Potassium (mmol/L)   Date Value   03/05/2018 4.1   10/25/2017 4.7   10/24/2017 4.2     Chloride (mmol/L)   Date Value   03/05/2018 96   10/25/2017 103   10/24/2017 102     CO2 (mmol/L)   Date Value   03/05/2018 31.0   10/25/2017 24.0   10/24/2017 26.0     BUN (mg/dL)   Date Value   03/05/2018 11   10/25/2017 15   10/24/2017 16     Creatinine (mg/dL)   Date Value   03/05/2018 1.54 (H)   10/25/2017 0.87   10/24/2017 1.00     Hemoglobin A1C (%)   Date Value   03/05/2018 8.1 (H)   10/14/2017 6.7 (H)   08/17/2017 6.6 (H)     Triglycerides (mg/dL)   Date Value   06/06/2018 149   03/05/2018 154   10/14/2017 64     LDL Cholesterol  (mg/dL)   Date Value   06/06/2018 184 (H)   03/05/2018 270 (H)   10/14/2017 79   10/12/2017 127       Assessment/Plan      1. Essential hypertension    2. Other hyperlipidemia    3. Vitamin D deficiency    4. Uncontrolled type 2 diabetes mellitus with complication (CMS/HCC)    5. Postsurgical hypothyroidism    .    Medications prescribed:  Outpatient Encounter Medications as of 12/13/2018   Medication Sig Dispense Refill   • ARMOUR THYROID 180 MG tablet TAKE ONE TABLET BY MOUTH TWICE DAILY 60 tablet 6   • aspirin 81 MG chewable tablet Chew 81 mg daily.     • atorvastatin (LIPITOR) 40 MG tablet      • Baclofen 2 % cream Apply  topically 3 (Three) Times a Day. Baclofen,  Neurontin, and Lidocaine compound cream to lower back TID for chronic back pain     • Blood Glucose Monitoring Suppl (FREESTYLE LITE) device      • carvedilol (COREG) 25 MG tablet 12.5 mg 2 (Two) Times a Day.     • cholecalciferol (VITAMIN D3) 1000 units tablet Take 1,000 Units by mouth Daily. 5 capsules daily     • Evolocumab 140 MG/ML solution auto-injector Inject 1 mL under the skin into the appropriate area as directed Every 14 (Fourteen) Days. 2 mL 0   • FREESTYLE LITE test strip      • glipiZIDE (GLUCOTROL) 5 MG ER tablet Take 5 mg by mouth Daily. daily     • HYDROcodone-acetaminophen (NORCO) 5-325 MG per tablet Take 1 tablet by mouth Every 6 (Six) Hours As Needed.     • isosorbide mononitrate (IMDUR) 30 MG 24 hr tablet Take 1 tablet by mouth Daily. 30 tablet 6   • lisinopril (PRINIVIL,ZESTRIL) 20 MG tablet Take 20 mg by mouth Daily.     • nitroglycerin (NITROSTAT) 0.4 MG SL tablet 1 under the tongue as needed for angina, may repeat q5mins for up three doses 30 tablet 3   • sertraline (ZOLOFT) 50 MG tablet As Needed. daily     • thyroid (ARMOUR THYROID) 180 MG tablet Take one tablet mon thru sat twide a day and half tab on sunday 60 tablet 5   • ticagrelor (BRILINTA) 90 MG tablet tablet Take 1 tablet by mouth 2 (Two) Times a Day. 60 tablet 6     No facility-administered encounter medications on file as of 12/13/2018.        Orders placed during this encounter include:  Orders Placed This Encounter   Procedures   • Comprehensive Metabolic Panel   • Hemoglobin A1c   • Vitamin D 25 Hydroxy   • Vitamin B12   • TSH   • T4, Free   • CBC & Differential     Order Specific Question:   Manual Differential     Answer:   No     Plan Details           Hashimoto's Thyroiditis leading to Hypothyroidism and Goiter---now postoperative hypothyroid     a. Hypothyroidism        I emphasized that if she misses one tablet one day she can take 2 tabs the next day.              Oct. 2016     TSH - 282   FT4 - 0.07     Discussed with   and change to [place] thyroid 90 mcg one po BID      Jan. 2017     TSH - 234     Discussed with Dr. Pearce Keep armour thyroid increase to 120 mg BID      If does not work will try tirosint      total thyroidectomy     b. Goiter--removed due to enlarging gland and compressive symptoms      cytology - hashimoto's thyroiditis extensive, bilateral             Will continue armour 180 mg bid      If still no improvement she does agree to go back to the synthroid                   Lab Results   Component Value Date     TSH 0.370 (L) 10/25/2017      Keep armour thyroid 180 mg BID except Sunday take one in the am and 1/2 in the afternoon               Lab Results   Component Value Date     TSH >100.000 (H) 03/05/2018      Out of medications      Repeat in 2 months            Lab Results   Component Value Date     TSH >100.000 (H) 07/17/2018      Repeat in one month     Missing medication        -----     May 2016     Creatinine 1.9     following with nephrology     Following with      Oct. 2017     Creatinine 0.87      ------     HTN -      On amlodipine     On lisinopril/hctz     On coreg 25 mg BID     Following with cardiology      Lipid management      Component      Latest Ref Rng & Units 3/5/2018   Total Cholesterol      0 - 199 mg/dL 382 (H)   Triglycerides      20 - 199 mg/dL 154   HDL Cholesterol      60 - 200 mg/dL 81   LDL Cholesterol       0 - 129 mg/dL 270 (H)   VLDL Cholesterol      mg/dL 30.8   LDL/HDL Ratio      0.00 - 3.22 3.34 (H)       Lipitor 40 mg qhs stopped      Started on repatha by cardiologist      ===========     Diabetes Mellitus      Started on glyburide 5mg daily --by primary provider     No lows                         Lab Results   Component Value Date     HGBA1C 6.7 (H) 10/14/2017                   Lab Results   Component Value Date     HGBA1C 8.1 (H) 03/05/2018         ===============     Sleep study was done -- she does have obstructive sleep apnea      Now on CPAP      Preventive care:     Smoking     Discussed smoking cessation--trying to cut back         Vitamin d def        August 2017     Vitamin d 17     Taking 50,000 units weekly              Need new labs today     4. Follow-up: Return in about 3 months (around 3/13/2019) for Recheck.

## 2018-12-13 NOTE — PROGRESS NOTES
Rhea Sutton  48 y.o. female    12/13/2018  1. Essential hypertension    2. Other hyperlipidemia    3. Coronary artery disease involving native coronary artery of native heart without angina pectoris        History of Present Illness    Ms. Sutton is a 47-year-old  lady who was admitted to [facility] with chest pain and ruled in for a non-ST segment elevation myocardial infarction In October 2017.  She underwent cardiac catheterization which showed the following findings:  #1 Critical lesion noted in the mid circumflex coronary artery (90% followed by 80%) and successful PCI with PTCA and placement of 3.0 x 38 mm xience Alpine stent and reduction in stenosis to 0%  #2 Previously placed stent in the right coronary artery was patent but multiple mild to moderate lesions noted in the ostial, proximal and mid Right Coronary Artery and 80% stenosis noted in a small caliber PDA less than 2 mm in diameter.  #3 LAD was a small to medium caliber vessel with mild to moderate disease which was diffuse.  #4.  Overall normal LV systolic function with an EF of 55% with mild inferior wall hypokinesis     The patient denied any chest pain or shortness of breath and has been compliant with her medications.  Has been on Repatha hyperlipidemia.  She has tolerated the medication well.  Her last LDL was 184 and this will be rechecked again.  Clinical exam was unremarkable with no signs of congestive heart failure.          SUBJECTIVE    Allergies   Allergen Reactions   • Penicillins Swelling     Of the throat   • Adhesive Tape Other (See Comments)     Silk tape- blisters   • Ciprofloxacin    • Coconut Flavor Swelling   • Latex Hives         Past Medical History:   Diagnosis Date   • Acquired hypothyroidism    • Acute pharyngitis    • Encounter for health maintenance examination    • Essential hypertension    • Goiter     - total thyroidectomy July 2015   • Hashimoto's thyroiditis    • Headache    • Heart disease     • Hyperglycemia     , unspecified   • Kidney failure stage III   • Low back pain    • Malaise and fatigue    • Otitis externa    • Postoperative hypothyroidism    • Tobacco dependence syndrome    • URI (upper respiratory infection)          Past Surgical History:   Procedure Laterality Date   • APPENDECTOMY     • CARDIAC CATHETERIZATION     • CHOLECYSTECTOMY     • COLON RESECTION      meckel resection   • CORONARY STENT PLACEMENT     • DILATATION AND CURETTAGE     • TOTAL ABDOMINAL HYSTERECTOMY WITH SALPINGO OOPHORECTOMY     • TOTAL THYROIDECTOMY           Family History   Problem Relation Age of Onset   • Diabetes Other    • Hyperlipidemia Other    • Hypertension Other    • Cancer Mother    • Diabetes Mother    • Coronary artery disease Mother    • Kidney disease Mother    • COPD Mother    • Coronary artery disease Father    • Early death Father    • Diabetes Paternal Aunt          Social History     Socioeconomic History   • Marital status:      Spouse name: Not on file   • Number of children: Not on file   • Years of education: Not on file   • Highest education level: Not on file   Social Needs   • Financial resource strain: Not on file   • Food insecurity - worry: Not on file   • Food insecurity - inability: Not on file   • Transportation needs - medical: Not on file   • Transportation needs - non-medical: Not on file   Occupational History   • Not on file   Tobacco Use   • Smoking status: Former Smoker     Packs/day: 0.00     Types: Cigarettes     Start date: 8/12/2015   • Smokeless tobacco: Never Used   • Tobacco comment: quit on 10/11/17   Substance and Sexual Activity   • Alcohol use: No   • Drug use: No   • Sexual activity: Defer   Other Topics Concern   • Not on file   Social History Narrative   • Not on file         Current Outpatient Medications   Medication Sig Dispense Refill   • ARMOUR THYROID 180 MG tablet TAKE ONE TABLET BY MOUTH TWICE DAILY 60 tablet 6   • aspirin 81 MG chewable tablet  "Chew 81 mg daily.     • atorvastatin (LIPITOR) 40 MG tablet      • Baclofen 2 % cream Apply  topically 3 (Three) Times a Day. Baclofen, Neurontin, and Lidocaine compound cream to lower back TID for chronic back pain     • Blood Glucose Monitoring Suppl (FREESTYLE LITE) device      • carvedilol (COREG) 25 MG tablet 12.5 mg 2 (Two) Times a Day.     • cholecalciferol (VITAMIN D3) 1000 units tablet Take 1,000 Units by mouth Daily. 5 capsules daily     • Evolocumab 140 MG/ML solution auto-injector Inject 1 mL under the skin into the appropriate area as directed Every 14 (Fourteen) Days. 2 mL 0   • FREESTYLE LITE test strip      • glipiZIDE (GLUCOTROL) 5 MG ER tablet Take 5 mg by mouth Daily. daily     • HYDROcodone-acetaminophen (NORCO) 5-325 MG per tablet Take 1 tablet by mouth Every 6 (Six) Hours As Needed.     • isosorbide mononitrate (IMDUR) 30 MG 24 hr tablet Take 1 tablet by mouth Daily. 30 tablet 6   • lisinopril (PRINIVIL,ZESTRIL) 20 MG tablet Take 20 mg by mouth Daily.     • nitroglycerin (NITROSTAT) 0.4 MG SL tablet 1 under the tongue as needed for angina, may repeat q5mins for up three doses 30 tablet 3   • sertraline (ZOLOFT) 50 MG tablet As Needed. daily     • thyroid (ARMOUR THYROID) 180 MG tablet Take one tablet mon thru sat twide a day and half tab on sunday 60 tablet 5   • ticagrelor (BRILINTA) 90 MG tablet tablet Take 1 tablet by mouth 2 (Two) Times a Day. 60 tablet 6     No current facility-administered medications for this visit.          OBJECTIVE    /84   Pulse 71   Ht 167.6 cm (65.98\")   Wt 119 kg (263 lb)   SpO2 99%   BMI 42.47 kg/m²         Review of Systems     Constitutional:  Denies recent weight loss, weight gain, fever or chills, no change in exercise tolerance     HENT:  Denies any hearing loss, epistaxis, hoarseness, or difficulty speaking.     Eyes: Wears eyeglasses or contact lenses     Respiratory:  Denies dyspnea with exertion,no cough, wheezing, or hemoptysis. "     Cardiovascular: Negative for palpations, chest pain, orthopnea, PND, peripheral edema, syncope, or claudication.     Gastrointestinal:  Denies change in bowel habits, dyspepsia, ulcer disease, hematochezia, or melena.     Endocrine: Negative for cold intolerance, heat intolerance, polydipsia, polyphagia and polyuria.  Hyperlipidemia, diabetes mellitus.    Genitourinary: Negative.      Musculoskeletal: DJD    Skin:  Denies any change in hair or nails, rashes, or skin lesions.     Neurological:  Denies any history of recurrent headaches, strokes, TIA, or seizure disorder.     Hematological: Denies any food allergies, seasonal allergies, bleeding disorders, or lymphadenopathy.     Psychiatric/Behavioral: Denies any history of depression, substance abuse, or change in cognitive function.         Physical Exam     Constitutional: Cooperative, alert and oriented,  in no acute distress.  obese    HENT:   Head: Normocephalic, normal hair patterns, no masses or tenderness.  Ears, Nose, and Throat: No gross abnormalities. No pallor or cyanosis.   Eyes: EOMS intact, PERRL, conjunctivae and lids unremarkable. Fundoscopic exam and visual fields not performed.   Neck: No palpable masses or adenopathy, no thyromegaly, no JVD, carotid pulses are full and equal bilaterally and without  Bruits.     Cardiovascular: Regular rhythm, S1 and S2 normal, no S3 or S4.  No murmurs, gallops, or rubs detected.     Pulmonary/Chest: Chest: normal symmetry,  normal respiratory excursion, no intercostal retraction, no use of accessory muscles.            Pulmonary: Normal breath sounds. No rales or ronchi.    Abdominal: Abdomen soft, bowel sounds normoactive, no masses, no hepatosplenomegaly, non-tender, no bruits.     Musculoskeletal: No deformities, clubbing, cyanosis, erythema, or edema observed.     Neurological: No gross motor or sensory deficits noted, affect appropriate, oriented to time, person, place.     Skin: Warm and dry to the  touch, no apparent skin lesions or masses noted.     Psychiatric: She has a normal mood and affect. Her behavior is normal. Judgment and thought content normal.         Procedures      Lab Results   Component Value Date    WBC 10.68 (H) 03/05/2018    HGB 13.5 03/05/2018    HCT 42.4 03/05/2018    MCV 89.3 03/05/2018     03/05/2018     Lab Results   Component Value Date    GLUCOSE 135 (H) 03/05/2018    BUN 11 03/05/2018    CREATININE 1.54 (H) 03/05/2018    EGFRIFNONA 36 (L) 03/05/2018    BCR 7.1 03/05/2018    CO2 31.0 03/05/2018    CALCIUM 9.5 03/05/2018    ALBUMIN 4.40 03/05/2018    AST 76 (H) 03/05/2018    ALT 65 (H) 03/05/2018     Lab Results   Component Value Date    CHOL 278 (H) 06/06/2018    CHOL 382 (H) 03/05/2018    CHOL 141 10/14/2017     Lab Results   Component Value Date    TRIG 149 06/06/2018    TRIG 154 03/05/2018    TRIG 64 10/14/2017     Lab Results   Component Value Date    HDL 67 06/06/2018    HDL 81 03/05/2018    HDL 38 (L) 10/14/2017     No components found for: LDLCALC  Lab Results   Component Value Date     (H) 06/06/2018     (H) 03/05/2018    LDL 79 10/14/2017     No results found for: HDLLDLRATIO  No components found for: CHOLHDL  Lab Results   Component Value Date    HGBA1C 8.1 (H) 03/05/2018     Lab Results   Component Value Date    TSH >100.000 (H) 07/17/2018    Q0TOWTF 24.1 (L) 06/08/2017           ASSESSMENT AND PLAN  Mr. Sutton has no clinical evidence of progression of coronary artery disease.  As mentioned above, lipid profile is being checked.  She follows up with endocrinology on a regular basis.  I've continued antiplatelet therapy with aspirin and Brilinta, lipid-lowering therapy with Repatha & atorvastatin, antihypertensive therapy with Coreg, lisinopril, antianginal therapy with isosorbide mononitrate.    Rhea was seen today for follow-up.    Diagnoses and all orders for this visit:    Essential hypertension    Other hyperlipidemia  -     Lipid Panel;  Future    Coronary artery disease involving native coronary artery of native heart without angina pectoris        Patient's Body mass index is 42.47 kg/m². BMI is above normal parameters. Recommendations include: exercise counseling and nutrition counseling.        Mian Montana MD  12/13/2018  2:36 PM

## 2019-01-02 ENCOUNTER — DOCUMENTATION (OUTPATIENT)
Dept: CARDIOLOGY | Facility: CLINIC | Age: 49
End: 2019-01-02

## 2019-01-03 ENCOUNTER — HOSPITAL ENCOUNTER (INPATIENT)
Facility: HOSPITAL | Age: 49
LOS: 4 days | Discharge: HOME | End: 2019-01-07
Attending: INTERNAL MEDICINE | Admitting: INTERNAL MEDICINE

## 2019-01-03 DIAGNOSIS — I21.19 AMI INFERIOR WALL (HCC): Primary | ICD-10-CM

## 2019-01-03 DIAGNOSIS — I25.10 CORONARY ARTERY DISEASE INVOLVING NATIVE CORONARY ARTERY OF NATIVE HEART WITHOUT ANGINA PECTORIS: ICD-10-CM

## 2019-01-03 PROCEDURE — C1725 CATH, TRANSLUMIN NON-LASER: HCPCS | Performed by: INTERNAL MEDICINE

## 2019-01-03 PROCEDURE — 93005 ELECTROCARDIOGRAM TRACING: CPT | Performed by: INTERNAL MEDICINE

## 2019-01-03 PROCEDURE — 93454 CORONARY ARTERY ANGIO S&I: CPT | Performed by: INTERNAL MEDICINE

## 2019-01-03 PROCEDURE — 25010000002 PHENYLEPHRINE PER 1 ML: Performed by: INTERNAL MEDICINE

## 2019-01-03 PROCEDURE — 25010000002 MIDAZOLAM PER 1 MG: Performed by: INTERNAL MEDICINE

## 2019-01-03 PROCEDURE — C1874 STENT, COATED/COV W/DEL SYS: HCPCS | Performed by: INTERNAL MEDICINE

## 2019-01-03 PROCEDURE — 99281 EMR DPT VST MAYX REQ PHY/QHP: CPT

## 2019-01-03 PROCEDURE — B2111ZZ FLUOROSCOPY OF MULTIPLE CORONARY ARTERIES USING LOW OSMOLAR CONTRAST: ICD-10-PCS | Performed by: INTERNAL MEDICINE

## 2019-01-03 PROCEDURE — 92921: CPT | Performed by: INTERNAL MEDICINE

## 2019-01-03 PROCEDURE — 92928 PRQ TCAT PLMT NTRAC ST 1 LES: CPT | Performed by: INTERNAL MEDICINE

## 2019-01-03 PROCEDURE — 25010000002 BIVALIRUDIN TRIFLUOROACETATE 250 MG RECONSTITUTED SOLUTION: Performed by: INTERNAL MEDICINE

## 2019-01-03 PROCEDURE — 93010 ELECTROCARDIOGRAM REPORT: CPT | Performed by: INTERNAL MEDICINE

## 2019-01-03 PROCEDURE — 02703ZZ DILATION OF CORONARY ARTERY, ONE ARTERY, PERCUTANEOUS APPROACH: ICD-10-PCS | Performed by: INTERNAL MEDICINE

## 2019-01-03 PROCEDURE — 25010000002 FENTANYL CITRATE (PF) 100 MCG/2ML SOLUTION: Performed by: INTERNAL MEDICINE

## 2019-01-03 PROCEDURE — 027036Z DILATION OF CORONARY ARTERY, ONE ARTERY WITH THREE DRUG-ELUTING INTRALUMINAL DEVICES, PERCUTANEOUS APPROACH: ICD-10-PCS | Performed by: INTERNAL MEDICINE

## 2019-01-03 PROCEDURE — C1887 CATHETER, GUIDING: HCPCS | Performed by: INTERNAL MEDICINE

## 2019-01-03 PROCEDURE — C1769 GUIDE WIRE: HCPCS | Performed by: INTERNAL MEDICINE

## 2019-01-03 PROCEDURE — 0 IOPAMIDOL PER 1 ML: Performed by: INTERNAL MEDICINE

## 2019-01-03 PROCEDURE — 93005 ELECTROCARDIOGRAM TRACING: CPT

## 2019-01-03 PROCEDURE — 99223 1ST HOSP IP/OBS HIGH 75: CPT | Performed by: INTERNAL MEDICINE

## 2019-01-03 PROCEDURE — 25010000002 BIVALIRUDIN TRIFLUOROACETATE 250 MG RECONSTITUTED SOLUTION 1 EACH VIAL: Performed by: INTERNAL MEDICINE

## 2019-01-03 PROCEDURE — C1894 INTRO/SHEATH, NON-LASER: HCPCS | Performed by: INTERNAL MEDICINE

## 2019-01-03 PROCEDURE — C9606 PERC D-E COR REVASC W AMI S: HCPCS | Performed by: INTERNAL MEDICINE

## 2019-01-03 DEVICE — XIENCE SIERRA™ EVEROLIMUS ELUTING CORONARY STENT SYSTEM 3.50 MM X 18 MM / RAPID-EXCHANGE
Type: IMPLANTABLE DEVICE | Status: FUNCTIONAL
Brand: XIENCE SIERRA™

## 2019-01-03 DEVICE — XIENCE SIERRA™ EVEROLIMUS ELUTING CORONARY STENT SYSTEM 2.75 MM X 23 MM / RAPID-EXCHANGE
Type: IMPLANTABLE DEVICE | Status: FUNCTIONAL
Brand: XIENCE SIERRA™

## 2019-01-03 DEVICE — XIENCE SIERRA™ EVEROLIMUS ELUTING CORONARY STENT SYSTEM 3.00 MM X 33 MM / RAPID-EXCHANGE
Type: IMPLANTABLE DEVICE | Status: FUNCTIONAL
Brand: XIENCE SIERRA™

## 2019-01-03 RX ORDER — ASPIRIN 81 MG/1
81 TABLET, CHEWABLE ORAL DAILY
Status: DISCONTINUED | OUTPATIENT
Start: 2019-01-04 | End: 2019-01-03 | Stop reason: SDUPTHER

## 2019-01-03 RX ORDER — NITROGLYCERIN 20 MG/100ML
5-200 INJECTION INTRAVENOUS
Status: DISCONTINUED | OUTPATIENT
Start: 2019-01-04 | End: 2019-01-04

## 2019-01-03 RX ORDER — GLIPIZIDE 5 MG/1
2.5 TABLET ORAL
Status: DISCONTINUED | OUTPATIENT
Start: 2019-01-04 | End: 2019-01-04

## 2019-01-03 RX ORDER — CARVEDILOL 12.5 MG/1
12.5 TABLET ORAL 2 TIMES DAILY WITH MEALS
Status: DISCONTINUED | OUTPATIENT
Start: 2019-01-03 | End: 2019-01-04

## 2019-01-03 RX ORDER — LEVOTHYROXINE AND LIOTHYRONINE 19; 4.5 UG/1; UG/1
180 TABLET ORAL DAILY
Status: DISCONTINUED | OUTPATIENT
Start: 2019-01-04 | End: 2019-01-03 | Stop reason: SDUPTHER

## 2019-01-03 RX ORDER — SODIUM CHLORIDE 9 MG/ML
100 INJECTION, SOLUTION INTRAVENOUS CONTINUOUS
Status: DISCONTINUED | OUTPATIENT
Start: 2019-01-03 | End: 2019-01-07 | Stop reason: HOSPADM

## 2019-01-03 RX ORDER — SODIUM CHLORIDE 9 MG/ML
INJECTION, SOLUTION INTRAVENOUS
Status: COMPLETED
Start: 2019-01-03 | End: 2019-01-03

## 2019-01-03 RX ORDER — ATORVASTATIN CALCIUM 40 MG/1
40 TABLET, FILM COATED ORAL DAILY
Status: DISCONTINUED | OUTPATIENT
Start: 2019-01-04 | End: 2019-01-07 | Stop reason: HOSPADM

## 2019-01-03 RX ORDER — MIDAZOLAM HYDROCHLORIDE 1 MG/ML
INJECTION INTRAMUSCULAR; INTRAVENOUS AS NEEDED
Status: DISCONTINUED | OUTPATIENT
Start: 2019-01-03 | End: 2019-01-03 | Stop reason: HOSPADM

## 2019-01-03 RX ORDER — LIDOCAINE HYDROCHLORIDE 20 MG/ML
INJECTION, SOLUTION INFILTRATION; PERINEURAL AS NEEDED
Status: DISCONTINUED | OUTPATIENT
Start: 2019-01-03 | End: 2019-01-03 | Stop reason: HOSPADM

## 2019-01-03 RX ORDER — LISINOPRIL 20 MG/1
20 TABLET ORAL DAILY
Status: DISCONTINUED | OUTPATIENT
Start: 2019-01-04 | End: 2019-01-04

## 2019-01-03 RX ORDER — FENTANYL CITRATE 50 UG/ML
INJECTION, SOLUTION INTRAMUSCULAR; INTRAVENOUS AS NEEDED
Status: DISCONTINUED | OUTPATIENT
Start: 2019-01-03 | End: 2019-01-03 | Stop reason: HOSPADM

## 2019-01-03 RX ORDER — BIVALIRUDIN 250 MG/5ML
INJECTION, POWDER, LYOPHILIZED, FOR SOLUTION INTRAVENOUS AS NEEDED
Status: DISCONTINUED | OUTPATIENT
Start: 2019-01-03 | End: 2019-01-03 | Stop reason: HOSPADM

## 2019-01-03 RX ORDER — ASPIRIN 81 MG/1
81 TABLET, CHEWABLE ORAL DAILY
Status: DISCONTINUED | OUTPATIENT
Start: 2019-01-04 | End: 2019-01-07 | Stop reason: HOSPADM

## 2019-01-03 RX ORDER — LEVOTHYROXINE AND LIOTHYRONINE 19; 4.5 UG/1; UG/1
180 TABLET ORAL 2 TIMES DAILY
Status: DISCONTINUED | OUTPATIENT
Start: 2019-01-03 | End: 2019-01-04

## 2019-01-03 RX ORDER — HYDROCODONE BITARTRATE AND ACETAMINOPHEN 5; 325 MG/1; MG/1
1 TABLET ORAL EVERY 6 HOURS PRN
Status: DISCONTINUED | OUTPATIENT
Start: 2019-01-03 | End: 2019-01-07 | Stop reason: HOSPADM

## 2019-01-03 RX ADMIN — CARVEDILOL 12.5 MG: 12.5 TABLET, FILM COATED ORAL at 23:03

## 2019-01-03 RX ADMIN — HYDROCODONE BITARTRATE AND ACETAMINOPHEN 1 TABLET: 5; 325 TABLET ORAL at 23:03

## 2019-01-03 RX ADMIN — SERTRALINE HYDROCHLORIDE 50 MG: 50 TABLET ORAL at 23:03

## 2019-01-03 RX ADMIN — SODIUM CHLORIDE 1000 ML: 900 INJECTION, SOLUTION INTRAVENOUS at 23:02

## 2019-01-03 RX ADMIN — SODIUM CHLORIDE 1000 ML: 9 INJECTION, SOLUTION INTRAVENOUS at 23:02

## 2019-01-03 RX ADMIN — THYROID, PORCINE 180 MG: 30 TABLET ORAL at 23:03

## 2019-01-04 ENCOUNTER — APPOINTMENT (OUTPATIENT)
Dept: CARDIOLOGY | Facility: HOSPITAL | Age: 49
End: 2019-01-04
Attending: INTERNAL MEDICINE

## 2019-01-04 LAB
ACT BLD: 148 SECONDS
ALBUMIN SERPL-MCNC: 3.6 G/DL (ref 3.4–4.8)
ALBUMIN/GLOB SERPL: 1.2 G/DL (ref 1.1–1.8)
ALP SERPL-CCNC: 106 U/L (ref 38–126)
ALT SERPL W P-5'-P-CCNC: 85 U/L (ref 9–52)
ANION GAP SERPL CALCULATED.3IONS-SCNC: 10 MMOL/L (ref 5–15)
ARTICHOKE IGE QN: 77 MG/DL (ref 1–129)
AST SERPL-CCNC: 173 U/L (ref 14–36)
BH CV ECHO MEAS - ACS: 2.2 CM
BH CV ECHO MEAS - AO MAX PG (FULL): 3.8 MMHG
BH CV ECHO MEAS - AO MAX PG: 9.7 MMHG
BH CV ECHO MEAS - AO MEAN PG (FULL): 2.3 MMHG
BH CV ECHO MEAS - AO MEAN PG: 6 MMHG
BH CV ECHO MEAS - AO ROOT AREA (BSA CORRECTED): 1.4
BH CV ECHO MEAS - AO ROOT AREA: 8.2 CM^2
BH CV ECHO MEAS - AO ROOT DIAM: 3.2 CM
BH CV ECHO MEAS - AO V2 MAX: 156 CM/SEC
BH CV ECHO MEAS - AO V2 MEAN: 119.4 CM/SEC
BH CV ECHO MEAS - AO V2 VTI: 33.9 CM
BH CV ECHO MEAS - AVA(I,A): 2.1 CM^2
BH CV ECHO MEAS - AVA(I,D): 2.1 CM^2
BH CV ECHO MEAS - AVA(V,A): 2.2 CM^2
BH CV ECHO MEAS - AVA(V,D): 2.2 CM^2
BH CV ECHO MEAS - BSA(HAYCOCK): 2.4 M^2
BH CV ECHO MEAS - BSA: 2.3 M^2
BH CV ECHO MEAS - BZI_BMI: 42.5 KILOGRAMS/M^2
BH CV ECHO MEAS - BZI_METRIC_HEIGHT: 168 CM
BH CV ECHO MEAS - BZI_METRIC_WEIGHT: 120 KG
BH CV ECHO MEAS - EDV(CUBED): 124.8 ML
BH CV ECHO MEAS - EDV(TEICH): 118.1 ML
BH CV ECHO MEAS - EF(CUBED): 63.9 %
BH CV ECHO MEAS - EF(TEICH): 55.2 %
BH CV ECHO MEAS - ESV(CUBED): 45 ML
BH CV ECHO MEAS - ESV(TEICH): 52.9 ML
BH CV ECHO MEAS - FS: 28.8 %
BH CV ECHO MEAS - IVS/LVPW: 0.92
BH CV ECHO MEAS - IVSD: 0.98 CM
BH CV ECHO MEAS - LA DIMENSION: 3.2 CM
BH CV ECHO MEAS - LA/AO: 0.98
BH CV ECHO MEAS - LV MASS(C)D: 187 GRAMS
BH CV ECHO MEAS - LV MASS(C)DI: 82.9 GRAMS/M^2
BH CV ECHO MEAS - LV MAX PG: 6 MMHG
BH CV ECHO MEAS - LV MEAN PG: 3.7 MMHG
BH CV ECHO MEAS - LV V1 MAX: 122 CM/SEC
BH CV ECHO MEAS - LV V1 MEAN: 93.7 CM/SEC
BH CV ECHO MEAS - LV V1 VTI: 24.9 CM
BH CV ECHO MEAS - LVIDD: 5 CM
BH CV ECHO MEAS - LVIDS: 3.6 CM
BH CV ECHO MEAS - LVOT AREA: 2.8 CM^2
BH CV ECHO MEAS - LVOT DIAM: 1.9 CM
BH CV ECHO MEAS - LVPWD: 1.1 CM
BH CV ECHO MEAS - MR MAX PG: 58.6 MMHG
BH CV ECHO MEAS - MR MAX VEL: 382.9 CM/SEC
BH CV ECHO MEAS - MV A MAX VEL: 70.2 CM/SEC
BH CV ECHO MEAS - MV E MAX VEL: 86.4 CM/SEC
BH CV ECHO MEAS - MV E/A: 1.2
BH CV ECHO MEAS - MV MAX PG: 4.1 MMHG
BH CV ECHO MEAS - MV MEAN PG: 1.4 MMHG
BH CV ECHO MEAS - MV P1/2T MAX VEL: 97.1 CM/SEC
BH CV ECHO MEAS - MV V2 MAX: 101 CM/SEC
BH CV ECHO MEAS - MV V2 MEAN: 54.8 CM/SEC
BH CV ECHO MEAS - MV V2 VTI: 33.8 CM
BH CV ECHO MEAS - MVA P1/2T LCG: 2.3 CM^2
BH CV ECHO MEAS - MVA(VTI): 2.1 CM^2
BH CV ECHO MEAS - PA MAX PG: 4 MMHG
BH CV ECHO MEAS - PA V2 MAX: 99.4 CM/SEC
BH CV ECHO MEAS - RAP SYSTOLE: 5 MMHG
BH CV ECHO MEAS - RVDD: 1.8 CM
BH CV ECHO MEAS - RVSP: 17.7 MMHG
BH CV ECHO MEAS - SI(AO): 122.9 ML/M^2
BH CV ECHO MEAS - SI(CUBED): 35.4 ML/M^2
BH CV ECHO MEAS - SI(LVOT): 31.1 ML/M^2
BH CV ECHO MEAS - SI(TEICH): 28.9 ML/M^2
BH CV ECHO MEAS - SV(AO): 277.4 ML
BH CV ECHO MEAS - SV(CUBED): 79.8 ML
BH CV ECHO MEAS - SV(LVOT): 70.2 ML
BH CV ECHO MEAS - SV(TEICH): 65.2 ML
BH CV ECHO MEAS - TR MAX VEL: 178 CM/SEC
BILIRUB SERPL-MCNC: 0.2 MG/DL (ref 0.2–1.3)
BUN BLD-MCNC: 14 MG/DL (ref 7–21)
BUN/CREAT SERPL: 13.7 (ref 7–25)
CALCIUM SPEC-SCNC: 8 MG/DL (ref 8.4–10.2)
CHLORIDE SERPL-SCNC: 102 MMOL/L (ref 95–110)
CHOLEST SERPL-MCNC: 137 MG/DL (ref 0–199)
CO2 SERPL-SCNC: 24 MMOL/L (ref 22–31)
CREAT BLD-MCNC: 1.02 MG/DL (ref 0.5–1)
DEPRECATED RDW RBC AUTO: 42.5 FL (ref 36.4–46.3)
ERYTHROCYTE [DISTWIDTH] IN BLOOD BY AUTOMATED COUNT: 13 % (ref 11.5–14.5)
GFR SERPL CREATININE-BSD FRML MDRD: 58 ML/MIN/1.73 (ref 58–135)
GLOBULIN UR ELPH-MCNC: 3 GM/DL (ref 2.3–3.5)
GLUCOSE BLD-MCNC: 225 MG/DL (ref 60–100)
GLUCOSE BLDC GLUCOMTR-MCNC: 150 MG/DL (ref 70–130)
GLUCOSE BLDC GLUCOMTR-MCNC: 158 MG/DL (ref 70–130)
HBA1C MFR BLD: 12 % (ref 4–5.6)
HCT VFR BLD AUTO: 37 % (ref 35–45)
HDLC SERPL-MCNC: 49 MG/DL (ref 60–200)
HGB BLD-MCNC: 12.6 G/DL (ref 12–15.5)
LDLC/HDLC SERPL: 1.58 {RATIO} (ref 0–3.22)
LV EF 2D ECHO EST: 55 %
MAXIMAL PREDICTED HEART RATE: 172 BPM
MCH RBC QN AUTO: 30.4 PG (ref 26.5–34)
MCHC RBC AUTO-ENTMCNC: 34.1 G/DL (ref 31.4–36)
MCV RBC AUTO: 89.2 FL (ref 80–98)
PLATELET # BLD AUTO: 236 10*3/MM3 (ref 150–450)
PMV BLD AUTO: 10 FL (ref 8–12)
POTASSIUM BLD-SCNC: 4 MMOL/L (ref 3.5–5.1)
PROT SERPL-MCNC: 6.6 G/DL (ref 6.3–8.6)
RBC # BLD AUTO: 4.15 10*6/MM3 (ref 3.77–5.16)
SODIUM BLD-SCNC: 136 MMOL/L (ref 137–145)
STRESS TARGET HR: 146 BPM
TRIGL SERPL-MCNC: 53 MG/DL (ref 20–199)
TROPONIN I SERPL-MCNC: 34.3 NG/ML
TSH SERPL DL<=0.05 MIU/L-ACNC: 79.9 MIU/ML (ref 0.46–4.68)
WBC NRBC COR # BLD: 15.74 10*3/MM3 (ref 3.2–9.8)

## 2019-01-04 PROCEDURE — 84484 ASSAY OF TROPONIN QUANT: CPT | Performed by: INTERNAL MEDICINE

## 2019-01-04 PROCEDURE — 99223 1ST HOSP IP/OBS HIGH 75: CPT | Performed by: NURSE PRACTITIONER

## 2019-01-04 PROCEDURE — 93306 TTE W/DOPPLER COMPLETE: CPT

## 2019-01-04 PROCEDURE — 99232 SBSQ HOSP IP/OBS MODERATE 35: CPT | Performed by: INTERNAL MEDICINE

## 2019-01-04 PROCEDURE — 80050 GENERAL HEALTH PANEL: CPT | Performed by: INTERNAL MEDICINE

## 2019-01-04 PROCEDURE — 82962 GLUCOSE BLOOD TEST: CPT

## 2019-01-04 PROCEDURE — 94760 N-INVAS EAR/PLS OXIMETRY 1: CPT

## 2019-01-04 PROCEDURE — 94799 UNLISTED PULMONARY SVC/PX: CPT

## 2019-01-04 PROCEDURE — 93010 ELECTROCARDIOGRAM REPORT: CPT | Performed by: INTERNAL MEDICINE

## 2019-01-04 PROCEDURE — 93005 ELECTROCARDIOGRAM TRACING: CPT | Performed by: INTERNAL MEDICINE

## 2019-01-04 PROCEDURE — 80061 LIPID PANEL: CPT | Performed by: INTERNAL MEDICINE

## 2019-01-04 PROCEDURE — 83036 HEMOGLOBIN GLYCOSYLATED A1C: CPT | Performed by: NURSE PRACTITIONER

## 2019-01-04 PROCEDURE — 93306 TTE W/DOPPLER COMPLETE: CPT | Performed by: INTERNAL MEDICINE

## 2019-01-04 RX ORDER — CARVEDILOL 6.25 MG/1
6.25 TABLET ORAL 2 TIMES DAILY WITH MEALS
Status: DISCONTINUED | OUTPATIENT
Start: 2019-01-04 | End: 2019-01-07 | Stop reason: HOSPADM

## 2019-01-04 RX ORDER — ISOSORBIDE MONONITRATE 60 MG/1
60 TABLET, EXTENDED RELEASE ORAL
Status: DISCONTINUED | OUTPATIENT
Start: 2019-01-04 | End: 2019-01-04

## 2019-01-04 RX ORDER — LISINOPRIL 20 MG/1
20 TABLET ORAL DAILY
Status: DISCONTINUED | OUTPATIENT
Start: 2019-01-05 | End: 2019-01-07 | Stop reason: HOSPADM

## 2019-01-04 RX ORDER — LISINOPRIL 40 MG/1
40 TABLET ORAL DAILY
Status: DISCONTINUED | OUTPATIENT
Start: 2019-01-04 | End: 2019-01-04

## 2019-01-04 RX ORDER — SODIUM CHLORIDE 0.9 % (FLUSH) 0.9 %
3 SYRINGE (ML) INJECTION EVERY 12 HOURS SCHEDULED
Status: DISCONTINUED | OUTPATIENT
Start: 2019-01-04 | End: 2019-01-07 | Stop reason: HOSPADM

## 2019-01-04 RX ORDER — DEXTROSE MONOHYDRATE 25 G/50ML
25 INJECTION, SOLUTION INTRAVENOUS
Status: DISCONTINUED | OUTPATIENT
Start: 2019-01-04 | End: 2019-01-07 | Stop reason: HOSPADM

## 2019-01-04 RX ORDER — SODIUM CHLORIDE 0.9 % (FLUSH) 0.9 %
5 SYRINGE (ML) INJECTION AS NEEDED
Status: DISCONTINUED | OUTPATIENT
Start: 2019-01-04 | End: 2019-01-07 | Stop reason: HOSPADM

## 2019-01-04 RX ORDER — LEVOTHYROXINE SODIUM 0.15 MG/1
150 TABLET ORAL
Status: DISCONTINUED | OUTPATIENT
Start: 2019-01-05 | End: 2019-01-07 | Stop reason: HOSPADM

## 2019-01-04 RX ORDER — NICOTINE POLACRILEX 4 MG
15 LOZENGE BUCCAL
Status: DISCONTINUED | OUTPATIENT
Start: 2019-01-04 | End: 2019-01-07 | Stop reason: HOSPADM

## 2019-01-04 RX ORDER — ISOSORBIDE MONONITRATE 30 MG/1
30 TABLET, EXTENDED RELEASE ORAL
Status: DISCONTINUED | OUTPATIENT
Start: 2019-01-05 | End: 2019-01-07 | Stop reason: HOSPADM

## 2019-01-04 RX ADMIN — NITROGLYCERIN 5 MCG/MIN: 20 INJECTION INTRAVENOUS at 00:05

## 2019-01-04 RX ADMIN — TICAGRELOR 90 MG: 90 TABLET ORAL at 20:43

## 2019-01-04 RX ADMIN — SODIUM CHLORIDE, PRESERVATIVE FREE 3 ML: 5 INJECTION INTRAVENOUS at 20:43

## 2019-01-04 RX ADMIN — ATORVASTATIN CALCIUM 40 MG: 40 TABLET, FILM COATED ORAL at 08:37

## 2019-01-04 RX ADMIN — Medication 2.5 MG: at 06:12

## 2019-01-04 RX ADMIN — TICAGRELOR 90 MG: 90 TABLET ORAL at 08:39

## 2019-01-04 RX ADMIN — ASPIRIN 81 MG CHEWABLE TABLET 81 MG: 81 TABLET CHEWABLE at 08:36

## 2019-01-04 RX ADMIN — SERTRALINE HYDROCHLORIDE 50 MG: 50 TABLET ORAL at 20:43

## 2019-01-04 RX ADMIN — ISOSORBIDE MONONITRATE 60 MG: 60 TABLET, EXTENDED RELEASE ORAL at 08:37

## 2019-01-04 RX ADMIN — SODIUM CHLORIDE 100 ML/HR: 900 INJECTION, SOLUTION INTRAVENOUS at 18:50

## 2019-01-04 RX ADMIN — Medication: at 11:19

## 2019-01-04 RX ADMIN — SODIUM CHLORIDE 100 ML/HR: 900 INJECTION, SOLUTION INTRAVENOUS at 13:45

## 2019-01-04 RX ADMIN — THYROID, PORCINE 180 MG: 30 TABLET ORAL at 08:39

## 2019-01-04 RX ADMIN — Medication: at 08:38

## 2019-01-04 RX ADMIN — CARVEDILOL 12.5 MG: 12.5 TABLET, FILM COATED ORAL at 08:37

## 2019-01-04 RX ADMIN — LISINOPRIL 40 MG: 40 TABLET ORAL at 08:36

## 2019-01-04 RX ADMIN — SODIUM CHLORIDE, PRESERVATIVE FREE 3 ML: 5 INJECTION INTRAVENOUS at 08:44

## 2019-01-04 RX ADMIN — Medication: at 08:37

## 2019-01-04 NOTE — H&P
[facility] Cardiology  HISTORY AND PHYSICAL  Rhea Sutton  48 y.o. female     Primary cardiologist Dr. Barragan    Chief complaint -chest pain      History of present Illness 47-year-old lady who has history of multiple stents in the right coronary artery, circumflex artery and also diffuse disease in the LAD and last cardiac catheterization was in 2070 October came with sudden onset of chest pain with diaphoresis or shortness of breath was found to have acute inferior wall MI, she has been on medical therapy with statins, PCSK9 Inhibitors, Brilinta.  She had seen Dr. Barragan 2 weeks ago.              Allergies   Allergen Reactions   • Penicillins Swelling     Of the throat   • Adhesive Tape Other (See Comments)     Silk tape- blisters   • Ciprofloxacin    • Coconut Flavor Swelling   • Latex Hives         Past Medical History:   Diagnosis Date   • Acquired hypothyroidism    • Acute pharyngitis    • Encounter for health maintenance examination    • Essential hypertension    • Goiter     - total thyroidectomy July 2015   • Hashimoto's thyroiditis    • Headache    • Heart disease    • Hyperglycemia     , unspecified   • Kidney failure stage III   • Low back pain    • Malaise and fatigue    • Otitis externa    • Postoperative hypothyroidism    • Tobacco dependence syndrome    • URI (upper respiratory infection)          Past Surgical History:   Procedure Laterality Date   • APPENDECTOMY     • CARDIAC CATHETERIZATION     • CARDIAC CATHETERIZATION N/A 10/13/2017    Procedure: Left Heart Cath, PCI if indicated;  Surgeon: Mian Montana MD;  Location: [facility] INVASIVE LOCATION;  Service:    • CHOLECYSTECTOMY     • COLON RESECTION      meckel resection   • CORONARY STENT PLACEMENT     • DILATATION AND CURETTAGE     • PA RT/LT HEART CATHETERS N/A 10/13/2017    Procedure: Percutaneous Coronary Intervention;  Surgeon: Mian Montana MD;  Location: [facility] INVASIVE LOCATION;   Service: Cardiovascular   • TOTAL ABDOMINAL HYSTERECTOMY WITH SALPINGO OOPHORECTOMY     • TOTAL THYROIDECTOMY           Family History   Problem Relation Age of Onset   • Diabetes Other    • Hyperlipidemia Other    • Hypertension Other    • Cancer Mother    • Diabetes Mother    • Coronary artery disease Mother    • Kidney disease Mother    • COPD Mother    • Coronary artery disease Father    • Early death Father    • Diabetes Paternal Aunt          Social History     Socioeconomic History   • Marital status:      Spouse name: Not on file   • Number of children: Not on file   • Years of education: Not on file   • Highest education level: Not on file   Social Needs   • Financial resource strain: Not on file   • Food insecurity - worry: Not on file   • Food insecurity - inability: Not on file   • Transportation needs - medical: Not on file   • Transportation needs - non-medical: Not on file   Occupational History   • Not on file   Tobacco Use   • Smoking status: Former Smoker     Packs/day: 0.00     Types: Cigarettes     Start date: 8/12/2015   • Smokeless tobacco: Never Used   • Tobacco comment: quit on 10/11/17   Substance and Sexual Activity   • Alcohol use: No   • Drug use: No   • Sexual activity: Defer   Other Topics Concern   • Not on file   Social History Narrative   • Not on file         Current Facility-Administered Medications   Medication Dose Route Frequency Provider Last Rate Last Dose   • Bivalirudin Trifluoroacetate (ANGIOMAX) 250 mg in Sodium chloride 0.9 % 50 mL (5 mg/mL) infusion    Continuous PRTony Amador MD   Stopped at 01/03/19 2107   • Bivalirudin Trifluoroacetate (ANGIOMAX) injection    PRTony Amador MD   89.25 mg at 01/03/19 2019   • fentaNYL citrate (PF) (SUBLIMAZE) injection    PRTony Amador MD   25 mcg at 01/03/19 2011   • heparin 1000 units in sodium chloride 0.9% IV infusion    PRTony Amador MD   1,000 mL at 01/03/19 2009   • heparin  5000 units in sodium chloride 0.9% IV infusion    PRN Tony Gonzalez MD   500 mL at 01/03/19 2009   • iopamidol (ISOVUE-370) 76 % injection    PRN Tony Gonzalez MD   266 mL at 01/03/19 2110   • lidocaine (XYLOCAINE) 2% injection    PRN Tony Gonzalez MD   20 mL at 01/03/19 2008   • midazolam (VERSED) injection    PRN Tony Gonzalez MD   1 mg at 01/03/19 2011   • phenylephrine (LEATHA-SYNEPHRINE) injection    PRN Tony Gonzalez MD   100 mcg at 01/03/19 2040       Home medications-glipizide ER 5 mg daily, repatha, Lipitor, Brilinta, Imdur, Zoloft      Review of Systems     Constitution: Denies any fatigue, fever or chills    HENT: Denies any headache, hearing impairment    Eyes: Denies any blurring of vision, or photophobia     Cardivascular - As per history of present illness     Respiratory system-denies any COPD, shortness of breath     Endocrine:   history of hyperlipidemia, diabetes       Musculoskeletal:   history of arthritis with musculoskeletal problems    Gastrointestinal: No nausea, vomiting, or melena    Genitourinary: No dysuria or hematuria    Neurological:   No history of seizure disorder, stroke, memory problems    Psychiatric/Behavioral:        No history of depression, bipolar disorder or schizophrenia     Hematological- no history of easy bruising            OBJECTIVE    There were no vitals taken for this visit.      Physical Exam     Constitutional: is oriented to person, place, and time.     Skin-warm and dry    Well developed and nourished in no acute distress      Head: Normocephalic and atraumatic.     Eyes: Pupils are equal     Neck: Neck supple. No bruit in the carotids,    Cardiovascular: Howell in the fifth intercostal space, regular rate, and  Rhythm,  S1 greater than S2, no S3 or S4, no gallop       Pulmonary/Chest:   Air  Entry is equal on both sides  No wheezing or crackles,      Abdominal: Soft.  No hepatosplenomegaly, bowel sounds are  present    Musculoskeletal: No kyphoscoliosis    Neurological: is alert and oriented to person, place, and time.    cranial nerve are intact .   No motor or sensory deficit    Extremities-no edema, no radial femoral delay      Psychiatric: He has a normal mood and affect.                  His behavior is normal.        Lab Results (last 24 hours)     ** No results found for the last 24 hours. **                 A/P  Acute inferior wall MI with ST elevation in inferior leads discussed with emergency cardiac catheterization and possible PCI, patient consented for PCI.    Hyperlipidemia we'll continue Lipitor and repatha as scheduled.    Hypothyroidism on Synthroid supplements.    Diabetes on glipizide ER.    We will admit her to the ICU after the PCI        Tony Gonzalez MD  1/3/2019  9:18 PM    EMR Dragon/Transcription disclaimer:   Some of this note may be an electronic transcription/translation of spoken language to printed text. The electronic translation of spoken language may permit erroneous, or at times, nonsensical words or phrases to be inadvertently transcribed; Although I have reviewed the note for such errors, some may still exist.

## 2019-01-04 NOTE — PROGRESS NOTES
Discharge Planning Assessment  [facility]     Patient Name: Rhea Sutton  MRN: [id number]  Today's Date: 1/4/2019    Admit Date: 1/3/2019    Discharge Needs Assessment     Row Name 01/04/19 1557       Living Environment    Lives With  grandparent(s)    Current Living Arrangements  home/apartment/condo    Primary Care Provided by  self    Provides Primary Care For  no one    Quality of Family Relationships  supportive    Able to Return to Prior Arrangements  yes    Living Arrangement Comments  Pt resides at home with her grandmother.        Resource/Environmental Concerns    Resource/Environmental Concerns  none    Transportation Concerns  car, none       Transition Planning    Patient/Family Anticipates Transition to  home    Patient/Family Anticipated Services at Transition  none    Transportation Anticipated  family or friend will provide       Discharge Needs Assessment    Concerns to be Addressed  denies needs/concerns at this time;adjustment to diagnosis/illness    Equipment Currently Used at Home  none    Anticipated Changes Related to Illness  none    Current Discharge Risk  chronically ill    Discharge Coordination/Progress  Plans to return home at d/c. Pt denies any needs at this time.         Discharge Plan     Row Name 01/04/19 5235       Plan    Plan Comments  LSW assesment complete. pt resides at home with her grandmother. pt voiced support system. Pt also informed LSW that she was independent prior to hospitalization. Her plan is to return home at d/c and she did not anticipate any needs at this time. LSW/case mgt will follow up as consulted and complete arrangements as ordered.     Row Name 01/04/19 2324       Plan    Plan Comments  LSW attempted to complete LACE assesment however pt was receiving ECHO at bedside. LSW/case mgt will follow up at a later time.         Destination      No service coordination in this encounter.      Durable Medical Equipment      No service coordination in  this encounter.      Dialysis/Infusion      No service coordination in this encounter.      Home Medical Care      No service coordination in this encounter.      Community Resources      No service coordination in this encounter.          Demographic Summary     Row Name 01/04/19 1556       General Information    Admission Type  inpatient    Referral Source  high risk screening    Reason for Consult  discharge planning    Preferred Language  English     Used During This Interaction  no       Contact Information    Contact Information Obtained for          Functional Status     Row Name 01/04/19 1556       Functional Status    Usual Activity Tolerance  good    Current Activity Tolerance  moderate       Functional Status, IADL    Medications  independent    Meal Preparation  independent    Housekeeping  independent    Laundry  independent    Shopping  independent       Mental Status Summary    Recent Changes in Mental Status/Cognitive Functioning  no changes        Psychosocial    No documentation.       Abuse/Neglect    No documentation.       Legal    No documentation.       Substance Abuse    No documentation.       Patient Forms    No documentation.           JEFF Griggs

## 2019-01-04 NOTE — CONSULTS
CVTS CONSULTATION          Patient Care Team:  Anderson Abbott MD as PCP - General (Family Medicine)  Ceci Jj MA as Medical Assistant  Requesting Provider: Mian Montana MD    Chief complaint: CAD      SUBJECTIVE       History of Present Illness:  47-year-old lady who has history of multiple stents in the right coronary artery, circumflex artery and also diffuse disease in the LAD and last cardiac catheterization was in 2070 October came with sudden onset of chest pain with diaphoresis or shortness of breath was found to have acute inferior wall MI, she has been on medical therapy with statins, PCSK9 Inhibitors, Brilinta. She underwent successful PCI to proximal RCA. Additional diffuse disease of the left system and in stent stenosis of PDA.  Dr. Vilchis consulted for surgical revascularization consideration      The following portions of the patient's history were reviewed and updated as appropriate: allergies, current medications, past family history, past medical history, past social history, past surgical history and problem list.  Recent images independently reviewed.  Available laboratory values reviewed.    Review of Systems   Constitutional: Positive for activity change and fatigue.   Eyes: Negative for visual disturbance.   Respiratory: Positive for chest tightness and shortness of breath.    Cardiovascular: Negative for palpitations and leg swelling.   Genitourinary: Negative for difficulty urinating.   Musculoskeletal: Positive for arthralgias and back pain.   Neurological: Positive for headaches. Negative for dizziness and numbness.   Hematological: Does not bruise/bleed easily.   All other systems reviewed and are negative.       Past Medical History:   Diagnosis Date   • Acquired hypothyroidism    • Acute pharyngitis    • Encounter for health maintenance examination    • Essential hypertension    • Goiter     - total thyroidectomy July 2015   • Hashimoto's  thyroiditis    • Headache    • Heart disease    • Hyperglycemia     , unspecified   • Kidney failure stage III   • Low back pain    • Malaise and fatigue    • Otitis externa    • Postoperative hypothyroidism    • Tobacco dependence syndrome    • URI (upper respiratory infection)      Past Surgical History:   Procedure Laterality Date   • APPENDECTOMY     • CARDIAC CATHETERIZATION     • CARDIAC CATHETERIZATION N/A 10/13/2017    Procedure: Left Heart Cath, PCI if indicated;  Surgeon: Mian Montana MD;  Location: [facility] INVASIVE LOCATION;  Service:    • CHOLECYSTECTOMY     • COLON RESECTION      meckel resection   • CORONARY STENT PLACEMENT     • DILATATION AND CURETTAGE     • IA RT/LT HEART CATHETERS N/A 10/13/2017    Procedure: Percutaneous Coronary Intervention;  Surgeon: Mian Montana MD;  Location: [facility] CATH INVASIVE LOCATION;  Service: Cardiovascular   • TOTAL ABDOMINAL HYSTERECTOMY WITH SALPINGO OOPHORECTOMY     • TOTAL THYROIDECTOMY       Family History   Problem Relation Age of Onset   • Diabetes Other    • Hyperlipidemia Other    • Hypertension Other    • Cancer Mother    • Diabetes Mother    • Coronary artery disease Mother    • Kidney disease Mother    • COPD Mother    • Coronary artery disease Father    • Early death Father    • Diabetes Paternal Aunt      Social History     Tobacco Use   • Smoking status: Former Smoker     Packs/day: 0.00     Types: Cigarettes     Start date: 8/12/2015   • Smokeless tobacco: Never Used   • Tobacco comment: quit on 10/11/17   Substance Use Topics   • Alcohol use: No   • Drug use: No     Medications Prior to Admission   Medication Sig Dispense Refill Last Dose   • ARMOUR THYROID 180 MG tablet TAKE ONE TABLET BY MOUTH TWICE DAILY 60 tablet 6 Taking   • aspirin 81 MG chewable tablet Chew 81 mg daily.   Taking   • atorvastatin (LIPITOR) 40 MG tablet    Taking   • Baclofen 2 % cream Apply  topically 3 (Three) Times a Day. Baclofen, Neurontin, and  Lidocaine compound cream to lower back TID for chronic back pain   Taking   • Blood Glucose Monitoring Suppl (FREESTYLE LITE) device    Taking   • carvedilol (COREG) 25 MG tablet 12.5 mg 2 (Two) Times a Day.   Taking   • cholecalciferol (VITAMIN D3) 1000 units tablet Take 1,000 Units by mouth Daily. 5 capsules daily   Taking   • Evolocumab 140 MG/ML solution auto-injector Inject 1 mL under the skin into the appropriate area as directed Every 14 (Fourteen) Days. 2 mL 0 Taking   • FREESTYLE LITE test strip    Taking   • glipiZIDE (GLUCOTROL) 5 MG ER tablet Take 5 mg by mouth Daily. daily   Taking   • HYDROcodone-acetaminophen (NORCO) 5-325 MG per tablet Take 1 tablet by mouth Every 6 (Six) Hours As Needed.   Taking   • isosorbide mononitrate (IMDUR) 30 MG 24 hr tablet Take 1 tablet by mouth Daily. 30 tablet 6 Taking   • lisinopril (PRINIVIL,ZESTRIL) 20 MG tablet Take 20 mg by mouth Daily.   Taking   • nitroglycerin (NITROSTAT) 0.4 MG SL tablet 1 under the tongue as needed for angina, may repeat q5mins for up three doses 30 tablet 3 Taking   • sertraline (ZOLOFT) 50 MG tablet As Needed. daily   Taking   • thyroid (ARMOUR THYROID) 180 MG tablet Take one tablet mon thru sat twide a day and half tab on sunday 60 tablet 5 Taking   • ticagrelor (BRILINTA) 90 MG tablet tablet Take 1 tablet by mouth 2 (Two) Times a Day. 60 tablet 6 Taking       aspirin 81 mg Oral Daily   atorvastatin 40 mg Oral Daily   carvedilol 12.5 mg Oral BID With Meals   glipiZIDE 2.5 mg Oral BID AC   isosorbide mononitrate 60 mg Oral Q24H   lisinopril 40 mg Oral Daily   permethrin  Topical Q3 Days   sertraline 50 mg Oral Nightly   sodium chloride 3 mL Intravenous Q12H   thyroid 180 mg Oral BID   ticagrelor 90 mg Oral BID     Allergies:  Penicillins; Adhesive tape; Ciprofloxacin; Coconut flavor; and Latex    OBJECTIVE        Vital Signs  Temp:  [97.6 °F (36.4 °C)-98 °F (36.7 °C)] 97.6 °F (36.4 °C)  Heart Rate:  [53-77] 60  Resp:  [10-25] 18  BP:  "(109-175)/() 116/74  Arterial Line BP: (137-337)/() 337/336    Flowsheet Rows      First Filed Value   Admission Height  167.6 cm (66\") Documented at 01/03/2019 2215   Admission Weight  118 kg (260 lb) Documented at 01/03/2019 2215        167.6 cm (66\")    Physical Exam   Constitutional: She is oriented to person, place, and time. She appears well-developed. She appears lethargic.   HENT:   Head: Normocephalic.   Eyes: EOM are normal.   Neck: Neck supple. Carotid bruit is not present.   Cardiovascular: Normal rate and normal heart sounds.   Pulses:       Dorsalis pedis pulses are 2+ on the right side, and 2+ on the left side.        Posterior tibial pulses are 2+ on the right side, and 2+ on the left side.   Pulmonary/Chest: Effort normal and breath sounds normal.   Abdominal: Soft. Bowel sounds are normal.   Musculoskeletal: Normal range of motion. She exhibits no edema.   Neurological: She is oriented to person, place, and time. She appears lethargic.   Skin: Skin is warm and dry. Capillary refill takes less than 2 seconds.   Psychiatric: She has a normal mood and affect. Thought content normal.   Vitals reviewed.      Results Review:   Lab Results   Component Value Date    WBC 15.74 (H) 01/04/2019    HGB 12.6 01/04/2019    HCT 37.0 01/04/2019    MCV 89.2 01/04/2019     01/04/2019     Lab Results   Component Value Date    GLUCOSE 225 (H) 01/04/2019    BUN 14 01/04/2019    CREATININE 1.02 (H) 01/04/2019    EGFRIFNONA 58 01/04/2019    BCR 13.7 01/04/2019    K 4.0 01/04/2019    CO2 24.0 01/04/2019    CALCIUM 8.0 (L) 01/04/2019    ALBUMIN 3.60 01/04/2019     (H) 01/04/2019    ALT 85 (H) 01/04/2019   Contains critical data Troponin   Order: [id number]   Status:  Final result   Visible to patient:  No (Not Released)    Ref Range & Units 03:11   Troponin I <=0.034 ng/mL 34.300               Results for orders placed during the hospital encounter of 01/03/19   Adult Transthoracic Echo Complete W/ " Cont if Necessary Per Protocol    Narrative · Mild tricuspid valve regurgitation is present.  · Estimated EF = 55%.  · Left ventricular systolic function is normal.  · Left ventricular diastolic dysfunction (grade I a) consistent with   impaired relaxation.  · Left atrial cavity size is mildly dilated.        Selective coronary angiography: 1/3/19  Dominance: Right dominant     Left Main coronary artery: Large caliber vessel divides and left circumflex artery and LAD     Left anterior descending artery: Diffusely disease that is proximally there is 80-90% stenosis and distal LAD goes all the way to the apex and there is diffuse disease in the LAD     Left circumflex:  Large caliber vessel previously placed in the left circumflex artery is widely patent     Right coronary artery:  Proximal to mid was 100% inside the stent      Percutaneous coronary intervention: After giving Angiomax bolus and drip a right guide was placed in the right coronary artery and a run-through wire was crossed through the lesion and was dilated with a 2.0 X 20 Trek balloon and subsequently distal RCA was stented with a 2.75 x 23 Xience alma stent, mid to distal was stented with a 3.0 x 33 stent and proximally with a 3.5 x 18 Xience alma stent and subsequently postdilated with a 3.0 x 35 noncompliant mozec balloon and subsequently there was a ostial stenosis of the PDA and it was attempted to place a stent but stent was getting stuck in the previously placed stent struts hence /balloon angioplasty was done into the ostial PDA reducing 90% stenosis to 30-40%.        Conclusion : Successful PCI to the proximal to mid right coronary artery reducing 100% stenosis to 0% obtaining BEA 3 flow with increased Xience Alma stents        Plan: Continue Brilinta and aspirin and statins, will need to bring her back and consider PCI to the LAD and the PDA at a later date                ASSESSMENT/PLAN         AMI inferior wall  (CMS/[facility])      Assessment & Plan      Ms. Sutton cardiac cath demonstrates severe CAD and elevated troponin levels, currently chest pain free. Dr. Vilchis independently reviewed cath films regarding options for medical management, PCI, CABG    Coronary Artery Bypass Grafting to be considered following 6 weeks post PCI stabilization.  Carotid Duplex Scan, BLE Vein Mapping, PFT will [facility] prior to office evaluation with Dr. Vilchis.      Thank you for the opportunity to participate in this patient's care.       I discussed the patients findings and my recommendations with Dr. Gonzalez.              This document has been electronically signed by DUC Dash on January 4, 2019 3:55 PM

## 2019-01-04 NOTE — CONSULTS
Adult Nutrition  Assessment    Patient Name:  Rhea Sutton  YOB: 1970  MRN: [id number]  Admit Date:  1/3/2019    Assessment Date:  1/4/2019    Comments:  Pt presents at 200% IBW with a BMI of 42.05 (which is compatible with morbid obesity.  She was admitted with AMI and is s/p PCI with stents X 3 placed.  Pt unavailable for education at this time.  RD will monitor.     Reason for Assessment     Row Name 01/04/19 1319          Reason for Assessment    Reason For Assessment  per organizational policy     Diagnosis  cardiac disease     Identified At Risk by Screening Criteria  BMI         Nutrition/Diet History     Row Name 01/04/19 1319          Nutrition/Diet History    Typical Food/Fluid Intake  Pt in room with staff.  She is being treated for Lice.           Anthropometrics     Row Name 01/04/19 0526          Anthropometrics    Weight  120 kg (264 lb 15.9 oz)         Labs/Tests/Procedures/Meds     Row Name 01/04/19 1320          Labs/Procedures/Meds    Lab Results Reviewed  reviewed, pertinent        Diagnostic Tests/Procedures    Diagnostic Test/Procedure Reviewed  reviewed, pertinent     Diagnostic Test/Procedures Comments  s/p PCI with stent        Medications    Pertinent Medications Reviewed  reviewed, pertinent         Physical Findings     Row Name 01/04/19 1320          Physical Findings    Overall Physical Appearance  obese                     Electronically signed by:  Keyana Juarez RD  01/04/19 1:25 PM

## 2019-01-04 NOTE — PLAN OF CARE
Problem: Patient Care Overview  Goal: Plan of Care Review  Outcome: Ongoing (interventions implemented as appropriate)    Goal: Individualization and Mutuality  Outcome: Ongoing (interventions implemented as appropriate)    Goal: Discharge Needs Assessment  Outcome: Ongoing (interventions implemented as appropriate)    Goal: Interprofessional Rounds/Family Conf  Outcome: Ongoing (interventions implemented as appropriate)      Problem: Cardiac Catheterization (Diagnostic/Interventional) (Adult)  Goal: Signs and Symptoms of Listed Potential Problems Will be Absent, Minimized or Managed (Cardiac Catheterization)  Outcome: Ongoing (interventions implemented as appropriate)    Goal: Anesthesia/Sedation Recovery  Outcome: Ongoing (interventions implemented as appropriate)      Problem: Pain, Chronic (Adult)  Goal: Identify Related Risk Factors and Signs and Symptoms  Outcome: Ongoing (interventions implemented as appropriate)    Goal: Acceptable Pain/Comfort Level and Functional Ability  Outcome: Ongoing (interventions implemented as appropriate)      Problem: Diabetes, Type 2 (Adult)  Goal: Signs and Symptoms of Listed Potential Problems Will be Absent, Minimized or Managed (Diabetes, Type 2)  Outcome: Ongoing (interventions implemented as appropriate)

## 2019-01-04 NOTE — PAYOR COMM NOTE
"Chris Davies (48 y.o. Female)     Date of Birth Social Security Number Address Home Phone MRN    1970  P O  [address] [place] [address] [phone] [id number]    Sikh Marital Status          Christianity        Admission Date Admission Type Admitting Provider Attending Provider Department, Room/Bed    1/3/19 Elective Tony Gonzalez MD Sreekumar, Bhaskaran Nair, MD [facility] CRITICAL CARE, 11/A    Discharge Date Discharge Disposition Discharge Destination                       Attending Provider:  Mian Montana MD    Allergies:  Penicillins, Adhesive Tape, Ciprofloxacin, Coconut Flavor, Latex    Isolation:  Contact   Infection:  Lice (01/04/19)   Code Status:  CPR    Ht:  167.6 cm (66\")   Wt:  120 kg (264 lb 15.9 oz)    Admission Cmt:  None   Principal Problem:  None                Active Insurance as of 1/3/2019     Primary Coverage     Payor Plan Insurance Group Employer/Plan Group    [employer] [place] [employer] [place]      Payor Plan Address Payor Plan Phone Number Payor Plan Fax Number Effective Dates    [address] [address] [phone]  8/11/2016 - None Entered    [facility] [id number]       Subscriber Name Subscriber Birth Date Member ID       CHRIS DAVIES 1970 [id number]                 Emergency Contacts      (Rel.) Home Phone Work Phone Mobile Phone    Danielle Machuca (Grandparent) [phone] -- --               History & Physical      Tony Gonzalez MD at 1/3/2019  9:17 PM            [facility] Cardiology  HISTORY AND PHYSICAL  Chris Davies  48 y.o. female     Primary cardiologist Dr. Barragan    Chief complaint -chest pain      History of present Illness 47-year-old lady who has history of multiple stents in the right coronary artery, circumflex artery and also diffuse disease in the LAD and last cardiac catheterization was in 2070 October came with sudden onset of " chest pain with diaphoresis or shortness of breath was found to have acute inferior wall MI, she has been on medical therapy with statins, PCSK9 Inhibitors, Brilinta.  She had seen Dr. Barragan 2 weeks ago.              Allergies   Allergen Reactions   • Penicillins Swelling     Of the throat   • Adhesive Tape Other (See Comments)     Silk tape- blisters   • Ciprofloxacin    • Coconut Flavor Swelling   • Latex Hives         Past Medical History:   Diagnosis Date   • Acquired hypothyroidism    • Acute pharyngitis    • Encounter for health maintenance examination    • Essential hypertension    • Goiter     - total thyroidectomy July 2015   • Hashimoto's thyroiditis    • Headache    • Heart disease    • Hyperglycemia     , unspecified   • Kidney failure stage III   • Low back pain    • Malaise and fatigue    • Otitis externa    • Postoperative hypothyroidism    • Tobacco dependence syndrome    • URI (upper respiratory infection)          Past Surgical History:   Procedure Laterality Date   • APPENDECTOMY     • CARDIAC CATHETERIZATION     • CARDIAC CATHETERIZATION N/A 10/13/2017    Procedure: Left Heart Cath, PCI if indicated;  Surgeon: Mian Montana MD;  Location: [facility] INVASIVE LOCATION;  Service:    • CHOLECYSTECTOMY     • COLON RESECTION      meckel resection   • CORONARY STENT PLACEMENT     • DILATATION AND CURETTAGE     • [place] RT/LT HEART CATHETERS N/A 10/13/2017    Procedure: Percutaneous Coronary Intervention;  Surgeon: Mian Montana MD;  Location: [facility] INVASIVE LOCATION;  Service: Cardiovascular   • TOTAL ABDOMINAL HYSTERECTOMY WITH SALPINGO OOPHORECTOMY     • TOTAL THYROIDECTOMY           Family History   Problem Relation Age of Onset   • Diabetes Other    • Hyperlipidemia Other    • Hypertension Other    • Cancer Mother    • Diabetes Mother    • Coronary artery disease Mother    • Kidney disease Mother    • COPD Mother    • Coronary artery disease Father    • Early death Father     • Diabetes Paternal Aunt          Social History     Socioeconomic History   • Marital status:      Spouse name: Not on file   • Number of children: Not on file   • Years of education: Not on file   • Highest education level: Not on file   Social Needs   • Financial resource strain: Not on file   • Food insecurity - worry: Not on file   • Food insecurity - inability: Not on file   • Transportation needs - medical: Not on file   • Transportation needs - non-medical: Not on file   Occupational History   • Not on file   Tobacco Use   • Smoking status: Former Smoker     Packs/day: 0.00     Types: Cigarettes     Start date: 8/12/2015   • Smokeless tobacco: Never Used   • Tobacco comment: quit on 10/11/17   Substance and Sexual Activity   • Alcohol use: No   • Drug use: No   • Sexual activity: Defer   Other Topics Concern   • Not on file   Social History Narrative   • Not on file         Current Facility-Administered Medications   Medication Dose Route Frequency Provider Last Rate Last Dose   • Bivalirudin Trifluoroacetate (ANGIOMAX) 250 mg in Sodium chloride 0.9 % 50 mL (5 mg/mL) infusion    Continuous PRTony Amador MD   Stopped at 01/03/19 2107   • Bivalirudin Trifluoroacetate (ANGIOMAX) injection    PRN Tony Gonzalez MD   89.25 mg at 01/03/19 2019   • fentaNYL citrate (PF) (SUBLIMAZE) injection    PRTony Amador MD   25 mcg at 01/03/19 2011   • heparin 1000 units in sodium chloride 0.9% IV infusion    PRTony Amador MD   1,000 mL at 01/03/19 2009   • heparin 5000 units in sodium chloride 0.9% IV infusion    PRTony Amador MD   500 mL at 01/03/19 2009   • iopamidol (ISOVUE-370) 76 % injection    PRN Tony Gonzalez MD   266 mL at 01/03/19 2110   • lidocaine (XYLOCAINE) 2% injection    PRTony Amador MD   20 mL at 01/03/19 2008   • midazolam (VERSED) injection    PRTony Amador MD   1 mg at 01/03/19 2011   • phenylephrine  (LEATHA-SYNEPHRINE) injection    HANNAHN Tony Gonzalez MD   100 mcg at 01/03/19 2040       Home medications-glipizide ER 5 mg daily, repatha, Lipitor, Brilinta, Imdur, Zoloft      Review of Systems     Constitution: Denies any fatigue, fever or chills    HENT: Denies any headache, hearing impairment    Eyes: Denies any blurring of vision, or photophobia     Cardivascular - As per history of present illness     Respiratory system-denies any COPD, shortness of breath     Endocrine:   history of hyperlipidemia, diabetes       Musculoskeletal:   history of arthritis with musculoskeletal problems    Gastrointestinal: No nausea, vomiting, or melena    Genitourinary: No dysuria or hematuria    Neurological:   No history of seizure disorder, stroke, memory problems    Psychiatric/Behavioral:        No history of depression, bipolar disorder or schizophrenia     Hematological- no history of easy bruising            OBJECTIVE    There were no vitals taken for this visit.      Physical Exam     Constitutional: is oriented to person, place, and time.     Skin-warm and dry    Well developed and nourished in no acute distress      Head: Normocephalic and atraumatic.     Eyes: Pupils are equal     Neck: Neck supple. No bruit in the carotids,    Cardiovascular: [place] in the fifth intercostal space, regular rate, and  Rhythm,  S1 greater than S2, no S3 or S4, no gallop       Pulmonary/Chest:   Air  Entry is equal on both sides  No wheezing or crackles,      Abdominal: Soft.  No hepatosplenomegaly, bowel sounds are present    Musculoskeletal: No kyphoscoliosis    Neurological: is alert and oriented to person, place, and time.    cranial nerve are intact .   No motor or sensory deficit    Extremities-no edema, no radial femoral delay      Psychiatric: He has a normal mood and affect.                  His behavior is normal.        Lab Results (last 24 hours)     ** No results found for the last 24 hours. **                 A/P  Acute  inferior wall MI with ST elevation in inferior leads discussed with emergency cardiac catheterization and possible PCI, patient consented for PCI.    Hyperlipidemia we'll continue Lipitor and repatha as scheduled.    Hypothyroidism on Synthroid supplements.    Diabetes on glipizide ER.    We will admit her to the ICU after the PCI        Tony Gonzalez MD  1/3/2019  9:18 PM    EMR Dragon/Transcription disclaimer:   Some of this note may be an electronic transcription/translation of spoken language to printed text. The electronic translation of spoken language may permit erroneous, or at times, nonsensical words or phrases to be inadvertently transcribed; Although I have reviewed the note for such errors, some may still exist.       Electronically signed by Tony Gonzalez MD at 1/3/2019  9:21 PM          Emergency Department Notes      Kati Osman, RN at 1/3/2019  7:54 PM        Pt pulled into ED room 6, EKG obtained et pt sent directly to cath lab       Kati Osman, RN  01/03/19 1956      Electronically signed by Kati Osman, RN at 1/3/2019  7:56 PM       Hospital Medications (all)       Dose Frequency Start End    aspirin chewable tablet 81 mg 81 mg Daily 1/4/2019     Sig - Route: Chew 1 tablet Daily. - Oral    atorvastatin (LIPITOR) tablet 40 mg 40 mg Daily 1/4/2019     Sig - Route: Take 1 tablet by mouth Daily. - Oral    carvedilol (COREG) tablet 12.5 mg 12.5 mg 2 Times Daily With Meals 1/3/2019     Sig - Route: Take 1 tablet by mouth 2 (Two) Times a Day With Meals. - Oral    glipiZIDE (GLUCOTROL) half tablet 2.5 mg 2.5 mg 2 Times Daily Before Meals 1/4/2019     Sig - Route: Take 1 half tablet by mouth 2 (Two) Times a Day Before Meals. - Oral    HYDROcodone-acetaminophen (NORCO) 5-325 MG per tablet 1 tablet 1 tablet Every 6 Hours PRN 1/3/2019     Sig - Route: Take 1 tablet by mouth Every 6 (Six) Hours As Needed for Moderate Pain . - Oral    isosorbide mononitrate (IMDUR) 24 hr tablet 60 mg  60 mg Every 24 Hours Scheduled 1/4/2019     Sig - Route: Take 1 tablet by mouth Daily. - Oral    lisinopril (PRINIVIL,ZESTRIL) tablet 40 mg 40 mg Daily 1/4/2019     Sig - Route: Take 1 tablet by mouth Daily. - Oral    nitroglycerin 50 mg/250 mL (0.2 mg/mL) infusion 5-200 mcg/min Titrated 1/4/2019     Sig - Route: Infuse 5-200 mcg/min into a venous catheter Dose Adjusted By Provider As Needed. - Intravenous    permethrin (NIX) 1 % liquid  Every 3 Days 1/4/2019 1/10/2019    Sig - Route: Apply  topically to the appropriate area as directed Every 3 (Three) Days. - Topical    permethrin (NIX) 1 % liquid  Once 1/4/2019 1/4/2019    Sig - Route: Apply  topically to the appropriate area as directed 1 (One) Time. - Topical    permethrin (NIX) 1 % liquid  Once 1/4/2019     Sig - Route: Apply  topically to the appropriate area as directed 1 (One) Time. - Topical    sertraline (ZOLOFT) tablet 50 mg 50 mg Nightly 1/3/2019     Sig - Route: Take 1 tablet by mouth Every Night. - Oral    sodium chloride 0.9 % flush 3 mL 3 mL Every 12 Hours Scheduled 1/4/2019     Sig - Route: Infuse 3 mL into a venous catheter Every 12 (Twelve) Hours. - Intravenous    sodium chloride 0.9 % flush 5 mL 5 mL As Needed 1/4/2019     Sig - Route: Infuse 5 mL into a venous catheter As Needed for Line Care (After Medication Administration or Blood Draw). - Intravenous    Sodium chloride 0.9 % infusion 100 mL/hr Continuous 1/3/2019     Sig - Route: Infuse 100 mL/hr into a venous catheter Continuous. - Intravenous    Thyroid (ARMOUR) tablet 180 mg 180 mg 2 Times Daily 1/3/2019     Sig - Route: Take 6 tablets by mouth 2 (Two) Times a Day. - Oral    ticagrelor (BRILINTA) tablet 90 mg 90 mg 2 Times Daily 1/3/2019     Sig - Route: Take 1 tablet by mouth 2 (Two) Times a Day. - Oral    aspirin chewable tablet 81 mg (Discontinued) 81 mg Daily 1/4/2019 1/3/2019    Sig - Route: Chew 1 tablet Daily. - Oral    Reason for Discontinue: Duplicate order    Bivalirudin  Trifluoroacetate (ANGIOMAX) 250 mg in Sodium chloride 0.9 % 50 mL (5 mg/mL) infusion (Discontinued)  Continuous PRN 1/3/2019 1/3/2019    Sig: Continuous As Needed.    Reason for Discontinue: Patient Discharge    Bivalirudin Trifluoroacetate (ANGIOMAX) injection (Discontinued)  As Needed 1/3/2019 1/3/2019    Sig: As Needed.    Reason for Discontinue: Patient Discharge    fentaNYL citrate (PF) (SUBLIMAZE) injection (Discontinued)  As Needed 1/3/2019 1/3/2019    Sig: As Needed.    Reason for Discontinue: Patient Discharge    heparin 1000 units in sodium chloride 0.9% IV infusion (Discontinued)  As Needed 1/3/2019 1/3/2019    Sig: As Needed.    Reason for Discontinue: Patient Discharge    heparin 5000 units in sodium chloride 0.9% IV infusion (Discontinued)  As Needed 1/3/2019 1/3/2019    Sig: As Needed.    Reason for Discontinue: Patient Discharge    iopamidol (ISOVUE-370) 76 % injection (Discontinued)  As Needed 1/3/2019 1/3/2019    Sig: As Needed.    Reason for Discontinue: Patient Discharge    lidocaine (XYLOCAINE) 2% injection (Discontinued)  As Needed 1/3/2019 1/3/2019    Sig: As Needed.    Reason for Discontinue: Patient Discharge    lisinopril (PRINIVIL,ZESTRIL) tablet 20 mg (Discontinued) 20 mg Daily 1/4/2019 1/4/2019    Sig - Route: Take 1 tablet by mouth Daily. - Oral    midazolam (VERSED) injection (Discontinued)  As Needed 1/3/2019 1/3/2019    Sig: As Needed.    Reason for Discontinue: Patient Discharge    permethrin (NIX) 1 % liquid (Discontinued)  Once 1/4/2019 1/4/2019    Sig - Route: Apply  topically to the appropriate area as directed 1 (One) Time. - Topical    phenylephrine (LEATHA-SYNEPHRINE) injection (Discontinued)  As Needed 1/3/2019 1/3/2019    Sig: As Needed.    Reason for Discontinue: Patient Discharge    Thyroid (ARMOUR) tablet 180 mg (Discontinued) 180 mg Daily 1/4/2019 1/3/2019    Sig - Route: Take 6 tablets by mouth Daily. - Oral    Reason for Discontinue: Duplicate order    ticagrelor  (BRILINTA) tablet (Discontinued)  As Needed 1/3/2019 1/3/2019    Sig: As Needed.    Reason for Discontinue: Patient Discharge            Lab Results (last 24 hours)     Procedure Component Value Units Date/Time    Troponin [[id number]]  (Abnormal) Collected:  01/04/19 0311    Specimen:  Blood Updated:  01/04/19 0419     Troponin I 34.300 ng/mL     Lipid Panel [[id number]]  (Abnormal) Collected:  01/04/19 0311    Specimen:  Blood Updated:  01/04/19 0358     Total Cholesterol 137 mg/dL      Triglycerides 53 mg/dL      HDL Cholesterol 49 mg/dL      LDL Cholesterol  77 mg/dL      LDL/HDL Ratio 1.58    Comprehensive Metabolic Panel [[id number]]  (Abnormal) Collected:  01/04/19 0311    Specimen:  Blood Updated:  01/04/19 0348     Glucose 225 mg/dL      BUN 14 mg/dL      Creatinine 1.02 mg/dL      Sodium 136 mmol/L      Potassium 4.0 mmol/L      Chloride 102 mmol/L      CO2 24.0 mmol/L      Calcium 8.0 mg/dL      Total Protein 6.6 g/dL      Albumin 3.60 g/dL      ALT (SGPT) 85 U/L      AST (SGOT) 173 U/L      Alkaline Phosphatase 106 U/L      Total Bilirubin 0.2 mg/dL      eGFR Non African Amer 58 mL/min/1.73      Globulin 3.0 gm/dL      A/G Ratio 1.2 g/dL      BUN/Creatinine Ratio 13.7     Anion Gap 10.0 mmol/L     CBC (No Diff) [[id number]]  (Abnormal) Collected:  01/04/19 0311    Specimen:  Blood Updated:  01/04/19 0334     WBC 15.74 10*3/mm3      RBC 4.15 10*6/mm3      Hemoglobin 12.6 g/dL      Hematocrit 37.0 %      MCV 89.2 fL      MCH 30.4 pg      MCHC 34.1 g/dL      RDW 13.0 %      RDW-SD 42.5 fl      MPV 10.0 fL      Platelets 236 10*3/mm3     POC Activated Clotting Time [[id number]]  (Normal) Collected:  01/04/19 0059    Specimen:  Blood Updated:  01/04/19 0059     Activated Clotting Time  148 Seconds         Imaging Results (last 24 hours)     ** No results found for the last 24 hours. **        ECG/EMG Results (last 24 hours)     Procedure Component Value Units Date/Time    ECG 12 Lead [[id number]] Collected:   01/03/19 2216     Updated:  01/03/19 2217    Narrative:       Test Reason : Post-cath  Blood Pressure : **/** mmHG  Vent. Rate : 069 BPM     Atrial Rate : 069 BPM     P-R Int : 158 ms          QRS Dur : 100 ms      QT Int : 432 ms       P-R-T Axes : 028 044 -25 degrees     QTc Int : 462 ms    Normal sinus rhythm  Possible Inferior infarct , age undetermined  Abnormal ECG  When compared with ECG of 03-JAN-2019 19:51,  premature ventricular complexes are no longer present  ST no longer elevated in Inferior leads  T wave inversion now evident in Inferior leads  T wave inversion no longer evident in Lateral leads    Referred By:             Confirmed By:     ECG 12 Lead [[id number]] Collected:  01/03/19 1951     Updated:  01/03/19 2222    Narrative:       Test Reason : chest pain  Blood Pressure : **/** mmHG  Vent. Rate : 061 BPM     Atrial Rate : 061 BPM     P-R Int : 120 ms          QRS Dur : 096 ms      QT Int : 464 ms       P-R-T Axes : 061 048 095 degrees     QTc Int : 467 ms    ** Poor data quality, interpretation may be adversely affected  ** Age and gender specific ECG analysis **  Sinus rhythm with sinus arrhythmia with occasional premature ventricular  complexes  ST elevation, consider inferior injury or acute infarct  ** ** ACUTE MI ** **  Abnormal ECG    Confirmed by NATHALIA NEWMAN, B. N. ([id number]) on 1/3/2019 10:22:38 PM    Referred By:             Confirmed By:SUDHA SLOAN MD    ECG 12 Lead [[id number]] Collected:  01/04/19 0705     Updated:  01/04/19 0705    Narrative:       Test Reason : ami  Blood Pressure : **/** mmHG  Vent. Rate : 058 BPM     Atrial Rate : 058 BPM     P-R Int : 174 ms          QRS Dur : 096 ms      QT Int : 448 ms       P-R-T Axes : 037 025 -43 degrees     QTc Int : 439 ms    Sinus bradycardia  Cannot rule out Anterior infarct , age undetermined  Abnormal ECG  When compared with ECG of 03-JAN-2019 22:16,  No significant change was found    Referred By:             Confirmed By:

## 2019-01-04 NOTE — PROGRESS NOTES
CARDIOLOGY PROGRESS NOTE      LOS: 1 day   Patient Care Team:  Anderson Abbott MD as PCP - General (Family Medicine)  Ceci Jj MA as Medical Assistant    Problems/Diagnoses:   Ms. Sutton is a 48-year-old  lady who was admitted to [facility] with prolonged chest pain with EKG evidence of acute inferior wall myocardial infarction.  She underwent emergent cardiac catheterization with PCI to right coronary artery by Dr. Gonzalez yesterday.  She was also noted to have significant disease in the LAD territory and a CT surgery consultation has been requested.     She presented with chest pain in October 2017 and ruled in for a non-ST segment elevation myocardial infarctions and cardiac catheterization at that time showed   #1 Critical lesion noted in the mid circumflex coronary artery (90% followed by 80%) and successful PCI with PTCA and placement of 3.0 x 38 mm xience Alpine stent and reduction in stenosis to 0%  #2 Previously placed stent in the right coronary artery was patent but multiple mild to moderate lesions noted in the ostial, proximal and mid Right Coronary Artery and 80% stenosis noted in a small caliber PDA less than 2 mm in diameter.  #3 LAD was a small to medium caliber vessel with mild to moderate disease which was diffuse.  #4.  Overall normal LV systolic function with an EF of 55% with mild inferior wall hypokinesis     The patient has had significant hyperlipidemia, diabetes mellitus, hypothyroidism and her LDL has been optimized with PCSK9 inhibitors.    The patient denied any chest pain and clinical exam today did not reveal any signs of congestive heart failure.  She was noted to have head lice and is on treatment for this.    Review of Systems:     Constitutional:  Denies recent weight loss, weight gain, fever or chills    HENT:  Denies any hearing loss, epistaxis, hoarseness, or difficulty speaking.     Eyes: Wears eyeglasses or contact lenses     Respiratory:   Denies dyspnea with exertion,no cough, wheezing, or hemoptysis.     Cardiovascular: Negative for palpitations, chest pain    Gastrointestinal:  Denies change in bowel habits, dyspepsia, ulcer disease, hematochezia, or melena.     Endocrine: Negative for cold intolerance, heat intolerance, polydipsia, polyphagia and polyuria.       Objective     Vital Signs  Temp:  [97.6 °F (36.4 °C)-98 °F (36.7 °C)] 98 °F (36.7 °C)  Heart Rate:  [53-77] 63  Resp:  [10-25] 18  BP: (109-175)/() 118/64  Arterial Line BP: (137-337)/() 337/336    Physical Exam:    Constitutional: Cooperative, alert and oriented, in no acute distress.     HENT:   Head: Normocephalic, normal hair patterns, no masses or tenderness.  Ears, Nose, and Throat: No gross abnormalities. No pallor or cyanosis. Dentition good.   Eyes: EOMS intact, PERRL, conjunctivae and lids unremarkable. Fundoscopic exam and visual fields not performed.   Neck: No palpable masses or adenopathy, no thyromegaly, no JVD, carotid pulses are full and equal bilaterally and without  Bruits.     Cardiovascular: Regular rhythm, S1 and S2 normal, no S3 or S4. No murmurs, gallops, or rubs detected.     Pulmonary/Chest: Chest: normal symmetry,normal respiratory excursion, no intercostal retraction, no use of accessory muscles.            Pulmonary: Normal breath sounds. No rales or rhonchi.    Abdominal: Abdomen soft, bowel sounds normoactive, no masses, no hepatosplenomegaly, non-tender, no bruits.     Musculoskeletal: No deformities, clubbing, cyanosis, erythema, or edema observed.     Results Review:    Lab Results (last 24 hours)     Procedure Component Value Units Date/Time    TSH [[id number]]  (Abnormal) Collected:  01/04/19 0311    Specimen:  Blood Updated:  01/04/19 1652     TSH 79.900 mIU/mL     Hemoglobin A1c [[id number]] Collected:  01/04/19 0311    Specimen:  Blood Updated:  01/04/19 1610    Troponin [[id number]]  (Abnormal) Collected:  01/04/19 0311    Specimen:  Blood  Updated:  01/04/19 0419     Troponin I 34.300 ng/mL     Lipid Panel [[id number]]  (Abnormal) Collected:  01/04/19 0311    Specimen:  Blood Updated:  01/04/19 0358     Total Cholesterol 137 mg/dL      Triglycerides 53 mg/dL      HDL Cholesterol 49 mg/dL      LDL Cholesterol  77 mg/dL      LDL/HDL Ratio 1.58    Comprehensive Metabolic Panel [[id number]]  (Abnormal) Collected:  01/04/19 0311    Specimen:  Blood Updated:  01/04/19 0348     Glucose 225 mg/dL      BUN 14 mg/dL      Creatinine 1.02 mg/dL      Sodium 136 mmol/L      Potassium 4.0 mmol/L      Chloride 102 mmol/L      CO2 24.0 mmol/L      Calcium 8.0 mg/dL      Total Protein 6.6 g/dL      Albumin 3.60 g/dL      ALT (SGPT) 85 U/L      AST (SGOT) 173 U/L      Alkaline Phosphatase 106 U/L      Total Bilirubin 0.2 mg/dL      eGFR Non African Amer 58 mL/min/1.73      Globulin 3.0 gm/dL      A/G Ratio 1.2 g/dL      BUN/Creatinine Ratio 13.7     Anion Gap 10.0 mmol/L     CBC (No Diff) [[id number]]  (Abnormal) Collected:  01/04/19 0311    Specimen:  Blood Updated:  01/04/19 0334     WBC 15.74 10*3/mm3      RBC 4.15 10*6/mm3      Hemoglobin 12.6 g/dL      Hematocrit 37.0 %      MCV 89.2 fL      MCH 30.4 pg      MCHC 34.1 g/dL      RDW 13.0 %      RDW-SD 42.5 fl      MPV 10.0 fL      Platelets 236 10*3/mm3     POC Activated Clotting Time [[id number]]  (Normal) Collected:  01/04/19 0059    Specimen:  Blood Updated:  01/04/19 0059     Activated Clotting Time  148 Seconds             Medication Review:   Current Facility-Administered Medications   Medication Dose Route Frequency Provider Last Rate Last Dose   • aspirin chewable tablet 81 mg  81 mg Oral Daily Tony Gonzalez MD   81 mg at 01/04/19 0836   • atorvastatin (LIPITOR) tablet 40 mg  40 mg Oral Daily Tony Gonzalez MD   40 mg at 01/04/19 0837   • carvedilol (COREG) tablet 6.25 mg  6.25 mg Oral BID With Meals Mian Montana MD       • enoxaparin (LOVENOX) syringe 40 mg  40 mg Subcutaneous Q24H  Mian Montana MD       • glipiZIDE (GLUCOTROL) half tablet 2.5 mg  2.5 mg Oral BID AC Tony Gonzalez MD   2.5 mg at 01/04/19 0612   • HYDROcodone-acetaminophen (NORCO) 5-325 MG per tablet 1 tablet  1 tablet Oral Q6H PRN Tony Gonzalez MD   1 tablet at 01/03/19 2303   • [START ON 1/5/2019] isosorbide mononitrate (IMDUR) 24 hr tablet 30 mg  30 mg Oral Q24H Mian Montana MD       • [START ON 1/5/2019] lisinopril (PRINIVIL,ZESTRIL) tablet 20 mg  20 mg Oral Daily Mian Montana MD       • permethrin (NIX) 1 % liquid   Topical Q3 Days Igor Alvarez MD       • sertraline (ZOLOFT) tablet 50 mg  50 mg Oral Nightly Tony Gonzalez MD   50 mg at 01/03/19 2303   • sodium chloride 0.9 % flush 3 mL  3 mL Intravenous Q12H Mian Montana MD   3 mL at 01/04/19 0844   • sodium chloride 0.9 % flush 5 mL  5 mL Intravenous PRN Mian Montana MD       • Sodium chloride 0.9 % infusion  100 mL/hr Intravenous Continuous Tony Gonzalez  mL/hr at 01/03/19 2304 100 mL/hr at 01/03/19 2304   • Thyroid (ARMOUR) tablet 180 mg  180 mg Oral BID Tony Gonzalez MD   180 mg at 01/04/19 0839   • ticagrelor (BRILINTA) tablet 90 mg  90 mg Oral BID Tony Gonzalez MD   90 mg at 01/04/19 0839         Assessment/Plan     Inferior wall MI s/p PCI to RCA.  Has multivessel CAD.  CT surgery consultation obtained.  Continue present management with aspirin, Brilinta, statins.   Since her blood pressure has been low normal, the dose of Imdur was decreased to 30 mg daily, lisinopril to 20 mg daily and Coreg to 6.25 mg twice a day.  Patient has profound hypothyroidism and is on Synthroid supplements.  Endocrinology consultation will be obtained.  Will review echocardiogram.         AMI inferior wall (CMS/HCC)        Mian Montana MD  01/04/19  5:49 PM

## 2019-01-04 NOTE — ED NOTES
Pt pulled into ED room 6, EKG obtained et pt sent directly to cath lab       Kati Osman RN  01/03/19 1956

## 2019-01-05 PROBLEM — IMO0002 UNCONTROLLED DIABETES MELLITUS: Chronic | Status: ACTIVE | Noted: 2019-01-05

## 2019-01-05 PROBLEM — I25.10 CAD (CORONARY ARTERY DISEASE): Chronic | Status: ACTIVE | Noted: 2018-12-13

## 2019-01-05 PROBLEM — B85.0 HEAD LICE INFESTATION: Chronic | Status: ACTIVE | Noted: 2019-01-05

## 2019-01-05 PROBLEM — E11.9 DIABETES MELLITUS (HCC): Chronic | Status: ACTIVE | Noted: 2019-01-05

## 2019-01-05 LAB
ANION GAP SERPL CALCULATED.3IONS-SCNC: 7 MMOL/L (ref 5–15)
ARTERIAL PATENCY WRIST A: ABNORMAL
ATMOSPHERIC PRESS: 746 MMHG
BASE EXCESS BLDA CALC-SCNC: -0.8 MMOL/L (ref 0–2)
BDY SITE: ABNORMAL
BUN BLD-MCNC: 12 MG/DL (ref 7–21)
BUN/CREAT SERPL: 10.8 (ref 7–25)
CALCIUM SPEC-SCNC: 7.8 MG/DL (ref 8.4–10.2)
CHLORIDE SERPL-SCNC: 104 MMOL/L (ref 95–110)
CO2 SERPL-SCNC: 23 MMOL/L (ref 22–31)
CREAT BLD-MCNC: 1.11 MG/DL (ref 0.5–1)
GFR SERPL CREATININE-BSD FRML MDRD: 52 ML/MIN/1.73 (ref 58–135)
GLUCOSE BLD-MCNC: 163 MG/DL (ref 60–100)
GLUCOSE BLDC GLUCOMTR-MCNC: 127 MG/DL (ref 70–130)
GLUCOSE BLDC GLUCOMTR-MCNC: 139 MG/DL (ref 70–130)
GLUCOSE BLDC GLUCOMTR-MCNC: 145 MG/DL (ref 70–130)
GLUCOSE BLDC GLUCOMTR-MCNC: 146 MG/DL (ref 70–130)
HCO3 BLDA-SCNC: 22.7 MMOL/L (ref 20–26)
HOROWITZ INDEX BLD+IHG-RTO: 21 %
Lab: ABNORMAL
MODALITY: ABNORMAL
MRSA DNA SPEC QL NAA+PROBE: POSITIVE
PCO2 BLDA: 33.2 MM HG (ref 35–45)
PH BLDA: 7.44 PH UNITS (ref 7.35–7.45)
PO2 BLDA: 86.4 MM HG (ref 83–108)
POTASSIUM BLD-SCNC: 3.7 MMOL/L (ref 3.5–5.1)
SAO2 % BLDCOA: 98 % (ref 94–99)
SODIUM BLD-SCNC: 134 MMOL/L (ref 137–145)
TROPONIN I SERPL-MCNC: 4.45 NG/ML
VENTILATOR MODE: ABNORMAL

## 2019-01-05 PROCEDURE — 87641 MR-STAPH DNA AMP PROBE: CPT | Performed by: NURSE PRACTITIONER

## 2019-01-05 PROCEDURE — 82962 GLUCOSE BLOOD TEST: CPT

## 2019-01-05 PROCEDURE — 36600 WITHDRAWAL OF ARTERIAL BLOOD: CPT

## 2019-01-05 PROCEDURE — 25010000002 ENOXAPARIN PER 10 MG: Performed by: INTERNAL MEDICINE

## 2019-01-05 PROCEDURE — 84484 ASSAY OF TROPONIN QUANT: CPT | Performed by: INTERNAL MEDICINE

## 2019-01-05 PROCEDURE — 99232 SBSQ HOSP IP/OBS MODERATE 35: CPT | Performed by: INTERNAL MEDICINE

## 2019-01-05 PROCEDURE — 82803 BLOOD GASES ANY COMBINATION: CPT

## 2019-01-05 PROCEDURE — 80048 BASIC METABOLIC PNL TOTAL CA: CPT | Performed by: INTERNAL MEDICINE

## 2019-01-05 RX ORDER — LEVOTHYROXINE SODIUM 0.15 MG/1
150 TABLET ORAL DAILY
Qty: 30 TABLET | Refills: 11 | Status: SHIPPED | OUTPATIENT
Start: 2019-01-05 | End: 2019-02-07 | Stop reason: SDUPTHER

## 2019-01-05 RX ADMIN — SODIUM CHLORIDE 100 ML/HR: 900 INJECTION, SOLUTION INTRAVENOUS at 12:11

## 2019-01-05 RX ADMIN — SODIUM CHLORIDE 100 ML/HR: 900 INJECTION, SOLUTION INTRAVENOUS at 02:36

## 2019-01-05 RX ADMIN — CARVEDILOL 6.25 MG: 6.25 TABLET, FILM COATED ORAL at 10:06

## 2019-01-05 RX ADMIN — LEVOTHYROXINE SODIUM 150 MCG: 150 TABLET ORAL at 06:02

## 2019-01-05 RX ADMIN — TICAGRELOR 90 MG: 90 TABLET ORAL at 21:23

## 2019-01-05 RX ADMIN — SODIUM CHLORIDE 100 ML/HR: 900 INJECTION, SOLUTION INTRAVENOUS at 23:37

## 2019-01-05 RX ADMIN — CARVEDILOL 6.25 MG: 6.25 TABLET, FILM COATED ORAL at 17:50

## 2019-01-05 RX ADMIN — TICAGRELOR 90 MG: 90 TABLET ORAL at 08:39

## 2019-01-05 RX ADMIN — SODIUM CHLORIDE, PRESERVATIVE FREE 3 ML: 5 INJECTION INTRAVENOUS at 08:51

## 2019-01-05 RX ADMIN — SERTRALINE HYDROCHLORIDE 50 MG: 50 TABLET ORAL at 21:23

## 2019-01-05 RX ADMIN — ENOXAPARIN SODIUM 40 MG: 40 INJECTION SUBCUTANEOUS at 17:51

## 2019-01-05 RX ADMIN — SODIUM CHLORIDE, PRESERVATIVE FREE 3 ML: 5 INJECTION INTRAVENOUS at 21:24

## 2019-01-05 RX ADMIN — ATORVASTATIN CALCIUM 40 MG: 40 TABLET, FILM COATED ORAL at 08:39

## 2019-01-05 RX ADMIN — ASPIRIN 81 MG CHEWABLE TABLET 81 MG: 81 TABLET CHEWABLE at 08:39

## 2019-01-05 NOTE — PROGRESS NOTES
CARDIOLOGY PROGRESS NOTE      LOS: 2 days   Patient Care Team:  Anderson Abbott MD as PCP - General (Family Medicine)  Ceci Jj MA as Medical Assistant    Interval History/Subjective:   Ms. Sutton is a 48-year-old  lady who was admitted to [facility] with prolonged chest pain with EKG evidence of acute inferior wall myocardial infarction.  She underwent emergent cardiac catheterization with PCI to right coronary artery by Dr. Gonzalez 1/3/19.  She was also noted to have significant disease in the LAD territory and a CT surgery consultation has been requested.      She presented with chest pain in October 2017 and ruled in for a non-ST segment elevation myocardial infarctions and cardiac catheterization at that time showed   #1 Critical lesion noted in the mid circumflex coronary artery (90% followed by 80%) and successful PCI with PTCA and placement of 3.0 x 38 mm xience Alpine stent and reduction in stenosis to 0%  #2 Previously placed stent in the right coronary artery was patent but multiple mild to moderate lesions noted in the ostial, proximal and mid Right Coronary Artery and 80% stenosis noted in a small caliber PDA less than 2 mm in diameter.  #3 LAD was a small to medium caliber vessel with mild to moderate disease which was diffuse.  #4.  Overall normal LV systolic function with an EF of 55% with mild inferior wall hypokinesis     The patient has had significant hyperlipidemia, diabetes mellitus, hypothyroidism and her LDL has been optimized with PCSK9 inhibitors.     The patient denied any chest pain and clinical exam today did not reveal any signs of congestive heart failure.  She was noted to have head lice  and has been treated for this as an inpatient and had her head shaved.    Patient is tired she has gotten out of bed and ambulated.    Objective     Vital Signs  Temp:  [97.9 °F (36.6 °C)-98.2 °F (36.8 °C)] 98.2 °F (36.8 °C)  Heart Rate:  [53-72] 58  Resp:   [11-20] 15  BP: (100-156)/(60-93) 151/85    Physical Exam:    Constitutional: Cooperative, alert and oriented, well-developed, well-nourished, in no acute distress.     HENT:   Head: Normocephalic, normal hair patterns, no masses or tenderness.  Ears, Nose, and Throat: No gross abnormalities. No pallor or cyanosis. Dentition good.   Eyes: EOMS intact, PERRL, conjunctivae and lids unremarkable. Fundoscopic exam and visual fields not performed.   Neck: No palpable masses or adenopathy, no thyromegaly, no JVD, carotid pulses are full and equal bilaterally and without  Bruits.     Cardiovascular: Regular rhythm, S1 and S2 normal, no S3 or S4. Apical impulse not displaced. No murmurs, gallops, or rubs detected.     Pulmonary/Chest: Chest: normal symmetry, no tenderness to palpation, normal respiratory excursion, no intercostal retraction, no use of accessory muscles.            Pulmonary: Normal breath sounds. No rales or rhonchi.    Abdominal: Abdomen soft, bowel sounds normoactive, no masses, no hepatosplenomegaly, non-tender, no bruits.     Musculoskeletal: No deformities, clubbing, cyanosis, erythema, or edema observed. There are no spinal abnormalities noted. Normal muscle strength and tone. Pulses full and equal in all extremities, no bruits auscultated.     Neurological: No gross motor or sensory deficits noted, affect appropriate, oriented to time, person, place.     Skin: Warm and dry to the touch, no apparent skin lesions or masses noted.     Psychiatric: She has a normal mood and affect. Her behavior is normal. Judgment and thought content normal.     Results Review:    Lab Results (last 24 hours)     Procedure Component Value Units Date/Time    MRSA Screen, PCR - Swab, Nares [[id number]]  (Abnormal) Collected:  01/05/19 0838    Specimen:  Swab from Nares Updated:  01/05/19 1004     MRSA, PCR Positive    Narrative:       Performed by real-time polymerase chain reaction (qPCR).    Blood Gas, Arterial [[id number]]   (Abnormal) Collected:  01/05/19 0618    Specimen:  Arterial Blood Updated:  01/05/19 0624     Site Right Radial     Kolby's Test N/A     pH, Arterial 7.444 pH units      pCO2, Arterial 33.2 mm Hg      Comment: 84 Value below reference range        pO2, Arterial 86.4 mm Hg      HCO3, Arterial 22.7 mmol/L      Base Excess, Arterial -0.8 mmol/L      Comment: 84 Value below reference range        O2 Saturation, Arterial 98.0 %      Barometric Pressure for Blood Gas 746 mmHg      Modality Room Air     FIO2 21 %      Ventilator Mode NA     Collected by celestina     Comment: Meter: [id number]     :  [id number]       POC Glucose Once [[id number]]  (Abnormal) Collected:  01/04/19 2041    Specimen:  Blood Updated:  01/04/19 2123     Glucose 158 mg/dL      Comment: Sliding Scale AdminOperator: [id number] ISAIAS MICHELLEMeter ID: [id number]       POC Glucose Once [[id number]]  (Abnormal) Collected:  01/04/19 1839    Specimen:  Blood Updated:  01/04/19 1850     Glucose 150 mg/dL      Comment: : [id number] EAGLE JENNIFERMeter ID: [id number]       Hemoglobin A1c [[id number]]  (Abnormal) Collected:  01/04/19 0311    Specimen:  Blood Updated:  01/04/19 1842     Hemoglobin A1C 12.0 %     TSH [[id number]]  (Abnormal) Collected:  01/04/19 0311    Specimen:  Blood Updated:  01/04/19 1652     TSH 79.900 mIU/mL         Imaging Results (last 24 hours)     ** No results found for the last 24 hours. **          Medication Review:   Current Facility-Administered Medications   Medication Dose Route Frequency Provider Last Rate Last Dose   • aspirin chewable tablet 81 mg  81 mg Oral Daily Tony Gonzalez MD   81 mg at 01/05/19 0839   • atorvastatin (LIPITOR) tablet 40 mg  40 mg Oral Daily Tony Gonzalez MD   40 mg at 01/05/19 0839   • carvedilol (COREG) tablet 6.25 mg  6.25 mg Oral BID With Meals Mian Montana MD   6.25 mg at 01/05/19 1006   • dextrose (D50W) 25 g/ 50mL Intravenous Solution 25 g  25  g Intravenous Q15 Min PRN Mian Montana MD       • dextrose (GLUTOSE) oral gel 15 g  15 g Oral Q15 Min PRN Mian Montana MD       • enoxaparin (LOVENOX) syringe 40 mg  40 mg Subcutaneous Q24H Mian Montana MD       • glucagon (human recombinant) (GLUCAGEN DIAGNOSTIC) injection 1 mg  1 mg Subcutaneous PRN Mian Montana MD       • HYDROcodone-acetaminophen (NORCO) 5-325 MG per tablet 1 tablet  1 tablet Oral Q6H PRN Tony Gonzalez MD   1 tablet at 01/03/19 2303   • insulin aspart (novoLOG) injection 0-7 Units  0-7 Units Subcutaneous 4x Daily AC & at Bedtime Mian Montana MD       • isosorbide mononitrate (IMDUR) 24 hr tablet 30 mg  30 mg Oral Q24H Mian Montana MD   Stopped at 01/05/19 1005   • levothyroxine (SYNTHROID, LEVOTHROID) tablet 150 mcg  150 mcg Oral Q AM Aime Aceves MD   150 mcg at 01/05/19 0602   • lisinopril (PRINIVIL,ZESTRIL) tablet 20 mg  20 mg Oral Daily Mian Montana MD   Stopped at 01/05/19 1005   • permethrin (NIX) 1 % liquid   Topical Q3 Days Igor Alvarez MD       • sertraline (ZOLOFT) tablet 50 mg  50 mg Oral Nightly Tony Gonzalez MD   50 mg at 01/04/19 2043   • sodium chloride 0.9 % flush 3 mL  3 mL Intravenous Q12H Mian Montana MD   3 mL at 01/05/19 0851   • sodium chloride 0.9 % flush 5 mL  5 mL Intravenous PRN Mian Montana MD       • Sodium chloride 0.9 % infusion  100 mL/hr Intravenous Continuous Tony Gonzalez  mL/hr at 01/05/19 0236 100 mL/hr at 01/05/19 0236   • ticagrelor (BRILINTA) tablet 90 mg  90 mg Oral BID Tony Gonzalez MD   90 mg at 01/05/19 0839         Assessment/Plan    1. Inferior ST segment elevation myocardial infarction: Status post emergent distal RCA was stented with a 2.75 x 23 Xience alma stent, mid to distal was stented with a 3.0 x 33 stent and proximally with a 3.5 x 18 Xience alma stent and  subsequently postdilated with a 3.0 x 35 noncompliant mozec balloon and subsequently there was a ostial stenosis of the PDA and it was attempted to place a stent but stent was getting stuck in the previously placed stent struts hence balloon angioplasty was done into the ostial PDA reducing 90% stenosis to 30-40%.  On 1/3/2019 by Dr. Gonzalez.  Echo shows that ejection fraction is preserved  -Aspirin 81 mg daily  -Brilinta 90 mg by mouth 2 times a day  -Atorvastatin 40 mg daily  -Coreg 6.25 mg by mouth daily  -Cardiac rehabilitation  -Transfer to the  telemetry    2.  Coronary artery disease: Patient noted to have severe LAD disease on cardiac catheterization.  Given that she has diabetic with a preserved ejection fraction CT surgery consultation was rendered.  Patient has elected to undergo coronary artery bypass grafting approximately 6 weeks after recovering for her acute inferior ST segment myocardial elevation MI.  Appreciate CT surgery input  Presurgery evaluation per CT surgery  Continue aspirin 81 mg daily  Continue atorvastatin 40 mg daily  Continue Coreg 6.25 mg daily    3.  Obesity: Counseled on need for diet exercise and weight loss    4.  Head lice: Patient is undergone treatment and had her head shaved.  The family has asked.  For some time this weekend to delous the home.    5. DM/Hypothyroid/obesity        Isreal Olmstead MD, EMILY, [facility]  Interventional Cardiology  01/05/19  10:35 AM

## 2019-01-05 NOTE — PLAN OF CARE
Problem: Patient Care Overview  Goal: Plan of Care Review   01/04/19 1927   OTHER   Outcome Summary patient vss, requested head shaved due to infestation as well as in support of mother who is undergoing chemo. Remaining in contact precautions. ambulatory in room without issues.

## 2019-01-05 NOTE — PLAN OF CARE
Problem: Patient Care Overview  Goal: Plan of Care Review  Outcome: Ongoing (interventions implemented as appropriate)    Goal: Individualization and Mutuality  Outcome: Ongoing (interventions implemented as appropriate)    Goal: Discharge Needs Assessment  Outcome: Ongoing (interventions implemented as appropriate)    Goal: Interprofessional Rounds/Family Conf  Outcome: Ongoing (interventions implemented as appropriate)      Problem: Cardiac Catheterization (Diagnostic/Interventional) (Adult)  Goal: Signs and Symptoms of Listed Potential Problems Will be Absent, Minimized or Managed (Cardiac Catheterization)  Outcome: Ongoing (interventions implemented as appropriate)    Goal: Anesthesia/Sedation Recovery  Outcome: Ongoing (interventions implemented as appropriate)      Problem: Pain, Chronic (Adult)  Goal: Identify Related Risk Factors and Signs and Symptoms  Outcome: Ongoing (interventions implemented as appropriate)    Goal: Acceptable Pain/Comfort Level and Functional Ability  Outcome: Ongoing (interventions implemented as appropriate)      Problem: Diabetes, Type 2 (Adult)  Goal: Signs and Symptoms of Listed Potential Problems Will be Absent, Minimized or Managed (Diabetes, Type 2)  Outcome: Ongoing (interventions implemented as appropriate)      Problem: Skin Injury Risk (Adult)  Goal: Identify Related Risk Factors and Signs and Symptoms  Outcome: Ongoing (interventions implemented as appropriate)    Goal: Skin Health and Integrity  Outcome: Ongoing (interventions implemented as appropriate)

## 2019-01-05 NOTE — CONSULTS
CONSULT NOTE     Rhea Sutton is a 48 y.o. female who I am being consulted for  evaluation of type 2 diabetes and hypothyroidism       Referring Provider  Dr. Montana    49 yo female patient that I am being consulted for type 2 diabetes and uncontrolled hypothyroidism    Hypothyroidism   Duration, since 2015  Secondary to total thyroidectomy for obstructive goiter  Aggravating I spoke with patient and she now admits to noncompliance.   Alleviating, she is on armour thyroid  Symptoms, fatigue, weakness, malaise    Type 2 Diabetes  Duration    Lab Results   Component Value Date    HGBA1C 12.0 (H) 01/04/2019    HGBA1C 8.1 (H) 03/05/2018    HGBA1C 6.7 (H) 10/14/2017       Dates back to Oct 2017 but now significantly uncontrolled    Inpatient BG near goal since eating hospital diet    Severity , high    Patient was admitted with MI, sp PCI to RCA , has multivessel CAD and is being evaluated by CT surgery     Aggravating, high carb meals at home and lack of exercise     Treatment. Home treatment with glipizide      Allergies   Allergen Reactions   • Penicillins Swelling     Of the throat   • Adhesive Tape Other (See Comments)     Silk tape- blisters   • Ciprofloxacin    • Coconut Flavor Swelling   • Latex Hives       Past Medical History:   Diagnosis Date   • CAD (coronary artery disease)    • Chronic back pain    • Chronic bronchitis (CMS/HCC)    • Diabetes mellitus (CMS/HCC)    • Goiter    • Hashimoto's thyroiditis    • Head lice infestation    • Hyperlipidemia    • Hypertension    • Postoperative hypothyroidism      Family History   Problem Relation Age of Onset   • Coronary artery disease Mother    • Coronary artery disease Father      Social History     Tobacco Use   • Smoking status: Former Smoker     Packs/day: 0.00     Types: Cigarettes     Start date: 8/12/2015   • Smokeless tobacco: Never Used   Substance Use Topics   • Alcohol use: No   • Drug use: No         Current Facility-Administered Medications:    •  aspirin chewable tablet 81 mg, 81 mg, Oral, Daily, Tony Gonzalez MD, 81 mg at 01/05/19 0839  •  atorvastatin (LIPITOR) tablet 40 mg, 40 mg, Oral, Daily, Tony Gonzalez MD, 40 mg at 01/05/19 0839  •  carvedilol (COREG) tablet 6.25 mg, 6.25 mg, Oral, BID With Meals, Mian Montana MD, 6.25 mg at 01/05/19 1006  •  dextrose (D50W) 25 g/ 50mL Intravenous Solution 25 g, 25 g, Intravenous, Q15 Min PRN, Mian Montana MD  •  dextrose (GLUTOSE) oral gel 15 g, 15 g, Oral, Q15 Min PRN, Mian Montana MD  •  enoxaparin (LOVENOX) syringe 40 mg, 40 mg, Subcutaneous, Q24H, Mian Montana MD  •  glucagon (human recombinant) (GLUCAGEN DIAGNOSTIC) injection 1 mg, 1 mg, Subcutaneous, PRN, Mian Montana MD  •  HYDROcodone-acetaminophen (NORCO) 5-325 MG per tablet 1 tablet, 1 tablet, Oral, Q6H PRN, Tony Gonzalez MD, 1 tablet at 01/03/19 2303  •  insulin aspart (novoLOG) injection 0-7 Units, 0-7 Units, Subcutaneous, 4x Daily AC & at Bedtime, Mian Montana MD  •  isosorbide mononitrate (IMDUR) 24 hr tablet 30 mg, 30 mg, Oral, Q24H, Mian Montana MD, Stopped at 01/05/19 1005  •  levothyroxine (SYNTHROID, LEVOTHROID) tablet 150 mcg, 150 mcg, Oral, Q AM, Aime Aceves MD, 150 mcg at 01/05/19 0602  •  lisinopril (PRINIVIL,ZESTRIL) tablet 20 mg, 20 mg, Oral, Daily, Mian Montana MD, Stopped at 01/05/19 1005  •  permethrin (NIX) 1 % liquid, , Topical, Q3 Days, Igor Alvarez MD  •  sertraline (ZOLOFT) tablet 50 mg, 50 mg, Oral, Nightly, Tony Gonzalez MD, 50 mg at 01/04/19 2043  •  sodium chloride 0.9 % flush 3 mL, 3 mL, Intravenous, Q12H, Mian Montana MD, 3 mL at 01/05/19 0851  •  sodium chloride 0.9 % flush 5 mL, 5 mL, Intravenous, PRN, Mian Montana MD  •  Sodium chloride 0.9 % infusion, 100 mL/hr, Intravenous, Continuous, Tony Gonzalez MD, Last Rate: 100  mL/hr at 01/05/19 1211, 100 mL/hr at 01/05/19 1211  •  ticagrelor (BRILINTA) tablet 90 mg, 90 mg, Oral, BID, Tony Gonzalez MD, 90 mg at 01/05/19 0839    No current facility-administered medications on file prior to encounter.      Current Outpatient Medications on File Prior to Encounter   Medication Sig Dispense Refill   • ARMOUR THYROID 180 MG tablet TAKE ONE TABLET BY MOUTH TWICE DAILY 60 tablet 6   • aspirin 81 MG chewable tablet Chew 81 mg daily.     • atorvastatin (LIPITOR) 40 MG tablet      • Baclofen 2 % cream Apply  topically 3 (Three) Times a Day. Baclofen, Neurontin, and Lidocaine compound cream to lower back TID for chronic back pain     • Blood Glucose Monitoring Suppl (FREESTYLE LITE) device      • carvedilol (COREG) 25 MG tablet 12.5 mg 2 (Two) Times a Day.     • cholecalciferol (VITAMIN D3) 1000 units tablet Take 1,000 Units by mouth Daily. 5 capsules daily     • Evolocumab 140 MG/ML solution auto-injector Inject 1 mL under the skin into the appropriate area as directed Every 14 (Fourteen) Days. 2 mL 0   • FREESTYLE LITE test strip      • glipiZIDE (GLUCOTROL) 5 MG ER tablet Take 5 mg by mouth Daily. daily     • HYDROcodone-acetaminophen (NORCO) 5-325 MG per tablet Take 1 tablet by mouth Every 6 (Six) Hours As Needed.     • isosorbide mononitrate (IMDUR) 30 MG 24 hr tablet Take 1 tablet by mouth Daily. 30 tablet 6   • lisinopril (PRINIVIL,ZESTRIL) 20 MG tablet Take 20 mg by mouth Daily.     • nitroglycerin (NITROSTAT) 0.4 MG SL tablet 1 under the tongue as needed for angina, may repeat q5mins for up three doses 30 tablet 3   • sertraline (ZOLOFT) 50 MG tablet As Needed. daily     • thyroid (ARMOUR THYROID) 180 MG tablet Take one tablet mon thru sat twide a day and half tab on sunday 60 tablet 5   • ticagrelor (BRILINTA) 90 MG tablet tablet Take 1 tablet by mouth 2 (Two) Times a Day. 60 tablet 6       Medications Discontinued During This Encounter   Medication Reason   • iopamidol (ISOVUE-370)  76 % injection Patient Discharge   • phenylephrine (LEATHA-SYNEPHRINE) injection Patient Discharge   • Bivalirudin Trifluoroacetate (ANGIOMAX) 250 mg in Sodium chloride 0.9 % 50 mL (5 mg/mL) infusion Patient Discharge   • Bivalirudin Trifluoroacetate (ANGIOMAX) injection Patient Discharge   • fentaNYL citrate (PF) (SUBLIMAZE) injection Patient Discharge   • midazolam (VERSED) injection Patient Discharge   • heparin 5000 units in sodium chloride 0.9% IV infusion Patient Discharge   • heparin 1000 units in sodium chloride 0.9% IV infusion Patient Discharge   • lidocaine (XYLOCAINE) 2% injection Patient Discharge   • ticagrelor (BRILINTA) tablet Patient Discharge   • Thyroid (ARMOUR) tablet 180 mg Duplicate order   • aspirin chewable tablet 81 mg Duplicate order   • lisinopril (PRINIVIL,ZESTRIL) tablet 20 mg    • permethrin (NIX) 1 % liquid    • nitroglycerin 50 mg/250 mL (0.2 mg/mL) infusion    • carvedilol (COREG) tablet 12.5 mg    • isosorbide mononitrate (IMDUR) 24 hr tablet 60 mg    • lisinopril (PRINIVIL,ZESTRIL) tablet 40 mg    • Thyroid (ARMOUR) tablet 180 mg    • glipiZIDE (GLUCOTROL) half tablet 2.5 mg        Review of Systems    Review of Systems   Constitutional: Positive for fatigue. Negative for activity change, appetite change, chills, diaphoresis, fever and unexpected weight change.   HENT: Negative for congestion, dental problem, drooling, ear discharge, ear pain, facial swelling, mouth sores, postnasal drip, rhinorrhea, sinus pressure, sore throat, tinnitus, trouble swallowing and voice change.    Eyes: Negative for photophobia, pain, discharge, redness, itching and visual disturbance.   Respiratory: Positive for shortness of breath. Negative for apnea, cough, choking, chest tightness, wheezing and stridor.    Cardiovascular: Negative for chest pain, palpitations and leg swelling.   Gastrointestinal: Positive for constipation. Negative for abdominal distention, abdominal pain, diarrhea, nausea and  "vomiting.   Endocrine: Positive for cold intolerance. Negative for heat intolerance, polydipsia, polyphagia and polyuria.   Genitourinary: Negative for decreased urine volume, difficulty urinating, dysuria, flank pain, frequency, hematuria and urgency.   Musculoskeletal: Positive for arthralgias and myalgias. Negative for back pain, gait problem, joint swelling, neck pain and neck stiffness.   Skin: Negative for color change, pallor, rash and wound.   Allergic/Immunologic: Negative for immunocompromised state.   Neurological: Positive for weakness. Negative for dizziness, tremors, seizures, syncope, facial asymmetry, speech difficulty, light-headedness, numbness and headaches.   Hematological: Negative for adenopathy.   Psychiatric/Behavioral: Negative for agitation, behavioral problems, confusion, decreased concentration, dysphoric mood, hallucinations, self-injury, sleep disturbance and suicidal ideas. The patient is not nervous/anxious and is not hyperactive.         Objective:   /93 (BP Location: Left arm, Patient Position: Lying)   Pulse 60   Temp 99 °F (37.2 °C) (Oral)   Resp 18   Ht 167.6 cm (66\")   Wt 118 kg (260 lb 9.3 oz)   SpO2 95%   BMI 42.06 kg/m²     Physical Exam   Constitutional: She is oriented to person, place, and time. She appears well-developed.   HENT:   Head: Normocephalic.   Right Ear: External ear normal.   Left Ear: External ear normal.   Nose: Nose normal.   Eyes: Conjunctivae and EOM are normal. No scleral icterus.   Neck: Normal range of motion. Neck supple. No tracheal deviation present. No thyromegaly present.   Cardiovascular: Normal rate, regular rhythm, normal heart sounds and intact distal pulses. Exam reveals no gallop and no friction rub.   No murmur heard.  Pulmonary/Chest: Effort normal and breath sounds normal. No stridor. No respiratory distress. She has no wheezes. She has no rales. She exhibits no tenderness.   Abdominal: Soft. Bowel sounds are normal. She " exhibits no distension and no mass. There is no tenderness. There is no rebound and no guarding.   Musculoskeletal: Normal range of motion. She exhibits no tenderness or deformity.   Lymphadenopathy:     She has no cervical adenopathy.   Neurological: She is alert and oriented to person, place, and time. She displays normal reflexes. She exhibits normal muscle tone. Coordination normal.   Skin: No rash noted. No erythema. No pallor.   Psychiatric: She has a normal mood and affect. Her behavior is normal. Judgment and thought content normal.       Lab Review    Lab Results   Component Value Date    HGBA1C 12.0 (H) 01/04/2019       Lab Results   Component Value Date    GLUCOSE 225 (H) 01/04/2019    CALCIUM 8.0 (L) 01/04/2019     (L) 01/04/2019    K 4.0 01/04/2019    CO2 24.0 01/04/2019     01/04/2019    BUN 14 01/04/2019    CREATININE 1.02 (H) 01/04/2019    EGFRIFNONA 58 01/04/2019    BCR 13.7 01/04/2019    ANIONGAP 10.0 01/04/2019     Lab Results   Component Value Date    GLUCOSE 225 (H) 01/04/2019    BUN 14 01/04/2019    CREATININE 1.02 (H) 01/04/2019    EGFRIFNONA 58 01/04/2019    BCR 13.7 01/04/2019    CO2 24.0 01/04/2019    CALCIUM 8.0 (L) 01/04/2019    ALBUMIN 3.60 01/04/2019     (H) 01/04/2019    ALT 85 (H) 01/04/2019       Lab Results   Component Value Date    WBC 15.74 (H) 01/04/2019    HGB 12.6 01/04/2019    HCT 37.0 01/04/2019    MCV 89.2 01/04/2019     01/04/2019       Lab Results   Component Value Date    TSH 79.900 (H) 01/04/2019           Assessment/Plan       Problem   Type 2 Diabetes Mellitus With Hyperglycemia ([facility])   Postsurgical Hypothyroidism       Hypothyroidism    Patient admits to noncompliance    Dose of armour 180 BID was numerically sufficient had she been taking her medication .  We will not use armour given CAD    Her weight based dose of synthroid should be   2.1 micrograms per 118 kg = 250 mcgs of levothyroxine    Since TSH is 70 , I will start with 150 mcgs  and expect to see her in 2 week to increase dose to 200 mcgs     We will follow Free T4 as a guide to treatment since the intention is to have her near euthyroid for potential upcoming CABG.    We had long discussion that I can only prescribe her the appropriate dose. It is up to her to take it.   Her noncompliance reflects poorly in her health and in me as her physician.   I will help her if she is to be complaint.   She verbalized understanding this.    ====    Type 2 diabetes    Complicated by CAD  Back in March GFR less than 45 but now 58.    Her Aic is 12  Her BG in the hospital are between 80 to 180    This obviously represents high carb diet at home.     The plan is    A. Trulicity 0.75 mg weekly. Since noncompliant , hopefully at least she will be compliant with once weekly regimen  B. Stop Glipizide  C. I will start jardiance 10 mg daily unless Cardiology prefers not to.   D. As long as she eats up to 50 grams of carbohdyrate per meal, she doesn't need novolog.   If she exceeds 50 grams of carbohydrate , I will give her 5 units to 10 units before meals based on carb load.               I reviewed and summarized records from this admission and I reviewed / ordered labs.       Please see my above opinion and suggestions.

## 2019-01-05 NOTE — PLAN OF CARE
Problem: Patient Care Overview  Goal: Plan of Care Review  Outcome: Ongoing (interventions implemented as appropriate)   01/05/19 1151   Coping/Psychosocial   Plan of Care Reviewed With patient   Plan of Care Review   Progress no change   OTHER   Outcome Summary transfer from [facility]cu     Goal: Individualization and Mutuality  Outcome: Ongoing (interventions implemented as appropriate)    Goal: Discharge Needs Assessment  Outcome: Ongoing (interventions implemented as appropriate)    Goal: Interprofessional Rounds/Family Conf  Outcome: Ongoing (interventions implemented as appropriate)      Problem: Cardiac Catheterization (Diagnostic/Interventional) (Adult)  Goal: Signs and Symptoms of Listed Potential Problems Will be Absent, Minimized or Managed (Cardiac Catheterization)  Outcome: Ongoing (interventions implemented as appropriate)    Goal: Anesthesia/Sedation Recovery  Outcome: Outcome(s) achieved Date Met: 01/05/19      Problem: Pain, Chronic (Adult)  Goal: Identify Related Risk Factors and Signs and Symptoms  Outcome: Ongoing (interventions implemented as appropriate)    Goal: Acceptable Pain/Comfort Level and Functional Ability  Outcome: Ongoing (interventions implemented as appropriate)      Problem: Diabetes, Type 2 (Adult)  Goal: Signs and Symptoms of Listed Potential Problems Will be Absent, Minimized or Managed (Diabetes, Type 2)  Outcome: Ongoing (interventions implemented as appropriate)      Problem: Skin Injury Risk (Adult)  Goal: Identify Related Risk Factors and Signs and Symptoms  Outcome: Outcome(s) achieved Date Met: 01/05/19    Goal: Skin Health and Integrity  Outcome: Ongoing (interventions implemented as appropriate)

## 2019-01-05 NOTE — CONSULTS
Consult Note  Nicola Cameron MD  Hospitalist    Referring Provider: Dr. Olmstead - Cardiology    Reason for Consultation: diabetes management / severe hypothyroidism.    Patient Care Team:  Anderson Abbott MD as PCP - General (Family Medicine)    Chief complaint   Chief Complaint   Patient presents with   • Chest Pain     Subjective .     History of present illness: admitted for chest pain / shortness of breath. She was diagnosed with an acute MI requiring coronary angiogram and stenting of her RCA. She has a long history of non compliance with her medications.    History  Past Medical History:   Diagnosis Date   • CAD (coronary artery disease)    • Chronic back pain    • Chronic bronchitis (CMS/HCC)    • Diabetes mellitus (CMS/HCC)    • Goiter    • Hashimoto's thyroiditis    • Head lice infestation    • Hyperlipidemia    • Hypertension    • Postoperative hypothyroidism    ,   Past Surgical History:   Procedure Laterality Date   • CARDIAC CATHETERIZATION N/A 10/13/2017   • CARDIAC CATHETERIZATION N/A 1/3/2019   • CHOLECYSTECTOMY     • COLON RESECTION     • CORONARY STENT PLACEMENT     • MI RT/LT HEART CATHETERS N/A 10/13/2017   • TOTAL ABDOMINAL HYSTERECTOMY WITH SALPINGO OOPHORECTOMY     • TOTAL THYROIDECTOMY      and   Social History     Tobacco Use   • Smoking status: Former Smoker     Packs/day: 0.00     Types: Cigarettes     Start date: 8/12/2015   • Smokeless tobacco: Never Used   Substance Use Topics   • Alcohol use: No   • Drug use: No     Current Facility-Administered Medications   Medication Dose Route Frequency Provider Last Rate Last Dose   • aspirin chewable tablet 81 mg  81 mg Oral Daily Tony Gonzalez MD   81 mg at 01/05/19 0839   • atorvastatin (LIPITOR) tablet 40 mg  40 mg Oral Daily Tony Gonzalze MD   40 mg at 01/05/19 0839   • carvedilol (COREG) tablet 6.25 mg  6.25 mg Oral BID With Meals Mian Montana MD   6.25 mg at 01/05/19 1750   • dextrose (D50W) 25 g/ 50mL  Intravenous Solution 25 g  25 g Intravenous Q15 Min PRN Mian Montana MD       • dextrose (GLUTOSE) oral gel 15 g  15 g Oral Q15 Min PRN Mian Montana MD       • enoxaparin (LOVENOX) syringe 40 mg  40 mg Subcutaneous Q24H Mian Montana MD   40 mg at 01/05/19 1751   • glucagon (human recombinant) (GLUCAGEN DIAGNOSTIC) injection 1 mg  1 mg Subcutaneous PRN Mian Montana MD       • HYDROcodone-acetaminophen (NORCO) 5-325 MG per tablet 1 tablet  1 tablet Oral Q6H PRN Tony Gonzalez MD   1 tablet at 01/03/19 2303   • insulin aspart (novoLOG) injection 0-7 Units  0-7 Units Subcutaneous 4x Daily AC & at Bedtime Mian Montana MD       • isosorbide mononitrate (IMDUR) 24 hr tablet 30 mg  30 mg Oral Q24H Mian Montana MD   Stopped at 01/05/19 1005   • levothyroxine (SYNTHROID, LEVOTHROID) tablet 150 mcg  150 mcg Oral Q AM Pearce Aime Frias MD   150 mcg at 01/05/19 0602   • lisinopril (PRINIVIL,ZESTRIL) tablet 20 mg  20 mg Oral Daily Mian Montana MD   Stopped at 01/05/19 1005   • permethrin (NIX) 1 % liquid   Topical Q3 Days Igor Alvarez MD       • sertraline (ZOLOFT) tablet 50 mg  50 mg Oral Nightly Tony Gonzalez MD   50 mg at 01/04/19 2043   • sodium chloride 0.9 % flush 3 mL  3 mL Intravenous Q12H Mian Monatna MD   3 mL at 01/05/19 0851   • sodium chloride 0.9 % flush 5 mL  5 mL Intravenous PRN Mian Montana MD       • Sodium chloride 0.9 % infusion  100 mL/hr Intravenous Continuous Tony Gonzalez  mL/hr at 01/05/19 1211 100 mL/hr at 01/05/19 1211   • ticagrelor (BRILINTA) tablet 90 mg  90 mg Oral BID Tony Gonzalez MD   90 mg at 01/05/19 0804       Review of Systems  Review of Systems   Constitutional: Positive for fatigue. Negative for fever.   Respiratory: Positive for shortness of breath. Negative for cough, choking, wheezing and stridor.     Cardiovascular: Positive for chest pain. Negative for palpitations and leg swelling.   Gastrointestinal: Negative for abdominal distention, abdominal pain, anal bleeding, constipation, nausea and vomiting.   Genitourinary: Negative for dysuria, enuresis, frequency, hematuria and urgency.   Musculoskeletal: Positive for back pain. Negative for arthralgias and gait problem.   Skin: Positive for pallor. Negative for color change and rash.   Neurological: Positive for weakness. Negative for seizures, syncope and headaches.   Psychiatric/Behavioral: Negative for agitation, behavioral problems and confusion.   All other systems reviewed and are negative.      Objective     Vital Signs   Temp:  [97.9 °F (36.6 °C)-99 °F (37.2 °C)] 98 °F (36.7 °C)  Heart Rate:  [53-73] 58  Resp:  [11-20] 18  BP: (100-161)/(60-97) 140/97    Physical Exam:  Physical Exam   Constitutional: She is oriented to person, place, and time. She appears well-developed and well-nourished. No distress.   HENT:   Head: Normocephalic and atraumatic.   Eyes: EOM are normal. Pupils are equal, round, and reactive to light. No scleral icterus.   Neck: Normal range of motion. Neck supple.   Cardiovascular: Normal rate and regular rhythm.   Pulmonary/Chest: Effort normal and breath sounds normal. No stridor. No respiratory distress.   Abdominal: Soft. Bowel sounds are normal. She exhibits no distension.   Musculoskeletal: Normal range of motion. She exhibits no edema or tenderness.   Neurological: She is alert and oriented to person, place, and time. She displays normal reflexes. No cranial nerve deficit. Coordination normal.   Skin: Skin is warm and dry. No rash noted. No erythema. There is pallor.   Psychiatric: She has a normal mood and affect. Her behavior is normal.   Vitals reviewed.      Results Review:  Lab Results (last 24 hours)     Procedure Component Value Units Date/Time    POC Glucose Once [[id number]]  (Normal) Collected:  01/05/19 3813     Specimen:  Blood Updated:  01/05/19 1731     Glucose 127 mg/dL      Comment: RN NotifiedOperator: [id number] TRISHA BRANDYMeter ID: [id number]       POC Glucose Once [[id number]]  (Abnormal) Collected:  01/05/19 0849    Specimen:  Blood Updated:  01/05/19 1319     Glucose 145 mg/dL      Comment: : [id number] EAGLE ANTONIOFERMeter ID: [id number]       Basic Metabolic Panel [[id number]]  (Abnormal) Collected:  01/05/19 1116    Specimen:  Blood Updated:  01/05/19 1255     Glucose 163 mg/dL      BUN 12 mg/dL      Creatinine 1.11 mg/dL      Sodium 134 mmol/L      Potassium 3.7 mmol/L      Chloride 104 mmol/L      CO2 23.0 mmol/L      Calcium 7.8 mg/dL      eGFR Non African Amer 52 mL/min/1.73      BUN/Creatinine Ratio 10.8     Anion Gap 7.0 mmol/L     POC Glucose Once [[id number]]  (Abnormal) Collected:  01/05/19 1153    Specimen:  Blood Updated:  01/05/19 1205     Glucose 146 mg/dL      Comment: RN NotifiedOperator: [id number] TRISHA BRANDYMeter ID: [id number]       Troponin [[id number]]  (Abnormal) Collected:  01/05/19 1116    Specimen:  Blood Updated:  01/05/19 1203     Troponin I 4.450 ng/mL     MRSA Screen, PCR - Swab, Nares [[id number]]  (Abnormal) Collected:  01/05/19 0838    Specimen:  Swab from Nares Updated:  01/05/19 1004     MRSA, PCR Positive    Narrative:       Performed by real-time polymerase chain reaction (qPCR).    Blood Gas, Arterial [[id number]]  (Abnormal) Collected:  01/05/19 0618    Specimen:  Arterial Blood Updated:  01/05/19 0624     Site Right Radial     Kolby's Test N/A     pH, Arterial 7.444 pH units      pCO2, Arterial 33.2 mm Hg      Comment: 84 Value below reference range        pO2, Arterial 86.4 mm Hg      HCO3, Arterial 22.7 mmol/L      Base Excess, Arterial -0.8 mmol/L      Comment: 84 Value below reference range        O2 Saturation, Arterial 98.0 %      Barometric Pressure for Blood Gas 746 mmHg      Modality Room Air     FIO2 21 %      Ventilator Mode NA     Collected by celestina      Comment: Meter: [id number]     :  [id number]       POC Glucose Once [[id number]]  (Abnormal) Collected:  01/04/19 2041    Specimen:  Blood Updated:  01/04/19 2123     Glucose 158 mg/dL      Comment: Sliding Scale AdminOperator: [id number] ISAIAS CORTESMeter ID: [id number]       POC Glucose Once [[id number]]  (Abnormal) Collected:  01/04/19 1839    Specimen:  Blood Updated:  01/04/19 1850     Glucose 150 mg/dL      Comment: : [id number] EAGLE ANTONIOFERMeter ID: [id number]       Hemoglobin A1c [[id number]]  (Abnormal) Collected:  01/04/19 0311    Specimen:  Blood Updated:  01/04/19 1842     Hemoglobin A1C 12.0 %         Imaging Results (last 24 hours)     ** No results found for the last 24 hours. **            Assessment/Plan       AMI inferior wall (CMS/HCC)    CAD (coronary artery disease)    Postsurgical hypothyroidism    Uncontrolled diabetes mellitus (CMS/HCC)    Hypertension    Head lice infestation    Resume her Synthroid, start regular Insulin sliding scale and Levemir if necessary.    I discussed the patients findings and my recommendations with Dr. Aysha Cameron MD  01/05/19  6:38 PM

## 2019-01-05 NOTE — PLAN OF CARE
Problem: Patient Care Overview  Goal: Plan of Care Review  Outcome: Ongoing (interventions implemented as appropriate)   01/05/19 0657   Coping/Psychosocial   Plan of Care Reviewed With patient   Plan of Care Review   Progress improving   OTHER   Outcome Summary VS WNL and patient remains on room air. Up to bedside commode with standby assist. No complications over the night.     Goal: Individualization and Mutuality  Outcome: Ongoing (interventions implemented as appropriate)    Goal: Discharge Needs Assessment  Outcome: Ongoing (interventions implemented as appropriate)    Goal: Interprofessional Rounds/Family Conf  Outcome: Ongoing (interventions implemented as appropriate)      Problem: Cardiac Catheterization (Diagnostic/Interventional) (Adult)  Goal: Signs and Symptoms of Listed Potential Problems Will be Absent, Minimized or Managed (Cardiac Catheterization)  Outcome: Ongoing (interventions implemented as appropriate)      Problem: Pain, Chronic (Adult)  Goal: Identify Related Risk Factors and Signs and Symptoms  Outcome: Ongoing (interventions implemented as appropriate)    Goal: Acceptable Pain/Comfort Level and Functional Ability  Outcome: Ongoing (interventions implemented as appropriate)      Problem: Diabetes, Type 2 (Adult)  Goal: Signs and Symptoms of Listed Potential Problems Will be Absent, Minimized or Managed (Diabetes, Type 2)  Outcome: Ongoing (interventions implemented as appropriate)      Problem: Skin Injury Risk (Adult)  Goal: Identify Related Risk Factors and Signs and Symptoms  Outcome: Ongoing (interventions implemented as appropriate)    Goal: Skin Health and Integrity  Outcome: Ongoing (interventions implemented as appropriate)

## 2019-01-06 LAB
ANION GAP SERPL CALCULATED.3IONS-SCNC: 10 MMOL/L (ref 5–15)
BUN BLD-MCNC: 11 MG/DL (ref 7–21)
BUN/CREAT SERPL: 10 (ref 7–25)
CALCIUM SPEC-SCNC: 7.8 MG/DL (ref 8.4–10.2)
CHLORIDE SERPL-SCNC: 105 MMOL/L (ref 95–110)
CO2 SERPL-SCNC: 22 MMOL/L (ref 22–31)
CREAT BLD-MCNC: 1.1 MG/DL (ref 0.5–1)
GFR SERPL CREATININE-BSD FRML MDRD: 53 ML/MIN/1.73 (ref 58–135)
GLUCOSE BLD-MCNC: 151 MG/DL (ref 60–100)
GLUCOSE BLDC GLUCOMTR-MCNC: 108 MG/DL (ref 70–130)
GLUCOSE BLDC GLUCOMTR-MCNC: 141 MG/DL (ref 70–130)
GLUCOSE BLDC GLUCOMTR-MCNC: 233 MG/DL (ref 70–130)
POTASSIUM BLD-SCNC: 3.4 MMOL/L (ref 3.5–5.1)
SODIUM BLD-SCNC: 137 MMOL/L (ref 137–145)
T4 FREE SERPL-MCNC: 0.51 NG/DL (ref 0.78–2.19)
WHOLE BLOOD HOLD SPECIMEN: NORMAL

## 2019-01-06 PROCEDURE — 82962 GLUCOSE BLOOD TEST: CPT

## 2019-01-06 PROCEDURE — 99232 SBSQ HOSP IP/OBS MODERATE 35: CPT | Performed by: INTERNAL MEDICINE

## 2019-01-06 PROCEDURE — 84439 ASSAY OF FREE THYROXINE: CPT | Performed by: INTERNAL MEDICINE

## 2019-01-06 PROCEDURE — 80048 BASIC METABOLIC PNL TOTAL CA: CPT | Performed by: INTERNAL MEDICINE

## 2019-01-06 RX ORDER — MAGNESIUM SULFATE HEPTAHYDRATE 40 MG/ML
4 INJECTION, SOLUTION INTRAVENOUS AS NEEDED
Status: DISCONTINUED | OUTPATIENT
Start: 2019-01-06 | End: 2019-01-07 | Stop reason: HOSPADM

## 2019-01-06 RX ORDER — POTASSIUM CHLORIDE 750 MG/1
40 CAPSULE, EXTENDED RELEASE ORAL ONCE
Status: COMPLETED | OUTPATIENT
Start: 2019-01-06 | End: 2019-01-06

## 2019-01-06 RX ORDER — POTASSIUM CHLORIDE 750 MG/1
40 CAPSULE, EXTENDED RELEASE ORAL AS NEEDED
Status: DISCONTINUED | OUTPATIENT
Start: 2019-01-06 | End: 2019-01-07 | Stop reason: HOSPADM

## 2019-01-06 RX ORDER — POTASSIUM CHLORIDE 1.5 G/1.77G
40 POWDER, FOR SOLUTION ORAL AS NEEDED
Status: DISCONTINUED | OUTPATIENT
Start: 2019-01-06 | End: 2019-01-07 | Stop reason: HOSPADM

## 2019-01-06 RX ORDER — MAGNESIUM SULFATE HEPTAHYDRATE 40 MG/ML
2 INJECTION, SOLUTION INTRAVENOUS AS NEEDED
Status: DISCONTINUED | OUTPATIENT
Start: 2019-01-06 | End: 2019-01-07 | Stop reason: HOSPADM

## 2019-01-06 RX ADMIN — HYDROCODONE BITARTRATE AND ACETAMINOPHEN 1 TABLET: 5; 325 TABLET ORAL at 21:26

## 2019-01-06 RX ADMIN — POTASSIUM CHLORIDE 40 MEQ: 750 CAPSULE, EXTENDED RELEASE ORAL at 15:30

## 2019-01-06 RX ADMIN — CARVEDILOL 6.25 MG: 6.25 TABLET, FILM COATED ORAL at 09:13

## 2019-01-06 RX ADMIN — ASPIRIN 81 MG CHEWABLE TABLET 81 MG: 81 TABLET CHEWABLE at 09:14

## 2019-01-06 RX ADMIN — SERTRALINE HYDROCHLORIDE 50 MG: 50 TABLET ORAL at 21:24

## 2019-01-06 RX ADMIN — LISINOPRIL 20 MG: 20 TABLET ORAL at 09:16

## 2019-01-06 RX ADMIN — SODIUM CHLORIDE 100 ML/HR: 900 INJECTION, SOLUTION INTRAVENOUS at 09:16

## 2019-01-06 RX ADMIN — TICAGRELOR 90 MG: 90 TABLET ORAL at 21:24

## 2019-01-06 RX ADMIN — TICAGRELOR 90 MG: 90 TABLET ORAL at 09:13

## 2019-01-06 RX ADMIN — LEVOTHYROXINE SODIUM 150 MCG: 150 TABLET ORAL at 06:15

## 2019-01-06 RX ADMIN — POTASSIUM CHLORIDE 40 MEQ: 750 CAPSULE, EXTENDED RELEASE ORAL at 21:23

## 2019-01-06 RX ADMIN — ISOSORBIDE MONONITRATE 30 MG: 30 TABLET, EXTENDED RELEASE ORAL at 09:13

## 2019-01-06 RX ADMIN — ATORVASTATIN CALCIUM 40 MG: 40 TABLET, FILM COATED ORAL at 09:13

## 2019-01-06 NOTE — PROGRESS NOTES
CARDIOLOGY PROGRESS NOTE      LOS: 3 days   Patient Care Team:  Anderson Abbott MD as PCP - General (Family Medicine)    Interval History/Subjective:   Ms. Sutton is a 48-year-old  lady who was admitted to [facility] with prolonged chest pain with EKG evidence of acute inferior wall myocardial infarction.  She underwent emergent cardiac catheterization with PCI to right coronary artery by Dr. Gonzalez 1/3/19.  She was also noted to have significant disease in the LAD territory and a CT surgery consultation has been requested.      She presented with chest pain in October 2017 and ruled in for a non-ST segment elevation myocardial infarctions and cardiac catheterization at that time showed   #1 Critical lesion noted in the mid circumflex coronary artery (90% followed by 80%) and successful PCI with PTCA and placement of 3.0 x 38 mm xience Alpine stent and reduction in stenosis to 0%  #2 Previously placed stent in the right coronary artery was patent but multiple mild to moderate lesions noted in the ostial, proximal and mid Right Coronary Artery and 80% stenosis noted in a small caliber PDA less than 2 mm in diameter.  #3 LAD was a small to medium caliber vessel with mild to moderate disease which was diffuse.  #4.  Overall normal LV systolic function with an EF of 55% with mild inferior wall hypokinesis     The patient has had significant hyperlipidemia, diabetes mellitus, hypothyroidism and her LDL has been optimized with PCSK9 inhibitors.     The patient denied any chest pain and clinical exam today did not reveal any signs of congestive heart failure.  She was noted to have head lice  and has been treated for this as an inpatient and had her head shaved.     Patient is tired she has gotten out of bed and ambulated.   No other concerns or complaints patient has anticipating going home tomorrow.    Objective     Vital Signs  Temp:  [98 °F (36.7 °C)-98.9 °F (37.2 °C)] 98.9 °F (37.2 °C)  Heart  Rate:  [53-69] 60  Resp:  [18] 18  BP: (133-172)/(68-97) 148/76    Physical Exam:    Constitutional: Cooperative, alert and oriented, well-developed, well-nourished, in no acute distress.     HENT:   Head: Normocephalic, normal hair patterns, no masses or tenderness.  Ears, Nose, and Throat: No gross abnormalities. No pallor or cyanosis. Dentition good.   Eyes: EOMS intact, PERRL, conjunctivae and lids unremarkable. Fundoscopic exam and visual fields not performed.   Neck: No palpable masses or adenopathy, no thyromegaly, no JVD, carotid pulses are full and equal bilaterally and without  Bruits.     Cardiovascular: Regular rhythm, S1 and S2 normal, no S3 or S4. Apical impulse not displaced. No murmurs, gallops, or rubs detected.     Pulmonary/Chest: Chest: normal symmetry, no tenderness to palpation, normal respiratory excursion, no intercostal retraction, no use of accessory muscles.            Pulmonary: Normal breath sounds. No rales or rhonchi.    Abdominal: Abdomen soft, bowel sounds normoactive, no masses, no hepatosplenomegaly, non-tender, no bruits.     Musculoskeletal: No deformities, clubbing, cyanosis, erythema, or edema observed. There are no spinal abnormalities noted. Normal muscle strength and tone. Pulses full and equal in all extremities, no bruits auscultated.     Neurological: No gross motor or sensory deficits noted, affect appropriate, oriented to time, person, place.     Skin: Warm and dry to the touch, no apparent skin lesions or masses noted.     Psychiatric: She has a normal mood and affect. Her behavior is normal. Judgment and thought content normal.     Results Review:    Lab Results (last 24 hours)     Procedure Component Value Units Date/Time    POC Glucose Once [[id number]]  (Abnormal) Collected:  01/06/19 1051    Specimen:  Blood Updated:  01/06/19 1105     Glucose 233 mg/dL      Comment: RN NotifiedOperator: [id number] KYLIE [facility] ID: [id number]       Extra Tubes [[id number]]  Collected:  01/06/19 0655    Specimen:  Blood, Venous Line Updated:  01/06/19 0800    Narrative:       The following orders were created for panel order Extra Tubes.  Procedure                               Abnormality         Status                     ---------                               -----------         ------                     Lavender Top[[id number]]                                     Final result                 Please view results for these tests on the individual orders.    Lavender Top [[id number]] Collected:  01/06/19 0655    Specimen:  Blood Updated:  01/06/19 0800     Extra Tube hold for add-on     Comment: Auto resulted       T4, Free [[id number]]  (Abnormal) Collected:  01/06/19 0655    Specimen:  Blood Updated:  01/06/19 0744     Free T4 0.51 ng/dL     Basic Metabolic Panel [[id number]]  (Abnormal) Collected:  01/06/19 0655    Specimen:  Blood Updated:  01/06/19 0732     Glucose 151 mg/dL      BUN 11 mg/dL      Creatinine 1.10 mg/dL      Sodium 137 mmol/L      Potassium 3.4 mmol/L      Chloride 105 mmol/L      CO2 22.0 mmol/L      Calcium 7.8 mg/dL      eGFR Non African Amer 53 mL/min/1.73      BUN/Creatinine Ratio 10.0     Anion Gap 10.0 mmol/L     POC Glucose Once [[id number]]  (Abnormal) Collected:  01/06/19 0636    Specimen:  Blood Updated:  01/06/19 0649     Glucose 141 mg/dL      Comment: RN NotifiedOperator: [id number] VIRAL ELRITAMeter ID: [id number]       POC Glucose Once [[id number]]  (Abnormal) Collected:  01/05/19 2108    Specimen:  Blood Updated:  01/05/19 2132     Glucose 139 mg/dL      Comment: RN NotifiedOperator: [id number] VIRAL ELRITAMeter ID: [id number]       POC Glucose Once [[id number]]  (Normal) Collected:  01/05/19 1718    Specimen:  Blood Updated:  01/05/19 1731     Glucose 127 mg/dL      Comment: RN NotifiedOperator: [id number] TRISHA BRANDYMeter ID: [id number]       POC Glucose Once [[id number]]  (Abnormal) Collected:  01/05/19 0849    Specimen:  Blood Updated:   01/05/19 1319     Glucose 145 mg/dL      Comment: : [id number] EAGLE Paniaguaer ID: [id number]       Basic Metabolic Panel [[id number]]  (Abnormal) Collected:  01/05/19 1116    Specimen:  Blood Updated:  01/05/19 1255     Glucose 163 mg/dL      BUN 12 mg/dL      Creatinine 1.11 mg/dL      Sodium 134 mmol/L      Potassium 3.7 mmol/L      Chloride 104 mmol/L      CO2 23.0 mmol/L      Calcium 7.8 mg/dL      eGFR Non African Amer 52 mL/min/1.73      BUN/Creatinine Ratio 10.8     Anion Gap 7.0 mmol/L         Imaging Results (last 24 hours)     ** No results found for the last 24 hours. **          Medication Review:   Current Facility-Administered Medications   Medication Dose Route Frequency Provider Last Rate Last Dose   • aspirin chewable tablet 81 mg  81 mg Oral Daily Tony Gonzalez MD   81 mg at 01/06/19 0914   • atorvastatin (LIPITOR) tablet 40 mg  40 mg Oral Daily Tony Gonzalez MD   40 mg at 01/06/19 0913   • carvedilol (COREG) tablet 6.25 mg  6.25 mg Oral BID With Meals Mian Montana MD   6.25 mg at 01/06/19 0913   • dextrose (D50W) 25 g/ 50mL Intravenous Solution 25 g  25 g Intravenous Q15 Min PRN Mian Montana MD       • dextrose (GLUTOSE) oral gel 15 g  15 g Oral Q15 Min PRN Mian Montana MD       • enoxaparin (LOVENOX) syringe 40 mg  40 mg Subcutaneous Q24H Mian Montana MD   40 mg at 01/05/19 1751   • glucagon (human recombinant) (GLUCAGEN DIAGNOSTIC) injection 1 mg  1 mg Subcutaneous PRN Mian Montana MD       • HYDROcodone-acetaminophen (NORCO) 5-325 MG per tablet 1 tablet  1 tablet Oral Q6H PRN Tony Gonzalez MD   1 tablet at 01/03/19 6953   • insulin aspart (novoLOG) injection 0-7 Units  0-7 Units Subcutaneous 4x Daily AC & at Bedtime Mian Montana MD       • isosorbide mononitrate (IMDUR) 24 hr tablet 30 mg  30 mg Oral Q24H Mian Montana MD   30 mg at 01/06/19 0913   •  levothyroxine (SYNTHROID, LEVOTHROID) tablet 150 mcg  150 mcg Oral Q AM Aime Aceves MD   150 mcg at 01/06/19 0615   • lisinopril (PRINIVIL,ZESTRIL) tablet 20 mg  20 mg Oral Daily Mian Montana MD   20 mg at 01/06/19 0916   • Magnesium Sulfate 2 gram Bolus, followed by 8 gram infusion (total Mg dose 10 grams)- Mg less than or equal to 1mg/dL  2 g Intravenous PRN Isreal Olmstead MD        Or   • Magnesium Sulfate 2 gram / 50mL Infusion (GIVE X 3 BAGS TO EQUAL 6GM TOTAL DOSE) - Mg 1.1 - 1.5 mg/dl  2 g Intravenous PRN Isreal Olmstead MD        Or   • Magnesium Sulfate 4 gram infusion- Mg 1.6-1.9 mg/dL  4 g Intravenous PRN Isreal Olmstead MD       • permethrin (NIX) 1 % liquid   Topical Q3 Days Igor Alvarez MD       • potassium chloride (KLOR-CON) packet 40 mEq  40 mEq Oral PRN Isreal Olmstead MD       • potassium chloride (MICRO-K) CR capsule 40 mEq  40 mEq Oral PRN Isreal Olmstead MD       • sertraline (ZOLOFT) tablet 50 mg  50 mg Oral Nightly Tony Gonzalez MD   50 mg at 01/05/19 2123   • sodium chloride 0.9 % flush 3 mL  3 mL Intravenous Q12H Mian Montana MD   3 mL at 01/05/19 2124   • sodium chloride 0.9 % flush 5 mL  5 mL Intravenous PRN Mian Montana MD       • Sodium chloride 0.9 % infusion  100 mL/hr Intravenous Continuous Tony Gonzalez  mL/hr at 01/06/19 0916 100 mL/hr at 01/06/19 0916   • ticagrelor (BRILINTA) tablet 90 mg  90 mg Oral BID Tony Gonzalez MD   90 mg at 01/06/19 0913         Assessment/Plan    1. Inferior ST segment elevation myocardial infarction: Status post emergent distal RCA was stented with a 2.75 x 23 Xience alma stent, mid to distal was stented with a 3.0 x 33 stent and proximally with a 3.5 x 18 Xience amla stent and subsequently postdilated with a 3.0 x 35 noncompliant mozec balloon and subsequently there was a ostial stenosis of the PDA and it was attempted  to place a stent but stent was getting stuck in the previously placed stent struts hence balloon angioplasty was done into the ostial PDA reducing 90% stenosis to 30-40%.  On 1/3/2019 by Dr. Gonzalez.  Echo shows that ejection fraction is preserved  -Aspirin 81 mg daily  -Brilinta 90 mg by mouth 2 times a day  -Atorvastatin 40 mg daily  -Coreg 6.25 mg by mouth daily  -Cardiac rehabilitation    2.  Coronary artery disease: Patient noted to have severe LAD disease on cardiac catheterization.  Given that she has diabetic with a preserved ejection fraction CT surgery consultation was rendered.  Patient has elected to undergo coronary artery bypass grafting approximately 6 weeks after recovering for her acute inferior ST segment myocardial elevation MI.  Appreciate CT surgery input  Presurgery evaluation per CT surgery  Continue aspirin 81 mg daily  Continue atorvastatin 40 mg daily  Continue Coreg 6.25 mg daily     3.  Obesity: Counseled on need for diet exercise and weight loss     4.  Head lice: Patient is undergone treatment and had her head shaved.  The family has asked.  For some time this weekend to delous the home.     5. DM/Hypothyroid/obesity            Isreal Olmstead MD, EMILY, [facility]  Interventional Cardiology  01/06/19  12:18 PM

## 2019-01-06 NOTE — PROGRESS NOTES
[facility] Medicine Services  INPATIENT PROGRESS NOTE    Length of Stay: 3  Date of Admission: 1/3/2019  Primary Care Physician: Anderson Abbott MD    Subjective     Chief Complaint: Chest pain and shortness of breath    HPI: Admitted for chest pain / shortness of breath. She was diagnosed with an acute MI requiring coronary angiogram and stenting of her RCA. She has a long history of non compliance with her medications.  She also had head lice and her hair was shaved and her house is being deloused this weekend.  Medical consultation was requested for diabetes management and hypothyroidism.    This morning patient denies chest pain or shortness of breath.  She is interested in going home.  Her sugars are better controlled and she has been started on Synthroid.    Review of Systems   Constitutional: Negative for appetite change, chills, fatigue and fever.   HENT: Negative for congestion and sore throat.    Eyes: Negative for pain and redness.   Respiratory: Negative for cough, chest tightness, shortness of breath and wheezing.    Cardiovascular: Negative for chest pain, palpitations and leg swelling.   Gastrointestinal: Negative for abdominal pain, constipation, diarrhea and nausea.   Genitourinary: Negative for dysuria and hematuria.   Musculoskeletal: Negative for arthralgias, joint swelling and neck pain.   Skin: Negative for color change and rash.   Neurological: Negative for dizziness, syncope, light-headedness and headaches.   Hematological: Negative for adenopathy.   Psychiatric/Behavioral: Negative for agitation and confusion. The patient is not nervous/anxious.        Objective    Temp:  [98 °F (36.7 °C)-98.9 °F (37.2 °C)] 98.9 °F (37.2 °C)  Heart Rate:  [53-69] 60  Resp:  [18] 18  BP: (133-172)/(68-97) 148/76    Physical Exam   Constitutional: She is oriented to person, place, and time. She appears well-developed and well-nourished. No distress.   HENT:    Head: Normocephalic and atraumatic.   Mouth/Throat: Oropharynx is clear and moist.   Head is shaved of hair   Eyes: Conjunctivae and EOM are normal. Pupils are equal, round, and reactive to light.   Neck: Normal range of motion. Neck supple. No JVD present. No tracheal deviation present. No thyromegaly present.   Cardiovascular: Normal rate, regular rhythm, normal heart sounds and intact distal pulses. Exam reveals no gallop and no friction rub.   No murmur heard.  Pulmonary/Chest: Effort normal and breath sounds normal. No stridor. No respiratory distress. She has no wheezes. She has no rales. She exhibits no tenderness.   Abdominal: Soft. Bowel sounds are normal. She exhibits no distension and no mass. There is no tenderness. There is no rebound and no guarding. No hernia.   Musculoskeletal: Normal range of motion. She exhibits no edema, tenderness or deformity.   Lymphadenopathy:     She has no cervical adenopathy.   Neurological: She is alert and oriented to person, place, and time. No cranial nerve deficit. Coordination normal.   Skin: Skin is warm and dry. No rash noted. She is not diaphoretic. No erythema. No pallor.   Psychiatric: She has a normal mood and affect. Her behavior is normal. Judgment and thought content normal.       Medication Review:    Current Facility-Administered Medications:   •  aspirin chewable tablet 81 mg, 81 mg, Oral, Daily, Tony Gonzalez MD, 81 mg at 01/06/19 0914  •  atorvastatin (LIPITOR) tablet 40 mg, 40 mg, Oral, Daily, Tony Gonzalez MD, 40 mg at 01/06/19 0913  •  carvedilol (COREG) tablet 6.25 mg, 6.25 mg, Oral, BID With Meals, Mian Monatna MD, 6.25 mg at 01/06/19 0913  •  dextrose (D50W) 25 g/ 50mL Intravenous Solution 25 g, 25 g, Intravenous, Q15 Min PRN, Mian Montana MD  •  dextrose (GLUTOSE) oral gel 15 g, 15 g, Oral, Q15 Min PRN, Mian Montana MD  •  enoxaparin (LOVENOX) syringe 40 mg, 40 mg, Subcutaneous, Q24H,  Mian Montana MD, 40 mg at 01/05/19 1751  •  glucagon (human recombinant) (GLUCAGEN DIAGNOSTIC) injection 1 mg, 1 mg, Subcutaneous, PRN, Mian Montana MD  •  HYDROcodone-acetaminophen (NORCO) 5-325 MG per tablet 1 tablet, 1 tablet, Oral, Q6H PRN, Tony Gonzalez MD, 1 tablet at 01/03/19 2303  •  insulin aspart (novoLOG) injection 0-7 Units, 0-7 Units, Subcutaneous, 4x Daily AC & at Bedtime, Mian Montana MD  •  isosorbide mononitrate (IMDUR) 24 hr tablet 30 mg, 30 mg, Oral, Q24H, Mian Montana MD, 30 mg at 01/06/19 0913  •  levothyroxine (SYNTHROID, LEVOTHROID) tablet 150 mcg, 150 mcg, Oral, Q AM, Aime Aceves MD, 150 mcg at 01/06/19 0615  •  lisinopril (PRINIVIL,ZESTRIL) tablet 20 mg, 20 mg, Oral, Daily, Mian Montana MD, 20 mg at 01/06/19 0916  •  Magnesium Sulfate 2 gram Bolus, followed by 8 gram infusion (total Mg dose 10 grams)- Mg less than or equal to 1mg/dL, 2 g, Intravenous, PRN **OR** Magnesium Sulfate 2 gram / 50mL Infusion (GIVE X 3 BAGS TO EQUAL 6GM TOTAL DOSE) - Mg 1.1 - 1.5 mg/dl, 2 g, Intravenous, PRN **OR** Magnesium Sulfate 4 gram infusion- Mg 1.6-1.9 mg/dL, 4 g, Intravenous, PRN, Isreal Olmstead MD  •  permethrin (NIX) 1 % liquid, , Topical, Q3 Days, Igor Alvarez MD  •  potassium chloride (KLOR-CON) packet 40 mEq, 40 mEq, Oral, PRN, Isreal Olmstead MD  •  potassium chloride (MICRO-K) CR capsule 40 mEq, 40 mEq, Oral, PRN, Isreal Olmstead MD  •  potassium chloride (MICRO-K) CR capsule 40 mEq, 40 mEq, Oral, Once, Andreas Garcia MD  •  sertraline (ZOLOFT) tablet 50 mg, 50 mg, Oral, Nightly, Tony Gonzalez MD, 50 mg at 01/05/19 2123  •  sodium chloride 0.9 % flush 3 mL, 3 mL, Intravenous, Q12H, Mian Montana MD, 3 mL at 01/05/19 2124  •  sodium chloride 0.9 % flush 5 mL, 5 mL, Intravenous, PRN, Mian Montana MD  •  Sodium chloride 0.9 % infusion, 100  mL/hr, Intravenous, Continuous, Tony Gonzalez MD, Last Rate: 100 mL/hr at 01/06/19 0916, 100 mL/hr at 01/06/19 0916  •  ticagrelor (BRILINTA) tablet 90 mg, 90 mg, Oral, BID, Tony Gonzalez MD, 90 mg at 01/06/19 0913    Results Review:  I have reviewed the labs, radiology results, and diagnostic studies.    Laboratory Data:   Results from last 7 days   Lab Units  01/06/19   0655  01/05/19   1116  01/04/19   0311   SODIUM mmol/L  137  134*  136*   POTASSIUM mmol/L  3.4*  3.7  4.0   CHLORIDE mmol/L  105  104  102   CO2 mmol/L  22.0  23.0  24.0   BUN mg/dL  11  12  14   CREATININE mg/dL  1.10*  1.11*  1.02*   GLUCOSE mg/dL  151*  163*  225*   CALCIUM mg/dL  7.8*  7.8*  8.0*   BILIRUBIN mg/dL   --    --   0.2   ALK PHOS U/L   --    --   106   ALT (SGPT) U/L   --    --   85*   AST (SGOT) U/L   --    --   173*   ANION GAP mmol/L  10.0  7.0  10.0     Estimated Creatinine Clearance: 81.4 mL/min (A) (by C-G formula based on SCr of 1.1 mg/dL (H)).          Results from last 7 days   Lab Units  01/04/19   0311   WBC 10*3/mm3  15.74*   HEMOGLOBIN g/dL  12.6   HEMATOCRIT %  37.0   PLATELETS 10*3/mm3  236           Culture Data:   No results found for: BLOODCX  No results found for: URINECX  No results found for: RESPCX  No results found for: WOUNDCX  No results found for: STOOLCX  No components found for: BODYFLD    Radiology Data:   Imaging Results (last 24 hours)     ** No results found for the last 24 hours. **          I have reviewed the patient's current medications.     Assessment/Plan     Hospital Problem List:  Principal Problem:    AMI inferior wall (CMS/HCC)  Active Problems:    Postsurgical hypothyroidism    Hypertension    CAD (coronary artery disease)    Head lice infestation    Uncontrolled diabetes mellitus (CMS/HCC)    Plan  - Blood sugars are better controlled.  Continue NovoLog sliding scale as ordered.  -Endocrinology consultation appreciated with Dr. Frias.  - Continue Synthroid for  hypothyroidism  - Continue permethrin for head lice.  Patient's house is being deloused this weekend patient can return home this week.    Discharge Planning: In progress    Andreas Garcia MD   01/06/19   1:40 PM

## 2019-01-06 NOTE — PLAN OF CARE
Problem: Patient Care Overview  Goal: Plan of Care Review  Outcome: Ongoing (interventions implemented as appropriate)   01/06/19 0533   Coping/Psychosocial   Plan of Care Reviewed With patient   Plan of Care Review   Progress improving   OTHER   Outcome Summary No complaints of pain.       Problem: Cardiac Catheterization (Diagnostic/Interventional) (Adult)  Goal: Signs and Symptoms of Listed Potential Problems Will be Absent, Minimized or Managed (Cardiac Catheterization)  Outcome: Ongoing (interventions implemented as appropriate)      Problem: Pain, Chronic (Adult)  Goal: Identify Related Risk Factors and Signs and Symptoms  Outcome: Ongoing (interventions implemented as appropriate)    Goal: Acceptable Pain/Comfort Level and Functional Ability  Outcome: Ongoing (interventions implemented as appropriate)      Problem: Diabetes, Type 2 (Adult)  Goal: Signs and Symptoms of Listed Potential Problems Will be Absent, Minimized or Managed (Diabetes, Type 2)  Outcome: Ongoing (interventions implemented as appropriate)      Problem: Skin Injury Risk (Adult)  Goal: Skin Health and Integrity  Outcome: Ongoing (interventions implemented as appropriate)

## 2019-01-06 NOTE — PLAN OF CARE
Problem: Patient Care Overview  Goal: Plan of Care Review  Outcome: Ongoing (interventions implemented as appropriate)    Goal: Individualization and Mutuality  Outcome: Ongoing (interventions implemented as appropriate)      Problem: Cardiac Catheterization (Diagnostic/Interventional) (Adult)  Goal: Signs and Symptoms of Listed Potential Problems Will be Absent, Minimized or Managed (Cardiac Catheterization)  Outcome: Ongoing (interventions implemented as appropriate)      Problem: Pain, Chronic (Adult)  Goal: Identify Related Risk Factors and Signs and Symptoms  Outcome: Ongoing (interventions implemented as appropriate)    Goal: Acceptable Pain/Comfort Level and Functional Ability  Outcome: Ongoing (interventions implemented as appropriate)      Problem: Diabetes, Type 2 (Adult)  Goal: Signs and Symptoms of Listed Potential Problems Will be Absent, Minimized or Managed (Diabetes, Type 2)  Outcome: Ongoing (interventions implemented as appropriate)      Problem: Skin Injury Risk (Adult)  Goal: Skin Health and Integrity  Outcome: Ongoing (interventions implemented as appropriate)

## 2019-01-07 VITALS
OXYGEN SATURATION: 97 % | HEART RATE: 60 BPM | RESPIRATION RATE: 18 BRPM | BODY MASS INDEX: 41.24 KG/M2 | DIASTOLIC BLOOD PRESSURE: 83 MMHG | SYSTOLIC BLOOD PRESSURE: 171 MMHG | WEIGHT: 256.6 LBS | HEIGHT: 66 IN | TEMPERATURE: 97.9 F

## 2019-01-07 LAB
ANION GAP SERPL CALCULATED.3IONS-SCNC: 7 MMOL/L (ref 5–15)
BUN BLD-MCNC: 10 MG/DL (ref 7–21)
BUN/CREAT SERPL: 9 (ref 7–25)
CALCIUM SPEC-SCNC: 7.8 MG/DL (ref 8.4–10.2)
CHLORIDE SERPL-SCNC: 106 MMOL/L (ref 95–110)
CO2 SERPL-SCNC: 23 MMOL/L (ref 22–31)
CREAT BLD-MCNC: 1.11 MG/DL (ref 0.5–1)
GFR SERPL CREATININE-BSD FRML MDRD: 52 ML/MIN/1.73 (ref 58–135)
GLUCOSE BLD-MCNC: 140 MG/DL (ref 60–100)
GLUCOSE BLDC GLUCOMTR-MCNC: 132 MG/DL (ref 70–130)
GLUCOSE BLDC GLUCOMTR-MCNC: 136 MG/DL (ref 70–130)
GLUCOSE BLDC GLUCOMTR-MCNC: 154 MG/DL (ref 70–130)
POTASSIUM BLD-SCNC: 4 MMOL/L (ref 3.5–5.1)
SODIUM BLD-SCNC: 136 MMOL/L (ref 137–145)

## 2019-01-07 PROCEDURE — 82962 GLUCOSE BLOOD TEST: CPT

## 2019-01-07 PROCEDURE — 99239 HOSP IP/OBS DSCHRG MGMT >30: CPT | Performed by: INTERNAL MEDICINE

## 2019-01-07 PROCEDURE — 80048 BASIC METABOLIC PNL TOTAL CA: CPT | Performed by: INTERNAL MEDICINE

## 2019-01-07 RX ORDER — ASPIRIN 81 MG/1
81 TABLET, CHEWABLE ORAL DAILY
Qty: 30 TABLET | Refills: 6 | Status: SHIPPED | OUTPATIENT
Start: 2019-01-08 | End: 2019-01-07 | Stop reason: HOSPADM

## 2019-01-07 RX ORDER — LEVOTHYROXINE SODIUM 0.15 MG/1
150 TABLET ORAL DAILY
Qty: 30 TABLET | Refills: 6 | Status: SHIPPED | OUTPATIENT
Start: 2019-01-07 | End: 2019-02-07 | Stop reason: DRUGHIGH

## 2019-01-07 RX ADMIN — TICAGRELOR 90 MG: 90 TABLET ORAL at 08:59

## 2019-01-07 RX ADMIN — LEVOTHYROXINE SODIUM 150 MCG: 150 TABLET ORAL at 06:06

## 2019-01-07 RX ADMIN — ASPIRIN 81 MG CHEWABLE TABLET 81 MG: 81 TABLET CHEWABLE at 08:59

## 2019-01-07 RX ADMIN — CARVEDILOL 6.25 MG: 6.25 TABLET, FILM COATED ORAL at 08:59

## 2019-01-07 RX ADMIN — ATORVASTATIN CALCIUM 40 MG: 40 TABLET, FILM COATED ORAL at 08:59

## 2019-01-07 RX ADMIN — LISINOPRIL 20 MG: 20 TABLET ORAL at 08:59

## 2019-01-07 RX ADMIN — ISOSORBIDE MONONITRATE 30 MG: 30 TABLET, EXTENDED RELEASE ORAL at 08:59

## 2019-01-07 NOTE — PAYOR COMM NOTE
"Chris Davies (48 y.o. Female)     Date of Birth Social Security Number Address Home Phone MRN    1970  P O  [address] [place] [address] [phone] [id number]    Denominational Marital Status          Shinto        Admission Date Admission Type Admitting Provider Attending Provider Department, Room/Bed    1/3/19 Elective Tony Gonzalez MD Sreekumar, Bhaskaran Nair, MD [address], 333/1    Discharge Date Discharge Disposition Discharge Destination         Home or Self Care              Attending Provider:  Mian Montana MD    Allergies:  Penicillins, Adhesive Tape, Ciprofloxacin, Coconut Flavor, Latex    Isolation:  Contact   Infection:  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), Lice (01/04/19)   Code Status:  CPR    Ht:  167.6 cm (66\")   Wt:  116 kg (256 lb 9.6 oz)    Admission Cmt:  None   Principal Problem:  AMI inferior wall (CMS/HCC) [I21.19]                 Active Insurance as of 1/3/2019     Primary Coverage     Payor Plan Insurance Group Employer/Plan Group    [employer] [place] [employer] [place]ICA[place]      Payor Plan Address Payor Plan Phone Number Payor Plan Fax Number Effective Dates    [address] [address] [phone]  8/11/2016 - None Entered    [facility] [id number]       Subscriber Name Subscriber Birth Date Member ID       CHRIS DAVIES 1970 [id number]                 Emergency Contacts      (Rel.) Home Phone Work Phone Mobile Phone    Machuca,Danielle (Grandparent) [phone] -- --               Discharge Summary      Mian Montana MD at 1/7/2019 11:17 AM          Date of Discharge:  1/7/2019    Discharge Diagnosis:  1.  Acute inferior wall microinfarction  2.  Multivessel coronary artery disease  3.  Hyperlipidemia  4.  Hypothyroidism  5.  Diabetes mellitus  6.  Head lice    Presenting Problem/History of Present Illness  AMI inferior wall (CMS/HCC) [I21.19]     Hospital Course   Ms. Davies is a 48-year-old "  lady who was admitted to [facility] with prolonged chest pain with EKG evidence of acute inferior wall myocardial infarction.  She underwent emergent cardiac catheterization with PCI to right coronary artery by Dr. Gonzalez yesterday.  She was also noted to have significant disease in the LAD territory and a CT surgery consultation has been requested.  Catheterization showed:  Dominance: Right dominant  Left Main coronary artery: Large caliber vessel divides and left circumflex artery and LAD  Left anterior descending artery: Diffusely disease that is proximally there is 80-90% stenosis and distal LAD goes all the way to the apex and there is diffuse disease in the LAD  Left circumflex:  Large caliber vessel previously placed in the left circumflex artery is widely patent  Right coronary artery:  Proximal to mid was 100% inside the stent.  Percutaneous coronary intervention: After giving Angiomax bolus and drip a right guide was placed in the right coronary artery and a run-through wire was crossed through the lesion and was dilated with a 2.0 X 20 Trek balloon and subsequently distal RCA was stented with a 2.75 x 23 Xience alma stent, mid to distal was stented with a 3.0 x 33 stent and proximally with a 3.5 x 18 Xience alma stent and subsequently postdilated with a 3.0 x 35 noncompliant mozec balloon and subsequently there was a ostial stenosis of the PDA and it was attempted to place a stent but stent was getting stuck in the previously placed stent struts hence balloon angioplasty was done into the ostial PDA reducing 90% stenosis to 30-40%.  Conclusion : Successful PCI to the proximal to mid right coronary artery reducing 100% stenosis to 0% obtaining BEA 3 flow with Xience Alma stents      She presented with chest pain in October 2017 and ruled in for a non-ST segment elevation myocardial infarctions and cardiac catheterization at that time showed   #1 Critical lesion noted in the mid  circumflex coronary artery (90% followed by 80%) and successful PCI with PTCA and placement of 3.0 x 38 mm xience Alpine stent and reduction in stenosis to 0%  #2 Previously placed stent in the right coronary artery was patent but multiple mild to moderate lesions noted in the ostial, proximal and mid Right Coronary Artery and 80% stenosis noted in a small caliber PDA less than 2 mm in diameter.  #3 LAD was a small to medium caliber vessel with mild to moderate disease which was diffuse.  #4.  Overall normal LV systolic function with an EF of 55% with mild inferior wall hypokinesis     The patient has had significant hyperlipidemia, diabetes mellitus, hypothyroidism and her LDL has been optimized with PCSK9 inhibitors.       She was noted to have head lice and  treated with Permethrin.    The patient did not have any recurrence of chest pain and remained stable with no signs of congestive heart failure.  Echocardiogram showed:  ·  Mild tricuspid valve regurgitation is present.  · Estimated EF = 55%.  · Left ventricular systolic function is normal.  · Left ventricular diastolic dysfunction (grade I a) consistent with impaired relaxation.  · Left atrial cavity size is mildly dilated.     The patient was seen in consultation by the hospitalist service, endocrinologist Dr. Ramses Frias and CT surgery by Dr. Vilchis.  Dr. Frias adjusted her antidiabetic medications and started her on Synthroid since she was noted to have profound hypothyroidism.  Antidiabetics were adjusted as well.  For best long-term revascularization the patient will need coronary artery bypass surgery and this is being considered in about 4-6 weeks.  Arrangements have been made for her to have urinary workup for CABG as outpatient.    The peak troponins with 34.3.  TSH was 79.9    Procedures Performed  Procedure(s):  Left Heart Cath       Consults:   Consults     Date and Time Order Name Status Description    1/5/2019 1048 Inpatient  Hospitalist Consult Completed     1/4/2019 1758 Inpatient Endocrinology Consult      1/4/2019 0956 Inpatient Cardiothoracic Surgery Consult Completed           Pertinent Test Results:   RESULTS  Lab Results (last 24 hours)     Procedure Component Value Units Date/Time    POC Glucose Once [[id number]]  (Abnormal) Collected:  01/07/19 1022    Specimen:  Blood Updated:  01/07/19 1036     Glucose 154 mg/dL      Comment: RN NotifiedOperator: [id number] INNA TIFFANYMeter ID: [id number]       Basic Metabolic Panel [[id number]]  (Abnormal) Collected:  01/07/19 0625    Specimen:  Blood Updated:  01/07/19 0654     Glucose 140 mg/dL      BUN 10 mg/dL      Creatinine 1.11 mg/dL      Sodium 136 mmol/L      Potassium 4.0 mmol/L      Chloride 106 mmol/L      CO2 23.0 mmol/L      Calcium 7.8 mg/dL      eGFR Non African Amer 52 mL/min/1.73      BUN/Creatinine Ratio 9.0     Anion Gap 7.0 mmol/L     POC Glucose Once [[id number]]  (Abnormal) Collected:  01/07/19 0625    Specimen:  Blood Updated:  01/07/19 0638     Glucose 132 mg/dL      Comment: Result Not ConfirmedOperator: [id number] [employer] TechnologiesWNAMeter ID: [id number]       POC Glucose Once [[id number]]  (Abnormal) Collected:  01/06/19 1956    Specimen:  Blood Updated:  01/07/19 0117     Glucose 136 mg/dL      Comment: RN NotifiedOperator: [id number] [facility] HospitalToopher SHAWNAMeter ID: [id number]       POC Glucose Once [[id number]]  (Normal) Collected:  01/06/19 1724    Specimen:  Blood Updated:  01/06/19 1754     Glucose 108 mg/dL      Comment: : [id number] [facility] ID: [id number]               Condition on Discharge:  Stable    Vital Signs  Temp:  [97.8 °F (36.6 °C)-98.5 °F (36.9 °C)] 97.9 °F (36.6 °C)  Heart Rate:  [51-60] 60  Resp:  [16-18] 18  BP: (118-171)/(56-83) 171/83    Physical Exam:  Constitutional: Alert and oriented, in no acute distress.     Cardiovascular: Regular rhythm, S1 and S2 normal, no S3 or S4. No murmurs, gallops, or rubs detected.      Pulmonary/Chest: Chest: normal symmetry, no tenderness to palpation, normal respiratory excursion, no intercostal retraction, no use of accessory muscles.            Pulmonary: Normal breath sounds. No rales or rhonchi.    Abdominal: Abdomen soft, bowel sounds normoactive, no masses, no hepatosplenomegaly, non-tender, no bruits.     Discharge Disposition  Home or Self Care    Discharge Medications     Discharge Medications      New Medications      Instructions Start Date   Dulaglutide 0.75 MG/0.5ML solution pen-injector   0.75 mg, Subcutaneous, Weekly      Ertugliflozin L-PyroglutamicAc 5 MG tablet  Commonly known as:  STEGLATRO   5 mg, Oral, Every Morning      levothyroxine 150 MCG tablet  Commonly known as:  SYNTHROID   150 mcg, Oral, Daily      levothyroxine 150 MCG tablet  Commonly known as:  SYNTHROID, LEVOTHROID   150 mcg, Oral, Daily         Changes to Medications      Instructions Start Date   aspirin 81 MG chewable tablet  What changed:  Another medication with the same name was added. Make sure you understand how and when to take each.   81 mg, Oral, Daily      aspirin 81 MG chewable tablet  What changed:  You were already taking a medication with the same name, and this prescription was added. Make sure you understand how and when to take each.   81 mg, Oral, Daily         Continue These Medications      Instructions Start Date   atorvastatin 40 MG tablet  Commonly known as:  LIPITOR   No dose, route, or frequency recorded.      Baclofen 2 % cream   Apply externally, 3 Times Daily, Baclofen, Neurontin, and Lidocaine compound cream to lower back TID for chronic back pain       carvedilol 25 MG tablet  Commonly known as:  COREG   12.5 mg, 2 Times Daily      cholecalciferol 1000 units tablet  Commonly known as:  VITAMIN D3   1,000 Units, Oral, Daily, 5 capsules daily      Evolocumab 140 MG/ML solution auto-injector   140 mg, Subcutaneous, Every 14 Days      FREESTYLE LITE device   No dose, route, or  frequency recorded.      FREESTYLE LITE test strip  Generic drug:  glucose blood   No dose, route, or frequency recorded.      HYDROcodone-acetaminophen 5-325 MG per tablet  Commonly known as:  NORCO   1 tablet, Oral, Every 6 Hours PRN      lisinopril 20 MG tablet  Commonly known as:  PRINIVIL,ZESTRIL   20 mg, Oral, Daily      nitroglycerin 0.4 MG SL tablet  Commonly known as:  NITROSTAT   1 under the tongue as needed for angina, may repeat q5mins for up three doses      sertraline 50 MG tablet  Commonly known as:  ZOLOFT   As Needed, daily      ticagrelor 90 MG tablet tablet  Commonly known as:  BRILINTA   90 mg, Oral, 2 Times Daily         Stop These Medications    ARMOUR THYROID 180 MG tablet  Generic drug:  thyroid     glipiZIDE 5 MG ER tablet  Commonly known as:  GLUCOTROL XL     isosorbide mononitrate 30 MG 24 hr tablet  Commonly known as:  IMDUR     thyroid 180 MG tablet  Commonly known as:  ARMOUR THYROID            Discharge Diet:  Cardiac prudent    Activity at Discharge: Patient advised to not perform any strenuous physical activity until her coronary artery bypass surgery.    Follow-up Appointments  Future Appointments   Date Time Provider Department Center   2/1/2019 10:00 AM VENT PULMONARY MAD [facility] PULM MAD None   2/1/2019 10:30 AM MAD C VAS ROOM W [facility]   2/1/2019 11:30 AM MAD HVC VAS ROOM W [facility]   2/7/2019  1:30 PM Jaren Vilchis MD [facility] CTV MAD None   6/13/2019  2:30 PM Chuck Thompson APRN [facility] END MAD None   6/13/2019  3:30 PM Mian Montana MD [facility] CD MAD None     Additional Instructions for the Follow-ups that You Need to Schedule     Ambulatory Referral to Cardiac Rehab   As directed               Mian Montana MD  01/07/19  11:17 AM    Time: Discharge 40 min            Electronically signed by Mian Montana MD at 1/7/2019 11:28 AM       Marybeth Olson RN  [facility]  [phone]  Fax [phone]

## 2019-01-07 NOTE — PROGRESS NOTES
[facility] Medicine Services  INPATIENT PROGRESS NOTE    Length of Stay: 4  Date of Admission: 1/3/2019  Primary Care Physician: Anderson Abbott MD    Subjective     Chief Complaint: No new complaints.      HPI:   Patient is in for follow-up today  She was admitted for chest pain / shortness of breath. She was diagnosed with an acute MI requiring coronary angiogram and stenting of her RCA. She has a long history of non compliance with her medications.  She also had head lice and her hair was shaved and her house is being deloused this weekend.  Medical consultation was requested for diabetes management and hypothyroidism.  She is doing well and voices no new complaints.      Review of Systems   Constitutional: Negative for appetite change, chills, fatigue and fever.   HENT: Negative for congestion and sore throat.    Eyes: Negative for pain and redness.   Respiratory: Negative for cough, chest tightness, shortness of breath and wheezing.    Cardiovascular: Negative for chest pain, palpitations and leg swelling.   Gastrointestinal: Negative for abdominal pain, constipation, diarrhea and nausea.   Genitourinary: Negative for dysuria and hematuria.   Musculoskeletal: Negative for arthralgias, joint swelling and neck pain.   Skin: Negative for color change and rash.   Neurological: Negative for dizziness, syncope, light-headedness and headaches.   Hematological: Negative for adenopathy.   Psychiatric/Behavioral: Negative for agitation and confusion. The patient is not nervous/anxious.        Objective    Temp:  [97.8 °F (36.6 °C)-98.5 °F (36.9 °C)] 97.9 °F (36.6 °C)  Heart Rate:  [51-60] 60  Resp:  [16-18] 18  BP: (118-171)/(56-83) 171/83    Physical Exam   Constitutional: She is oriented to person, place, and time. She appears well-developed and well-nourished. No distress.   HENT:   Head: Normocephalic and atraumatic.   Mouth/Throat: Oropharynx is clear and moist.    Head is shaved of hair   Eyes: Conjunctivae and EOM are normal. Pupils are equal, round, and reactive to light.   Neck: Normal range of motion. Neck supple. No JVD present. No tracheal deviation present. No thyromegaly present.   Cardiovascular: Normal rate, regular rhythm, normal heart sounds and intact distal pulses. Exam reveals no gallop and no friction rub.   No murmur heard.  Pulmonary/Chest: Effort normal and breath sounds normal. No stridor. No respiratory distress. She has no wheezes. She has no rales. She exhibits no tenderness.   Abdominal: Soft. Bowel sounds are normal. She exhibits no distension and no mass. There is no tenderness. There is no rebound and no guarding. No hernia.   Musculoskeletal: Normal range of motion. She exhibits no edema, tenderness or deformity.   Lymphadenopathy:     She has no cervical adenopathy.   Neurological: She is alert and oriented to person, place, and time. No cranial nerve deficit. Coordination normal.   Skin: Skin is warm and dry. No rash noted. She is not diaphoretic. No erythema. No pallor.   Psychiatric: She has a normal mood and affect. Her behavior is normal. Judgment and thought content normal.       Medication Review:    Current Facility-Administered Medications:   •  aspirin chewable tablet 81 mg, 81 mg, Oral, Daily, Tony Gonzalez MD, 81 mg at 01/07/19 0859  •  atorvastatin (LIPITOR) tablet 40 mg, 40 mg, Oral, Daily, Tony Gonzalez MD, 40 mg at 01/07/19 0859  •  carvedilol (COREG) tablet 6.25 mg, 6.25 mg, Oral, BID With Meals, Mian Montana MD, 6.25 mg at 01/07/19 0859  •  dextrose (D50W) 25 g/ 50mL Intravenous Solution 25 g, 25 g, Intravenous, Q15 Min PRN, Mian Montana MD  •  dextrose (GLUTOSE) oral gel 15 g, 15 g, Oral, Q15 Min PRN, Mian Montana MD  •  enoxaparin (LOVENOX) syringe 40 mg, 40 mg, Subcutaneous, Q24H, Mian Montana MD, 40 mg at 01/05/19 1751  •  glucagon (human recombinant)  (GLUCAGEN DIAGNOSTIC) injection 1 mg, 1 mg, Subcutaneous, PRN, Mian Montana MD  •  HYDROcodone-acetaminophen (NORCO) 5-325 MG per tablet 1 tablet, 1 tablet, Oral, Q6H PRN, Tony Gonzalez MD, 1 tablet at 01/06/19 2126  •  insulin aspart (novoLOG) injection 0-7 Units, 0-7 Units, Subcutaneous, 4x Daily AC & at Bedtime, Mian Montana MD  •  isosorbide mononitrate (IMDUR) 24 hr tablet 30 mg, 30 mg, Oral, Q24H, Mian Montana MD, 30 mg at 01/07/19 0859  •  levothyroxine (SYNTHROID, LEVOTHROID) tablet 150 mcg, 150 mcg, Oral, Q AM, Aime Aceves MD, 150 mcg at 01/07/19 0606  •  lisinopril (PRINIVIL,ZESTRIL) tablet 20 mg, 20 mg, Oral, Daily, Mian Montana MD, 20 mg at 01/07/19 0859  •  Magnesium Sulfate 2 gram Bolus, followed by 8 gram infusion (total Mg dose 10 grams)- Mg less than or equal to 1mg/dL, 2 g, Intravenous, PRN **OR** Magnesium Sulfate 2 gram / 50mL Infusion (GIVE X 3 BAGS TO EQUAL 6GM TOTAL DOSE) - Mg 1.1 - 1.5 mg/dl, 2 g, Intravenous, PRN **OR** Magnesium Sulfate 4 gram infusion- Mg 1.6-1.9 mg/dL, 4 g, Intravenous, PRN, Isreal Olmstead MD  •  permethrin (NIX) 1 % liquid, , Topical, Q3 Days, Igor Alvarez MD  •  potassium chloride (KLOR-CON) packet 40 mEq, 40 mEq, Oral, PRN, Isreal Olmstead MD  •  potassium chloride (MICRO-K) CR capsule 40 mEq, 40 mEq, Oral, PRN, Isreal Olmstead MD, 40 mEq at 01/06/19 2123  •  sertraline (ZOLOFT) tablet 50 mg, 50 mg, Oral, Nightly, Tony Gonzalez MD, 50 mg at 01/06/19 2124  •  sodium chloride 0.9 % flush 3 mL, 3 mL, Intravenous, Q12H, iMan Montana MD, 3 mL at 01/05/19 2124  •  sodium chloride 0.9 % flush 5 mL, 5 mL, Intravenous, PRN, Mian Montana MD  •  Sodium chloride 0.9 % infusion, 100 mL/hr, Intravenous, Continuous, Tony Gonzalez MD, Last Rate: 100 mL/hr at 01/06/19 0916, 100 mL/hr at 01/06/19 0916  •  ticagrelor (BRILINTA) tablet 90  mg, 90 mg, Oral, BID, Tony Gonzalez MD, 90 mg at 01/07/19 0859    Results Review:  I have reviewed the labs, radiology results, and diagnostic studies.    Laboratory Data:   Results from last 7 days   Lab Units  01/07/19   0625  01/06/19   0655  01/05/19   1116  01/04/19   0311   SODIUM mmol/L  136*  137  134*  136*   POTASSIUM mmol/L  4.0  3.4*  3.7  4.0   CHLORIDE mmol/L  106  105  104  102   CO2 mmol/L  23.0  22.0  23.0  24.0   BUN mg/dL  10  11  12  14   CREATININE mg/dL  1.11*  1.10*  1.11*  1.02*   GLUCOSE mg/dL  140*  151*  163*  225*   CALCIUM mg/dL  7.8*  7.8*  7.8*  8.0*   BILIRUBIN mg/dL   --    --    --   0.2   ALK PHOS U/L   --    --    --   106   ALT (SGPT) U/L   --    --    --   85*   AST (SGOT) U/L   --    --    --   173*   ANION GAP mmol/L  7.0  10.0  7.0  10.0     Estimated Creatinine Clearance: 80.2 mL/min (A) (by C-G formula based on SCr of 1.11 mg/dL (H)).          Results from last 7 days   Lab Units  01/04/19   0311   WBC 10*3/mm3  15.74*   HEMOGLOBIN g/dL  12.6   HEMATOCRIT %  37.0   PLATELETS 10*3/mm3  236           Culture Data:   No results found for: BLOODCX  No results found for: URINECX  No results found for: RESPCX  No results found for: WOUNDCX  No results found for: STOOLCX  No components found for: BODYFLD    Radiology Data:   Imaging Results (last 24 hours)     ** No results found for the last 24 hours. **          I have reviewed the patient's current medications.     Assessment/Plan     Hospital Problem List:  Principal Problem:    AMI inferior wall (CMS/HCC)  Active Problems:    Postsurgical hypothyroidism    Hypertension    CAD (coronary artery disease)    Head lice infestation    Uncontrolled diabetes mellitus (CMS/HCC)    Plan  - Blood sugar is better controlled.  Continue NovoLog sliding scale as ordered.  -Endocrinology consultation appreciated with Dr. Frias.  - Continue Synthroid for hypothyroidism  - Continue permethrin for head lice.  Patient's house has been  deloused this past weekend.    Discharge Planning:  Likely discharge today.    Isreal Poe MD   01/07/19   12:02 PM

## 2019-01-07 NOTE — CONSULTS
Adult Nutrition  Assessment    Patient Name:  Rhea Sutton  YOB: 1970  MRN: [id number]  Admit Date:  1/3/2019    Assessment Date:  1/7/2019    Comments:  Patient admitted to hospital d/t AMI. Patient was educated on the heart healthy diet/low-fat diet. Per patient request, educated patient on ADA diet/consistent carbohydrates. Educated patient on the importance of eating 3 meals/day and eating well-rounded meals following the ADA diet.  Patient stated that she had been making dietary changes to help improve her diabetes and heart-health. Will follow-up to see if patient has questions regarding education.     Reason for Assessment     Row Name 01/07/19 1254          Reason for Assessment    Reason For Assessment  follow-up protocol  (Pended)      Identified At Risk by Screening Criteria  need for education  (Pended)          Nutrition/Diet History     Row Name 01/07/19 1254          Nutrition/Diet History    Typical Food/Fluid Intake  Patient alert and feeling good.   (Pended)          Anthropometrics     Row Name 01/07/19 0617          Anthropometrics    Weight  116 kg (256 lb 9.6 oz)         Labs/Tests/Procedures/Meds     Row Name 01/07/19 1255          Labs/Procedures/Meds    Lab Results Reviewed  reviewed, pertinent  (Pended)      Lab Results Comments  Na+-136, Glucose-154, Cr-1.11  (Pended)         Diagnostic Tests/Procedures    Diagnostic Test/Procedure Reviewed  reviewed  (Pended)         Medications    Pertinent Medications Reviewed  reviewed, pertinent  (Pended)          Physical Findings     Row Name 01/07/19 1256          Physical Findings    Overall Physical Appearance  obese  (Pended)      Skin  other (see comments)  (Pended)  head shaved d/t lice          Estimated/Assessed Needs     Row Name 01/07/19 1258 01/07/19 1256       Calculation Measurements    Weight Used For Calculations  116 kg (255 lb 11.7 oz)  (Pended)   73.5 kg (162 lb)  (Pended)        Estimated/Assessed Needs     Additional Documentation  --  Bond-St. Jeor Equation (Group);Calorie Requirements (Group);Fluid Requirements (Group);KCAL/KG (Group);Protein Requirements (Group)  (Pended)        Calorie Requirements    Weight Used For Calorie Calculations  --  73.5 kg (162 lb)  (Pended)     Estimated Calorie Requirement (kcal/day)  --  1800  (Pended)        KCAL/KG    14 Kcal/Kg (kcal)  1624  (Pended)   1028.76  (Pended)     15 Kcal/Kg (kcal)  1740  (Pended)   1102.25  (Pended)     18 Kcal/Kg (kcal)  2088  (Pended)   1322.69  (Pended)     20 Kcal/Kg (kcal)  2320  (Pended)   1469.66  (Pended)     25 Kcal/Kg (kcal)  2900  (Pended)   1837.08  (Pended)     30 Kcal/Kg (kcal)  3480  (Pended)   2204.49  (Pended)     35 Kcal/Kg (kcal)  4060  (Pended)   2571.91  (Pended)     40 Kcal/Kg (kcal)  4640  (Pended)   2939.32  (Pended)     45 Kcal/Kg (kcal)  5220  (Pended)   3306.74  (Pended)     50 Kcal/Kg (kcal)  5800  (Pended)   3674.15  (Pended)        Bond-St. Jeor Equation    RMR (Bond-St. Jeor Equation)  1806.75  (Pended)   1381.58  (Pended)        Protein Requirements    Weight Used For Protein Calculations  --  73.5 kg (162 lb)  (Pended)     Est Protein Requirement Amount (gms/kg)  --  0.8 gm protein  (Pended)     Estimated Protein Requirements (gms/day)  --  58.79  (Pended)        Fluid Requirements    Estimated Fluid Requirements (mL/day)  --  1800  (Pended)     RDA Method (mL)  --  1800  (Pended)     Jonathan-Bessie Method (over 20 kg)  3820  (Pended)   2969.66  (Pended)         Nutrition Prescription Ordered     Row Name 01/07/19 1258          Nutrition Prescription PO    Current PO Diet  Regular  (Pended)      Fluid Consistency  Thin  (Pended)      Common Modifiers  Cardiac;Consistent Carbohydrate  (Pended)          Evaluation of Received Nutrient/Fluid Intake     Row Name 01/07/19 1258 01/07/19 1253       Calculation Measurements    Weight Used For Calculations  116 kg (255 lb 11.7 oz)  (Pended)   73.5 kg (162 lb)  (Pended)         PO Evaluation    Number of Days PO Intake Evaluated  1 day  (Pended)   --    Number of Meals  3  (Pended)   --    % PO Intake  50%  (Pended)   --        Evaluation of Prescribed Nutrient/Fluid Intake     Row Name 01/07/19 1258 01/07/19 1256       Calculation Measurements    Weight Used For Calculations  116 kg (255 lb 11.7 oz)  (Pended)   73.5 kg (162 lb)  (Pended)             Electronically signed by:  Lakeisha Forte  01/07/19 12:59 PM

## 2019-01-07 NOTE — PLAN OF CARE
Problem: Patient Care Overview  Goal: Plan of Care Review  Outcome: Ongoing (interventions implemented as appropriate)   01/07/19 0518   Coping/Psychosocial   Plan of Care Reviewed With patient   Plan of Care Review   Progress improving     Goal: Individualization and Mutuality  Outcome: Ongoing (interventions implemented as appropriate)    Goal: Discharge Needs Assessment  Outcome: Ongoing (interventions implemented as appropriate)    Goal: Interprofessional Rounds/Family Conf  Outcome: Ongoing (interventions implemented as appropriate)      Problem: Cardiac Catheterization (Diagnostic/Interventional) (Adult)  Goal: Signs and Symptoms of Listed Potential Problems Will be Absent, Minimized or Managed (Cardiac Catheterization)  Outcome: Ongoing (interventions implemented as appropriate)      Problem: Pain, Chronic (Adult)  Goal: Acceptable Pain/Comfort Level and Functional Ability  Outcome: Ongoing (interventions implemented as appropriate)

## 2019-01-07 NOTE — DISCHARGE SUMMARY
Date of Discharge:  1/7/2019    Discharge Diagnosis:  1.  Acute inferior wall microinfarction  2.  Multivessel coronary artery disease  3.  Hyperlipidemia  4.  Hypothyroidism  5.  Diabetes mellitus  6.  Head lice    Presenting Problem/History of Present Illness  AMI inferior wall (CMS/[facility]) [I21.19]     Hospital Course   Ms. Sutton is a 48-year-old  lady who was admitted to [facility] with prolonged chest pain with EKG evidence of acute inferior wall myocardial infarction.  She underwent emergent cardiac catheterization with PCI to right coronary artery by Dr. Gonzalez yesterday.  She was also noted to have significant disease in the LAD territory and a CT surgery consultation has been requested.  Catheterization showed:  Dominance: Right dominant  Left Main coronary artery: Large caliber vessel divides and left circumflex artery and LAD  Left anterior descending artery: Diffusely disease that is proximally there is 80-90% stenosis and distal LAD goes all the way to the apex and there is diffuse disease in the LAD  Left circumflex:  Large caliber vessel previously placed in the left circumflex artery is widely patent  Right coronary artery:  Proximal to mid was 100% inside the stent.  Percutaneous coronary intervention: After giving Angiomax bolus and drip a right guide was placed in the right coronary artery and a run-through wire was crossed through the lesion and was dilated with a 2.0 X 20 Trek balloon and subsequently distal RCA was stented with a 2.75 x 23 Xience alma stent, mid to distal was stented with a 3.0 x 33 stent and proximally with a 3.5 x 18 Xience alma stent and subsequently postdilated with a 3.0 x 35 noncompliant mozec balloon and subsequently there was a ostial stenosis of the PDA and it was attempted to place a stent but stent was getting stuck in the previously placed stent struts hence balloon angioplasty was done into the ostial PDA reducing 90% stenosis to  30-40%.  Conclusion : Successful PCI to the proximal to mid right coronary artery reducing 100% stenosis to 0% obtaining BEA 3 flow with Xience Kelley stents      She presented with chest pain in October 2017 and ruled in for a non-ST segment elevation myocardial infarctions and cardiac catheterization at that time showed   #1 Critical lesion noted in the mid circumflex coronary artery (90% followed by 80%) and successful PCI with PTCA and placement of 3.0 x 38 mm xience Alpine stent and reduction in stenosis to 0%  #2 Previously placed stent in the right coronary artery was patent but multiple mild to moderate lesions noted in the ostial, proximal and mid Right Coronary Artery and 80% stenosis noted in a small caliber PDA less than 2 mm in diameter.  #3 LAD was a small to medium caliber vessel with mild to moderate disease which was diffuse.  #4.  Overall normal LV systolic function with an EF of 55% with mild inferior wall hypokinesis     The patient has had significant hyperlipidemia, diabetes mellitus, hypothyroidism and her LDL has been optimized with PCSK9 inhibitors.       She was noted to have head lice and treated with Permethrin.    The patient did not have any recurrence of chest pain and remained stable with no signs of congestive heart failure.  Echocardiogram showed:  ·  Mild tricuspid valve regurgitation is present.  · Estimated EF = 55%.  · Left ventricular systolic function is normal.  · Left ventricular diastolic dysfunction (grade I a) consistent with impaired relaxation.  · Left atrial cavity size is mildly dilated.     The patient was seen in consultation by the hospitalist service, endocrinologist Dr. Ramses Frias and CT surgery by Dr. Vilchis.  Dr. Frias adjusted her antidiabetic medications and started her on Synthroid since she was noted to have profound hypothyroidism.  Antidiabetics were adjusted as well.  For best long-term revascularization the patient will need coronary artery  bypass surgery and this is being considered in about 4-6 weeks.  Arrangements have been made for her to have urinary workup for CABG as outpatient.    The peak troponins with 34.3.  TSH was 79.9    Procedures Performed  Procedure(s):  Left Heart Cath       Consults:   Consults     Date and Time Order Name Status Description    1/5/2019 1048 Inpatient Hospitalist Consult Completed     1/4/2019 1758 Inpatient Endocrinology Consult      1/4/2019 0956 Inpatient Cardiothoracic Surgery Consult Completed           Pertinent Test Results:   RESULTS  Lab Results (last 24 hours)     Procedure Component Value Units Date/Time    POC Glucose Once [[id number]]  (Abnormal) Collected:  01/07/19 1022    Specimen:  Blood Updated:  01/07/19 1036     Glucose 154 mg/dL      Comment: RN NotifiedOperator: [id number] INNAURI MCKNIGHTFANYMeter ID: [id number]       Basic Metabolic Panel [[id number]]  (Abnormal) Collected:  01/07/19 0625    Specimen:  Blood Updated:  01/07/19 0654     Glucose 140 mg/dL      BUN 10 mg/dL      Creatinine 1.11 mg/dL      Sodium 136 mmol/L      Potassium 4.0 mmol/L      Chloride 106 mmol/L      CO2 23.0 mmol/L      Calcium 7.8 mg/dL      eGFR Non African Amer 52 mL/min/1.73      BUN/Creatinine Ratio 9.0     Anion Gap 7.0 mmol/L     POC Glucose Once [[id number]]  (Abnormal) Collected:  01/07/19 0625    Specimen:  Blood Updated:  01/07/19 0638     Glucose 132 mg/dL      Comment: Result Not ConfirmedOperator: [id number] TrelliseWNAMeter ID: [id number]       POC Glucose Once [[id number]]  (Abnormal) Collected:  01/06/19 1956    Specimen:  Blood Updated:  01/07/19 0117     Glucose 136 mg/dL      Comment: RN NotifiedOperator: [id number] [facility] SHAWNAMeter ID: [id number]       POC Glucose Once [[id number]]  (Normal) Collected:  01/06/19 1724    Specimen:  Blood Updated:  01/06/19 1754     Glucose 108 mg/dL      Comment: : [id number] KYLIE KELLERHerrick Campusangel luis ID: [id number]               Condition on Discharge:   Stable    Vital Signs  Temp:  [97.8 °F (36.6 °C)-98.5 °F (36.9 °C)] 97.9 °F (36.6 °C)  Heart Rate:  [51-60] 60  Resp:  [16-18] 18  BP: (118-171)/(56-83) 171/83    Physical Exam:  Constitutional: Alert and oriented, in no acute distress.     Cardiovascular: Regular rhythm, S1 and S2 normal, no S3 or S4. No murmurs, gallops, or rubs detected.     Pulmonary/Chest: Chest: normal symmetry, no tenderness to palpation, normal respiratory excursion, no intercostal retraction, no use of accessory muscles.            Pulmonary: Normal breath sounds. No rales or rhonchi.    Abdominal: Abdomen soft, bowel sounds normoactive, no masses, no hepatosplenomegaly, non-tender, no bruits.     Discharge Disposition  Home or Self Care    Discharge Medications     Discharge Medications      New Medications      Instructions Start Date   Dulaglutide 0.75 MG/0.5ML solution pen-injector   0.75 mg, Subcutaneous, Weekly      Ertugliflozin L-PyroglutamicAc 5 MG tablet  Commonly known as:  STEGLATRO   5 mg, Oral, Every Morning      levothyroxine 150 MCG tablet  Commonly known as:  SYNTHROID   150 mcg, Oral, Daily      levothyroxine 150 MCG tablet  Commonly known as:  SYNTHROID, LEVOTHROID   150 mcg, Oral, Daily         Changes to Medications      Instructions Start Date   aspirin 81 MG chewable tablet  What changed:  Another medication with the same name was added. Make sure you understand how and when to take each.   81 mg, Oral, Daily      aspirin 81 MG chewable tablet  What changed:  You were already taking a medication with the same name, and this prescription was added. Make sure you understand how and when to take each.   81 mg, Oral, Daily         Continue These Medications      Instructions Start Date   atorvastatin 40 MG tablet  Commonly known as:  LIPITOR   No dose, route, or frequency recorded.      Baclofen 2 % cream   Apply externally, 3 Times Daily, Baclofen, Neurontin, and Lidocaine compound cream to lower back TID for chronic  back pain       carvedilol 25 MG tablet  Commonly known as:  COREG   12.5 mg, 2 Times Daily      cholecalciferol 1000 units tablet  Commonly known as:  VITAMIN D3   1,000 Units, Oral, Daily, 5 capsules daily      Evolocumab 140 MG/ML solution auto-injector   140 mg, Subcutaneous, Every 14 Days      FREESTYLE LITE device   No dose, route, or frequency recorded.      FREESTYLE LITE test strip  Generic drug:  glucose blood   No dose, route, or frequency recorded.      HYDROcodone-acetaminophen 5-325 MG per tablet  Commonly known as:  NORCO   1 tablet, Oral, Every 6 Hours PRN      lisinopril 20 MG tablet  Commonly known as:  PRINIVIL,ZESTRIL   20 mg, Oral, Daily      nitroglycerin 0.4 MG SL tablet  Commonly known as:  NITROSTAT   1 under the tongue as needed for angina, may repeat q5mins for up three doses      sertraline 50 MG tablet  Commonly known as:  ZOLOFT   As Needed, daily      ticagrelor 90 MG tablet tablet  Commonly known as:  BRILINTA   90 mg, Oral, 2 Times Daily         Stop These Medications    ARMOUR THYROID 180 MG tablet  Generic drug:  thyroid     glipiZIDE 5 MG ER tablet  Commonly known as:  GLUCOTROL XL     isosorbide mononitrate 30 MG 24 hr tablet  Commonly known as:  IMDUR     thyroid 180 MG tablet  Commonly known as:  ARMOUR THYROID            Discharge Diet:  Cardiac prudent    Activity at Discharge: Patient advised to not perform any strenuous physical activity until her coronary artery bypass surgery.    Follow-up Appointments  Future Appointments   Date Time Provider Department Center   2/1/2019 10:00 AM [facility] PULMONARY MAD [facility] PULM MAD None   2/1/2019 10:30 AM MAD [facility] VAS ROOM [facility]   2/1/2019 11:30 AM MAD [facility] VAS ROOM W [facility]   2/7/2019  1:30 PM Jaren Vilchis MD [facility] CTV MAD None   6/13/2019  2:30 PM Chuck Thompson APRN [facility] END MAD None   6/13/2019  3:30 PM Mian Mnotana MD [facility] CD MAD None     Additional Instructions for the Follow-ups  that You Need to Schedule     Ambulatory Referral to Cardiac Rehab   As directed               Mian Montana MD  01/07/19  11:17 AM    Time: Discharge 40 min

## 2019-01-08 ENCOUNTER — DOCUMENTATION (OUTPATIENT)
Dept: CARDIAC REHAB | Facility: HOSPITAL | Age: 49
End: 2019-01-08

## 2019-01-08 ENCOUNTER — READMISSION MANAGEMENT (OUTPATIENT)
Dept: CALL CENTER | Facility: HOSPITAL | Age: 49
End: 2019-01-08

## 2019-01-08 NOTE — OUTREACH NOTE
Prep Survey      Responses   Facility patient discharged from?  [facility]   Is patient eligible?  Yes   Discharge diagnosis  MI   Does the patient have one of the following disease processes/diagnoses(primary or secondary)?  Acute MI (STEMI,NSTEMI)   Does the patient have Home health ordered?  No   Is there a DME ordered?  No   Prep survey completed?  Yes          Gloria Garrison RN

## 2019-01-08 NOTE — PAYOR COMM NOTE
"Chris Davies (48 y.o. Female)     Date of Birth Social Security Number Address Home Phone MRN    1970  P O  [address] [place] [address] [phone] [id number]    [employer]azdanism Marital Status          [facility]        Admission Date Admission Type Admitting Provider Attending Provider Department, Room/Bed    1/3/19 Elective Tony Gonzalez MD  [address], 333/1    Discharge Date Discharge Disposition Discharge Destination        1/7/2019 Home or Self Care              Attending Provider:  (none)   Allergies:  Penicillins, Adhesive Tape, Ciprofloxacin, Coconut Flavor, Latex    Isolation:  Contact   Infection:  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), Lice (01/04/19)   Code Status:  Prior    Ht:  167.6 cm (66\")   Wt:  116 kg (256 lb 9.6 oz)    Admission Cmt:  None   Principal Problem:  AMI inferior wall (CMS/HCC) [I21.19]                 Active Insurance as of 1/3/2019     Primary Coverage     Payor Plan Insurance Group Employer/Plan Group    [employer] [place] [employer] [place]ICA[place]      Payor Plan Address Payor Plan Phone Number Payor Plan Fax Number Effective Dates    [address] [address] [phone]  8/11/2016 - None Entered    [facility] [id number]       Subscriber Name Subscriber Birth Date Member ID       CHRIS DAVIES 1970 [id number]                 Emergency Contacts      (Rel.) Home Phone Work Phone Mobile Phone    Danielle Machuca (Grandparent) [phone] -- --               Discharge Summary      Mian Montana MD at 1/7/2019 11:17 AM          Date of Discharge:  1/7/2019    Discharge Diagnosis:  1.  Acute inferior wall microinfarction  2.  Multivessel coronary artery disease  3.  Hyperlipidemia  4.  Hypothyroidism  5.  Diabetes mellitus  6.  Head lice    Presenting Problem/History of Present Illness  AMI inferior wall (CMS/HCC) [I21.19]     Hospital Course   Ms. Davies is a 48-year-old  lady who was admitted to [facility] health with prolonged chest pain with EKG evidence of acute inferior wall myocardial infarction.  She underwent emergent cardiac catheterization with PCI to right coronary artery by Dr. Gonzalez yesterday.  She was also noted to have significant disease in the LAD territory and a CT surgery consultation has been requested.  Catheterization showed:  Dominance: Right dominant  Left Main coronary artery: Large caliber vessel divides and left circumflex artery and LAD  Left anterior descending artery: Diffusely disease that is proximally there is 80-90% stenosis and distal LAD goes all the way to the apex and there is diffuse disease in the LAD  Left circumflex:  Large caliber vessel previously placed in the left circumflex artery is widely patent  Right coronary artery:  Proximal to mid was 100% inside the stent.  Percutaneous coronary intervention: After giving Angiomax bolus and drip a right guide was placed in the right coronary artery and a run-through wire was crossed through the lesion and was dilated with a 2.0 X 20 Trek balloon and subsequently distal RCA was stented with a 2.75 x 23 Xience alma stent, mid to distal was stented with a 3.0 x 33 stent and proximally with a 3.5 x 18 Xience alma stent and subsequently postdilated with a 3.0 x 35 noncompliant mozec balloon and subsequently there was a ostial stenosis of the PDA and it was attempted to place a stent but stent was getting stuck in the previously placed stent struts hence balloon angioplasty was done into the ostial PDA reducing 90% stenosis to 30-40%.  Conclusion : Successful PCI to the proximal to mid right coronary artery reducing 100% stenosis to 0% obtaining BEA 3 flow with Xience Alma stents      She presented with chest pain in October 2017 and ruled in for a non-ST segment elevation myocardial infarctions and cardiac catheterization at that time showed   #1 Critical lesion noted in the mid circumflex coronary artery (90% followed by  80%) and successful PCI with PTCA and placement of 3.0 x 38 mm xience Alpine stent and reduction in stenosis to 0%  #2 Previously placed stent in the right coronary artery was patent but multiple mild to moderate lesions noted in the ostial, proximal and mid Right Coronary Artery and 80% stenosis noted in a small caliber PDA less than 2 mm in diameter.  #3 LAD was a small to medium caliber vessel with mild to moderate disease which was diffuse.  #4.  Overall normal LV systolic function with an EF of 55% with mild inferior wall hypokinesis     The patient has had significant hyperlipidemia, diabetes mellitus, hypothyroidism and her LDL has been optimized with PCSK9 inhibitors.       She was noted to have head lice and  treated with Permethrin.    The patient did not have any recurrence of chest pain and remained stable with no signs of congestive heart failure.  Echocardiogram showed:  ·  Mild tricuspid valve regurgitation is present.  · Estimated EF = 55%.  · Left ventricular systolic function is normal.  · Left ventricular diastolic dysfunction (grade I a) consistent with impaired relaxation.  · Left atrial cavity size is mildly dilated.     The patient was seen in consultation by the hospitalist service, endocrinologist Dr. Ramses Frias and CT surgery by Dr. Vilchis.  Dr. Frias adjusted her antidiabetic medications and started her on Synthroid since she was noted to have profound hypothyroidism.  Antidiabetics were adjusted as well.  For best long-term revascularization the patient will need coronary artery bypass surgery and this is being considered in about 4-6 weeks.  Arrangements have been made for her to have urinary workup for CABG as outpatient.    The peak troponins with 34.3.  TSH was 79.9    Procedures Performed  Procedure(s):  Left Heart Cath       Consults:   Consults     Date and Time Order Name Status Description    1/5/2019 1067 Inpatient Hospitalist Consult Completed     1/4/2019 8988  Inpatient Endocrinology Consult      1/4/2019 0956 Inpatient Cardiothoracic Surgery Consult Completed           Pertinent Test Results:   RESULTS  Lab Results (last 24 hours)     Procedure Component Value Units Date/Time    POC Glucose Once [[id number]]  (Abnormal) Collected:  01/07/19 1022    Specimen:  Blood Updated:  01/07/19 1036     Glucose 154 mg/dL      Comment: RN NotifiedOperator: [id number] INNA MCKNIGHTFANYMeter ID: [id number]       Basic Metabolic Panel [[id number]]  (Abnormal) Collected:  01/07/19 0625    Specimen:  Blood Updated:  01/07/19 0654     Glucose 140 mg/dL      BUN 10 mg/dL      Creatinine 1.11 mg/dL      Sodium 136 mmol/L      Potassium 4.0 mmol/L      Chloride 106 mmol/L      CO2 23.0 mmol/L      Calcium 7.8 mg/dL      eGFR Non African Amer 52 mL/min/1.73      BUN/Creatinine Ratio 9.0     Anion Gap 7.0 mmol/L     POC Glucose Once [[id number]]  (Abnormal) Collected:  01/07/19 0625    Specimen:  Blood Updated:  01/07/19 0638     Glucose 132 mg/dL      Comment: Result Not ConfirmedOperator: [id number] NIMISHA SHAWNAMeter ID: [id number]       POC Glucose Once [[id number]]  (Abnormal) Collected:  01/06/19 1956    Specimen:  Blood Updated:  01/07/19 0117     Glucose 136 mg/dL      Comment: RN NotifiedOperator: [id number] NIMISHA SHAWNAMeter ID: [id number]       POC Glucose Once [[id number]]  (Normal) Collected:  01/06/19 1724    Specimen:  Blood Updated:  01/06/19 1754     Glucose 108 mg/dL      Comment: : [id number] RUST ID: [id number]               Condition on Discharge:  Stable    Vital Signs  Temp:  [97.8 °F (36.6 °C)-98.5 °F (36.9 °C)] 97.9 °F (36.6 °C)  Heart Rate:  [51-60] 60  Resp:  [16-18] 18  BP: (118-171)/(56-83) 171/83    Physical Exam:  Constitutional: Alert and oriented, in no acute distress.     Cardiovascular: Regular rhythm, S1 and S2 normal, no S3 or S4. No murmurs, gallops, or rubs detected.     Pulmonary/Chest: Chest: normal symmetry, no tenderness to  palpation, normal respiratory excursion, no intercostal retraction, no use of accessory muscles.            Pulmonary: Normal breath sounds. No rales or rhonchi.    Abdominal: Abdomen soft, bowel sounds normoactive, no masses, no hepatosplenomegaly, non-tender, no bruits.     Discharge Disposition  Home or Self Care    Discharge Medications     Discharge Medications      New Medications      Instructions Start Date   Dulaglutide 0.75 MG/0.5ML solution pen-injector   0.75 mg, Subcutaneous, Weekly      Ertugliflozin L-PyroglutamicAc 5 MG tablet  Commonly known as:  STEGLATRO   5 mg, Oral, Every Morning      levothyroxine 150 MCG tablet  Commonly known as:  SYNTHROID   150 mcg, Oral, Daily      levothyroxine 150 MCG tablet  Commonly known as:  SYNTHROID, LEVOTHROID   150 mcg, Oral, Daily         Changes to Medications      Instructions Start Date   aspirin 81 MG chewable tablet  What changed:  Another medication with the same name was added. Make sure you understand how and when to take each.   81 mg, Oral, Daily      aspirin 81 MG chewable tablet  What changed:  You were already taking a medication with the same name, and this prescription was added. Make sure you understand how and when to take each.   81 mg, Oral, Daily         Continue These Medications      Instructions Start Date   atorvastatin 40 MG tablet  Commonly known as:  LIPITOR   No dose, route, or frequency recorded.      Baclofen 2 % cream   Apply externally, 3 Times Daily, Baclofen, Neurontin, and Lidocaine compound cream to lower back TID for chronic back pain       carvedilol 25 MG tablet  Commonly known as:  COREG   12.5 mg, 2 Times Daily      cholecalciferol 1000 units tablet  Commonly known as:  VITAMIN D3   1,000 Units, Oral, Daily, 5 capsules daily      Evolocumab 140 MG/ML solution auto-injector   140 mg, Subcutaneous, Every 14 Days      FREESTYLE LITE device   No dose, route, or frequency recorded.      FREESTYLE LITE test strip  Generic drug:   glucose blood   No dose, route, or frequency recorded.      HYDROcodone-acetaminophen 5-325 MG per tablet  Commonly known as:  NORCO   1 tablet, Oral, Every 6 Hours PRN      lisinopril 20 MG tablet  Commonly known as:  PRINIVIL,ZESTRIL   20 mg, Oral, Daily      nitroglycerin 0.4 MG SL tablet  Commonly known as:  NITROSTAT   1 under the tongue as needed for angina, may repeat q5mins for up three doses      sertraline 50 MG tablet  Commonly known as:  ZOLOFT   As Needed, daily      ticagrelor 90 MG tablet tablet  Commonly known as:  BRILINTA   90 mg, Oral, 2 Times Daily         Stop These Medications    ARMOUR THYROID 180 MG tablet  Generic drug:  thyroid     glipiZIDE 5 MG ER tablet  Commonly known as:  GLUCOTROL XL     isosorbide mononitrate 30 MG 24 hr tablet  Commonly known as:  IMDUR     thyroid 180 MG tablet  Commonly known as:  ARMOUR THYROID            Discharge Diet:  Cardiac prudent    Activity at Discharge: Patient advised to not perform any strenuous physical activity until her coronary artery bypass surgery.    Follow-up Appointments  Future Appointments   Date Time Provider Department Center   2/1/2019 10:00 AM [facility] PULMONARY MAD [facility] PULM MAD None   2/1/2019 10:30 AM MAD [facility] VAS ROOM W [facility]   2/1/2019 11:30 AM MAD [facility] VAS ROOM W [facility]   2/7/2019  1:30 PM Jaren Vilchis MD MG[facility] CTV MAD None   6/13/2019  2:30 PM Chuck Thompson APRN [facility] END MAD None   6/13/2019  3:30 PM Mian Montana MD [facility] CD MAD None     Additional Instructions for the Follow-ups that You Need to Schedule     Ambulatory Referral to Cardiac Rehab   As directed               Mian Montana MD  01/07/19  11:17 AM    Time: Discharge 40 min            Electronically signed by Mian Montana MD at 1/7/2019 11:28 AM

## 2019-01-09 ENCOUNTER — READMISSION MANAGEMENT (OUTPATIENT)
Dept: CALL CENTER | Facility: HOSPITAL | Age: 49
End: 2019-01-09

## 2019-01-09 NOTE — OUTREACH NOTE
AMI Week 1 Survey      Responses   Facility patient discharged from?  [facility]   Does the patient have one of the following disease processes/diagnoses(primary or secondary)?  Acute MI (STEMI,NSTEMI)   Is there a successful TCM telephone encounter documented?  No   Week 1 attempt successful?  Yes   Call start time  1204   Call end time  1222   Discharge diagnosis  MI   Meds reviewed with patient/caregiver?  Yes   Does the patient have all prescriptions related to this admission filled (includes statins,anticoagulants,HTN meds,anti-arrhythmia meds)  Yes   Is the patient taking all medications as directed (includes completed medication regime)?  Yes   Does the patient have a primary care provider?   Yes   Does the patient have an appointment with their PCP,cardiologist,or clinic within 7 days of discharge?  Yes   Has the patient kept scheduled appointments due by today?  Yes   Has all DME been delivered?  No   Psychosocial issues?  No   Comments  Patient reports vomiting and diarrhea since yesterday. She denies chest pain, SOB, B/P WNL, no heart racing, or skipping beats,I encouraged her to call her cardiology to report. She verbalized understanding.      Did the patient receive a copy of their discharge instructions?  Yes   Nursing interventions  Reviewed instructions with patient   What is the patient's perception of their health status since discharge?  Improving   Nursing interventions  Nurse provided patient education   Is the patient/caregiver able to teach back signs and symptoms of when to call for help immediately:  Sudden chest discomfort, Sudden discomfort in arms, back, neck or jaw, Shortness of breath at any time, Sudden sweating or clammy skin, Nausea or vomiting, Dizziness or lightheadedness, Irregular or rapid heart rate   Nursing interventions  Nurse provided patient education   Is the pateint /caregiver able to teach back the importance of cardiac rehab?  Yes   Nursing interventions  Provided  education on importance of cardiac rehab   Is the patient/caregiver able to teach back lifestyle changes to help prevent MIs  Quit smoking, Regular exercise as approved by provider, Heart healthy diet, Maintaining a healthy weight, Managing diabetes, Reducing stress   Is the patient/caregiver able to teach back ways to prevent a second heart attack:  Take medications, Follow up with MD, Participate in Cardiac Rehab   Is the patient/caregiver able to teach back the hierarchy of who to call/visit for symptoms/problems? PCP, Specialist, Home health nurse, Urgent Care, ED, 911  Yes   Week 1 call completed?  Yes          Russell Marsh RN

## 2019-01-14 ENCOUNTER — TELEPHONE (OUTPATIENT)
Dept: ENDOCRINOLOGY | Facility: CLINIC | Age: 49
End: 2019-01-14

## 2019-01-17 ENCOUNTER — READMISSION MANAGEMENT (OUTPATIENT)
Dept: CALL CENTER | Facility: HOSPITAL | Age: 49
End: 2019-01-17

## 2019-01-17 ENCOUNTER — LAB (OUTPATIENT)
Dept: LAB | Facility: HOSPITAL | Age: 49
End: 2019-01-17

## 2019-01-17 DIAGNOSIS — E78.49 OTHER HYPERLIPIDEMIA: ICD-10-CM

## 2019-01-17 PROCEDURE — 82607 VITAMIN B-12: CPT | Performed by: NURSE PRACTITIONER

## 2019-01-17 PROCEDURE — 82306 VITAMIN D 25 HYDROXY: CPT | Performed by: NURSE PRACTITIONER

## 2019-01-17 PROCEDURE — 80050 GENERAL HEALTH PANEL: CPT | Performed by: NURSE PRACTITIONER

## 2019-01-17 PROCEDURE — 84439 ASSAY OF FREE THYROXINE: CPT | Performed by: NURSE PRACTITIONER

## 2019-01-17 PROCEDURE — 80061 LIPID PANEL: CPT

## 2019-01-17 PROCEDURE — 83036 HEMOGLOBIN GLYCOSYLATED A1C: CPT | Performed by: NURSE PRACTITIONER

## 2019-01-17 NOTE — OUTREACH NOTE
AMI Week 2 Survey      Responses   Facility patient discharged from?  [facility]   Does the patient have one of the following disease processes/diagnoses(primary or secondary)?  Acute MI (STEMI,NSTEMI)   Week 2 attempt successful?  No   Unsuccessful attempts  Attempt 1          Russell Marsh RN

## 2019-01-18 ENCOUNTER — TELEPHONE (OUTPATIENT)
Dept: ENDOCRINOLOGY | Facility: CLINIC | Age: 49
End: 2019-01-18

## 2019-01-18 ENCOUNTER — READMISSION MANAGEMENT (OUTPATIENT)
Dept: CALL CENTER | Facility: HOSPITAL | Age: 49
End: 2019-01-18

## 2019-01-18 LAB
25(OH)D3 SERPL-MCNC: 28.1 NG/ML (ref 30–100)
ALBUMIN SERPL-MCNC: 3.8 G/DL (ref 3.4–4.8)
ALBUMIN/GLOB SERPL: 1.4 G/DL (ref 1.1–1.8)
ALP SERPL-CCNC: 91 U/L (ref 38–126)
ALT SERPL W P-5'-P-CCNC: 41 U/L (ref 9–52)
ANION GAP SERPL CALCULATED.3IONS-SCNC: 10 MMOL/L (ref 5–15)
ARTICHOKE IGE QN: <30 MG/DL (ref 1–129)
AST SERPL-CCNC: 39 U/L (ref 14–36)
BASOPHILS # BLD AUTO: 0.04 10*3/MM3 (ref 0–0.2)
BASOPHILS NFR BLD AUTO: 0.4 % (ref 0–2)
BILIRUB SERPL-MCNC: 0.2 MG/DL (ref 0.2–1.3)
BUN BLD-MCNC: 13 MG/DL (ref 7–21)
BUN/CREAT SERPL: 10.2 (ref 7–25)
CALCIUM SPEC-SCNC: 9.5 MG/DL (ref 8.4–10.2)
CHLORIDE SERPL-SCNC: 98 MMOL/L (ref 95–110)
CHOLEST SERPL-MCNC: 76 MG/DL (ref 0–199)
CO2 SERPL-SCNC: 31 MMOL/L (ref 22–31)
CREAT BLD-MCNC: 1.28 MG/DL (ref 0.5–1)
DEPRECATED RDW RBC AUTO: 43.4 FL (ref 36.4–46.3)
EOSINOPHIL # BLD AUTO: 0.22 10*3/MM3 (ref 0–0.7)
EOSINOPHIL NFR BLD AUTO: 2.2 % (ref 0–7)
ERYTHROCYTE [DISTWIDTH] IN BLOOD BY AUTOMATED COUNT: 12.9 % (ref 11.5–14.5)
GFR SERPL CREATININE-BSD FRML MDRD: 45 ML/MIN/1.73 (ref 58–135)
GLOBULIN UR ELPH-MCNC: 2.8 GM/DL (ref 2.3–3.5)
GLUCOSE BLD-MCNC: 102 MG/DL (ref 60–100)
HBA1C MFR BLD: 10.5 % (ref 4–5.6)
HCT VFR BLD AUTO: 38.1 % (ref 35–45)
HDLC SERPL-MCNC: 54 MG/DL (ref 60–200)
HGB BLD-MCNC: 12.4 G/DL (ref 12–15.5)
IMM GRANULOCYTES # BLD AUTO: 0.06 10*3/MM3 (ref 0–0.02)
IMM GRANULOCYTES NFR BLD AUTO: 0.6 % (ref 0–0.5)
LDLC/HDLC SERPL: ABNORMAL {RATIO} (ref 0–3.22)
LYMPHOCYTES # BLD AUTO: 2.32 10*3/MM3 (ref 0.6–4.2)
LYMPHOCYTES NFR BLD AUTO: 22.8 % (ref 10–50)
MCH RBC QN AUTO: 30 PG (ref 26.5–34)
MCHC RBC AUTO-ENTMCNC: 32.5 G/DL (ref 31.4–36)
MCV RBC AUTO: 92.3 FL (ref 80–98)
MONOCYTES # BLD AUTO: 0.66 10*3/MM3 (ref 0–0.9)
MONOCYTES NFR BLD AUTO: 6.5 % (ref 0–12)
NEUTROPHILS # BLD AUTO: 6.89 10*3/MM3 (ref 2–8.6)
NEUTROPHILS NFR BLD AUTO: 67.5 % (ref 37–80)
PLATELET # BLD AUTO: 276 10*3/MM3 (ref 150–450)
PMV BLD AUTO: 10.9 FL (ref 8–12)
POTASSIUM BLD-SCNC: 3.9 MMOL/L (ref 3.5–5.1)
PROT SERPL-MCNC: 6.6 G/DL (ref 6.3–8.6)
RBC # BLD AUTO: 4.13 10*6/MM3 (ref 3.77–5.16)
SODIUM BLD-SCNC: 139 MMOL/L (ref 137–145)
T4 FREE SERPL-MCNC: 0.98 NG/DL (ref 0.78–2.19)
TRIGL SERPL-MCNC: 63 MG/DL (ref 20–199)
TSH SERPL DL<=0.05 MIU/L-ACNC: 73.5 MIU/ML (ref 0.46–4.68)
VIT B12 BLD-MCNC: 407 PG/ML (ref 239–931)
WBC NRBC COR # BLD: 10.19 10*3/MM3 (ref 3.2–9.8)

## 2019-01-21 ENCOUNTER — TELEPHONE (OUTPATIENT)
Dept: ENDOCRINOLOGY | Facility: CLINIC | Age: 49
End: 2019-01-21

## 2019-01-21 NOTE — TELEPHONE ENCOUNTER
Pt had to reschedule due to weather, she would like a call to discuss her results and medications that  put her on in hospital. Thanks!

## 2019-01-21 NOTE — TELEPHONE ENCOUNTER
Pt had to reschedule due to weather, she would like a call to discuss her results and medications that  put her on in hospital. Thanks!    Please advise

## 2019-01-21 NOTE — TELEPHONE ENCOUNTER
She needs to make another appointment I cannot go over all of the results and medication changes over the phone;

## 2019-01-22 RX ORDER — TICAGRELOR 90 MG/1
TABLET ORAL
Qty: 60 TABLET | Refills: 6 | Status: SHIPPED | OUTPATIENT
Start: 2019-01-22 | End: 2019-02-28

## 2019-01-25 ENCOUNTER — READMISSION MANAGEMENT (OUTPATIENT)
Dept: CALL CENTER | Facility: HOSPITAL | Age: 49
End: 2019-01-25

## 2019-01-25 NOTE — OUTREACH NOTE
AMI Week 3 Survey      Responses   Facility patient discharged from?  [facility]   Does the patient have one of the following disease processes/diagnoses(primary or secondary)?  Acute MI (STEMI,NSTEMI)   Week 3 attempt successful?  No   Unsuccessful attempts  Attempt 1          Russell Marsh RN

## 2019-01-28 ENCOUNTER — READMISSION MANAGEMENT (OUTPATIENT)
Dept: CALL CENTER | Facility: HOSPITAL | Age: 49
End: 2019-01-28

## 2019-01-28 NOTE — OUTREACH NOTE
AMI Week 3 Survey      Responses   Facility patient discharged from?  [facility]   Does the patient have one of the following disease processes/diagnoses(primary or secondary)?  Acute MI (STEMI,NSTEMI)   Week 3 attempt successful?  Yes   Call start time  1306   Call end time  1317   Meds reviewed with patient/caregiver?  Yes   Is the patient having any side effects they believe may be caused by any medication additions or changes?  No   Does the patient have all prescriptions related to this admission filled (includes statins,anticoagulants,HTN meds,anti-arrhythmia meds)  Yes   Is the patient taking all medications as directed (includes completed medication regime)?  Yes   Has the patient kept scheduled appointments due by today?  Yes   Has home health visited the patient within 72 hours of discharge?  N/A   Comments  PT is doing better and has no complaints of vomiting or diarrhea at this time.   What is the patient's perception of their health status since discharge?  Same   Additional teach back comments  Pt has cardiology appointment in Feb. Open heart surgery going to be scheduled.   Week 3 call completed?  Yes          Angelica Richey RN

## 2019-01-29 ENCOUNTER — TELEPHONE (OUTPATIENT)
Dept: ENDOCRINOLOGY | Facility: CLINIC | Age: 49
End: 2019-01-29

## 2019-02-01 ENCOUNTER — PROCEDURE VISIT (OUTPATIENT)
Dept: PULMONOLOGY | Facility: CLINIC | Age: 49
End: 2019-02-01

## 2019-02-01 DIAGNOSIS — I25.10 CORONARY ARTERY DISEASE INVOLVING NATIVE CORONARY ARTERY OF NATIVE HEART WITHOUT ANGINA PECTORIS: ICD-10-CM

## 2019-02-01 PROCEDURE — 94727 GAS DIL/WSHOT DETER LNG VOL: CPT | Performed by: INTERNAL MEDICINE

## 2019-02-01 PROCEDURE — 94060 EVALUATION OF WHEEZING: CPT | Performed by: INTERNAL MEDICINE

## 2019-02-01 PROCEDURE — 94729 DIFFUSING CAPACITY: CPT | Performed by: INTERNAL MEDICINE

## 2019-02-05 ENCOUNTER — READMISSION MANAGEMENT (OUTPATIENT)
Dept: CALL CENTER | Facility: HOSPITAL | Age: 49
End: 2019-02-05

## 2019-02-05 NOTE — OUTREACH NOTE
AMI Week 4 Survey      Responses   Facility patient discharged from?  [facility]   Does the patient have one of the following disease processes/diagnoses(primary or secondary)?  Acute MI (STEMI,NSTEMI)   Week 4 attempt successful?  Yes   Call start time  1640   Call end time  1643   Discharge diagnosis  MI   Meds reviewed with patient/caregiver?  Yes   Is the patient taking all medications as directed (includes completed medication regime)?  Yes   Has the patient kept scheduled appointments due by today?  Yes   Comments  Pt will see surgeon on thursday to schedule surgery   Is the patient still receiving Home Health Services?  N/A   What is the patient's perception of their health status since discharge?  Improving   Is the pateint /caregiver able to teach back the importance of cardiac rehab?  Yes   Is the patient/caregiver able to teach back the hierarchy of who to call/visit for symptoms/problems? PCP, Specialist, Home health nurse, Urgent Care, ED, 911  Yes   Additional teach back comments  Pt will meet with cardiology on 2/7 to schedule open heart surgery.   Week 4 call completed?  Yes   Would the patient like one additional call?  No   Graduated  Yes   Was the number of calls appropriate?  Yes          Ele Pierce, ROSAURA

## 2019-02-07 ENCOUNTER — OFFICE VISIT (OUTPATIENT)
Dept: CARDIAC SURGERY | Facility: CLINIC | Age: 49
End: 2019-02-07

## 2019-02-07 ENCOUNTER — OFFICE VISIT (OUTPATIENT)
Dept: ENDOCRINOLOGY | Facility: CLINIC | Age: 49
End: 2019-02-07

## 2019-02-07 VITALS
BODY MASS INDEX: 40 KG/M2 | DIASTOLIC BLOOD PRESSURE: 90 MMHG | HEART RATE: 74 BPM | HEIGHT: 66 IN | SYSTOLIC BLOOD PRESSURE: 140 MMHG | WEIGHT: 248.9 LBS

## 2019-02-07 VITALS
HEIGHT: 66 IN | DIASTOLIC BLOOD PRESSURE: 78 MMHG | WEIGHT: 248 LBS | OXYGEN SATURATION: 97 % | TEMPERATURE: 98.4 F | BODY MASS INDEX: 39.86 KG/M2 | HEART RATE: 74 BPM | SYSTOLIC BLOOD PRESSURE: 120 MMHG

## 2019-02-07 DIAGNOSIS — E78.2 MIXED HYPERLIPIDEMIA: ICD-10-CM

## 2019-02-07 DIAGNOSIS — I21.4 NSTEMI (NON-ST ELEVATED MYOCARDIAL INFARCTION) (HCC): ICD-10-CM

## 2019-02-07 DIAGNOSIS — E89.0 POSTSURGICAL HYPOTHYROIDISM: Primary | Chronic | ICD-10-CM

## 2019-02-07 DIAGNOSIS — E11.65 TYPE 2 DIABETES MELLITUS WITH HYPERGLYCEMIA, WITHOUT LONG-TERM CURRENT USE OF INSULIN (HCC): ICD-10-CM

## 2019-02-07 DIAGNOSIS — E11.65 UNCONTROLLED TYPE 2 DIABETES MELLITUS WITH HYPERGLYCEMIA (HCC): Chronic | ICD-10-CM

## 2019-02-07 DIAGNOSIS — I25.118 CORONARY ARTERY DISEASE OF NATIVE ARTERY OF NATIVE HEART WITH STABLE ANGINA PECTORIS (HCC): Primary | ICD-10-CM

## 2019-02-07 DIAGNOSIS — I10 ESSENTIAL HYPERTENSION: Chronic | ICD-10-CM

## 2019-02-07 DIAGNOSIS — M54.16 LUMBAR RADICULOPATHY: ICD-10-CM

## 2019-02-07 DIAGNOSIS — N18.30 CHRONIC KIDNEY DISEASE, STAGE 3 (MODERATE): ICD-10-CM

## 2019-02-07 PROCEDURE — 99214 OFFICE O/P EST MOD 30 MIN: CPT | Performed by: NURSE PRACTITIONER

## 2019-02-07 PROCEDURE — 99215 OFFICE O/P EST HI 40 MIN: CPT | Performed by: THORACIC SURGERY (CARDIOTHORACIC VASCULAR SURGERY)

## 2019-02-07 RX ORDER — NITROGLYCERIN 0.4 MG/1
0.4 TABLET SUBLINGUAL
Status: CANCELLED | OUTPATIENT
Start: 2019-03-05

## 2019-02-07 RX ORDER — CHLORHEXIDINE GLUCONATE 500 MG/1
1 CLOTH TOPICAL EVERY 12 HOURS PRN
Status: CANCELLED | OUTPATIENT
Start: 2019-02-07

## 2019-02-07 RX ORDER — CLINDAMYCIN PHOSPHATE 900 MG/50ML
900 INJECTION INTRAVENOUS ONCE
Status: CANCELLED | OUTPATIENT
Start: 2019-03-05 | End: 2019-03-05

## 2019-02-07 RX ORDER — ACETAMINOPHEN 325 MG/1
650 TABLET ORAL EVERY 4 HOURS PRN
Status: CANCELLED | OUTPATIENT
Start: 2019-03-05

## 2019-02-07 RX ORDER — CHLORHEXIDINE GLUCONATE 0.12 MG/ML
15 RINSE ORAL EVERY 12 HOURS
Status: CANCELLED | OUTPATIENT
Start: 2019-02-07 | End: 2019-02-08

## 2019-02-07 RX ORDER — SODIUM CHLORIDE 0.9 % (FLUSH) 0.9 %
3-10 SYRINGE (ML) INJECTION AS NEEDED
Status: CANCELLED | OUTPATIENT
Start: 2019-03-05

## 2019-02-07 RX ORDER — LEVOTHYROXINE SODIUM 0.2 MG/1
200 TABLET ORAL DAILY
Qty: 30 TABLET | Refills: 11 | Status: SHIPPED | OUTPATIENT
Start: 2019-02-07 | End: 2019-03-19 | Stop reason: SDUPTHER

## 2019-02-07 RX ORDER — SODIUM CHLORIDE 0.9 % (FLUSH) 0.9 %
3 SYRINGE (ML) INJECTION EVERY 12 HOURS SCHEDULED
Status: CANCELLED | OUTPATIENT
Start: 2019-03-05

## 2019-02-07 RX ORDER — SODIUM CHLORIDE 9 MG/ML
100 INJECTION, SOLUTION INTRAVENOUS CONTINUOUS
Status: CANCELLED | OUTPATIENT
Start: 2019-03-05

## 2019-02-07 NOTE — PROGRESS NOTES
Subjective    Rhea Sutton is a 48 y.o. female. she is here today for follow-up.    History of Present Illness         47 yo female presents for follow up      Hypothyroidism   Duration, since 2015  Secondary to total thyroidectomy for obstructive goiter  Aggravating I spoke with patient and she now admits to noncompliance.   Alleviating,armour thyroid--stopped    Now on levothyroxine   Symptoms, fatigue, weakness, malaise    She states today she is compliant and taking the levothryoxine      Type 2 Diabetes  Duration           Lab Results   Component Value Date     HGBA1C 12.0 (H) 01/04/2019     HGBA1C 8.1 (H) 03/05/2018     HGBA1C 6.7 (H) 10/14/2017         Lab Results   Component Value Date    HGBA1C 10.5 (H) 01/17/2019       Dates back to Oct 2017 but now significantly uncontrolled          Severity , high     Patient was admitted with MI, sp PCI to RCA , has multivessel CAD and is being evaluated by CT surgery      Aggravating, high carb meals at home and lack of exercise      Treatment. Home treatment with trulicity , and steglatro      Home readings 90 up to 130 per patient       The following portions of the patient's history were reviewed and updated as appropriate:   Past Medical History:   Diagnosis Date   • CAD (coronary artery disease)    • Chronic back pain    • Chronic bronchitis (CMS/HCC)    • Diabetes mellitus (CMS/HCC)    • Goiter    • Hashimoto's thyroiditis    • Head lice infestation    • Hyperlipidemia    • Hypertension    • Postoperative hypothyroidism      Past Surgical History:   Procedure Laterality Date   • CARDIAC CATHETERIZATION N/A 10/13/2017   • CARDIAC CATHETERIZATION N/A 1/3/2019   • CHOLECYSTECTOMY     • COLON RESECTION     • CORONARY STENT PLACEMENT     • SC RT/LT HEART CATHETERS N/A 10/13/2017   • TOTAL ABDOMINAL HYSTERECTOMY WITH SALPINGO OOPHORECTOMY     • TOTAL THYROIDECTOMY       Family History   Problem Relation Age of Onset   • Coronary artery disease Mother    •  Coronary artery disease Father      OB History     No data available        Current Outpatient Medications   Medication Sig Dispense Refill   • aspirin 81 MG chewable tablet Chew 81 mg daily.     • atorvastatin (LIPITOR) 40 MG tablet      • Baclofen 2 % cream Apply  topically 3 (Three) Times a Day. Baclofen, Neurontin, and Lidocaine compound cream to lower back TID for chronic back pain     • Blood Glucose Monitoring Suppl (FREESTYLE LITE) device      • BRILINTA 90 MG tablet tablet TAKE ONE TABLET BY MOUTH TWICE DAILY 60 tablet 6   • carvedilol (COREG) 25 MG tablet 12.5 mg 2 (Two) Times a Day.     • cholecalciferol (VITAMIN D3) 1000 units tablet Take 1,000 Units by mouth Daily. 5 capsules daily     • Ertugliflozin L-PyroglutamicAc (STEGLATRO) 5 MG tablet Take 1 tablet by mouth Every Morning. 30 tablet 11   • Evolocumab 140 MG/ML solution auto-injector Inject 1 mL under the skin into the appropriate area as directed Every 14 (Fourteen) Days. 2 mL 0   • exenatide er (BYDUREON BCISE) 2 MG/0.85ML auto-injector injection Inject 0.85 mL under the skin into the appropriate area as directed 1 (One) Time Per Week. 4 pen 5   • FREESTYLE LITE test strip      • HYDROcodone-acetaminophen (NORCO) 5-325 MG per tablet Take 1 tablet by mouth Every 6 (Six) Hours As Needed.     • levothyroxine (SYNTHROID) 150 MCG tablet Take 1 tablet by mouth Daily. 30 tablet 11   • levothyroxine (SYNTHROID, LEVOTHROID) 150 MCG tablet Take 1 tablet by mouth Daily. 30 tablet 6   • lisinopril (PRINIVIL,ZESTRIL) 20 MG tablet Take 20 mg by mouth Daily.     • nitroglycerin (NITROSTAT) 0.4 MG SL tablet 1 under the tongue as needed for angina, may repeat q5mins for up three doses 30 tablet 3   • sertraline (ZOLOFT) 50 MG tablet As Needed. daily     • exenatide er (BYDUREON) 2 MG/0.85ML auto-injector injection Inject 0.85 mL under the skin into the appropriate area as directed 1 (One) Time Per Week. 4 pen 11   • levothyroxine (SYNTHROID) 200 MCG tablet Take 1  tablet by mouth Daily. 30 tablet 11     No current facility-administered medications for this visit.      Allergies   Allergen Reactions   • Penicillins Swelling     Of the throat   • Adhesive Tape Other (See Comments)     Silk tape- blisters   • Ciprofloxacin    • Coconut Flavor Swelling   • Latex Hives     Social History     Socioeconomic History   • Marital status:      Spouse name: Not on file   • Number of children: Not on file   • Years of education: Not on file   • Highest education level: Not on file   Tobacco Use   • Smoking status: Former Smoker     Packs/day: 0.00     Types: Cigarettes     Start date: 8/12/2015   • Smokeless tobacco: Never Used   Substance and Sexual Activity   • Alcohol use: No   • Drug use: No   • Sexual activity: Not Currently       Review of Systems  Review of Systems   Constitutional: Negative for activity change, appetite change, diaphoresis and fatigue.   HENT: Negative for facial swelling, sneezing, sore throat, tinnitus, trouble swallowing and voice change.    Eyes: Negative for photophobia, pain, discharge, redness, itching and visual disturbance.   Respiratory: Negative for apnea, cough, choking, chest tightness and shortness of breath.    Cardiovascular: Negative for chest pain, palpitations and leg swelling.   Gastrointestinal: Negative for abdominal distention, abdominal pain, constipation, diarrhea, nausea and vomiting.   Endocrine: Negative for cold intolerance, heat intolerance, polydipsia, polyphagia and polyuria.   Genitourinary: Negative for difficulty urinating, dysuria, frequency, hematuria and urgency.   Musculoskeletal: Negative for arthralgias, back pain, gait problem, joint swelling, myalgias, neck pain and neck stiffness.   Skin: Negative for color change, pallor, rash and wound.   Neurological: Negative for dizziness, tremors, weakness, light-headedness, numbness and headaches.   Hematological: Negative for adenopathy. Does not bruise/bleed easily.  "  Psychiatric/Behavioral: Negative for behavioral problems, confusion and sleep disturbance.        Objective    /90 (BP Location: Left arm)   Pulse 74   Ht 167.6 cm (66\")   Wt 113 kg (248 lb 14.4 oz)   BMI 40.17 kg/m²   Physical Exam   Constitutional: She is oriented to person, place, and time. She appears well-developed and well-nourished. No distress.   HENT:   Head: Normocephalic and atraumatic.   Right Ear: External ear normal.   Left Ear: External ear normal.   Nose: Nose normal.   Eyes: Conjunctivae and EOM are normal. Pupils are equal, round, and reactive to light.   Neck: Normal range of motion. Neck supple. No tracheal deviation present. No thyromegaly present.   Cardiovascular: Normal rate, regular rhythm and normal heart sounds.   No murmur heard.  Pulmonary/Chest: Effort normal and breath sounds normal. No respiratory distress. She has no wheezes.   Abdominal: Soft. Bowel sounds are normal. There is no tenderness. There is no rebound and no guarding.   Musculoskeletal: Normal range of motion. She exhibits no edema, tenderness or deformity.   Neurological: She is alert and oriented to person, place, and time. No cranial nerve deficit.   Skin: Skin is warm and dry. No rash noted.   Psychiatric: She has a normal mood and affect. Her behavior is normal. Judgment and thought content normal.       Lab Review  Glucose (mg/dL)   Date Value   01/17/2019 102 (H)   01/07/2019 140 (H)   01/06/2019 151 (H)     Sodium (mmol/L)   Date Value   01/17/2019 139   01/07/2019 136 (L)   01/06/2019 137     Potassium (mmol/L)   Date Value   01/17/2019 3.9   01/07/2019 4.0   01/06/2019 3.4 (L)     Chloride (mmol/L)   Date Value   01/17/2019 98   01/07/2019 106   01/06/2019 105     CO2 (mmol/L)   Date Value   01/17/2019 31.0   01/07/2019 23.0   01/06/2019 22.0     BUN (mg/dL)   Date Value   01/17/2019 13   01/07/2019 10   01/06/2019 11     Creatinine (mg/dL)   Date Value   01/17/2019 1.28 (H)   01/07/2019 1.11 (H) "   01/06/2019 1.10 (H)     Hemoglobin A1C (%)   Date Value   01/17/2019 10.5 (H)   01/04/2019 12.0 (H)   03/05/2018 8.1 (H)     Triglycerides (mg/dL)   Date Value   01/17/2019 63   01/04/2019 53   06/06/2018 149     LDL Cholesterol  (mg/dL)   Date Value   01/17/2019 <30   01/04/2019 77   06/06/2018 184 (H)   03/05/2018 270 (H)       Assessment/Plan      1. Postsurgical hypothyroidism    2. Type 2 diabetes mellitus with hyperglycemia, without long-term current use of insulin (CMS/formerly [facility])    .    Medications prescribed:  Outpatient Encounter Medications as of 2/7/2019   Medication Sig Dispense Refill   • aspirin 81 MG chewable tablet Chew 81 mg daily.     • atorvastatin (LIPITOR) 40 MG tablet      • Baclofen 2 % cream Apply  topically 3 (Three) Times a Day. Baclofen, Neurontin, and Lidocaine compound cream to lower back TID for chronic back pain     • Blood Glucose Monitoring Suppl (FREESTYLE LITE) device      • BRILINTA 90 MG tablet tablet TAKE ONE TABLET BY MOUTH TWICE DAILY 60 tablet 6   • carvedilol (COREG) 25 MG tablet 12.5 mg 2 (Two) Times a Day.     • cholecalciferol (VITAMIN D3) 1000 units tablet Take 1,000 Units by mouth Daily. 5 capsules daily     • Ertugliflozin L-PyroglutamicAc (STEGLATRO) 5 MG tablet Take 1 tablet by mouth Every Morning. 30 tablet 11   • Evolocumab 140 MG/ML solution auto-injector Inject 1 mL under the skin into the appropriate area as directed Every 14 (Fourteen) Days. 2 mL 0   • exenatide er (BYDUREON BCISE) 2 MG/0.85ML auto-injector injection Inject 0.85 mL under the skin into the appropriate area as directed 1 (One) Time Per Week. 4 pen 5   • FREESTYLE LITE test strip      • HYDROcodone-acetaminophen (NORCO) 5-325 MG per tablet Take 1 tablet by mouth Every 6 (Six) Hours As Needed.     • levothyroxine (SYNTHROID) 150 MCG tablet Take 1 tablet by mouth Daily. 30 tablet 11   • levothyroxine (SYNTHROID, LEVOTHROID) 150 MCG tablet Take 1 tablet by mouth Daily. 30 tablet 6   • lisinopril  (PRINIVIL,ZESTRIL) 20 MG tablet Take 20 mg by mouth Daily.     • nitroglycerin (NITROSTAT) 0.4 MG SL tablet 1 under the tongue as needed for angina, may repeat q5mins for up three doses 30 tablet 3   • sertraline (ZOLOFT) 50 MG tablet As Needed. daily     • exenatide er (BYDUREON) 2 MG/0.85ML auto-injector injection Inject 0.85 mL under the skin into the appropriate area as directed 1 (One) Time Per Week. 4 pen 11   • levothyroxine (SYNTHROID) 200 MCG tablet Take 1 tablet by mouth Daily. 30 tablet 11     No facility-administered encounter medications on file as of 2/7/2019.        Orders placed during this encounter include:  Orders Placed This Encounter   Procedures   • Hemoglobin A1c   • TSH   • T4, Free     Type 2 Diabetes Mellitus With Hyperglycemia (Cms/Hcc)   Postsurgical Hypothyroidism         Hypothyroidism-postsurgical      Patient admits to noncompliance          Since TSH is 70 , I will start with 150 mcgs and expect to see her in 2 week to increase dose to 200 mcgs     Lab Results   Component Value Date    TSH 73.500 (H) 01/17/2019     Component      Latest Ref Rng & Units 1/17/2019   Free T4      0.78 - 2.19 ng/dL 0.98          We will follow Free T4 as a guide to treatment since the intention is to have her near euthyroid for potential upcoming CABG.    Increase to 200 mcg daily           ====     Type 2 diabetes      Lab Results   Component Value Date    HGBA1C 10.5 (H) 01/17/2019          Complicated by CAD  Back in March GFR less than 45 but now 58.            A. Trulicity 0.75 mg weekly - she is taking -- insurance is requiring bydueron -- RX sent   B. Stop Glipizide  C.  steglatro 5 mg   D. As long as she eats up to 50 grams of carbohdyrate per meal, she doesn't need novolog.   If she exceeds 50 grams of carbohydrate , I will give her 5 units to 10 units before meals based on carb load. ==she is not using the Novolog        Bone Health    Component      Latest Ref Rng & Units 1/17/2019   25  Hydroxy, Vitamin D      30.0 - 100.0 ng/ml 28.1 (L)       Taking vitamin d supplement      Lab Results   Component Value Date    [id number] 01/17/2019         Lipid Management:      Component      Latest Ref Rng & Units 1/17/2019   Total Cholesterol      0 - 199 mg/dL 76   Triglycerides      20 - 199 mg/dL 63   HDL Cholesterol      60 - 200 mg/dL 54 (L)   LDL Cholesterol       1 - 129 mg/dL <30   LDL/HDL Ratio      0.00 - 3.22      On repatha    4. Follow-up: Return in about 6 weeks (around 3/21/2019) for Recheck.

## 2019-02-09 PROBLEM — N18.30 CHRONIC KIDNEY DISEASE, STAGE 3 (MODERATE): Status: ACTIVE | Noted: 2019-02-09

## 2019-02-09 NOTE — PROGRESS NOTES
2/7/2019    Rhea Sutton  1970    Chief Complaint:    Chief Complaint   Patient presents with   • Coronary Artery Disease       HPI:      PCP:  Anderson Abbott MD  Cardiology:  Dr Montana  Endocrinology:  Chuck Thompson APRN    48 y.o. female with HTN(stable, increased risk stroke, rupture), Hyperlipidemia(stable, increased risk cardiovascular events), Diabetes Mellitus(stable, increased risk cardiovascular events), Obesity(uncontrolled, increased risk cardiovascular events) and Chronic Kidney Disease(stable, increased risk renal failure) , CAD(stable, PCI 2017, 2019, increase risk cardiovascular events).  former smoker.  multiple stents in the right coronary artery, circumflex artery and also diffuse disease in the LAD and last cardiac catheterization was in 2070 October came with sudden onset of chest pain with diaphoresis or shortness of breath was found to have acute inferior wall MI, she has been on medical therapy with statins, PCSK9 Inhibitors, Brilinta. She underwent successful PCI to proximal RCA. Additional diffuse disease of the left system and in stent stenosis of PDA.. head lice treated permethrin.  No other associated signs, symptoms or modifying factors.    1/2017 PCI CX  1/4/2019 Cardiac Catherization:  LAD 80%, CX 70%, RCA 90% (PCI 40%)  1/2019 ECG:  SB 58,   1/2019 Echocardiogram:  EF 55%, LA 32mm, LV 50mm, RVSP 18mmHg.  Mild MR TR.    2/2019 Carotid Duplex:  PARI 0-49% bidirectional vert.  LICA 0-49% antegrade vert.  2/2019 Lower extremity vein mapping:  RIGHT 3.0-6.5mm.  LEFT 2.0-5.6mm    The following portions of the patient's history were reviewed and updated as appropriate: allergies, current medications, past family history, past medical history, past social history, past surgical history and problem list.  Recent images independently reviewed.  Available laboratory values reviewed.    PMH:  Past Medical History:   Diagnosis Date   • CAD (coronary artery disease)     • Chronic back pain    • Chronic bronchitis (CMS/HCC)    • Diabetes mellitus (CMS/HCC)    • Goiter    • Hashimoto's thyroiditis    • Head lice infestation    • Hyperlipidemia    • Hypertension    • Postoperative hypothyroidism      Family History   Problem Relation Age of Onset   • Coronary artery disease Mother    • Coronary artery disease Father      Social History     Tobacco Use   • Smoking status: Former Smoker     Packs/day: 0.00     Types: Cigarettes     Start date: 8/12/2015   • Smokeless tobacco: Never Used   Substance Use Topics   • Alcohol use: No   • Drug use: No       ALLERGIES:  Allergies   Allergen Reactions   • Penicillins Swelling     Of the throat   • Adhesive Tape Other (See Comments)     Silk tape- blisters   • Ciprofloxacin    • Coconut Flavor Swelling   • Latex Hives         MEDICATIONS:    Current Outpatient Medications:   •  aspirin 81 MG chewable tablet, Chew 81 mg daily., Disp: , Rfl:   •  atorvastatin (LIPITOR) 40 MG tablet, , Disp: , Rfl:   •  Baclofen 2 % cream, Apply  topically 3 (Three) Times a Day. Baclofen, Neurontin, and Lidocaine compound cream to lower back TID for chronic back pain, Disp: , Rfl:   •  Blood Glucose Monitoring Suppl (FREESTYLE LITE) device, , Disp: , Rfl:   •  BRILINTA 90 MG tablet tablet, TAKE ONE TABLET BY MOUTH TWICE DAILY, Disp: 60 tablet, Rfl: 6  •  carvedilol (COREG) 25 MG tablet, 12.5 mg 2 (Two) Times a Day., Disp: , Rfl:   •  cholecalciferol (VITAMIN D3) 1000 units tablet, Take 1,000 Units by mouth Daily. 5 capsules daily, Disp: , Rfl:   •  Ertugliflozin L-PyroglutamicAc (STEGLATRO) 5 MG tablet, Take 1 tablet by mouth Every Morning., Disp: 30 tablet, Rfl: 11  •  Evolocumab 140 MG/ML solution auto-injector, Inject 1 mL under the skin into the appropriate area as directed Every 14 (Fourteen) Days., Disp: 2 mL, Rfl: 0  •  exenatide er (BYDUREON) 2 MG/0.85ML auto-injector injection, Inject 0.85 mL under the skin into the appropriate area as directed 1 (One)  Time Per Week., Disp: 4 pen, Rfl: 11  •  FREESTYLE LITE test strip, , Disp: , Rfl:   •  HYDROcodone-acetaminophen (NORCO) 5-325 MG per tablet, Take 1 tablet by mouth Every 6 (Six) Hours As Needed., Disp: , Rfl:   •  levothyroxine (SYNTHROID) 200 MCG tablet, Take 1 tablet by mouth Daily., Disp: 30 tablet, Rfl: 11  •  lisinopril (PRINIVIL,ZESTRIL) 20 MG tablet, Take 20 mg by mouth Daily., Disp: , Rfl:   •  nitroglycerin (NITROSTAT) 0.4 MG SL tablet, 1 under the tongue as needed for angina, may repeat q5mins for up three doses, Disp: 30 tablet, Rfl: 3  •  sertraline (ZOLOFT) 50 MG tablet, As Needed. daily, Disp: , Rfl:     Review of Systems   Review of Systems   Constitution: Positive for malaise/fatigue. Negative for weakness and weight loss.   Cardiovascular: Positive for chest pain and dyspnea on exertion. Negative for claudication.   Respiratory: Positive for shortness of breath. Negative for cough.    Skin: Negative for color change and poor wound healing.   Musculoskeletal: Positive for back pain, joint pain and myalgias.   Neurological: Positive for headaches. Negative for dizziness and numbness.       Physical Exam   Physical Exam   Constitutional: She is oriented to person, place, and time. She is active and cooperative. She does not appear ill. No distress.   HENT:   Right Ear: Hearing normal.   Left Ear: Hearing normal.   Nose: No nasal deformity. No epistaxis.   Mouth/Throat: She does not have dentures. Normal dentition.   Cardiovascular: Normal rate and regular rhythm.   No murmur heard.  Pulses:       Carotid pulses are 2+ on the right side, and 2+ on the left side.       Radial pulses are 2+ on the right side, and 2+ on the left side.        Dorsalis pedis pulses are 2+ on the right side, and 2+ on the left side.        Posterior tibial pulses are 2+ on the right side, and 2+ on the left side.   Pulmonary/Chest: Effort normal and breath sounds normal.   Abdominal: Soft. She exhibits no distension and no  mass. There is no tenderness.   Musculoskeletal: She exhibits no deformity.   Gait normal.    Neurological: She is alert and oriented to person, place, and time. She has normal strength.   Skin: Skin is warm and dry. No cyanosis or erythema. No pallor.   No venous staining   Psychiatric: She has a normal mood and affect. Her speech is normal. Judgment and thought content normal.     Results for RHEA DAVIES (MRN [id number]) as of 2/9/2019 13:57  GFR 45 Ref. Range 1/17/2019 13:39   Creatinine Latest Ref Range: 0.50 - 1.00 mg/dL 1.28 (H)   BUN Latest Ref Range: 7 - 21 mg/dL 13     ASSESSMENT:  Rhea was seen today for coronary artery disease.    Diagnoses and all orders for this visit:    Coronary artery disease of native artery of native heart with stable angina pectoris (CMS/HCC)  -     clindamycin (CLEOCIN) 900 mg in dextrose (D5W) 5 % 100 mL IVPB; Infuse 900 mg into a venous catheter 1 (One) Time.  -     Case Request; Standing  -     Instruct Patient on Coughing, Deep Breathing and Incentive Spirometry; Future  -     Type & Screen; Future  -     Chest X-Ray PA & Lateral; Future  -     chlorhexidine (PERIDEX) 0.12 % solution 15 mL; Apply 15 mL to the mouth or throat Every 12 (Twelve) Hours.  -     Chlorhexidine Gluconate Cloth 2 % pads 1 application; Apply 1 application topically to the appropriate area as directed Every 12 (Twelve) Hours As Needed (12 hours night before surgery and repeat in AM prior to surgery.).  -     mupirocin (BACTROBAN) 2 % nasal ointment 1 application; 1 application by Each Nare route Every 12 (Twelve) Hours.  -     acetaminophen (TYLENOL) tablet 650 mg; Take 2 tablets by mouth Every 4 (Four) Hours As Needed for Mild Pain .  -     nitroglycerin (NITROSTAT) SL tablet 0.4 mg; Place 1 tablet under the tongue Every 5 (Five) Minutes As Needed for Chest Pain.  -     sodium chloride 0.9 % infusion; Infuse 100 mL/hr into a venous catheter Continuous.  -     sodium chloride 0.9 % flush 3  mL; Infuse 3 mL into a venous catheter Every 12 (Twelve) Hours.  -     sodium chloride 0.9 % flush 3-10 mL; Infuse 3-10 mL into a venous catheter As Needed for Line Care.  -     Case Request    Essential hypertension    Mixed hyperlipidemia    NSTEMI (non-ST elevated myocardial infarction) (CMS/[facility])    Uncontrolled type 2 diabetes mellitus with hyperglycemia (CMS/[facility])    Lumbar radiculopathy    Chronic kidney disease, stage 3 (moderate) (CMS/[facility])    Other orders  -     Inpatient Admission; Standing  -     Notify Surgeon if Patient Currently Taking Plavix, Effient, Ticlid, Brillinta, Pradaxa, Xarelto, Eliquis or Savaysa  -     Give Patient Pre-Op Education Booklet  -     Provide Patient Instuctions on NPO Status  -     Chlorhexidine Skin Prep  -     Hold Home Medications Morning of Surgery As Directed on Patient Instruction Sheet  -     Height & Weight  -     Check BP in Both Arms & Document on Cardiac Surgery Information Sheet  -     Verify NPO Status; Standing  -     Clip Hair Chin to Ankles; Standing  -     Void On Call to OR; Standing  -     Instruct Patient on Coughing, Deep Breathing and Incentive Spirometry; Standing  -     Oxygen Therapy-; Standing  -     Notify Physician - Standard; Standing  -     Prepare RBC, 2 Units; Standing  -     Insert Peripheral IV; Standing  -     Saline Lock & Maintain IV Access; Standing  -     Five Meter Walk Test; Future  -     Obtain Informed Consent; Standing  -     Prepare Platelet Pheresis, 2 Units; Standing    PLAN:  Detailed discussion with Rhea Sutton regarding situation options and plans.  Severe asymptomatic CAD.  Coronary Artery Bypass Grafting is advisable.      Risks including but not limited to Mortality 2-3%, Stroke 1-2%, bleeding (return to surgery), transfusion, infection, pulmonary (prolonged mechanical ventilation, tracheostomy), renal dysfunction (dialysis).  Benefits:  Relief of symptoms, reduction in cardiac events and hospitalization, and improved  survival.  Options:  Medical therapy, multivessel PCI discussed. Understands and wishes to proceed.    Coronary Artery Bypass Grafting, LIMA - LAD, SVG - M2 and SVG - PDA, ON, TXA, Ancef, radial arterial line, pulmonary artery catheter.  GEN.  SDS.  3/5/2019    Return after above studies complete  Obesity Class  3. Increased risk cardiovascular events, sleep and breathing disorders, joint issues, type 2 diabetes mellitus. Options for weight management, heart healthy diet, exercise programs, and associated health risks of obesity discussed.  Recommended regular physical activity, progressive walking program.  Continue current medications as directed.    Thank you for the opportunity to participate in this patient's care.    Copy to primary care provider.    EMR Dragon/Transcription disclaimer:   Much of this encounter note is an electronic transcription/translation of spoken language to printed text. The electronic translation of spoken language may permit erroneous, or at times, nonsensical words or phrases to be inadvertently transcribed; Although I have reviewed the note for such errors, some may still exist.

## 2019-02-12 ENCOUNTER — OFFICE VISIT (OUTPATIENT)
Dept: CARDIOLOGY | Facility: CLINIC | Age: 49
End: 2019-02-12

## 2019-02-12 VITALS
OXYGEN SATURATION: 99 % | SYSTOLIC BLOOD PRESSURE: 118 MMHG | WEIGHT: 248 LBS | HEIGHT: 66 IN | BODY MASS INDEX: 39.86 KG/M2 | HEART RATE: 66 BPM | DIASTOLIC BLOOD PRESSURE: 80 MMHG

## 2019-02-12 DIAGNOSIS — I21.4 NSTEMI (NON-ST ELEVATED MYOCARDIAL INFARCTION) (HCC): Primary | ICD-10-CM

## 2019-02-12 DIAGNOSIS — I25.118 CORONARY ARTERY DISEASE OF NATIVE ARTERY OF NATIVE HEART WITH STABLE ANGINA PECTORIS (HCC): ICD-10-CM

## 2019-02-12 DIAGNOSIS — E78.2 MIXED HYPERLIPIDEMIA: ICD-10-CM

## 2019-02-12 DIAGNOSIS — I10 ESSENTIAL HYPERTENSION: Chronic | ICD-10-CM

## 2019-02-12 PROCEDURE — 99214 OFFICE O/P EST MOD 30 MIN: CPT | Performed by: INTERNAL MEDICINE

## 2019-02-13 NOTE — PROGRESS NOTES
Rhea Sutton  48 y.o. female    02/12/2019  1. NSTEMI (non-ST elevated myocardial infarction) (CMS/[facility])    2. Essential hypertension    3. Mixed hyperlipidemia    4. Coronary artery disease of native artery of native heart with stable angina pectoris (CMS/[facility])        History of Present Illness    Ms. Sutton is a 48-year-old  lady who was admitted to [facility] with prolonged chest pain with EKG evidence of acute inferior wall myocardial infarction in 1/ 2019.  She underwent emergent cardiac catheterization with PCI to right coronary artery by Dr. Gonzalez.  She was also noted to have significant disease in the LAD territory.  Catheterization showed:  Dominance: Right dominant  Left Main coronary artery: Large caliber vessel divides and left circumflex artery and LAD  Left anterior descending artery: Diffusely disease that is proximally there is 80-90% stenosis and distal LAD goes all the way to the apex and there is diffuse disease in the LAD  Left circumflex:  Large caliber vessel previously placed in the left circumflex artery is widely patent  Right coronary artery:  Proximal to mid was 100% inside the stent.  Percutaneous coronary intervention: After giving Angiomax bolus and drip a right guide was placed in the right coronary artery and a run-through wire was crossed through the lesion and was dilated with a 2.0 X 20 Trek balloon and subsequently distal RCA was stented with a 2.75 x 23 Xience alma stent, mid to distal was stented with a 3.0 x 33 stent and proximally with a 3.5 x 18 Xience alma stent and subsequently postdilated with a 3.0 x 35 noncompliant mozec balloon and subsequently there was a ostial stenosis of the PDA and it was attempted to place a stent but stent was getting stuck in the previously placed stent struts hence balloon angioplasty was done into the ostial PDA reducing 90% stenosis to 30-40%.  Conclusion : Successful PCI to the proximal to mid right coronary  artery reducing 100% stenosis to 0% obtaining BEA 3 flow with Xience Kelley stents      She presented with chest pain in October 2017 and ruled in for a non-ST segment elevation myocardial infarctions and cardiac catheterization at that time showed   #1 Critical lesion noted in the mid circumflex coronary artery (90% followed by 80%) and successful PCI with PTCA and placement of 3.0 x 38 mm xience Alpine stent and reduction in stenosis to 0%  #2 Previously placed stent in the right coronary artery was patent but multiple mild to moderate lesions noted in the ostial, proximal and mid Right Coronary Artery and 80% stenosis noted in a small caliber PDA less than 2 mm in diameter.  #3 LAD was a small to medium caliber vessel with mild to moderate disease which was diffuse.  #4.  Overall normal LV systolic function with an EF of 55% with mild inferior wall hypokinesis     The patient has had significant hyperlipidemia, diabetes mellitus, hypothyroidism and her LDL has been optimized with PCSK9 inhibitors.       She was noted to have head lice and treated with Permethrin.     The patient did not have any recurrence of chest pain and remained stable with no signs of congestive heart failure.  Echocardiogram showed:  ·  Mild tricuspid valve regurgitation is present.  · Estimated EF = 55%.  · Left ventricular systolic function is normal.  · Left ventricular diastolic dysfunction (grade I a) consistent with impaired relaxation.  · Left atrial cavity size is mildly dilated.     The patient was seen in consultation by the hospitalist service, endocrinologist Dr. Ramses Frias and CT surgery by Dr. Vilchis.  Dr. Frias adjusted her antidiabetic medications and started her on Synthroid since she was noted to have profound hypothyroidism.  Antidiabetics were adjusted as well.      Ms. Sutton is been evaluated by Dr. Vilchis and has been scheduled for coronary artery bypass surgery on 3/ 5/ 2019.  He denied any chest pain,  shortness of breath or palpitation and has been compliant with her medications.  Clinical exam today was unremarkable.      SUBJECTIVE    Allergies   Allergen Reactions   • Penicillins Swelling     Of the throat   • Adhesive Tape Other (See Comments)     Silk tape- blisters   • Ciprofloxacin    • Coconut Flavor Swelling   • Latex Hives         Past Medical History:   Diagnosis Date   • CAD (coronary artery disease)    • Chronic back pain    • Chronic bronchitis (CMS/HCC)    • Diabetes mellitus (CMS/HCC)    • Goiter    • Hashimoto's thyroiditis    • Head lice infestation    • Hyperlipidemia    • Hypertension    • Postoperative hypothyroidism          Past Surgical History:   Procedure Laterality Date   • CARDIAC CATHETERIZATION N/A 10/13/2017   • CARDIAC CATHETERIZATION N/A 1/3/2019   • CHOLECYSTECTOMY     • COLON RESECTION     • CORONARY STENT PLACEMENT     • NE RT/LT HEART CATHETERS N/A 10/13/2017   • TOTAL ABDOMINAL HYSTERECTOMY WITH SALPINGO OOPHORECTOMY     • TOTAL THYROIDECTOMY           Family History   Problem Relation Age of Onset   • Coronary artery disease Mother    • Coronary artery disease Father          Social History     Socioeconomic History   • Marital status:      Spouse name: Not on file   • Number of children: Not on file   • Years of education: Not on file   • Highest education level: Not on file   Social Needs   • Financial resource strain: Not on file   • Food insecurity - worry: Not on file   • Food insecurity - inability: Not on file   • Transportation needs - medical: Not on file   • Transportation needs - non-medical: Not on file   Occupational History   • Not on file   Tobacco Use   • Smoking status: Former Smoker     Packs/day: 0.00     Types: Cigarettes     Start date: 8/12/2015   • Smokeless tobacco: Never Used   Substance and Sexual Activity   • Alcohol use: No   • Drug use: No   • Sexual activity: Not Currently   Other Topics Concern   • Not on file   Social History Narrative  "  • Not on file         Current Outpatient Medications   Medication Sig Dispense Refill   • aspirin 81 MG chewable tablet Chew 81 mg daily.     • atorvastatin (LIPITOR) 40 MG tablet      • Baclofen 2 % cream Apply  topically 3 (Three) Times a Day. Baclofen, Neurontin, and Lidocaine compound cream to lower back TID for chronic back pain     • Blood Glucose Monitoring Suppl (FREESTYLE LITE) device      • BRILINTA 90 MG tablet tablet TAKE ONE TABLET BY MOUTH TWICE DAILY 60 tablet 6   • carvedilol (COREG) 25 MG tablet 12.5 mg 2 (Two) Times a Day.     • cholecalciferol (VITAMIN D3) 1000 units tablet Take 1,000 Units by mouth Daily. 5 capsules daily     • Ertugliflozin L-PyroglutamicAc (STEGLATRO) 5 MG tablet Take 1 tablet by mouth Every Morning. 30 tablet 11   • Evolocumab 140 MG/ML solution auto-injector Inject 1 mL under the skin into the appropriate area as directed Every 14 (Fourteen) Days. 2 mL 0   • exenatide er (BYDUREON) 2 MG/0.85ML auto-injector injection Inject 0.85 mL under the skin into the appropriate area as directed 1 (One) Time Per Week. 4 pen 11   • FREESTYLE LITE test strip      • HYDROcodone-acetaminophen (NORCO) 5-325 MG per tablet Take 1 tablet by mouth Every 6 (Six) Hours As Needed.     • levothyroxine (SYNTHROID) 200 MCG tablet Take 1 tablet by mouth Daily. 30 tablet 11   • lisinopril (PRINIVIL,ZESTRIL) 20 MG tablet Take 20 mg by mouth Daily.     • nitroglycerin (NITROSTAT) 0.4 MG SL tablet 1 under the tongue as needed for angina, may repeat q5mins for up three doses 30 tablet 3   • sertraline (ZOLOFT) 50 MG tablet As Needed. daily       No current facility-administered medications for this visit.          OBJECTIVE    /80   Pulse 66   Ht 167.6 cm (65.98\")   Wt 112 kg (248 lb)   SpO2 99%   BMI 40.05 kg/m²         Review of Systems     Constitutional:  Denies recent weight loss, weight gain, fever or chills, no change in exercise tolerance     HENT:  Denies any hearing loss, epistaxis, " hoarseness, or difficulty speaking.     Eyes: Wears eyeglasses or contact lenses     Respiratory:  Denies dyspnea with exertion,no cough, wheezing, or hemoptysis.     Cardiovascular: Negative for palpations, chest pain. See HPI    Gastrointestinal:  Denies change in bowel habits, dyspepsia, ulcer disease, hematochezia, or melena.     Endocrine: Negative for cold intolerance, heat intolerance, polydipsia, polyphagia and polyuria.    Genitourinary: Negative.      Musculoskeletal: DJD    Skin:  Denies any change in hair or nails, rashes, or skin lesions.     Neurological:  Denies any history of recurrent headaches, strokes, TIA, or seizure disorder.     Hematological: Denies any food allergies, seasonal allergies, bleeding disorders, or lymphadenopathy.     Psychiatric/Behavioral: Denies any history of depression, substance abuse, or change in cognitive function.         Physical Exam     Constitutional: Cooperative, alert and oriented, in no acute distress.     HENT:   Head: Normocephalic, normal hair patterns, no masses or tenderness.  Ears, Nose, and Throat: No gross abnormalities. No pallor or cyanosis.   Eyes: EOMS intact, PERRL, conjunctivae and lids unremarkable. Fundoscopic exam and visual fields not performed.   Neck: No palpable masses or adenopathy, no thyromegaly, no JVD, carotid pulses are full and equal bilaterally and without  Bruits.     Cardiovascular: Regular rhythm, S1 and S2 normal, no S3 or S4.  No murmurs, gallops, or rubs detected.     Pulmonary/Chest: Chest: normal symmetry,  normal respiratory excursion, no intercostal retraction, no use of accessory muscles.            Pulmonary: Normal breath sounds. No rales or ronchi.    Abdominal: Abdomen soft, bowel sounds normoactive, no masses, no hepatosplenomegaly, non-tender, no bruits.     Musculoskeletal: No deformities, clubbing, cyanosis, erythema, or edema observed. There are no spinal abnormalities noted. Normal muscle strength and tone. Pulses  full and equal in all extremities, no bruits auscultated.     Neurological: No gross motor or sensory deficits noted, affect appropriate, oriented to time, person, place.     Skin: Warm and dry to the touch, no apparent skin lesions or masses noted.     Psychiatric: She has a normal mood and affect. Her behavior is normal. Judgment and thought content normal.         Procedures      Lab Results   Component Value Date    WBC 10.19 (H) 01/17/2019    HGB 12.4 01/17/2019    HCT 38.1 01/17/2019    MCV 92.3 01/17/2019     01/17/2019     Lab Results   Component Value Date    GLUCOSE 102 (H) 01/17/2019    BUN 13 01/17/2019    CREATININE 1.28 (H) 01/17/2019    EGFRIFNONA 45 (L) 01/17/2019    BCR 10.2 01/17/2019    CO2 31.0 01/17/2019    CALCIUM 9.5 01/17/2019    ALBUMIN 3.80 01/17/2019    AST 39 (H) 01/17/2019    ALT 41 01/17/2019     Lab Results   Component Value Date    CHOL 76 01/17/2019    CHOL 137 01/04/2019    CHOL 278 (H) 06/06/2018     Lab Results   Component Value Date    TRIG 63 01/17/2019    TRIG 53 01/04/2019    TRIG 149 06/06/2018     Lab Results   Component Value Date    HDL 54 (L) 01/17/2019    HDL 49 (L) 01/04/2019    HDL 67 06/06/2018     No components found for: LDLCALC  Lab Results   Component Value Date    LDL <30 01/17/2019    LDL 77 01/04/2019     (H) 06/06/2018     No results found for: HDLLDLRATIO  No components found for: CHOLHDL  Lab Results   Component Value Date    HGBA1C 10.5 (H) 01/17/2019     Lab Results   Component Value Date    TSH 73.500 (H) 01/17/2019    H8FPROA 24.1 (L) 06/08/2017           ASSESSMENT AND PLAN  Ms. Sutton is progressing well following her recent intervention and has been compliant with her medications.  CT surgery is planned for the first week of March.  I have continued antiplatelet therapy with aspirin and Alimta, lipid-lowering therapy with atorvastatin and Evolocumab, and a hypertensive therapy with Coreg, lisinopril.  She will go off antiplatelet  therapy with Brilinta about a week prior to CT surgery as advised by Dr. Vilchis.    Rhea was seen today for follow-up.    Diagnoses and all orders for this visit:    NSTEMI (non-ST elevated myocardial infarction) (CMS/HCC)    Essential hypertension    Mixed hyperlipidemia    Coronary artery disease of native artery of native heart with stable angina pectoris (CMS/HCC)        Patient's Body mass index is 40.05 kg/m². BMI is above normal parameters. Recommendations include: exercise counseling and nutrition counseling.        Mian Montana MD  2/12/2019  8:22 PM

## 2019-02-14 ENCOUNTER — TELEPHONE (OUTPATIENT)
Dept: ENDOCRINOLOGY | Facility: CLINIC | Age: 49
End: 2019-02-14

## 2019-02-14 NOTE — TELEPHONE ENCOUNTER
Rhea Sutton (Key: [id number]) - [id number]   Need help? Call us at [phone]     [facility]   [facility]   Next Steps   The plan will fax you a determination, typically within 1 to 5 business days.  How do I follow up?   DrugBydureon BCise 2MG/0.85ML auto-injectors   FormWellcare [place] Medicaid Coverage Determination Form[place] Medicaid Prior Authorization Form for Coverage Determination [phone]   Original Claim Info75

## 2019-02-28 ENCOUNTER — ANESTHESIA EVENT (OUTPATIENT)
Dept: PERIOP | Facility: HOSPITAL | Age: 49
End: 2019-02-28

## 2019-02-28 ENCOUNTER — APPOINTMENT (OUTPATIENT)
Dept: PREADMISSION TESTING | Facility: HOSPITAL | Age: 49
End: 2019-02-28

## 2019-02-28 ENCOUNTER — HOSPITAL ENCOUNTER (OUTPATIENT)
Dept: GENERAL RADIOLOGY | Facility: HOSPITAL | Age: 49
Discharge: HOME | End: 2019-02-28
Admitting: THORACIC SURGERY (CARDIOTHORACIC VASCULAR SURGERY)

## 2019-02-28 VITALS
RESPIRATION RATE: 16 BRPM | DIASTOLIC BLOOD PRESSURE: 70 MMHG | HEIGHT: 65 IN | WEIGHT: 248 LBS | BODY MASS INDEX: 41.32 KG/M2 | OXYGEN SATURATION: 98 % | SYSTOLIC BLOOD PRESSURE: 118 MMHG | HEART RATE: 64 BPM

## 2019-02-28 DIAGNOSIS — A49.02 MRSA (METHICILLIN RESISTANT STAPHYLOCOCCUS AUREUS): Primary | ICD-10-CM

## 2019-02-28 DIAGNOSIS — I25.118 CORONARY ARTERY DISEASE OF NATIVE ARTERY OF NATIVE HEART WITH STABLE ANGINA PECTORIS (HCC): ICD-10-CM

## 2019-02-28 LAB
ABO GROUP BLD: NORMAL
ANION GAP SERPL CALCULATED.3IONS-SCNC: 6 MMOL/L (ref 5–15)
APTT PPP: 33 SECONDS (ref 20–40.3)
BLD GP AB SCN SERPL QL: NEGATIVE
BUN BLD-MCNC: 11 MG/DL (ref 7–21)
BUN/CREAT SERPL: 11.3 (ref 7–25)
CALCIUM SPEC-SCNC: 8.6 MG/DL (ref 8.4–10.2)
CHLORIDE SERPL-SCNC: 101 MMOL/L (ref 95–110)
CO2 SERPL-SCNC: 29 MMOL/L (ref 22–31)
CREAT BLD-MCNC: 0.97 MG/DL (ref 0.5–1)
DEPRECATED RDW RBC AUTO: 43.4 FL (ref 37–54)
ERYTHROCYTE [DISTWIDTH] IN BLOOD BY AUTOMATED COUNT: 13.2 % (ref 12.3–15.4)
GFR SERPL CREATININE-BSD FRML MDRD: 61 ML/MIN/1.73 (ref 58–135)
GLUCOSE BLD-MCNC: 202 MG/DL (ref 60–100)
HCT VFR BLD AUTO: 36.5 % (ref 34–46.6)
HGB BLD-MCNC: 12.1 G/DL (ref 12–15.9)
INR PPP: 1 (ref 0.8–1.2)
Lab: NORMAL
MCH RBC QN AUTO: 29.8 PG (ref 26.6–33)
MCHC RBC AUTO-ENTMCNC: 33.2 G/DL (ref 31.5–35.7)
MCV RBC AUTO: 89.9 FL (ref 79–97)
MRSA DNA SPEC QL NAA+PROBE: NEGATIVE
PLATELET # BLD AUTO: 283 10*3/MM3 (ref 140–450)
PMV BLD AUTO: 9.7 FL (ref 6–12)
POTASSIUM BLD-SCNC: 4.1 MMOL/L (ref 3.5–5.1)
PROTHROMBIN TIME: 13 SECONDS (ref 11.1–15.3)
RBC # BLD AUTO: 4.06 10*6/MM3 (ref 3.77–5.28)
RH BLD: POSITIVE
SODIUM BLD-SCNC: 136 MMOL/L (ref 137–145)
T&S EXPIRATION DATE: NORMAL
WBC NRBC COR # BLD: 10.11 10*3/MM3 (ref 3.4–10.8)

## 2019-02-28 PROCEDURE — 86850 RBC ANTIBODY SCREEN: CPT | Performed by: THORACIC SURGERY (CARDIOTHORACIC VASCULAR SURGERY)

## 2019-02-28 PROCEDURE — 94799 UNLISTED PULMONARY SVC/PX: CPT

## 2019-02-28 PROCEDURE — 36415 COLL VENOUS BLD VENIPUNCTURE: CPT

## 2019-02-28 PROCEDURE — 87641 MR-STAPH DNA AMP PROBE: CPT | Performed by: NURSE PRACTITIONER

## 2019-02-28 PROCEDURE — 85027 COMPLETE CBC AUTOMATED: CPT | Performed by: ANESTHESIOLOGY

## 2019-02-28 PROCEDURE — 80048 BASIC METABOLIC PNL TOTAL CA: CPT | Performed by: ANESTHESIOLOGY

## 2019-02-28 PROCEDURE — 86900 BLOOD TYPING SEROLOGIC ABO: CPT | Performed by: THORACIC SURGERY (CARDIOTHORACIC VASCULAR SURGERY)

## 2019-02-28 PROCEDURE — 85730 THROMBOPLASTIN TIME PARTIAL: CPT | Performed by: NURSE PRACTITIONER

## 2019-02-28 PROCEDURE — 85610 PROTHROMBIN TIME: CPT | Performed by: NURSE PRACTITIONER

## 2019-02-28 PROCEDURE — 71046 X-RAY EXAM CHEST 2 VIEWS: CPT

## 2019-02-28 PROCEDURE — 86901 BLOOD TYPING SEROLOGIC RH(D): CPT | Performed by: THORACIC SURGERY (CARDIOTHORACIC VASCULAR SURGERY)

## 2019-02-28 RX ORDER — LISINOPRIL 30 MG/1
30 TABLET ORAL DAILY
COMMUNITY
End: 2019-03-12 | Stop reason: HOSPADM

## 2019-02-28 RX ORDER — HYDROCODONE BITARTRATE AND ACETAMINOPHEN 7.5; 325 MG/1; MG/1
1 TABLET ORAL NIGHTLY PRN
COMMUNITY
End: 2019-03-12 | Stop reason: HOSPADM

## 2019-02-28 RX ORDER — CARVEDILOL 12.5 MG/1
12.5 TABLET ORAL 2 TIMES DAILY WITH MEALS
COMMUNITY
End: 2019-03-12 | Stop reason: HOSPADM

## 2019-03-04 DIAGNOSIS — I25.10 CORONARY ARTERY DISEASE INVOLVING NATIVE HEART WITHOUT ANGINA PECTORIS, UNSPECIFIED VESSEL OR LESION TYPE: Primary | ICD-10-CM

## 2019-03-04 RX ORDER — TRANEXAMIC ACID 100 MG/ML
2.5 INJECTION, SOLUTION INTRAVENOUS ONCE
Status: DISCONTINUED | OUTPATIENT
Start: 2019-03-05 | End: 2019-03-06

## 2019-03-05 ENCOUNTER — ANESTHESIA (OUTPATIENT)
Dept: PERIOP | Facility: HOSPITAL | Age: 49
End: 2019-03-05

## 2019-03-05 ENCOUNTER — APPOINTMENT (OUTPATIENT)
Dept: GENERAL RADIOLOGY | Facility: HOSPITAL | Age: 49
End: 2019-03-05

## 2019-03-05 ENCOUNTER — HOSPITAL ENCOUNTER (INPATIENT)
Facility: HOSPITAL | Age: 49
LOS: 7 days | Discharge: HOME | End: 2019-03-12
Attending: THORACIC SURGERY (CARDIOTHORACIC VASCULAR SURGERY) | Admitting: THORACIC SURGERY (CARDIOTHORACIC VASCULAR SURGERY)

## 2019-03-05 DIAGNOSIS — Z74.09 IMPAIRED MOBILITY AND ADLS: ICD-10-CM

## 2019-03-05 DIAGNOSIS — Z78.9 IMPAIRED MOBILITY AND ADLS: ICD-10-CM

## 2019-03-05 DIAGNOSIS — I25.118 CORONARY ARTERY DISEASE OF NATIVE ARTERY OF NATIVE HEART WITH STABLE ANGINA PECTORIS (HCC): ICD-10-CM

## 2019-03-05 DIAGNOSIS — I25.10 CORONARY ARTERY DISEASE INVOLVING NATIVE HEART WITHOUT ANGINA PECTORIS, UNSPECIFIED VESSEL OR LESION TYPE: ICD-10-CM

## 2019-03-05 DIAGNOSIS — Z74.09 IMPAIRED FUNCTIONAL MOBILITY, BALANCE, GAIT, AND ENDURANCE: ICD-10-CM

## 2019-03-05 LAB
A-A DO2: ABNORMAL MMHG
ABO GROUP BLD: NORMAL
ACT BLD: 123 SECONDS (ref 82–152)
ACT BLD: 148 SECONDS (ref 82–152)
ACT BLD: 371 SECONDS (ref 82–152)
ACT BLD: 469 SECONDS (ref 82–152)
ACT BLD: 522 SECONDS (ref 82–152)
ACT BLD: 558 SECONDS (ref 82–152)
ACT BLD: 574 SECONDS (ref 82–152)
ACT BLD: 717 SECONDS (ref 82–152)
ALBUMIN SERPL-MCNC: 3 G/DL (ref 3.4–4.8)
ANION GAP SERPL CALCULATED.3IONS-SCNC: 10 MMOL/L (ref 5–15)
APTT PPP: 31.4 SECONDS (ref 20–40.3)
ARTERIAL PATENCY WRIST A: ABNORMAL
ATMOSPHERIC PRESS: 757 MMHG
ATMOSPHERIC PRESS: 757 MMHG
ATMOSPHERIC PRESS: 758 MMHG
ATMOSPHERIC PRESS: 758 MMHG
BASE EXCESS BLDA CALC-SCNC: -1 MMOL/L (ref -5–5)
BASE EXCESS BLDA CALC-SCNC: -2 MMOL/L (ref -5–5)
BASE EXCESS BLDA CALC-SCNC: -2.6 MMOL/L (ref 0–2)
BASE EXCESS BLDA CALC-SCNC: -3.1 MMOL/L (ref 0–2)
BASE EXCESS BLDA CALC-SCNC: -3.6 MMOL/L (ref 0–2)
BASE EXCESS BLDA CALC-SCNC: -4 MMOL/L (ref -5–5)
BASE EXCESS BLDA CALC-SCNC: 0 MMOL/L (ref -5–5)
BASE EXCESS BLDA CALC-SCNC: 1 MMOL/L (ref -5–5)
BASE EXCESS BLDA CALC-SCNC: 1 MMOL/L (ref -5–5)
BASE EXCESS BLDA CALC-SCNC: 2 MMOL/L (ref -5–5)
BASE EXCESS BLDA CALC-SCNC: 2 MMOL/L (ref -5–5)
BASE EXCESS BLDV CALC-SCNC: -1.7 MMOL/L (ref 0–2)
BASOPHILS # BLD AUTO: 0.1 10*3/MM3 (ref 0–0.2)
BASOPHILS NFR BLD AUTO: 0.4 % (ref 0–1.5)
BDY SITE: ABNORMAL
BLD GP AB SCN SERPL QL: NEGATIVE
BUN BLD-MCNC: 9 MG/DL (ref 7–21)
BUN/CREAT SERPL: 11.8 (ref 7–25)
CA-I BLD-MCNC: 3.93 MG/DL (ref 4.6–5.6)
CA-I BLD-MCNC: 4.3 MG/DL (ref 4.6–5.6)
CA-I BLD-MCNC: 4.89 MG/DL (ref 4.6–5.6)
CA-I BLDA-SCNC: 0.86 MMOL/L (ref 1.2–1.32)
CA-I BLDA-SCNC: 0.87 MMOL/L (ref 1.2–1.32)
CA-I BLDA-SCNC: 0.87 MMOL/L (ref 1.2–1.32)
CA-I BLDA-SCNC: 0.88 MMOL/L (ref 1.2–1.32)
CA-I BLDA-SCNC: 0.88 MMOL/L (ref 1.2–1.32)
CA-I BLDA-SCNC: 0.89 MMOL/L (ref 1.2–1.32)
CA-I BLDA-SCNC: 1.04 MMOL/L (ref 1.2–1.32)
CA-I BLDA-SCNC: 1.09 MMOL/L (ref 1.2–1.32)
CALCIUM SPEC-SCNC: 6.9 MG/DL (ref 8.4–10.2)
CHLORIDE SERPL-SCNC: 101 MMOL/L (ref 95–110)
CO2 BLDA-SCNC: 23 MMOL/L (ref 24–29)
CO2 BLDA-SCNC: 25 MMOL/L (ref 24–29)
CO2 BLDA-SCNC: 26 MMOL/L (ref 24–29)
CO2 BLDA-SCNC: 27 MMOL/L (ref 24–29)
CO2 BLDA-SCNC: 28 MMOL/L (ref 24–29)
CO2 BLDA-SCNC: 29 MMOL/L (ref 24–29)
CO2 SERPL-SCNC: 23 MMOL/L (ref 22–31)
COHGB MFR BLD: 0.7 % (ref 0–5)
COHGB MFR BLD: 0.9 % (ref 0–5)
COHGB MFR BLD: 1.1 % (ref 0–5)
COHGB MFR BLD: 1.4 % (ref 0–5)
CREAT BLD-MCNC: 0.76 MG/DL (ref 0.5–1)
DEPRECATED RDW RBC AUTO: 43 FL (ref 37–54)
EOSINOPHIL # BLD AUTO: 0.03 10*3/MM3 (ref 0–0.4)
EOSINOPHIL NFR BLD AUTO: 0.1 % (ref 0.3–6.2)
ERYTHROCYTE [DISTWIDTH] IN BLOOD BY AUTOMATED COUNT: 13.2 % (ref 12.3–15.4)
GFR SERPL CREATININE-BSD FRML MDRD: 81 ML/MIN/1.73 (ref 58–135)
GLUCOSE BLD-MCNC: 128 MG/DL (ref 60–100)
GLUCOSE BLDA-MCNC: 133 MMOL/L (ref 65–95)
GLUCOSE BLDA-MCNC: 141 MMOL/L (ref 65–95)
GLUCOSE BLDA-MCNC: ABNORMAL MMOL/L (ref 65–95)
GLUCOSE BLDC GLUCOMTR-MCNC: 114 MG/DL (ref 70–130)
GLUCOSE BLDC GLUCOMTR-MCNC: 139 MG/DL (ref 70–130)
GLUCOSE BLDC GLUCOMTR-MCNC: 141 MG/DL (ref 70–130)
GLUCOSE BLDC GLUCOMTR-MCNC: 149 MG/DL (ref 70–130)
GLUCOSE BLDC GLUCOMTR-MCNC: 159 MG/DL (ref 70–130)
GLUCOSE BLDC GLUCOMTR-MCNC: 161 MG/DL (ref 70–130)
GLUCOSE BLDC GLUCOMTR-MCNC: 95 MG/DL (ref 70–130)
HCO3 BLDA-SCNC: 21.6 MMOL/L (ref 22–26)
HCO3 BLDA-SCNC: 22.1 MMOL/L (ref 20–26)
HCO3 BLDA-SCNC: 22.7 MMOL/L (ref 20–26)
HCO3 BLDA-SCNC: 22.9 MMOL/L (ref 20–26)
HCO3 BLDA-SCNC: 23.4 MMOL/L (ref 22–26)
HCO3 BLDA-SCNC: 24.6 MMOL/L (ref 22–26)
HCO3 BLDA-SCNC: 25.7 MMOL/L (ref 22–26)
HCO3 BLDA-SCNC: 26.3 MMOL/L (ref 22–26)
HCO3 BLDA-SCNC: 26.4 MMOL/L (ref 22–26)
HCO3 BLDA-SCNC: 26.6 MMOL/L (ref 22–26)
HCO3 BLDA-SCNC: 27.3 MMOL/L (ref 22–26)
HCO3 BLDV-SCNC: 24.7 MMOL/L (ref 18–23)
HCT VFR BLD AUTO: 28.4 % (ref 34–46.6)
HCT VFR BLD CALC: 26.3 % (ref 38–51)
HCT VFR BLD CALC: 26.8 % (ref 38–51)
HCT VFR BLD CALC: 30.2 % (ref 38–51)
HCT VFR BLDA CALC: 20 % (ref 38–51)
HCT VFR BLDA CALC: 20 % (ref 38–51)
HCT VFR BLDA CALC: 21 % (ref 38–51)
HCT VFR BLDA CALC: 22 % (ref 38–51)
HCT VFR BLDA CALC: 22 % (ref 38–51)
HCT VFR BLDA CALC: 23 % (ref 38–51)
HCT VFR BLDA CALC: 24 % (ref 38–51)
HCT VFR BLDA CALC: 34 % (ref 38–51)
HGB BLD-MCNC: 9.3 G/DL (ref 12–15.9)
HGB BLDA-MCNC: 10.1 G/DL (ref 13.5–17.5)
HGB BLDA-MCNC: 11.6 G/DL (ref 12–17)
HGB BLDA-MCNC: 6.8 G/DL (ref 12–17)
HGB BLDA-MCNC: 6.8 G/DL (ref 12–17)
HGB BLDA-MCNC: 7.1 G/DL (ref 12–17)
HGB BLDA-MCNC: 7.5 G/DL (ref 12–17)
HGB BLDA-MCNC: 7.5 G/DL (ref 12–17)
HGB BLDA-MCNC: 7.8 G/DL (ref 12–17)
HGB BLDA-MCNC: 8.2 G/DL (ref 12–17)
HGB BLDA-MCNC: 8.6 G/DL (ref 13.5–17.5)
HGB BLDA-MCNC: 8.8 G/DL (ref 13.5–17.5)
HGB BLDA-MCNC: 9.9 G/DL (ref 13.5–17.5)
HOROWITZ INDEX BLD+IHG-RTO: 100 %
HOROWITZ INDEX BLD+IHG-RTO: 100 %
HOROWITZ INDEX BLD+IHG-RTO: 50 %
HOROWITZ INDEX BLD+IHG-RTO: 50 %
IMM GRANULOCYTES # BLD AUTO: 0.3 10*3/MM3 (ref 0–0.05)
IMM GRANULOCYTES NFR BLD AUTO: 1.3 % (ref 0–0.5)
INR PPP: 1.42 (ref 0.8–1.2)
LYMPHOCYTES # BLD AUTO: 1.67 10*3/MM3 (ref 0.7–3.1)
LYMPHOCYTES NFR BLD AUTO: 7.4 % (ref 19.6–45.3)
Lab: ABNORMAL
Lab: NORMAL
MAGNESIUM SERPL-MCNC: 3.3 MG/DL (ref 1.6–2.3)
MCH RBC QN AUTO: 29.2 PG (ref 26.6–33)
MCHC RBC AUTO-ENTMCNC: 32.7 G/DL (ref 31.5–35.7)
MCV RBC AUTO: 89.3 FL (ref 79–97)
METHGB BLD QL: 1 % (ref 0–3)
METHGB BLD QL: 1.3 % (ref 0–3)
MODALITY: ABNORMAL
MONOCYTES # BLD AUTO: 1.12 10*3/MM3 (ref 0.1–0.9)
MONOCYTES NFR BLD AUTO: 5 % (ref 5–12)
NEUTROPHILS # BLD AUTO: 19.39 10*3/MM3 (ref 1.4–7)
NEUTROPHILS NFR BLD AUTO: 85.8 % (ref 42.7–76)
NOTE: ABNORMAL
NRBC BLD AUTO-RTO: 0 /100 WBC (ref 0–0)
OXYHGB MFR BLDV: 62.2 % (ref 45–75)
OXYHGB MFR BLDV: 93.4 % (ref 94–99)
OXYHGB MFR BLDV: 96 % (ref 94–99)
OXYHGB MFR BLDV: 96.5 % (ref 94–99)
PAW @ PEAK INSP FLOW SETTING VENT: 24 CMH2O
PAW @ PEAK INSP FLOW SETTING VENT: 24 CMH2O
PAW @ PEAK INSP FLOW SETTING VENT: 25 CMH2O
PAW @ PEAK INSP FLOW SETTING VENT: 25 CMH2O
PCO2 BLDA: 37.1 MM HG (ref 35–45)
PCO2 BLDA: 41.5 MM HG (ref 35–45)
PCO2 BLDA: 41.6 MM HG (ref 35–45)
PCO2 BLDA: 42.3 MM HG (ref 35–45)
PCO2 BLDA: 42.8 MM HG (ref 35–45)
PCO2 BLDA: 42.9 MM HG (ref 35–45)
PCO2 BLDA: 43.4 MM HG (ref 35–45)
PCO2 BLDA: 43.6 MM HG (ref 35–45)
PCO2 BLDA: 44.3 MM HG (ref 35–45)
PCO2 BLDA: 45 MM HG (ref 35–45)
PCO2 BLDA: 45.9 MM HG (ref 35–45)
PCO2 BLDV: 48.4 MM HG (ref 41–51)
PCO2 TEMP ADJ BLD: ABNORMAL MM HG (ref 35–45)
PEEP RESPIRATORY: 5 CM[H2O]
PH BLDA: 7.33 PH UNITS (ref 7.35–7.45)
PH BLDA: 7.33 PH UNITS (ref 7.35–7.45)
PH BLDA: 7.35 PH UNITS (ref 7.35–7.45)
PH BLDA: 7.35 PH UNITS (ref 7.35–7.6)
PH BLDA: 7.37 PH UNITS (ref 7.35–7.6)
PH BLDA: 7.39 PH UNITS (ref 7.35–7.6)
PH BLDV: 7.32 PH UNITS (ref 7.29–7.37)
PH, TEMP CORRECTED: ABNORMAL PH UNITS
PHOSPHATE SERPL-MCNC: 2.8 MG/DL (ref 2.4–4.4)
PLATELET # BLD AUTO: 170 10*3/MM3 (ref 140–450)
PMV BLD AUTO: 9.5 FL (ref 6–12)
PO2 BLDA: 101 MMHG (ref 80–105)
PO2 BLDA: 107 MM HG (ref 83–108)
PO2 BLDA: 140 MM HG (ref 83–108)
PO2 BLDA: 236 MMHG (ref 80–105)
PO2 BLDA: 337 MMHG (ref 80–105)
PO2 BLDA: 425 MMHG (ref 80–105)
PO2 BLDA: 447 MMHG (ref 80–105)
PO2 BLDA: 450 MMHG (ref 80–105)
PO2 BLDA: 455 MMHG (ref 80–105)
PO2 BLDA: 53 MMHG (ref 80–105)
PO2 BLDA: 80.6 MM HG (ref 83–108)
PO2 BLDV: 36.6 MM HG (ref 27–53)
PO2 TEMP ADJ BLD: ABNORMAL MM HG (ref 83–108)
POTASSIUM BLD-SCNC: 3.6 MMOL/L (ref 3.5–5.1)
POTASSIUM BLDA-SCNC: 3.5 MMOL/L (ref 3.4–4.5)
POTASSIUM BLDA-SCNC: 3.9 MMOL/L (ref 3.5–4.9)
POTASSIUM BLDA-SCNC: 3.9 MMOL/L (ref 3.5–4.9)
POTASSIUM BLDA-SCNC: 4 MMOL/L (ref 3.5–4.9)
POTASSIUM BLDA-SCNC: 4.3 MMOL/L (ref 3.4–4.5)
POTASSIUM BLDA-SCNC: 4.3 MMOL/L (ref 3.4–4.5)
POTASSIUM BLDA-SCNC: 4.3 MMOL/L (ref 3.5–4.9)
POTASSIUM BLDA-SCNC: 4.3 MMOL/L (ref 3.5–4.9)
POTASSIUM BLDV-SCNC: 3.5 MMOL/L
PROTHROMBIN TIME: 16.9 SECONDS (ref 11.1–15.3)
RBC # BLD AUTO: 3.18 10*6/MM3 (ref 3.77–5.28)
RH BLD: POSITIVE
SAO2 % BLDA: 100 % (ref 95–98)
SAO2 % BLDA: 85 % (ref 95–98)
SAO2 % BLDA: 98 % (ref 95–98)
SAO2 % BLDCOA: 95.5 % (ref 94–99)
SAO2 % BLDCOA: 98 % (ref 94–99)
SAO2 % BLDCOA: 98.8 % (ref 94–99)
SAO2 % BLDCOV: 63.7 % (ref 45–75)
SET MECH RESP RATE: 14
SODIUM BLD-SCNC: 134 MMOL/L (ref 137–145)
SODIUM BLDA-SCNC: 139 MMOL/L (ref 138–146)
SODIUM BLDA-SCNC: 140 MMOL/L (ref 138–146)
SODIUM BLDA-SCNC: 141 MMOL/L (ref 138–146)
SODIUM BLDA-SCNC: 142 MMOL/L (ref 136–146)
SODIUM BLDA-SCNC: 142 MMOL/L (ref 136–146)
SODIUM BLDA-SCNC: 142 MMOL/L (ref 138–146)
SODIUM BLDA-SCNC: 143 MMOL/L (ref 136–146)
SODIUM BLDV-SCNC: 143 MMOL/L
T&S EXPIRATION DATE: NORMAL
VENTILATOR MODE: AC
VT ON VENT VENT: 500 ML
VT ON VENT VENT: 500 ML
VT ON VENT VENT: 508 ML
VT ON VENT VENT: 520 ML
WBC NRBC COR # BLD: 22.61 10*3/MM3 (ref 3.4–10.8)

## 2019-03-05 PROCEDURE — C1751 CATH, INF, PER/CENT/MIDLINE: HCPCS | Performed by: ANESTHESIOLOGY

## 2019-03-05 PROCEDURE — 93010 ELECTROCARDIOGRAM REPORT: CPT | Performed by: INTERNAL MEDICINE

## 2019-03-05 PROCEDURE — 82947 ASSAY GLUCOSE BLOOD QUANT: CPT

## 2019-03-05 PROCEDURE — C1713 ANCHOR/SCREW BN/BN,TIS/BN: HCPCS | Performed by: THORACIC SURGERY (CARDIOTHORACIC VASCULAR SURGERY)

## 2019-03-05 PROCEDURE — 82962 GLUCOSE BLOOD TEST: CPT

## 2019-03-05 PROCEDURE — 85025 COMPLETE CBC W/AUTO DIFF WBC: CPT | Performed by: NURSE PRACTITIONER

## 2019-03-05 PROCEDURE — 25010000002 PAPAVERINE PER 60 MG: Performed by: THORACIC SURGERY (CARDIOTHORACIC VASCULAR SURGERY)

## 2019-03-05 PROCEDURE — 86850 RBC ANTIBODY SCREEN: CPT | Performed by: THORACIC SURGERY (CARDIOTHORACIC VASCULAR SURGERY)

## 2019-03-05 PROCEDURE — 85014 HEMATOCRIT: CPT

## 2019-03-05 PROCEDURE — 83050 HGB METHEMOGLOBIN QUAN: CPT

## 2019-03-05 PROCEDURE — C1896 LEAD, AICD, NON SING/DUAL: HCPCS | Performed by: THORACIC SURGERY (CARDIOTHORACIC VASCULAR SURGERY)

## 2019-03-05 PROCEDURE — 25010000002 POTASSIUM PER 2 MEQ: Performed by: NURSE PRACTITIONER

## 2019-03-05 PROCEDURE — 85610 PROTHROMBIN TIME: CPT | Performed by: NURSE PRACTITIONER

## 2019-03-05 PROCEDURE — 25010000002 HEPARIN (PORCINE) PER 1000 UNITS: Performed by: THORACIC SURGERY (CARDIOTHORACIC VASCULAR SURGERY)

## 2019-03-05 PROCEDURE — 84132 ASSAY OF SERUM POTASSIUM: CPT

## 2019-03-05 PROCEDURE — 25010000002 EPINEPHRINE PER 0.1 MG: Performed by: THORACIC SURGERY (CARDIOTHORACIC VASCULAR SURGERY)

## 2019-03-05 PROCEDURE — 63710000001 INSULIN REGULAR HUMAN PER 5 UNITS: Performed by: NURSE ANESTHETIST, CERTIFIED REGISTERED

## 2019-03-05 PROCEDURE — 71045 X-RAY EXAM CHEST 1 VIEW: CPT

## 2019-03-05 PROCEDURE — 25010000002 SUCCINYLCHOLINE PER 20 MG: Performed by: NURSE ANESTHETIST, CERTIFIED REGISTERED

## 2019-03-05 PROCEDURE — 33533 CABG ARTERIAL SINGLE: CPT | Performed by: THORACIC SURGERY (CARDIOTHORACIC VASCULAR SURGERY)

## 2019-03-05 PROCEDURE — 94002 VENT MGMT INPAT INIT DAY: CPT

## 2019-03-05 PROCEDURE — 25010000002 PROTAMINE SULFATE PER 10 MG: Performed by: NURSE ANESTHETIST, CERTIFIED REGISTERED

## 2019-03-05 PROCEDURE — 25010000002 MIDAZOLAM PER 1 MG: Performed by: NURSE ANESTHETIST, CERTIFIED REGISTERED

## 2019-03-05 PROCEDURE — A4648 IMPLANTABLE TISSUE MARKER: HCPCS | Performed by: THORACIC SURGERY (CARDIOTHORACIC VASCULAR SURGERY)

## 2019-03-05 PROCEDURE — 25010000002 PROPOFOL 10 MG/ML EMULSION: Performed by: NURSE ANESTHETIST, CERTIFIED REGISTERED

## 2019-03-05 PROCEDURE — 85730 THROMBOPLASTIN TIME PARTIAL: CPT | Performed by: NURSE PRACTITIONER

## 2019-03-05 PROCEDURE — 25010000002 VANCOMYCIN: Performed by: THORACIC SURGERY (CARDIOTHORACIC VASCULAR SURGERY)

## 2019-03-05 PROCEDURE — 25010000002 ALBUMIN HUMAN 5% PER 50 ML: Performed by: THORACIC SURGERY (CARDIOTHORACIC VASCULAR SURGERY)

## 2019-03-05 PROCEDURE — C1887 CATHETER, GUIDING: HCPCS | Performed by: THORACIC SURGERY (CARDIOTHORACIC VASCULAR SURGERY)

## 2019-03-05 PROCEDURE — 85018 HEMOGLOBIN: CPT

## 2019-03-05 PROCEDURE — 85347 COAGULATION TIME ACTIVATED: CPT

## 2019-03-05 PROCEDURE — 82330 ASSAY OF CALCIUM: CPT

## 2019-03-05 PROCEDURE — 86900 BLOOD TYPING SEROLOGIC ABO: CPT | Performed by: THORACIC SURGERY (CARDIOTHORACIC VASCULAR SURGERY)

## 2019-03-05 PROCEDURE — P9041 ALBUMIN (HUMAN),5%, 50ML: HCPCS | Performed by: THORACIC SURGERY (CARDIOTHORACIC VASCULAR SURGERY)

## 2019-03-05 PROCEDURE — 82375 ASSAY CARBOXYHB QUANT: CPT

## 2019-03-05 PROCEDURE — 82820 HEMOGLOBIN-OXYGEN AFFINITY: CPT

## 2019-03-05 PROCEDURE — C1729 CATH, DRAINAGE: HCPCS | Performed by: THORACIC SURGERY (CARDIOTHORACIC VASCULAR SURGERY)

## 2019-03-05 PROCEDURE — 33519 CABG ARTERY-VEIN THREE: CPT | Performed by: THORACIC SURGERY (CARDIOTHORACIC VASCULAR SURGERY)

## 2019-03-05 PROCEDURE — 06BQ4ZZ EXCISION OF LEFT SAPHENOUS VEIN, PERCUTANEOUS ENDOSCOPIC APPROACH: ICD-10-PCS | Performed by: THORACIC SURGERY (CARDIOTHORACIC VASCULAR SURGERY)

## 2019-03-05 PROCEDURE — 86923 COMPATIBILITY TEST ELECTRIC: CPT

## 2019-03-05 PROCEDURE — 86901 BLOOD TYPING SEROLOGIC RH(D): CPT | Performed by: THORACIC SURGERY (CARDIOTHORACIC VASCULAR SURGERY)

## 2019-03-05 PROCEDURE — 5A1221Z PERFORMANCE OF CARDIAC OUTPUT, CONTINUOUS: ICD-10-PCS | Performed by: THORACIC SURGERY (CARDIOTHORACIC VASCULAR SURGERY)

## 2019-03-05 PROCEDURE — 84295 ASSAY OF SERUM SODIUM: CPT

## 2019-03-05 PROCEDURE — 93005 ELECTROCARDIOGRAM TRACING: CPT | Performed by: NURSE PRACTITIONER

## 2019-03-05 PROCEDURE — 25010000002 FUROSEMIDE PER 20 MG: Performed by: NURSE ANESTHETIST, CERTIFIED REGISTERED

## 2019-03-05 PROCEDURE — 02100Z9 BYPASS CORONARY ARTERY, ONE ARTERY FROM LEFT INTERNAL MAMMARY, OPEN APPROACH: ICD-10-PCS | Performed by: THORACIC SURGERY (CARDIOTHORACIC VASCULAR SURGERY)

## 2019-03-05 PROCEDURE — 021209W BYPASS CORONARY ARTERY, THREE ARTERIES FROM AORTA WITH AUTOLOGOUS VENOUS TISSUE, OPEN APPROACH: ICD-10-PCS | Performed by: THORACIC SURGERY (CARDIOTHORACIC VASCULAR SURGERY)

## 2019-03-05 PROCEDURE — 33508 ENDOSCOPIC VEIN HARVEST: CPT | Performed by: THORACIC SURGERY (CARDIOTHORACIC VASCULAR SURGERY)

## 2019-03-05 PROCEDURE — 25010000002 HEPARIN (PORCINE) PER 1000 UNITS: Performed by: NURSE ANESTHETIST, CERTIFIED REGISTERED

## 2019-03-05 PROCEDURE — 82803 BLOOD GASES ANY COMBINATION: CPT

## 2019-03-05 PROCEDURE — 25010000002 PHENYLEPHRINE PER 1 ML: Performed by: NURSE ANESTHETIST, CERTIFIED REGISTERED

## 2019-03-05 PROCEDURE — 82805 BLOOD GASES W/O2 SATURATION: CPT

## 2019-03-05 PROCEDURE — 25010000002 FENTANYL CITRATE (PF) 100 MCG/2ML SOLUTION: Performed by: NURSE ANESTHETIST, CERTIFIED REGISTERED

## 2019-03-05 PROCEDURE — 83735 ASSAY OF MAGNESIUM: CPT | Performed by: NURSE PRACTITIONER

## 2019-03-05 PROCEDURE — 25010000002 AMIODARONE PER 30 MG: Performed by: NURSE ANESTHETIST, CERTIFIED REGISTERED

## 2019-03-05 PROCEDURE — 80069 RENAL FUNCTION PANEL: CPT | Performed by: NURSE PRACTITIONER

## 2019-03-05 PROCEDURE — 25010000002 AMIODARONE IN DEXTROSE 5% 360-4.14 MG/200ML-% SOLUTION: Performed by: NURSE ANESTHETIST, CERTIFIED REGISTERED

## 2019-03-05 PROCEDURE — 94799 UNLISTED PULMONARY SVC/PX: CPT

## 2019-03-05 DEVICE — SS SUTURE, 3 PER SLEEVE
Type: IMPLANTABLE DEVICE | Site: CHEST | Status: FUNCTIONAL
Brand: MYO/WIRE II

## 2019-03-05 DEVICE — IMPLANTABLE DEVICE: Type: IMPLANTABLE DEVICE | Site: CHEST | Status: FUNCTIONAL

## 2019-03-05 DEVICE — DISK-SHAPED STYLE, SILICONE (1 PER STERILE PKG)
Type: IMPLANTABLE DEVICE | Site: CHEST | Status: FUNCTIONAL
Brand: SCANLAN® RADIOMARK® GRAFT MARKERS

## 2019-03-05 RX ORDER — SENNA AND DOCUSATE SODIUM 50; 8.6 MG/1; MG/1
1 TABLET, FILM COATED ORAL 2 TIMES DAILY
Status: DISCONTINUED | OUTPATIENT
Start: 2019-03-05 | End: 2019-03-12 | Stop reason: HOSPADM

## 2019-03-05 RX ORDER — NITROGLYCERIN 20 MG/100ML
5-200 INJECTION INTRAVENOUS CONTINUOUS PRN
Status: DISCONTINUED | OUTPATIENT
Start: 2019-03-05 | End: 2019-03-06

## 2019-03-05 RX ORDER — IPRATROPIUM BROMIDE AND ALBUTEROL SULFATE 2.5; .5 MG/3ML; MG/3ML
3 SOLUTION RESPIRATORY (INHALATION)
Status: DISCONTINUED | OUTPATIENT
Start: 2019-03-05 | End: 2019-03-12 | Stop reason: HOSPADM

## 2019-03-05 RX ORDER — POTASSIUM CHLORIDE 29.8 MG/ML
20 INJECTION INTRAVENOUS
Status: DISCONTINUED | OUTPATIENT
Start: 2019-03-05 | End: 2019-03-06

## 2019-03-05 RX ORDER — PAPAVERINE HYDROCHLORIDE 30 MG/ML
INJECTION INTRAMUSCULAR; INTRAVENOUS AS NEEDED
Status: DISCONTINUED | OUTPATIENT
Start: 2019-03-05 | End: 2019-03-12 | Stop reason: HOSPADM

## 2019-03-05 RX ORDER — EPHEDRINE SULFATE 50 MG/ML
INJECTION, SOLUTION INTRAVENOUS AS NEEDED
Status: DISCONTINUED | OUTPATIENT
Start: 2019-03-05 | End: 2019-03-05 | Stop reason: SURG

## 2019-03-05 RX ORDER — ONDANSETRON 2 MG/ML
4 INJECTION INTRAMUSCULAR; INTRAVENOUS EVERY 6 HOURS PRN
Status: DISCONTINUED | OUTPATIENT
Start: 2019-03-05 | End: 2019-03-12 | Stop reason: HOSPADM

## 2019-03-05 RX ORDER — EPINEPHRINE 1 MG/ML
INJECTION, SOLUTION, CONCENTRATE INTRAVENOUS AS NEEDED
Status: DISCONTINUED | OUTPATIENT
Start: 2019-03-05 | End: 2019-03-12 | Stop reason: HOSPADM

## 2019-03-05 RX ORDER — CARVEDILOL 3.12 MG/1
3.12 TABLET ORAL EVERY 12 HOURS SCHEDULED
Status: DISCONTINUED | OUTPATIENT
Start: 2019-03-05 | End: 2019-03-10

## 2019-03-05 RX ORDER — SODIUM CHLORIDE 9 MG/ML
100 INJECTION, SOLUTION INTRAVENOUS CONTINUOUS
Status: DISCONTINUED | OUTPATIENT
Start: 2019-03-05 | End: 2019-03-05

## 2019-03-05 RX ORDER — PROTAMINE SULFATE 10 MG/ML
INJECTION, SOLUTION INTRAVENOUS AS NEEDED
Status: DISCONTINUED | OUTPATIENT
Start: 2019-03-05 | End: 2019-03-05 | Stop reason: SURG

## 2019-03-05 RX ORDER — ALBUMIN, HUMAN INJ 5% 5 %
500 SOLUTION INTRAVENOUS ONCE
Status: COMPLETED | OUTPATIENT
Start: 2019-03-05 | End: 2019-03-05

## 2019-03-05 RX ORDER — ASPIRIN 81 MG/1
81 TABLET ORAL DAILY
Status: DISCONTINUED | OUTPATIENT
Start: 2019-03-06 | End: 2019-03-12 | Stop reason: HOSPADM

## 2019-03-05 RX ORDER — SODIUM CHLORIDE 0.9 % (FLUSH) 0.9 %
3 SYRINGE (ML) INJECTION EVERY 12 HOURS SCHEDULED
Status: DISCONTINUED | OUTPATIENT
Start: 2019-03-05 | End: 2019-03-05 | Stop reason: HOSPADM

## 2019-03-05 RX ORDER — CALCIUM CHLORIDE 100 MG/ML
INJECTION INTRAVENOUS; INTRAVENTRICULAR AS NEEDED
Status: DISCONTINUED | OUTPATIENT
Start: 2019-03-05 | End: 2019-03-05

## 2019-03-05 RX ORDER — ALBUMIN, HUMAN INJ 5% 5 %
SOLUTION INTRAVENOUS
Status: DISPENSED
Start: 2019-03-05 | End: 2019-03-06

## 2019-03-05 RX ORDER — FENTANYL CITRATE 50 UG/ML
INJECTION, SOLUTION INTRAMUSCULAR; INTRAVENOUS AS NEEDED
Status: DISCONTINUED | OUTPATIENT
Start: 2019-03-05 | End: 2019-03-05 | Stop reason: SURG

## 2019-03-05 RX ORDER — VECURONIUM BROMIDE 20 MG/20ML
INJECTION, POWDER, LYOPHILIZED, FOR SOLUTION INTRAVENOUS AS NEEDED
Status: DISCONTINUED | OUTPATIENT
Start: 2019-03-05 | End: 2019-03-05 | Stop reason: SURG

## 2019-03-05 RX ORDER — SODIUM CHLORIDE 0.9 % (FLUSH) 0.9 %
3-10 SYRINGE (ML) INJECTION AS NEEDED
Status: DISCONTINUED | OUTPATIENT
Start: 2019-03-05 | End: 2019-03-05 | Stop reason: HOSPADM

## 2019-03-05 RX ORDER — ACETAMINOPHEN 500 MG
1000 TABLET ORAL EVERY 6 HOURS
Status: DISCONTINUED | OUTPATIENT
Start: 2019-03-05 | End: 2019-03-12 | Stop reason: HOSPADM

## 2019-03-05 RX ORDER — SODIUM CHLORIDE, SODIUM GLUCONATE, SODIUM ACETATE, POTASSIUM CHLORIDE, AND MAGNESIUM CHLORIDE 526; 502; 368; 37; 30 MG/100ML; MG/100ML; MG/100ML; MG/100ML; MG/100ML
INJECTION, SOLUTION INTRAVENOUS CONTINUOUS PRN
Status: DISCONTINUED | OUTPATIENT
Start: 2019-03-05 | End: 2019-03-05 | Stop reason: SURG

## 2019-03-05 RX ORDER — OXYCODONE HYDROCHLORIDE 5 MG/1
5 TABLET ORAL EVERY 4 HOURS PRN
Status: DISCONTINUED | OUTPATIENT
Start: 2019-03-05 | End: 2019-03-12 | Stop reason: HOSPADM

## 2019-03-05 RX ORDER — PROPOFOL 10 MG/ML
VIAL (ML) INTRAVENOUS AS NEEDED
Status: DISCONTINUED | OUTPATIENT
Start: 2019-03-05 | End: 2019-03-05 | Stop reason: SURG

## 2019-03-05 RX ORDER — MORPHINE SULFATE 2 MG/ML
2 INJECTION, SOLUTION INTRAMUSCULAR; INTRAVENOUS
Status: DISCONTINUED | OUTPATIENT
Start: 2019-03-05 | End: 2019-03-06

## 2019-03-05 RX ORDER — PHENYLEPHRINE HCL IN 0.9% NACL 0.5 MG/5ML
.5-3 SYRINGE (ML) INTRAVENOUS CONTINUOUS PRN
Status: DISCONTINUED | OUTPATIENT
Start: 2019-03-05 | End: 2019-03-06

## 2019-03-05 RX ORDER — MIDAZOLAM HYDROCHLORIDE 1 MG/ML
INJECTION INTRAMUSCULAR; INTRAVENOUS AS NEEDED
Status: DISCONTINUED | OUTPATIENT
Start: 2019-03-05 | End: 2019-03-05 | Stop reason: SURG

## 2019-03-05 RX ORDER — SUCCINYLCHOLINE CHLORIDE 20 MG/ML
INJECTION INTRAMUSCULAR; INTRAVENOUS AS NEEDED
Status: DISCONTINUED | OUTPATIENT
Start: 2019-03-05 | End: 2019-03-05 | Stop reason: SURG

## 2019-03-05 RX ORDER — ALBUMIN, HUMAN INJ 5% 5 %
25 SOLUTION INTRAVENOUS AS NEEDED
Status: DISCONTINUED | OUTPATIENT
Start: 2019-03-05 | End: 2019-03-06

## 2019-03-05 RX ORDER — NITROGLYCERIN 0.4 MG/1
0.4 TABLET SUBLINGUAL
Status: DISCONTINUED | OUTPATIENT
Start: 2019-03-05 | End: 2019-03-05 | Stop reason: HOSPADM

## 2019-03-05 RX ORDER — FUROSEMIDE 10 MG/ML
INJECTION INTRAMUSCULAR; INTRAVENOUS AS NEEDED
Status: DISCONTINUED | OUTPATIENT
Start: 2019-03-05 | End: 2019-03-05 | Stop reason: SURG

## 2019-03-05 RX ORDER — ACETAMINOPHEN 325 MG/1
650 TABLET ORAL EVERY 4 HOURS PRN
Status: DISCONTINUED | OUTPATIENT
Start: 2019-03-05 | End: 2019-03-05 | Stop reason: HOSPADM

## 2019-03-05 RX ORDER — NITROGLYCERIN 40 MG/100ML
INJECTION INTRAVENOUS CONTINUOUS PRN
Status: DISCONTINUED | OUTPATIENT
Start: 2019-03-05 | End: 2019-03-05 | Stop reason: SURG

## 2019-03-05 RX ORDER — HEPARIN SODIUM 1000 [USP'U]/ML
INJECTION, SOLUTION INTRAVENOUS; SUBCUTANEOUS AS NEEDED
Status: DISCONTINUED | OUTPATIENT
Start: 2019-03-05 | End: 2019-03-05 | Stop reason: SURG

## 2019-03-05 RX ORDER — CALCIUM CHLORIDE 100 MG/ML
INJECTION INTRAVENOUS; INTRAVENTRICULAR AS NEEDED
Status: DISCONTINUED | OUTPATIENT
Start: 2019-03-05 | End: 2019-03-05 | Stop reason: SURG

## 2019-03-05 RX ORDER — MAGNESIUM SULFATE HEPTAHYDRATE 40 MG/ML
2 INJECTION, SOLUTION INTRAVENOUS AS NEEDED
Status: DISCONTINUED | OUTPATIENT
Start: 2019-03-05 | End: 2019-03-06

## 2019-03-05 RX ORDER — SODIUM CHLORIDE, SODIUM GLUCONATE, SODIUM ACETATE, POTASSIUM CHLORIDE AND MAGNESIUM CHLORIDE 526; 502; 368; 37; 30 MG/100ML; MG/100ML; MG/100ML; MG/100ML; MG/100ML
INJECTION, SOLUTION INTRAVENOUS CONTINUOUS PRN
Status: COMPLETED | OUTPATIENT
Start: 2019-03-05 | End: 2019-03-05

## 2019-03-05 RX ORDER — AMIODARONE HYDROCHLORIDE 50 MG/ML
INJECTION, SOLUTION INTRAVENOUS AS NEEDED
Status: DISCONTINUED | OUTPATIENT
Start: 2019-03-05 | End: 2019-03-05 | Stop reason: SURG

## 2019-03-05 RX ORDER — OXYCODONE HYDROCHLORIDE 5 MG/1
10 TABLET ORAL EVERY 4 HOURS PRN
Status: DISCONTINUED | OUTPATIENT
Start: 2019-03-05 | End: 2019-03-12 | Stop reason: HOSPADM

## 2019-03-05 RX ORDER — SODIUM CHLORIDE 9 MG/ML
INJECTION, SOLUTION INTRAVENOUS CONTINUOUS PRN
Status: DISCONTINUED | OUTPATIENT
Start: 2019-03-05 | End: 2019-03-05 | Stop reason: SURG

## 2019-03-05 RX ORDER — CLINDAMYCIN PHOSPHATE 900 MG/50ML
900 INJECTION INTRAVENOUS ONCE
Status: DISCONTINUED | OUTPATIENT
Start: 2019-03-05 | End: 2019-03-05

## 2019-03-05 RX ORDER — DEXTROSE AND SODIUM CHLORIDE 5; .45 G/100ML; G/100ML
30 INJECTION, SOLUTION INTRAVENOUS CONTINUOUS
Status: DISCONTINUED | OUTPATIENT
Start: 2019-03-05 | End: 2019-03-06

## 2019-03-05 RX ORDER — SODIUM CHLORIDE 9 MG/ML
INJECTION, SOLUTION INTRAVENOUS
Status: DISPENSED
Start: 2019-03-05 | End: 2019-03-06

## 2019-03-05 RX ORDER — DEXTROSE MONOHYDRATE 25 G/50ML
25-50 INJECTION, SOLUTION INTRAVENOUS
Status: DISCONTINUED | OUTPATIENT
Start: 2019-03-05 | End: 2019-03-12 | Stop reason: HOSPADM

## 2019-03-05 RX ADMIN — MIDAZOLAM HYDROCHLORIDE 3 MG: 2 INJECTION, SOLUTION INTRAMUSCULAR; INTRAVENOUS at 18:15

## 2019-03-05 RX ADMIN — MIDAZOLAM HYDROCHLORIDE 2 MG: 2 INJECTION, SOLUTION INTRAMUSCULAR; INTRAVENOUS at 12:16

## 2019-03-05 RX ADMIN — CALCIUM CHLORIDE 1 G: 100 INJECTION INTRAVENOUS; INTRAVENTRICULAR at 22:10

## 2019-03-05 RX ADMIN — CALCIUM CHLORIDE 2 MG: 100 INJECTION, SOLUTION INTRAVENOUS at 17:58

## 2019-03-05 RX ADMIN — NITROGLYCERIN 100 MCG/MIN: 20 INJECTION INTRAVENOUS at 20:13

## 2019-03-05 RX ADMIN — FENTANYL CITRATE 100 MCG: 50 INJECTION, SOLUTION INTRAMUSCULAR; INTRAVENOUS at 18:47

## 2019-03-05 RX ADMIN — FENTANYL CITRATE 100 MCG: 50 INJECTION, SOLUTION INTRAMUSCULAR; INTRAVENOUS at 18:30

## 2019-03-05 RX ADMIN — PROPOFOL 100 MG: 10 INJECTION, EMULSION INTRAVENOUS at 12:29

## 2019-03-05 RX ADMIN — ALBUMIN HUMAN 500 ML: 0.05 INJECTION, SOLUTION INTRAVENOUS at 20:09

## 2019-03-05 RX ADMIN — AMIODARONE HYDROCHLORIDE 300 MG: 50 INJECTION, SOLUTION INTRAVENOUS at 17:15

## 2019-03-05 RX ADMIN — VANCOMYCIN HYDROCHLORIDE 1750 MG: 1 INJECTION, POWDER, LYOPHILIZED, FOR SOLUTION INTRAVENOUS at 12:33

## 2019-03-05 RX ADMIN — NITROGLYCERIN 400 MCG/MIN: 40 INJECTION INTRAVENOUS at 13:33

## 2019-03-05 RX ADMIN — PROTAMINE SULFATE 300 MG: 10 INJECTION, SOLUTION INTRAVENOUS at 17:34

## 2019-03-05 RX ADMIN — MIDAZOLAM HYDROCHLORIDE 1 MG: 2 INJECTION, SOLUTION INTRAMUSCULAR; INTRAVENOUS at 15:35

## 2019-03-05 RX ADMIN — DEXMEDETOMIDINE HYDROCHLORIDE 0.5 MCG/KG/HR: 100 INJECTION, SOLUTION INTRAVENOUS at 21:34

## 2019-03-05 RX ADMIN — VECURONIUM BROMIDE 10 MG: 1 INJECTION, POWDER, LYOPHILIZED, FOR SOLUTION INTRAVENOUS at 18:18

## 2019-03-05 RX ADMIN — SODIUM CHLORIDE: 9 INJECTION, SOLUTION INTRAVENOUS at 13:59

## 2019-03-05 RX ADMIN — MIDAZOLAM HYDROCHLORIDE 1 MG: 2 INJECTION, SOLUTION INTRAMUSCULAR; INTRAVENOUS at 12:24

## 2019-03-05 RX ADMIN — EPHEDRINE SULFATE 10 MG: 50 INJECTION INTRAVENOUS at 12:47

## 2019-03-05 RX ADMIN — FENTANYL CITRATE 100 MCG: 50 INJECTION, SOLUTION INTRAMUSCULAR; INTRAVENOUS at 18:57

## 2019-03-05 RX ADMIN — DEXTROSE AND SODIUM CHLORIDE 30 ML/HR: 5; 450 INJECTION, SOLUTION INTRAVENOUS at 19:32

## 2019-03-05 RX ADMIN — SUCCINYLCHOLINE CHLORIDE 140 MG: 20 INJECTION, SOLUTION INTRAMUSCULAR; INTRAVENOUS at 12:30

## 2019-03-05 RX ADMIN — VECURONIUM BROMIDE 83 MCG/MIN: 10 INJECTION, POWDER, LYOPHILIZED, FOR SOLUTION INTRAVENOUS at 13:04

## 2019-03-05 RX ADMIN — ALBUMIN HUMAN 500 ML: 0.05 INJECTION, SOLUTION INTRAVENOUS at 19:31

## 2019-03-05 RX ADMIN — FENTANYL CITRATE 100 MCG: 50 INJECTION, SOLUTION INTRAMUSCULAR; INTRAVENOUS at 13:29

## 2019-03-05 RX ADMIN — FUROSEMIDE 20 MG: 10 INJECTION, SOLUTION INTRAMUSCULAR; INTRAVENOUS at 14:36

## 2019-03-05 RX ADMIN — AMIODARONE HYDROCHLORIDE 1 MG/MIN: 1.8 INJECTION, SOLUTION INTRAVENOUS at 17:15

## 2019-03-05 RX ADMIN — SODIUM CHLORIDE 1000 ML: 9 INJECTION, SOLUTION INTRAVENOUS at 20:46

## 2019-03-05 RX ADMIN — HEPARIN SODIUM 5000 UNITS: 1000 INJECTION, SOLUTION INTRAVENOUS; SUBCUTANEOUS at 13:39

## 2019-03-05 RX ADMIN — SODIUM CHLORIDE, SODIUM GLUCONATE, SODIUM ACETATE, POTASSIUM CHLORIDE, AND MAGNESIUM CHLORIDE: 526; 502; 368; 37; 30 INJECTION, SOLUTION INTRAVENOUS at 13:59

## 2019-03-05 RX ADMIN — AMIODARONE HYDROCHLORIDE 1 MG/MIN: 1.8 INJECTION, SOLUTION INTRAVENOUS at 19:32

## 2019-03-05 RX ADMIN — FUROSEMIDE 20 MG: 10 INJECTION, SOLUTION INTRAMUSCULAR; INTRAVENOUS at 14:56

## 2019-03-05 RX ADMIN — PHENYLEPHRINE HYDROCHLORIDE 100 MCG: 10 INJECTION INTRAVENOUS at 18:43

## 2019-03-05 RX ADMIN — TRANEXAMIC ACID 1000 MG: 1 INJECTION, SOLUTION INTRAVENOUS at 13:07

## 2019-03-05 RX ADMIN — AMIODARONE HYDROCHLORIDE 0.5 MG/MIN: 1.8 INJECTION, SOLUTION INTRAVENOUS at 23:50

## 2019-03-05 RX ADMIN — FENTANYL CITRATE 100 MCG: 50 INJECTION, SOLUTION INTRAMUSCULAR; INTRAVENOUS at 18:40

## 2019-03-05 RX ADMIN — MIDAZOLAM HYDROCHLORIDE 1 MG: 2 INJECTION, SOLUTION INTRAMUSCULAR; INTRAVENOUS at 17:50

## 2019-03-05 RX ADMIN — SODIUM CHLORIDE 500 ML: 9 INJECTION, SOLUTION INTRAVENOUS at 21:00

## 2019-03-05 RX ADMIN — FENTANYL CITRATE 150 MCG: 50 INJECTION, SOLUTION INTRAMUSCULAR; INTRAVENOUS at 13:33

## 2019-03-05 RX ADMIN — TRANEXAMIC ACID 1344 MG: 1 INJECTION, SOLUTION INTRAVENOUS at 13:37

## 2019-03-05 RX ADMIN — CALCIUM CHLORIDE 1 G: 100 INJECTION INTRAVENOUS; INTRAVENTRICULAR at 19:57

## 2019-03-05 RX ADMIN — FENTANYL CITRATE 250 MCG: 50 INJECTION, SOLUTION INTRAMUSCULAR; INTRAVENOUS at 13:07

## 2019-03-05 RX ADMIN — DEXMEDETOMIDINE HYDROCHLORIDE 0.5 MCG/KG/HR: 100 INJECTION, SOLUTION INTRAVENOUS at 19:32

## 2019-03-05 RX ADMIN — SODIUM CHLORIDE, SODIUM GLUCONATE, SODIUM ACETATE, POTASSIUM CHLORIDE, AND MAGNESIUM CHLORIDE: 526; 502; 368; 37; 30 INJECTION, SOLUTION INTRAVENOUS at 12:48

## 2019-03-05 RX ADMIN — MIDAZOLAM HYDROCHLORIDE 2 MG: 2 INJECTION, SOLUTION INTRAMUSCULAR; INTRAVENOUS at 15:28

## 2019-03-05 RX ADMIN — MIDAZOLAM HYDROCHLORIDE 2 MG: 2 INJECTION, SOLUTION INTRAMUSCULAR; INTRAVENOUS at 15:19

## 2019-03-05 RX ADMIN — HEPARIN SODIUM 30000 UNITS: 1000 INJECTION, SOLUTION INTRAVENOUS; SUBCUTANEOUS at 14:35

## 2019-03-05 RX ADMIN — SODIUM CHLORIDE 4 UNITS/HR: 9 INJECTION, SOLUTION INTRAVENOUS at 15:14

## 2019-03-05 RX ADMIN — ALBUMIN HUMAN 25 G: 0.05 INJECTION, SOLUTION INTRAVENOUS at 23:19

## 2019-03-05 RX ADMIN — SODIUM CHLORIDE, SODIUM GLUCONATE, SODIUM ACETATE, POTASSIUM CHLORIDE, AND MAGNESIUM CHLORIDE: 526; 502; 368; 37; 30 INJECTION, SOLUTION INTRAVENOUS at 16:47

## 2019-03-05 RX ADMIN — POTASSIUM CHLORIDE 20 MEQ: 400 INJECTION, SOLUTION INTRAVENOUS at 20:29

## 2019-03-05 RX ADMIN — EPHEDRINE SULFATE 10 MG: 50 INJECTION INTRAVENOUS at 12:44

## 2019-03-05 RX ADMIN — POTASSIUM CHLORIDE 20 MEQ: 400 INJECTION, SOLUTION INTRAVENOUS at 20:44

## 2019-03-05 RX ADMIN — VANCOMYCIN HYDROCHLORIDE 1750 MG: 5 INJECTION, POWDER, LYOPHILIZED, FOR SOLUTION INTRAVENOUS at 23:34

## 2019-03-05 RX ADMIN — FENTANYL CITRATE 400 MCG: 50 INJECTION, SOLUTION INTRAMUSCULAR; INTRAVENOUS at 12:29

## 2019-03-05 RX ADMIN — SODIUM CHLORIDE 6 UNITS/HR: 9 INJECTION, SOLUTION INTRAVENOUS at 19:55

## 2019-03-05 RX ADMIN — MIDAZOLAM HYDROCHLORIDE 2 MG: 2 INJECTION, SOLUTION INTRAMUSCULAR; INTRAVENOUS at 13:23

## 2019-03-05 RX ADMIN — FENTANYL CITRATE 100 MCG: 50 INJECTION, SOLUTION INTRAMUSCULAR; INTRAVENOUS at 12:24

## 2019-03-05 RX ADMIN — SODIUM CHLORIDE 100 ML/HR: 9 INJECTION, SOLUTION INTRAVENOUS at 09:22

## 2019-03-05 NOTE — ANESTHESIA PROCEDURE NOTES
Central Line    Pre-sedation assessment completed: 3/5/2019 12:37 PM    Patient reassessed immediately prior to procedure    Patient location during procedure: OR  Start time: 3/5/2019 12:35 PM  Indications: central pressure monitoring, vascular access and MD/Surgeon request  Staff  Anesthesiologist: Tony Miller MD  CRNA: Edis Hull CRNA  Preanesthetic Checklist  Completed: patient identified, site marked, surgical consent, pre-op evaluation, timeout performed, IV checked, risks and benefits discussed and monitors and equipment checked  Central Line Prep  Sterile Tech:gloves, cap, gown, mask and sterile barriers  Prep: chloraprep  Patient monitoring: blood pressure monitoring, continuous pulse oximetry and EKG  Central Line Procedure  Laterality:right  Location:internal jugular  Catheter Type:triple lumen and [place]-Mary  Catheter Size:9 Fr  Guidance:landmark technique  Assessment  Post procedure:biopatch applied, line sutured and occlusive dressing applied  Assessement:blood return through all ports, free fluid flow and chest x-ray ordered  Complications:no  Patient Tolerance:patient tolerated the procedure well with no apparent complications

## 2019-03-05 NOTE — ANESTHESIA PROCEDURE NOTES
Airway  Date/Time: 3/5/2019 12:33 PM  End Time:3/5/2019 12:33 PM  Airway not difficult    General Information and Staff    Patient location during procedure: OR  Anesthesiologist: Tony Miller MD  CRNA: Edis Hull CRNA    Indications and Patient Condition  Indications for airway management: airway protection    Preoxygenated: yes  MILS not maintained throughout  Mask difficulty assessment: 1 - vent by mask    Final Airway Details  Final airway type: endotracheal airway      Successful airway: ETT  Cuffed: yes   Successful intubation technique: direct laryngoscopy  Facilitating devices/methods: intubating stylet  Endotracheal tube insertion site: oral  Blade: Ahsan  Blade size: 3  ETT size (mm): 7.5  Cormack-Lehane Classification: grade I - full view of glottis  Placement verified by: chest auscultation and capnometry   Measured from: lips  ETT to lips (cm): 21  Number of attempts at approach: 1

## 2019-03-05 NOTE — ANESTHESIA PROCEDURE NOTES
Arterial Line      Patient location during procedure: OR  Start time: 3/5/2019 12:25 PM  Stop Time:3/5/2019 12:28 PM       Line placed for hemodynamic monitoring.  Performed By   CRNA: Edis Hull CRNA  Preanesthetic Checklist  Completed: patient identified, site marked, surgical consent, pre-op evaluation, timeout performed, IV checked, risks and benefits discussed and monitors and equipment checked  Arterial Line Prep   Sterile Tech: cap, gloves, sterile barriers and mask  Prep: ChloraPrep  Patient monitoring: EKG, continuous pulse oximetry and blood pressure monitoring  Arterial Line Procedure   Laterality:left  Location:  radial artery   Guidance: landmark technique  Number of attempts: 2  Successful placement: yes          Post Assessment   Dressing Type: wrist guard applied, secured with tape and occlusive dressing applied.   Complications no  Circ/Move/Sens Assessment: normal and unchanged.   Patient Tolerance: patient tolerated the procedure well with no apparent complications  Additional Notes  Attempt x 2, pt tolerated well

## 2019-03-06 ENCOUNTER — APPOINTMENT (OUTPATIENT)
Dept: GENERAL RADIOLOGY | Facility: HOSPITAL | Age: 49
End: 2019-03-06

## 2019-03-06 LAB
A-A DO2: ABNORMAL MMHG
A-A DO2: ABNORMAL MMHG
ANION GAP SERPL CALCULATED.3IONS-SCNC: 8 MMOL/L (ref 5–15)
ARTERIAL PATENCY WRIST A: ABNORMAL
ATMOSPHERIC PRESS: 758 MMHG
ATMOSPHERIC PRESS: 759 MMHG
ATMOSPHERIC PRESS: 759 MMHG
BASE EXCESS BLDA CALC-SCNC: -1.5 MMOL/L (ref 0–2)
BASE EXCESS BLDA CALC-SCNC: -2.1 MMOL/L (ref 0–2)
BASE EXCESS BLDA CALC-SCNC: -3 MMOL/L (ref 0–2)
BASOPHILS # BLD AUTO: 0.05 10*3/MM3 (ref 0–0.2)
BASOPHILS NFR BLD AUTO: 0.3 % (ref 0–1.5)
BDY SITE: ABNORMAL
BUN BLD-MCNC: 8 MG/DL (ref 7–21)
BUN/CREAT SERPL: 9.6 (ref 7–25)
CA-I BLD-MCNC: 4.61 MG/DL (ref 4.6–5.6)
CA-I BLD-MCNC: 4.62 MG/DL (ref 4.6–5.6)
CALCIUM SPEC-SCNC: 8.2 MG/DL (ref 8.4–10.2)
CHLORIDE SERPL-SCNC: 106 MMOL/L (ref 95–110)
CO2 SERPL-SCNC: 24 MMOL/L (ref 22–31)
COHGB MFR BLD: 0.9 % (ref 0–5)
COHGB MFR BLD: 0.9 % (ref 0–5)
CPAP: 26 CMH2O
CREAT BLD-MCNC: 0.83 MG/DL (ref 0.5–1)
DEPRECATED RDW RBC AUTO: 45 FL (ref 37–54)
EOSINOPHIL # BLD AUTO: 0.02 10*3/MM3 (ref 0–0.4)
EOSINOPHIL NFR BLD AUTO: 0.1 % (ref 0.3–6.2)
ERYTHROCYTE [DISTWIDTH] IN BLOOD BY AUTOMATED COUNT: 13.6 % (ref 12.3–15.4)
GAS FLOW AIRWAY: 3 LPM
GFR SERPL CREATININE-BSD FRML MDRD: 73 ML/MIN/1.73 (ref 58–135)
GLUCOSE BLD-MCNC: 110 MG/DL (ref 60–100)
GLUCOSE BLDA-MCNC: 115 MMOL/L (ref 65–95)
GLUCOSE BLDA-MCNC: 126 MMOL/L (ref 65–95)
GLUCOSE BLDC GLUCOMTR-MCNC: 108 MG/DL (ref 70–130)
GLUCOSE BLDC GLUCOMTR-MCNC: 110 MG/DL (ref 70–130)
GLUCOSE BLDC GLUCOMTR-MCNC: 111 MG/DL (ref 70–130)
GLUCOSE BLDC GLUCOMTR-MCNC: 128 MG/DL (ref 70–130)
GLUCOSE BLDC GLUCOMTR-MCNC: 130 MG/DL (ref 70–130)
GLUCOSE BLDC GLUCOMTR-MCNC: 132 MG/DL (ref 70–130)
GLUCOSE BLDC GLUCOMTR-MCNC: 137 MG/DL (ref 70–130)
GLUCOSE BLDC GLUCOMTR-MCNC: 139 MG/DL (ref 70–130)
GLUCOSE BLDC GLUCOMTR-MCNC: 140 MG/DL (ref 70–130)
GLUCOSE BLDC GLUCOMTR-MCNC: 176 MG/DL (ref 70–130)
GLUCOSE BLDC GLUCOMTR-MCNC: 94 MG/DL (ref 70–130)
GLUCOSE BLDC GLUCOMTR-MCNC: 98 MG/DL (ref 70–130)
HCO3 BLDA-SCNC: 22.2 MMOL/L (ref 20–26)
HCO3 BLDA-SCNC: 23.4 MMOL/L (ref 20–26)
HCO3 BLDA-SCNC: 23.6 MMOL/L (ref 20–26)
HCT VFR BLD AUTO: 26.7 % (ref 34–46.6)
HCT VFR BLD CALC: 26.3 % (ref 38–51)
HCT VFR BLD CALC: 26.6 % (ref 38–51)
HGB BLD-MCNC: 8.5 G/DL (ref 12–15.9)
HGB BLDA-MCNC: 8.6 G/DL (ref 13.5–17.5)
HGB BLDA-MCNC: 8.7 G/DL (ref 13.5–17.5)
HOROWITZ INDEX BLD+IHG-RTO: 30 %
HOROWITZ INDEX BLD+IHG-RTO: 35 %
IMM GRANULOCYTES # BLD AUTO: 0.08 10*3/MM3 (ref 0–0.05)
IMM GRANULOCYTES NFR BLD AUTO: 0.5 % (ref 0–0.5)
LYMPHOCYTES # BLD AUTO: 1.16 10*3/MM3 (ref 0.7–3.1)
LYMPHOCYTES NFR BLD AUTO: 7.8 % (ref 19.6–45.3)
Lab: ABNORMAL
MAGNESIUM SERPL-MCNC: 2.6 MG/DL (ref 1.6–2.3)
MCH RBC QN AUTO: 28.9 PG (ref 26.6–33)
MCHC RBC AUTO-ENTMCNC: 31.8 G/DL (ref 31.5–35.7)
MCV RBC AUTO: 90.8 FL (ref 79–97)
METHGB BLD QL: 1.2 % (ref 0–3)
METHGB BLD QL: 1.5 % (ref 0–3)
MODALITY: ABNORMAL
MONOCYTES # BLD AUTO: 0.82 10*3/MM3 (ref 0.1–0.9)
MONOCYTES NFR BLD AUTO: 5.5 % (ref 5–12)
NEUTROPHILS # BLD AUTO: 12.73 10*3/MM3 (ref 1.4–7)
NEUTROPHILS NFR BLD AUTO: 85.8 % (ref 42.7–76)
NOTE: ABNORMAL
NOTE: ABNORMAL
NRBC BLD AUTO-RTO: 0 /100 WBC (ref 0–0)
OXYHGB MFR BLDV: 93.5 % (ref 94–99)
OXYHGB MFR BLDV: 93.7 % (ref 94–99)
PAW @ PEAK INSP FLOW SETTING VENT: 26 CMH2O
PCO2 BLDA: 39.6 MM HG (ref 35–45)
PCO2 BLDA: 40.4 MM HG (ref 35–45)
PCO2 BLDA: 42 MM HG (ref 35–45)
PCO2 TEMP ADJ BLD: ABNORMAL MM HG (ref 35–45)
PCO2 TEMP ADJ BLD: ABNORMAL MM HG (ref 35–45)
PEEP RESPIRATORY: 5 CM[H2O]
PEEP RESPIRATORY: 5 CM[H2O]
PH BLDA: 7.35 PH UNITS (ref 7.35–7.45)
PH BLDA: 7.36 PH UNITS (ref 7.35–7.45)
PH BLDA: 7.38 PH UNITS (ref 7.35–7.45)
PH, TEMP CORRECTED: ABNORMAL PH UNITS
PH, TEMP CORRECTED: ABNORMAL PH UNITS
PLATELET # BLD AUTO: 137 10*3/MM3 (ref 140–450)
PMV BLD AUTO: 9.6 FL (ref 6–12)
PO2 BLDA: 72.4 MM HG (ref 83–108)
PO2 BLDA: 76 MM HG (ref 83–108)
PO2 BLDA: 80.5 MM HG (ref 83–108)
PO2 TEMP ADJ BLD: ABNORMAL MM HG (ref 83–108)
PO2 TEMP ADJ BLD: ABNORMAL MM HG (ref 83–108)
POTASSIUM BLD-SCNC: 4.2 MMOL/L (ref 3.5–5.1)
POTASSIUM BLDA-SCNC: 4 MMOL/L (ref 3.4–4.5)
POTASSIUM BLDA-SCNC: 4.1 MMOL/L (ref 3.4–4.5)
PSV: 15 CMH2O
PSV: 9 CMH2O
RBC # BLD AUTO: 2.94 10*6/MM3 (ref 3.77–5.28)
SAO2 % BLDCOA: 95 % (ref 94–99)
SAO2 % BLDCOA: 95.5 % (ref 94–99)
SAO2 % BLDCOA: 96 % (ref 94–99)
SET MECH RESP RATE: 10
SODIUM BLD-SCNC: 138 MMOL/L (ref 137–145)
SODIUM BLDA-SCNC: 142 MMOL/L (ref 136–146)
SODIUM BLDA-SCNC: 143 MMOL/L (ref 136–146)
VENTILATOR MODE: ABNORMAL
VT ON VENT VENT: 418 ML
VT ON VENT VENT: 514 ML
WBC NRBC COR # BLD: 14.86 10*3/MM3 (ref 3.4–10.8)

## 2019-03-06 PROCEDURE — 83050 HGB METHEMOGLOBIN QUAN: CPT

## 2019-03-06 PROCEDURE — 83735 ASSAY OF MAGNESIUM: CPT | Performed by: NURSE PRACTITIONER

## 2019-03-06 PROCEDURE — 94799 UNLISTED PULMONARY SVC/PX: CPT

## 2019-03-06 PROCEDURE — 85025 COMPLETE CBC W/AUTO DIFF WBC: CPT | Performed by: NURSE PRACTITIONER

## 2019-03-06 PROCEDURE — 93005 ELECTROCARDIOGRAM TRACING: CPT | Performed by: NURSE PRACTITIONER

## 2019-03-06 PROCEDURE — 93010 ELECTROCARDIOGRAM REPORT: CPT | Performed by: INTERNAL MEDICINE

## 2019-03-06 PROCEDURE — 80048 BASIC METABOLIC PNL TOTAL CA: CPT | Performed by: NURSE PRACTITIONER

## 2019-03-06 PROCEDURE — 71045 X-RAY EXAM CHEST 1 VIEW: CPT

## 2019-03-06 PROCEDURE — 25010000002 AMIODARONE IN DEXTROSE 5% 360-4.14 MG/200ML-% SOLUTION: Performed by: NURSE PRACTITIONER

## 2019-03-06 PROCEDURE — 82803 BLOOD GASES ANY COMBINATION: CPT

## 2019-03-06 PROCEDURE — 25010000002 VANCOMYCIN 5 G RECONSTITUTED SOLUTION: Performed by: NURSE PRACTITIONER

## 2019-03-06 PROCEDURE — 82375 ASSAY CARBOXYHB QUANT: CPT

## 2019-03-06 PROCEDURE — 63710000001 INSULIN ASPART PER 5 UNITS: Performed by: NURSE PRACTITIONER

## 2019-03-06 PROCEDURE — 82805 BLOOD GASES W/O2 SATURATION: CPT

## 2019-03-06 PROCEDURE — 97166 OT EVAL MOD COMPLEX 45 MIN: CPT

## 2019-03-06 PROCEDURE — 25010000002 KETOROLAC TROMETHAMINE PER 15 MG: Performed by: NURSE PRACTITIONER

## 2019-03-06 PROCEDURE — 97162 PT EVAL MOD COMPLEX 30 MIN: CPT

## 2019-03-06 PROCEDURE — 63710000001 INSULIN DETEMIR PER 5 UNITS: Performed by: NURSE PRACTITIONER

## 2019-03-06 PROCEDURE — 82962 GLUCOSE BLOOD TEST: CPT

## 2019-03-06 PROCEDURE — 25010000002 VANCOMYCIN 1 G RECONSTITUTED SOLUTION: Performed by: NURSE PRACTITIONER

## 2019-03-06 PROCEDURE — 99024 POSTOP FOLLOW-UP VISIT: CPT | Performed by: NURSE PRACTITIONER

## 2019-03-06 RX ORDER — LEVOTHYROXINE SODIUM 0.1 MG/1
200 TABLET ORAL EVERY MORNING
Status: DISCONTINUED | OUTPATIENT
Start: 2019-03-07 | End: 2019-03-12 | Stop reason: HOSPADM

## 2019-03-06 RX ORDER — AMIODARONE HYDROCHLORIDE 200 MG/1
400 TABLET ORAL
Status: DISCONTINUED | OUTPATIENT
Start: 2019-03-06 | End: 2019-03-09

## 2019-03-06 RX ORDER — KETOROLAC TROMETHAMINE 30 MG/ML
30 INJECTION, SOLUTION INTRAMUSCULAR; INTRAVENOUS ONCE
Status: COMPLETED | OUTPATIENT
Start: 2019-03-06 | End: 2019-03-06

## 2019-03-06 RX ORDER — FAMOTIDINE 20 MG/1
20 TABLET, FILM COATED ORAL 2 TIMES DAILY
Status: DISCONTINUED | OUTPATIENT
Start: 2019-03-06 | End: 2019-03-12 | Stop reason: HOSPADM

## 2019-03-06 RX ORDER — KETOROLAC TROMETHAMINE 10 MG/1
10 TABLET, FILM COATED ORAL EVERY 8 HOURS SCHEDULED
Status: DISPENSED | OUTPATIENT
Start: 2019-03-06 | End: 2019-03-09

## 2019-03-06 RX ORDER — PRENATAL VIT/IRON FUM/FOLIC AC 27MG-0.8MG
1 TABLET ORAL DAILY
Status: DISCONTINUED | OUTPATIENT
Start: 2019-03-06 | End: 2019-03-12 | Stop reason: HOSPADM

## 2019-03-06 RX ORDER — ATORVASTATIN CALCIUM 40 MG/1
40 TABLET, FILM COATED ORAL NIGHTLY
Status: DISCONTINUED | OUTPATIENT
Start: 2019-03-06 | End: 2019-03-12 | Stop reason: HOSPADM

## 2019-03-06 RX ORDER — DEXTROSE MONOHYDRATE 25 G/50ML
25 INJECTION, SOLUTION INTRAVENOUS
Status: DISCONTINUED | OUTPATIENT
Start: 2019-03-06 | End: 2019-03-12 | Stop reason: HOSPADM

## 2019-03-06 RX ORDER — ASCORBIC ACID 500 MG
500 TABLET ORAL 2 TIMES DAILY
Status: DISCONTINUED | OUTPATIENT
Start: 2019-03-06 | End: 2019-03-12 | Stop reason: HOSPADM

## 2019-03-06 RX ORDER — NICOTINE POLACRILEX 4 MG
15 LOZENGE BUCCAL
Status: DISCONTINUED | OUTPATIENT
Start: 2019-03-06 | End: 2019-03-12 | Stop reason: HOSPADM

## 2019-03-06 RX ORDER — GUAIFENESIN 600 MG/1
1200 TABLET, EXTENDED RELEASE ORAL EVERY 12 HOURS SCHEDULED
Status: DISCONTINUED | OUTPATIENT
Start: 2019-03-06 | End: 2019-03-12 | Stop reason: HOSPADM

## 2019-03-06 RX ADMIN — CARVEDILOL 3.12 MG: 3.12 TABLET, FILM COATED ORAL at 20:59

## 2019-03-06 RX ADMIN — ATORVASTATIN CALCIUM 40 MG: 40 TABLET, FILM COATED ORAL at 20:59

## 2019-03-06 RX ADMIN — AMIODARONE HYDROCHLORIDE 400 MG: 200 TABLET ORAL at 20:59

## 2019-03-06 RX ADMIN — KETOROLAC TROMETHAMINE 10 MG: 10 TABLET, FILM COATED ORAL at 14:22

## 2019-03-06 RX ADMIN — Medication 400 MG: at 10:29

## 2019-03-06 RX ADMIN — IPRATROPIUM BROMIDE AND ALBUTEROL SULFATE 3 ML: 2.5; .5 SOLUTION RESPIRATORY (INHALATION) at 22:28

## 2019-03-06 RX ADMIN — ACETAMINOPHEN 1000 MG: 500 TABLET ORAL at 10:28

## 2019-03-06 RX ADMIN — KETOROLAC TROMETHAMINE 30 MG: 30 INJECTION, SOLUTION INTRAMUSCULAR; INTRAVENOUS at 10:29

## 2019-03-06 RX ADMIN — FAMOTIDINE 20 MG: 20 TABLET ORAL at 20:59

## 2019-03-06 RX ADMIN — OXYCODONE HYDROCHLORIDE AND ACETAMINOPHEN 500 MG: 500 TABLET ORAL at 20:59

## 2019-03-06 RX ADMIN — OXYCODONE HYDROCHLORIDE 10 MG: 5 TABLET ORAL at 04:59

## 2019-03-06 RX ADMIN — Medication 400 MG: at 20:59

## 2019-03-06 RX ADMIN — IPRATROPIUM BROMIDE AND ALBUTEROL SULFATE 3 ML: 2.5; .5 SOLUTION RESPIRATORY (INHALATION) at 11:00

## 2019-03-06 RX ADMIN — OXYCODONE HYDROCHLORIDE 10 MG: 5 TABLET ORAL at 09:41

## 2019-03-06 RX ADMIN — ACETAMINOPHEN 1000 MG: 500 TABLET ORAL at 22:52

## 2019-03-06 RX ADMIN — VANCOMYCIN HYDROCHLORIDE 1750 MG: 5 INJECTION, POWDER, LYOPHILIZED, FOR SOLUTION INTRAVENOUS at 12:30

## 2019-03-06 RX ADMIN — AMIODARONE HYDROCHLORIDE 0.5 MG/MIN: 1.8 INJECTION, SOLUTION INTRAVENOUS at 08:39

## 2019-03-06 RX ADMIN — OXYCODONE HYDROCHLORIDE 10 MG: 5 TABLET ORAL at 14:22

## 2019-03-06 RX ADMIN — SENNOSIDES AND DOCUSATE SODIUM 1 TABLET: 8.6; 5 TABLET ORAL at 20:59

## 2019-03-06 RX ADMIN — FAMOTIDINE 20 MG: 20 TABLET ORAL at 10:30

## 2019-03-06 RX ADMIN — OXYCODONE HYDROCHLORIDE AND ACETAMINOPHEN 500 MG: 500 TABLET ORAL at 10:32

## 2019-03-06 RX ADMIN — GUAIFENESIN 1200 MG: 600 TABLET, EXTENDED RELEASE ORAL at 10:30

## 2019-03-06 RX ADMIN — GUAIFENESIN 1200 MG: 600 TABLET, EXTENDED RELEASE ORAL at 20:59

## 2019-03-06 RX ADMIN — IPRATROPIUM BROMIDE AND ALBUTEROL SULFATE 3 ML: 2.5; .5 SOLUTION RESPIRATORY (INHALATION) at 14:34

## 2019-03-06 RX ADMIN — PRENATAL VIT W/ FE FUMARATE-FA TAB 27-0.8 MG 1 TABLET: 27-0.8 TAB at 10:30

## 2019-03-06 RX ADMIN — ASPIRIN 81 MG: 81 TABLET, COATED ORAL at 10:29

## 2019-03-06 RX ADMIN — SERTRALINE HYDROCHLORIDE 50 MG: 50 TABLET ORAL at 22:51

## 2019-03-06 RX ADMIN — ACETAMINOPHEN 1000 MG: 500 TABLET ORAL at 14:21

## 2019-03-06 RX ADMIN — OXYCODONE HYDROCHLORIDE 5 MG: 5 TABLET ORAL at 19:27

## 2019-03-06 RX ADMIN — SENNOSIDES AND DOCUSATE SODIUM 1 TABLET: 8.6; 5 TABLET ORAL at 10:29

## 2019-03-06 NOTE — PLAN OF CARE
Problem: Patient Care Overview  Goal: Plan of Care Review  Outcome: Ongoing (interventions implemented as appropriate)   03/06/19 0616   Coping/Psychosocial   Plan of Care Reviewed With patient   Plan of Care Review   Progress improving   OTHER   Outcome Summary Pt sitting up in chair, extubated at 0342 and placed on 3L NC. VSS. Pt remains on IV amio gtt, started in OR at 1735. Contreras, shannanan, and a-line dc'd. Pt tolerating liquids at this time- am breakfast tray ordered.        Problem: Skin Injury Risk (Adult)  Goal: Skin Health and Integrity  Outcome: Ongoing (interventions implemented as appropriate)      Problem: Fall Risk (Adult)  Goal: Absence of Fall  Outcome: Ongoing (interventions implemented as appropriate)      Problem: Cardiac Surgery (Adult)  Goal: Signs and Symptoms of Listed Potential Problems Will be Absent, Minimized or Managed (Cardiac Surgery)  Outcome: Ongoing (interventions implemented as appropriate)    Goal: Anesthesia/Sedation Recovery  Outcome: Ongoing (interventions implemented as appropriate)

## 2019-03-06 NOTE — PLAN OF CARE
Problem: Patient Care Overview  Goal: Plan of Care Review  Outcome: Ongoing (interventions implemented as appropriate)   03/06/19 1022   Coping/Psychosocial   Plan of Care Reviewed With patient   OTHER   Outcome Summary OT lazarus completed this date, co-eval with PT. Pt was in pain and pain increased with any movement. Pt demonstrated less than 90 degress of shoulder flexion. Pt reported RUE to be numb but light tough sensation appeared intact. Pt was min assist of 2 for sit to stand t/f and attempt to step forward and backward. Pt could benefit from further skilled OT to increase strength, endurance, balance, safety, problem solving and independence with ADL. Recommend 24/7 care at d/c with home health OT and PT if pt makes progress.

## 2019-03-06 NOTE — PLAN OF CARE
Problem: Patient Care Overview  Goal: Plan of Care Review   03/06/19 7419   Coping/Psychosocial   Plan of Care Reviewed With patient   OTHER   Outcome Summary PT evaluation completed as co-eval with OT. Pt transferred sit to stand with min assistance of 2 and verbal/tactile cues to use legs more than UE's. Pt took 2 steps forward back with min assistance. Function limited by decreased strength, balance, and tolerance for functional mobility and activities s/p CABG. Pt will benefit from PT to regain lost function. Anticipate home with 24/7 assist as needed with HH therapy with progression to cardiac rehab as allowed by MD.

## 2019-03-06 NOTE — PLAN OF CARE
Problem: Patient Care Overview  Goal: Plan of Care Review  Outcome: Ongoing (interventions implemented as appropriate)   03/06/19 1434   Coping/Psychosocial   Plan of Care Reviewed With caregiver;patient   Plan of Care Review   Progress no change   OTHER   Outcome Summary New Assessment. Pt is post op CABG. PO diet started. BMI of 41.27. Will monitor po       Problem: Skin Injury Risk (Adult)  Intervention: Promote/Optimize Nutrition   03/06/19 1434   Nutrition Interventions   Oral Nutrition Promotion calorie dense foods provided;calorie dense liquids provided;nutritional therapy counseling provided

## 2019-03-06 NOTE — OP NOTE
OPERATIVE NOTE  Rhea Sutton  1970  3/5/2019    PREOP DIAGNOSES:  Coronary artery disease of native artery of native heart with stable angina pectoris (CMS/HCC) [I25.118]    POSTOP DIAGNOSES:  Coronary artery disease of native artery of native heart with stable angina pectoris (CMS/HCC) [I25.118]    PROCEDURE:   CORONARY ARTERY BYPASS GRAFTING x4  ENDOSCOPIC VEIN HARVEST           LEFT INTERNAL MAMMARY ARTERY TO LEFT ANTERIOR DESCENDING CORONARY ARTERY  SAPHENOUS VEIN GRAFT FROM ASCENDING AORTA TO FIRST DIAGONAL BRANCH  SAPHENOUS VEIN GRAFT FROM ASCENDING AORTA TO THIRD MARGINAL BRANCH  SAPHENOUS VEIN GRAFT FROM ASCENDING AORTA TO POSTERIOR DESCENDING CORONARY ARTERY   Negative pressure wound therapy (prevena)    SURGEON: ROSEMARIE Vilchis MD FACS RPVI    ASSISTANT: Inocencio Bailey CFA    ANESTHESIA: General ET  General    ESTIMATED BLOOD LOSS: 500 ml     COMPLICATIONS: none    DESCRIPTION OF OPERATION:   Patient taken to the operating room placed in supine position. General endotracheal anesthesia was induced. patient was prepped and draped in sterile fashion. radial arterial line and right internal jugular central venous line and pulmonary catheter were placed by anesthesia. Good quality saphenous vein was harvested using endoscopic technique.     Primary median sternotomy was performed. pericardium was opened in inverted T fashion. systemic heparin was given. Aorta was cannulated with 7fr soft flow aortic cannula. Venous cannulation was performed with 29/36 two-stage venous cannula. 15Fr Retrograde cardioplegia cannula was inserted into the coronary sinus. Cardiopulmonary bypass was instituted at a flow of 2.2L/min/m2 153min with the patient at 32°F throughout much of the perfusion. Aorta was crossclamped for a total of 131min. the antegrade cardioplegia needle was inserted into the anterolateral aspect ascending aorta. Cold blood cardioplegia was infused into the aortic root and the coronary  sinus for a total of 500 mL each. subsequent doses of antegrade, retrograde and vein graft cardioplegia were given at 15-20 minute intervals. A warm dose of antegrade and retrograde cardioplegia were given prior to removal of the cross-clamp. The left ventricle was enlarged grade 2, right ventricle enlarged grade 3. LAD was 1.75mm and moderately diffusely diseased, D1 branch was 1.75 mm and moderately diffusely diseased, third marginal branch was 1.75 mm and moderately diffusely diseased, distal PDA was was 1.75 mm and moderately diffusely diseased,.Suction device was placed to the apex to expose D1 branch. End segment of vein was anastomosed end to side to the D1 branch using 6-0 Prolene. Heart was returned to neutral position, Vein graft cut to length at the ascending aorta.  Suction device was placed to the apex to expose third marginal branch. End segment of vein was anastomosed end to side to the third margnal branch using 6-0 Prolene. Heart was returned to neutral position, Vein graft cut to length at the ascending aorta.  Suction device was placed to the apex to expose PDA. End segment of vein was anastomosed end to side to the PDA using 6-0 Prolene. Heart was returned to neutral position, Vein graft cut to length at the ascending aorta. . LIMA was brought through a wide lateral pericardial slit and anastomosed end-to-side to the LAD. The LIMA pedicle was attached to the epicardium using 6-0 Prolene. The proximal end of the vein graft was anastomosed to a punched-out site on the ascending aorta using 6-0 Prolene. Usual debubbling techniques were employed. .3vein graft marker was used. hot shot was given. 2 atrial pacing wires were placed, 2 ventricular pacing wires were placed. Two 24 [place] multihole chest tubes were placed in the mediastinum extending into each pleural space, 28F inferior pericardial tube was placed. After resumption of regular rhythm, cardiopulmonary bypass was gradually discontinued, the  antegrade and retrograde cannulas were removed. the venous cannula was removed. after maintaining satisfactory hemodynamics, the aortic cannula was removed. Full dose protamine reversal was used, tranexamic acid infusion was used. The left pericardium and pleura were repaired, mediastinal fat was closed over the ascending aorta and graft. hemostasis was obtained. incision was closed in layers of sternal wires, double wires, 2-0 PDS, 2-0 PDS, 4-0 Monocryl in the skin. Diffuse tissue edema. Prevena negative pressure wound dressing was applied ([id number]). patient transferred to CCU in critical condition.          This document has been electronically signed by Jaren Vilchis MD on March 5, 2019 6:21 PM

## 2019-03-06 NOTE — CONSULTS
Adult Nutrition  Assessment    Patient Name:  Rhea Sutton  YOB: 1970  MRN: [id number]  Admit Date:  3/5/2019    Assessment Date:  3/6/2019    Comments:  Patient presents at  198% IBW with a BMI of 41.27 which is compatible with morbid obesity. She is s/p CABG.  Currently taking po with no Gi distress. She reports Good blood sugar control PTA.  Will send milk with meal for optimal protein and monitor post op course.  RD will provide education prior to d/c      Reason for Assessment     Row Name 03/06/19 1426          Reason for Assessment    Reason For Assessment  per organizational policy     Diagnosis  cardiac disease     Identified At Risk by Screening Criteria  BMI         Nutrition/Diet History     Row Name 03/06/19 1427          Nutrition/Diet History    Typical Food/Fluid Intake  Pt reprots taht she was eating well pta and that her blood sugar has been under good control.             Labs/Tests/Procedures/Meds     Row Name 03/06/19 1428          Labs/Procedures/Meds    Lab Results Reviewed  reviewed, pertinent        Diagnostic Tests/Procedures    Diagnostic Test/Procedure Reviewed  reviewed, pertinent     Diagnostic Test/Procedures Comments  CABG        Medications    Pertinent Medications Reviewed  reviewed, pertinent     Pertinent Medications Comments  Levemir; Vanc         Physical Findings     Row Name 03/06/19 1428          Physical Findings    Overall Physical Appearance  on oxygen therapy         Estimated/Assessed Needs     Row Name 03/06/19 1428          Calculation Measurements    Weight Used For Calculations  112 kg (248 lb)        Estimated/Assessed Needs    Additional Documentation  Additional Requirements (Group);Fluid Requirements (Group);[place]-St. Jeor Equation (Group);Protein Requirements (Group);Calorie Requirements (Group);KCAL/KG (Group)        Calorie Requirements    Estimated Calorie Requirement (kcal/day)  1700     Estimated Calorie Need Method  kcal/kg         KCAL/KG    14 Kcal/Kg (kcal)  1574.89     15 Kcal/Kg (kcal)  1687.38     18 Kcal/Kg (kcal)  2024.86     20 Kcal/Kg (kcal)  2249.84     25 Kcal/Kg (kcal)  2812.3     30 Kcal/Kg (kcal)  3374.76     35 Kcal/Kg (kcal)  3937.22     40 Kcal/Kg (kcal)  4499.68     45 Kcal/Kg (kcal)  5062.14     50 Kcal/Kg (kcal)  5624.6     kcal/kg (Specify)  15        [place]-St. Jeor Equation    RMR ([place]-St. Jeor Equation)  1755.8        Protein Requirements    Weight Used For Protein Calculations  56.7 kg (125 lb)     Est Protein Requirement Amount (gms/kg)  -- 68 gms of protein (1.2 gms/kg IBW)        Fluid Requirements    Estimated Fluid Requirements (mL/day)  1600     Estimated Fluid Requirement Method  RDA Method     RDA Method (mL)  1600     Jonathan-Bessie Method (over 20 kg)  3749.84         Nutrition Prescription Ordered     Row Name 03/06/19 1431          Nutrition Prescription PO    Current PO Diet  Regular     Fluid Consistency  Thin     Common Modifiers  Cardiac         Evaluation of Received Nutrient/Fluid Intake     Row Name 03/06/19 1431 03/06/19 1428       Calculation Measurements    Weight Used For Calculations  --  112 kg (248 lb)       PO Evaluation    Number of Days PO Intake Evaluated  Insufficient Data  --        Evaluation of Prescribed Nutrient/Fluid Intake     Row Name 03/06/19 1428          Calculation Measurements    Weight Used For Calculations  112 kg (248 lb)             Electronically signed by:  Keyana Juarez RD  03/06/19 2:35 PM

## 2019-03-06 NOTE — PROGRESS NOTES
"  Cardiothoracic - Vascular Surgery Daily Note        LOS: 1 day   Patient Care Team:  Anderson Abbott MD as PCP - General (Family Medicine)    POD#1  CORONARY ARTERY BYPASS GRAFTING x4  ENDOSCOPIC VEIN HARVEST           LEFT INTERNAL MAMMARY ARTERY TO LEFT ANTERIOR DESCENDING CORONARY ARTERY  SAPHENOUS VEIN GRAFT FROM ASCENDING AORTA TO FIRST DIAGONAL BRANCH  SAPHENOUS VEIN GRAFT FROM ASCENDING AORTA TO THIRD MARGINAL BRANCH  SAPHENOUS VEIN GRAFT FROM ASCENDING AORTA TO POSTERIOR DESCENDING CORONARY ARTERY       Chief Complaint: CAD, Surgical Discomfort      Subjective     The following portions of the patient's history were reviewed and updated as appropriate: allergies, current medications, past family history, past medical history, past social history, past surgical history and problem list.     Subjective:  Symptoms:  Stable.  She reports shortness of breath and chest pain (surgical).    Diet:  Adequate intake.    Activity level: Impaired due to pain.    Pain:  She complains of pain that is moderate.  Pain is requiring pain medication.          Objective     Vital Signs  Heart Rate:  [69-80] 71  Resp:  [14-31] 25  BP: ()/(51-70) 111/70  Arterial Line BP: (0-163)/(0-107) 104/61  FiO2 (%):  [30 %-100 %] 30 %  Body mass index is 41.27 kg/m².    Intake/Output Summary (Last 24 hours) at 3/6/2019 0933  Last data filed at 3/6/2019 0838  Gross per 24 hour   Intake 8507.13 ml   Output 3474 ml   Net 5033.13 ml     I/O this shift:  In: 50 [I.V.:50]  Out: -     Wt Readings from Last 3 Encounters:   03/05/19 113 kg (248 lb 0.3 oz)   02/28/19 112 kg (248 lb)   02/12/19 112 kg (248 lb)     CT 411cc No Air Leak -10sx    Physical Exam   Objective:  General Appearance:  Comfortable.    Vital signs: (most recent): Blood pressure 111/70, pulse 71, temperature 96.7 °F (35.9 °C), temperature source Temporal, resp. rate 25, height 165.1 cm (65\"), weight 113 kg (248 lb 0.3 oz), SpO2 98 %, not currently breastfeeding.  " Vital signs are normal.  No fever.    Output: Producing urine and no stool output.    Lungs:  Normal effort and normal respiratory rate.  There are decreased breath sounds (bases).    Heart: Normal rate.  Regular rhythm.    Chest: (CT No Air Leak  AV Wires to chest)  Abdomen: Abdomen is soft.  Hypoactive bowel sounds.     Extremities: Decreased range of motion.  There is dependent edema (generalized).    Pulses: Distal pulses are intact.    Neurological: Patient is oriented to person, place and time.  (Drowsy).    Pupils:  Pupils are equal, round, and reactive to light.    Skin:  Warm and dry.  (M/S [place]  [facility])              Results Review:    Lab Results   Component Value Date    WBC 14.86 (H) 03/06/2019    HGB 8.5 (L) 03/06/2019    HCT 26.7 (L) 03/06/2019    MCV 90.8 03/06/2019     (L) 03/06/2019     Lab Results   Component Value Date    GLUCOSE 110 (H) 03/06/2019    BUN 8 03/06/2019    CREATININE 0.83 03/06/2019    EGFRIFNONA 73 03/06/2019    BCR 9.6 03/06/2019    K 4.2 03/06/2019    CO2 24.0 03/06/2019    CALCIUM 8.2 (L) 03/06/2019    ALBUMIN 3.00 (L) 03/05/2019    AST 39 (H) 01/17/2019    ALT 41 01/17/2019       Calcium Calcium   Date/Time Value Ref Range Status   03/06/2019 0503 8.2 (L) 8.4 - 10.2 mg/dL Final   03/05/2019 1939 6.9 (L) 8.4 - 10.2 mg/dL Final      Magnesium Magnesium   Date/Time Value Ref Range Status   03/06/2019 0503 2.6 (H) 1.6 - 2.3 mg/dL Final   03/05/2019 1939 3.3 (H) 1.6 - 2.3 mg/dL Final        Imaging Results (last 24 hours)     Procedure Component Value Units Date/Time    XR Chest 1 View [[id number]] Collected:  03/06/19 0457     Updated:  03/06/19 0535    Narrative:       Exam: AP portable chest    INDICATION: Post heart surgery    COMPARISON: 3/5/2019 at 8:10 PM    FINDINGS: AP portable chest. The endotracheal tube NG tube and  [place]-Mary catheter have been removed. Right IJ central catheter  is in place. The bilateral chest tubes and mediastinal drain  remain in place.  Heart size normal. There is atelectasis present  in the left lower lobe. There may be a small left pleural  effusion.      Impression:       1. Removal of the endotracheal tube , NG tube and [place]-Mary  catheter  2. Atelectasis left lower lung    Electronically signed by:  Rosendo Laureano MD  3/6/2019 5:34 AM CST  Workstation: [id number]    XR Chest 1 View [[id number]] Collected:  03/05/19 2016     Updated:  03/05/19 2031    Narrative:       PROCEDURE: XR CHEST 1 VIEW    HISTORY: Post-Op Check Line & Tube Placement, I25.118  Atherosclerotic heart disease of native coronary artery with  other forms of angina pectoris I25.10 Atherosclerotic heart  disease of native coronary artery without angina pectoris    COMPARISON: 2/28/2019     TECHNIQUE:     Single projection of the chest was done.    FINDINGS:    There is interval median sternotomy. There is presence of  bilateral chest tubes. The tip of the orogastric tube is at least  in the body of the stomach with the tip of the endotracheal tube  is 4.9 cm above the tracheal bifurcation. The tip of the  right-sided [place]-Mary catheter  is in the pulmonary trunk.    There is mild discoid atelectasis/infiltrate in the left midlung  zone .    There are no pneumothoraces or pleural effusions.    The pulmonary vascularity is normal.    The cardiomediastinal silhouette is otherwise unremarkable.      Impression:         There is interval median sternotomy. There is presence of  bilateral chest tubes. The tip of the orogastric tube is at least  in the body of the stomach with the tip of the endotracheal tube  is 4.9 cm above the tracheal bifurcation. The tip of the  right-sided [place]-Mary catheter is in the pulmonary trunk.    There is mild discoid atelectasis/infiltrate in the left midlung  zone .    Electronically signed by:  Pb Benedict MD  3/5/2019 8:29 PM CST  Workstation: [id number]                                acetaminophen 1,000 mg Oral Q6H   albumin human       amiodarone 400 mg Oral Q24H   aspirin 81 mg Oral Daily   carvedilol 3.125 mg Oral Q12H   famotidine 20 mg Oral BID   guaiFENesin 1,200 mg Oral Q12H   insulin detemir 30 Units Subcutaneous Nightly   ipratropium-albuterol 3 mL Nebulization 4x Daily - RT   ketorolac 30 mg Intravenous Once   ketorolac 10 mg Oral Q8H   magnesium oxide 400 mg Oral BID   prenatal vitamin 27-0.8 1 tablet Oral Daily   sennosides-docusate sodium 1 tablet Oral BID   sodium chloride      vancomycin 15 mg/kg Intravenous Q12H   vitamin C 500 mg Oral BID       amiodarone 0.5 mg/min Last Rate: 0.5 mg/min (03/06/19 0839)   insulin regular infusion 1 unit/mL 1-20 Units/hr Last Rate: 4 Units/hr (03/06/19 0820)             ASSESSMENT/PLAN       Coronary artery disease of native artery of native heart with stable angina pectoris (CMS/HCC)      Assessment & Plan    Stable Post op CABG: Progressing    CAD: ASA, BRILINTA, BETA. STATIN Hold ACE.     Resp: O2 support. Incentive. CT H2O seal. Ext. 0342    Pain: Tylenol. OxyIR. Toradol    Rehab: OOB. Ambulate. PT/OT    DM/Transient Post Op Hyperglycemia: DC Insulin gtt on transfer. SSI coverage. Levemir    Depression: Zoloft    Acute Blood loss Anemia: Asymptomatic. Iron supplement    Disp: Stable Transfer 3W telemetry          This document has been electronically signed by DUC Dash on March 6, 2019 9:33 AM

## 2019-03-06 NOTE — ANESTHESIA PROCEDURE NOTES
Central Line      Patient location during procedure: OR  Indications: central pressure monitoring and MD/Surgeon request  Staff  Anesthesiologist: Tony Miller MD  CRNA: Lionel Frankel CRNA  Preanesthetic Checklist  Completed: patient identified, site marked, surgical consent, pre-op evaluation, timeout performed, IV checked, risks and benefits discussed and monitors and equipment checked  Central Line Prep  Sterile Tech:gloves, cap, gown, mask and sterile barriers  Patient monitoring: blood pressure monitoring, continuous pulse oximetry and EKG  Central Line Procedure  Laterality:right  Location:internal jugular  Catheter Type:[place]-Mary  Catheter Size:7.5 Fr  Assessment  Post procedure:biopatch applied, line sutured and occlusive dressing applied  Assessement:blood return through all ports, free fluid flow and chest x-ray ordered  Complications:no  Patient Tolerance:patient tolerated the procedure well with no apparent complications

## 2019-03-06 NOTE — PLAN OF CARE
Problem: Patient Care Overview  Goal: Plan of Care Review  Outcome: Ongoing (interventions implemented as appropriate)   03/06/19 3380   Coping/Psychosocial   Plan of Care Reviewed With patient   Plan of Care Review   Progress improving   OTHER   Outcome Summary Patient initially very drowsy this am, would only stay awake briefly when spoken to. After working with the patient with her incentive and coughing and deep breathing she was more awake and was able to have pain medication administered. Toradol was administered and she stated that that helped her more than anything. She was very shaky while working with PT/OT and the session was completed early. Patient remained up in the chair until 1500 at which time she was assisted to the bedside commode then from there to bed. Blood sugars have been well controlled. She remains on amiodarone at 0.5mg until 1900 this evening. she has remained in sinus rhythm and all other vital signs have remained stable. CT drainage is serosangueinous and WNL     Goal: Individualization and Mutuality  Outcome: Ongoing (interventions implemented as appropriate)    Goal: Discharge Needs Assessment  Outcome: Ongoing (interventions implemented as appropriate)      Problem: Skin Injury Risk (Adult)  Goal: Identify Related Risk Factors and Signs and Symptoms  Outcome: Outcome(s) achieved Date Met: 03/06/19    Goal: Skin Health and Integrity  Outcome: Ongoing (interventions implemented as appropriate)      Problem: Fall Risk (Adult)  Goal: Identify Related Risk Factors and Signs and Symptoms  Outcome: Outcome(s) achieved Date Met: 03/06/19    Goal: Absence of Fall  Outcome: Ongoing (interventions implemented as appropriate)      Problem: Cardiac Surgery (Adult)  Goal: Signs and Symptoms of Listed Potential Problems Will be Absent, Minimized or Managed (Cardiac Surgery)  Outcome: Ongoing (interventions implemented as appropriate)    Goal: Anesthesia/Sedation Recovery  Outcome: Outcome(s)  achieved Date Met: 03/06/19      Problem: Diabetes, Type 2 (Adult)  Goal: Signs and Symptoms of Listed Potential Problems Will be Absent, Minimized or Managed (Diabetes, Type 2)  Outcome: Ongoing (interventions implemented as appropriate)

## 2019-03-06 NOTE — THERAPY EVALUATION
Acute Care - Occupational Therapy Initial Evaluation  [facility]     Patient Name: Rhea Sutton  : 1970  MRN: [id number]  Today's Date: 3/6/2019  Onset of Illness/Injury or Date of Surgery: 19  Date of Referral to OT: 19  Referring Physician: DUC High    Admit Date: 3/5/2019       ICD-10-CM ICD-9-CM   1. Coronary artery disease of native artery of native heart with stable angina pectoris (CMS/HCC) I25.118 414.01     413.9   2. Coronary artery disease involving native heart without angina pectoris, unspecified vessel or lesion type I25.10 414.01   3. Impaired functional mobility, balance, gait, and endurance Z74.09 V49.89   4. Impaired mobility and ADLs Z74.09 799.89     Patient Active Problem List   Diagnosis   • Postsurgical hypothyroidism   • Impaired fasting glucose   • Vitamin D deficiency   • Type 2 diabetes mellitus with hyperglycemia (CMS/[facility])   • Hypertension   • Hyperlipidemia   • DDD (degenerative disc disease), lumbar   • Lumbar radiculopathy   • Cervicalgia   • NSTEMI (non-ST elevated myocardial infarction) (CMS/HCC)   • CAD (coronary artery disease)   • AMI inferior wall (CMS/[facility])   • Head lice infestation   • Diabetes mellitus (CMS/HCC)   • Uncontrolled diabetes mellitus (CMS/HCC)   • Coronary artery disease of native artery of native heart with stable angina pectoris (CMS/HCC)   • Chronic kidney disease, stage 3 (moderate) (CMS/HCC)     Past Medical History:   Diagnosis Date   • CAD (coronary artery disease)    • Chronic back pain    • Chronic bronchitis (CMS/HCC)    • Diabetes mellitus (CMS/HCC)    • Diverticulitis 1986   • Goiter    • Hashimoto's thyroiditis    • Head lice infestation    • Hyperlipidemia    • Hypertension    • MI (myocardial infarction) (CMS/HCC)    • Postoperative hypothyroidism    • Sinus congestion    • Sleep apnea     not using c-pap     Past Surgical History:   Procedure Laterality Date   • APPENDECTOMY     • CARDIAC  CATHETERIZATION N/A 10/13/2017   • CARDIAC CATHETERIZATION N/A 1/3/2019   • CHOLECYSTECTOMY     • COLON RESECTION     • COLON SURGERY     • CORONARY STENT PLACEMENT     • AL RT/LT HEART CATHETERS N/A 10/13/2017   • TOTAL ABDOMINAL HYSTERECTOMY WITH SALPINGO OOPHORECTOMY     • TOTAL THYROIDECTOMY            OT ASSESSMENT FLOWSHEET (last 72 hours)      Occupational Therapy Evaluation     Row Name 03/06/19 1022                   OT Evaluation Time/Intention    Subjective Information  complains of;pain  -        Document Type  evaluation  -        Mode of Treatment  occupational therapy;physical therapy  -        Total Evaluation Minutes, Occupational Therapy  29  -        Patient Effort  adequate  -        Symptoms Noted During/After Treatment  increased pain  -        Comment  pt has decreased pain tolerance; pain limits engagement at times  -           General Information    Patient Profile Reviewed?  yes  -        Onset of Illness/Injury or Date of Surgery  03/05/19  -        Referring Physician  DUC High  -        Patient Observations  alert;cooperative;agree to therapy  -        Patient/Family Observations  no family present   -        General Observations of Patient  Pt in recliner with legs elevated;noted chest hugger, O2 per nasal cannula, chest tube, telemetry, IV,   -        Prior Level of Function  independent:;all household mobility;transfer;bed mobility;ADL's;community mobility family shares IADL   -        Equipment Currently Used at Home  none has shower chair was not using  -        Pertinent History of Current Functional Problem  Pt [facility] 3/5/2019 for CABGx4  -        Existing Precautions/Restrictions  sternal;fall;oxygen therapy device and L/min;cardiac  -        Limitations/Impairments  safety/cognitive  -        Risks Reviewed  patient:;LOB;nausea/vomiting;dizziness;increased discomfort;change in vital signs  -        Benefits Reviewed   patient:;improve function;increase independence;increase strength;increase balance;increase knowledge  -           Relationship/Environment    Lives With  grandparent(s);child(gini), adult  -        Concerns About Impact on Relationships  Pt lives with grandmother and son. Pt says her uncle will be staying with her for a few weeks to assist as needed and to provide transportation  -           Resource/Environmental Concerns    Current Living Arrangements  home/apartment/condo  -           Home Main Entrance    Number of Stairs, Main Entrance  two  -        Stair Railings, Main Entrance  none  -           Cognitive Assessment/Interventions    Additional Documentation  Cognitive Assessment/Intervention (Group)  -           Cognitive Assessment/Intervention- PT/OT    Affect/Mental Status (Cognitive)  -- in increased pain   -        Orientation Status (Cognition)  oriented x 4  -        Follows Commands (Cognition)  follows one step commands;75-90% accuracy;verbal cues/prompting required;repetition of directions required;physical/tactile prompts required  -        Safety Deficit (Cognitive)  mild deficit;moderate deficit  -        Personal Safety Interventions  supervised activity;nonskid shoes/slippers when out of bed;fall prevention program maintained  -           Safety Issues, Functional Mobility    Safety Issues Affecting Function (Mobility)  ability to follow commands;awareness of need for assistance;insight into deficits/self awareness;judgment;problem solving;safety precaution awareness;safety precautions follow-through/compliance;sequencing abilities  -        Impairments Affecting Function (Mobility)  balance;coordination;endurance/activity tolerance;motor control;motor planning;pain;postural/trunk control;shortness of breath;strength  -           Bed Mobility Assessment/Treatment    Comment (Bed Mobility)  defer pt up in recliner, left in recliner   -           Transfer  Assessment/Treatment    Transfer Assessment/Treatment  sit-stand transfer;stand-sit transfer  -           Sit-Stand Transfer    Sit-Stand [place] (Transfers)  minimum assist (75% patient effort);2 person assist;verbal cues;nonverbal cues (demo/gesture)  -           Stand-Sit Transfer    Stand-Sit [place] (Transfers)  minimum assist (75% patient effort);2 person assist;nonverbal cues (demo/gesture);verbal cues  -           ADL Assessment/Intervention    BADL Assessment/Intervention  feeding  -           Self-Feeding Assessment/Training    Comment (Feeding)  set up assist to drink from cup   -           BADL Safety/Performance    Impairments, [facility] Safety/Performance  balance;endurance/activity tolerance;coordination;motor control;motor planning;pain;range of motion;shortness of breath;strength;trunk/postural control  -           General ROM    GENERAL ROM COMMENTS  Limited shoulder range due to pain approx 60 degrees bilaterally, rest appeared WFL but hard to assess secondary to decreased pain tolerance.   -           MMT (Manual Muscle Testing)    General MMT Comments  defer secondary to sternal precuations.   -           Light Touch Sensation Assessment    Left Upper Extremity: Light Touch Sensation Assessment  intact  -        Right Upper Extremity: Light Touch Sensation Assessment  intact  -        Comment, Right Upper Extremity: Light Touch Sensation Assessment  reports right arm in numb RN aware; pt was able to indentify light touch appropriately   -           Vision Assessment/Intervention    Visual Impairment/Limitations  corrective lenses full time  -           Positioning and Restraints    Pre-Treatment Position  sitting in chair/recliner  -        Post Treatment Position  chair  -        In Chair  reclined;sitting;encouraged to call for assist;call light within reach;patient within staff view  -           Pain Assessment    Additional Documentation  Pain Scale:  Numbers Pre/Post-Treatment (Group)  -           Pain Scale: Numbers Pre/Post-Treatment    Pain Scale: Numbers, Pretreatment  9/10  -        Pain Scale: Numbers, Post-Treatment  10/10  -        Pain Location - Orientation  incisional  -        Pain Location  back incision   -        Pain Intervention(s)  Repositioned;Ambulation/increased activity;Distraction;Rest  -           Edema Assessment & Management    Location (Edema)  -- swelling noted BUE  -           Wound 03/05/19 1414 Other (See comments) midline sternal incision    Wound - Properties Group Date first assessed: 03/05/19  - Time first assessed: 1414  -CF Present On Admission : no  -CF Side: Other (See comments)  -CF, midline sternal incision  Orientation: midline  -CF Location: sternal  - Type: incision  -CF Additional Comments: closed incision. dressings applied  -       Wound 03/05/19 1414 Left leg incision    Wound - Properties Group Date first assessed: 03/05/19  -CF Time first assessed: 1414  -CF Present On Admission : no  -CF Side: Left  -CF Location: leg  -CF Type: incision  -CF Additional Comments: closed incisions. dressings applied (skin affix, kerlex, ace)  -       Plan of Care Review    Plan of Care Reviewed With  patient  -           Clinical Impression (OT)    Date of Referral to OT  03/05/19  -        OT Diagnosis  Impaired mobility and ADL   -        Prognosis (OT Eval)  guarded by pain   -        Functional Level at Time of Evaluation (OT Eval)  pt decreased independence, safety, balance, strength, endurance, problem solving and independence with ADL, t/f and mobility  -        Patient/Family Goals Statement (OT Eval)  to go home   -        Criteria for Skilled Therapeutic Interventions Met (OT Eval)  yes;treatment indicated  -        Rehab Potential (OT Eval)  fair, will monitor progress closely  -        Therapy Frequency (OT Eval)  daily  -        Predicted Duration of Therapy Intervention  (Therapy Eval)  until d/c   -BH        Care Plan Review (OT)  evaluation/treatment results reviewed;care plan/treatment goals reviewed;risks/benefits reviewed;patient/other agree to care plan  -        Anticipated Discharge Disposition (OT)  home with 24/7 care;home with home health;skilled nursing facility depends on progress  -           Vital Signs    Pre Systolic BP Rehab  99  -BH        Pre Treatment Diastolic BP  65  -BH        Intra Systolic BP Rehab  104  -BH        Intra Treatment Diastolic BP  65  -BH        Post Systolic BP Rehab  105  -BH        Post Treatment Diastolic BP  57  -BH        Pretreatment Heart Rate (beats/min)  73  -BH        Intratreatment Heart Rate (beats/min)  77  -BH        Posttreatment Heart Rate (beats/min)  75  -BH        Pre SpO2 (%)  94  -BH        O2 Delivery Pre Treatment  supplemental O2  -BH        Intra SpO2 (%)  89  -BH        O2 Delivery Intra Treatment  supplemental O2  -BH        Post SpO2 (%)  90  -BH        O2 Delivery Post Treatment  supplemental O2  -BH        Pre Patient Position  Sitting  -BH        Intra Patient Position  Sitting  -BH        Post Patient Position  Sitting  -BH           Planned OT Interventions    Planned Therapy Interventions (OT Eval)  activity tolerance training;adaptive equipment training;BADL retraining;functional balance retraining;IADL retraining;occupation/activity based interventions;passive ROM/stretching;patient/caregiver education/training;ROM/therapeutic exercise;strengthening exercise;transfer/mobility retraining  -           OT Goals    Transfer Goal Selection (OT)  transfer, OT goal 1  -        Bathing Goal Selection (OT)  bathing, OT goal 1  -        Dressing Goal Selection (OT)  dressing, OT goal 1  -        Toileting Goal Selection (OT)  toileting, OT goal 1  -        Endurance Goal Selection (OT)  endurance, OT goal 1  -        Functional Mobility Goal Selection (OT)  functional mobility, OT goal 1  -         Precaution Goal Selection (OT)  precaution, OT goal 1  -        Additional Documentation  Endurance Goal Selection (OT) (Row);Functional Mobility Selection (OT) (Row);Precaution Goal Selection (OT) (Row)  -           Transfer Goal 1 (OT)    Activity/Assistive Device (Transfer Goal 1, OT)  toilet  -        [place] Level/Cues Needed (Transfer Goal 1, OT)  contact guard assist  -        Time Frame (Transfer Goal 1, OT)  long term goal (LTG);by discharge  -        Progress/Outcome (Transfer Goal 1, OT)  goal not met  -           Bathing Goal 1 (OT)    Activity/Assistive Device (Bathing Goal 1, OT)  bathing skills, all  -        [place] Level/Cues Needed (Bathing Goal 1, OT)  set-up required;verbal cues required;standby assist  -        Time Frame (Bathing Goal 1, OT)  long term goal (LTG);by discharge  -        Progress/Outcomes (Bathing Goal 1, OT)  goal not met  -           Dressing Goal 1 (OT)    Activity/Assistive Device (Dressing Goal 1, OT)  dressing skills, all  -BH        [place]/Cues Needed (Dressing Goal 1, OT)  set-up required;verbal cues required;standby assist  -        Time Frame (Dressing Goal 1, OT)  long term goal (LTG);by discharge  -        Progress/Outcome (Dressing Goal 1, OT)  goal not met  -           Toileting Goal 1 (OT)    Activity/Device (Toileting Goal 1, OT)  toileting skills, all  -        [place] Level/Cues Needed (Toileting Goal 1, OT)  set-up required;verbal cues required;contact guard assist  -        Time Frame (Toileting Goal 1, OT)  long term goal (LTG);by discharge  -        Progress/Outcome (Toileting Goal 1, OT)  goal not met  -            Endurance Goal 1 (OT)    Activity Level (Endurance Goal 1, OT)  endurance 2 fair + 25 min functional task O2 90 or up 3 or less rest breaks  -        Time Frame (Endurance Goal 1, OT)  long term goal (LTG);by discharge  -        Progress/Outcome (Endurance Goal 1, OT)  goal not met  -            Functional Mobility Goal 1 (OT)    Activity/Assistive Device (Functional Mobility Goal 1, OT)  walker, rolling  -        [place] Level/Cues Needed (Functional Mobility Goal 1, OT)  contact guard assist  -        Distance Goal 1 (Functional Mobility, OT)  to and from bathroom with good safety, balance and problem solving   -        Time Frame (Functional Mobility Goal 1, OT)  long term goal (LTG);by discharge  -        Progress/Outcome (Functional Mobility Goal 1, OT)  goal not met  -           Precaution Goal 1 (OT)    Activity (Precaution Goal 1, OT)  sternal precautions;fall precautions;demonstrates compliance;demonstrates understanding;during all tasks in daily routine;during ADLs;during mobility tasks;during transfers  -        [place] Level/Cues Needed (Precautions Goal 1, OT)  with minimum;verbal cues/redirection  -        Time Frame (Precaution Goal 1, OT)  long term goal (LTG);by discharge  -        Progress/Outcome (Precaution Goal 1, OT)  goal not met  -           Patient Education Goal (OT)    Activity (Patient Education Goal, OT)  Pt will communicate good home safety awareness.   -        [place]/Cues/Accuracy (Memory Goal 2, OT)  verbalizes understanding  -        Time Frame (Patient Education Goal, OT)  long term goal (LTG);by discharge  -        Progress/Outcome (Patient Education Goal, OT)  goal not met  -           Living Environment    Home Accessibility  stairs to enter home;tub/shower is not walk in  -          User Key  (r) = Recorded By, (t) = Taken By, (c) = Cosigned By    Initials Name Effective Dates     Marlen Jaimes, OTR/L 06/08/18 -     CF Aparna Eason RN 10/17/16 -          Occupational Therapy Education     Title: PT OT SLP Therapies (In Progress)     Topic: Occupational Therapy (In Progress)     Point: ADL training (In Progress)     Description: Instruct learner(s) on proper safety adaptation and remediation techniques during  self care or transfers.   Instruct in proper use of assistive devices.    Learning Progress Summary           Patient Acceptance, E, NR by  at 3/6/2019  3:08 PM    Comment:  Educated about OT and POC. Educated to call for assist. Educated on safety throughout. Educated on CABG precuations. Educated on benefits of moving and doing therapy.                   Point: Precautions (In Progress)     Description: Instruct learner(s) on prescribed precautions during self-care and functional transfers.    Learning Progress Summary           Patient Acceptance, E, NR by  at 3/6/2019  3:08 PM    Comment:  Educated about OT and POC. Educated to call for assist. Educated on safety throughout. Educated on CABG precuations. Educated on benefits of moving and doing therapy.                   Point: Body mechanics (In Progress)     Description: Instruct learner(s) on proper positioning and spine alignment during self-care, functional mobility activities and/or exercises.    Learning Progress Summary           Patient Acceptance, E, NR by  at 3/6/2019  3:08 PM    Comment:  Educated about OT and POC. Educated to call for assist. Educated on safety throughout. Educated on CABG precuations. Educated on benefits of moving and doing therapy.                               User Key     Initials Effective Dates Name Provider Type Discipline     06/08/18 -  Marlen Jaimes, OTR/L Occupational Therapist OT                  OT Recommendation and Plan  Outcome Summary/Treatment Plan (OT)  Anticipated Discharge Disposition (OT): home with 24/7 care, home with home health, skilled nursing facility(depends on progress)  Planned Therapy Interventions (OT Eval): activity tolerance training, adaptive equipment training, BADL retraining, functional balance retraining, IADL retraining, occupation/activity based interventions, passive ROM/stretching, patient/caregiver education/training, ROM/therapeutic exercise, strengthening exercise,  transfer/mobility retraining  Therapy Frequency (OT Eval): daily  Plan of Care Review  Plan of Care Reviewed With: patient  Plan of Care Reviewed With: patient  Outcome Summary: OT babitaal completed this date, co-eval with PT. Pt was in pain and pain increased with any movement. Pt demonstrated less than 90 degress of shoulder flexion. Pt reported RUE to be numb but light tough sensation appeared intact. Pt was min assist of 2 for sit to stand t/f and attempt to step forward and backward. Pt could benefit from further skilled OT to increase strength, endurance, balance, safety, problem solving and independence with ADL. Recommend 24/7 care at d/c with home health OT and PT if pt makes progress.     Outcome Measures     Row Name 03/06/19 1027 03/06/19 1022          How much help from another person do you currently need...    Turning from your back to your side while in flat bed without using bedrails?  2  -JOLLY  --     Moving from lying on back to sitting on the side of a flat bed without bedrails?  2  -JOLLY  --     Moving to and from a bed to a chair (including a wheelchair)?  2  -JOLLY  --     Standing up from a chair using your arms (e.g., wheelchair, bedside chair)?  3  -JOLLY  --     Climbing 3-5 steps with a railing?  1  -JOLLY  --     To walk in hospital room?  2  -JOLLY  --     AM-PAC 6 Clicks Score  12  -JOLLY  --        How much help from another is currently needed...    Putting on and taking off regular lower body clothing?  --  1  -BH     Bathing (including washing, rinsing, and drying)  --  2  -BH     Toileting (which includes using toilet bed pan or urinal)  --  1  -BH     Putting on and taking off regular upper body clothing  --  2  -BH     Taking care of personal grooming (such as brushing teeth)  --  3  -BH     Eating meals  --  3  -BH     Score  --  12  -BH        Functional Assessment    Outcome Measure Options  AM-PAC 6 Clicks Basic Mobility (PT)  -JOLLY  AM-PAC 6 Clicks Daily Activity (OT)  -       User Key  (r) =  Recorded By, (t) = Taken By, (c) = Cosigned By    Initials Name Provider Type     Marlen Jaimes, OTR/L Occupational Therapist    Pamela Lozano, PT Physical Therapist          Time Calculation:   Time Calculation- OT     Row Name 03/06/19 1512             Time Calculation- OT    OT Start Time  1022  -      OT Stop Time  1051  -      OT Time Calculation (min)  29 min  -      OT Received On  03/06/19  -      OT Goal Re-Cert Due Date  03/19/19  -        User Key  (r) = Recorded By, (t) = Taken By, (c) = Cosigned By    Initials Name Provider Type     Marlen Jaimes, OTR/L Occupational Therapist        Therapy Suggested Charges     Code   Minutes Charges    None           Therapy Charges for Today     Code Description Service Date Service Provider Modifiers Qty    [id number]  OT EVAL MOD COMPLEXITY 2 3/6/2019 Marlen Jaimes OTR/L GO 1               Marlen Jaimes OTR/NADIA  3/6/2019

## 2019-03-06 NOTE — THERAPY EVALUATION
Acute Care - Physical Therapy Initial Evaluation  [facility]     Patient Name: Rhea Sutton  : 1970  MRN: [id number]  Today's Date: 3/6/2019   Onset of Illness/Injury or Date of Surgery: 19  Date of Referral to PT: 19  Referring Physician: DUC High      Admit Date: 3/5/2019    Visit Dx:     ICD-10-CM ICD-9-CM   1. Coronary artery disease of native artery of native heart with stable angina pectoris (CMS/HCC) I25.118 414.01     413.9   2. Coronary artery disease involving native heart without angina pectoris, unspecified vessel or lesion type I25.10 414.01   3. Impaired functional mobility, balance, gait, and endurance Z74.09 V49.89     Patient Active Problem List   Diagnosis   • Postsurgical hypothyroidism   • Impaired fasting glucose   • Vitamin D deficiency   • Type 2 diabetes mellitus with hyperglycemia (CMS/HCC)   • Hypertension   • Hyperlipidemia   • DDD (degenerative disc disease), lumbar   • Lumbar radiculopathy   • Cervicalgia   • NSTEMI (non-ST elevated myocardial infarction) (CMS/HCC)   • CAD (coronary artery disease)   • AMI inferior wall (CMS/HCC)   • Head lice infestation   • Diabetes mellitus (CMS/HCC)   • Uncontrolled diabetes mellitus (CMS/HCC)   • Coronary artery disease of native artery of native heart with stable angina pectoris (CMS/HCC)   • Chronic kidney disease, stage 3 (moderate) (CMS/HCC)     Past Medical History:   Diagnosis Date   • CAD (coronary artery disease)    • Chronic back pain    • Chronic bronchitis (CMS/HCC)    • Diabetes mellitus (CMS/HCC)    • Diverticulitis 1986   • Goiter    • Hashimoto's thyroiditis    • Head lice infestation    • Hyperlipidemia    • Hypertension    • MI (myocardial infarction) (CMS/HCC)    • Postoperative hypothyroidism    • Sinus congestion    • Sleep apnea     not using c-pap     Past Surgical History:   Procedure Laterality Date   • APPENDECTOMY     • CARDIAC CATHETERIZATION N/A 10/13/2017   • CARDIAC  CATHETERIZATION N/A 1/3/2019   • CHOLECYSTECTOMY     • COLON RESECTION     • COLON SURGERY     • CORONARY STENT PLACEMENT     • [place] RT/LT HEART CATHETERS N/A 10/13/2017   • TOTAL ABDOMINAL HYSTERECTOMY WITH SALPINGO OOPHORECTOMY     • TOTAL THYROIDECTOMY          PT ASSESSMENT (last 12 hours)      Physical Therapy Evaluation     Row Name 03/06/19 1027          PT Evaluation Time/Intention    Subjective Information  complains of;pain  -JOLLY     Document Type  evaluation  -JOLLY     Mode of Treatment  physical therapy;occupational therapy;co-treatment  -JOLLY     Patient Effort  adequate  -JOLLY     Symptoms Noted During/After Treatment  increased pain  -JOLLY     Row Name 03/06/19 1027          General Information    Patient Profile Reviewed?  yes  -JOLLY     Onset of Illness/Injury or Date of Surgery  03/05/19  -JOLLY     Referring Physician  DUC High  -JOLLY     Patient Observations  alert;cooperative;agree to therapy  -JOLLY     Patient/Family Observations  no family present  -JOLLY     General Observations of Patient  Pt in recliner with legs elevated;noted chest hugger, O2 per nasal cannula, chest tube, telemetry, IV,   -JOLLY     Prior Level of Function  independent:;all household mobility;community mobility;ADL's family shares IADL's  -JOLLY     Equipment Currently Used at Home  none has shower chair was not using  -JOLLY     Pertinent History of Current Functional Problem  Pt admitted to [facility] 3/5/2019 for CABGx4  -JOLLY     Existing Precautions/Restrictions  cardiac;oxygen therapy device and L/min;sternal  -JOLLY     Risks Reviewed  patient:;LOB;nausea/vomiting;dizziness;increased discomfort;change in vital signs;increased drainage;lines disloged  -JOLLY     Benefits Reviewed  patient:;improve function;increase independence;increase strength;increase balance;decrease pain;decrease risk of DVT;improve skin integrity;increase knowledge  -JOLLY     Row Name 03/06/19 1027          Relationship/Environment    Lives With  grandparent(s);child(gini), adult   -     Concerns About Impact on Relationships  Pt lives with grandmother and son. Pt says her uncle will be staying with her for a few weeks to assist as needed and to provide transportation  -     Row Name 03/06/19 1027          Resource/Environmental Concerns    Current Living Arrangements  home/apartment/condo 1 level house  -     Row Name 03/06/19 1027          Home Main Entrance    Number of Stairs, Main Entrance  two  -     Stair Railings, Main Entrance  none  -     Row Name 03/06/19 1027          Cognitive Assessment/Intervention- PT/OT    Orientation Status (Cognition)  oriented x 4  -     Follows Commands (Cognition)  follows one step commands;75-90% accuracy;repetition of directions required;verbal cues/prompting required;physical/tactile prompts required  -     Row Name 03/06/19 1027          Safety Issues, Functional Mobility    Safety Issues Affecting Function (Mobility)  insight into deficits/self awareness;safety precaution awareness;safety precautions follow-through/compliance  -     Impairments Affecting Function (Mobility)  balance;endurance/activity tolerance;pain;strength  -[facility] Name 03/06/19 1027          Bed Mobility Assessment/Treatment    Comment (Bed Mobility)  not observed;pt in recliner  -     Row Name 03/06/19 1027          Transfer Assessment/Treatment    Transfer Assessment/Treatment  sit-stand transfer;stand-sit transfer  -     Sit-Stand [place] (Transfers)  minimum assist (75% patient effort);2 person assist;verbal cues;nonverbal cues (demo/gesture)  -     Stand-Sit [place] (Transfers)  minimum assist (75% patient effort);2 person assist;verbal cues;nonverbal cues (demo/gesture)  -     Row Name 03/06/19 1027          Gait/Stairs Assessment/Training    Gait/Stairs Assessment/Training  gait/ambulation independence  -     [place] Level (Gait)  minimum assist (75% patient effort);2 person assist;verbal cues;nonverbal cues (demo/gesture)  -      Distance in Feet (Gait)  2  -     Row Name 03/06/19 1027          General ROM    GENERAL ROM COMMENTS  Please refer to OT evaluation for BUE range detail;;BLE: AROM WFL  -     Row Name 03/06/19 1027          MMT (Manual Muscle Testing)    General MMT Comments  Please refer to OT evaluation for BUE strength detail;BLE: 3+/5 ankles/feet, knees, hips  -     Row Name 03/06/19 1027          Sensory Assessment/Intervention    Sensory General Assessment  no sensation deficits identified to light touch in LE's  -     Row Name 03/06/19 1027          Vision Assessment/Intervention    Visual Impairment/Limitations  corrective lenses full time  -     Row Name 03/06/19 1027          Pain Assessment    Additional Documentation  Pain Scale: Numbers Pre/Post-Treatment (Group)  -[facility] Name 03/06/19 1027          Pain Scale: Numbers Pre/Post-Treatment    Pain Scale: Numbers, Pretreatment  9/10  -JOLLY     Pain Scale: Numbers, Post-Treatment  10/10  -JOLLY     Pain Location - Orientation  incisional  -     Pain Location  back  -     Pain Intervention(s)  Repositioned;Medication (See MAR)  -     Row Name 03/06/19 1027          Edema Assessment & Management    Location (Edema)  upper extremity, left;upper extremity, right mild, soft  -JOLLY     Additional Documentation  Location (Edema) (Row)  -     Row Name             Wound 03/05/19 1414 Other (See comments) midline sternal incision    Wound - Properties Group Date first assessed: 03/05/19  -CF Time first assessed: 1414  -CF Present On Admission : no  -CF Side: Other (See comments)  -CF, midline sternal incision  Orientation: midline  -CF Location: sternal  -CF Type: incision  -CF Additional Comments: closed incision. dressings applied  -    Row Name             Wound 03/05/19 1414 Left leg incision    Wound - Properties Group Date first assessed: 03/05/19  -CF Time first assessed: 1414  -CF Present On Admission : no  -CF Side: Left  -CF Location: leg  -CF Type:  incision  - Additional Comments: closed incisions. dressings applied (skin affix, kerlex, ace)  -CF    Row Name 03/06/19 1027          Plan of Care Review    Plan of Care Reviewed With  patient  -JOLLY     Row Name 03/06/19 1027          Physical Therapy Clinical Impression    Date of Referral to PT  03/05/19  -     PT Diagnosis (PT Clinical Impression)  impaired gait and mobility s/p CABG  -     Prognosis (PT Clinical Impression)  good  -JOLLY     Patient/Family Goals Statement (PT Clinical Impression)  home with assistance  -     Criteria for Skilled Interventions Met (PT Clinical Impression)  yes;treatment indicated  -     Pathology/Pathophysiology Noted (Describe Specifically for Each System)  musculoskeletal;cardiovascular  -JOLLY     Impairments Found (describe specific impairments)  aerobic capacity/endurance;ergonomics and body mechanics;gait, locomotion, and balance;ventilation and respiration/gas exchange  -     Functional Limitations in Following Categories (Describe Specific Limitations)  self-care;home management;community/leisure  -     Disability: Inability to Perform Actions/Activities of Required Roles (describe specific disability)  community/leisure  -     Rehab Potential (PT Clinical Summary)  good, to achieve stated therapy goals  -     Predicted Duration of Therapy (PT)  until discharge or goals met  -     Care Plan Review (PT)  evaluation/treatment results reviewed;care plan/treatment goals reviewed;risks/benefits reviewed;current/potential barriers reviewed;patient/other agree to care plan  -     Row Name 03/06/19 1027          Vital Signs    Pre Systolic BP Rehab  99  -JOLLY     Pre Treatment Diastolic BP  65  -JOLLY     Intra Systolic BP Rehab  104  -JOLLY     Intra Treatment Diastolic BP  65  -JOLLY     Post Systolic BP Rehab  105  -JOLLY     Post Treatment Diastolic BP  57  -JOLLY     Pretreatment Heart Rate (beats/min)  73  -JOLLY     Intratreatment Heart Rate (beats/min)  77  -JOLLY      Posttreatment Heart Rate (beats/min)  75  -JOLLY     Pre SpO2 (%)  94  -JOLLY     O2 Delivery Pre Treatment  supplemental O2  -JOLLY     Intra SpO2 (%)  89  -JOLLY     O2 Delivery Intra Treatment  supplemental O2  -JOLLY     Post SpO2 (%)  90  -JOLLY     O2 Delivery Post Treatment  supplemental O2  -JOLLY     Pre Patient Position  Sitting  -JOLLY     Intra Patient Position  Sitting  -JOLLY     Post Patient Position  Sitting  -JOLLY     Row Name 03/06/19 1027          Physical Therapy Goals    Bed Mobility Goal Selection (PT)  bed mobility, PT goal 1  -JOLLY     Transfer Goal Selection (PT)  transfer, PT goal 1  -JOLLY     Gait Training Goal Selection (PT)  gait training, PT goal 1  -JOLLY     Stairs Goal Selection (PT)  stairs, PT goal 1  -JOLLY     Additional Documentation  Stairs Goal Selection (PT) (Row)  -     Row Name 03/06/19 1027          Bed Mobility Goal 1 (PT)    Activity/Assistive Device (Bed Mobility Goal 1, PT)  rolling to left;rolling to right;sit to supine;supine to sit HOB flat;no rails  -JOLLY     [place] Level/Cues Needed (Bed Mobility Goal 1, PT)  standby assist;independent  -JOLLY     Time Frame (Bed Mobility Goal 1, PT)  by discharge  -JOLLY     Progress/Outcomes (Bed Mobility Goal 1, PT)  goal not met  -     Row Name 03/06/19 1027          Transfer Goal 1 (PT)    Activity/Assistive Device (Transfer Goal 1, PT)  sit-to-stand/stand-to-sit;bed-to-chair/chair-to-bed  -JOLLY     [place] Level/Cues Needed (Transfer Goal 1, PT)  standby assist;conditional independence  -JOLLY     Time Frame (Transfer Goal 1, PT)  by discharge  -JOLLY     Progress/Outcome (Transfer Goal 1, PT)  goal not met  -     Row Name 03/06/19 1027          Gait Training Goal 1 (PT)    Activity/Assistive Device (Gait Training Goal 1, PT)  gait (walking locomotion)  -JOLLY     [place] Level (Gait Training Goal 1, PT)  standby assist  -JOLLY     Distance (Gait Goal 1, PT)  300ft  -JOLLY     Time Frame (Gait Training Goal 1, PT)  by discharge  -JOLLY     Progress/Outcome (Gait  Training Goal 1, PT)  goal not met  -     Row Name 03/06/19 1027          Stairs Goal 1 (PT)    Activity/Assistive Device (Stairs Goal 1, PT)  ascending stairs;descending stairs  -     [place] Level/Cues Needed (Stairs Goal 1, PT)  contact guard assist  -     Number of Stairs (Stairs Goal 1, PT)  2  -     Time Frame (Stairs Goal 1, PT)  by discharge  -     Progress/Outcome (Stairs Goal 1, PT)  goal not met  -     Row Name 03/06/19 1027          Positioning and Restraints    Pre-Treatment Position  sitting in chair/recliner  -     Post Treatment Position  chair  -JOLLY     In Chair  call light within reach;encouraged to call for assist;legs elevated;patient within staff view  -     Row Name 03/06/19 1027          Living Environment    Home Accessibility  stairs to enter home;tub/shower is not walk in  -       User Key  (r) = Recorded By, (t) = Taken By, (c) = Cosigned By    Initials Name Provider Type    Pamela Lozano, PT Physical Therapist    Aparna Rosales RN Registered Nurse        Physical Therapy Education     Title: PT OT SLP Therapies (In Progress)     Topic: Physical Therapy (In Progress)     Point: Mobility training (In Progress)     Learning Progress Summary           Patient Acceptance, E, NR by  at 3/6/2019  1:29 PM                   Point: Body mechanics (In Progress)     Learning Progress Summary           Patient Acceptance, E, NR by  at 3/6/2019  1:29 PM                   Point: Precautions (In Progress)     Learning Progress Summary           Patient Acceptance, E, NR by  at 3/6/2019  1:29 PM                               User Key     Initials Effective Dates Name Provider Type Discipline     04/03/18 -  Pamela Del Valle, PT Physical Therapist PT              PT Recommendation and Plan  Anticipated Discharge Disposition (PT): home with 24/7 care, home with home health(Progression to cardiac rehab as allowed by MD)  Planned Therapy Interventions (PT Eval): balance  training, bed mobility training, gait training, home exercise program, patient/family education, stair training, strengthening, transfer training  Therapy Frequency (PT Clinical Impression): other (see comments)(5-14 times per week)  Outcome Summary/Treatment Plan (PT)  Anticipated Discharge Disposition (PT): home with 24/7 care, home with home health(Progression to cardiac rehab as allowed by MD)  Plan of Care Reviewed With: patient  Outcome Summary: PT evaluation completed as co-eval with OT. Pt transferred sit to stand with min assistance of 2 and verbal/tactile cues to use legs more than UE's. Pt took 2 steps forward back with min assistance. Function limited by decreased strength, balance, and tolerance for functional mobility and activities s/p CABG. Pt will benefit from PT to regain lost function. Anticipate home with 24/7 assist as needed with HH therapy with progression to cardiac rehab as allowed by MD.  Outcome Measures     Row Name 03/06/19 1027             How much help from another person do you currently need...    Turning from your back to your side while in flat bed without using bedrails?  2  -JOLLY      Moving from lying on back to sitting on the side of a flat bed without bedrails?  2  -JOLLY      Moving to and from a bed to a chair (including a wheelchair)?  2  -JOLLY      Standing up from a chair using your arms (e.g., wheelchair, bedside chair)?  3  -JOLLY      Climbing 3-5 steps with a railing?  1  -JOLLY      To walk in hospital room?  2  -JOLLY      AM-PAC 6 Clicks Score  12  -JOLLY         Functional Assessment    Outcome Measure Options  AM-PAC 6 Clicks Basic Mobility (PT)  -JOLLY        User Key  (r) = Recorded By, (t) = Taken By, (c) = Cosigned By    Initials Name Provider Type    Pamela Lozano PT Physical Therapist         Time Calculation:   PT Charges     Row Name 03/06/19 1335             Time Calculation    Start Time  1027  -JOLLY      Stop Time  1055  -JOLLY      Time Calculation (min)  28 min  -JOLLY       PT Received On  03/06/19  -JOLLY      PT Goal Re-Cert Due Date  03/19/19  -JOLLY         Time Calculation- PT    Total Timed Code Minutes- PT  0 minute(s)  -JOLLY        User Key  (r) = Recorded By, (t) = Taken By, (c) = Cosigned By    Initials Name Provider Type    Pamela Lozano, PT Physical Therapist        Therapy Suggested Charges     Code   Minutes Charges    None           Therapy Charges for Today     Code Description Service Date Service Provider Modifiers Qty    [id number] HC PT EVAL MOD COMPLEXITY 2 3/6/2019 Pamela Del Valle, PT GP 1          PT G-Codes  Outcome Measure Options: AM-PAC 6 Clicks Basic Mobility (PT)  AM-PAC 6 Clicks Score: 12      Pamela Del Valle PT  3/6/2019

## 2019-03-06 NOTE — ANESTHESIA POSTPROCEDURE EVALUATION
Patient: Rhea Sutton    Procedure Summary     Date:  03/05/19 Room / Location:  [facility] OR  /  [facility] OR    Anesthesia Start:  1218 Anesthesia Stop:  1928    Procedure:  CORONARY ARTERY BYPASS GRAFTING ENDOSCOPIC VEIN HARVEST          (CELL SAVER)             LATEX ALLERGY leg start @ 1323 chest start @ 1328 vein out 1417 vein prep [id number] on pump 1456 off pump 1729 (N/A Chest) Diagnosis:       Coronary artery disease of native artery of native heart with stable angina pectoris (CMS/HCC)      (Coronary artery disease of native artery of native heart with stable angina pectoris (CMS/HCC) [I25.118])    Surgeon:  Jaren Vilchis MD Provider:  Tony Miller MD    Anesthesia Type:  general ASA Status:  4          Anesthesia Type: general  Last vitals  BP   169/99 (03/05/19 0932)   Temp   96.7 °F (35.9 °C) (03/05/19 0855)   Pulse   80 (03/05/19 1912)   Resp   14 (03/05/19 1912)     SpO2   100 % (03/05/19 1912)     Post Anesthesia Care and Evaluation    Patient location during evaluation: ICU  Patient participation: complete - patient cannot participate  Level of consciousness: obtunded/minimal responses  Pain score: 0  Pain management: adequate  Airway patency: patent  Anesthetic complications: No anesthetic complications  PONV Status: none  Cardiovascular status: acceptable and hemodynamically stable  Respiratory status: ETT, ventilator and acceptable  Hydration status: acceptable

## 2019-03-06 NOTE — PROGRESS NOTES
Discharge Planning Assessment  [facility]     Patient Name: Rhea Sutton  MRN: [id number]  Today's Date: 3/6/2019    Admit Date: 3/5/2019    Discharge Needs Assessment     Row Name 03/06/19 1100       Living Environment    Lives With  grandparent(s);child(gini), adult    Current Living Arrangements  home/apartment/condo    Primary Care Provided by  self    Provides Primary Care For  no one    Family Caregiver if Needed  none    Quality of Family Relationships  helpful;supportive    Able to Return to Prior Arrangements  yes    Living Arrangement Comments  Pt resides at home with her 23 year old son and grandmother.        Resource/Environmental Concerns    Resource/Environmental Concerns  none    Transportation Concerns  car, none       Transition Planning    Patient/Family Anticipates Transition to  home    Patient/Family Anticipated Services at Transition  home health care    Transportation Anticipated  family or friend will provide       Discharge Needs Assessment    Concerns to be Addressed  adjustment to diagnosis/illness    Equipment Currently Used at Home  none    Equipment Needed After Discharge  -- Awaiting therapy recomendations.     Discharge Facility/Level of Care Needs  home with home health    Current Discharge Risk  chronically ill        Discharge Plan     Row Name 03/06/19 1102       Plan    Plan Comments  LSW assessment complete. pt resides at home with 23 year old son and grandmother. pt reports good support system. pt informed LSW that she was independent prior to hospitalization.  Her goal is to return home at d/c. Pt agreeable to home health if recomended by provider. LSW awaiting additional recomendations from MD and therapy. LSW/case mgt will follow up as consulted and complete arrangements as ordered.         Destination      No service coordination in this encounter.      Durable Medical Equipment      No service coordination in this encounter.      Dialysis/Infusion      No  service coordination in this encounter.      Home Medical Care      No service coordination in this encounter.      Community Resources      No service coordination in this encounter.          Demographic Summary     Row Name 03/06/19 1059       General Information    Admission Type  inpatient    Referral Source  high risk screening    Reason for Consult  discharge planning    Preferred Language  English     Used During This Interaction  no       Contact Information    Contact Information Obtained for          Functional Status     Row Name 03/06/19 1059       Functional Status    Usual Activity Tolerance  good    Current Activity Tolerance  fair       Functional Status, IADL    Medications  independent    Meal Preparation  independent    Housekeeping  independent    Shopping  independent       Mental Status Summary    Recent Changes in Mental Status/Cognitive Functioning  no changes       Employment/    Employment Status  unemployed        Psychosocial    No documentation.       Abuse/Neglect    No documentation.       Legal    No documentation.       Substance Abuse    No documentation.       Patient Forms    No documentation.           JEFF Griggs

## 2019-03-07 LAB
GLUCOSE BLDC GLUCOMTR-MCNC: 132 MG/DL (ref 70–130)
GLUCOSE BLDC GLUCOMTR-MCNC: 133 MG/DL (ref 70–130)
GLUCOSE BLDC GLUCOMTR-MCNC: 159 MG/DL (ref 70–130)
GLUCOSE BLDC GLUCOMTR-MCNC: 161 MG/DL (ref 70–130)
MAGNESIUM SERPL-MCNC: 2.3 MG/DL (ref 1.6–2.3)
POTASSIUM BLD-SCNC: 3.9 MMOL/L (ref 3.5–5.1)

## 2019-03-07 PROCEDURE — 97116 GAIT TRAINING THERAPY: CPT

## 2019-03-07 PROCEDURE — 94799 UNLISTED PULMONARY SVC/PX: CPT

## 2019-03-07 PROCEDURE — 99024 POSTOP FOLLOW-UP VISIT: CPT | Performed by: NURSE PRACTITIONER

## 2019-03-07 PROCEDURE — 84132 ASSAY OF SERUM POTASSIUM: CPT | Performed by: NURSE PRACTITIONER

## 2019-03-07 PROCEDURE — 63710000001 INSULIN ASPART PER 5 UNITS: Performed by: NURSE PRACTITIONER

## 2019-03-07 PROCEDURE — 25010000002 ONDANSETRON PER 1 MG: Performed by: NURSE PRACTITIONER

## 2019-03-07 PROCEDURE — 83735 ASSAY OF MAGNESIUM: CPT | Performed by: NURSE PRACTITIONER

## 2019-03-07 PROCEDURE — 25010000002 VANCOMYCIN 5 G RECONSTITUTED SOLUTION: Performed by: NURSE PRACTITIONER

## 2019-03-07 PROCEDURE — 97530 THERAPEUTIC ACTIVITIES: CPT

## 2019-03-07 PROCEDURE — 97110 THERAPEUTIC EXERCISES: CPT

## 2019-03-07 PROCEDURE — 63710000001 INSULIN DETEMIR PER 5 UNITS: Performed by: NURSE PRACTITIONER

## 2019-03-07 PROCEDURE — 94760 N-INVAS EAR/PLS OXIMETRY 1: CPT

## 2019-03-07 PROCEDURE — 82962 GLUCOSE BLOOD TEST: CPT

## 2019-03-07 PROCEDURE — 97535 SELF CARE MNGMENT TRAINING: CPT

## 2019-03-07 RX ORDER — SODIUM CHLORIDE 0.9 % (FLUSH) 0.9 %
3 SYRINGE (ML) INJECTION EVERY 12 HOURS SCHEDULED
Status: DISCONTINUED | OUTPATIENT
Start: 2019-03-07 | End: 2019-03-12 | Stop reason: HOSPADM

## 2019-03-07 RX ORDER — SODIUM CHLORIDE 0.9 % (FLUSH) 0.9 %
3-10 SYRINGE (ML) INJECTION AS NEEDED
Status: DISCONTINUED | OUTPATIENT
Start: 2019-03-07 | End: 2019-03-12 | Stop reason: HOSPADM

## 2019-03-07 RX ADMIN — OXYCODONE HYDROCHLORIDE AND ACETAMINOPHEN 500 MG: 500 TABLET ORAL at 08:21

## 2019-03-07 RX ADMIN — FAMOTIDINE 20 MG: 20 TABLET ORAL at 20:46

## 2019-03-07 RX ADMIN — OXYCODONE HYDROCHLORIDE 10 MG: 5 TABLET ORAL at 05:45

## 2019-03-07 RX ADMIN — INSULIN DETEMIR 30 UNITS: 100 INJECTION, SOLUTION SUBCUTANEOUS at 20:47

## 2019-03-07 RX ADMIN — KETOROLAC TROMETHAMINE 10 MG: 10 TABLET, FILM COATED ORAL at 05:44

## 2019-03-07 RX ADMIN — ASPIRIN 81 MG: 81 TABLET, COATED ORAL at 08:21

## 2019-03-07 RX ADMIN — GUAIFENESIN 1200 MG: 600 TABLET, EXTENDED RELEASE ORAL at 20:46

## 2019-03-07 RX ADMIN — ACETAMINOPHEN 1000 MG: 500 TABLET ORAL at 20:46

## 2019-03-07 RX ADMIN — GUAIFENESIN 1200 MG: 600 TABLET, EXTENDED RELEASE ORAL at 08:21

## 2019-03-07 RX ADMIN — OXYCODONE HYDROCHLORIDE AND ACETAMINOPHEN 500 MG: 500 TABLET ORAL at 20:46

## 2019-03-07 RX ADMIN — ONDANSETRON 4 MG: 2 INJECTION INTRAMUSCULAR; INTRAVENOUS at 09:54

## 2019-03-07 RX ADMIN — KETOROLAC TROMETHAMINE 10 MG: 10 TABLET, FILM COATED ORAL at 14:03

## 2019-03-07 RX ADMIN — Medication 400 MG: at 08:20

## 2019-03-07 RX ADMIN — PRENATAL VIT W/ FE FUMARATE-FA TAB 27-0.8 MG 1 TABLET: 27-0.8 TAB at 08:21

## 2019-03-07 RX ADMIN — Medication 400 MG: at 20:46

## 2019-03-07 RX ADMIN — VANCOMYCIN HYDROCHLORIDE 1750 MG: 5 INJECTION, POWDER, LYOPHILIZED, FOR SOLUTION INTRAVENOUS at 01:03

## 2019-03-07 RX ADMIN — INSULIN ASPART 4 UNITS: 100 INJECTION, SOLUTION INTRAVENOUS; SUBCUTANEOUS at 20:47

## 2019-03-07 RX ADMIN — LEVOTHYROXINE SODIUM 200 MCG: 100 TABLET ORAL at 07:10

## 2019-03-07 RX ADMIN — INSULIN ASPART 4 UNITS: 100 INJECTION, SOLUTION INTRAVENOUS; SUBCUTANEOUS at 12:24

## 2019-03-07 RX ADMIN — CARVEDILOL 3.12 MG: 3.12 TABLET, FILM COATED ORAL at 20:46

## 2019-03-07 RX ADMIN — OXYCODONE HYDROCHLORIDE 5 MG: 5 TABLET ORAL at 01:03

## 2019-03-07 RX ADMIN — KETOROLAC TROMETHAMINE 10 MG: 10 TABLET, FILM COATED ORAL at 22:13

## 2019-03-07 RX ADMIN — FAMOTIDINE 20 MG: 20 TABLET ORAL at 08:20

## 2019-03-07 RX ADMIN — IPRATROPIUM BROMIDE AND ALBUTEROL SULFATE 3 ML: 2.5; .5 SOLUTION RESPIRATORY (INHALATION) at 21:16

## 2019-03-07 RX ADMIN — ATORVASTATIN CALCIUM 40 MG: 40 TABLET, FILM COATED ORAL at 20:46

## 2019-03-07 RX ADMIN — IPRATROPIUM BROMIDE AND ALBUTEROL SULFATE 3 ML: 2.5; .5 SOLUTION RESPIRATORY (INHALATION) at 07:07

## 2019-03-07 RX ADMIN — ACETAMINOPHEN 1000 MG: 500 TABLET ORAL at 14:03

## 2019-03-07 RX ADMIN — IPRATROPIUM BROMIDE AND ALBUTEROL SULFATE 3 ML: 2.5; .5 SOLUTION RESPIRATORY (INHALATION) at 15:59

## 2019-03-07 RX ADMIN — SERTRALINE HYDROCHLORIDE 50 MG: 50 TABLET ORAL at 22:13

## 2019-03-07 RX ADMIN — ACETAMINOPHEN 1000 MG: 500 TABLET ORAL at 08:20

## 2019-03-07 RX ADMIN — SENNOSIDES AND DOCUSATE SODIUM 1 TABLET: 8.6; 5 TABLET ORAL at 08:20

## 2019-03-07 NOTE — THERAPY TREATMENT NOTE
Acute Care - Occupational Therapy Treatment Note  [facility]     Patient Name: Rhea Sutton  : 1970  MRN: [id number]  Today's Date: 3/7/2019  Onset of Illness/Injury or Date of Surgery: 19  Date of Referral to OT: 19  Referring Physician: DUC High    Admit Date: 3/5/2019       ICD-10-CM ICD-9-CM   1. Coronary artery disease of native artery of native heart with stable angina pectoris (CMS/HCC) I25.118 414.01     413.9   2. Coronary artery disease involving native heart without angina pectoris, unspecified vessel or lesion type I25.10 414.01   3. Impaired functional mobility, balance, gait, and endurance Z74.09 V49.89   4. Impaired mobility and ADLs Z74.09 799.89     Patient Active Problem List   Diagnosis   • Postsurgical hypothyroidism   • Impaired fasting glucose   • Vitamin D deficiency   • Type 2 diabetes mellitus with hyperglycemia (CMS/[facility])   • Hypertension   • Hyperlipidemia   • DDD (degenerative disc disease), lumbar   • Lumbar radiculopathy   • Cervicalgia   • NSTEMI (non-ST elevated myocardial infarction) (CMS/HCC)   • CAD (coronary artery disease)   • AMI inferior wall (CMS/[facility])   • Head lice infestation   • Diabetes mellitus (CMS/HCC)   • Uncontrolled diabetes mellitus (CMS/HCC)   • Coronary artery disease of native artery of native heart with stable angina pectoris (CMS/HCC)   • Chronic kidney disease, stage 3 (moderate) (CMS/[facility])     Past Medical History:   Diagnosis Date   • CAD (coronary artery disease)    • Chronic back pain    • Chronic bronchitis (CMS/HCC)    • Diabetes mellitus (CMS/HCC)    • Diverticulitis 1986   • Goiter    • Hashimoto's thyroiditis    • Head lice infestation    • Hyperlipidemia    • Hypertension    • MI (myocardial infarction) (CMS/HCC)    • Postoperative hypothyroidism    • Sinus congestion    • Sleep apnea     not using c-pap     Past Surgical History:   Procedure Laterality Date   • APPENDECTOMY     • CARDIAC CATHETERIZATION  N/A 10/13/2017   • CARDIAC CATHETERIZATION N/A 1/3/2019   • CHOLECYSTECTOMY     • COLON RESECTION     • COLON SURGERY     • CORONARY STENT PLACEMENT     • RI RT/LT HEART CATHETERS N/A 10/13/2017   • TOTAL ABDOMINAL HYSTERECTOMY WITH SALPINGO OOPHORECTOMY     • TOTAL THYROIDECTOMY         Therapy Treatment    Rehabilitation Treatment Summary     Row Name 03/07/19 1100             Treatment Time/Intention    Discipline  occupational therapy assistant  -      Document Type  therapy note (daily note)  -      Subjective Information  complains of;fatigue;pain;dyspnea  -      Mode of Treatment  individual therapy;occupational therapy  -      Therapy Frequency (OT Eval)  daily  -      Patient Effort  good  -      Recorded by [] Fang Irvin CUTLER/L 03/07/19 1439      Row Name 03/07/19 1100             Vital Signs    Pre SpO2 (%)  96  -      O2 Delivery Pre Treatment  supplemental O2  -      Recorded by [] Fang Irvin CUTLER/L 03/07/19 1439      Row Name 03/07/19 1100             Cognitive Assessment/Intervention- PT/OT    Affect/Mental Status (Cognitive)  WNL  -      Orientation Status (Cognition)  oriented x 4  -      Recorded by [] Fang Irvin CUTLER/L 03/07/19 1439      Row Name 03/07/19 1100             Transfer Assessment/Treatment    Transfer Assessment/Treatment  bed-chair transfer;sit-stand transfer;stand-sit transfer;toilet transfer  -      Recorded by [] Fang Irvin CUTLER/L 03/07/19 1439      Row Name 03/07/19 1100             Sit-Stand Transfer    Sit-Stand [place] (Transfers)  supervision  -      Recorded by [] Fang Irvin COTA/L 03/07/19 1439      Row Name 03/07/19 1100             Stand-Sit Transfer    Stand-Sit [place] (Transfers)  supervision  -      Recorded by [] Fang Irvin CUTLER/L 03/07/19 1439      Row Name 03/07/19 1100             Toilet Transfer    Type (Toilet Transfer)  sit-stand;stand-sit  -      [place] Level (Toilet  Transfer)  supervision  -      Assistive Device (Toilet Transfer)  walker, front-wheeled  -      Recorded by [] Fang Irvin COTA/L 03/07/19 1439      Row Name 03/07/19 1100             Bathing Assessment/Intervention    Bathing [place] Level  bathing skills;upper body;lower body;minimum assist (75% patient effort)  -      Assistive Devices (Bathing)  bath mitt  -      Bathing Position  edge of bed sitting  -      Recorded by [] Fang Irvin COTA/L 03/07/19 1450      Row Name 03/07/19 1100             Upper Body Dressing Assessment/Training    Upper Body Dressing [place] Level  doff;don;pajama/robe;contact guard assist  -      Upper Body Dressing Position  edge of bed sitting  -      Recorded by [] Fang Irvin COTA/L 03/07/19 1450      Row Name 03/07/19 1100             Lower Body Dressing Assessment/Training    Lower Body Dressing [place] Level  socks;doff;don;pants/bottoms;moderate assist (50% patient effort)  -      Lower Body Dressing Position  unsupported sitting;unsupported standing  -      Recorded by [] Fang Irvin COTA/L 03/07/19 1450      Row Name 03/07/19 1100             Grooming Assessment/Training    [place] Level (Grooming)  wash face, hands;hair care, combing/brushing;supervision  -      Grooming Position  edge of bed sitting  -      Recorded by [] Fang Irvin COTA/L 03/07/19 1450      Row Name 03/07/19 1100             Toileting Assessment/Training    [place] Level (Toileting)  toileting skills;moderate assist (50% patient effort)  -      Assistive Devices (Toileting)  commode;grab bar/safety frame  -      Toileting Position  unsupported sitting  -      Recorded by [] Fang Irvin COTA/L 03/07/19 1450      Row Name 03/07/19 1100             Positioning and Restraints    Pre-Treatment Position  sitting in chair/recliner  -      Post Treatment Position  chair  -      In Chair  call light within  reach;sitting;encouraged to call for assist  -      Recorded by [JH] Fang Irvin CUTLER/L 03/07/19 1439      Row Name 03/07/19 1100             Pain Scale: Numbers Pre/Post-Treatment    Pain Scale: Numbers, Pretreatment  4/10  -      Pain Scale: Numbers, Post-Treatment  7/10  -      Pain Location - Orientation  generalized  -      Pain Location  back  -      Pain Intervention(s)  Repositioned  -      Recorded by [] Fang Irvin CUTLER/L 03/07/19 1439      Row Name                Wound 03/05/19 1414 Other (See comments) midline sternal incision    Wound - Properties Group Date first assessed: 03/05/19 [CF] Time first assessed: 1414 [CF] Present On Admission : no [CF] Side: Other (See comments) [CF], midline sternal incision  Orientation: midline [CF] Location: sternal [CF] Type: incision [CF] Additional Comments: closed incision. dressings applied [CF] Recorded by:  [CF] Aparna Eason RN 03/05/19 1414    Row Name                Wound 03/05/19 1414 Left leg incision    Wound - Properties Group Date first assessed: 03/05/19 [CF] Time first assessed: 1414 [CF] Present On Admission : no [CF] Side: Left [CF] Location: leg [CF] Type: incision [CF] Additional Comments: closed incisions. dressings applied (skin affix, kerlex, ace) [CF] Recorded by:  [CF] Aparna Eason RN 03/05/19 1415    Row Name 03/07/19 1100             Outcome Summary/Treatment Plan (OT)    Daily Summary of Progress (OT)  progress toward functional goals is good  -      Plan for Continued Treatment (OT)  Continue per POC   -      Anticipated Discharge Disposition (OT)  home with home health;home with OP services  -      Recorded by [] Fang Irvin COTA/NADIA 03/07/19 1439        User Key  (r) = Recorded By, (t) = Taken By, (c) = Cosigned By    Initials Name Effective Dates Discipline     Fang Irvin CUTLER/L 03/07/18 -  OT    CF Aparna Eason RN 10/17/16 -  Nurse        Wound 03/05/19 1414 Other (See comments) midline  sternal incision (Active)   Dressing Appearance dry;intact 3/7/2019  9:17 AM   Closure AYDEE 3/7/2019  9:17 AM   Base dressing in place, unable to visualize 3/7/2019  9:17 AM   Periwound intact 3/7/2019  9:17 AM   Periwound Temperature warm 3/7/2019  9:17 AM   Periwound Skin Turgor soft 3/7/2019  9:17 AM       Wound 03/05/19 1414 Left leg incision (Active)   Dressing Appearance open to air 3/7/2019  9:17 AM   Closure Liquid skin adhesive 3/7/2019  9:17 AM   Base clean 3/7/2019  9:17 AM   Periwound ecchymotic 3/7/2019  9:17 AM   Edges rolled/closed 3/7/2019  9:17 AM   Drainage Characteristics/Odor serosanguineous 3/7/2019  9:17 AM   Drainage Amount scant 3/7/2019  9:17 AM   Care, Wound cleansed with 3/7/2019  6:32 AM     Rehab Goal Summary     Row Name 03/07/19 1100             Occupational Therapy Goals    Transfer Goal Selection (OT)  transfer, OT goal 1  -      Bathing Goal Selection (OT)  bathing, OT goal 1  -JH      Dressing Goal Selection (OT)  dressing, OT goal 1  -JH      Toileting Goal Selection (OT)  toileting, OT goal 1  -JH      Endurance Goal Selection (OT)  endurance, OT goal 1  -JH      Functional Mobility Goal Selection (OT)  functional mobility, OT goal 1  -JH      Precaution Goal Selection (OT)  precaution, OT goal 1  -JH         Transfer Goal 1 (OT)    Activity/Assistive Device (Transfer Goal 1, OT)  toilet  -      [place] Level/Cues Needed (Transfer Goal 1, OT)  contact guard assist  -      Time Frame (Transfer Goal 1, OT)  long term goal (LTG);by discharge  -      Progress/Outcome (Transfer Goal 1, OT)  goal not met  -         Bathing Goal 1 (OT)    Activity/Assistive Device (Bathing Goal 1, OT)  bathing skills, all  -      [place] Level/Cues Needed (Bathing Goal 1, OT)  set-up required;verbal cues required;standby assist  -      Time Frame (Bathing Goal 1, OT)  long term goal (LTG);by discharge  -      Progress/Outcomes (Bathing Goal 1, OT)  goal not met  -          Dressing Goal 1 (OT)    Activity/Assistive Device (Dressing Goal 1, OT)  dressing skills, all  -      [place]/Cues Needed (Dressing Goal 1, OT)  set-up required;verbal cues required;standby assist  -      Time Frame (Dressing Goal 1, OT)  long term goal (LTG);by discharge  -      Progress/Outcome (Dressing Goal 1, OT)  goal not met  -         Toileting Goal 1 (OT)    Activity/Device (Toileting Goal 1, OT)  toileting skills, all  -      [place] Level/Cues Needed (Toileting Goal 1, OT)  set-up required;verbal cues required;contact guard assist  -      Time Frame (Toileting Goal 1, OT)  long term goal (LTG);by discharge  -      Progress/Outcome (Toileting Goal 1, OT)  goal not met  -          Endurance Goal 1 (OT)    Activity Level (Endurance Goal 1, OT)  endurance 2 fair + 25 min functional task O2 90 or up 3 or less rest breaks  -      Progress/Outcome (Endurance Goal 1, OT)  goal not met  -         Functional Mobility Goal 1 (OT)    Activity/Assistive Device (Functional Mobility Goal 1, OT)  walker, rolling  -      [place] Level/Cues Needed (Functional Mobility Goal 1, OT)  contact guard assist  -      Distance Goal 1 (Functional Mobility, OT)  to and from bathroom with good safety, balance and problem solving   -      Time Frame (Functional Mobility Goal 1, OT)  long term goal (LTG);by discharge  -      Progress/Outcome (Functional Mobility Goal 1, OT)  goal not met  -         Precaution Goal 1 (OT)    Activity (Precaution Goal 1, OT)  sternal precautions;fall precautions;demonstrates compliance;demonstrates understanding;during all tasks in daily routine;during ADLs;during mobility tasks;during transfers  -      [place] Level/Cues Needed (Precautions Goal 1, OT)  with minimum;verbal cues/redirection  -      Time Frame (Precaution Goal 1, OT)  long term goal (LTG);by discharge  -      Progress/Outcome (Precaution Goal 1, OT)  goal not met  -         Patient  Education Goal (OT)    Activity (Patient Education Goal, OT)  Pt will communicate good home safety awareness.   -      [place]/Cues/Accuracy (Memory Goal 2, OT)  verbalizes understanding  -      Time Frame (Patient Education Goal, OT)  long term goal (LTG);by discharge  -      Progress/Outcome (Patient Education Goal, OT)  goal not met  -        User Key  (r) = Recorded By, (t) = Taken By, (c) = Cosigned By    Initials Name Provider Type Discipline    Fang Pandya COTA/L Occupational Therapy Assistant OT        Occupational Therapy Education     Title: PT OT SLP Therapies (In Progress)     Topic: Occupational Therapy (In Progress)     Point: ADL training (Done)     Description: Instruct learner(s) on proper safety adaptation and remediation techniques during self care or transfers.   Instruct in proper use of assistive devices.    Learning Progress Summary           Patient Acceptance, E, VU by  at 3/7/2019  2:53 PM    Comment:  E/C-W/S tech's    Acceptance, E, NR by  at 3/6/2019  3:08 PM    Comment:  Educated about OT and POC. Educated to call for assist. Educated on safety throughout. Educated on CABG precuations. Educated on benefits of moving and doing therapy.                   Point: Precautions (Done)     Description: Instruct learner(s) on prescribed precautions during self-care and functional transfers.    Learning Progress Summary           Patient Acceptance, E, VU by  at 3/7/2019  2:53 PM    Comment:  E/C-W/S tech's    Acceptance, E, NR by  at 3/6/2019  3:08 PM    Comment:  Educated about OT and POC. Educated to call for assist. Educated on safety throughout. Educated on CABG precuations. Educated on benefits of moving and doing therapy.                   Point: Body mechanics (Done)     Description: Instruct learner(s) on proper positioning and spine alignment during self-care, functional mobility activities and/or exercises.    Learning Progress Summary           Patient  Acceptance, E, VU by  at 3/7/2019  2:53 PM    Comment:  E/C-W/S tech's    Acceptance, E, NR by  at 3/6/2019  3:08 PM    Comment:  Educated about OT and POC. Educated to call for assist. Educated on safety throughout. Educated on CABG precuations. Educated on benefits of moving and doing therapy.                               User Key     Initials Effective Dates Name Provider Type Discipline     06/08/18 -  Marlen Jaimes, OTR/L Occupational Therapist OT     03/07/18 -  Fang Irvin, CUTLER/L Occupational Therapy Assistant OT                OT Recommendation and Plan  Outcome Summary/Treatment Plan (OT)  Daily Summary of Progress (OT): progress toward functional goals is good  Plan for Continued Treatment (OT): Continue per POC   Anticipated Discharge Disposition (OT): home with home health, home with OP services  Therapy Frequency (OT Eval): daily  Daily Summary of Progress (OT): progress toward functional goals is good  Outcome Summary: Pt completed adl task with UB min-mod A, max A for LB, pt making progress toward OT goals,  OT/PT at d/c with cardiac rehab to follow   Outcome Measures     Row Name 03/07/19 1100 03/06/19 1027 03/06/19 1022       How much help from another person do you currently need...    Turning from your back to your side while in flat bed without using bedrails?  --  2  -JOLLY  --    Moving from lying on back to sitting on the side of a flat bed without bedrails?  --  2  -JOLLY  --    Moving to and from a bed to a chair (including a wheelchair)?  --  2  -JOLLY  --    Standing up from a chair using your arms (e.g., wheelchair, bedside chair)?  --  3  -JOLLY  --    Climbing 3-5 steps with a railing?  --  1  -JOLLY  --    To walk in hospital room?  --  2  -JOLLY  --    AM-PAC 6 Clicks Score  --  12  -JOLLY  --       How much help from another is currently needed...    Putting on and taking off regular lower body clothing?  1  -  --  1  -    Bathing (including washing, rinsing, and drying)  2  -  --   2  -    Toileting (which includes using toilet bed pan or urinal)  1  -  --  1  -    Putting on and taking off regular upper body clothing  2  -  --  2  -    Taking care of personal grooming (such as brushing teeth)  3  -  --  3  -    Eating meals  3  -  --  3  -    Score  12  -  --  12  -       Functional Assessment    Outcome Measure Options  --  AM-PAC 6 Clicks Basic Mobility (PT)  -  AM-[facility] 6 Clicks Daily Activity (OT)  -      User Key  (r) = Recorded By, (t) = Taken By, (c) = Cosigned By    Initials Name Provider Type     Marlen Jaimes, OTR/L Occupational Therapist    Pamela Lozano, PT Physical Therapist     Fang Irvin CUTLER/L Occupational Therapy Assistant           Time Calculation:   Time Calculation- OT     Row Name 03/07/19 1454             Time Calculation-     OT Start Time  1100  -      OT Stop Time  1155  -      OT Time Calculation (min)  55 min  -      OT Received On  03/07/19  -        User Key  (r) = Recorded By, (t) = Taken By, (c) = Cosigned By    Initials Name Provider Type     Fang Irvin, CUTLER/L Occupational Therapy Assistant           Therapy Suggested Charges     Code   Minutes Charges    None           Therapy Charges for Today     Code Description Service Date Service Provider Modifiers Qty    [id number]  OT THERAPEUTIC ACT EA 15 MIN 3/7/2019 Fang Irvin COTA/L GO 1    [id number]  OT SELF CARE/MGMT/TRAIN EA 15 MIN 3/7/2019 Fang Irvin COTA/L GO 3               ANTHONY Bay  3/7/2019

## 2019-03-07 NOTE — THERAPY TREATMENT NOTE
Acute Care - Physical Therapy Treatment Note  [facility]     Patient Name: Rhea Sutton  : 1970  MRN: [id number]  Today's Date: 3/7/2019  Onset of Illness/Injury or Date of Surgery: 19  Date of Referral to PT: 19  Referring Physician: DUC High    Admit Date: 3/5/2019    Visit Dx:    ICD-10-CM ICD-9-CM   1. Coronary artery disease of native artery of native heart with stable angina pectoris (CMS/HCC) I25.118 414.01     413.9   2. Coronary artery disease involving native heart without angina pectoris, unspecified vessel or lesion type I25.10 414.01   3. Impaired functional mobility, balance, gait, and endurance Z74.09 V49.89   4. Impaired mobility and ADLs Z74.09 799.89     Patient Active Problem List   Diagnosis   • Postsurgical hypothyroidism   • Impaired fasting glucose   • Vitamin D deficiency   • Type 2 diabetes mellitus with hyperglycemia (CMS/Formerly [facility])   • Hypertension   • Hyperlipidemia   • DDD (degenerative disc disease), lumbar   • Lumbar radiculopathy   • Cervicalgia   • NSTEMI (non-ST elevated myocardial infarction) (CMS/Formerly [facility])   • CAD (coronary artery disease)   • AMI inferior wall (CMS/Formerly [facility])   • Head lice infestation   • Diabetes mellitus (CMS/HCC)   • Uncontrolled diabetes mellitus (CMS/HCC)   • Coronary artery disease of native artery of native heart with stable angina pectoris (CMS/Formerly [facility])   • Chronic kidney disease, stage 3 (moderate) (CMS/Formerly [facility])       Therapy Treatment    Rehabilitation Treatment Summary     Row Name 19 1445 19 1100          Treatment Time/Intention    Discipline  physical therapy assistant  -LN  occupational therapy assistant  -     Document Type  therapy note (daily note)  -LN  therapy note (daily note)  -     Subjective Information  complains of;pain  -LN  complains of;fatigue;pain;dyspnea  -     Mode of Treatment  physical therapy  -LN  individual therapy;occupational therapy  -     Therapy Frequency (PT Clinical Impression)   other (see comments) 5-14 times per week  -LN  --     Therapy Frequency (OT Eval)  --  daily  -     Patient Effort  adequate  -LN  good  -     Existing Precautions/Restrictions  cardiac;oxygen therapy device and L/min;sternal  -LN  --     Recorded by [LN] Margarette Nava, PTA 03/07/19 1551 [] Fang Irvin CUTLER/L 03/07/19 1439     Row Name 03/07/19 1445 03/07/19 1100          Vital Signs    Post Systolic BP Rehab  108  -LN  --     Post Treatment Diastolic BP  78  -LN  --     Pretreatment Heart Rate (beats/min)  63  -LN  --     Intratreatment Heart Rate (beats/min)  63  -LN  --     Posttreatment Heart Rate (beats/min)  65  -LN  --     Pre SpO2 (%)  96  -LN  96  -     O2 Delivery Pre Treatment  supplemental O2  -LN  supplemental O2  -     Intra SpO2 (%)  91  -LN  --     Post SpO2 (%)  92  -LN  --     Pre Patient Position  Sitting in br  -LN  --     Intra Patient Position  Standing  -LN  --     Post Patient Position  Supine  -LN  --     Recorded by [LN] Margarette Nava, PTA 03/07/19 1551 [] Fang Irvin CUTLER/L 03/07/19 1439     Row Name 03/07/19 1445 03/07/19 1100          Cognitive Assessment/Intervention- PT/OT    Affect/Mental Status (Cognitive)  --  WNL  -     Orientation Status (Cognition)  oriented x 4  -LN  oriented x 4  -     Follows Commands (Cognition)  follows one step commands;75-90% accuracy;repetition of directions required;verbal cues/prompting required;physical/tactile prompts required  -LN  --     Recorded by [LN] Margarette Nava, PTA 03/07/19 1551 [] Fang Irvin CUTLER/L 03/07/19 1439     Row Name 03/07/19 1445             Safety Issues, Functional Mobility    Impairments Affecting Function (Mobility)  balance;endurance/activity tolerance;pain;strength  -LN      Recorded by [LN] Margarette Nava, PTA 03/07/19 1551      Row Name 03/07/19 1445             Bed Mobility Assessment/Treatment    Supine-Sit [place] (Bed Mobility)  not tested  -LN      Sit-Supine [place] (Bed  Mobility)  contact guard  -LN      Assistive Device (Bed Mobility)  head of bed elevated;bed rails  -LN      Recorded by [LN] Margarette Nava, PTA 03/07/19 1551      Row Name 03/07/19 1445 03/07/19 1100          Transfer Assessment/Treatment    Transfer Assessment/Treatment  sit-stand transfer;stand-sit transfer  -LN  bed-chair transfer;sit-stand transfer;stand-sit transfer;toilet transfer  -JH     Recorded by [LN] Margarette Nava, PTA 03/07/19 1551 [JH] Fang Irvin CUTLER/L 03/07/19 1439     Row Name 03/07/19 1445             Bed-Chair Transfer    Bed-Chair [place] (Transfers)  not tested  -LN      Recorded by [LN] Margarette Nava, PTA 03/07/19 1551      Row Name 03/07/19 1445 03/07/19 1100          Sit-Stand Transfer    Sit-Stand [place] (Transfers)  verbal cues;contact guard  -LN  supervision  -JH     Assistive Device (Sit-Stand Transfers)  walker, front-wheeled  -LN  --     Recorded by [LN] Margarette Nava, PTA 03/07/19 1551 [JH] Fang Irvin CUTLER/L 03/07/19 1439     Row Name 03/07/19 1445 03/07/19 1100          Stand-Sit Transfer    Stand-Sit [place] (Transfers)  verbal cues;contact guard  -LN  supervision  -JH     Assistive Device (Stand-Sit Transfers)  walker, front-wheeled  -LN  --     Recorded by [LN] Margarette Nava, PTA 03/07/19 1551 [JH] Fang Irvin CUTLER/L 03/07/19 1439     Row Name 03/07/19 1445 03/07/19 1100          Toilet Transfer    Type (Toilet Transfer)  sit-stand;stand-sit  -LN  sit-stand;stand-sit  -JH     [place] Level (Toilet Transfer)  supervision;contact guard  -LN  supervision  -JH     Assistive Device (Toilet Transfer)  walker, front-wheeled  -LN  walker, front-wheeled  -JH     Recorded by [LN] Margarette Nava, PTA 03/07/19 1551 [JH] Fang Irvin CUTLER/L 03/07/19 1439     Row Name 03/07/19 1445             Gait/Stairs Assessment/Training    Gait/Stairs Assessment/Training  gait/ambulation independence  -LN      [place] Level (Gait)  contact guard  -LN       Assistive Device (Gait)  walker, front-wheeled  -LN      Distance in Feet (Gait)  150  -LN      Recorded by [LN] Brandy Margarette S, PTA 03/07/19 1551      Row Name 03/07/19 1100             Bathing Assessment/Intervention    Bathing [place] Level  bathing skills;upper body;lower body;minimum assist (75% patient effort)  -      Assistive Devices (Bathing)  bath mitt  -      Bathing Position  edge of bed sitting  -      Recorded by [JH] Fang Irvin CUTLER/L 03/07/19 1450      Row Name 03/07/19 1100             Upper Body Dressing Assessment/Training    Upper Body Dressing [place] Level  doff;don;pajama/robe;contact guard assist  -      Upper Body Dressing Position  edge of bed sitting  -      Recorded by [] Fang Irvin COTA/L 03/07/19 1450      Row Name 03/07/19 1100             Lower Body Dressing Assessment/Training    Lower Body Dressing [place] Level  socks;doff;don;pants/bottoms;moderate assist (50% patient effort)  -      Lower Body Dressing Position  unsupported sitting;unsupported standing  -      Recorded by [] Fang Irvin COTA/L 03/07/19 1450      Row Name 03/07/19 1100             Grooming Assessment/Training    [place] Level (Grooming)  wash face, hands;hair care, combing/brushing;supervision  -      Grooming Position  edge of bed sitting  -      Recorded by [JH] Fang Irvin CUTLER/L 03/07/19 1450      Row Name 03/07/19 1100             Toileting Assessment/Training    [place] Level (Toileting)  toileting skills;moderate assist (50% patient effort)  -      Assistive Devices (Toileting)  commode;grab bar/safety frame  -      Toileting Position  unsupported sitting  -      Recorded by [] Fang Irvin CUTLER/L 03/07/19 1450      Row Name 03/07/19 1445             Lower Extremity Seated Therapeutic Exercise    Performed, Seated Lower Extremity (Therapeutic Exercise)  hip flexion/extension;ankle dorsiflexion/plantarflexion;LAQ (long arc quad),  "knee extension  -LN      Exercise Type, Seated Lower Extremity (Therapeutic Exercise)  AROM (active range of motion)  -LN      Sets/Reps Detail, Seated Lower Extremity (Therapeutic Exercise)  1/15  -LN      Recorded by [LN] Margarette Nava PTA 03/07/19 1551      Row Name 03/07/19 1445 03/07/19 1100          Positioning and Restraints    Pre-Treatment Position  --  sitting in chair/recliner  -JH     Post Treatment Position  bed  -LN  chair  -JH     In Bed  notified nsg;supine;call light within reach;encouraged to call for assist;exit alarm on  -LN  --     In Chair  --  call light within reach;sitting;encouraged to call for assist  -JH     Recorded by [LN] Margarette Nava PTA 03/07/19 1551 [JH] Fang Irvin COTA/L 03/07/19 1439     Row Name 03/07/19 1445 03/07/19 1100          Pain Scale: Numbers Pre/Post-Treatment    Pain Scale: Numbers, Pretreatment  -- \"15\"  -LN  4/10  -     Pain Scale: Numbers, Post-Treatment  9/10  -LN  7/10  -     Pain Location - Orientation  incisional  -LN  generalized  -     Pain Location  chest back  -LN  back  -     Pain Intervention(s)  -- meds not due  -LN  Repositioned  -JH     Recorded by [LN] Margarette Nava PTA 03/07/19 1551 [JH] Fang Irvin CUTLER/L 03/07/19 1439     Row Name 03/07/19 1445             Sensory Assessment/Intervention    Sensory General Assessment  -- to light touch in LE's  -LN      Recorded by [LN] Margarette Nava PTA 03/07/19 1551      Row Name 03/07/19 1445             Vision Assessment/Intervention    Visual Impairment/Limitations  corrective lenses full time  -LN      Recorded by [LN] Margarette Nava PTA 03/07/19 1551      Row Name 03/07/19 1445             Edema Assessment & Management    Location (Edema)  --  -LN      Recorded by [LN] Margarette Nava PTA 03/07/19 1551      Row Name                Wound 03/05/19 1414 Other (See comments) midline sternal incision    Wound - Properties Group Date first assessed: 03/05/19 [CF] Time first assessed: 1414 " [CF] Present On Admission : no [CF] Side: Other (See comments) [CF], midline sternal incision  Orientation: midline [CF] Location: sternal [CF] Type: incision [CF] Additional Comments: closed incision. dressings applied [CF] Recorded by:  [CF] Aparna Eason RN 03/05/19 1414    Row Name                Wound 03/05/19 1414 Left leg incision    Wound - Properties Group Date first assessed: 03/05/19 [CF] Time first assessed: 1414 [CF] Present On Admission : no [CF] Side: Left [CF] Location: leg [CF] Type: incision [CF] Additional Comments: closed incisions. dressings applied (skin affix, kerlex, ace) [CF] Recorded by:  [CF] Aparna Eason RN 03/05/19 1415    Row Name 03/07/19 1445             Plan of Care Review    Plan of Care Reviewed With  patient  -LN      Recorded by [LN] Margarette Nava PTA 03/07/19 1551      Row Name 03/07/19 1100             Outcome Summary/Treatment Plan (OT)    Daily Summary of Progress (OT)  progress toward functional goals is good  -      Plan for Continued Treatment (OT)  Continue per POC   -      Anticipated Discharge Disposition (OT)  home with home health;home with  services  -      Recorded by [JH] Fang Irvin COTA/L 03/07/19 1439      Row Name 03/07/19 1445             Outcome Summary/Treatment Plan (PT)    Plan for Continued Treatment (PT)  cont  -LN      Anticipated Discharge Disposition (PT)  home with 24/7 care;home with home health Progression to cardiac rehab as allowed by MD  -INDIA      Recorded by [LN] Margarette Nava PTA 03/07/19 1551        User Key  (r) = Recorded By, (t) = Taken By, (c) = Cosigned By    Initials Name Effective Dates Discipline    LN Margarette Nava PTA 03/07/18 -  PT    JH Fang Irvin CUTLER/L 03/07/18 -  OT    CF Aparna Eason RN 10/17/16 -  Nurse          Wound 03/05/19 1414 Other (See comments) midline sternal incision (Active)   Dressing Appearance dry;intact 3/7/2019  9:17 AM   Closure AYDEE 3/7/2019  9:17 AM   Base dressing in place,  unable to visualize 3/7/2019  9:17 AM   Periwound intact 3/7/2019  9:17 AM   Periwound Temperature warm 3/7/2019  9:17 AM   Periwound Skin Turgor soft 3/7/2019  9:17 AM       Wound 03/05/19 1414 Left leg incision (Active)   Dressing Appearance open to air 3/7/2019  9:17 AM   Closure Liquid skin adhesive 3/7/2019  9:17 AM   Base clean 3/7/2019  9:17 AM   Periwound ecchymotic 3/7/2019  9:17 AM   Edges rolled/closed 3/7/2019  9:17 AM   Drainage Characteristics/Odor serosanguineous 3/7/2019  9:17 AM   Drainage Amount scant 3/7/2019  9:17 AM   Care, Wound cleansed with 3/7/2019  6:32 AM       Rehab Goal Summary     Row Name 03/07/19 1445 03/07/19 1100          Physical Therapy Goals    Bed Mobility Goal Selection (PT)  bed mobility, PT goal 1  -LN  --     Transfer Goal Selection (PT)  transfer, PT goal 1  -LN  --     Gait Training Goal Selection (PT)  gait training, PT goal 1  -LN  --     Stairs Goal Selection (PT)  stairs, PT goal 1  -LN  --        Bed Mobility Goal 1 (PT)    Activity/Assistive Device (Bed Mobility Goal 1, PT)  rolling to left;rolling to right;sit to supine;supine to sit HOB flat;no rails  -LN  --     [place] Level/Cues Needed (Bed Mobility Goal 1, PT)  standby assist;independent  -LN  --     Time Frame (Bed Mobility Goal 1, PT)  by discharge  -LN  --     Progress/Outcomes (Bed Mobility Goal 1, PT)  goal not met  -LN  --        Transfer Goal 1 (PT)    Activity/Assistive Device (Transfer Goal 1, PT)  sit-to-stand/stand-to-sit;bed-to-chair/chair-to-bed  -LN  --     [place] Level/Cues Needed (Transfer Goal 1, PT)  standby assist;conditional independence  -LN  --     Time Frame (Transfer Goal 1, PT)  by discharge  -LN  --     Progress/Outcome (Transfer Goal 1, PT)  goal not met  -LN  --        Gait Training Goal 1 (PT)    Activity/Assistive Device (Gait Training Goal 1, PT)  gait (walking locomotion)  -LN  --     [place] Level (Gait Training Goal 1, PT)  standby assist  -LN  --     Distance  (Gait Goal 1, PT)  300ft  -LN  --     Time Frame (Gait Training Goal 1, PT)  by discharge  -LN  --     Progress/Outcome (Gait Training Goal 1, PT)  goal not met  -LN  --        Stairs Goal 1 (PT)    Activity/Assistive Device (Stairs Goal 1, PT)  ascending stairs;descending stairs  -LN  --     [place] Level/Cues Needed (Stairs Goal 1, PT)  contact guard assist  -LN  --     Number of Stairs (Stairs Goal 1, PT)  2  -LN  --     Time Frame (Stairs Goal 1, PT)  by discharge  -LN  --     Progress/Outcome (Stairs Goal 1, PT)  goal not met  -LN  --        Occupational Therapy Goals    Transfer Goal Selection (OT)  --  transfer, OT goal 1  -     Bathing Goal Selection (OT)  --  bathing, OT goal 1  -     Dressing Goal Selection (OT)  --  dressing, OT goal 1  -     Toileting Goal Selection (OT)  --  toileting, OT goal 1  -     Endurance Goal Selection (OT)  --  endurance, OT goal 1  -     Functional Mobility Goal Selection (OT)  --  functional mobility, OT goal 1  -     Precaution Goal Selection (OT)  --  precaution, OT goal 1  -        Transfer Goal 1 (OT)    Activity/Assistive Device (Transfer Goal 1, OT)  --  toilet  -     [place] Level/Cues Needed (Transfer Goal 1, OT)  --  contact guard assist  -     Time Frame (Transfer Goal 1, OT)  --  long term goal (LTG);by discharge  -     Progress/Outcome (Transfer Goal 1, OT)  --  goal not met  -        Bathing Goal 1 (OT)    Activity/Assistive Device (Bathing Goal 1, OT)  --  bathing skills, all  -     [place] Level/Cues Needed (Bathing Goal 1, OT)  --  set-up required;verbal cues required;standby assist  -     Time Frame (Bathing Goal 1, OT)  --  long term goal (LTG);by discharge  -     Progress/Outcomes (Bathing Goal 1, OT)  --  goal not met  -        Dressing Goal 1 (OT)    Activity/Assistive Device (Dressing Goal 1, OT)  --  dressing skills, all  -     [place]/Cues Needed (Dressing Goal 1, OT)  --  set-up required;verbal cues  required;standby assist  -     Time Frame (Dressing Goal 1, OT)  --  long term goal (LTG);by discharge  -     Progress/Outcome (Dressing Goal 1, OT)  --  goal not met  -        Toileting Goal 1 (OT)    Activity/Device (Toileting Goal 1, OT)  --  toileting skills, all  -     [place] Level/Cues Needed (Toileting Goal 1, OT)  --  set-up required;verbal cues required;contact guard assist  -     Time Frame (Toileting Goal 1, OT)  --  long term goal (LTG);by discharge  -     Progress/Outcome (Toileting Goal 1, OT)  --  goal not met  -         Endurance Goal 1 (OT)    Activity Level (Endurance Goal 1, OT)  --  endurance 2 fair + 25 min functional task O2 90 or up 3 or less rest breaks  -     Progress/Outcome (Endurance Goal 1, OT)  --  goal not met  -        Functional Mobility Goal 1 (OT)    Activity/Assistive Device (Functional Mobility Goal 1, OT)  --  walker, rolling  -     [place] Level/Cues Needed (Functional Mobility Goal 1, OT)  --  contact guard assist  -     Distance Goal 1 (Functional Mobility, OT)  --  to and from bathroom with good safety, balance and problem solving   -     Time Frame (Functional Mobility Goal 1, OT)  --  long term goal (LTG);by discharge  -     Progress/Outcome (Functional Mobility Goal 1, OT)  --  goal not met  -        Precaution Goal 1 (OT)    Activity (Precaution Goal 1, OT)  --  sternal precautions;fall precautions;demonstrates compliance;demonstrates understanding;during all tasks in daily routine;during ADLs;during mobility tasks;during transfers  [facility]     [place] Level/Cues Needed (Precautions Goal 1, OT)  --  with minimum;verbal cues/redirection  -     Time Frame (Precaution Goal 1, OT)  --  long term goal (LTG);by discharge  -     Progress/Outcome (Precaution Goal 1, OT)  --  goal not met  -        Patient Education Goal (OT)    Activity (Patient Education Goal, OT)  --  Pt will communicate good home safety awareness.   -      [place]/Cues/Accuracy (Memory Goal 2, OT)  --  verbalizes understanding  -     Time Frame (Patient Education Goal, OT)  --  long term goal (LTG);by discharge  -     Progress/Outcome (Patient Education Goal, OT)  --  goal not met  -       User Key  (r) = Recorded By, (t) = Taken By, (c) = Cosigned By    Initials Name Provider Type Discipline    Margarette Poon PTA Physical Therapy Assistant PT     Fang Irvin COTA/L Occupational Therapy Assistant OT          Physical Therapy Education     Title: PT OT SLP Therapies (In Progress)     Topic: Physical Therapy (Done)     Point: Mobility training (Done)     Learning Progress Summary           Patient Acceptance, E,TB, VU by INDIA at 3/7/2019  3:53 PM    Comment:  cardia/sternal precautions    Acceptance, E, NR by JOLLY at 3/6/2019  1:29 PM                   Point: Home exercise program (Done)     Learning Progress Summary           Patient Acceptance, E,TB, VU by INDIA at 3/7/2019  3:53 PM    Comment:  cardia/sternal precautions                   Point: Body mechanics (Done)     Learning Progress Summary           Patient Acceptance, E,TB, VU by INDIA at 3/7/2019  3:53 PM    Comment:  cardia/sternal precautions    Acceptance, E, NR by JOLLY at 3/6/2019  1:29 PM                   Point: Precautions (Done)     Learning Progress Summary           Patient Acceptance, E,TB, VU by INDIA at 3/7/2019  3:53 PM    Comment:  cardia/sternal precautions    Acceptance, E, NR by JOLLY at 3/6/2019  1:29 PM                               User Key     Initials Effective Dates Name Provider Type Discipline    JOLLY 04/03/18 -  Pamela Del Valle, PT Physical Therapist PT    INDIA 03/07/18 -  Margarette Nava PTA Physical Therapy Assistant PT                PT Recommendation and Plan  Anticipated Discharge Disposition (PT): home with 24/7 care, home with home health(Progression to cardiac rehab as allowed by MD)  Therapy Frequency (PT Clinical Impression): other (see comments)(5-14 times per  week)  Outcome Summary/Treatment Plan (PT)  Plan for Continued Treatment (PT): cont  Anticipated Discharge Disposition (PT): home with 24/7 care, home with home health(Progression to cardiac rehab as allowed by MD)  Plan of Care Reviewed With: patient  Progress: improving  Outcome Summary: sit-sup cga of 1,sit-stand-sit cga/sba of 1,amb 150' with rw and cga of 1-no new goals met-if d/c would benefit from hh pt followed by cardiac rehab  Outcome Measures     Row Name 03/07/19 1445 03/07/19 1100 03/06/19 1027       How much help from another person do you currently need...    Turning from your back to your side while in flat bed without using bedrails?  2  -LN  --  2  -JOLLY    Moving from lying on back to sitting on the side of a flat bed without bedrails?  2  -LN  --  2  -JOLLY    Moving to and from a bed to a chair (including a wheelchair)?  3  -LN  --  2  -JOLLY    Standing up from a chair using your arms (e.g., wheelchair, bedside chair)?  3  -LN  --  3  -JOLLY    Climbing 3-5 steps with a railing?  2  -LN  --  1  -JOLLY    To walk in hospital room?  3  -LN  --  2  -JOLLY    AM-PAC 6 Clicks Score  15  -LN  --  12  -JOLLY       How much help from another is currently needed...    Putting on and taking off regular lower body clothing?  --  1  -JH  --    Bathing (including washing, rinsing, and drying)  --  2  -JH  --    Toileting (which includes using toilet bed pan or urinal)  --  1  -JH  --    Putting on and taking off regular upper body clothing  --  2  -JH  --    Taking care of personal grooming (such as brushing teeth)  --  3  -JH  --    Eating meals  --  3  -JH  --    Score  --  12  -JH  --       Functional Assessment    Outcome Measure Options  AM-PAC 6 Clicks Basic Mobility (PT)  -LN  --  AM-PAC 6 Clicks Basic Mobility (PT)  -JOLLY    Row Name 03/06/19 1022             How much help from another is currently needed...    Putting on and taking off regular lower body clothing?  1  -BH      Bathing (including washing, rinsing, and  drying)  2  -BH      Toileting (which includes using toilet bed pan or urinal)  1  -BH      Putting on and taking off regular upper body clothing  2  -BH      Taking care of personal grooming (such as brushing teeth)  3  -      Eating meals  3  -      Score  12  -         Functional Assessment    Outcome Measure Options  AM-PAC 6 Clicks Daily Activity (OT)  -        User Key  (r) = Recorded By, (t) = Taken By, (c) = Cosigned By    Initials Name Provider Type     Marlen Jaimes, OTR/L Occupational Therapist    Pamela Lozano, PT Physical Therapist    LN Margarette Nava PTA Physical Therapy Assistant    Fang Pandya, CUTLER/L Occupational Therapy Assistant         Time Calculation:   PT Charges     Row Name 03/07/19 1445             Time Calculation    Start Time  1445  -LN      Stop Time  1525  -LN      Time Calculation (min)  40 min  -LN      PT Received On  03/07/19  -LN         Time Calculation- PT    Total Timed Code Minutes- PT  40 minute(s)  -LN        User Key  (r) = Recorded By, (t) = Taken By, (c) = Cosigned By    Initials Name Provider Type    LN Margarette Nava PTA Physical Therapy Assistant        Therapy Suggested Charges     Code   Minutes Charges    None           Therapy Charges for Today     Code Description Service Date Service Provider Modifiers Qty    30245203665 HC GAIT TRAINING EA 15 MIN 3/7/2019 Margarette Nava PTA GP 1    93743131985 HC PT THERAPEUTIC ACT EA 15 MIN 3/7/2019 Margarette Nava PTA GP 1    51916140876 HC PT THER PROC EA 15 MIN 3/7/2019 Margarette Nava PTA GP 1          PT G-Codes  Outcome Measure Options: AM-PAC 6 Clicks Basic Mobility (PT)  AM-PAC 6 Clicks Score: 15  Score: 12    Margarette Nava PTA  3/7/2019

## 2019-03-07 NOTE — PLAN OF CARE
Problem: Patient Care Overview  Goal: Plan of Care Review  Outcome: Ongoing (interventions implemented as appropriate)   03/07/19 0549   Coping/Psychosocial   Plan of Care Reviewed With patient   Plan of Care Review   Progress improving   OTHER   Outcome Summary Pt. pain somewhat controlled with pain medication. Pt. ambulates well with assist x2. VSS, will continue to monitor.        Problem: Skin Injury Risk (Adult)  Goal: Skin Health and Integrity  Outcome: Ongoing (interventions implemented as appropriate)      Problem: Fall Risk (Adult)  Goal: Absence of Fall  Outcome: Ongoing (interventions implemented as appropriate)      Problem: Cardiac Surgery (Adult)  Goal: Signs and Symptoms of Listed Potential Problems Will be Absent, Minimized or Managed (Cardiac Surgery)  Outcome: Ongoing (interventions implemented as appropriate)      Problem: Diabetes, Type 2 (Adult)  Goal: Signs and Symptoms of Listed Potential Problems Will be Absent, Minimized or Managed (Diabetes, Type 2)  Outcome: Ongoing (interventions implemented as appropriate)

## 2019-03-07 NOTE — PLAN OF CARE
Problem: Patient Care Overview  Goal: Plan of Care Review  Outcome: Ongoing (interventions implemented as appropriate)  Heart Healthy Diet info and Making Lifestyle Changes for a Healthier Weight used to provide ed regarding Heart Healthy Low Sodium Diet and diet copies given.   03/07/19 4351   Coping/Psychosocial   Plan of Care Reviewed With patient   Plan of Care Review   Progress improving   OTHER   Outcome Summary Transfered to Acute Services. Intake 25% - 2x.

## 2019-03-07 NOTE — PLAN OF CARE
Problem: Patient Care Overview  Goal: Plan of Care Review   03/07/19 1445   Coping/Psychosocial   Plan of Care Reviewed With patient   Plan of Care Review   Progress improving   OTHER   Outcome Summary sit-sup cga of 1,sit-stand-sit cga/sba of 1,amb 150' with rw and cga of 1-no new goals met-if d/c would benefit from hh pt followed by cardiac rehab     Goal: Discharge Needs Assessment   03/06/19 1100 03/06/19 3533   Discharge Needs Assessment   Readmission Within the Last 30 Days --  no previous admission in last 30 days   Concerns to be Addressed --  adjustment to diagnosis/illness   Patient/Family Anticipates Transition to --  home with family   Patient/Family Anticipated Services at Transition --  home health care   Transportation Concerns car, none --    Transportation Anticipated --  family or friend will provide   Anticipated Changes Related to Illness --  none   Equipment Needed After Discharge --  none   Discharge Facility/Level of Care Needs --  home with home health   Current Discharge Risk --  chronically ill;dependent with mobility/activities of daily living;physical impairment   Disability   Equipment Currently Used at Home --  none

## 2019-03-07 NOTE — CONSULTS
Adult Nutrition  Assessment    Patient Name:  Rhea Sutton  YOB: 1970  MRN: [id number]  Admit Date:  3/5/2019    Assessment Date:  3/7/2019    Comments:  Pt reports usual appetite.  Indicated she would forget about having the milk with her meals.  Voiced no new food preferences.  Intake 25% - 2x.  Blood glucose is usually elevated.  Levemir insulin, sliding scale novolog prescribed.  Hgb, Hct are low.  Prenatal Vit, Vit C prescribed.  Reports having nausea.  Pepcid, PNR Zofran prescribed.  Pt with dx CAD and is S/P CABG.  BMI 42 with pt at 204% IBW.  Heart Healthy Diet info and Making Lifestyle Changes for a Healthier Weight used to provide ed regarding Heart Healthy Wt Loss Diet and diet copy given.       Reason for Assessment     Row Name 03/07/19 1605          Reason for Assessment    Reason For Assessment  per organizational policy Wt Loss Heart Healthy Diet ed     Diagnosis  other (see comments) dx CAD, dx Morbid Obesity     Identified At Risk by Screening Criteria  BMI;need for education             Labs/Tests/Procedures/Meds     Row Name 03/07/19 1606          Labs/Procedures/Meds    Lab Results Reviewed  reviewed, pertinent        Medications    Pertinent Medications Reviewed  reviewed, pertinent         Physical Findings     Row Name 03/07/19 1606          Physical Findings    Skin  other (see comments) right anterior groin puncture, left leg incision, midline sternal surgical incision         Estimated/Assessed Needs     Row Name 03/07/19 1607          Calculation Measurements    Weight Used For Calculations  71.7 kg (158 lb 1.1 oz)        Estimated/Assessed Needs    Additional Documentation  Calorie Requirements (Group);Fluid Requirements (Group);[place]-StRasheed Coreas Equation (Group);Protein Requirements (Group)        Calorie Requirements    Estimated Calorie Requirement (kcal/day)  1752     Estimated Calorie Need Method  [place]-St Coreas        KCAL/KG    14 Kcal/Kg (kcal)  1003.8      15 Kcal/Kg (kcal)  1075.5     18 Kcal/Kg (kcal)  1290.6     20 Kcal/Kg (kcal)  1434     25 Kcal/Kg (kcal)  1792.5     30 Kcal/Kg (kcal)  2151     35 Kcal/Kg (kcal)  2509.5     40 Kcal/Kg (kcal)  2868     45 Kcal/Kg (kcal)  3226.5     50 Kcal/Kg (kcal)  3585        [place]-St. Jeor Equation    RMR ([place]-St. Jeor Equation)  1347.88        Protein Requirements    Est Protein Requirement Amount (gms/kg)  1.2 gm protein     Estimated Protein Requirements (gms/day)  86.04        Fluid Requirements    Estimated Fluid Requirements (mL/day)  2151     RDA Method (mL)  2151     Jonathan-Bessie Method (over 20 kg)  2934         Nutrition Prescription Ordered     Row Name 03/07/19 1610          Nutrition Prescription PO    Current PO Diet  Regular     Supplement  Milk     Supplement Frequency  3 times a day     Common Modifiers  Cardiac;Consistent Carbohydrate         Evaluation of Received Nutrient/Fluid Intake     Row Name 03/07/19 1610 03/07/19 1607       Calculation Measurements    Weight Used For Calculations  --  71.7 kg (158 lb 1.1 oz)       PO Evaluation    Number of Meals  2  --    % PO Intake  25% - 2x  --        Evaluation of Prescribed Nutrient/Fluid Intake     Row Name 03/07/19 1607          Calculation Measurements    Weight Used For Calculations  71.7 kg (158 lb 1.1 oz)             Electronically signed by:  Benita Toscano RD  03/07/19 4:12 PM

## 2019-03-07 NOTE — PROGRESS NOTES
"  Cardiothoracic - Vascular Surgery Daily Note        LOS: 2 days   Patient Care Team:  Anderson Abbott MD as PCP - General (Family Medicine)    POD#2  CORONARY ARTERY BYPASS GRAFTING x4  ENDOSCOPIC VEIN HARVEST           LEFT INTERNAL MAMMARY ARTERY TO LEFT ANTERIOR DESCENDING CORONARY ARTERY  SAPHENOUS VEIN GRAFT FROM ASCENDING AORTA TO FIRST DIAGONAL BRANCH  SAPHENOUS VEIN GRAFT FROM ASCENDING AORTA TO THIRD MARGINAL BRANCH  SAPHENOUS VEIN GRAFT FROM ASCENDING AORTA TO POSTERIOR DESCENDING CORONARY ARTERY       Chief Complaint: CAD, Surgical Discomfort      Subjective     The following portions of the patient's history were reviewed and updated as appropriate: allergies, current medications, past family history, past medical history, past social history, past surgical history and problem list.     Subjective:  Symptoms:  Stable.  She reports shortness of breath and chest pain (surgical).    Diet:  Adequate intake.    Activity level: Impaired due to pain.    Pain:  She complains of pain that is moderate.  Pain is requiring pain medication.          Objective     Vital Signs  Temp:  [96.8 °F (36 °C)-98.5 °F (36.9 °C)] 97 °F (36.1 °C)  Heart Rate:  [65-98] 68  Resp:  [16-25] 18  BP: ()/(54-67) 97/58  Body mass index is 42.6 kg/m².    Intake/Output Summary (Last 24 hours) at 3/7/2019 1124  Last data filed at 3/7/2019 0917  Gross per 24 hour   Intake 780 ml   Output 1320 ml   Net -540 ml     I/O this shift:  In: 300 [P.O.:300]  Out: -     Wt Readings from Last 3 Encounters:   03/07/19 116 kg (256 lb)   02/28/19 112 kg (248 lb)   02/12/19 112 kg (248 lb)     CT 640cc No Air Leak H2O seal    Physical Exam   Objective:  General Appearance:  Comfortable.    Vital signs: (most recent): Blood pressure 97/58, pulse 68, temperature 97 °F (36.1 °C), temperature source Temporal, resp. rate 18, height 165.1 cm (65\"), weight 116 kg (256 lb), SpO2 94 %, not currently breastfeeding.  Vital signs are normal.  No fever.  "   Output: Producing urine and producing stool.    Lungs:  Normal effort and normal respiratory rate.  There are decreased breath sounds (bases).    Heart: Normal rate.  Regular rhythm.    Chest: (CT No Air Leak  AV Wires to chest)  Abdomen: Abdomen is soft.  Hypoactive bowel sounds.     Extremities: Decreased range of motion.  There is dependent edema (generalized).    Pulses: Distal pulses are intact.    Neurological: Patient is alert and oriented to person, place and time.    Pupils:  Pupils are equal, round, and reactive to light.    Skin:  Warm and dry.  (M/S [place]  [facility])              Results Review:    Lab Results   Component Value Date    WBC 14.86 (H) 03/06/2019    HGB 8.5 (L) 03/06/2019    HCT 26.7 (L) 03/06/2019    MCV 90.8 03/06/2019     (L) 03/06/2019     Lab Results   Component Value Date    GLUCOSE 110 (H) 03/06/2019    BUN 8 03/06/2019    CREATININE 0.83 03/06/2019    EGFRIFNONA 73 03/06/2019    BCR 9.6 03/06/2019    K 3.9 03/07/2019    CO2 24.0 03/06/2019    CALCIUM 8.2 (L) 03/06/2019    ALBUMIN 3.00 (L) 03/05/2019    AST 39 (H) 01/17/2019    ALT 41 01/17/2019       Calcium Calcium   Date/Time Value Ref Range Status   03/06/2019 0503 8.2 (L) 8.4 - 10.2 mg/dL Final   03/05/2019 1939 6.9 (L) 8.4 - 10.2 mg/dL Final      Magnesium Magnesium   Date/Time Value Ref Range Status   03/07/2019 0546 2.3 1.6 - 2.3 mg/dL Final   03/06/2019 0503 2.6 (H) 1.6 - 2.3 mg/dL Final   03/05/2019 1939 3.3 (H) 1.6 - 2.3 mg/dL Final        Imaging Results (last 24 hours)     ** No results found for the last 24 hours. **                                  acetaminophen 1,000 mg Oral Q6H   amiodarone 400 mg Oral Q24H   aspirin 81 mg Oral Daily   atorvastatin 40 mg Oral Nightly   carvedilol 3.125 mg Oral Q12H   famotidine 20 mg Oral BID   guaiFENesin 1,200 mg Oral Q12H   insulin aspart 0-24 Units Subcutaneous 4x Daily AC & at Bedtime   insulin detemir 30 Units Subcutaneous Nightly   ipratropium-albuterol 3 mL  Nebulization 4x Daily - RT   ketorolac 10 mg Oral Q8H   levothyroxine 200 mcg Oral QAM   magnesium oxide 400 mg Oral BID   prenatal vitamin 27-0.8 1 tablet Oral Daily   sennosides-docusate sodium 1 tablet Oral BID   sertraline 50 mg Oral Nightly   vitamin C 500 mg Oral BID                ASSESSMENT/PLAN       Coronary artery disease of native artery of native heart with stable angina pectoris (CMS/HCC)      Assessment & Plan    Stable Post op CABG: Progressing    CAD: ASA, BRILINTA, BETA. STATIN Hold ACE.     Resp: O2 support. Incentive. CT H2O seal.     Pain: Tylenol. OxyIR. Toradol    Rehab: OOB. Ambulate. PT/OT    DM/Transient Post Op Hyperglycemia: DC Insulin gtt on transfer. SSI coverage. Levemir    Depression: Zoloft    Acute Blood loss Anemia: Asymptomatic. Iron supplement    Disp: Stable Progress care 3W telemetry. DC Planning 1-2 days          This document has been electronically signed by DUC Dash on March 7, 2019 11:24 AM

## 2019-03-07 NOTE — NURSING NOTE
Pt. Refused novolog and levemir this evening. Pt. FSBS 176. Pt educated on need for medication and its help with the healing process post CABG. Pt. Still refuses.

## 2019-03-07 NOTE — PLAN OF CARE
Problem: Patient Care Overview  Goal: Plan of Care Review  Outcome: Ongoing (interventions implemented as appropriate)   03/07/19 0549 03/07/19 1100   Coping/Psychosocial   Plan of Care Reviewed With patient --    Plan of Care Review   Progress improving --    OTHER   Outcome Summary --  Pt completed adl task with UB min-mod A, max A for LB, pt making progress toward OT goals, HH OT/PT at d/c with cardiac rehab to follow

## 2019-03-08 LAB
ALBUMIN SERPL-MCNC: 3.2 G/DL (ref 3.4–4.8)
ALBUMIN/GLOB SERPL: 1.2 G/DL (ref 1.1–1.8)
ALP SERPL-CCNC: 79 U/L (ref 38–126)
ALT SERPL W P-5'-P-CCNC: 67 U/L (ref 9–52)
ANION GAP SERPL CALCULATED.3IONS-SCNC: 8 MMOL/L (ref 5–15)
AST SERPL-CCNC: 73 U/L (ref 14–36)
BILIRUB SERPL-MCNC: 0.3 MG/DL (ref 0.2–1.3)
BUN BLD-MCNC: 17 MG/DL (ref 7–21)
BUN/CREAT SERPL: 21.3 (ref 7–25)
CALCIUM SPEC-SCNC: 7.8 MG/DL (ref 8.4–10.2)
CHLORIDE SERPL-SCNC: 103 MMOL/L (ref 95–110)
CO2 SERPL-SCNC: 23 MMOL/L (ref 22–31)
CREAT BLD-MCNC: 0.8 MG/DL (ref 0.5–1)
DEPRECATED RDW RBC AUTO: 47 FL (ref 37–54)
ERYTHROCYTE [DISTWIDTH] IN BLOOD BY AUTOMATED COUNT: 13.8 % (ref 12.3–15.4)
GFR SERPL CREATININE-BSD FRML MDRD: 77 ML/MIN/1.73 (ref 58–135)
GLOBULIN UR ELPH-MCNC: 2.7 GM/DL (ref 2.3–3.5)
GLUCOSE BLD-MCNC: 123 MG/DL (ref 60–100)
GLUCOSE BLDC GLUCOMTR-MCNC: 108 MG/DL (ref 70–130)
GLUCOSE BLDC GLUCOMTR-MCNC: 121 MG/DL (ref 70–130)
GLUCOSE BLDC GLUCOMTR-MCNC: 122 MG/DL (ref 70–130)
GLUCOSE BLDC GLUCOMTR-MCNC: 128 MG/DL (ref 70–130)
GLUCOSE BLDC GLUCOMTR-MCNC: 132 MG/DL (ref 70–130)
HCT VFR BLD AUTO: 30.8 % (ref 34–46.6)
HGB BLD-MCNC: 9.4 G/DL (ref 12–15.9)
MCH RBC QN AUTO: 28.7 PG (ref 26.6–33)
MCHC RBC AUTO-ENTMCNC: 30.5 G/DL (ref 31.5–35.7)
MCV RBC AUTO: 93.9 FL (ref 79–97)
PLATELET # BLD AUTO: 150 10*3/MM3 (ref 140–450)
PMV BLD AUTO: 10.4 FL (ref 6–12)
POTASSIUM BLD-SCNC: 4.3 MMOL/L (ref 3.5–5.1)
PROT SERPL-MCNC: 5.9 G/DL (ref 6.3–8.6)
RBC # BLD AUTO: 3.28 10*6/MM3 (ref 3.77–5.28)
SODIUM BLD-SCNC: 134 MMOL/L (ref 137–145)
WBC NRBC COR # BLD: 14.68 10*3/MM3 (ref 3.4–10.8)

## 2019-03-08 PROCEDURE — 80053 COMPREHEN METABOLIC PANEL: CPT | Performed by: NURSE PRACTITIONER

## 2019-03-08 PROCEDURE — 97535 SELF CARE MNGMENT TRAINING: CPT

## 2019-03-08 PROCEDURE — 93010 ELECTROCARDIOGRAM REPORT: CPT | Performed by: INTERNAL MEDICINE

## 2019-03-08 PROCEDURE — 97116 GAIT TRAINING THERAPY: CPT

## 2019-03-08 PROCEDURE — 94799 UNLISTED PULMONARY SVC/PX: CPT

## 2019-03-08 PROCEDURE — 97530 THERAPEUTIC ACTIVITIES: CPT

## 2019-03-08 PROCEDURE — 25010000002 FUROSEMIDE PER 20 MG: Performed by: NURSE PRACTITIONER

## 2019-03-08 PROCEDURE — 99024 POSTOP FOLLOW-UP VISIT: CPT | Performed by: NURSE PRACTITIONER

## 2019-03-08 PROCEDURE — 97110 THERAPEUTIC EXERCISES: CPT

## 2019-03-08 PROCEDURE — 85027 COMPLETE CBC AUTOMATED: CPT | Performed by: NURSE PRACTITIONER

## 2019-03-08 PROCEDURE — 94760 N-INVAS EAR/PLS OXIMETRY 1: CPT

## 2019-03-08 PROCEDURE — 82962 GLUCOSE BLOOD TEST: CPT

## 2019-03-08 PROCEDURE — 93005 ELECTROCARDIOGRAM TRACING: CPT | Performed by: NURSE PRACTITIONER

## 2019-03-08 RX ORDER — FUROSEMIDE 10 MG/ML
20 INJECTION INTRAMUSCULAR; INTRAVENOUS ONCE
Status: COMPLETED | OUTPATIENT
Start: 2019-03-08 | End: 2019-03-08

## 2019-03-08 RX ADMIN — LEVOTHYROXINE SODIUM 200 MCG: 100 TABLET ORAL at 06:21

## 2019-03-08 RX ADMIN — SERTRALINE HYDROCHLORIDE 50 MG: 50 TABLET ORAL at 21:32

## 2019-03-08 RX ADMIN — SODIUM CHLORIDE, PRESERVATIVE FREE 3 ML: 5 INJECTION INTRAVENOUS at 08:47

## 2019-03-08 RX ADMIN — OXYCODONE HYDROCHLORIDE 5 MG: 5 TABLET ORAL at 10:30

## 2019-03-08 RX ADMIN — CARVEDILOL 3.12 MG: 3.12 TABLET, FILM COATED ORAL at 21:32

## 2019-03-08 RX ADMIN — ACETAMINOPHEN 1000 MG: 500 TABLET ORAL at 15:33

## 2019-03-08 RX ADMIN — FAMOTIDINE 20 MG: 20 TABLET ORAL at 21:32

## 2019-03-08 RX ADMIN — FUROSEMIDE 20 MG: 10 INJECTION, SOLUTION INTRAVENOUS at 11:51

## 2019-03-08 RX ADMIN — KETOROLAC TROMETHAMINE 10 MG: 10 TABLET, FILM COATED ORAL at 21:32

## 2019-03-08 RX ADMIN — Medication 400 MG: at 08:44

## 2019-03-08 RX ADMIN — OXYCODONE HYDROCHLORIDE AND ACETAMINOPHEN 500 MG: 500 TABLET ORAL at 08:45

## 2019-03-08 RX ADMIN — Medication 400 MG: at 21:32

## 2019-03-08 RX ADMIN — OXYCODONE HYDROCHLORIDE 5 MG: 5 TABLET ORAL at 19:26

## 2019-03-08 RX ADMIN — FAMOTIDINE 20 MG: 20 TABLET ORAL at 08:45

## 2019-03-08 RX ADMIN — OXYCODONE HYDROCHLORIDE 5 MG: 5 TABLET ORAL at 15:33

## 2019-03-08 RX ADMIN — ACETAMINOPHEN 1000 MG: 500 TABLET ORAL at 21:32

## 2019-03-08 RX ADMIN — IPRATROPIUM BROMIDE AND ALBUTEROL SULFATE 3 ML: 2.5; .5 SOLUTION RESPIRATORY (INHALATION) at 07:19

## 2019-03-08 RX ADMIN — OXYCODONE HYDROCHLORIDE AND ACETAMINOPHEN 500 MG: 500 TABLET ORAL at 21:32

## 2019-03-08 RX ADMIN — IPRATROPIUM BROMIDE AND ALBUTEROL SULFATE 3 ML: 2.5; .5 SOLUTION RESPIRATORY (INHALATION) at 10:41

## 2019-03-08 RX ADMIN — KETOROLAC TROMETHAMINE 10 MG: 10 TABLET, FILM COATED ORAL at 06:21

## 2019-03-08 RX ADMIN — PRENATAL VIT W/ FE FUMARATE-FA TAB 27-0.8 MG 1 TABLET: 27-0.8 TAB at 08:45

## 2019-03-08 RX ADMIN — GUAIFENESIN 1200 MG: 600 TABLET, EXTENDED RELEASE ORAL at 08:45

## 2019-03-08 RX ADMIN — AMIODARONE HYDROCHLORIDE 400 MG: 200 TABLET ORAL at 08:45

## 2019-03-08 RX ADMIN — KETOROLAC TROMETHAMINE 10 MG: 10 TABLET, FILM COATED ORAL at 13:53

## 2019-03-08 RX ADMIN — ATORVASTATIN CALCIUM 40 MG: 40 TABLET, FILM COATED ORAL at 21:32

## 2019-03-08 RX ADMIN — ACETAMINOPHEN 1000 MG: 500 TABLET ORAL at 08:44

## 2019-03-08 RX ADMIN — ASPIRIN 81 MG: 81 TABLET, COATED ORAL at 08:45

## 2019-03-08 RX ADMIN — IPRATROPIUM BROMIDE AND ALBUTEROL SULFATE 3 ML: 2.5; .5 SOLUTION RESPIRATORY (INHALATION) at 19:39

## 2019-03-08 RX ADMIN — GUAIFENESIN 1200 MG: 600 TABLET, EXTENDED RELEASE ORAL at 21:32

## 2019-03-08 RX ADMIN — OXYCODONE HYDROCHLORIDE 5 MG: 5 TABLET ORAL at 23:18

## 2019-03-08 RX ADMIN — OXYCODONE HYDROCHLORIDE 5 MG: 5 TABLET ORAL at 01:24

## 2019-03-08 NOTE — PLAN OF CARE
Problem: Patient Care Overview  Goal: Plan of Care Review  Outcome: Ongoing (interventions implemented as appropriate)   03/08/19 0438   Coping/Psychosocial   Plan of Care Reviewed With patient   Plan of Care Review   Progress improving   OTHER   Outcome Summary Pt. ambulating well and pain somewhat controlled with pain medication. VSS, will continue to monitor.        Problem: Skin Injury Risk (Adult)  Goal: Skin Health and Integrity  Outcome: Ongoing (interventions implemented as appropriate)      Problem: Fall Risk (Adult)  Goal: Absence of Fall  Outcome: Ongoing (interventions implemented as appropriate)      Problem: Cardiac Surgery (Adult)  Goal: Signs and Symptoms of Listed Potential Problems Will be Absent, Minimized or Managed (Cardiac Surgery)  Outcome: Ongoing (interventions implemented as appropriate)      Problem: Diabetes, Type 2 (Adult)  Goal: Signs and Symptoms of Listed Potential Problems Will be Absent, Minimized or Managed (Diabetes, Type 2)  Outcome: Ongoing (interventions implemented as appropriate)

## 2019-03-08 NOTE — THERAPY TREATMENT NOTE
Acute Care - Physical Therapy Treatment Note  [facility]     Patient Name: Rhea Sutton  : 1970  MRN: [id number]  Today's Date: 3/8/2019  Onset of Illness/Injury or Date of Surgery: 19  Date of Referral to PT: 19  Referring Physician: DUC Hihg    Admit Date: 3/5/2019    Visit Dx:    ICD-10-CM ICD-9-CM   1. Coronary artery disease of native artery of native heart with stable angina pectoris (CMS/HCC) I25.118 414.01     413.9   2. Coronary artery disease involving native heart without angina pectoris, unspecified vessel or lesion type I25.10 414.01   3. Impaired functional mobility, balance, gait, and endurance Z74.09 V49.89   4. Impaired mobility and ADLs Z74.09 799.89     Patient Active Problem List   Diagnosis   • Postsurgical hypothyroidism   • Impaired fasting glucose   • Vitamin D deficiency   • Type 2 diabetes mellitus with hyperglycemia (CMS/[facility])   • Hypertension   • Hyperlipidemia   • DDD (degenerative disc disease), lumbar   • Lumbar radiculopathy   • Cervicalgia   • NSTEMI (non-ST elevated myocardial infarction) (CMS/[facility])   • CAD (coronary artery disease)   • AMI inferior wall (CMS/[facility])   • Head lice infestation   • Diabetes mellitus (CMS/HCC)   • Uncontrolled diabetes mellitus (CMS/[facility])   • Coronary artery disease of native artery of native heart with stable angina pectoris (CMS/[facility])   • Chronic kidney disease, stage 3 (moderate) (CMS/[facility])       Therapy Treatment    Rehabilitation Treatment Summary     Row Name 19 1057 19 0730          Treatment Time/Intention    Discipline  physical therapy assistant  -LN  occupational therapy assistant  -     Document Type  therapy note (daily note)  -LN  therapy note (daily note)  -     Subjective Information  complains of;pain;dizziness  -  complains of;fatigue;nausea/vomiting  -     Mode of Treatment  physical therapy  -LN  individual therapy;occupational therapy  -     Therapy Frequency (PT Clinical  Impression)  other (see comments) 5-14 times per week  -LN  --     Therapy Frequency (OT Eval)  --  daily  -     Patient Effort  adequate  -LN  fair  -     Existing Precautions/Restrictions  cardiac;oxygen therapy device and L/min;sternal  -LN  --     Recorded by [LN] Margarette Nava PTA 03/08/19 1304 [JH] Fang Irvin CUTLER/L 03/08/19 1206     Row Name 03/08/19 1057 03/08/19 0730          Vital Signs    Pre Systolic BP Rehab  110  -LN  --     Pre Treatment Diastolic BP  60  -LN  --     Intra Systolic BP Rehab  119  -LN  --     Intra Treatment Diastolic BP  70  -LN  --     Post Systolic BP Rehab  111  -LN  --     Post Treatment Diastolic BP  67  -LN  --     Pretreatment Heart Rate (beats/min)  56  -LN  --     Intratreatment Heart Rate (beats/min)  62  -LN  --     Posttreatment Heart Rate (beats/min)  65  -LN  --     Pre SpO2 (%)  92  -LN  94  -JH     O2 Delivery Pre Treatment  supplemental O2  -LN  supplemental O2  -     Intra SpO2 (%)  91  -LN  --     Post SpO2 (%)  93  -LN  --     Pre Patient Position  Supine  -LN  --     Intra Patient Position  Standing  -LN  --     Post Patient Position  Sitting  -LN  --     Recorded by [LN] Margarette Nava PTA 03/08/19 1304 [] Fang Irvin CUTLER/L 03/08/19 1206     Row Name 03/08/19 1057 03/08/19 0730          Cognitive Assessment/Intervention- PT/OT    Affect/Mental Status (Cognitive)  --  WNL  -     Orientation Status (Cognition)  oriented x 4  -LN  oriented x 4  -     Follows Commands (Cognition)  follows one step commands;75-90% accuracy;repetition of directions required;verbal cues/prompting required;physical/tactile prompts required  -LN  --     Recorded by [LN] Margarette Nava PTA 03/08/19 1304 [JH] Fang Irvin CUTLER/L 03/08/19 1206     Row Name 03/08/19 1057             Safety Issues, Functional Mobility    Impairments Affecting Function (Mobility)  balance;endurance/activity tolerance;pain;strength  -LN      Recorded by [LN] Margarette Nava, PTA  03/08/19 1304      Row Name 03/08/19 1057             Bed Mobility Assessment/Treatment    Supine-Sit [place] (Bed Mobility)  minimum assist (75% patient effort)  -LN      Sit-Supine [place] (Bed Mobility)  not tested  -LN      Assistive Device (Bed Mobility)  head of bed elevated;bed rails  -LN      Recorded by [LN] Margarette Nava, PTA 03/08/19 1304      Row Name 03/08/19 1057             Transfer Assessment/Treatment    Transfer Assessment/Treatment  sit-stand transfer;stand-sit transfer  -LN      Recorded by [LN] Margarette Nava, PTA 03/08/19 1304      Row Name 03/08/19 1057             Bed-Chair Transfer    Bed-Chair [place] (Transfers)  contact guard  -LN      Assistive Device (Bed-Chair Transfers)  walker, front-wheeled  -LN      Recorded by [LN] Margarette Nava, PTA 03/08/19 1304      Row Name 03/08/19 1057             Sit-Stand Transfer    Sit-Stand [place] (Transfers)  verbal cues;contact guard  -LN      Assistive Device (Sit-Stand Transfers)  walker, front-wheeled  -LN      Recorded by [LN] Margarette Nava, PTA 03/08/19 1304      Row Name 03/08/19 1057             Stand-Sit Transfer    Stand-Sit [place] (Transfers)  verbal cues;contact guard  -LN      Assistive Device (Stand-Sit Transfers)  walker, front-wheeled  -LN      Recorded by [LN] Margarette Nava, PTA 03/08/19 1304      Row Name 03/08/19 1057             Toilet Transfer    Type (Toilet Transfer)  sit-stand;stand-sit  -LN      [place] Level (Toilet Transfer)  supervision;contact guard  -LN      Assistive Device (Toilet Transfer)  walker, front-wheeled  -LN      Recorded by [LN] Margarette Nava, PTA 03/08/19 1304      Row Name 03/08/19 1057             Gait/Stairs Assessment/Training    Gait/Stairs Assessment/Training  gait/ambulation independence  -LN      [place] Level (Gait)  contact guard  -LN      Assistive Device (Gait)  walker, front-wheeled  -LN      Distance in Feet (Gait)  200  -LN      Recorded by [LN] Margarette Nava  S, PTA 03/08/19 1304      Row Name 03/08/19 0730             Bathing Assessment/Intervention    Comment (Bathing)  Pt state she is not feeling well enough  -      Recorded by [JH] Fang Irvin COTA/L 03/08/19 1206      Row Name 03/08/19 0730             Upper Extremity Seated Therapeutic Exercise    Performed, Seated Upper Extremity (Therapeutic Exercise)  shoulder flexion/extension;shoulder horizontal abduction/adduction;elbow flexion/extension;forearm supination/pronation  -      Exercise Type, Seated Upper Extremity (Therapeutic Exercise)  AROM (active range of motion)  -      Expected Outcomes, Seated Upper Extremity (Therapeutic Exercise)  improve functional tolerance, self-care activity  -      Restrictions, Seated Upper Extremity (Therapeutic Exercise)  CABG precautions   -      Sets/Reps Detail, Seated Upper Extremity (Therapeutic Exercise)  2/010-15  -JH      Recorded by [JH] Fang Irvin COTA/L 03/08/19 1206      Row Name 03/08/19 1057             Lower Extremity Seated Therapeutic Exercise    Performed, Seated Lower Extremity (Therapeutic Exercise)  hip flexion/extension;hip abduction/adduction;LAQ (long arc quad), knee extension;ankle dorsiflexion/plantarflexion  -LN      Exercise Type, Seated Lower Extremity (Therapeutic Exercise)  AROM (active range of motion)  -LN      Sets/Reps Detail, Seated Lower Extremity (Therapeutic Exercise)  1/15  -LN      Recorded by [LN] Margarette Nava, PTA 03/08/19 1304      Row Name 03/08/19 1057 03/08/19 0730          Positioning and Restraints    Pre-Treatment Position  --  sitting in chair/recliner  -     Post Treatment Position  chair  -LN  chair  -     In Chair  notified nsg;reclined;call light within reach;encouraged to call for assist;legs elevated nsg states no need for alarm  -LN  encouraged to call for assist;call light within reach;sitting  -JH     Recorded by [LN] Margarette Nava, MARY ELLEN 03/08/19 1304 [JH] Fang Irvin COTA/L 03/08/19 1206      Row Name 03/08/19 1057 03/08/19 0730          Pain Scale: Numbers Pre/Post-Treatment    Pain Scale: Numbers, Pretreatment  10/10  -LN  4/10  -     Pain Scale: Numbers, Post-Treatment  9/10  -LN  4/10  -     Pain Location - Orientation  incisional  -LN  incisional  -JH     Pain Location  chest back  -LN  chest  -     Pain Intervention(s)  -- pre med  -LN  --     Recorded by [LN] Margarette Nava PTA 03/08/19 1304 [JH] Fang Irvin COTA/L 03/08/19 1206     Row Name 03/08/19 1057             Vision Assessment/Intervention    Visual Impairment/Limitations  corrective lenses full time  -LN      Recorded by [LN] Margarette Nava PTA 03/08/19 1304      Row Name                Wound 03/05/19 1414 Other (See comments) midline sternal incision    Wound - Properties Group Date first assessed: 03/05/19 [CF] Time first assessed: 1414 [CF] Present On Admission : no [CF] Side: Other (See comments) [CF], midline sternal incision  Orientation: midline [CF] Location: sternal [CF] Type: incision [CF] Additional Comments: closed incision. dressings applied [CF] Recorded by:  [CF] Aparna Eason RN 03/05/19 1414    Row Name                Wound 03/05/19 1414 Left leg incision    Wound - Properties Group Date first assessed: 03/05/19 [CF] Time first assessed: 1414 [CF] Present On Admission : no [CF] Side: Left [CF] Location: leg [CF] Type: incision [CF] Additional Comments: closed incisions. dressings applied (skin affix, kerlex, ace) [CF] Recorded by:  [CF] Aparna Eason RN 03/05/19 1415    Row Name 03/08/19 1057             Plan of Care Review    Plan of Care Reviewed With  patient  -LN      Recorded by [LN] Margarette Nava PTA 03/08/19 1304      Row Name 03/08/19 0730             Outcome Summary/Treatment Plan (OT)    Daily Summary of Progress (OT)  progress towards functional goals is fair  -      Plan for Continued Treatment (OT)  Continue per POC  -JH      Anticipated Discharge Disposition (OT)  home with assist;home  with home health;home with  services  -      Recorded by [JH] Fang Irvin, CUTLER/L 03/08/19 1206      Row Name 03/08/19 1057             Outcome Summary/Treatment Plan (PT)    Plan for Continued Treatment (PT)  cont  -LN      Anticipated Discharge Disposition (PT)  home with 24/7 care;home with home health Progression to cardiac rehab as allowed by MD ALBARRAN      Recorded by [LN] Margarette Nava, PTA 03/08/19 1304        User Key  (r) = Recorded By, (t) = Taken By, (c) = Cosigned By    Initials Name Effective Dates Discipline    LN Margarette Nava, PTA 03/07/18 -  PT     Fang Irvin CUTLER/L 03/07/18 -  OT     Aparna Eason RN 10/17/16 -  Nurse          Wound 03/05/19 1414 Other (See comments) midline sternal incision (Active)   Dressing Appearance dry;intact 3/8/2019  8:44 AM   Closure AYDEE 3/8/2019  8:44 AM   Base dressing in place, unable to visualize 3/8/2019  8:44 AM   Periwound intact 3/8/2019  8:44 AM   Periwound Temperature warm 3/8/2019  8:44 AM   Periwound Skin Turgor soft 3/8/2019  8:44 AM       Wound 03/05/19 1414 Left leg incision (Active)   Dressing Appearance open to air 3/8/2019  8:44 AM   Closure Liquid skin adhesive 3/8/2019  8:44 AM   Base clean;dry 3/8/2019  8:44 AM   Periwound dry;ecchymotic 3/8/2019  8:44 AM   Periwound Temperature warm 3/8/2019  8:44 AM   Drainage Amount none 3/7/2019  8:46 PM   Care, Wound cleansed with 3/8/2019  6:21 AM       Rehab Goal Summary     Row Name 03/08/19 1057 03/08/19 0730          Physical Therapy Goals    Bed Mobility Goal Selection (PT)  bed mobility, PT goal 1  -LN  --     Transfer Goal Selection (PT)  transfer, PT goal 1  -LN  --     Gait Training Goal Selection (PT)  gait training, PT goal 1  -LN  --     Stairs Goal Selection (PT)  stairs, PT goal 1  -LN  --        Bed Mobility Goal 1 (PT)    Activity/Assistive Device (Bed Mobility Goal 1, PT)  rolling to left;rolling to right;sit to supine;supine to sit HOB flat;no rails  -LN  --     [place]  Level/Cues Needed (Bed Mobility Goal 1, PT)  standby assist;independent  -LN  --     Time Frame (Bed Mobility Goal 1, PT)  by discharge  -LN  --     Progress/Outcomes (Bed Mobility Goal 1, PT)  goal not met  -LN  --        Transfer Goal 1 (PT)    Activity/Assistive Device (Transfer Goal 1, PT)  sit-to-stand/stand-to-sit;bed-to-chair/chair-to-bed  -LN  --     [place] Level/Cues Needed (Transfer Goal 1, PT)  standby assist;conditional independence  -LN  --     Time Frame (Transfer Goal 1, PT)  by discharge  -LN  --     Progress/Outcome (Transfer Goal 1, PT)  goal not met  -LN  --        Gait Training Goal 1 (PT)    Activity/Assistive Device (Gait Training Goal 1, PT)  gait (walking locomotion)  -LN  --     [place] Level (Gait Training Goal 1, PT)  standby assist  -LN  --     Distance (Gait Goal 1, PT)  300ft  -LN  --     Time Frame (Gait Training Goal 1, PT)  by discharge  -LN  --     Progress/Outcome (Gait Training Goal 1, PT)  goal not met  -LN  --        Stairs Goal 1 (PT)    Activity/Assistive Device (Stairs Goal 1, PT)  ascending stairs;descending stairs  -LN  --     [place] Level/Cues Needed (Stairs Goal 1, PT)  contact guard assist  -LN  --     Number of Stairs (Stairs Goal 1, PT)  2  -LN  --     Time Frame (Stairs Goal 1, PT)  by discharge  -LN  --     Progress/Outcome (Stairs Goal 1, PT)  goal not met  -LN  --        Occupational Therapy Goals    Transfer Goal Selection (OT)  --  transfer, OT goal 1  -JH     Bathing Goal Selection (OT)  --  bathing, OT goal 1  -JH     Dressing Goal Selection (OT)  --  dressing, OT goal 1  -     Toileting Goal Selection (OT)  --  toileting, OT goal 1  -     Endurance Goal Selection (OT)  --  endurance, OT goal 1  -     Functional Mobility Goal Selection (OT)  --  functional mobility, OT goal 1  -     Precaution Goal Selection (OT)  --  precaution, OT goal 1  -        Transfer Goal 1 (OT)    Activity/Assistive Device (Transfer Goal 1, OT)  --  toilet   -     [place] Level/Cues Needed (Transfer Goal 1, OT)  --  contact guard assist  -     Time Frame (Transfer Goal 1, OT)  --  long term goal (LTG);by discharge  -     Progress/Outcome (Transfer Goal 1, OT)  --  goal not met  -        Bathing Goal 1 (OT)    Activity/Assistive Device (Bathing Goal 1, OT)  --  bathing skills, all  -     [place] Level/Cues Needed (Bathing Goal 1, OT)  --  set-up required;verbal cues required;standby assist  -     Time Frame (Bathing Goal 1, OT)  --  long term goal (LTG);by discharge  -     Progress/Outcomes (Bathing Goal 1, OT)  --  goal not met  -        Dressing Goal 1 (OT)    Activity/Assistive Device (Dressing Goal 1, OT)  --  dressing skills, all  -     [place]/Cues Needed (Dressing Goal 1, OT)  --  set-up required;verbal cues required;standby assist  -     Time Frame (Dressing Goal 1, OT)  --  long term goal (LTG);by discharge  -     Progress/Outcome (Dressing Goal 1, OT)  --  goal not met  -        Toileting Goal 1 (OT)    Activity/Device (Toileting Goal 1, OT)  --  toileting skills, all  -     [place] Level/Cues Needed (Toileting Goal 1, OT)  --  set-up required;verbal cues required;contact guard assist  -     Time Frame (Toileting Goal 1, OT)  --  long term goal (LTG);by discharge  -     Progress/Outcome (Toileting Goal 1, OT)  --  goal not met  -         Endurance Goal 1 (OT)    Activity Level (Endurance Goal 1, OT)  --  endurance 2 fair + 25 min functional task O2 90 or up 3 or less rest breaks  -     Progress/Outcome (Endurance Goal 1, OT)  --  goal not met  -        Functional Mobility Goal 1 (OT)    Activity/Assistive Device (Functional Mobility Goal 1, OT)  --  walker, rolling  -     [place] Level/Cues Needed (Functional Mobility Goal 1, OT)  --  contact guard assist  -     Distance Goal 1 (Functional Mobility, OT)  --  to and from bathroom with good safety, balance and problem solving   -     Time Frame  (Functional Mobility Goal 1, OT)  --  long term goal (LTG);by discharge  -     Progress/Outcome (Functional Mobility Goal 1, OT)  --  goal not met  -        Precaution Goal 1 (OT)    Activity (Precaution Goal 1, OT)  --  sternal precautions;fall precautions;demonstrates compliance;demonstrates understanding;during all tasks in daily routine;during ADLs;during mobility tasks;during transfers  -     [place] Level/Cues Needed (Precautions Goal 1, OT)  --  with minimum;verbal cues/redirection  -     Time Frame (Precaution Goal 1, OT)  --  long term goal (LTG);by discharge  -     Progress/Outcome (Precaution Goal 1, OT)  --  goal not met  -        Patient Education Goal (OT)    Activity (Patient Education Goal, OT)  --  Pt will communicate good home safety awareness.   -     [place]/Cues/Accuracy (Memory Goal 2, OT)  --  verbalizes understanding  -     Time Frame (Patient Education Goal, OT)  --  long term goal (LTG);by discharge  -     Progress/Outcome (Patient Education Goal, OT)  --  goal not met  -       User Key  (r) = Recorded By, (t) = Taken By, (c) = Cosigned By    Initials Name Provider Type Discipline    Margarette Poon, PTA Physical Therapy Assistant PT    Fang Pandya COTA/L Occupational Therapy Assistant OT          Physical Therapy Education     Title: PT OT SLP Therapies (In Progress)     Topic: Physical Therapy (Done)     Point: Mobility training (Done)     Learning Progress Summary           Patient Acceptance, E,TB, VU,DU by INDIA at 3/8/2019  1:05 PM    Acceptance, E,TB, VU by INDIA at 3/7/2019  3:53 PM    Comment:  cardia/sternal precautions    Acceptance, E, NR by JOLLY at 3/6/2019  1:29 PM                   Point: Home exercise program (Done)     Learning Progress Summary           Patient Acceptance, E,TB, VU,DU by INDIA at 3/8/2019  1:05 PM    Acceptance, E,TB, VU by INDIA at 3/7/2019  3:53 PM    Comment:  cardia/sternal precautions                   Point: Body mechanics  (Done)     Learning Progress Summary           Patient Acceptance, E,TB, VU,DU by LN at 3/8/2019  1:05 PM    Acceptance, E,TB, VU by INDIA at 3/7/2019  3:53 PM    Comment:  cardia/sternal precautions    Acceptance, E, NR by JOLLY at 3/6/2019  1:29 PM                   Point: Precautions (Done)     Learning Progress Summary           Patient Acceptance, E,TB, VU,DU by LN at 3/8/2019  1:05 PM    Acceptance, E,TB, VU by LN at 3/7/2019  3:53 PM    Comment:  cardia/sternal precautions    Acceptance, E, NR by JOLLY at 3/6/2019  1:29 PM                               User Key     Initials Effective Dates Name Provider Type Discipline    JOLLY 04/03/18 -  Pamela Del Valle, PT Physical Therapist PT    INDIA 03/07/18 -  Margarette Nava PTA Physical Therapy Assistant PT                PT Recommendation and Plan  Anticipated Discharge Disposition (PT): home with 24/7 care, home with home health(Progression to cardiac rehab as allowed by MD)  Therapy Frequency (PT Clinical Impression): other (see comments)(5-14 times per week)  Outcome Summary/Treatment Plan (PT)  Plan for Continued Treatment (PT): cont  Anticipated Discharge Disposition (PT): home with 24/7 care, home with home health(Progression to cardiac rehab as allowed by MD)  Plan of Care Reviewed With: patient  Progress: improving  Outcome Summary: sup-sit min of 1,sit-stand-sit cga of 1,amb 200' with rw and cga of 1,min dizziness after gt which resolved with sitting-reviewed cardiac/sternal pre-no goals met-if d/c would benefit from hh and assist as needed  Outcome Measures     Row Name 03/08/19 1057 03/08/19 0730 03/07/19 1445       How much help from another person do you currently need...    Turning from your back to your side while in flat bed without using bedrails?  3  -LN  --  2  -LN    Moving from lying on back to sitting on the side of a flat bed without bedrails?  3  -LN  --  2  -LN    Moving to and from a bed to a chair (including a wheelchair)?  3  -LN  --  3  -LN    Standing  up from a chair using your arms (e.g., wheelchair, bedside chair)?  3  -LN  --  3  -LN    Climbing 3-5 steps with a railing?  2  -LN  --  2  -LN    To walk in hospital room?  3  -LN  --  3  -LN    AM-PAC 6 Clicks Score  17  -LN  --  15  -LN       How much help from another is currently needed...    Putting on and taking off regular lower body clothing?  --  2  -JH  --    Bathing (including washing, rinsing, and drying)  --  2  -JH  --    Toileting (which includes using toilet bed pan or urinal)  --  1  -JH  --    Putting on and taking off regular upper body clothing  --  2  -JH  --    Taking care of personal grooming (such as brushing teeth)  --  3  -JH  --    Eating meals  --  3  -JH  --    Score  --  13  -JH  --       Functional Assessment    Outcome Measure Options  AM-PAC 6 Clicks Basic Mobility (PT)  -LN  --  AM-PAC 6 Clicks Basic Mobility (PT)  -LN    Row Name 03/07/19 1100 03/06/19 1027 03/06/19 1022       How much help from another person do you currently need...    Turning from your back to your side while in flat bed without using bedrails?  --  2  -JOLLY  --    Moving from lying on back to sitting on the side of a flat bed without bedrails?  --  2  -JOLLY  --    Moving to and from a bed to a chair (including a wheelchair)?  --  2  -JOLLY  --    Standing up from a chair using your arms (e.g., wheelchair, bedside chair)?  --  3  -JOLLY  --    Climbing 3-5 steps with a railing?  --  1  -JOLLY  --    To walk in hospital room?  --  2  -JOLLY  --    AM-PAC 6 Clicks Score  --  12  -JOLLY  --       How much help from another is currently needed...    Putting on and taking off regular lower body clothing?  1  -JH  --  1  -BH    Bathing (including washing, rinsing, and drying)  2  -JH  --  2  -BH    Toileting (which includes using toilet bed pan or urinal)  1  -JH  --  1  -BH    Putting on and taking off regular upper body clothing  2  -JH  --  2  -BH    Taking care of personal grooming (such as brushing teeth)  3  -JH  --  3  -BH     Eating meals  3  -  --  3  -    Score  12  -  --  12  -       Functional Assessment    Outcome Measure Options  --  AM-PAC 6 Clicks Basic Mobility (PT)  -  AM-PAC 6 Clicks Daily Activity (OT)  -      User Key  (r) = Recorded By, (t) = Taken By, (c) = Cosigned By    Initials Name Provider Type     Marlen Jaimes, OTR/L Occupational Therapist    JOLLY Pamela Del Valle, PT Physical Therapist    LN Margarette Nava, PTA Physical Therapy Assistant     Fang Irvin, CUTLER/L Occupational Therapy Assistant         Time Calculation:   PT Charges     Row Name 03/08/19 1057             Time Calculation    Start Time  1057  -LN      Stop Time  1136  -LN      Time Calculation (min)  39 min  -LN      PT Received On  03/08/19  -LN         Time Calculation- PT    Total Timed Code Minutes- PT  39 minute(s)  -        User Key  (r) = Recorded By, (t) = Taken By, (c) = Cosigned By    Initials Name Provider Type    LN Margarette Nava, PTA Physical Therapy Assistant        Therapy Suggested Charges     Code   Minutes Charges    None           Therapy Charges for Today     Code Description Service Date Service Provider Modifiers Qty    98500537198 HC GAIT TRAINING EA 15 MIN 3/7/2019 Margarette Nava S, PTA GP 1    45973784995 HC PT THERAPEUTIC ACT EA 15 MIN 3/7/2019 Laina Navai S, PTA GP 1    08844153396 HC PT THER PROC EA 15 MIN 3/7/2019 Margarette Nava S, PTA GP 1    67205166411 HC GAIT TRAINING EA 15 MIN 3/8/2019 Margarette Nava S, PTA GP 1    83048863908 HC PT THERAPEUTIC ACT EA 15 MIN 3/8/2019 Laina Navai S, PTA GP 1    17233648660 HC PT SELF CARE/MGMT/TRAIN EA 15 MIN 3/8/2019 Margarette Nava S, PTA GP 1          PT G-Codes  Outcome Measure Options: AM-PAC 6 Clicks Basic Mobility (PT)  AM-PAC 6 Clicks Score: 17  Score: 13    Margarette S MARY ELLEN Nava  3/8/2019

## 2019-03-08 NOTE — PROGRESS NOTES
"  Cardiothoracic - Vascular Surgery Daily Note        LOS: 3 days   Patient Care Team:  Anderson Abbott MD as PCP - General (Family Medicine)    POD#3  CORONARY ARTERY BYPASS GRAFTING x4  ENDOSCOPIC VEIN HARVEST           LEFT INTERNAL MAMMARY ARTERY TO LEFT ANTERIOR DESCENDING CORONARY ARTERY  SAPHENOUS VEIN GRAFT FROM ASCENDING AORTA TO FIRST DIAGONAL BRANCH  SAPHENOUS VEIN GRAFT FROM ASCENDING AORTA TO THIRD MARGINAL BRANCH  SAPHENOUS VEIN GRAFT FROM ASCENDING AORTA TO POSTERIOR DESCENDING CORONARY ARTERY       Chief Complaint: CAD, Surgical Discomfort      Subjective     The following portions of the patient's history were reviewed and updated as appropriate: allergies, current medications, past family history, past medical history, past social history, past surgical history and problem list.     Subjective:  Symptoms:  Stable.  She reports shortness of breath and chest pain (surgical).    Diet:  Adequate intake.    Activity level: Impaired due to pain.    Pain:  She complains of pain that is moderate.  Pain is requiring pain medication.          Objective     Vital Signs  Temp:  [97.2 °F (36.2 °C)-97.9 °F (36.6 °C)] 97.2 °F (36.2 °C)  Heart Rate:  [58-68] 65  Resp:  [16-18] 18  BP: ()/(55-80) 110/60  Body mass index is 42.1 kg/m².    Intake/Output Summary (Last 24 hours) at 3/8/2019 1043  Last data filed at 3/8/2019 0852  Gross per 24 hour   Intake 660 ml   Output 550 ml   Net 110 ml     I/O this shift:  In: -   Out: 20 [Chest Tube:20]    Wt Readings from Last 3 Encounters:   03/08/19 115 kg (253 lb)   02/28/19 112 kg (248 lb)   02/12/19 112 kg (248 lb)     CT 430cc No Air Leak H2O seal    Physical Exam   Objective:  General Appearance:  Comfortable.    Vital signs: (most recent): Blood pressure 110/60, pulse 65, temperature 97.2 °F (36.2 °C), temperature source Oral, resp. rate 18, height 165.1 cm (65\"), weight 115 kg (253 lb), SpO2 92 %, not currently breastfeeding.  Vital signs are normal.  No " fever.    Output: Producing urine and producing stool.    Lungs:  Normal effort and normal respiratory rate.  There are decreased breath sounds (bases).    Heart: Normal rate.  Regular rhythm.    Chest: (CT No Air Leak  AV Wires to chest)  Abdomen: Abdomen is soft.  Hypoactive bowel sounds.     Extremities: Decreased range of motion.  There is dependent edema (generalized).    Pulses: Distal pulses are intact.    Neurological: Patient is alert and oriented to person, place and time.    Pupils:  Pupils are equal, round, and reactive to light.    Skin:  Warm and dry.  (M/S [place]  [facility])              Results Review:    Lab Results   Component Value Date    WBC 14.68 (H) 03/08/2019    HGB 9.4 (L) 03/08/2019    HCT 30.8 (L) 03/08/2019    MCV 93.9 03/08/2019     03/08/2019     Lab Results   Component Value Date    GLUCOSE 123 (H) 03/08/2019    BUN 17 03/08/2019    CREATININE 0.80 03/08/2019    EGFRIFNONA 77 03/08/2019    BCR 21.3 03/08/2019    K 4.3 03/08/2019    CO2 23.0 03/08/2019    CALCIUM 7.8 (L) 03/08/2019    ALBUMIN 3.20 (L) 03/08/2019    AST 73 (H) 03/08/2019    ALT 67 (H) 03/08/2019       Calcium Calcium   Date/Time Value Ref Range Status   03/08/2019 0741 7.8 (L) 8.4 - 10.2 mg/dL Final   03/06/2019 0503 8.2 (L) 8.4 - 10.2 mg/dL Final   03/05/2019 1939 6.9 (L) 8.4 - 10.2 mg/dL Final      Magnesium Magnesium   Date/Time Value Ref Range Status   03/07/2019 0546 2.3 1.6 - 2.3 mg/dL Final   03/06/2019 0503 2.6 (H) 1.6 - 2.3 mg/dL Final   03/05/2019 1939 3.3 (H) 1.6 - 2.3 mg/dL Final        Imaging Results (last 24 hours)     ** No results found for the last 24 hours. **                                  acetaminophen 1,000 mg Oral Q6H   amiodarone 400 mg Oral Q24H   aspirin 81 mg Oral Daily   atorvastatin 40 mg Oral Nightly   carvedilol 3.125 mg Oral Q12H   famotidine 20 mg Oral BID   guaiFENesin 1,200 mg Oral Q12H   insulin aspart 0-24 Units Subcutaneous 4x Daily AC & at Bedtime   insulin detemir 30  Units Subcutaneous Nightly   ipratropium-albuterol 3 mL Nebulization 4x Daily - RT   ketorolac 10 mg Oral Q8H   levothyroxine 200 mcg Oral QAM   magnesium oxide 400 mg Oral BID   prenatal vitamin 27-0.8 1 tablet Oral Daily   sennosides-docusate sodium 1 tablet Oral BID   sertraline 50 mg Oral Nightly   sodium chloride 3 mL Intravenous Q12H   vitamin C 500 mg Oral BID                ASSESSMENT/PLAN       Coronary artery disease of native artery of native heart with stable angina pectoris (CMS/HCC)      Assessment & Plan    Stable Post op CABG: Progressing    CAD: ASA, BRILINTA, BETA. STATIN Hold ACE.     Resp: O2 support. Incentive. CT H2O seal. Lasix today    Pain: Tylenol. OxyIR. Toradol    Rehab: OOB. Ambulate. PT/OT    DM/Transient Post Op Hyperglycemia:  SSI coverage. Levemir    Depression: Zoloft    Acute Blood loss Anemia: Asymptomatic. Iron supplement    Disp: Stable Progress care 3W telemetry. DC Planning 1-2 days          This document has been electronically signed by DUC Dash on March 8, 2019 10:43 AM

## 2019-03-08 NOTE — THERAPY TREATMENT NOTE
Acute Care - Occupational Therapy Treatment Note  [facility]     Patient Name: Rhea Sutton  : 1970  MRN: [id number]  Today's Date: 3/8/2019  Onset of Illness/Injury or Date of Surgery: 19  Date of Referral to OT: 19  Referring Physician: [facility]    Admit Date: 3/5/2019       ICD-10-CM ICD-9-CM   1. Coronary artery disease of native artery of native heart with stable angina pectoris (CMS/HCC) I25.118 414.01     413.9   2. Coronary artery disease involving native heart without angina pectoris, unspecified vessel or lesion type I25.10 414.01   3. Impaired functional mobility, balance, gait, and endurance Z74.09 V49.89   4. Impaired mobility and ADLs Z74.09 799.89     Patient Active Problem List   Diagnosis   • Postsurgical hypothyroidism   • Impaired fasting glucose   • Vitamin D deficiency   • Type 2 diabetes mellitus with hyperglycemia (CMS/[facility])   • Hypertension   • Hyperlipidemia   • DDD (degenerative disc disease), lumbar   • Lumbar radiculopathy   • Cervicalgia   • NSTEMI (non-ST elevated myocardial infarction) (CMS/HCC)   • CAD (coronary artery disease)   • AMI inferior wall (CMS/[facility])   • Head lice infestation   • Diabetes mellitus (CMS/HCC)   • Uncontrolled diabetes mellitus (CMS/HCC)   • Coronary artery disease of native artery of native heart with stable angina pectoris (CMS/HCC)   • Chronic kidney disease, stage 3 (moderate) (CMS/HCC)     Past Medical History:   Diagnosis Date   • CAD (coronary artery disease)    • Chronic back pain    • Chronic bronchitis (CMS/HCC)    • Diabetes mellitus (CMS/HCC)    • Diverticulitis 1986   • Goiter    • Hashimoto's thyroiditis    • Head lice infestation    • Hyperlipidemia    • Hypertension    • MI (myocardial infarction) (CMS/HCC)    • Postoperative hypothyroidism    • Sinus congestion    • Sleep apnea     not using c-pap     Past Surgical History:   Procedure Laterality Date   • APPENDECTOMY     • CARDIAC CATHETERIZATION  N/A 10/13/2017   • CARDIAC CATHETERIZATION N/A 1/3/2019   • CHOLECYSTECTOMY     • COLON RESECTION     • COLON SURGERY     • CORONARY STENT PLACEMENT     • GA RT/LT HEART CATHETERS N/A 10/13/2017   • TOTAL ABDOMINAL HYSTERECTOMY WITH SALPINGO OOPHORECTOMY     • TOTAL THYROIDECTOMY         Therapy Treatment    Rehabilitation Treatment Summary     Row Name 03/08/19 0730             Treatment Time/Intention    Discipline  occupational therapy assistant  -      Document Type  therapy note (daily note)  -      Subjective Information  complains of;fatigue;nausea/vomiting  -      Mode of Treatment  individual therapy;occupational therapy  -      Therapy Frequency (OT Eval)  daily  -      Patient Effort  fair  -      Recorded by [] Fang Irvin COTA/L 03/08/19 1206      Row Name 03/08/19 0730             Vital Signs    Pre SpO2 (%)  94  -      O2 Delivery Pre Treatment  supplemental O2  -      Recorded by [] Fang Irvin CUTLER/L 03/08/19 1206      Row Name 03/08/19 0730             Cognitive Assessment/Intervention- PT/OT    Affect/Mental Status (Cognitive)  WNL  -      Orientation Status (Cognition)  oriented x 4  -      Recorded by [] Fang Irvin CUTLER/L 03/08/19 1206      Row Name 03/08/19 0730             Bathing Assessment/Intervention    Comment (Bathing)  Pt state she is not feeling well enough  -      Recorded by [] Fang Irvin CUTLER/L 03/08/19 1206      Row Name 03/08/19 0730             Upper Extremity Seated Therapeutic Exercise    Performed, Seated Upper Extremity (Therapeutic Exercise)  shoulder flexion/extension;shoulder horizontal abduction/adduction;elbow flexion/extension;forearm supination/pronation  -      Exercise Type, Seated Upper Extremity (Therapeutic Exercise)  AROM (active range of motion)  -      Expected Outcomes, Seated Upper Extremity (Therapeutic Exercise)  improve functional tolerance, self-care activity  -      Restrictions, Seated Upper  Extremity (Therapeutic Exercise)  CABG precautions   -      Sets/Reps Detail, Seated Upper Extremity (Therapeutic Exercise)  2/010-15  -      Recorded by [JH] Fang Irvin COTA/L 03/08/19 1206      Row Name 03/08/19 0730             Positioning and Restraints    Pre-Treatment Position  sitting in chair/recliner  -      Post Treatment Position  chair  -      In Chair  encouraged to call for assist;call light within reach;sitting  -      Recorded by [JH] Fang Irvin COTA/L 03/08/19 1206      Row Name 03/08/19 0730             Pain Scale: Numbers Pre/Post-Treatment    Pain Scale: Numbers, Pretreatment  4/10  -      Pain Scale: Numbers, Post-Treatment  4/10  -      Pain Location - Orientation  incisional  -      Pain Location  chest  -      Recorded by [JH] Fang Irvin COTA/L 03/08/19 1206      Row Name                Wound 03/05/19 1414 Other (See comments) midline sternal incision    Wound - Properties Group Date first assessed: 03/05/19 [CF] Time first assessed: 1414 [CF] Present On Admission : no [CF] Side: Other (See comments) [CF], midline sternal incision  Orientation: midline [CF] Location: sternal [CF] Type: incision [CF] Additional Comments: closed incision. dressings applied [CF] Recorded by:  [CF] Aparna Eason RN 03/05/19 1414    Row Name                Wound 03/05/19 1414 Left leg incision    Wound - Properties Group Date first assessed: 03/05/19 [CF] Time first assessed: 1414 [CF] Present On Admission : no [CF] Side: Left [CF] Location: leg [CF] Type: incision [CF] Additional Comments: closed incisions. dressings applied (skin affix, kerlex, ace) [CF] Recorded by:  [CF] Aparna Eason RN 03/05/19 1415    Row Name 03/08/19 0730             Outcome Summary/Treatment Plan (OT)    Daily Summary of Progress (OT)  progress towards functional goals is fair  -      Plan for Continued Treatment (OT)  Continue per POC  -      Anticipated Discharge Disposition (OT)  home with  assist;home with home health;home with OP services  -      Recorded by [] AlbaroParagHE Manuel/NADIA 03/08/19 1206        User Key  (r) = Recorded By, (t) = Taken By, (c) = Cosigned By    Initials Name Effective Dates Discipline     Fang Irvin CUTLER/L 03/07/18 -  OT    CF Aparna Eason RN 10/17/16 -  Nurse        Wound 03/05/19 1414 Other (See comments) midline sternal incision (Active)   Dressing Appearance dry;intact 3/8/2019  8:44 AM   Closure AYDEE 3/8/2019  8:44 AM   Base dressing in place, unable to visualize 3/8/2019  8:44 AM   Periwound intact 3/8/2019  8:44 AM   Periwound Temperature warm 3/8/2019  8:44 AM   Periwound Skin Turgor soft 3/8/2019  8:44 AM       Wound 03/05/19 1414 Left leg incision (Active)   Dressing Appearance open to air 3/8/2019  8:44 AM   Closure Liquid skin adhesive 3/8/2019  8:44 AM   Base clean;dry 3/8/2019  8:44 AM   Periwound dry;ecchymotic 3/8/2019  8:44 AM   Periwound Temperature warm 3/8/2019  8:44 AM   Drainage Amount none 3/7/2019  8:46 PM   Care, Wound cleansed with 3/8/2019  6:21 AM     Rehab Goal Summary     Row Name 03/08/19 0730             Occupational Therapy Goals    Transfer Goal Selection (OT)  transfer, OT goal 1  -      Bathing Goal Selection (OT)  bathing, OT goal 1  -      Dressing Goal Selection (OT)  dressing, OT goal 1  -      Toileting Goal Selection (OT)  toileting, OT goal 1  -      Endurance Goal Selection (OT)  endurance, OT goal 1  -      Functional Mobility Goal Selection (OT)  functional mobility, OT goal 1  -      Precaution Goal Selection (OT)  precaution, OT goal 1  -         Transfer Goal 1 (OT)    Activity/Assistive Device (Transfer Goal 1, OT)  toilet  -      [place] Level/Cues Needed (Transfer Goal 1, OT)  contact guard assist  -      Time Frame (Transfer Goal 1, OT)  long term goal (LTG);by discharge  -      Progress/Outcome (Transfer Goal 1, OT)  goal not met  -         Bathing Goal 1 (OT)    Activity/Assistive  Device (Bathing Goal 1, OT)  bathing skills, all  -      [place] Level/Cues Needed (Bathing Goal 1, OT)  set-up required;verbal cues required;standby assist  -      Time Frame (Bathing Goal 1, OT)  long term goal (LTG);by discharge  -      Progress/Outcomes (Bathing Goal 1, OT)  goal not met  -         Dressing Goal 1 (OT)    Activity/Assistive Device (Dressing Goal 1, OT)  dressing skills, all  -      [place]/Cues Needed (Dressing Goal 1, OT)  set-up required;verbal cues required;standby assist  -      Time Frame (Dressing Goal 1, OT)  long term goal (LTG);by discharge  -      Progress/Outcome (Dressing Goal 1, OT)  goal not met  -         Toileting Goal 1 (OT)    Activity/Device (Toileting Goal 1, OT)  toileting skills, all  -      [place] Level/Cues Needed (Toileting Goal 1, OT)  set-up required;verbal cues required;contact guard assist  -      Time Frame (Toileting Goal 1, OT)  long term goal (LTG);by discharge  -      Progress/Outcome (Toileting Goal 1, OT)  goal not met  -          Endurance Goal 1 (OT)    Activity Level (Endurance Goal 1, OT)  endurance 2 fair + 25 min functional task O2 90 or up 3 or less rest breaks  -      Progress/Outcome (Endurance Goal 1, OT)  goal not met  -         Functional Mobility Goal 1 (OT)    Activity/Assistive Device (Functional Mobility Goal 1, OT)  walker, rolling  -      [place] Level/Cues Needed (Functional Mobility Goal 1, OT)  contact guard assist  -      Distance Goal 1 (Functional Mobility, OT)  to and from bathroom with good safety, balance and problem solving   -      Time Frame (Functional Mobility Goal 1, OT)  long term goal (LTG);by discharge  -      Progress/Outcome (Functional Mobility Goal 1, OT)  goal not met  -         Precaution Goal 1 (OT)    Activity (Precaution Goal 1, OT)  sternal precautions;fall precautions;demonstrates compliance;demonstrates understanding;during all tasks in daily  routine;during ADLs;during mobility tasks;during transfers  -      [place] Level/Cues Needed (Precautions Goal 1, OT)  with minimum;verbal cues/redirection  -      Time Frame (Precaution Goal 1, OT)  long term goal (LTG);by discharge  -      Progress/Outcome (Precaution Goal 1, OT)  goal not met  -         Patient Education Goal (OT)    Activity (Patient Education Goal, OT)  Pt will communicate good home safety awareness.   -      [place]/Cues/Accuracy (Memory Goal 2, OT)  verbalizes understanding  -      Time Frame (Patient Education Goal, OT)  long term goal (LTG);by discharge  -      Progress/Outcome (Patient Education Goal, OT)  goal not met  -        User Key  (r) = Recorded By, (t) = Taken By, (c) = Cosigned By    Initials Name Provider Type Discipline    Fang Pandya COTA/L Occupational Therapy Assistant OT        Occupational Therapy Education     Title: PT OT SLP Therapies (In Progress)     Topic: Occupational Therapy (In Progress)     Point: ADL training (Done)     Description: Instruct learner(s) on proper safety adaptation and remediation techniques during self care or transfers.   Instruct in proper use of assistive devices.    Learning Progress Summary           Patient Acceptance, E, VU by  at 3/7/2019  2:53 PM    Comment:  E/C-W/S tech's    Acceptance, E, NR by  at 3/6/2019  3:08 PM    Comment:  Educated about OT and POC. Educated to call for assist. Educated on safety throughout. Educated on CABG precuations. Educated on benefits of moving and doing therapy.                   Point: Precautions (Done)     Description: Instruct learner(s) on prescribed precautions during self-care and functional transfers.    Learning Progress Summary           Patient Acceptance, E, VU by  at 3/7/2019  2:53 PM    Comment:  E/C-W/S tech's    Acceptance, E, NR by  at 3/6/2019  3:08 PM    Comment:  Educated about OT and POC. Educated to call for assist. Educated on safety  throughout. Educated on CABG precuations. Educated on benefits of moving and doing therapy.                   Point: Body mechanics (Done)     Description: Instruct learner(s) on proper positioning and spine alignment during self-care, functional mobility activities and/or exercises.    Learning Progress Summary           Patient Acceptance, E, VU by  at 3/7/2019  2:53 PM    Comment:  E/C-W/S tech's    Acceptance, E, NR by  at 3/6/2019  3:08 PM    Comment:  Educated about OT and POC. Educated to call for assist. Educated on safety throughout. Educated on CABG precuations. Educated on benefits of moving and doing therapy.                               User Key     Initials Effective Dates Name Provider Type Discipline     06/08/18 -  Marlen Jaimes, OTR/L Occupational Therapist OT     03/07/18 -  Fang Irvin, CUTLER/L Occupational Therapy Assistant OT                OT Recommendation and Plan  Outcome Summary/Treatment Plan (OT)  Daily Summary of Progress (OT): progress towards functional goals is fair  Plan for Continued Treatment (OT): Continue per POC  Anticipated Discharge Disposition (OT): home with assist, home with home health, home with OP services  Therapy Frequency (OT Eval): daily  Daily Summary of Progress (OT): progress towards functional goals is fair  Outcome Summary: Pt require encouragement to work with OT, eventually working on UB ther/ex per CABG precautions/limitations, HH OT/PT with F/U OP services   Outcome Measures     Row Name 03/08/19 0730 03/07/19 1445 03/07/19 1100       How much help from another person do you currently need...    Turning from your back to your side while in flat bed without using bedrails?  --  2  -LN  --    Moving from lying on back to sitting on the side of a flat bed without bedrails?  --  2  -LN  --    Moving to and from a bed to a chair (including a wheelchair)?  --  3  -LN  --    Standing up from a chair using your arms (e.g., wheelchair, bedside chair)?   --  3  -LN  --    Climbing 3-5 steps with a railing?  --  2  -LN  --    To walk in hospital room?  --  3  -LN  --    AM-PAC 6 Clicks Score  --  15  -LN  --       How much help from another is currently needed...    Putting on and taking off regular lower body clothing?  2  -JH  --  1  -JH    Bathing (including washing, rinsing, and drying)  2  -JH  --  2  -JH    Toileting (which includes using toilet bed pan or urinal)  1  -JH  --  1  -JH    Putting on and taking off regular upper body clothing  2  -JH  --  2  -JH    Taking care of personal grooming (such as brushing teeth)  3  -JH  --  3  -JH    Eating meals  3  -JH  --  3  -    Score  13  -JH  --  12  -       Functional Assessment    Outcome Measure Options  --  AM-PAC 6 Clicks Basic Mobility (PT)  -LN  --    Row Name 03/06/19 1027 03/06/19 1022          How much help from another person do you currently need...    Turning from your back to your side while in flat bed without using bedrails?  2  -JOLLY  --     Moving from lying on back to sitting on the side of a flat bed without bedrails?  2  -JOLLY  --     Moving to and from a bed to a chair (including a wheelchair)?  2  -JOLLY  --     Standing up from a chair using your arms (e.g., wheelchair, bedside chair)?  3  -JOLLY  --     Climbing 3-5 steps with a railing?  1  -JOLLY  --     To walk in hospital room?  2  -JOLLY  --     AM-PAC 6 Clicks Score  12  -JOLLY  --        How much help from another is currently needed...    Putting on and taking off regular lower body clothing?  --  1  -BH     Bathing (including washing, rinsing, and drying)  --  2  -BH     Toileting (which includes using toilet bed pan or urinal)  --  1  -BH     Putting on and taking off regular upper body clothing  --  2  -BH     Taking care of personal grooming (such as brushing teeth)  --  3  -BH     Eating meals  --  3  -BH     Score  --  12  -        Functional Assessment    Outcome Measure Options  AM-PAC 6 Clicks Basic Mobility (PT)  -JOLLY  AM-PAC 6 Clicks  Daily Activity (OT)  -       User Key  (r) = Recorded By, (t) = Taken By, (c) = Cosigned By    Initials Name Provider Type     Marlen Jaimes, OTR/L Occupational Therapist    Pamela Lozano, PT Physical Therapist    LN Margarette Nava, PTA Physical Therapy Assistant     Fang Irvin, CUTLER/L Occupational Therapy Assistant           Time Calculation:   Time Calculation- OT     Row Name 03/08/19 1215             Time Calculation-     OT Start Time  0730  -      OT Stop Time  0800  -      OT Time Calculation (min)  30 min  -      OT Received On  03/08/19  -        User Key  (r) = Recorded By, (t) = Taken By, (c) = Cosigned By    Initials Name Provider Type     Fang Irvin, CUTLER/L Occupational Therapy Assistant           Therapy Suggested Charges     Code   Minutes Charges    None           Therapy Charges for Today     Code Description Service Date Service Provider Modifiers Qty    99862432463 HC OT THERAPEUTIC ACT EA 15 MIN 3/7/2019 Fang Irvin COTA/L GO 1    59734062781 HC OT SELF CARE/MGMT/TRAIN EA 15 MIN 3/7/2019 Fang Irvin CUTLER/L GO 3    06557006093 HC OT THER PROC EA 15 MIN 3/8/2019 Fang Irvin COTA/L GO 2               [place]/NADIA  3/8/2019

## 2019-03-08 NOTE — PLAN OF CARE
Problem: Patient Care Overview  Goal: Plan of Care Review   03/08/19 1057   Coping/Psychosocial   Plan of Care Reviewed With patient   Plan of Care Review   Progress improving   OTHER   Outcome Summary sup-sit min of 1,sit-stand-sit cga of 1,amb 200' with rw and cga of 1,min dizziness after gt which resolved with sitting-reviewed cardiac/sternal pre-no goals met-if d/c would benefit from hh and assist as needed     Goal: Discharge Needs Assessment   03/06/19 1100 03/06/19 1543   Discharge Needs Assessment   Readmission Within the Last 30 Days --  no previous admission in last 30 days   Concerns to be Addressed --  adjustment to diagnosis/illness   Patient/Family Anticipates Transition to --  home with family   Patient/Family Anticipated Services at Transition --  home health care   Transportation Concerns car, none --    Transportation Anticipated --  family or friend will provide   Anticipated Changes Related to Illness --  none   Equipment Needed After Discharge --  none   Discharge Facility/Level of Care Needs --  home with home health   Current Discharge Risk --  chronically ill;dependent with mobility/activities of daily living;physical impairment   Disability   Equipment Currently Used at Home --  none

## 2019-03-08 NOTE — PLAN OF CARE
Problem: Patient Care Overview  Goal: Plan of Care Review  Outcome: Ongoing (interventions implemented as appropriate)   03/08/19 0438 03/08/19 0737   Coping/Psychosocial   Plan of Care Reviewed With patient --    Plan of Care Review   Progress improving --    OTHER   Outcome Summary --  Pt require encouragement to work with OT, eventually working on UB ther/ex per CABG precautions/limitations, HH OT/PT with F/U OP services

## 2019-03-09 LAB
BASOPHILS # BLD AUTO: 0.04 10*3/MM3 (ref 0–0.2)
BASOPHILS NFR BLD AUTO: 0.3 % (ref 0–1.5)
DEPRECATED RDW RBC AUTO: 44.9 FL (ref 37–54)
EOSINOPHIL # BLD AUTO: 0.31 10*3/MM3 (ref 0–0.4)
EOSINOPHIL NFR BLD AUTO: 2.5 % (ref 0.3–6.2)
ERYTHROCYTE [DISTWIDTH] IN BLOOD BY AUTOMATED COUNT: 13.7 % (ref 12.3–15.4)
GLUCOSE BLDC GLUCOMTR-MCNC: 101 MG/DL (ref 70–130)
GLUCOSE BLDC GLUCOMTR-MCNC: 118 MG/DL (ref 70–130)
GLUCOSE BLDC GLUCOMTR-MCNC: 163 MG/DL (ref 70–130)
GLUCOSE BLDC GLUCOMTR-MCNC: 176 MG/DL (ref 70–130)
HCT VFR BLD AUTO: 28.2 % (ref 34–46.6)
HGB BLD-MCNC: 9 G/DL (ref 12–15.9)
IMM GRANULOCYTES # BLD AUTO: 0.05 10*3/MM3 (ref 0–0.05)
IMM GRANULOCYTES NFR BLD AUTO: 0.4 % (ref 0–0.5)
LYMPHOCYTES # BLD AUTO: 1.61 10*3/MM3 (ref 0.7–3.1)
LYMPHOCYTES NFR BLD AUTO: 13.2 % (ref 19.6–45.3)
MCH RBC QN AUTO: 28.9 PG (ref 26.6–33)
MCHC RBC AUTO-ENTMCNC: 31.9 G/DL (ref 31.5–35.7)
MCV RBC AUTO: 90.7 FL (ref 79–97)
MONOCYTES # BLD AUTO: 0.66 10*3/MM3 (ref 0.1–0.9)
MONOCYTES NFR BLD AUTO: 5.4 % (ref 5–12)
NEUTROPHILS # BLD AUTO: 9.5 10*3/MM3 (ref 1.4–7)
NEUTROPHILS NFR BLD AUTO: 78.2 % (ref 42.7–76)
NRBC BLD AUTO-RTO: 0 /100 WBC (ref 0–0)
PLATELET # BLD AUTO: 205 10*3/MM3 (ref 140–450)
PMV BLD AUTO: 10 FL (ref 6–12)
RBC # BLD AUTO: 3.11 10*6/MM3 (ref 3.77–5.28)
WBC NRBC COR # BLD: 12.17 10*3/MM3 (ref 3.4–10.8)

## 2019-03-09 PROCEDURE — 97110 THERAPEUTIC EXERCISES: CPT

## 2019-03-09 PROCEDURE — 94760 N-INVAS EAR/PLS OXIMETRY 1: CPT

## 2019-03-09 PROCEDURE — 82962 GLUCOSE BLOOD TEST: CPT

## 2019-03-09 PROCEDURE — 94799 UNLISTED PULMONARY SVC/PX: CPT

## 2019-03-09 PROCEDURE — 25010000002 FUROSEMIDE PER 20 MG: Performed by: NURSE PRACTITIONER

## 2019-03-09 PROCEDURE — 85025 COMPLETE CBC W/AUTO DIFF WBC: CPT | Performed by: NURSE PRACTITIONER

## 2019-03-09 PROCEDURE — 97530 THERAPEUTIC ACTIVITIES: CPT

## 2019-03-09 PROCEDURE — 63710000001 INSULIN ASPART PER 5 UNITS: Performed by: NURSE PRACTITIONER

## 2019-03-09 PROCEDURE — 63710000001 INSULIN DETEMIR PER 5 UNITS: Performed by: NURSE PRACTITIONER

## 2019-03-09 PROCEDURE — 99024 POSTOP FOLLOW-UP VISIT: CPT | Performed by: NURSE PRACTITIONER

## 2019-03-09 PROCEDURE — 25010000002 AMIODARONE IN DEXTROSE 5% 360-4.14 MG/200ML-% SOLUTION: Performed by: NURSE PRACTITIONER

## 2019-03-09 RX ORDER — AMIODARONE HYDROCHLORIDE 200 MG/1
200 TABLET ORAL
Status: DISCONTINUED | OUTPATIENT
Start: 2019-03-10 | End: 2019-03-10

## 2019-03-09 RX ORDER — FUROSEMIDE 10 MG/ML
20 INJECTION INTRAMUSCULAR; INTRAVENOUS ONCE
Status: COMPLETED | OUTPATIENT
Start: 2019-03-09 | End: 2019-03-09

## 2019-03-09 RX ORDER — ECHINACEA PURPUREA EXTRACT 125 MG
2 TABLET ORAL AS NEEDED
Status: DISCONTINUED | OUTPATIENT
Start: 2019-03-09 | End: 2019-03-12 | Stop reason: HOSPADM

## 2019-03-09 RX ORDER — POTASSIUM CHLORIDE 750 MG/1
20 CAPSULE, EXTENDED RELEASE ORAL ONCE
Status: COMPLETED | OUTPATIENT
Start: 2019-03-09 | End: 2019-03-09

## 2019-03-09 RX ADMIN — OXYCODONE HYDROCHLORIDE AND ACETAMINOPHEN 500 MG: 500 TABLET ORAL at 21:37

## 2019-03-09 RX ADMIN — KETOROLAC TROMETHAMINE 10 MG: 10 TABLET, FILM COATED ORAL at 06:04

## 2019-03-09 RX ADMIN — PRENATAL VIT W/ FE FUMARATE-FA TAB 27-0.8 MG 1 TABLET: 27-0.8 TAB at 08:02

## 2019-03-09 RX ADMIN — SERTRALINE HYDROCHLORIDE 50 MG: 50 TABLET ORAL at 21:37

## 2019-03-09 RX ADMIN — OXYCODONE HYDROCHLORIDE AND ACETAMINOPHEN 500 MG: 500 TABLET ORAL at 08:02

## 2019-03-09 RX ADMIN — OXYCODONE HYDROCHLORIDE 5 MG: 5 TABLET ORAL at 10:16

## 2019-03-09 RX ADMIN — OXYCODONE HYDROCHLORIDE 5 MG: 5 TABLET ORAL at 06:04

## 2019-03-09 RX ADMIN — CARVEDILOL 3.12 MG: 3.12 TABLET, FILM COATED ORAL at 21:37

## 2019-03-09 RX ADMIN — INSULIN ASPART 4 UNITS: 100 INJECTION, SOLUTION INTRAVENOUS; SUBCUTANEOUS at 21:38

## 2019-03-09 RX ADMIN — IPRATROPIUM BROMIDE AND ALBUTEROL SULFATE 3 ML: 2.5; .5 SOLUTION RESPIRATORY (INHALATION) at 19:40

## 2019-03-09 RX ADMIN — LEVOTHYROXINE SODIUM 200 MCG: 100 TABLET ORAL at 06:05

## 2019-03-09 RX ADMIN — FUROSEMIDE 20 MG: 10 INJECTION, SOLUTION INTRAVENOUS at 11:46

## 2019-03-09 RX ADMIN — GUAIFENESIN 1200 MG: 600 TABLET, EXTENDED RELEASE ORAL at 21:37

## 2019-03-09 RX ADMIN — Medication 400 MG: at 21:37

## 2019-03-09 RX ADMIN — ACETAMINOPHEN 1000 MG: 500 TABLET ORAL at 15:52

## 2019-03-09 RX ADMIN — OXYCODONE HYDROCHLORIDE 5 MG: 5 TABLET ORAL at 23:35

## 2019-03-09 RX ADMIN — ACETAMINOPHEN 1000 MG: 500 TABLET ORAL at 10:16

## 2019-03-09 RX ADMIN — ASPIRIN 81 MG: 81 TABLET, COATED ORAL at 08:02

## 2019-03-09 RX ADMIN — POTASSIUM CHLORIDE 20 MEQ: 750 CAPSULE, EXTENDED RELEASE ORAL at 11:46

## 2019-03-09 RX ADMIN — SODIUM CHLORIDE, PRESERVATIVE FREE 3 ML: 5 INJECTION INTRAVENOUS at 10:16

## 2019-03-09 RX ADMIN — IPRATROPIUM BROMIDE AND ALBUTEROL SULFATE 3 ML: 2.5; .5 SOLUTION RESPIRATORY (INHALATION) at 07:53

## 2019-03-09 RX ADMIN — FAMOTIDINE 20 MG: 20 TABLET ORAL at 21:38

## 2019-03-09 RX ADMIN — GUAIFENESIN 1200 MG: 600 TABLET, EXTENDED RELEASE ORAL at 08:02

## 2019-03-09 RX ADMIN — FAMOTIDINE 20 MG: 20 TABLET ORAL at 08:02

## 2019-03-09 RX ADMIN — Medication 400 MG: at 08:02

## 2019-03-09 RX ADMIN — ATORVASTATIN CALCIUM 40 MG: 40 TABLET, FILM COATED ORAL at 21:37

## 2019-03-09 RX ADMIN — ACETAMINOPHEN 1000 MG: 500 TABLET ORAL at 21:37

## 2019-03-09 RX ADMIN — INSULIN ASPART 4 UNITS: 100 INJECTION, SOLUTION INTRAVENOUS; SUBCUTANEOUS at 11:46

## 2019-03-09 RX ADMIN — ACETAMINOPHEN 1000 MG: 500 TABLET ORAL at 04:26

## 2019-03-09 RX ADMIN — INSULIN DETEMIR 30 UNITS: 100 INJECTION, SOLUTION SUBCUTANEOUS at 21:42

## 2019-03-09 RX ADMIN — OXYCODONE HYDROCHLORIDE 5 MG: 5 TABLET ORAL at 19:35

## 2019-03-09 RX ADMIN — OXYCODONE HYDROCHLORIDE 5 MG: 5 TABLET ORAL at 15:19

## 2019-03-09 RX ADMIN — IPRATROPIUM BROMIDE AND ALBUTEROL SULFATE 3 ML: 2.5; .5 SOLUTION RESPIRATORY (INHALATION) at 11:06

## 2019-03-09 NOTE — PLAN OF CARE
Problem: Patient Care Overview  Goal: Plan of Care Review  Outcome: Ongoing (interventions implemented as appropriate)   03/08/19 1508   Coping/Psychosocial   Plan of Care Reviewed With patient   Plan of Care Review   Progress improving   OTHER   Outcome Summary Pt doing well, still complains of some dizziness but vitals have been stable. Pt ambulated twice in the halls and has sat up in the chair for all meals.     Goal: Individualization and Mutuality  Outcome: Ongoing (interventions implemented as appropriate)    Goal: Discharge Needs Assessment  Outcome: Ongoing (interventions implemented as appropriate)      Problem: Skin Injury Risk (Adult)  Goal: Skin Health and Integrity  Outcome: Ongoing (interventions implemented as appropriate)      Problem: Fall Risk (Adult)  Goal: Absence of Fall  Outcome: Ongoing (interventions implemented as appropriate)      Problem: Cardiac Surgery (Adult)  Goal: Signs and Symptoms of Listed Potential Problems Will be Absent, Minimized or Managed (Cardiac Surgery)  Outcome: Ongoing (interventions implemented as appropriate)      Problem: Diabetes, Type 2 (Adult)  Goal: Signs and Symptoms of Listed Potential Problems Will be Absent, Minimized or Managed (Diabetes, Type 2)  Outcome: Ongoing (interventions implemented as appropriate)

## 2019-03-09 NOTE — PROGRESS NOTES
"  Cardiothoracic - Vascular Surgery Daily Note        LOS: 4 days   Patient Care Team:  Anderson Abbott MD as PCP - General (Family Medicine)    POD#4  CORONARY ARTERY BYPASS GRAFTING x4  ENDOSCOPIC VEIN HARVEST           LEFT INTERNAL MAMMARY ARTERY TO LEFT ANTERIOR DESCENDING CORONARY ARTERY  SAPHENOUS VEIN GRAFT FROM ASCENDING AORTA TO FIRST DIAGONAL BRANCH  SAPHENOUS VEIN GRAFT FROM ASCENDING AORTA TO THIRD MARGINAL BRANCH  SAPHENOUS VEIN GRAFT FROM ASCENDING AORTA TO POSTERIOR DESCENDING CORONARY ARTERY       Chief Complaint: CAD, Surgical Discomfort      Subjective     The following portions of the patient's history were reviewed and updated as appropriate: allergies, current medications, past family history, past medical history, past social history, past surgical history and problem list.     Subjective:  Symptoms:  Stable.  She reports shortness of breath and chest pain (surgical).    Diet:  Adequate intake.    Activity level: Impaired due to pain.    Pain:  She complains of pain that is moderate.  Pain is requiring pain medication.          Objective     Vital Signs  Temp:  [96.7 °F (35.9 °C)-97.7 °F (36.5 °C)] 97.2 °F (36.2 °C)  Heart Rate:  [55-68] 58  Resp:  [18] 18  BP: ()/(60-80) 114/65  Body mass index is 43.28 kg/m².    Intake/Output Summary (Last 24 hours) at 3/9/2019 1100  Last data filed at 3/9/2019 0900  Gross per 24 hour   Intake 1160 ml   Output 430 ml   Net 730 ml     I/O this shift:  In: 240 [P.O.:240]  Out: -     Wt Readings from Last 3 Encounters:   03/09/19 118 kg (260 lb 1.6 oz)   02/28/19 112 kg (248 lb)   02/12/19 112 kg (248 lb)      No Air Leak H2O seal    Physical Exam   Objective:  General Appearance:  Comfortable.    Vital signs: (most recent): Blood pressure 114/65, pulse 58, temperature 97.2 °F (36.2 °C), temperature source Axillary, resp. rate 18, height 165.1 cm (65\"), weight 118 kg (260 lb 1.6 oz), SpO2 94 %, not currently breastfeeding.  Vital signs are " normal.  No fever.    Output: Producing urine and producing stool.    Lungs:  Normal effort and normal respiratory rate.  There are decreased breath sounds (bases).    Heart: Normal rate.  Regular rhythm.    Chest: (CT No Air Leak  AV Wires to chest)  Abdomen: Abdomen is soft.  Hypoactive bowel sounds.     Extremities: Decreased range of motion.  There is dependent edema (generalized).    Pulses: Distal pulses are intact.    Neurological: Patient is alert and oriented to person, place and time.    Pupils:  Pupils are equal, round, and reactive to light.    Skin:  Warm and dry.  (M/S [place]  [facility])              Results Review:    Lab Results   Component Value Date    WBC 14.68 (H) 03/08/2019    HGB 9.4 (L) 03/08/2019    HCT 30.8 (L) 03/08/2019    MCV 93.9 03/08/2019     03/08/2019     Lab Results   Component Value Date    GLUCOSE 123 (H) 03/08/2019    BUN 17 03/08/2019    CREATININE 0.80 03/08/2019    EGFRIFNONA 77 03/08/2019    BCR 21.3 03/08/2019    K 4.3 03/08/2019    CO2 23.0 03/08/2019    CALCIUM 7.8 (L) 03/08/2019    ALBUMIN 3.20 (L) 03/08/2019    AST 73 (H) 03/08/2019    ALT 67 (H) 03/08/2019       Calcium Calcium   Date/Time Value Ref Range Status   03/08/2019 0741 7.8 (L) 8.4 - 10.2 mg/dL Final      Magnesium Magnesium   Date/Time Value Ref Range Status   03/07/2019 0546 2.3 1.6 - 2.3 mg/dL Final        Imaging Results (last 24 hours)     ** No results found for the last 24 hours. **                                  acetaminophen 1,000 mg Oral Q6H   amiodarone 400 mg Oral Q24H   aspirin 81 mg Oral Daily   atorvastatin 40 mg Oral Nightly   carvedilol 3.125 mg Oral Q12H   famotidine 20 mg Oral BID   furosemide 20 mg Intravenous Once   guaiFENesin 1,200 mg Oral Q12H   insulin aspart 0-24 Units Subcutaneous 4x Daily AC & at Bedtime   insulin detemir 30 Units Subcutaneous Nightly   ipratropium-albuterol 3 mL Nebulization 4x Daily - RT   ketorolac 10 mg Oral Q8H   levothyroxine 200 mcg Oral QAM    magnesium oxide 400 mg Oral BID   prenatal vitamin 27-0.8 1 tablet Oral Daily   sennosides-docusate sodium 1 tablet Oral BID   sertraline 50 mg Oral Nightly   sodium chloride 3 mL Intravenous Q12H   vitamin C 500 mg Oral BID                ASSESSMENT/PLAN       Coronary artery disease of native artery of native heart with stable angina pectoris (CMS/HCC)      Assessment & Plan    Stable Post op CABG: Progressing    CAD: ASA, BRILINTA, BETA. STATIN Hold ACE.     Resp: O2 support. Incentive. CT H2O seal. Lasix today    Pain: Tylenol. OxyIR. Toradol    Rehab: OOB. Ambulate. PT/OT    DM/Transient Post Op Hyperglycemia:  SSI coverage. Levemir    Depression: Zoloft    Acute Blood loss Anemia: Syncope, CBC today, 9.4 on 3/8.    Disp: Stable Progress care 3W telemetry. DC Planning 1-2 days            This document has been electronically signed by DUC Marr on March 9, 2019 11:00 AM

## 2019-03-09 NOTE — PLAN OF CARE
Problem: Patient Care Overview  Goal: Plan of Care Review  Outcome: Ongoing (interventions implemented as appropriate)   03/09/19 6096   Coping/Psychosocial   Plan of Care Reviewed With patient   Plan of Care Review   Progress improving   OTHER   Outcome Summary pt agreed to min OT agreed to ex and ed b ue and ed on precautions home safety and restrictions

## 2019-03-09 NOTE — THERAPY TREATMENT NOTE
Inpatient Rehabilitation - Occupational Therapy Treatment Note  [facility]     Patient Name: Rhea Sutton  : 1970  MRN: [id number]  Today's Date: 3/9/2019  Onset of Illness/Injury or Date of Surgery: 19  Date of Referral to OT: 19  Referring Physician: DUC High    Admit Date: 3/5/2019       ICD-10-CM ICD-9-CM   1. Coronary artery disease of native artery of native heart with stable angina pectoris (CMS/HCC) I25.118 414.01     413.9   2. Coronary artery disease involving native heart without angina pectoris, unspecified vessel or lesion type I25.10 414.01   3. Impaired functional mobility, balance, gait, and endurance Z74.09 V49.89   4. Impaired mobility and ADLs Z74.09 799.89     Patient Active Problem List   Diagnosis   • Postsurgical hypothyroidism   • Impaired fasting glucose   • Vitamin D deficiency   • Type 2 diabetes mellitus with hyperglycemia (CMS/[facility], located [facility])   • Hypertension   • Hyperlipidemia   • DDD (degenerative disc disease), lumbar   • Lumbar radiculopathy   • Cervicalgia   • NSTEMI (non-ST elevated myocardial infarction) (CMS/[facility], located within [facility])   • CAD (coronary artery disease)   • AMI inferior wall (CMS/[facility], located [facility])   • Head lice infestation   • Diabetes mellitus (CMS/HCC)   • Uncontrolled diabetes mellitus (CMS/HCC)   • Coronary artery disease of native artery of native heart with stable angina pectoris (CMS/[facility], located within [facility])   • Chronic kidney disease, stage 3 (moderate) (CMS/[facility], located within [facility])     Past Medical History:   Diagnosis Date   • CAD (coronary artery disease)    • Chronic back pain    • Chronic bronchitis (CMS/HCC)    • Diabetes mellitus (CMS/[facility] located [facility])    • Diverticulitis 1986   • Goiter    • Hashimoto's thyroiditis    • Head lice infestation    • Hyperlipidemia    • Hypertension    • MI (myocardial infarction) (CMS/HCC)    • Postoperative hypothyroidism    • Sinus congestion    • Sleep apnea     not using c-pap     Past Surgical History:   Procedure Laterality Date   • APPENDECTOMY     • CARDIAC  CATHETERIZATION N/A 10/13/2017   • CARDIAC CATHETERIZATION N/A 1/3/2019   • CHOLECYSTECTOMY     • COLON RESECTION     • COLON SURGERY     • CORONARY STENT PLACEMENT     • MN RT/LT HEART CATHETERS N/A 10/13/2017   • TOTAL ABDOMINAL HYSTERECTOMY WITH SALPINGO OOPHORECTOMY     • TOTAL THYROIDECTOMY         Therapy Treatment    Rehabilitation Treatment Summary     Row Name 03/09/19 1105             Treatment Time/Intention    Discipline  occupational therapy assistant  -LM      Document Type  therapy note (daily note)  -LM      Subjective Information  complains of  -LM      Mode of Treatment  individual therapy;occupational therapy also ed on louis precautions and ex and home ed   -LM      Patient Effort  adequate  -LM      Existing Precautions/Restrictions  cardiac  -LM      Recorded by [LM] Brandi Stoner CUTLER/L 03/09/19 1554      Row Name 03/09/19 1105             Cognitive Assessment/Intervention- PT/OT    Affect/Mental Status (Cognitive)  WNL  -LM      Orientation Status (Cognition)  oriented x 4  -LM      Follows Commands (Cognition)  follows one step commands  -LM      Safety Deficit (Cognitive)  mild deficit  -LM      Recorded by [LM] Brandi Stoner CUTLER/L 03/09/19 1554      Row Name 03/09/19 1105             Upper Extremity Seated Therapeutic Exercise    Performed, Seated Upper Extremity (Therapeutic Exercise)  shoulder flexion/extension;shoulder abduction/adduction;shoulder horizontal abduction/adduction;scapular protraction/retraction;elbow flexion/extension chest press and shoulder shrugs all with 90* and sternal pre  -LM      Recorded by [LM] Brandi Stoner CUTLER/L 03/09/19 1554      Row Name 03/09/19 1105             Positioning and Restraints    Pre-Treatment Position  sitting in chair/recliner  -LM      Post Treatment Position  chair  -LM      Recorded by [LM] Brandi Stoner CUTLER/L 03/09/19 1554      Row Name 03/09/19 1105             Pain Scale: Numbers Pre/Post-Treatment    Pain  Scale: Numbers, Pretreatment  7/10  -LM      Pain Scale: Numbers, Post-Treatment  7/10  -LM      Pain Location - Orientation  incisional  -LM      Pain Location  chest  -LM      Pain Intervention(s)  Medication (See MAR)  -LM      Recorded by [LM] Brandi Stoner COTA/L 03/09/19 1554      Row Name                Wound 03/05/19 1414 Other (See comments) midline sternal incision    Wound - Properties Group Date first assessed: 03/05/19 [CF] Time first assessed: 1414 [CF] Present On Admission : no [CF] Side: Other (See comments) [CF], midline sternal incision  Orientation: midline [CF] Location: sternal [CF] Type: incision [CF] Additional Comments: closed incision. dressings applied [CF] Recorded by:  [CF] Aparna Eason RN 03/05/19 1414    Row Name                Wound 03/05/19 1414 Left leg incision    Wound - Properties Group Date first assessed: 03/05/19 [CF] Time first assessed: 1414 [CF] Present On Admission : no [CF] Side: Left [CF] Location: leg [CF] Type: incision [CF] Additional Comments: closed incisions. dressings applied (skin affix, kerlex, ace) [CF] Recorded by:  [CF] Aparna Eason RN 03/05/19 1415    Row Name 03/09/19 1105             Outcome Summary/Treatment Plan (OT)    Daily Summary of Progress (OT)  progress toward functional goals as expected  -LM      Recorded by [LM] Brandi Stoner COTA/L 03/09/19 1554        User Key  (r) = Recorded By, (t) = Taken By, (c) = Cosigned By    Initials Name Effective Dates Discipline    LM Brandi Stoner COTA/L 03/07/18 -  OT    CF Aparna Eason RN 10/17/16 -  Nurse        Wound 03/05/19 1414 Other (See comments) midline sternal incision (Active)   Dressing Appearance dry;intact 3/9/2019  7:41 AM   Closure AYDEE 3/9/2019  7:41 AM   Base dressing in place, unable to visualize 3/9/2019  7:41 AM   Periwound intact 3/9/2019  7:41 AM   Periwound Temperature warm 3/9/2019  7:41 AM   Periwound Skin Turgor soft 3/9/2019  7:41 AM   Wound Output (mL) 150  3/8/2019  8:30 PM       Wound 03/05/19 1414 Left leg incision (Active)   Dressing Appearance open to air 3/9/2019  7:41 AM   Closure Liquid skin adhesive 3/9/2019  7:41 AM   Base clean;dry 3/9/2019  7:41 AM   Periwound dry;ecchymotic;edematous 3/9/2019  7:41 AM   Periwound Temperature warm 3/9/2019  7:41 AM   Periwound Skin Turgor soft 3/9/2019  7:41 AM     Rehab Goal Summary     Row Name 03/09/19 1554             Occupational Therapy Goals    Transfer Goal Selection (OT)  transfer, OT goal 1  -LM      Bathing Goal Selection (OT)  bathing, OT goal 1  -LM      Dressing Goal Selection (OT)  dressing, OT goal 1  -LM      Toileting Goal Selection (OT)  toileting, OT goal 1  -LM      Endurance Goal Selection (OT)  endurance, OT goal 1  -LM      Functional Mobility Goal Selection (OT)  functional mobility, OT goal 1  -LM      Precaution Goal Selection (OT)  precaution, OT goal 1  -LM         Transfer Goal 1 (OT)    Activity/Assistive Device (Transfer Goal 1, OT)  toilet  -LM      Ada Level/Cues Needed (Transfer Goal 1, OT)  contact guard assist  -LM      Time Frame (Transfer Goal 1, OT)  long term goal (LTG);by discharge  -LM      Progress/Outcome (Transfer Goal 1, OT)  goal not met  -LM         Bathing Goal 1 (OT)    Activity/Assistive Device (Bathing Goal 1, OT)  bathing skills, all  -LM      Ada Level/Cues Needed (Bathing Goal 1, OT)  set-up required;verbal cues required;standby assist  -LM      Time Frame (Bathing Goal 1, OT)  long term goal (LTG);by discharge  -LM      Progress/Outcomes (Bathing Goal 1, OT)  goal not met  -LM         Dressing Goal 1 (OT)    Activity/Assistive Device (Dressing Goal 1, OT)  dressing skills, all  -LM      Ada/Cues Needed (Dressing Goal 1, OT)  set-up required;verbal cues required;standby assist  -LM      Time Frame (Dressing Goal 1, OT)  long term goal (LTG);by discharge  -LM      Progress/Outcome (Dressing Goal 1, OT)  goal not met  -LM         Toileting Goal  1 (OT)    Activity/Device (Toileting Goal 1, OT)  toileting skills, all  -LM      [place] Level/Cues Needed (Toileting Goal 1, OT)  set-up required;verbal cues required;contact guard assist  -LM      Time Frame (Toileting Goal 1, OT)  long term goal (LTG);by discharge  -LM      Progress/Outcome (Toileting Goal 1, OT)  goal not met  -LM          Endurance Goal 1 (OT)    Activity Level (Endurance Goal 1, OT)  endurance 2 fair + 25 min functional task O2 90 or up 3 or less rest breaks  -LM      Progress/Outcome (Endurance Goal 1, OT)  goal not met  -LM         Functional Mobility Goal 1 (OT)    Activity/Assistive Device (Functional Mobility Goal 1, OT)  walker, rolling  -LM      [place] Level/Cues Needed (Functional Mobility Goal 1, OT)  contact guard assist  -LM      Distance Goal 1 (Functional Mobility, OT)  to and from bathroom with good safety, balance and problem solving   -LM      Time Frame (Functional Mobility Goal 1, OT)  long term goal (LTG);by discharge  -LM      Progress/Outcome (Functional Mobility Goal 1, OT)  goal not met  -LM         Precaution Goal 1 (OT)    Activity (Precaution Goal 1, OT)  sternal precautions;fall precautions;demonstrates compliance;demonstrates understanding;during all tasks in daily routine;during ADLs;during mobility tasks;during transfers  -LM      [place] Level/Cues Needed (Precautions Goal 1, OT)  with minimum;verbal cues/redirection  -LM      Time Frame (Precaution Goal 1, OT)  long term goal (LTG);by discharge  -LM      Progress/Outcome (Precaution Goal 1, OT)  goal not met  -LM         Patient Education Goal (OT)    Activity (Patient Education Goal, OT)  Pt will communicate good home safety awareness.   -LM      [place]/Cues/Accuracy (Memory Goal 2, OT)  verbalizes understanding  -LM      Time Frame (Patient Education Goal, OT)  long term goal (LTG);by discharge  -LM      Progress/Outcome (Patient Education Goal, OT)  goal not met  -LM        User Key   (r) = Recorded By, (t) = Taken By, (c) = Cosigned By    Initials Name Provider Type Discipline    Brandi Stiles COTA/L Occupational Therapy Assistant OT        Occupational Therapy Education     Title: PT OT SLP Therapies (Done)     Topic: Occupational Therapy (Done)     Point: ADL training (Done)     Description: Instruct learner(s) on proper safety adaptation and remediation techniques during self care or transfers.   Instruct in proper use of assistive devices.    Learning Progress Summary           Patient Acceptance, E, VU by  at 3/9/2019  3:59 PM    Acceptance, E, VU by  at 3/7/2019  2:53 PM    Comment:  E/C-W/S tech's    Acceptance, E, NR by  at 3/6/2019  3:08 PM    Comment:  Educated about OT and POC. Educated to call for assist. Educated on safety throughout. Educated on CABG precuations. Educated on benefits of moving and doing therapy.                   Point: Home exercise program (Done)     Description: Instruct learner(s) on appropriate technique for monitoring, assisting and/or progressing therapeutic exercises/activities.    Learning Progress Summary           Patient Acceptance, E, VU by  at 3/9/2019  3:59 PM                   Point: Precautions (Done)     Description: Instruct learner(s) on prescribed precautions during self-care and functional transfers.    Learning Progress Summary           Patient Acceptance, E, VU by  at 3/9/2019  3:59 PM    Acceptance, E, VU by  at 3/7/2019  2:53 PM    Comment:  E/C-W/S tech's    Acceptance, E, NR by  at 3/6/2019  3:08 PM    Comment:  Educated about OT and POC. Educated to call for assist. Educated on safety throughout. Educated on CABG precuations. Educated on benefits of moving and doing therapy.                   Point: Body mechanics (Done)     Description: Instruct learner(s) on proper positioning and spine alignment during self-care, functional mobility activities and/or exercises.    Learning Progress Summary           Patient  Acceptance, E, VU by  at 3/9/2019  3:59 PM    Acceptance, E, VU by  at 3/7/2019  2:53 PM    Comment:  E/C-W/S tech's    Acceptance, E, NR by  at 3/6/2019  3:08 PM    Comment:  Educated about OT and POC. Educated to call for assist. Educated on safety throughout. Educated on CABG precuations. Educated on benefits of moving and doing therapy.                               User Key     Initials Effective Dates Name Provider Type Discipline     06/08/18 -  Marlen Jaimes, OTR/L Occupational Therapist OT     03/07/18 -  Fang Irvin, CUTLER/L Occupational Therapy Assistant OT     03/07/18 -  Brandi Stoner, CUTLER/L Occupational Therapy Assistant OT                OT Recommendation and Plan  Outcome Summary/Treatment Plan (OT)  Daily Summary of Progress (OT): progress toward functional goals as expected  Daily Summary of Progress (OT): progress toward functional goals as expected  Plan of Care Review  Plan of Care Reviewed With: patient  Plan of Care Reviewed With: patient  Outcome Summary: pt agreed to min OT  agreed to ex and ed   b ue and ed on precautions home safety and restrictions   Outcome Measures     Row Name 03/08/19 1057 03/08/19 0730 03/07/19 1445       How much help from another person do you currently need...    Turning from your back to your side while in flat bed without using bedrails?  3  -LN  --  2  -LN    Moving from lying on back to sitting on the side of a flat bed without bedrails?  3  -LN  --  2  -LN    Moving to and from a bed to a chair (including a wheelchair)?  3  -LN  --  3  -LN    Standing up from a chair using your arms (e.g., wheelchair, bedside chair)?  3  -LN  --  3  -LN    Climbing 3-5 steps with a railing?  2  -LN  --  2  -LN    To walk in hospital room?  3  -LN  --  3  -LN    AM-PAC 6 Clicks Score  17  -LN  --  15  -LN       How much help from another is currently needed...    Putting on and taking off regular lower body clothing?  --  2  -JH  --    Bathing (including  washing, rinsing, and drying)  --  2  -JH  --    Toileting (which includes using toilet bed pan or urinal)  --  1  -JH  --    Putting on and taking off regular upper body clothing  --  2  -JH  --    Taking care of personal grooming (such as brushing teeth)  --  3  -JH  --    Eating meals  --  3  -JH  --    Score  --  13  -JH  --       Functional Assessment    Outcome Measure Options  AM-PAC 6 Clicks Basic Mobility (PT)  -LN  --  AM-PAC 6 Clicks Basic Mobility (PT)  -LN    Row Name 03/07/19 1100             How much help from another is currently needed...    Putting on and taking off regular lower body clothing?  1  -JH      Bathing (including washing, rinsing, and drying)  2  -JH      Toileting (which includes using toilet bed pan or urinal)  1  -JH      Putting on and taking off regular upper body clothing  2  -JH      Taking care of personal grooming (such as brushing teeth)  3  -JH      Eating meals  3  -JH      Score  12  -JH        User Key  (r) = Recorded By, (t) = Taken By, (c) = Cosigned By    Initials Name Provider Type    LN Margarette Nava, MARY ELLEN Physical Therapy Assistant     Fang Irvin CUTLER/L Occupational Therapy Assistant           Time Calculation:   Time Calculation- OT     Row Name 03/09/19 1532             Time Calculation- OT    OT Start Time  1105  -LM      OT Stop Time  1135  -LM      OT Time Calculation (min)  30 min  -LM      Total Timed Code Minutes- OT  30 minute(s)  -LM      OT Received On  03/09/19  -        User Key  (r) = Recorded By, (t) = Taken By, (c) = Cosigned By    Initials Name Provider Type     Brandi Stoner COTA/L Occupational Therapy Assistant           Therapy Suggested Charges     Code   Minutes Charges    None           Therapy Charges for Today     Code Description Service Date Service Provider Modifiers Qty    51009299951 HC OT THER PROC EA 15 MIN 3/9/2019 Brandi Stoner COTA/L GO 1    04123701538 HC OT THERAPEUTIC ACT EA 15 MIN 3/9/2019 [place]  HE Benz/NADIA GO 1               HE [place]/NADIA  3/9/2019

## 2019-03-10 ENCOUNTER — APPOINTMENT (OUTPATIENT)
Dept: GENERAL RADIOLOGY | Facility: HOSPITAL | Age: 49
End: 2019-03-10

## 2019-03-10 LAB
GLUCOSE BLDC GLUCOMTR-MCNC: 106 MG/DL (ref 70–130)
GLUCOSE BLDC GLUCOMTR-MCNC: 138 MG/DL (ref 70–130)
GLUCOSE BLDC GLUCOMTR-MCNC: 187 MG/DL (ref 70–130)
GLUCOSE BLDC GLUCOMTR-MCNC: 207 MG/DL (ref 70–130)

## 2019-03-10 PROCEDURE — 99024 POSTOP FOLLOW-UP VISIT: CPT | Performed by: NURSE PRACTITIONER

## 2019-03-10 PROCEDURE — 63710000001 INSULIN ASPART PER 5 UNITS: Performed by: NURSE PRACTITIONER

## 2019-03-10 PROCEDURE — 25010000002 ONDANSETRON PER 1 MG: Performed by: NURSE PRACTITIONER

## 2019-03-10 PROCEDURE — 25010000002 FUROSEMIDE PER 20 MG: Performed by: NURSE PRACTITIONER

## 2019-03-10 PROCEDURE — 63710000001 INSULIN DETEMIR PER 5 UNITS: Performed by: NURSE PRACTITIONER

## 2019-03-10 PROCEDURE — 82962 GLUCOSE BLOOD TEST: CPT

## 2019-03-10 PROCEDURE — 94799 UNLISTED PULMONARY SVC/PX: CPT

## 2019-03-10 PROCEDURE — 97110 THERAPEUTIC EXERCISES: CPT

## 2019-03-10 PROCEDURE — 71046 X-RAY EXAM CHEST 2 VIEWS: CPT

## 2019-03-10 RX ORDER — FUROSEMIDE 10 MG/ML
20 INJECTION INTRAMUSCULAR; INTRAVENOUS ONCE
Status: COMPLETED | OUTPATIENT
Start: 2019-03-10 | End: 2019-03-10

## 2019-03-10 RX ORDER — POTASSIUM CHLORIDE 750 MG/1
40 CAPSULE, EXTENDED RELEASE ORAL ONCE
Status: COMPLETED | OUTPATIENT
Start: 2019-03-10 | End: 2019-03-10

## 2019-03-10 RX ADMIN — FAMOTIDINE 20 MG: 20 TABLET ORAL at 08:28

## 2019-03-10 RX ADMIN — ACETAMINOPHEN 1000 MG: 500 TABLET ORAL at 21:27

## 2019-03-10 RX ADMIN — ONDANSETRON 4 MG: 2 INJECTION INTRAMUSCULAR; INTRAVENOUS at 10:39

## 2019-03-10 RX ADMIN — INSULIN ASPART 4 UNITS: 100 INJECTION, SOLUTION INTRAVENOUS; SUBCUTANEOUS at 10:54

## 2019-03-10 RX ADMIN — POTASSIUM CHLORIDE 40 MEQ: 750 CAPSULE, EXTENDED RELEASE ORAL at 13:52

## 2019-03-10 RX ADMIN — INSULIN DETEMIR 30 UNITS: 100 INJECTION, SOLUTION SUBCUTANEOUS at 21:28

## 2019-03-10 RX ADMIN — ACETAMINOPHEN 1000 MG: 500 TABLET ORAL at 16:06

## 2019-03-10 RX ADMIN — FAMOTIDINE 20 MG: 20 TABLET ORAL at 21:27

## 2019-03-10 RX ADMIN — OXYCODONE HYDROCHLORIDE AND ACETAMINOPHEN 500 MG: 500 TABLET ORAL at 08:28

## 2019-03-10 RX ADMIN — FUROSEMIDE 20 MG: 10 INJECTION, SOLUTION INTRAVENOUS at 10:54

## 2019-03-10 RX ADMIN — OXYCODONE HYDROCHLORIDE 5 MG: 5 TABLET ORAL at 04:57

## 2019-03-10 RX ADMIN — LEVOTHYROXINE SODIUM 200 MCG: 100 TABLET ORAL at 06:20

## 2019-03-10 RX ADMIN — INSULIN ASPART 5 UNITS: 100 INJECTION, SOLUTION INTRAVENOUS; SUBCUTANEOUS at 21:27

## 2019-03-10 RX ADMIN — PRENATAL VIT W/ FE FUMARATE-FA TAB 27-0.8 MG 1 TABLET: 27-0.8 TAB at 08:29

## 2019-03-10 RX ADMIN — SODIUM CHLORIDE, PRESERVATIVE FREE 3 ML: 5 INJECTION INTRAVENOUS at 10:39

## 2019-03-10 RX ADMIN — Medication 400 MG: at 21:27

## 2019-03-10 RX ADMIN — Medication 400 MG: at 08:28

## 2019-03-10 RX ADMIN — OXYCODONE HYDROCHLORIDE 5 MG: 5 TABLET ORAL at 13:51

## 2019-03-10 RX ADMIN — ATORVASTATIN CALCIUM 40 MG: 40 TABLET, FILM COATED ORAL at 21:27

## 2019-03-10 RX ADMIN — SERTRALINE HYDROCHLORIDE 50 MG: 50 TABLET ORAL at 21:27

## 2019-03-10 RX ADMIN — ACETAMINOPHEN 1000 MG: 500 TABLET ORAL at 10:54

## 2019-03-10 RX ADMIN — ASPIRIN 81 MG: 81 TABLET, COATED ORAL at 08:28

## 2019-03-10 RX ADMIN — OXYCODONE HYDROCHLORIDE 5 MG: 5 TABLET ORAL at 21:26

## 2019-03-10 RX ADMIN — ACETAMINOPHEN 1000 MG: 500 TABLET ORAL at 04:14

## 2019-03-10 RX ADMIN — OXYCODONE HYDROCHLORIDE AND ACETAMINOPHEN 500 MG: 500 TABLET ORAL at 21:27

## 2019-03-10 RX ADMIN — GUAIFENESIN 1200 MG: 600 TABLET, EXTENDED RELEASE ORAL at 21:26

## 2019-03-10 RX ADMIN — IPRATROPIUM BROMIDE AND ALBUTEROL SULFATE 3 ML: 2.5; .5 SOLUTION RESPIRATORY (INHALATION) at 15:17

## 2019-03-10 RX ADMIN — GUAIFENESIN 1200 MG: 600 TABLET, EXTENDED RELEASE ORAL at 08:28

## 2019-03-10 NOTE — PLAN OF CARE
Problem: Patient Care Overview  Goal: Plan of Care Review  Outcome: Ongoing (interventions implemented as appropriate)   03/10/19 0508 03/10/19 0905   Coping/Psychosocial   Plan of Care Reviewed With patient --    Plan of Care Review   Progress improving --    OTHER   Outcome Summary --  Pt engage in functional transfers with supervision and adl's with supervision UB and mod A with toileting & LB, pt making progress toward OT goals, home with OP services following

## 2019-03-10 NOTE — PLAN OF CARE
Problem: Patient Care Overview  Goal: Plan of Care Review  Outcome: Ongoing (interventions implemented as appropriate)   03/09/19 1923   Coping/Psychosocial   Plan of Care Reviewed With patient   Plan of Care Review   Progress improving   OTHER   Outcome Summary Coreg and amio was held this morning due to vitals, amio changed. Pt ambulated 3x today and has felt better. HR remains high 50's to 60. Chest tube output has slowed down. Continue to monitor pt .     Goal: Individualization and Mutuality  Outcome: Ongoing (interventions implemented as appropriate)    Goal: Discharge Needs Assessment  Outcome: Ongoing (interventions implemented as appropriate)      Problem: Skin Injury Risk (Adult)  Goal: Skin Health and Integrity  Outcome: Ongoing (interventions implemented as appropriate)      Problem: Fall Risk (Adult)  Goal: Absence of Fall  Outcome: Ongoing (interventions implemented as appropriate)      Problem: Cardiac Surgery (Adult)  Goal: Signs and Symptoms of Listed Potential Problems Will be Absent, Minimized or Managed (Cardiac Surgery)  Outcome: Ongoing (interventions implemented as appropriate)      Problem: Diabetes, Type 2 (Adult)  Goal: Signs and Symptoms of Listed Potential Problems Will be Absent, Minimized or Managed (Diabetes, Type 2)  Outcome: Ongoing (interventions implemented as appropriate)

## 2019-03-10 NOTE — THERAPY TREATMENT NOTE
Acute Care - Occupational Therapy Treatment Note  [facility]     Patient Name: Rhea Sutton  : 1970  MRN: [id number]  Today's Date: 3/10/2019  Onset of Illness/Injury or Date of Surgery: 19  Date of Referral to OT: 19  Referring Physician: DUC High    Admit Date: 3/5/2019       ICD-10-CM ICD-9-CM   1. Coronary artery disease of native artery of native heart with stable angina pectoris (CMS/HCC) I25.118 414.01     413.9   2. Coronary artery disease involving native heart without angina pectoris, unspecified vessel or lesion type I25.10 414.01   3. Impaired functional mobility, balance, gait, and endurance Z74.09 V49.89   4. Impaired mobility and ADLs Z74.09 799.89     Patient Active Problem List   Diagnosis   • Postsurgical hypothyroidism   • Impaired fasting glucose   • Vitamin D deficiency   • Type 2 diabetes mellitus with hyperglycemia (CMS/Formerly [facility])   • Hypertension   • Hyperlipidemia   • DDD (degenerative disc disease), lumbar   • Lumbar radiculopathy   • Cervicalgia   • NSTEMI (non-ST elevated myocardial infarction) (CMS/HCC)   • CAD (coronary artery disease)   • AMI inferior wall (CMS/[facility])   • Head lice infestation   • Diabetes mellitus (CMS/HCC)   • Uncontrolled diabetes mellitus (CMS/HCC)   • Coronary artery disease of native artery of native heart with stable angina pectoris (CMS/HCC)   • Chronic kidney disease, stage 3 (moderate) (CMS/[facility])     Past Medical History:   Diagnosis Date   • CAD (coronary artery disease)    • Chronic back pain    • Chronic bronchitis (CMS/HCC)    • Diabetes mellitus (CMS/HCC)    • Diverticulitis 1986   • Goiter    • Hashimoto's thyroiditis    • Head lice infestation    • Hyperlipidemia    • Hypertension    • MI (myocardial infarction) (CMS/HCC)    • Postoperative hypothyroidism    • Sinus congestion    • Sleep apnea     not using c-pap     Past Surgical History:   Procedure Laterality Date   • APPENDECTOMY     • CARDIAC  CATHETERIZATION N/A 10/13/2017   • CARDIAC CATHETERIZATION N/A 1/3/2019   • CHOLECYSTECTOMY     • COLON RESECTION     • COLON SURGERY     • CORONARY STENT PLACEMENT     • NV RT/LT HEART CATHETERS N/A 10/13/2017   • TOTAL ABDOMINAL HYSTERECTOMY WITH SALPINGO OOPHORECTOMY     • TOTAL THYROIDECTOMY         Therapy Treatment    Rehabilitation Treatment Summary     Row Name 03/10/19 0905             Treatment Time/Intention    Discipline  occupational therapy assistant  -      Document Type  therapy note (daily note)  -      Subjective Information  complains of;weakness;fatigue;pain;swelling  -      Mode of Treatment  individual therapy;occupational therapy  -      Therapy Frequency (OT Eval)  daily  -      Patient Effort  good  -      Recorded by [] Fang Irvin CUTLER/L 03/10/19 1102      Row Name 03/10/19 0905             Cognitive Assessment/Intervention- PT/OT    Affect/Mental Status (Cognitive)  WNL  -      Orientation Status (Cognition)  oriented x 4  -      Recorded by [] Fang Irvin COTA/L 03/10/19 1102      Row Name 03/10/19 0905             Functional Mobility    Functional Mobility- Ind. Level  supervision required  -      Functional Mobility- Device  rolling walker  -      Recorded by [] Fang Irvin CUTLER/L 03/10/19 1102      Row Name 03/10/19 0905             Transfer Assessment/Treatment    Transfer Assessment/Treatment  sit-stand transfer;stand-sit transfer;toilet transfer;floor transfer  -      Recorded by [] Fang Irvin CUTLER/L 03/10/19 1102      Row Name 03/10/19 0905             Bed-Chair Transfer    Bed-Chair [place] (Transfers)  supervision  -      Recorded by [] Fang Irvin COTA/L 03/10/19 1102      Row Name 03/10/19 0905             Sit-Stand Transfer    Sit-Stand [place] (Transfers)  supervision  -      Recorded by [] Fang Irvin CUTLER/L 03/10/19 1102      Row Name 03/10/19 0905             Stand-Sit Transfer    Stand-Sit  [place] (Transfers)  supervision  -JH      Recorded by [] Fang Irvin CUTLER/L 03/10/19 1102      Row Name 03/10/19 0905             Toilet Transfer    Type (Toilet Transfer)  sit-stand;stand-sit  -JH      [place] Level (Toilet Transfer)  supervision  -JH      Recorded by [] Fang Irvin CUTLER/L 03/10/19 1102      Row Name 03/10/19 0905             Floor Transfer    [place] Level (Floor Transfer)  supervision  -JH      Assistive Device (Floor Transfer)  walker, front-wheeled  -JH      Recorded by [] Fang Irvin CUTLER/L 03/10/19 1102      Row Name 03/10/19 0905             Bathing Assessment/Intervention    Bathing [place] Level  upper body;lower body;supervision  -JH      Assistive Devices (Bathing)  bath mitt  -JH      Bathing Position  supported sitting  -JH      Recorded by [] Fang Irvin CUTLER/L 03/10/19 1102      Row Name 03/10/19 0905             Upper Body Dressing Assessment/Training    Upper Body Dressing [place] Level  pajama/robe;supervision  -JH      Upper Body Dressing Position  unsupported sitting  -JH      Recorded by [] Fang Irvin CUTLER/L 03/10/19 1102      Row Name 03/10/19 0905             Lower Body Dressing Assessment/Training    Lower Body Dressing [place] Level  socks;moderate assist (50% patient effort)  -JH      Lower Body Dressing Position  unsupported sitting  -JH      Recorded by [] Fang Irvin CUTLER/L 03/10/19 1102      Row Name 03/10/19 0905             Grooming Assessment/Training    [place] Level (Grooming)  wash face, hands  -JH      Grooming Position  sink side  -JH      Recorded by [] Fang Irvin CUTLER/L 03/10/19 1102      Row Name 03/10/19 0905             Toileting Assessment/Training    [place] Level (Toileting)  moderate assist (50% patient effort)  -JH      Assistive Devices (Toileting)  commode;grab bar/safety frame  -JH      Toileting Position  unsupported sitting  -JH      Recorded by []  Fang Irvin COTA/L 03/10/19 1102      Row Name 03/10/19 0905             Positioning and Restraints    Pre-Treatment Position  sitting in chair/recliner  -      Post Treatment Position  chair  -JH      In Chair  call light within reach;encouraged to call for assist;sitting  -      Recorded by [] Fang Irvin COTA/L 03/10/19 1102      Row Name 03/10/19 0905             Pain Scale: Numbers Pre/Post-Treatment    Pain Scale: Numbers, Pretreatment  7/10  -      Pain Scale: Numbers, Post-Treatment  7/10  -      Pain Location - Orientation  generalized  -      Pain Location  back  -      Recorded by [] Fang Irvin COTA/L 03/10/19 1102      Row Name                Wound 03/05/19 1414 Other (See comments) midline sternal incision    Wound - Properties Group Date first assessed: 03/05/19 [CF] Time first assessed: 1414 [CF] Present On Admission : no [CF] Side: Other (See comments) [CF], midline sternal incision  Orientation: midline [CF] Location: sternal [CF] Type: incision [CF] Additional Comments: closed incision. dressings applied [CF] Recorded by:  [CF] Aparna Eason RN 03/05/19 1414    Row Name                Wound 03/05/19 1414 Left leg incision    Wound - Properties Group Date first assessed: 03/05/19 [CF] Time first assessed: 1414 [CF] Present On Admission : no [CF] Side: Left [CF] Location: leg [CF] Type: incision [CF] Additional Comments: closed incisions. dressings applied (skin affix, kerlex, ace) [CF] Recorded by:  [CF] Aparna Eason RN 03/05/19 1415    Row Name 03/10/19 0905             Outcome Summary/Treatment Plan (OT)    Daily Summary of Progress (OT)  progress toward functional goals is good  -      Anticipated Discharge Disposition (OT)  home with home health;home with OP services  -      Recorded by [] Fang Irvin COTA/NADIA 03/10/19 1102        User Key  (r) = Recorded By, (t) = Taken By, (c) = Cosigned By    Initials Name Effective Dates Discipline     [place]  Fang NAVARRO, CUTLER/L 03/07/18 -  OT    CF Aparna Eason RN 10/17/16 -  Nurse        Wound 03/05/19 1414 Other (See comments) midline sternal incision (Active)   Dressing Appearance dry;intact 3/10/2019  8:28 AM   Closure AYDEE 3/10/2019  8:28 AM   Base dressing in place, unable to visualize 3/10/2019  8:28 AM   Periwound intact 3/10/2019  8:28 AM   Periwound Temperature warm 3/10/2019  8:28 AM   Periwound Skin Turgor soft 3/10/2019  8:28 AM       Wound 03/05/19 1414 Left leg incision (Active)   Dressing Appearance open to air 3/10/2019  8:28 AM   Closure Liquid skin adhesive 3/10/2019  8:28 AM   Base clean 3/10/2019  8:28 AM   Periwound dry;ecchymotic;edematous 3/10/2019  8:28 AM   Periwound Temperature warm 3/10/2019  8:28 AM   Periwound Skin Turgor soft 3/10/2019  8:28 AM     Rehab Goal Summary     Row Name 03/10/19 0905             Occupational Therapy Goals    Transfer Goal Selection (OT)  transfer, OT goal 1  -      Bathing Goal Selection (OT)  bathing, OT goal 1  -JH      Dressing Goal Selection (OT)  dressing, OT goal 1  -JH      Toileting Goal Selection (OT)  toileting, OT goal 1  -JH      Endurance Goal Selection (OT)  endurance, OT goal 1  -JH      Functional Mobility Goal Selection (OT)  functional mobility, OT goal 1  -JH      Precaution Goal Selection (OT)  precaution, OT goal 1  -JH         Transfer Goal 1 (OT)    Activity/Assistive Device (Transfer Goal 1, OT)  toilet  -      [place] Level/Cues Needed (Transfer Goal 1, OT)  contact guard assist  -      Time Frame (Transfer Goal 1, OT)  long term goal (LTG);by discharge  -      Progress/Outcome (Transfer Goal 1, OT)  goal met  -         Bathing Goal 1 (OT)    Activity/Assistive Device (Bathing Goal 1, OT)  bathing skills, all  -      [place] Level/Cues Needed (Bathing Goal 1, OT)  set-up required;verbal cues required;standby assist  -      Time Frame (Bathing Goal 1, OT)  long term goal (LTG);by discharge  -      Progress/Outcomes  (Bathing Goal 1, OT)  goal not met  -         Dressing Goal 1 (OT)    Activity/Assistive Device (Dressing Goal 1, OT)  dressing skills, all  -      [place]/Cues Needed (Dressing Goal 1, OT)  set-up required;verbal cues required;standby assist  -      Time Frame (Dressing Goal 1, OT)  long term goal (LTG);by discharge  -      Progress/Outcome (Dressing Goal 1, OT)  goal not met  -         Toileting Goal 1 (OT)    Activity/Device (Toileting Goal 1, OT)  toileting skills, all  -      [place] Level/Cues Needed (Toileting Goal 1, OT)  set-up required;verbal cues required;contact guard assist  -      Time Frame (Toileting Goal 1, OT)  long term goal (LTG);by discharge  -      Progress/Outcome (Toileting Goal 1, OT)  goal not met  -          Endurance Goal 1 (OT)    Activity Level (Endurance Goal 1, OT)  endurance 2 fair + 25 min functional task O2 90 or up 3 or less rest breaks  -      Progress/Outcome (Endurance Goal 1, OT)  goal not met  -         Functional Mobility Goal 1 (OT)    Activity/Assistive Device (Functional Mobility Goal 1, OT)  walker, rolling  -      [place] Level/Cues Needed (Functional Mobility Goal 1, OT)  contact guard assist  -      Distance Goal 1 (Functional Mobility, OT)  to and from bathroom with good safety, balance and problem solving   -      Time Frame (Functional Mobility Goal 1, OT)  long term goal (LTG);by discharge  -      Progress/Outcome (Functional Mobility Goal 1, OT)  goal not met  -         Precaution Goal 1 (OT)    Activity (Precaution Goal 1, OT)  sternal precautions;fall precautions;demonstrates compliance;demonstrates understanding;during all tasks in daily routine;during ADLs;during mobility tasks;during transfers  -      [place] Level/Cues Needed (Precautions Goal 1, OT)  with minimum;verbal cues/redirection  -      Time Frame (Precaution Goal 1, OT)  long term goal (LTG);by discharge  -      Progress/Outcome (Precaution  Goal 1, OT)  goal not met  -         Patient Education Goal (OT)    Activity (Patient Education Goal, OT)  Pt will communicate good home safety awareness.   -      [place]/Cues/Accuracy (Memory Goal 2, OT)  verbalizes understanding  -      Time Frame (Patient Education Goal, OT)  long term goal (LTG);by discharge  -      Progress/Outcome (Patient Education Goal, OT)  goal met  -        User Key  (r) = Recorded By, (t) = Taken By, (c) = Cosigned By    Initials Name Provider Type Discipline    Fang Pandya COTA/L Occupational Therapy Assistant OT        Occupational Therapy Education     Title: PT OT SLP Therapies (Done)     Topic: Occupational Therapy (Done)     Point: ADL training (Done)     Description: Instruct learner(s) on proper safety adaptation and remediation techniques during self care or transfers.   Instruct in proper use of assistive devices.    Learning Progress Summary           Patient Acceptance, E, VU by  at 3/9/2019  3:59 PM    Acceptance, E, VU by  at 3/7/2019  2:53 PM    Comment:  E/C-W/S tech's    Acceptance, E, NR by  at 3/6/2019  3:08 PM    Comment:  Educated about OT and POC. Educated to call for assist. Educated on safety throughout. Educated on CABG precuations. Educated on benefits of moving and doing therapy.                   Point: Home exercise program (Done)     Description: Instruct learner(s) on appropriate technique for monitoring, assisting and/or progressing therapeutic exercises/activities.    Learning Progress Summary           Patient Acceptance, E, VU by  at 3/9/2019  3:59 PM                   Point: Precautions (Done)     Description: Instruct learner(s) on prescribed precautions during self-care and functional transfers.    Learning Progress Summary           Patient Acceptance, E, VU by  at 3/9/2019  3:59 PM    Acceptance, E, VU by  at 3/7/2019  2:53 PM    Comment:  E/C-W/S tech's    Acceptance, E, NR by  at 3/6/2019  3:08 PM    Comment:   Educated about OT and POC. Educated to call for assist. Educated on safety throughout. Educated on CABG precuations. Educated on benefits of moving and doing therapy.                   Point: Body mechanics (Done)     Description: Instruct learner(s) on proper positioning and spine alignment during self-care, functional mobility activities and/or exercises.    Learning Progress Summary           Patient Acceptance, E, VU by  at 3/9/2019  3:59 PM    Acceptance, E, VU by  at 3/7/2019  2:53 PM    Comment:  E/C-W/S tech's    Acceptance, E, NR by  at 3/6/2019  3:08 PM    Comment:  Educated about OT and POC. Educated to call for assist. Educated on safety throughout. Educated on CABG precuations. Educated on benefits of moving and doing therapy.                               User Key     Initials Effective Dates Name Provider Type Discipline     06/08/18 -  Marlen Jaimes, OTR/L Occupational Therapist OT     03/07/18 -  Fang Irvin, CUTLER/L Occupational Therapy Assistant OT     03/07/18 -  Brandi Stoner, CUTLER/L Occupational Therapy Assistant OT                OT Recommendation and Plan  Outcome Summary/Treatment Plan (OT)  Daily Summary of Progress (OT): progress toward functional goals is good  Plan for Continued Treatment (OT): Continue per POC  Anticipated Discharge Disposition (OT): home with home health, home with OP services  Therapy Frequency (OT Eval): daily  Daily Summary of Progress (OT): progress toward functional goals is good  Outcome Summary: Pt engage in functional transfers with supervision and adl's with supervision UB and mod A with toileting & LB, pt making progress toward OT goals, home with OP services following  Outcome Measures     Row Name 03/10/19 0905 03/08/19 1057 03/08/19 0730       How much help from another person do you currently need...    Turning from your back to your side while in flat bed without using bedrails?  --  3  -LN  --    Moving from lying on back to  sitting on the side of a flat bed without bedrails?  --  3  -LN  --    Moving to and from a bed to a chair (including a wheelchair)?  --  3  -LN  --    Standing up from a chair using your arms (e.g., wheelchair, bedside chair)?  --  3  -LN  --    Climbing 3-5 steps with a railing?  --  2  -LN  --    To walk in hospital room?  --  3  -LN  --    AM-PAC 6 Clicks Score  --  17  -LN  --       How much help from another is currently needed...    Putting on and taking off regular lower body clothing?  2  -JH  --  2  -JH    Bathing (including washing, rinsing, and drying)  2  -JH  --  2  -JH    Toileting (which includes using toilet bed pan or urinal)  3  -JH  --  1  -JH    Putting on and taking off regular upper body clothing  3  -JH  --  2  -JH    Taking care of personal grooming (such as brushing teeth)  3  -JH  --  3  -JH    Eating meals  4  -JH  --  3  -JH    Score  17  -JH  --  13  -JH       Functional Assessment    Outcome Measure Options  --  AM-PAC 6 Clicks Basic Mobility (PT)  -LN  --    Row Name 03/07/19 1445             How much help from another person do you currently need...    Turning from your back to your side while in flat bed without using bedrails?  2  -LN      Moving from lying on back to sitting on the side of a flat bed without bedrails?  2  -LN      Moving to and from a bed to a chair (including a wheelchair)?  3  -LN      Standing up from a chair using your arms (e.g., wheelchair, bedside chair)?  3  -LN      Climbing 3-5 steps with a railing?  2  -LN      To walk in hospital room?  3  -LN      AM-PAC 6 Clicks Score  15  -LN         Functional Assessment    Outcome Measure Options  AM-PAC 6 Clicks Basic Mobility (PT)  -LN        User Key  (r) = Recorded By, (t) = Taken By, (c) = Cosigned By    Initials Name Provider Type    LN Margarette Nava PTA Physical Therapy Assistant     Fang Irvin COTA/L Occupational Therapy Assistant           Time Calculation:   Time Calculation- OT     Row Name  03/10/19 1304             Time Calculation- OT    OT Start Time  0905  -      OT Stop Time  1000  -      OT Time Calculation (min)  55 min  -      OT Received On  03/10/19  -        User Key  (r) = Recorded By, (t) = Taken By, (c) = Cosigned By    Initials Name Provider Type    Fang Pandya COTA/L Occupational Therapy Assistant           Therapy Suggested Charges     Code   Minutes Charges    None                    ANTHONY Bay  3/10/2019

## 2019-03-10 NOTE — PLAN OF CARE
Problem: Patient Care Overview  Goal: Plan of Care Review  Outcome: Ongoing (interventions implemented as appropriate)   03/10/19 7176   Coping/Psychosocial   Plan of Care Reviewed With patient   Plan of Care Review   Progress improving   OTHER   Outcome Summary Pt chest tubes taken out and pt has ambulated 3x today, Chest xray done. Possible discharge.      Goal: Individualization and Mutuality  Outcome: Ongoing (interventions implemented as appropriate)    Goal: Discharge Needs Assessment  Outcome: Ongoing (interventions implemented as appropriate)      Problem: Skin Injury Risk (Adult)  Goal: Skin Health and Integrity  Outcome: Ongoing (interventions implemented as appropriate)      Problem: Fall Risk (Adult)  Goal: Absence of Fall  Outcome: Ongoing (interventions implemented as appropriate)      Problem: Cardiac Surgery (Adult)  Goal: Signs and Symptoms of Listed Potential Problems Will be Absent, Minimized or Managed (Cardiac Surgery)  Outcome: Ongoing (interventions implemented as appropriate)      Problem: Diabetes, Type 2 (Adult)  Goal: Signs and Symptoms of Listed Potential Problems Will be Absent, Minimized or Managed (Diabetes, Type 2)  Outcome: Ongoing (interventions implemented as appropriate)

## 2019-03-10 NOTE — PLAN OF CARE
Problem: Patient Care Overview  Goal: Plan of Care Review  Outcome: Ongoing (interventions implemented as appropriate)   03/10/19 0508   Coping/Psychosocial   Plan of Care Reviewed With patient   Plan of Care Review   Progress improving     Goal: Individualization and Mutuality  Outcome: Ongoing (interventions implemented as appropriate)    Goal: Discharge Needs Assessment  Outcome: Ongoing (interventions implemented as appropriate)    Goal: Interprofessional Rounds/Family Conf  Outcome: Ongoing (interventions implemented as appropriate)      Problem: Skin Injury Risk (Adult)  Goal: Skin Health and Integrity  Outcome: Ongoing (interventions implemented as appropriate)      Problem: Fall Risk (Adult)  Goal: Absence of Fall  Outcome: Ongoing (interventions implemented as appropriate)      Problem: Cardiac Surgery (Adult)  Goal: Signs and Symptoms of Listed Potential Problems Will be Absent, Minimized or Managed (Cardiac Surgery)  Outcome: Ongoing (interventions implemented as appropriate)      Problem: Diabetes, Type 2 (Adult)  Goal: Signs and Symptoms of Listed Potential Problems Will be Absent, Minimized or Managed (Diabetes, Type 2)  Outcome: Ongoing (interventions implemented as appropriate)

## 2019-03-10 NOTE — PROGRESS NOTES
"  Cardiothoracic - Vascular Surgery Daily Note        LOS: 5 days   Patient Care Team:  Anderson Abbott MD as PCP - General (Family Medicine)    POD#5    CORONARY ARTERY BYPASS GRAFTING x4  ENDOSCOPIC VEIN HARVEST           LEFT INTERNAL MAMMARY ARTERY TO LEFT ANTERIOR DESCENDING CORONARY ARTERY  SAPHENOUS VEIN GRAFT FROM ASCENDING AORTA TO FIRST DIAGONAL BRANCH  SAPHENOUS VEIN GRAFT FROM ASCENDING AORTA TO THIRD MARGINAL BRANCH  SAPHENOUS VEIN GRAFT FROM ASCENDING AORTA TO POSTERIOR DESCENDING CORONARY ARTERY       Chief Complaint: CAD, Surgical Discomfort      Subjective     The following portions of the patient's history were reviewed and updated as appropriate: allergies, current medications, past family history, past medical history, past social history, past surgical history and problem list.     Subjective:  Symptoms:  Stable.  She reports shortness of breath and chest pain (surgical).    Diet:  Adequate intake.    Activity level: Impaired due to pain.    Pain:  She complains of pain that is moderate.  Pain is requiring pain medication.          Objective     Vital Signs  Temp:  [96 °F (35.6 °C)-98.2 °F (36.8 °C)] 96.2 °F (35.7 °C)  Heart Rate:  [59-83] 61  Resp:  [16-18] 16  BP: ()/(59-71) 98/59  Body mass index is 43.4 kg/m².    Intake/Output Summary (Last 24 hours) at 3/10/2019 1018  Last data filed at 3/10/2019 0700  Gross per 24 hour   Intake 440 ml   Output 205 ml   Net 235 ml     No intake/output data recorded.    Wt Readings from Last 3 Encounters:   03/10/19 118 kg (260 lb 12.8 oz)   02/28/19 112 kg (248 lb)   02/12/19 112 kg (248 lb)      No Air Leak H2O seal    Physical Exam   Objective:  General Appearance:  Comfortable.    Vital signs: (most recent): Blood pressure 98/59, pulse 61, temperature 96.2 °F (35.7 °C), temperature source Temporal, resp. rate 16, height 165.1 cm (65\"), weight 118 kg (260 lb 12.8 oz), SpO2 94 %, not currently breastfeeding.  Vital signs are normal.  No " fever.    Output: Producing urine and producing stool.    Lungs:  Normal effort and normal respiratory rate.  There are decreased breath sounds (bases).    Heart: Normal rate.  Regular rhythm.    Chest: (CT No Air Leak  AV Wires to chest)  Abdomen: Abdomen is soft.  Hypoactive bowel sounds.     Extremities: Decreased range of motion.  There is dependent edema (generalized).    Pulses: Distal pulses are intact.    Neurological: Patient is alert and oriented to person, place and time.    Pupils:  Pupils are equal, round, and reactive to light.    Skin:  Warm and dry.  (M/[facility] [place]  [facility])              Results Review:    Lab Results   Component Value Date    WBC 12.17 (H) 03/09/2019    HGB 9.0 (L) 03/09/2019    HCT 28.2 (L) 03/09/2019    MCV 90.7 03/09/2019     03/09/2019     Lab Results   Component Value Date    GLUCOSE 123 (H) 03/08/2019    BUN 17 03/08/2019    CREATININE 0.80 03/08/2019    EGFRIFNONA 77 03/08/2019    BCR 21.3 03/08/2019    K 4.3 03/08/2019    CO2 23.0 03/08/2019    CALCIUM 7.8 (L) 03/08/2019    ALBUMIN 3.20 (L) 03/08/2019    AST 73 (H) 03/08/2019    ALT 67 (H) 03/08/2019       Calcium Calcium   Date/Time Value Ref Range Status   03/08/2019 0741 7.8 (L) 8.4 - 10.2 mg/dL Final      Magnesium No results found for: MG     Imaging Results (last 24 hours)     ** No results found for the last 24 hours. **                                  acetaminophen 1,000 mg Oral Q6H   aspirin 81 mg Oral Daily   atorvastatin 40 mg Oral Nightly   famotidine 20 mg Oral BID   guaiFENesin 1,200 mg Oral Q12H   insulin aspart 0-24 Units Subcutaneous 4x Daily AC & at Bedtime   insulin detemir 30 Units Subcutaneous Nightly   ipratropium-albuterol 3 mL Nebulization 4x Daily - RT   levothyroxine 200 mcg Oral QAM   magnesium oxide 400 mg Oral BID   prenatal vitamin 27-0.8 1 tablet Oral Daily   sennosides-docusate sodium 1 tablet Oral BID   sertraline 50 mg Oral Nightly   sodium chloride 3 mL Intravenous Q12H   vitamin C  500 mg Oral BID                ASSESSMENT/PLAN       Coronary artery disease of native artery of native heart with stable angina pectoris (CMS/HCC)      Assessment & Plan    Stable Post op CABG: Progressing slowly.  Lethargy, possibly related to hypothyroidism.  Bradycardic stop amiodarone and BB.     CAD: ASA, BRILINTA, statin, BB/ACE contraindicated hypotension and bradycardia.     Resp: O2 support. Incentive. CT removed, follow up CXR this afternoon. Lasix today    Pain: Tylenol. OxyIR. Toradol    Rehab: OOB. Ambulate. PT/OT    DM/Transient Post Op Hyperglycemia:  SSI coverage. Levemir    Depression: Zoloft    Acute Blood loss Anemia: Stable, iron supplementation in the form of prenatal.      Disp: Stable [facility] 3W telemetry. Possible DC home tomorrow.              This document has been electronically signed by DUC Marr on March 10, 2019 10:18 AM

## 2019-03-10 NOTE — THERAPY TREATMENT NOTE
Acute Care - Physical Therapy Treatment Note  [facility]     Patient Name: Rhea Sutton  : 1970  MRN: [id number]  Today's Date: 3/10/2019  Onset of Illness/Injury or Date of Surgery: 19  Date of Referral to PT: 19  Referring Physician: DUC High    Admit Date: 3/5/2019    Visit Dx:    ICD-10-CM ICD-9-CM   1. Coronary artery disease of native artery of native heart with stable angina pectoris (CMS/HCC) I25.118 414.01     413.9   2. Coronary artery disease involving native heart without angina pectoris, unspecified vessel or lesion type I25.10 414.01   3. Impaired functional mobility, balance, gait, and endurance Z74.09 V49.89   4. Impaired mobility and ADLs Z74.09 799.89     Patient Active Problem List   Diagnosis   • Postsurgical hypothyroidism   • Impaired fasting glucose   • Vitamin D deficiency   • Type 2 diabetes mellitus with hyperglycemia (CMS/[facility])   • Hypertension   • Hyperlipidemia   • DDD (degenerative disc disease), lumbar   • Lumbar radiculopathy   • Cervicalgia   • NSTEMI (non-ST elevated myocardial infarction) (CMS/HCC)   • CAD (coronary artery disease)   • AMI inferior wall (CMS/HCC)   • Head lice infestation   • Diabetes mellitus (CMS/HCC)   • Uncontrolled diabetes mellitus (CMS/HCC)   • Coronary artery disease of native artery of native heart with stable angina pectoris (CMS/HCC)   • Chronic kidney disease, stage 3 (moderate) (CMS/[facility])       Therapy Treatment    Rehabilitation Treatment Summary     Row Name 03/10/19 1305 03/10/19 0905          Treatment Time/Intention    Discipline  physical therapy assistant  -RW  occupational therapy assistant  -     Document Type  therapy note (daily note)  -RW  therapy note (daily note)  -     Subjective Information  complains of;fatigue  -RW  complains of;weakness;fatigue;pain;swelling  -     Mode of Treatment  physical therapy  -RW  individual therapy;occupational therapy  -     Patient/Family Observations  pt  reports that she walks 3x daily on her own  -RW  --     Total Minutes, Physical Therapy Treatment  27  -RW  --     Therapy Frequency (PT Clinical Impression)  other (see comments) 5-14 times per week  -RW  --     Therapy Frequency (OT Eval)  --  daily  -     Patient Effort  adequate  -RW  good  -     Comment  did not want to get oob as just back into it  -RW  --     Existing Precautions/Restrictions  cardiac;oxygen therapy device and L/min;sternal  -RW  --     Recorded by [RW] Abhi Cherry, [facility] 03/10/19 1429 [] Fang Irvin CUTLER/L 03/10/19 1102     Row Name 03/10/19 1305             Vital Signs    Pre Systolic BP Rehab  103  -RW      Pre Treatment Diastolic BP  62  -RW      Pretreatment Heart Rate (beats/min)  61  -RW      Pre SpO2 (%)  93  -RW      O2 Delivery Pre Treatment  supplemental O2  -RW      Recorded by [RW] Abhi Cherry, [facility] 03/10/19 1429      Row Name 03/10/19 1305 03/10/19 0905          Cognitive Assessment/Intervention- PT/OT    Affect/Mental Status (Cognitive)  WFL  -RW  WNL  -JH     Orientation Status (Cognition)  oriented x 4  -RW  oriented x 4  -JH     Follows Commands (Cognition)  WFL  -RW  --     Recorded by [RW] Abhi Cherry, [facility] 03/10/19 1429 [] Fang Irvin CUTLER/L 03/10/19 1102     Row Name 03/10/19 1305             Safety Issues, Functional Mobility    Impairments Affecting Function (Mobility)  balance;endurance/activity tolerance;pain;strength  -RW      Recorded by [RW] Abhi Cherry, PTA 03/10/19 1429      Row Name 03/10/19 1305             Bed Mobility Assessment/Treatment    Supine-Sit [place] (Bed Mobility)  not tested  -RW      Recorded by [RW] Abhi Cherry, [facility] 03/10/19 1429      Row Name 03/10/19 0905             Functional Mobility    Functional Mobility- Ind. Level  supervision required  -      Functional Mobility- Device  rolling walker  -JH      Recorded by [] Fang Irvin CUTLER/L 03/10/19 1102      Row Name 03/10/19 1305 03/10/19 0905           Transfer Assessment/Treatment    Transfer Assessment/Treatment  sit-stand transfer;stand-sit transfer  -RW  sit-stand transfer;stand-sit transfer;toilet transfer;floor transfer  -JH     Recorded by [RW] Abhi Cherry, PTA 03/10/19 1429 [JH] Fang Irvin CUTLER/L 03/10/19 1102     Row Name 03/10/19 1305 03/10/19 0905          Bed-Chair Transfer    Bed-Chair [place] (Transfers)  not tested  -RW  supervision  -JH     Recorded by [RW] Abhi Cherry, PTA 03/10/19 1429 [JH] Fang Irvin CUTLER/L 03/10/19 1102     Row Name 03/10/19 0905             Sit-Stand Transfer    Sit-Stand [place] (Transfers)  supervision  -JH      Recorded by [JH] Fang Irvin CUTLER/L 03/10/19 1102      Row Name 03/10/19 0905             Stand-Sit Transfer    Stand-Sit [place] (Transfers)  supervision  -JH      Recorded by [JH] Fang Irvin CUTLER/L 03/10/19 1102      Row Name 03/10/19 0905             Toilet Transfer    Type (Toilet Transfer)  sit-stand;stand-sit  -JH      [place] Level (Toilet Transfer)  supervision  -JH      Recorded by [JH] Fang Irvin CUTLER/L 03/10/19 1102      Row Name 03/10/19 0905             Floor Transfer    [place] Level (Floor Transfer)  supervision  -JH      Assistive Device (Floor Transfer)  walker, front-wheeled  -JH      Recorded by [JH] Fang Irvin CUTLER/L 03/10/19 1102      Row Name 03/10/19 0905             Bathing Assessment/Intervention    Bathing [place] Level  upper body;lower body;supervision  -JH      Assistive Devices (Bathing)  bath mitt  -JH      Bathing Position  supported sitting  -JH      Recorded by [JH] Fang Irvin CUTLER/L 03/10/19 1102      Row Name 03/10/19 0905             Upper Body Dressing Assessment/Training    Upper Body Dressing [place] Level  pajama/robe;supervision  -JH      Upper Body Dressing Position  unsupported sitting  -JH      Recorded by [JH] Fang Irvin CUTLER/L 03/10/19 1102      Row Name 03/10/19 0905              Lower Body Dressing Assessment/Training    Lower Body Dressing [place] Level  socks;moderate assist (50% patient effort)  -      Lower Body Dressing Position  unsupported sitting  -      Recorded by [] Fang Irvin COTA/L 03/10/19 1102      Row Name 03/10/19 0905             Grooming Assessment/Training    [place] Level (Grooming)  wash face, hands  -      Grooming Position  sink side  -      Recorded by [] Fang Irvin COTA/L 03/10/19 1102      Row Name 03/10/19 0905             Toileting Assessment/Training    [place] Level (Toileting)  moderate assist (50% patient effort)  -      Assistive Devices (Toileting)  commode;grab bar/safety frame  -      Toileting Position  unsupported sitting  -      Recorded by [] Fang Irvin COTA/L 03/10/19 1102      Row Name 03/10/19 1305             Lower Extremity Supine Therapeutic Exercise    Performed, Supine Lower Extremity (Therapeutic Exercise)  heel slides;hip abduction/adduction;SLR (straight leg raise)  -RW      Exercise Type, Supine Lower Extremity (Therapeutic Exercise)  AROM (active range of motion)  -RW      Expected Outcomes, Supine Lower Extremity (Therapeutic Exercise)  improve functional tolerance, self-care activity;improve performance, gait skills;improve performance, transfer skills  -RW      Sets/Reps Detail, Supine Lower Extremity (Therapeutic Exercise)  1-2/15-20  -RW      Recorded by [RW] Abhi Cherry, PTA 03/10/19 1429      Row Name 03/10/19 1305 03/10/19 0905          Positioning and Restraints    Pre-Treatment Position  in bed  -RW  sitting in chair/recliner  -     Post Treatment Position  bed  -RW  chair  -JH     In Bed  exit alarm on;call light within reach;encouraged to call for assist  -RW  --     In Chair  --  call light within reach;encouraged to call for assist;sitting  -JH     Recorded by [RW] Abhi Cherry, PTA 03/10/19 1429 [] Fang Irvin COTA/L 03/10/19 1102     Row Name 03/10/19  1305 03/10/19 0905          Pain Scale: Numbers Pre/Post-Treatment    Pain Scale: Numbers, Pretreatment  -- gave no rating  -  7/10  -     Pain Scale: Numbers, Post-Treatment  --  -RW  7/10  -     Pain Location - Orientation  --  -RW  generalized  -     Pain Location  back  -  back  -JH     Recorded by [RW] Abhi Cherry, PTA 03/10/19 1429 [JH] Fang Irvin CUTLER/L 03/10/19 1102     Row Name 03/10/19 1305             Sensory Assessment/Intervention    Sensory General Assessment  --  -RW      Recorded by [RW] Abhi Cherry, PTA 03/10/19 1429      Row Name 03/10/19 1305             Vision Assessment/Intervention    Visual Impairment/Limitations  --  -RW      Recorded by [RW] Abhi Cherry, PTA 03/10/19 1429      Row Name                Wound 03/05/19 1414 Other (See comments) midline sternal incision    Wound - Properties Group Date first assessed: 03/05/19 [CF] Time first assessed: 1414 [CF] Present On Admission : no [CF] Side: Other (See comments) [CF], midline sternal incision  Orientation: midline [CF] Location: sternal [CF] Type: incision [CF] Additional Comments: closed incision. dressings applied [CF] Recorded by:  [CF] Aparna Eason RN 03/05/19 1414    Row Name                Wound 03/05/19 1414 Left leg incision    Wound - Properties Group Date first assessed: 03/05/19 [CF] Time first assessed: 1414 [CF] Present On Admission : no [CF] Side: Left [CF] Location: leg [CF] Type: incision [CF] Additional Comments: closed incisions. dressings applied (skin affix, kerlex, ace) [CF] Recorded by:  [CF] Aparna Eason RN 03/05/19 1415    Row Name 03/10/19 0905             Outcome Summary/Treatment Plan (OT)    Daily Summary of Progress (OT)  progress toward functional goals is good  -      Anticipated Discharge Disposition (OT)  home with home health;home with OP services  -      Recorded by [JH] Fang Irvin CUTLER/L 03/10/19 1102      Row Name 03/10/19 1305             Outcome  Summary/Treatment Plan (PT)    Daily Summary of Progress (PT)  progress toward functional goals as expected  -RW      Plan for Continued Treatment (PT)  gt as willing  -RW      Anticipated Discharge Disposition (PT)  home with 24/7 care;home with home health Progression to cardiac rehab as allowed by MD  -DI      Recorded by [RW] Abhi Cherry, PTA 03/10/19 1429        User Key  (r) = Recorded By, (t) = Taken By, (c) = Cosigned By    Initials Name Effective Dates Discipline    RW Abhi Cherry, PTA 03/07/18 -  PT    JH Fang Irvin, CUTLER/L 03/07/18 -  OT    CF Aparna Eason RN 10/17/16 -  Nurse          Wound 03/05/19 1414 Other (See comments) midline sternal incision (Active)   Dressing Appearance dry;intact 3/10/2019  8:28 AM   Closure AYDEE 3/10/2019  8:28 AM   Base dressing in place, unable to visualize 3/10/2019  8:28 AM   Periwound intact 3/10/2019  8:28 AM   Periwound Temperature warm 3/10/2019  8:28 AM   Periwound Skin Turgor soft 3/10/2019  8:28 AM       Wound 03/05/19 1414 Left leg incision (Active)   Dressing Appearance open to air 3/10/2019  8:28 AM   Closure Liquid skin adhesive 3/10/2019  8:28 AM   Base clean 3/10/2019  8:28 AM   Periwound dry;ecchymotic;edematous 3/10/2019  8:28 AM   Periwound Temperature warm 3/10/2019  8:28 AM   Periwound Skin Turgor soft 3/10/2019  8:28 AM       Rehab Goal Summary     Row Name 03/10/19 0905             Occupational Therapy Goals    Transfer Goal Selection (OT)  transfer, OT goal 1  -JH      Bathing Goal Selection (OT)  bathing, OT goal 1  -JH      Dressing Goal Selection (OT)  dressing, OT goal 1  -JH      Toileting Goal Selection (OT)  toileting, OT goal 1  -JH      Endurance Goal Selection (OT)  endurance, OT goal 1  -JH      Functional Mobility Goal Selection (OT)  functional mobility, OT goal 1  -JH      Precaution Goal Selection (OT)  precaution, OT goal 1  -JH         Transfer Goal 1 (OT)    Activity/Assistive Device (Transfer Goal 1, OT)  toilet  -       [place] Level/Cues Needed (Transfer Goal 1, OT)  contact guard assist  -      Time Frame (Transfer Goal 1, OT)  long term goal (LTG);by discharge  -      Progress/Outcome (Transfer Goal 1, OT)  goal met  -         Bathing Goal 1 (OT)    Activity/Assistive Device (Bathing Goal 1, OT)  bathing skills, all  -JH      [place] Level/Cues Needed (Bathing Goal 1, OT)  set-up required;verbal cues required;standby assist  -      Time Frame (Bathing Goal 1, OT)  long term goal (LTG);by discharge  -      Progress/Outcomes (Bathing Goal 1, OT)  goal not met  -         Dressing Goal 1 (OT)    Activity/Assistive Device (Dressing Goal 1, OT)  dressing skills, all  -      [place]/Cues Needed (Dressing Goal 1, OT)  set-up required;verbal cues required;standby assist  -JH      Time Frame (Dressing Goal 1, OT)  long term goal (LTG);by discharge  -      Progress/Outcome (Dressing Goal 1, OT)  goal not met  -         Toileting Goal 1 (OT)    Activity/Device (Toileting Goal 1, OT)  toileting skills, all  -      [place] Level/Cues Needed (Toileting Goal 1, OT)  set-up required;verbal cues required;contact guard assist  -      Time Frame (Toileting Goal 1, OT)  long term goal (LTG);by discharge  -      Progress/Outcome (Toileting Goal 1, OT)  goal not met  -          Endurance Goal 1 (OT)    Activity Level (Endurance Goal 1, OT)  endurance 2 fair + 25 min functional task O2 90 or up 3 or less rest breaks  -      Progress/Outcome (Endurance Goal 1, OT)  goal not met  -         Functional Mobility Goal 1 (OT)    Activity/Assistive Device (Functional Mobility Goal 1, OT)  walker, rolling  -      [place] Level/Cues Needed (Functional Mobility Goal 1, OT)  contact guard assist  -      Distance Goal 1 (Functional Mobility, OT)  to and from bathroom with good safety, balance and problem solving   -      Time Frame (Functional Mobility Goal 1, OT)  long term goal (LTG);by discharge   -      Progress/Outcome (Functional Mobility Goal 1, OT)  goal not met  -         Precaution Goal 1 (OT)    Activity (Precaution Goal 1, OT)  sternal precautions;fall precautions;demonstrates compliance;demonstrates understanding;during all tasks in daily routine;during ADLs;during mobility tasks;during transfers  -      [place] Level/Cues Needed (Precautions Goal 1, OT)  with minimum;verbal cues/redirection  -      Time Frame (Precaution Goal 1, OT)  long term goal (LTG);by discharge  -      Progress/Outcome (Precaution Goal 1, OT)  goal not met  -         Patient Education Goal (OT)    Activity (Patient Education Goal, OT)  Pt will communicate good home safety awareness.   -      [place]/Cues/Accuracy (Memory Goal 2, OT)  verbalizes understanding  -      Time Frame (Patient Education Goal, OT)  long term goal (LTG);by discharge  -      Progress/Outcome (Patient Education Goal, OT)  goal met  -        User Key  (r) = Recorded By, (t) = Taken By, (c) = Cosigned By    Initials Name Provider Type Discipline    Fang Pandya COTA/L Occupational Therapy Assistant OT          Physical Therapy Education     Title: PT OT SLP Therapies (Done)     Topic: Physical Therapy (Done)     Point: Mobility training (Done)     Learning Progress Summary           Patient Acceptance, E,TB, VU,DU by INDIA at 3/8/2019  1:05 PM    Acceptance, E,TB, VU by INDIA at 3/7/2019  3:53 PM    Comment:  cardia/sternal precautions    Acceptance, E, NR by JOLLY at 3/6/2019  1:29 PM                   Point: Home exercise program (Done)     Learning Progress Summary           Patient Acceptance, E,TB, VU,DU by INDIA at 3/8/2019  1:05 PM    Acceptance, E,TB, VU by LN at 3/7/2019  3:53 PM    Comment:  cardia/sternal precautions                   Point: Body mechanics (Done)     Learning Progress Summary           Patient Acceptance, E,TB, VU,DU by INDIA at 3/8/2019  1:05 PM    Acceptance, E,TB, VU by LN at 3/7/2019  3:53 PM     Comment:  cardia/sternal precautions    Acceptance, E, NR by JOLLY at 3/6/2019  1:29 PM                   Point: Precautions (Done)     Learning Progress Summary           Patient Acceptance, E,TB, VU,DU by LN at 3/8/2019  1:05 PM    Acceptance, E,TB, VU by INDIA at 3/7/2019  3:53 PM    Comment:  cardia/sternal precautions    Acceptance, E, NR by JOLLY at 3/6/2019  1:29 PM                               User Key     Initials Effective Dates Name Provider Type Discipline     04/03/18 -  Pamela Del Valle, PT Physical Therapist PT    INDIA 03/07/18 -  Margarette Nava, PTA Physical Therapy Assistant PT                PT Recommendation and Plan  Anticipated Discharge Disposition (PT): home with 24/7 care, home with home health(Progression to cardiac rehab as allowed by MD)  Therapy Frequency (PT Clinical Impression): other (see comments)(5-14 times per week)  Outcome Summary/Treatment Plan (PT)  Daily Summary of Progress (PT): progress toward functional goals as expected  Plan for Continued Treatment (PT): gt as willing  Anticipated Discharge Disposition (PT): home with 24/7 care, home with home health(Progression to cardiac rehab as allowed by MD)  Plan of Care Reviewed With: patient  Progress: improving  Outcome Summary: pt reports that she walks 3x day on her own. did not want to get back oob as her back bothers her with transitions. tolerating supine therex.  Outcome Measures     Row Name 03/10/19 1400 03/10/19 0905 03/08/19 1057       How much help from another person do you currently need...    Turning from your back to your side while in flat bed without using bedrails?  3  -RW  --  3  -LN    Moving from lying on back to sitting on the side of a flat bed without bedrails?  3  -RW  --  3  -LN    Moving to and from a bed to a chair (including a wheelchair)?  3  -RW  --  3  -LN    Standing up from a chair using your arms (e.g., wheelchair, bedside chair)?  3  -RW  --  3  -LN    Climbing 3-5 steps with a railing?  3  -RW  --  2  -LN     To walk in hospital room?  3  -RW  --  3  -LN    AM-PAC 6 Clicks Score  18  -RW  --  17  -LN       How much help from another is currently needed...    Putting on and taking off regular lower body clothing?  --  2  -JH  --    Bathing (including washing, rinsing, and drying)  --  2  -JH  --    Toileting (which includes using toilet bed pan or urinal)  --  3  -JH  --    Putting on and taking off regular upper body clothing  --  3  -JH  --    Taking care of personal grooming (such as brushing teeth)  --  3  -JH  --    Eating meals  --  4  -JH  --    Score  --  17  -JH  --       Functional Assessment    Outcome Measure Options  --  --  AM-PAC 6 Clicks Basic Mobility (PT)  -LN    Row Name 03/08/19 0730 03/07/19 4385          How much help from another person do you currently need...    Turning from your back to your side while in flat bed without using bedrails?  --  2  -LN     Moving from lying on back to sitting on the side of a flat bed without bedrails?  --  2  -LN     Moving to and from a bed to a chair (including a wheelchair)?  --  3  -LN     Standing up from a chair using your arms (e.g., wheelchair, bedside chair)?  --  3  -LN     Climbing 3-5 steps with a railing?  --  2  -LN     To walk in hospital room?  --  3  -LN     AM-PAC 6 Clicks Score  --  15  -LN        How much help from another is currently needed...    Putting on and taking off regular lower body clothing?  2  -JH  --     Bathing (including washing, rinsing, and drying)  2  -JH  --     Toileting (which includes using toilet bed pan or urinal)  1  -JH  --     Putting on and taking off regular upper body clothing  2  -JH  --     Taking care of personal grooming (such as brushing teeth)  3  -JH  --     Eating meals  3  -JH  --     Score  13  -JH  --        Functional Assessment    Outcome Measure Options  --  AM-PAC 6 Clicks Basic Mobility (PT)  -LN       User Key  (r) = Recorded By, (t) = Taken By, (c) = Cosigned By    Initials Name Provider Type     Margarette Poon PTA Physical Therapy Assistant    Abhi Loza PTA Physical Therapy Assistant    Fang Pandya, CUTLER/L Occupational Therapy Assistant         Time Calculation:   PT Charges     Row Name 03/10/19 1431             Time Calculation    Start Time  1305  -RW      Stop Time  1332  -RW      Time Calculation (min)  27 min  -RW         Time Calculation- PT    Total Timed Code Minutes- PT  27 minute(s)  -RW        User Key  (r) = Recorded By, (t) = Taken By, (c) = Cosigned By    Initials Name Provider Type    Abhi Loza PTA Physical Therapy Assistant        Therapy Suggested Charges     Code   Minutes Charges    None           Therapy Charges for Today     Code Description Service Date Service Provider Modifiers Qty    [id number] HC PT THER PROC EA 15 MIN 3/10/2019 Abhi Cherry PTA GP 2          PT G-Codes  Outcome Measure Options: AM-PAC 6 Clicks Basic Mobility (PT)  AM-PAC 6 Clicks Score: 18  Score: 17    Abhi Cherry PTA  3/10/2019

## 2019-03-10 NOTE — PLAN OF CARE
Problem: Patient Care Overview  Goal: Plan of Care Review  Outcome: Ongoing (interventions implemented as appropriate)   03/10/19 0194   Coping/Psychosocial   Plan of Care Reviewed With patient   Plan of Care Review   Progress improving   OTHER   Outcome Summary pt reports that she walks 3x day on her own. did not want to get back oob as her back bothers her with transitions. tolerating supine therex.

## 2019-03-11 ENCOUNTER — APPOINTMENT (OUTPATIENT)
Dept: GENERAL RADIOLOGY | Facility: HOSPITAL | Age: 49
End: 2019-03-11

## 2019-03-11 LAB
ABO + RH BLD: NORMAL
ABO + RH BLD: NORMAL
BH BB BLOOD EXPIRATION DATE: NORMAL
BH BB BLOOD EXPIRATION DATE: NORMAL
BH BB BLOOD TYPE BARCODE: 6200
BH BB BLOOD TYPE BARCODE: 6200
BH BB DISPENSE STATUS: NORMAL
BH BB DISPENSE STATUS: NORMAL
BH BB PRODUCT CODE: NORMAL
BH BB PRODUCT CODE: NORMAL
BH BB UNIT NUMBER: NORMAL
BH BB UNIT NUMBER: NORMAL
GLUCOSE BLDC GLUCOMTR-MCNC: 119 MG/DL (ref 70–130)
GLUCOSE BLDC GLUCOMTR-MCNC: 123 MG/DL (ref 70–130)
GLUCOSE BLDC GLUCOMTR-MCNC: 87 MG/DL (ref 70–130)
GLUCOSE BLDC GLUCOMTR-MCNC: 94 MG/DL (ref 70–130)
UNIT  ABO: NORMAL
UNIT  ABO: NORMAL
UNIT  RH: NORMAL
UNIT  RH: NORMAL

## 2019-03-11 PROCEDURE — 99024 POSTOP FOLLOW-UP VISIT: CPT | Performed by: NURSE PRACTITIONER

## 2019-03-11 PROCEDURE — 97535 SELF CARE MNGMENT TRAINING: CPT

## 2019-03-11 PROCEDURE — 82962 GLUCOSE BLOOD TEST: CPT

## 2019-03-11 PROCEDURE — 97110 THERAPEUTIC EXERCISES: CPT

## 2019-03-11 PROCEDURE — 71046 X-RAY EXAM CHEST 2 VIEWS: CPT

## 2019-03-11 PROCEDURE — 94799 UNLISTED PULMONARY SVC/PX: CPT

## 2019-03-11 PROCEDURE — 97116 GAIT TRAINING THERAPY: CPT

## 2019-03-11 PROCEDURE — 97530 THERAPEUTIC ACTIVITIES: CPT

## 2019-03-11 RX ADMIN — SENNOSIDES AND DOCUSATE SODIUM 1 TABLET: 8.6; 5 TABLET ORAL at 20:33

## 2019-03-11 RX ADMIN — SODIUM CHLORIDE, PRESERVATIVE FREE 3 ML: 5 INJECTION INTRAVENOUS at 20:38

## 2019-03-11 RX ADMIN — ACETAMINOPHEN 1000 MG: 500 TABLET ORAL at 20:33

## 2019-03-11 RX ADMIN — OXYCODONE HYDROCHLORIDE AND ACETAMINOPHEN 500 MG: 500 TABLET ORAL at 09:08

## 2019-03-11 RX ADMIN — OXYCODONE HYDROCHLORIDE AND ACETAMINOPHEN 500 MG: 500 TABLET ORAL at 20:33

## 2019-03-11 RX ADMIN — ACETAMINOPHEN 1000 MG: 500 TABLET ORAL at 03:45

## 2019-03-11 RX ADMIN — FAMOTIDINE 20 MG: 20 TABLET ORAL at 20:33

## 2019-03-11 RX ADMIN — OXYCODONE HYDROCHLORIDE 5 MG: 5 TABLET ORAL at 14:09

## 2019-03-11 RX ADMIN — SERTRALINE HYDROCHLORIDE 50 MG: 50 TABLET ORAL at 20:33

## 2019-03-11 RX ADMIN — IPRATROPIUM BROMIDE AND ALBUTEROL SULFATE 3 ML: 2.5; .5 SOLUTION RESPIRATORY (INHALATION) at 21:52

## 2019-03-11 RX ADMIN — GUAIFENESIN 1200 MG: 600 TABLET, EXTENDED RELEASE ORAL at 20:33

## 2019-03-11 RX ADMIN — Medication 400 MG: at 20:33

## 2019-03-11 RX ADMIN — Medication 400 MG: at 09:09

## 2019-03-11 RX ADMIN — OXYCODONE HYDROCHLORIDE 5 MG: 5 TABLET ORAL at 06:19

## 2019-03-11 RX ADMIN — ACETAMINOPHEN 1000 MG: 500 TABLET ORAL at 09:08

## 2019-03-11 RX ADMIN — FAMOTIDINE 20 MG: 20 TABLET ORAL at 09:09

## 2019-03-11 RX ADMIN — OXYCODONE HYDROCHLORIDE 5 MG: 5 TABLET ORAL at 19:25

## 2019-03-11 RX ADMIN — IPRATROPIUM BROMIDE AND ALBUTEROL SULFATE 3 ML: 2.5; .5 SOLUTION RESPIRATORY (INHALATION) at 15:25

## 2019-03-11 RX ADMIN — ASPIRIN 81 MG: 81 TABLET, COATED ORAL at 09:08

## 2019-03-11 RX ADMIN — SODIUM CHLORIDE, PRESERVATIVE FREE 3 ML: 5 INJECTION INTRAVENOUS at 09:09

## 2019-03-11 RX ADMIN — LEVOTHYROXINE SODIUM 200 MCG: 100 TABLET ORAL at 06:19

## 2019-03-11 RX ADMIN — PRENATAL VIT W/ FE FUMARATE-FA TAB 27-0.8 MG 1 TABLET: 27-0.8 TAB at 09:08

## 2019-03-11 RX ADMIN — GUAIFENESIN 1200 MG: 600 TABLET, EXTENDED RELEASE ORAL at 09:09

## 2019-03-11 RX ADMIN — IPRATROPIUM BROMIDE AND ALBUTEROL SULFATE 3 ML: 2.5; .5 SOLUTION RESPIRATORY (INHALATION) at 08:22

## 2019-03-11 RX ADMIN — ATORVASTATIN CALCIUM 40 MG: 40 TABLET, FILM COATED ORAL at 20:33

## 2019-03-11 NOTE — THERAPY TREATMENT NOTE
"Acute Care - Physical Therapy Treatment Note  [facility]     Patient Name: Rhea Sutton  : 1970  MRN: [id number]  Today's Date: 3/11/2019  Onset of Illness/Injury or Date of Surgery: 19  Date of Referral to PT: 19  Referring Physician: DUC High    Admit Date: 3/5/2019    Visit Dx:    ICD-10-CM ICD-9-CM   1. Coronary artery disease of native artery of native heart with stable angina pectoris (CMS/HCC) I25.118 414.01     413.9   2. Coronary artery disease involving native heart without angina pectoris, unspecified vessel or lesion type I25.10 414.01   3. Impaired functional mobility, balance, gait, and endurance Z74.09 V49.89   4. Impaired mobility and ADLs Z74.09 799.89     Patient Active Problem List   Diagnosis   • Postsurgical hypothyroidism   • Impaired fasting glucose   • Vitamin D deficiency   • Type 2 diabetes mellitus with hyperglycemia (CMS/[facility])   • Hypertension   • Hyperlipidemia   • DDD (degenerative disc disease), lumbar   • Lumbar radiculopathy   • Cervicalgia   • NSTEMI (non-ST elevated myocardial infarction) (CMS/HCC)   • CAD (coronary artery disease)   • AMI inferior wall (CMS/HCC)   • Head lice infestation   • Diabetes mellitus (CMS/HCC)   • Uncontrolled diabetes mellitus (CMS/HCC)   • Coronary artery disease of native artery of native heart with stable angina pectoris (CMS/HCC)   • Chronic kidney disease, stage 3 (moderate) (CMS/[facility])       Therapy Treatment    Rehabilitation Treatment Summary     Row Name 19 1330 19 0843          Treatment Time/Intention    Discipline  physical therapy assistant  -LN  occupational therapy assistant  -BL     Document Type  therapy note (daily note)  -LN  therapy note (daily note)  -BL     Subjective Information  complains of;pain  -LN  complains of;pain;weakness;fatigue  -BL     Mode of Treatment  physical therapy \"I hurt worse since they pulled the tubes\"  -LN  occupational therapy;individual therapy  -BL     " Patient/Family Observations  --  Nursing okay treatment; c/o pain   -BL     Therapy Frequency (PT Clinical Impression)  other (see comments) 5-14 times per week  -LN  --     Therapy Frequency (OT Eval)  --  daily  -BL     Patient Effort  adequate  -LN  adequate  -BL     Comment  --  requires encouragement  -BL     Existing Precautions/Restrictions  cardiac;oxygen therapy device and L/min;sternal  -LN  cardiac;oxygen therapy device and L/min;sternal  -BL     Recorded by [LN] Margarette Nava, PTA 03/11/19 1653 [BL] Kristi Mahoney CUTLER/L 03/11/19 0900     Row Name 03/11/19 1330 03/11/19 0843          Vital Signs    Pre Systolic BP Rehab  106  -LN  103  -BL     Pre Treatment Diastolic BP  70  -LN  62  -BL     Post Systolic BP Rehab  110  -LN  118  -BL     Post Treatment Diastolic BP  60  -LN  73  -BL     Pretreatment Heart Rate (beats/min)  65  -LN  66  -BL2     Intratreatment Heart Rate (beats/min)  71  -LN  71  -BL     Posttreatment Heart Rate (beats/min)  57  -LN  65  -BL     Pre SpO2 (%)  94  -LN  93  -BL2     O2 Delivery Pre Treatment  supplemental O2  -LN  supplemental O2  -BL2     Intra SpO2 (%)  91  -LN  82  -BL     O2 Delivery Intra Treatment  --  supplemental O2  -BL     Post SpO2 (%)  93  -LN  92  -BL     O2 Delivery Post Treatment  --  supplemental O2  -BL     Pre Patient Position  Sitting  -LN  Supine  -BL2     Intra Patient Position  Standing  -LN  Standing  -BL2     Post Patient Position  Supine  -LN  Sitting  -BL2     Recorded by [LN] Margarette Nava, PTA 03/11/19 1653 [BL] Kristi Mahoney CUTLER/L 03/11/19 1000  [BL2] Kristi Mahoney CUTLER/L 03/11/19 0900     Row Name 03/11/19 1330 03/11/19 0843          Cognitive Assessment/Intervention- PT/OT    Affect/Mental Status (Cognitive)  WFL  -LN  WFL  -BL     Orientation Status (Cognition)  oriented x 4  -LN  oriented x 4  -BL     Follows Commands (Cognition)  WFL  -LN  WFL  -BL     Safety Deficit (Cognitive)  --  mild deficit  -BL     Personal Safety Interventions   --  fall prevention program maintained;muscle strengthening facilitated;nonskid shoes/slippers when out of bed;supervised activity  -BL     Recorded by [LN] Margarette Nava, PTA 03/11/19 1653 [BL] Kristi Mahoney COTA/L 03/11/19 0900     Row Name 03/11/19 1330             Safety Issues, Functional Mobility    Impairments Affecting Function (Mobility)  balance;endurance/activity tolerance;pain;strength  -LN      Recorded by [LN] Margarette Nava, PTA 03/11/19 1653      Row Name 03/11/19 1330 03/11/19 0843          Bed Mobility Assessment/Treatment    Supine-Sit [place] (Bed Mobility)  not tested  -LN  --     Sit-Supine [place] (Bed Mobility)  supervision  -LN  --     Assistive Device (Bed Mobility)  head of bed elevated;bed rails  -LN  --     Comment (Bed Mobility)  --  pt up in chair this date upon entry  -BL     Recorded by [LN] Margarette Nava, PTA 03/11/19 1653 [BL] Kristi Mahoney COTA/L 03/11/19 1000     Row Name 03/11/19 0843             Functional Mobility    Functional Mobility- Ind. Level  contact guard assist;supervision required  -BL      Functional Mobility- Device  rolling walker  -BL      Functional Mobility-Distance (Feet)  200  -BL      Functional Mobility- Safety Issues  supplemental O2  -BL      Recorded by [BL] Kristi Mahoney COTA/L 03/11/19 1000      Row Name 03/11/19 1330 03/11/19 0843          Transfer Assessment/Treatment    Transfer Assessment/Treatment  sit-stand transfer;stand-sit transfer  -LN  sit-stand transfer;stand-sit transfer;toilet transfer  -BL     Recorded by [LN] Margarette Nava, PTA 03/11/19 1653 [BL] Kristi Mahoney COTA/L 03/11/19 1000     Row Name 03/11/19 1330             Bed-Chair Transfer    Bed-Chair [place] (Transfers)  not tested  -LN      Recorded by [LN] Margarette Nava, PTA 03/11/19 1653      Row Name 03/11/19 1330             Chair-Bed Transfer    Chair-Bed [place] (Transfers)  supervision  -LN      Assistive Device (Chair-Bed Transfers)  walker,  front-wheeled  -LN      Recorded by [LN] Margarette Nava, PTA 03/11/19 1653      Row Name 03/11/19 1330 03/11/19 0843          Sit-Stand Transfer    Sit-Stand [place] (Transfers)  supervision  -LN  supervision;verbal cues  -BL     Assistive Device (Sit-Stand Transfers)  walker, front-wheeled  -LN  walker, front-wheeled  -BL     Recorded by [LN] Margarette Nava, PTA 03/11/19 1653 [BL] Kristi Mahoney CUTLER/L 03/11/19 1000     Row Name 03/11/19 1330 03/11/19 0843          Stand-Sit Transfer    Stand-Sit [place] (Transfers)  supervision;verbal cues  -LN  supervision;verbal cues  -BL     Assistive Device (Stand-Sit Transfers)  walker, front-wheeled  -LN  walker, front-wheeled  -BL     Recorded by [LN] Margarette Nava, PTA 03/11/19 1653 [BL] Kristi Mahoney CUTLER/L 03/11/19 1000     Row Name 03/11/19 1330 03/11/19 0843          Toilet Transfer    Type (Toilet Transfer)  sit-stand;stand-sit  -LN  sit-stand;stand-sit  -BL     [place] Level (Toilet Transfer)  supervision;conditional independence  -LN  conditional independence  -BL     Assistive Device (Toilet Transfer)  walker, front-wheeled  -LN  walker, front-wheeled;commode  -BL     Recorded by [LN] Margarette Nava, PTA 03/11/19 1653 [BL] Kristi Mahoney CUTLER/L 03/11/19 1000     Row Name 03/11/19 1330             Gait/Stairs Assessment/Training    [place] Level (Gait)  supervision  -LN      Assistive Device (Gait)  walker, front-wheeled  -LN      Distance in Feet (Gait)  260  -LN      Recorded by [LN] Margarette Nava, PTA 03/11/19 1653      Row Name 03/11/19 0843             ADL Assessment/Intervention    BADL Assessment/Intervention  toileting  -BL      Recorded by [BL] Kristi Mahoney CUTLER/L 03/11/19 1000      Row Name 03/11/19 0843             Bathing Assessment/Intervention    Comment (Bathing)  Pt deferred bathing task this date despite encouragment stating she would rather focus on walking. Pt also complaining of increased pain this date along with SOB.   -BL      Recorded by [BL] Kristi Mahoney COTA/L 03/11/19 1008      Row Name 03/11/19 0843             Upper Body Dressing Assessment/Training    Upper Body Dressing [place] Level  doff;don;александр/paris;set up;supervision  -BL      Upper Body Dressing Position  supported standing  -BL      Recorded by [BL] Kristi Mahoney COTA/L 03/11/19 1008      Row Name 03/11/19 0843             Lower Body Dressing Assessment/Training    Comment (Lower Body Dressing)  Pt educated on possible need for sock aid however pt states her mother will assist her with LB dressing upon returning home  -BL      Recorded by [BL] Kristi Mahoney COTA/L 03/11/19 1008      Row Name 03/11/19 0843             Grooming Assessment/Training    [place] Level (Grooming)  grooming skills;wash face, hands;supervision;verbal cues  -BL      Grooming Position  supported standing;sink side  -BL      Recorded by [BL] Kristi Mahoney COTA/L 03/11/19 1008      Row Name 03/11/19 0843             Toileting Assessment/Training    [place] Level (Toileting)  toileting skills;perform perineal hygiene;set up;conditional independence  -BL      Assistive Devices (Toileting)  commode  -BL      Toileting Position  supported sitting;supported standing  -BL      Recorded by [BL] Kristi Mahoney COTA/L 03/11/19 1008      Row Name 03/11/19 0843             BADL Safety/Performance    Impairments, BADL Safety/Performance  endurance/activity tolerance;strength;shortness of breath;pain;range of motion  -BL      Skilled BADL Treatment/Intervention  energy conservation;compensatory training  -BL      Progress in BADL Status  improvement noted;level of supervision required;use of compensatory strategies  -BL      Recorded by [BL] Kristi Mahoney COTA/L 03/11/19 1008      Row Name 03/11/19 0843             Motor Skills Assessment/Interventions    Additional Documentation  Balance (Group)  -BL      Recorded by [BL] Kristi Mahoney COTA/L 03/11/19 1008      Row Name  03/11/19 1330             Lower Extremity Seated Therapeutic Exercise    Performed, Seated Lower Extremity (Therapeutic Exercise)  hip flexion/extension;ankle dorsiflexion/plantarflexion;LAQ (long arc quad), knee extension  -LN      Exercise Type, Seated Lower Extremity (Therapeutic Exercise)  AROM (active range of motion)  -LN      Sets/Reps Detail, Seated Lower Extremity (Therapeutic Exercise)  1/20  -LN      Recorded by [LN] Margarette Nava, MARY ELLEN 03/11/19 1653      Row Name 03/11/19 0843             Balance    Balance  dynamic standing balance  -BL      Recorded by [BL] Kristi Mahoney COTA/L 03/11/19 1008      Row Name 03/11/19 0843             Dynamic Standing Balance    Level of [place], Reaches Outside Midline (Standing, Dynamic Balance)  supervision;contact guard assist  -BL      Time Able to Maintain Position, Reaches Outside Midline (Standing, Dynamic Balance)  3 to 4 minutes  -BL      Assistive Device Utilized (Supported Standing, Dynamic Balance)  walker, rolling  -BL      Comment, Reaches Outside Midline (Standing, Dynamic Balance)  no LOB however pt does fatigue quickly requiring rest breaks  -BL      Recorded by [BL] Kristi Mahoney COTA/L 03/11/19 1008      Row Name 03/11/19 1330 03/11/19 0843          Positioning and Restraints    Pre-Treatment Position  --  sitting in chair/recliner  -BL     Post Treatment Position  bed  -LN  chair  -BL     In Bed  notified nsg;supine;call light within reach;encouraged to call for assist;exit alarm on  -LN  --     In Chair  --  reclined;call light within reach;encouraged to call for assist all needs met and within reach  -BL     Recorded by [LN] Margarette Nava, PTA 03/11/19 1653 [BL] Kristi Mahoney COTA/L 03/11/19 1008     Row Name 03/11/19 0843             Pain Assessment    Additional Documentation  Pain Scale: Numbers Pre/Post-Treatment (Group)  -BL      Recorded by [BL] Kristi Mahoney COTA/L 03/11/19 1008      Row Name 03/11/19 1330 03/11/19 0843           Pain Scale: Numbers Pre/Post-Treatment    Pain Scale: Numbers, Pretreatment  9/10  -LN  10/10  -BL     Pain Scale: Numbers, Post-Treatment  7/10  -LN  10/10  -BL     Pain Location - Orientation  --  generalized  -BL     Pain Location  back chest  -LN  back  -BL     Pain Intervention(s)  -- nsg aware  -LN  Medication (See MAR);Repositioned;Rest  -BL     Recorded by [LN] Margarette Nava, PTA 03/11/19 1653 [BL] Kristi Mahoney COTA/L 03/11/19 1008     Row Name                Wound 03/05/19 1414 Other (See comments) midline sternal incision    Wound - Properties Group Date first assessed: 03/05/19 [CF] Time first assessed: 1414 [CF] Present On Admission : no [CF] Side: Other (See comments) [CF], midline sternal incision  Orientation: midline [CF] Location: sternal [CF] Type: incision [CF] Additional Comments: closed incision. dressings applied [CF] Recorded by:  [CF] Aparna Eason RN 03/05/19 1414    Row Name                Wound 03/05/19 1414 Left leg incision    Wound - Properties Group Date first assessed: 03/05/19 [CF] Time first assessed: 1414 [CF] Present On Admission : no [CF] Side: Left [CF] Location: leg [CF] Type: incision [CF] Additional Comments: closed incisions. dressings applied (skin affix, kerlex, ace) [CF] Recorded by:  [CF] Aparna Eason RN 03/05/19 1415    Row Name 03/11/19 1330             Plan of Care Review    Plan of Care Reviewed With  patient  -LN      Recorded by [LN] Margarette Nava, PTA 03/11/19 1653      Row Name 03/11/19 0843             Outcome Summary/Treatment Plan (OT)    Daily Summary of Progress (OT)  progress toward functional goals is good  -BL      Plan for Continued Treatment (OT)  cont current POC  -BL      Anticipated Discharge Disposition (OT)  home with home health;home with OP services;home with assist  -BL      Recorded by [BL] Kristi Mahoney CUTLER/L 03/11/19 1008      Row Name 03/11/19 1330             Outcome Summary/Treatment Plan (PT)    Plan for Continued Treatment  (PT)  cont  -LN      Anticipated Discharge Disposition (PT)  home with 24/7 care;home with home health Progression to cardiac rehab as allowed by MD ALBARRAN      Recorded by [LN] Margarette Nava, PTA 03/11/19 9348        User Key  (r) = Recorded By, (t) = Taken By, (c) = Cosigned By    Initials Name Effective Dates Discipline    LN Margarette Nava S, PTA 03/07/18 -  PT    BL Kristi Mahoney, CUTLER/L 10/17/16 -  OT    CF Aparna Eason RN 10/17/16 -  Nurse          Wound 03/05/19 1414 Other (See comments) midline sternal incision (Active)   Dressing Appearance dry;intact 3/11/2019  1:30 PM   Closure Approximated;Liquid skin adhesive 3/11/2019  1:30 PM   Base dry;pink 3/11/2019  1:30 PM   Periwound intact;dry 3/11/2019  1:30 PM   Periwound Temperature warm 3/11/2019  1:30 PM   Periwound Skin Turgor soft 3/11/2019  1:30 PM   Edges rolled/closed 3/11/2019  1:30 PM   Drainage Amount none 3/11/2019  1:30 PM   Care, Wound cleansed with;antimicrobial agent applied 3/11/2019  1:30 PM   Dressing Care, Wound dressing removed 3/11/2019  1:30 PM       Wound 03/05/19 1414 Left leg incision (Active)   Dressing Appearance open to air 3/11/2019  9:08 AM   Closure Liquid skin adhesive 3/11/2019  9:08 AM   Base clean;dry 3/11/2019  9:08 AM   Periwound dry;ecchymotic 3/11/2019  9:08 AM   Periwound Temperature warm 3/11/2019  9:08 AM   Periwound Skin Turgor soft 3/11/2019  9:08 AM   Drainage Amount none 3/11/2019  9:08 AM       Rehab Goal Summary     Row Name 03/11/19 1600 03/11/19 0843          Physical Therapy Goals    Bed Mobility Goal Selection (PT)  bed mobility, PT goal 1  -LN  --     Transfer Goal Selection (PT)  transfer, PT goal 1  -LN  --     Gait Training Goal Selection (PT)  gait training, PT goal 1  -LN  --     Stairs Goal Selection (PT)  stairs, PT goal 1  -LN  --        Bed Mobility Goal 1 (PT)    Activity/Assistive Device (Bed Mobility Goal 1, PT)  rolling to left;rolling to right;sit to supine;supine to sit HOB flat;no rails  -LN   --     [place] Level/Cues Needed (Bed Mobility Goal 1, PT)  standby assist;independent  -LN  --     Time Frame (Bed Mobility Goal 1, PT)  by discharge  -LN  --     Progress/Outcomes (Bed Mobility Goal 1, PT)  goal not met  -LN  --        Transfer Goal 1 (PT)    Activity/Assistive Device (Transfer Goal 1, PT)  sit-to-stand/stand-to-sit;bed-to-chair/chair-to-bed  -LN  --     [place] Level/Cues Needed (Transfer Goal 1, PT)  standby assist;conditional independence  -LN  --     Time Frame (Transfer Goal 1, PT)  by discharge  -LN  --     Progress/Outcome (Transfer Goal 1, PT)  goal not met  -LN  --        Gait Training Goal 1 (PT)    Activity/Assistive Device (Gait Training Goal 1, PT)  gait (walking locomotion)  -LN  --     [place] Level (Gait Training Goal 1, PT)  standby assist  -LN  --     Distance (Gait Goal 1, PT)  300ft  -LN  --     Time Frame (Gait Training Goal 1, PT)  by discharge  -LN  --     Progress/Outcome (Gait Training Goal 1, PT)  goal not met  -LN  --        Stairs Goal 1 (PT)    Activity/Assistive Device (Stairs Goal 1, PT)  ascending stairs;descending stairs  -LN  --     [place] Level/Cues Needed (Stairs Goal 1, PT)  contact guard assist  -LN  --     Number of Stairs (Stairs Goal 1, PT)  2  -LN  --     Time Frame (Stairs Goal 1, PT)  by discharge  -LN  --     Progress/Outcome (Stairs Goal 1, PT)  goal not met  -LN  --        Occupational Therapy Goals    Transfer Goal Selection (OT)  --  transfer, OT goal 1  -BL     Bathing Goal Selection (OT)  --  bathing, OT goal 1  -BL     Dressing Goal Selection (OT)  --  dressing, OT goal 1  -BL     Toileting Goal Selection (OT)  --  toileting, OT goal 1  -BL     Endurance Goal Selection (OT)  --  endurance, OT goal 1  -BL     Functional Mobility Goal Selection (OT)  --  functional mobility, OT goal 1  -BL     Precaution Goal Selection (OT)  --  precaution, OT goal 1  -BL        Transfer Goal 1 (OT)    Activity/Assistive Device (Transfer Goal  1, OT)  --  toilet  -BL     [place] Level/Cues Needed (Transfer Goal 1, OT)  --  contact guard assist  -BL     Time Frame (Transfer Goal 1, OT)  --  long term goal (LTG);by discharge  -BL     Progress/Outcome (Transfer Goal 1, OT)  --  goal met  (Significant)   -BL        Bathing Goal 1 (OT)    Activity/Assistive Device (Bathing Goal 1, OT)  --  bathing skills, all  -BL     [place] Level/Cues Needed (Bathing Goal 1, OT)  --  set-up required;verbal cues required;standby assist  -BL     Time Frame (Bathing Goal 1, OT)  --  long term goal (LTG);by discharge  -BL     Progress/Outcomes (Bathing Goal 1, OT)  --  goal not met  -BL        Dressing Goal 1 (OT)    Activity/Assistive Device (Dressing Goal 1, OT)  --  dressing skills, all  -BL     [place]/Cues Needed (Dressing Goal 1, OT)  --  set-up required;verbal cues required;standby assist  -BL     Time Frame (Dressing Goal 1, OT)  --  long term goal (LTG);by discharge  -BL     Progress/Outcome (Dressing Goal 1, OT)  --  goal not met  -BL        Toileting Goal 1 (OT)    Activity/Device (Toileting Goal 1, OT)  --  toileting skills, all  -BL     [place] Level/Cues Needed (Toileting Goal 1, OT)  --  set-up required;verbal cues required;contact guard assist  -BL     Time Frame (Toileting Goal 1, OT)  --  long term goal (LTG);by discharge  -BL     Progress/Outcome (Toileting Goal 1, OT)  --  goal met  (Significant)   -BL         Endurance Goal 1 (OT)    Activity Level (Endurance Goal 1, OT)  --  endurance 2 fair + 25 min functional task O2 90 or up 3 or less rest breaks  -BL     Time Frame (Endurance Goal 1, OT)  --  long term goal (LTG);by discharge  -BL     Progress/Outcome (Endurance Goal 1, OT)  --  goal not met  -BL        Functional Mobility Goal 1 (OT)    Activity/Assistive Device (Functional Mobility Goal 1, OT)  --  walker, rolling  -BL     [place] Level/Cues Needed (Functional Mobility Goal 1, OT)  --  contact guard assist  -BL     Distance  Goal 1 (Functional Mobility, OT)  --  to and from bathroom with good safety, balance and problem solving   -BL     Time Frame (Functional Mobility Goal 1, OT)  --  long term goal (LTG);by discharge  -BL     Progress/Outcome (Functional Mobility Goal 1, OT)  --  goal met  (Significant)   -BL        Precaution Goal 1 (OT)    Activity (Precaution Goal 1, OT)  --  sternal precautions;fall precautions;demonstrates compliance;demonstrates understanding;during all tasks in daily routine;during ADLs;during mobility tasks;during transfers  -BL     [place] Level/Cues Needed (Precautions Goal 1, OT)  --  with minimum;verbal cues/redirection  -BL     Time Frame (Precaution Goal 1, OT)  --  long term goal (LTG);by discharge  -BL     Progress/Outcome (Precaution Goal 1, OT)  --  goal not met  -BL        Patient Education Goal (OT)    Activity (Patient Education Goal, OT)  --  Pt will communicate good home safety awareness.   -BL     [place]/Cues/Accuracy (Memory Goal 2, OT)  --  verbalizes understanding  -BL     Time Frame (Patient Education Goal, OT)  --  long term goal (LTG);by discharge  -BL     Progress/Outcome (Patient Education Goal, OT)  --  goal met  -BL       User Key  (r) = Recorded By, (t) = Taken By, (c) = Cosigned By    Initials Name Provider Type Discipline    LN Margarette Nava, PTA Physical Therapy Assistant PT    BL Kristi Mahoney, HE/L Occupational Therapy Assistant OT          Physical Therapy Education     Title: PT OT SLP Therapies (Done)     Topic: Physical Therapy (Done)     Point: Mobility training (Done)     Learning Progress Summary           Patient Acceptance, E,TB, VU by INDIA at 3/11/2019  4:54 PM    Comment:  copies given of hep and cardiac/sternal precautions    Acceptance, E,TB, VU,DU by INDIA at 3/8/2019  1:05 PM    Acceptance, E,TB, VU by INDIA at 3/7/2019  3:53 PM    Comment:  cardia/sternal precautions    Acceptance, E, NR by JOLLY at 3/6/2019  1:29 PM                   Point: Home exercise  program (Done)     Learning Progress Summary           Patient Acceptance, E,TB, VU by LN at 3/11/2019  4:54 PM    Comment:  copies given of hep and cardiac/sternal precautions    Acceptance, E,TB, VU,DU by LN at 3/8/2019  1:05 PM    Acceptance, E,TB, VU by LN at 3/7/2019  3:53 PM    Comment:  cardia/sternal precautions                   Point: Body mechanics (Done)     Learning Progress Summary           Patient Acceptance, E,TB, VU by LN at 3/11/2019  4:54 PM    Comment:  copies given of hep and cardiac/sternal precautions    Acceptance, E,TB, VU,DU by LN at 3/8/2019  1:05 PM    Acceptance, E,TB, VU by LN at 3/7/2019  3:53 PM    Comment:  cardia/sternal precautions    Acceptance, E, NR by JOLLY at 3/6/2019  1:29 PM                   Point: Precautions (Done)     Learning Progress Summary           Patient Acceptance, E,TB, VU by LN at 3/11/2019  4:54 PM    Comment:  copies given of hep and cardiac/sternal precautions    Acceptance, E,TB, VU,DU by LN at 3/8/2019  1:05 PM    Acceptance, E,TB, VU by LN at 3/7/2019  3:53 PM    Comment:  cardia/sternal precautions    Acceptance, E, NR by JOLLY at 3/6/2019  1:29 PM                               User Key     Initials Effective Dates Name Provider Type Discipline     04/03/18 -  Pamela Del Valle, PT Physical Therapist PT    INDIA 03/07/18 -  Margarette Nava, PTA Physical Therapy Assistant PT                PT Recommendation and Plan  Anticipated Discharge Disposition (PT): home with 24/7 care, home with home health(Progression to cardiac rehab as allowed by MD)  Therapy Frequency (PT Clinical Impression): other (see comments)(5-14 times per week)  Outcome Summary/Treatment Plan (PT)  Plan for Continued Treatment (PT): cont  Anticipated Discharge Disposition (PT): home with 24/7 care, home with home health(Progression to cardiac rehab as allowed by MD)  Plan of Care Reviewed With: patient  Progress: improving  Outcome Summary: sit-sup sba,sit-stand-sit sba of 1,amb 260' with rw and  sba of 1-no goals met-if d/c would benefit from hh pt followed by cardiac rehab-step training next rx  Outcome Measures     Row Name 03/11/19 1600 03/11/19 0843 03/10/19 1400       How much help from another person do you currently need...    Turning from your back to your side while in flat bed without using bedrails?  3  -LN  --  3  -RW    Moving from lying on back to sitting on the side of a flat bed without bedrails?  3  -LN  --  3  -RW    Moving to and from a bed to a chair (including a wheelchair)?  3  -LN  --  3  -RW    Standing up from a chair using your arms (e.g., wheelchair, bedside chair)?  3  -LN  --  3  -RW    Climbing 3-5 steps with a railing?  3  -LN  --  3  -RW    To walk in hospital room?  3  -LN  --  3  -RW    AM-PAC 6 Clicks Score  18  -LN  --  18  -RW       How much help from another is currently needed...    Putting on and taking off regular lower body clothing?  --  2  -BL  --    Bathing (including washing, rinsing, and drying)  --  2  -BL  --    Toileting (which includes using toilet bed pan or urinal)  --  3  -BL  --    Putting on and taking off regular upper body clothing  --  3  -BL  --    Taking care of personal grooming (such as brushing teeth)  --  4  -BL  --    Eating meals  --  4  -BL  --    Score  --  18  -BL  --       Functional Assessment    Outcome Measure Options  AM-PAC 6 Clicks Basic Mobility (PT)  -LN  AM-PAC 6 Clicks Daily Activity (OT)  -BL  --    Row Name 03/10/19 0905             How much help from another is currently needed...    Putting on and taking off regular lower body clothing?  2  -JH      Bathing (including washing, rinsing, and drying)  2  -JH      Toileting (which includes using toilet bed pan or urinal)  3  -JH      Putting on and taking off regular upper body clothing  3  -JH      Taking care of personal grooming (such as brushing teeth)  3  -JH      Eating meals  4  -JH      Score  17  -JH        User Key  (r) = Recorded By, (t) = Taken By, (c) = Cosigned By     Initials Name Provider Type    LN Margarette Nava PTA Physical Therapy Assistant    RW Abhi Cherry, PTA Physical Therapy Assistant    Fang Pandya, CUTLER/L Occupational Therapy Assistant    BL Kristi Mahoney, CUTLER/L Occupational Therapy Assistant         Time Calculation:   PT Charges     Row Name 03/11/19 1600             Time Calculation    Start Time  1330  -LN      Stop Time  1410  -LN      Time Calculation (min)  40 min  -LN      PT Received On  03/11/19  -LN         Time Calculation- PT    Total Timed Code Minutes- PT  40 minute(s)  -LN        User Key  (r) = Recorded By, (t) = Taken By, (c) = Cosigned By    Initials Name Provider Type    LN Margarette Nava, MARY ELLEN Physical Therapy Assistant        Therapy Suggested Charges     Code   Minutes Charges    None           Therapy Charges for Today     Code Description Service Date Service Provider Modifiers Qty    64354979887 HC GAIT TRAINING EA 15 MIN 3/11/2019 Margarette Nava, PTA GP 1    14145813421 HC PT THERAPEUTIC ACT EA 15 MIN 3/11/2019 Margarette Nava PTA GP 1    23448690868 HC PT SELF CARE/MGMT/TRAIN EA 15 MIN 3/11/2019 Margarette Nava, MARY ELLEN GP 1          PT G-Codes  Outcome Measure Options: AM-PAC 6 Clicks Basic Mobility (PT)  AM-PAC 6 Clicks Score: 18  Score: 18    Margarette Nava PTA  3/11/2019

## 2019-03-11 NOTE — PLAN OF CARE
Problem: Patient Care Overview  Goal: Plan of Care Review  Outcome: Ongoing (interventions implemented as appropriate)   03/11/19 3366   Coping/Psychosocial   Plan of Care Reviewed With patient   Plan of Care Review   Progress improving   OTHER   Outcome Summary pacer wires dc'd today; prevena wound vac dc'd today; discharge tomorrow with home O2 and home health

## 2019-03-11 NOTE — PROGRESS NOTES
"  Cardiothoracic - Vascular Surgery Daily Note        LOS: 6 days   Patient Care Team:  Anderson Abbott MD as PCP - General (Family Medicine)    POD#6    CORONARY ARTERY BYPASS GRAFTING x4  ENDOSCOPIC VEIN HARVEST           LEFT INTERNAL MAMMARY ARTERY TO LEFT ANTERIOR DESCENDING CORONARY ARTERY  SAPHENOUS VEIN GRAFT FROM ASCENDING AORTA TO FIRST DIAGONAL BRANCH  SAPHENOUS VEIN GRAFT FROM ASCENDING AORTA TO THIRD MARGINAL BRANCH  SAPHENOUS VEIN GRAFT FROM ASCENDING AORTA TO POSTERIOR DESCENDING CORONARY ARTERY       Chief Complaint: CAD, Surgical Discomfort      Subjective     The following portions of the patient's history were reviewed and updated as appropriate: allergies, current medications, past family history, past medical history, past social history, past surgical history and problem list.     Subjective:  Symptoms:  Stable.  She reports shortness of breath and chest pain (surgical).    Diet:  Adequate intake.    Activity level: Impaired due to pain.    Pain:  She complains of pain that is moderate.  Pain is requiring pain medication.          Objective     Vital Signs  Temp:  [96.5 °F (35.8 °C)-97.8 °F (36.6 °C)] 97 °F (36.1 °C)  Heart Rate:  [57-68] 68  Resp:  [16-20] 20  BP: (105-138)/(60-89) 106/61  Body mass index is 42.83 kg/m².    Intake/Output Summary (Last 24 hours) at 3/11/2019 0927  Last data filed at 3/10/2019 1355  Gross per 24 hour   Intake 540 ml   Output --   Net 540 ml     No intake/output data recorded.    Wt Readings from Last 3 Encounters:   03/11/19 117 kg (257 lb 6.4 oz)   02/28/19 112 kg (248 lb)   02/12/19 112 kg (248 lb)         Physical Exam   Objective:  General Appearance:  Comfortable.    Vital signs: (most recent): Blood pressure 106/61, pulse 68, temperature 97 °F (36.1 °C), temperature source Tympanic, resp. rate 20, height 165.1 cm (65\"), weight 117 kg (257 lb 6.4 oz), SpO2 92 %, not currently breastfeeding.  Vital signs are normal.  No fever.    Output: Producing " urine and producing stool.    Lungs:  Normal effort and normal respiratory rate.  There are decreased breath sounds (bases).    Heart: Normal rate.  Regular rhythm.    Chest: (AV wires removed)  Abdomen: Abdomen is soft.  Hypoactive bowel sounds.     Extremities: Decreased range of motion.  There is dependent edema (generalized).    Pulses: Distal pulses are intact.    Neurological: Patient is alert and oriented to person, place and time.    Pupils:  Pupils are equal, round, and reactive to light.    Skin:  Warm and dry.  (Prevena removed)              Results Review:    Lab Results   Component Value Date    WBC 12.17 (H) 03/09/2019    HGB 9.0 (L) 03/09/2019    HCT 28.2 (L) 03/09/2019    MCV 90.7 03/09/2019     03/09/2019     Lab Results   Component Value Date    GLUCOSE 123 (H) 03/08/2019    BUN 17 03/08/2019    CREATININE 0.80 03/08/2019    EGFRIFNONA 77 03/08/2019    BCR 21.3 03/08/2019    K 4.3 03/08/2019    CO2 23.0 03/08/2019    CALCIUM 7.8 (L) 03/08/2019    ALBUMIN 3.20 (L) 03/08/2019    AST 73 (H) 03/08/2019    ALT 67 (H) 03/08/2019       Calcium No results found for: CALCIUM   Magnesium No results found for: MG     Imaging Results (last 24 hours)     Procedure Component Value Units Date/Time    XR Chest 2 View [[id number]] Collected:  03/10/19 1420     Updated:  03/10/19 1451    Narrative:         TWO VIEW CHEST    HISTORY: Removal of chest tube    Frontal and lateral films of the chest were obtained.  COMPARISON: March 6, 2019    Recent coronary artery bypass.  Right internal jugular catheter sheath has been removed.  Mediastinal drainage tubes and thoracotomy tubes have been  removed.  Possible small right apical pneumothorax.  Small left pleural effusion.  Subsegmental atelectasis or infiltrate left lung base.  Stable minimal cardiomegaly.  The pulmonary vasculature is not increased.  No pneumothorax.  No acute osseous abnormality.      Impression:       CONCLUSION:  Recent coronary artery  bypass.  Right internal jugular catheter sheath has been removed.  Mediastinal drainage tubes and thoracotomy tubes have been  removed.  Possible small right apical pneumothorax.  Small left pleural effusion.  Subsegmental atelectasis or infiltrate left lung base.  Stable minimal cardiomegaly.    [id number]    Electronically signed by:  Thanh Thompson MD  3/10/2019 2:50 PM CDT  Workstation: [id number]                                  acetaminophen 1,000 mg Oral Q6H   aspirin 81 mg Oral Daily   atorvastatin 40 mg Oral Nightly   famotidine 20 mg Oral BID   guaiFENesin 1,200 mg Oral Q12H   insulin aspart 0-24 Units Subcutaneous 4x Daily AC & at Bedtime   insulin detemir 30 Units Subcutaneous Nightly   ipratropium-albuterol 3 mL Nebulization 4x Daily - RT   levothyroxine 200 mcg Oral QAM   magnesium oxide 400 mg Oral BID   prenatal vitamin 27-0.8 1 tablet Oral Daily   sennosides-docusate sodium 1 tablet Oral BID   sertraline 50 mg Oral Nightly   sodium chloride 3 mL Intravenous Q12H   vitamin C 500 mg Oral BID        CXR 3/10/2019          ASSESSMENT/PLAN       Coronary artery disease of native artery of native heart with stable angina pectoris (CMS/HCC)      Assessment & Plan    Stable Post op CABG: Progressing slowly.  Lethargy, possibly related to hypothyroidism.  Bradycardic stop amiodarone and BB.     CAD: ASA, BRILINTA, statin, BB/ACE contraindicated hypotension and bradycardia.     Resp: O2 support. Incentive. CXR today, small ptx on prior examination.      Pain: Tylenol. OxyIR. Toradol    Rehab: OOB. Ambulate. PT/OT    DM/Transient Post Op Hyperglycemia:  SSI coverage. Levemir    Depression: Zoloft    Acute Blood loss Anemia: Stable, iron supplementation in the form of prenatal.      Disp: Possible DC home today with home health            This document has been electronically signed by DUC Marr on March 11, 2019 9:27 AM

## 2019-03-11 NOTE — THERAPY TREATMENT NOTE
Acute Care - Occupational Therapy Treatment Note  [facility]     Patient Name: Rhea Sutton  : 1970  MRN: [id number]  Today's Date: 3/11/2019  Onset of Illness/Injury or Date of Surgery: 19  Date of Referral to OT: 19  Referring Physician: DUC High    Admit Date: 3/5/2019       ICD-10-CM ICD-9-CM   1. Coronary artery disease of native artery of native heart with stable angina pectoris (CMS/HCC) I25.118 414.01     413.9   2. Coronary artery disease involving native heart without angina pectoris, unspecified vessel or lesion type I25.10 414.01   3. Impaired functional mobility, balance, gait, and endurance Z74.09 V49.89   4. Impaired mobility and ADLs Z74.09 799.89     Patient Active Problem List   Diagnosis   • Postsurgical hypothyroidism   • Impaired fasting glucose   • Vitamin D deficiency   • Type 2 diabetes mellitus with hyperglycemia (CMS/Formerly [facility])   • Hypertension   • Hyperlipidemia   • DDD (degenerative disc disease), lumbar   • Lumbar radiculopathy   • Cervicalgia   • NSTEMI (non-ST elevated myocardial infarction) (CMS/HCC)   • CAD (coronary artery disease)   • AMI inferior wall (CMS/[facility])   • Head lice infestation   • Diabetes mellitus (CMS/HCC)   • Uncontrolled diabetes mellitus (CMS/HCC)   • Coronary artery disease of native artery of native heart with stable angina pectoris (CMS/HCC)   • Chronic kidney disease, stage 3 (moderate) (CMS/Formerly [facility])     Past Medical History:   Diagnosis Date   • CAD (coronary artery disease)    • Chronic back pain    • Chronic bronchitis (CMS/HCC)    • Diabetes mellitus (CMS/HCC)    • Diverticulitis 1986   • Goiter    • Hashimoto's thyroiditis    • Head lice infestation    • Hyperlipidemia    • Hypertension    • MI (myocardial infarction) (CMS/HCC)    • Postoperative hypothyroidism    • Sinus congestion    • Sleep apnea     not using c-pap     Past Surgical History:   Procedure Laterality Date   • APPENDECTOMY     • CARDIAC  CATHETERIZATION N/A 10/13/2017   • CARDIAC CATHETERIZATION N/A 1/3/2019   • CHOLECYSTECTOMY     • COLON RESECTION     • COLON SURGERY     • CORONARY STENT PLACEMENT     • UT RT/LT HEART CATHETERS N/A 10/13/2017   • TOTAL ABDOMINAL HYSTERECTOMY WITH SALPINGO OOPHORECTOMY     • TOTAL THYROIDECTOMY         Therapy Treatment    Rehabilitation Treatment Summary     Row Name 03/11/19 0843             Treatment Time/Intention    Discipline  occupational therapy assistant  -BL      Document Type  therapy note (daily note)  -BL      Subjective Information  complains of;pain;weakness;fatigue  -BL      Mode of Treatment  occupational therapy;individual therapy  -BL      Patient/Family Observations  Nursing okay treatment; c/o pain   -BL      Therapy Frequency (OT Eval)  daily  -BL      Patient Effort  adequate  -BL      Comment  requires encouragement  -BL      Existing Precautions/Restrictions  cardiac;oxygen therapy device and L/min;sternal  -BL      Recorded by [BL] Kristi Mahoney COTA/L 03/11/19 0900      Row Name 03/11/19 0843             Vital Signs    Pre Systolic BP Rehab  103  -BL      Pre Treatment Diastolic BP  62  -BL      Post Systolic BP Rehab  118  -BL      Post Treatment Diastolic BP  73  -BL      Pretreatment Heart Rate (beats/min)  66  -BL2      Intratreatment Heart Rate (beats/min)  71  -BL      Posttreatment Heart Rate (beats/min)  65  -BL      Pre SpO2 (%)  93  -BL2      O2 Delivery Pre Treatment  supplemental O2  -BL2      Intra SpO2 (%)  82  -BL      O2 Delivery Intra Treatment  supplemental O2  -BL      Post SpO2 (%)  92  -BL      O2 Delivery Post Treatment  supplemental O2  -BL      Pre Patient Position  Supine  -BL2      Intra Patient Position  Standing  -BL2      Post Patient Position  Sitting  -BL2      Recorded by [BL] Kristi Mahoney COTA/L 03/11/19 1000  [BL2] Kristi Mahoney COTA/L 03/11/19 0900      Row Name 03/11/19 0843             Cognitive Assessment/Intervention- PT/OT    Affect/Mental  Status (Cognitive)  WFL  -BL      Orientation Status (Cognition)  oriented x 4  -BL      Follows Commands (Cognition)  WFL  -BL      Safety Deficit (Cognitive)  mild deficit  -BL      Personal Safety Interventions  fall prevention program maintained;muscle strengthening facilitated;nonskid shoes/slippers when out of bed;supervised activity  -BL      Recorded by [BL] Kristi Mahoney COTA/L 03/11/19 0900      Row Name 03/11/19 0843             Bed Mobility Assessment/Treatment    Comment (Bed Mobility)  pt up in chair this date upon entry  -BL      Recorded by [BL] Kristi Mahoney CUTLER/L 03/11/19 1000      Row Name 03/11/19 0843             Functional Mobility    Functional Mobility- Ind. Level  contact guard assist;supervision required  -BL      Functional Mobility- Device  rolling walker  -BL      Functional Mobility-Distance (Feet)  200  -BL      Functional Mobility- Safety Issues  supplemental O2  -BL      Recorded by [BL] Kristi Mahoney COTA/L 03/11/19 1000      Row Name 03/11/19 0843             Transfer Assessment/Treatment    Transfer Assessment/Treatment  sit-stand transfer;stand-sit transfer;toilet transfer  -BL      Recorded by [BL] Kristi Mahoney COTA/L 03/11/19 1000      Row Name 03/11/19 0843             Sit-Stand Transfer    Sit-Stand [place] (Transfers)  supervision;verbal cues  -BL      Assistive Device (Sit-Stand Transfers)  walker, front-wheeled  -BL      Recorded by [BL] Kristi Mahoney COTA/L 03/11/19 1000      Row Name 03/11/19 0843             Stand-Sit Transfer    Stand-Sit [place] (Transfers)  supervision;verbal cues  -BL      Assistive Device (Stand-Sit Transfers)  walker, front-wheeled  -BL      Recorded by [BL] Kristi Mahoney COTA/L 03/11/19 1000      Row Name 03/11/19 0843             Toilet Transfer    Type (Toilet Transfer)  sit-stand;stand-sit  -BL      [place] Level (Toilet Transfer)  conditional independence  -BL      Assistive Device (Toilet Transfer)  walker  front-wheeled;commode  -BL      Recorded by [BL] Kristi Mahoney CUTLER/L 03/11/19 1000      Row Name 03/11/19 0843             ADL Assessment/Intervention    BADL Assessment/Intervention  toileting  -BL      Recorded by [BL] Kristi Mahoney CUTLER/L 03/11/19 1000      Row Name 03/11/19 0843             Bathing Assessment/Intervention    Comment (Bathing)  Pt deferred bathing task this date despite encouragment stating she would rather focus on walking. Pt also complaining of increased pain this date along with SOB.  -BL      Recorded by [BL] Kristi Mahoney CUTLER/L 03/11/19 1008      Row Name 03/11/19 0843             Upper Body Dressing Assessment/Training    Upper Body Dressing [place] Level  doff;don;александр/paris;set up;supervision  -BL      Upper Body Dressing Position  supported standing  -BL      Recorded by [BL] Kristi Mahoney CUTLER/L 03/11/19 1008      Row Name 03/11/19 0843             Lower Body Dressing Assessment/Training    Comment (Lower Body Dressing)  Pt educated on possible need for sock aid however pt states her mother will assist her with LB dressing upon returning home  -BL      Recorded by [BL] Kristi Mahoney CUTLER/L 03/11/19 1008      Row Name 03/11/19 0843             Grooming Assessment/Training    [place] Level (Grooming)  grooming skills;wash face, hands;supervision;verbal cues  -BL      Grooming Position  supported standing;sink side  -BL      Recorded by [BL] Kristi Mahoney CUTLER/L 03/11/19 1008      Row Name 03/11/19 0843             Toileting Assessment/Training    [place] Level (Toileting)  toileting skills;perform perineal hygiene;set up;conditional independence  -BL      Assistive Devices (Toileting)  commode  -BL      Toileting Position  supported sitting;supported standing  -BL      Recorded by [BL] Kristi Mahoney CUTLER/L 03/11/19 1008      Row Name 03/11/19 0843             BADL Safety/Performance    Impairments, BADL Safety/Performance  endurance/activity  tolerance;strength;shortness of breath;pain;range of motion  -BL      Skilled BADL Treatment/Intervention  energy conservation;compensatory training  -BL      Progress in BADL Status  improvement noted;level of supervision required;use of compensatory strategies  -BL      Recorded by [BL] Kristi Mahoney COTA/L 03/11/19 1008      Row Name 03/11/19 0843             Motor Skills Assessment/Interventions    Additional Documentation  Balance (Group)  -BL      Recorded by [BL] Kristi Mahoney COTA/L 03/11/19 1008      Row Name 03/11/19 0843             Balance    Balance  dynamic standing balance  -BL      Recorded by [BL] Kristi Mahoney COTA/L 03/11/19 1008      Row Name 03/11/19 0843             Dynamic Standing Balance    Level of [place], Reaches Outside Midline (Standing, Dynamic Balance)  supervision;contact guard assist  -BL      Time Able to Maintain Position, Reaches Outside Midline (Standing, Dynamic Balance)  3 to 4 minutes  -BL      Assistive Device Utilized (Supported Standing, Dynamic Balance)  walker, rolling  -BL      Comment, Reaches Outside Midline (Standing, Dynamic Balance)  no LOB however pt does fatigue quickly requiring rest breaks  -BL      Recorded by [BL] Kristi Mahoney COTA/L 03/11/19 1008      Row Name 03/11/19 0843             Positioning and Restraints    Pre-Treatment Position  sitting in chair/recliner  -BL      Post Treatment Position  chair  -BL      In Chair  reclined;call light within reach;encouraged to call for assist all needs met and within reach  -BL      Recorded by [BL] Kristi Mahoney COTA/L 03/11/19 1008      Row Name 03/11/19 0843             Pain Assessment    Additional Documentation  Pain Scale: Numbers Pre/Post-Treatment (Group)  -BL      Recorded by [BL] Kristi Mahoney COTA/L 03/11/19 1008      Row Name 03/11/19 0843             Pain Scale: Numbers Pre/Post-Treatment    Pain Scale: Numbers, Pretreatment  10/10  -BL      Pain Scale: Numbers, Post-Treatment  10/10   -BL      Pain Location - Orientation  generalized  -BL      Pain Location  back  -BL      Pain Intervention(s)  Medication (See MAR);Repositioned;Rest  -BL      Recorded by [BL] Kristi Mahoney COTA/L 03/11/19 1008      Row Name                Wound 03/05/19 1414 Other (See comments) midline sternal incision    Wound - Properties Group Date first assessed: 03/05/19 [CF] Time first assessed: 1414 [CF] Present On Admission : no [CF] Side: Other (See comments) [CF], midline sternal incision  Orientation: midline [CF] Location: sternal [CF] Type: incision [CF] Additional Comments: closed incision. dressings applied [CF] Recorded by:  [CF] Aparna Eason RN 03/05/19 1414    Row Name                Wound 03/05/19 1414 Left leg incision    Wound - Properties Group Date first assessed: 03/05/19 [CF] Time first assessed: 1414 [CF] Present On Admission : no [CF] Side: Left [CF] Location: leg [CF] Type: incision [CF] Additional Comments: closed incisions. dressings applied (skin affix, kerlex, ace) [CF] Recorded by:  [CF] Aparna Eason RN 03/05/19 1415    Row Name 03/11/19 0843             Outcome Summary/Treatment Plan (OT)    Daily Summary of Progress (OT)  progress toward functional goals is good  -BL      Plan for Continued Treatment (OT)  cont current POC  -BL      Anticipated Discharge Disposition (OT)  home with home health;home with OP services;home with assist  -BL      Recorded by [BL] Kristi Mahoney COTA/L 03/11/19 1008        User Key  (r) = Recorded By, (t) = Taken By, (c) = Cosigned By    Initials Name Effective Dates Discipline    BL Kristi Mahoney COTA/L 10/17/16 -  OT    CF Aparna Eason RN 10/17/16 -  Nurse        Wound 03/05/19 1414 Other (See comments) midline sternal incision (Active)   Dressing Appearance dry;intact 3/11/2019  1:30 PM   Closure Approximated;Liquid skin adhesive 3/11/2019  1:30 PM   Base dry;pink 3/11/2019  1:30 PM   Periwound intact;dry 3/11/2019  1:30 PM   Periwound Temperature  warm 3/11/2019  1:30 PM   Periwound Skin Turgor soft 3/11/2019  1:30 PM   Edges rolled/closed 3/11/2019  1:30 PM   Drainage Amount none 3/11/2019  1:30 PM   Care, Wound cleansed with;antimicrobial agent applied 3/11/2019  1:30 PM   Dressing Care, Wound dressing removed 3/11/2019  1:30 PM       Wound 03/05/19 1414 Left leg incision (Active)   Dressing Appearance open to air 3/11/2019  9:08 AM   Closure Liquid skin adhesive 3/11/2019  9:08 AM   Base clean;dry 3/11/2019  9:08 AM   Periwound dry;ecchymotic 3/11/2019  9:08 AM   Periwound Temperature warm 3/11/2019  9:08 AM   Periwound Skin Turgor soft 3/11/2019  9:08 AM   Drainage Amount none 3/11/2019  9:08 AM     Rehab Goal Summary     Row Name 03/11/19 0843             Occupational Therapy Goals    Transfer Goal Selection (OT)  transfer, OT goal 1  -BL      Bathing Goal Selection (OT)  bathing, OT goal 1  -BL      Dressing Goal Selection (OT)  dressing, OT goal 1  -BL      Toileting Goal Selection (OT)  toileting, OT goal 1  -BL      Endurance Goal Selection (OT)  endurance, OT goal 1  -BL      Functional Mobility Goal Selection (OT)  functional mobility, OT goal 1  -BL      Precaution Goal Selection (OT)  precaution, OT goal 1  -BL         Transfer Goal 1 (OT)    Activity/Assistive Device (Transfer Goal 1, OT)  toilet  -BL      [place] Level/Cues Needed (Transfer Goal 1, OT)  contact guard assist  -BL      Time Frame (Transfer Goal 1, OT)  long term goal (LTG);by discharge  -BL      Progress/Outcome (Transfer Goal 1, OT)  goal met  (Significant)   -BL         Bathing Goal 1 (OT)    Activity/Assistive Device (Bathing Goal 1, OT)  bathing skills, all  -BL      [place] Level/Cues Needed (Bathing Goal 1, OT)  set-up required;verbal cues required;standby assist  -BL      Time Frame (Bathing Goal 1, OT)  long term goal (LTG);by discharge  -BL      Progress/Outcomes (Bathing Goal 1, OT)  goal not met  -BL         Dressing Goal 1 (OT)    Activity/Assistive Device  (Dressing Goal 1, OT)  dressing skills, all  -BL      [place]/Cues Needed (Dressing Goal 1, OT)  set-up required;verbal cues required;standby assist  -BL      Time Frame (Dressing Goal 1, OT)  long term goal (LTG);by discharge  -BL      Progress/Outcome (Dressing Goal 1, OT)  goal not met  -BL         Toileting Goal 1 (OT)    Activity/Device (Toileting Goal 1, OT)  toileting skills, all  -BL      [place] Level/Cues Needed (Toileting Goal 1, OT)  set-up required;verbal cues required;contact guard assist  -BL      Time Frame (Toileting Goal 1, OT)  long term goal (LTG);by discharge  -BL      Progress/Outcome (Toileting Goal 1, OT)  goal met  (Significant)   -BL          Endurance Goal 1 (OT)    Activity Level (Endurance Goal 1, OT)  endurance 2 fair + 25 min functional task O2 90 or up 3 or less rest breaks  -BL      Time Frame (Endurance Goal 1, OT)  long term goal (LTG);by discharge  -BL      Progress/Outcome (Endurance Goal 1, OT)  goal not met  -BL         Functional Mobility Goal 1 (OT)    Activity/Assistive Device (Functional Mobility Goal 1, OT)  walker, rolling  -BL      [place] Level/Cues Needed (Functional Mobility Goal 1, OT)  contact guard assist  -BL      Distance Goal 1 (Functional Mobility, OT)  to and from bathroom with good safety, balance and problem solving   -BL      Time Frame (Functional Mobility Goal 1, OT)  long term goal (LTG);by discharge  -BL      Progress/Outcome (Functional Mobility Goal 1, OT)  goal met  (Significant)   -BL         Precaution Goal 1 (OT)    Activity (Precaution Goal 1, OT)  sternal precautions;fall precautions;demonstrates compliance;demonstrates understanding;during all tasks in daily routine;during ADLs;during mobility tasks;during transfers  -BL      [place] Level/Cues Needed (Precautions Goal 1, OT)  with minimum;verbal cues/redirection  -BL      Time Frame (Precaution Goal 1, OT)  long term goal (LTG);by discharge  -BL      Progress/Outcome  (Precaution Goal 1, OT)  goal not met  -BL         Patient Education Goal (OT)    Activity (Patient Education Goal, OT)  Pt will communicate good home safety awareness.   -BL      [place]/Cues/Accuracy (Memory Goal 2, OT)  verbalizes understanding  -BL      Time Frame (Patient Education Goal, OT)  long term goal (LTG);by discharge  -BL      Progress/Outcome (Patient Education Goal, OT)  goal met  -BL        User Key  (r) = Recorded By, (t) = Taken By, (c) = Cosigned By    Initials Name Provider Type Discipline     Kristi Mahoney COTA/L Occupational Therapy Assistant OT        Occupational Therapy Education     Title: PT OT SLP Therapies (Done)     Topic: Occupational Therapy (Done)     Point: ADL training (Done)     Description: Instruct learner(s) on proper safety adaptation and remediation techniques during self care or transfers.   Instruct in proper use of assistive devices.    Learning Progress Summary           Patient Acceptance, E, VU,NR by KIM at 3/11/2019  2:16 PM    Acceptance, E, VU by VIRGINIA at 3/9/2019  3:59 PM    Acceptance, E, VU by  at 3/7/2019  2:53 PM    Comment:  E/C-W/S tech's    Acceptance, E, NR by  at 3/6/2019  3:08 PM    Comment:  Educated about OT and POC. Educated to call for assist. Educated on safety throughout. Educated on CABG precuations. Educated on benefits of moving and doing therapy.                   Point: Home exercise program (Done)     Description: Instruct learner(s) on appropriate technique for monitoring, assisting and/or progressing therapeutic exercises/activities.    Learning Progress Summary           Patient Acceptance, E, VU by VIRGINIA at 3/9/2019  3:59 PM                   Point: Precautions (Done)     Description: Instruct learner(s) on prescribed precautions during self-care and functional transfers.    Learning Progress Summary           Patient Acceptance, E, VU,NR by KIM at 3/11/2019  2:16 PM    Acceptance, E, VU by VIRGINIA at 3/9/2019  3:59 PM    Acceptance, E, VU  by  at 3/7/2019  2:53 PM    Comment:  E/C-W/S tech's    Acceptance, E, NR by  at 3/6/2019  3:08 PM    Comment:  Educated about OT and POC. Educated to call for assist. Educated on safety throughout. Educated on CABG precuations. Educated on benefits of moving and doing therapy.                   Point: Body mechanics (Done)     Description: Instruct learner(s) on proper positioning and spine alignment during self-care, functional mobility activities and/or exercises.    Learning Progress Summary           Patient Acceptance, E, VU,NR by  at 3/11/2019  2:16 PM    Acceptance, E, VU by  at 3/9/2019  3:59 PM    Acceptance, E, VU by  at 3/7/2019  2:53 PM    Comment:  E/C-W/S tech's    Acceptance, E, NR by  at 3/6/2019  3:08 PM    Comment:  Educated about OT and POC. Educated to call for assist. Educated on safety throughout. Educated on CABG precuations. Educated on benefits of moving and doing therapy.                               User Key     Initials Effective Dates Name Provider Type Discipline     06/08/18 -  Marlen Jaimes, OTR/L Occupational Therapist OT     03/07/18 -  Fang Irvin, CUTLER/L Occupational Therapy Assistant OT     10/17/16 -  Kristi Mahoney CUTLER/L Occupational Therapy Assistant OT     03/07/18 -  Brandi Stoner, CUTLER/L Occupational Therapy Assistant OT                OT Recommendation and Plan  Outcome Summary/Treatment Plan (OT)  Daily Summary of Progress (OT): progress toward functional goals is good  Plan for Continued Treatment (OT): cont current POC  Anticipated Discharge Disposition (OT): home with home health, home with OP services, home with assist  Therapy Frequency (OT Eval): daily  Daily Summary of Progress (OT): progress toward functional goals is good  Plan of Care Review  Plan of Care Reviewed With: patient  Plan of Care Reviewed With: patient  Outcome Summary: Pt participated well in OT this date however required several RB's during functional mobility  task due to decrease in O2 saturation to 82% in hallway with nasal cannula 1 liter support. Pt was able to recover well this date in 2 minutes to 92% O2 and BP remained stable throughout session. Pt was able to meet 3 goals during todays session with increased independence with toileting however still demo's decreased endurance and overall safety with functional tasks. Recommend home with HH OT and assistance.   Outcome Measures     Row Name 03/11/19 0843 03/10/19 1400 03/10/19 0905       How much help from another person do you currently need...    Turning from your back to your side while in flat bed without using bedrails?  --  3  -RW  --    Moving from lying on back to sitting on the side of a flat bed without bedrails?  --  3  -RW  --    Moving to and from a bed to a chair (including a wheelchair)?  --  3  -RW  --    Standing up from a chair using your arms (e.g., wheelchair, bedside chair)?  --  3  -RW  --    Climbing 3-5 steps with a railing?  --  3  -RW  --    To walk in hospital room?  --  3  -RW  --    AM-PAC 6 Clicks Score  --  18  -RW  --       How much help from another is currently needed...    Putting on and taking off regular lower body clothing?  2  -BL  --  2  -JH    Bathing (including washing, rinsing, and drying)  2  -BL  --  2  -JH    Toileting (which includes using toilet bed pan or urinal)  3  -BL  --  3  -JH    Putting on and taking off regular upper body clothing  3  -BL  --  3  -JH    Taking care of personal grooming (such as brushing teeth)  4  -BL  --  3  -JH    Eating meals  4  -BL  --  4  -JH    Score  18  -BL  --  17  -JH       Functional Assessment    Outcome Measure Options  AM-PAC 6 Clicks Daily Activity (OT)  -BL  --  --      User Key  (r) = Recorded By, (t) = Taken By, (c) = Cosigned By    Initials Name Provider Type    Abhi Loza, PTA Physical Therapy Assistant     Fang Irvin, CUTLER/L Occupational Therapy Assistant     Kristi Mahoney, CUTLER/L Occupational Therapy  Assistant           Time Calculation:   Time Calculation- OT     Row Name 03/11/19 1009             Time Calculation- OT    OT Start Time  0843  -BL      OT Stop Time  0944  -BL      OT Time Calculation (min)  61 min  -BL      Total Timed Code Minutes- OT  61 minute(s)  -BL      OT Received On  03/11/19  -        User Key  (r) = Recorded By, (t) = Taken By, (c) = Cosigned By    Initials Name Provider Type     Kristi Mahoney CUTLER/L Occupational Therapy Assistant           Therapy Suggested Charges     Code   Minutes Charges    None           Therapy Charges for Today     Code Description Service Date Service Provider Modifiers Qty    61650875965 HC OT SELF CARE/MGMT/TRAIN EA 15 MIN 3/11/2019 Kristi Mahoney COTA/L GO 2    [id number] HC OT THER PROC EA 15 MIN 3/11/2019 Kristi Mahoney CUTLER/L GO 2               HE Andersen/NADIA  3/11/2019

## 2019-03-11 NOTE — PAYOR COMM NOTE
"    Updated clinicals for auth # [id number].      Chris Davies (48 y.o. Female)     Date of Birth Social Security Number Address Home Phone MRN    1970  P O  [address] [place] [address] [phone] [id number]    Druze Marital Status          Amish        Admission Date Admission Type Admitting Provider Attending Provider Department, Room/Bed    3/5/19 Elective Jaren Vilchis MD [place], Thomas Mark, MD [address], 310/1    Discharge Date Discharge Disposition Discharge Destination                       Attending Provider:  Jaren Vilchis MD    Allergies:  Penicillins, Adhesive Tape, Ciprofloxacin, Coconut Flavor, Latex    Isolation:  None   Infection:  None   Code Status:  CPR    Ht:  165.1 cm (65\")   Wt:  117 kg (257 lb 6.4 oz)    Admission Cmt:  None   Principal Problem:  Coronary artery disease of native artery of native heart with stable angina pectoris (CMS/[facility]) [I25.118] More...                 Active Insurance as of 3/5/2019     Primary Coverage     Payor Plan Insurance Group Employer/Plan Group    [employer] [place] [employer] [place]ICAID      Payor Plan Address Payor Plan Phone Number Payor Plan Fax Number Effective Dates    [address] [address] [phone]  8/11/2016 - None Entered    [facility] [id number]       Subscriber Name Subscriber Birth Date Member ID       CHRIS DAVIES 1970 [id number]                 Emergency Contacts      (Rel.) Home Phone Work Phone Mobile Phone    Machuca,Danielle (Grandparent) [phone] -- --            Insurance Information                [facility] MEDICAID Phone: [phone]    Subscriber: Chris Davies Subscriber#: [id number]    Group#:  Precert#:              Physician Progress Notes (most recent note)      Archie Moore APRN at 3/11/2019  9:27 AM            Cardiothoracic - Vascular Surgery Daily Note        LOS: 6 days " "  Patient Care Team:  Anderson Abbott MD as PCP - General (Family Medicine)    POD#6    CORONARY ARTERY BYPASS GRAFTING x4  ENDOSCOPIC VEIN HARVEST           LEFT INTERNAL MAMMARY ARTERY TO LEFT ANTERIOR DESCENDING CORONARY ARTERY  SAPHENOUS VEIN GRAFT FROM ASCENDING AORTA TO FIRST DIAGONAL BRANCH  SAPHENOUS VEIN GRAFT FROM ASCENDING AORTA TO THIRD MARGINAL BRANCH  SAPHENOUS VEIN GRAFT FROM ASCENDING AORTA TO POSTERIOR DESCENDING CORONARY ARTERY       Chief Complaint: CAD, Surgical Discomfort      Subjective     The following portions of the patient's history were reviewed and updated as appropriate: allergies, current medications, past family history, past medical history, past social history, past surgical history and problem list.     Subjective:  Symptoms:  Stable.  She reports shortness of breath and chest pain (surgical).    Diet:  Adequate intake.    Activity level: Impaired due to pain.    Pain:  She complains of pain that is moderate.  Pain is requiring pain medication.          Objective     Vital Signs  Temp:  [96.5 °F (35.8 °C)-97.8 °F (36.6 °C)] 97 °F (36.1 °C)  Heart Rate:  [57-68] 68  Resp:  [16-20] 20  BP: (105-138)/(60-89) 106/61  Body mass index is 42.83 kg/m².    Intake/Output Summary (Last 24 hours) at 3/11/2019 0927  Last data filed at 3/10/2019 1355  Gross per 24 hour   Intake 540 ml   Output --   Net 540 ml     No intake/output data recorded.    Wt Readings from Last 3 Encounters:   03/11/19 117 kg (257 lb 6.4 oz)   02/28/19 112 kg (248 lb)   02/12/19 112 kg (248 lb)         Physical Exam   Objective:  General Appearance:  Comfortable.    Vital signs: (most recent): Blood pressure 106/61, pulse 68, temperature 97 °F (36.1 °C), temperature source Tympanic, resp. rate 20, height 165.1 cm (65\"), weight 117 kg (257 lb 6.4 oz), SpO2 92 %, not currently breastfeeding.  Vital signs are normal.  No fever.    Output: Producing urine and producing stool.    Lungs:  Normal effort and normal " respiratory rate.  There are decreased breath sounds (bases).    Heart: Normal rate.  Regular rhythm.    Chest: (AV wires removed)  Abdomen: Abdomen is soft.  Hypoactive bowel sounds.     Extremities: Decreased range of motion.  There is dependent edema (generalized).    Pulses: Distal pulses are intact.    Neurological: Patient is alert and oriented to person, place and time.    Pupils:  Pupils are equal, round, and reactive to light.    Skin:  Warm and dry.  (Prevena removed)              Results Review:    Lab Results   Component Value Date    WBC 12.17 (H) 03/09/2019    HGB 9.0 (L) 03/09/2019    HCT 28.2 (L) 03/09/2019    MCV 90.7 03/09/2019     03/09/2019     Lab Results   Component Value Date    GLUCOSE 123 (H) 03/08/2019    BUN 17 03/08/2019    CREATININE 0.80 03/08/2019    EGFRIFNONA 77 03/08/2019    BCR 21.3 03/08/2019    K 4.3 03/08/2019    CO2 23.0 03/08/2019    CALCIUM 7.8 (L) 03/08/2019    ALBUMIN 3.20 (L) 03/08/2019    AST 73 (H) 03/08/2019    ALT 67 (H) 03/08/2019       Calcium No results found for: CALCIUM   Magnesium No results found for: MG     Imaging Results (last 24 hours)     Procedure Component Value Units Date/Time    XR Chest 2 View [[id number]] Collected:  03/10/19 1420     Updated:  03/10/19 1451    Narrative:         TWO VIEW CHEST    HISTORY: Removal of chest tube    Frontal and lateral films of the chest were obtained.  COMPARISON: March 6, 2019    Recent coronary artery bypass.  Right internal jugular catheter sheath has been removed.  Mediastinal drainage tubes and thoracotomy tubes have been  removed.  Possible small right apical pneumothorax.  Small left pleural effusion.  Subsegmental atelectasis or infiltrate left lung base.  Stable minimal cardiomegaly.  The pulmonary vasculature is not increased.  No pneumothorax.  No acute osseous abnormality.      Impression:       CONCLUSION:  Recent coronary artery bypass.  Right internal jugular catheter sheath has been  removed.  Mediastinal drainage tubes and thoracotomy tubes have been  removed.  Possible small right apical pneumothorax.  Small left pleural effusion.  Subsegmental atelectasis or infiltrate left lung base.  Stable minimal cardiomegaly.    [id number]    Electronically signed by:  Thanh Thompson MD  3/10/2019 2:50 PM CDT  Workstation: [employer]                                  acetaminophen 1,000 mg Oral Q6H   aspirin 81 mg Oral Daily   atorvastatin 40 mg Oral Nightly   famotidine 20 mg Oral BID   guaiFENesin 1,200 mg Oral Q12H   insulin aspart 0-24 Units Subcutaneous 4x Daily AC & at Bedtime   insulin detemir 30 Units Subcutaneous Nightly   ipratropium-albuterol 3 mL Nebulization 4x Daily - RT   levothyroxine 200 mcg Oral QAM   magnesium oxide 400 mg Oral BID   prenatal vitamin 27-0.8 1 tablet Oral Daily   sennosides-docusate sodium 1 tablet Oral BID   sertraline 50 mg Oral Nightly   sodium chloride 3 mL Intravenous Q12H   vitamin C 500 mg Oral BID        CXR 3/10/2019          ASSESSMENT/PLAN       Coronary artery disease of native artery of native heart with stable angina pectoris (CMS/HCC)      Assessment & Plan    Stable Post op CABG: Progressing slowly.  Lethargy, possibly related to hypothyroidism.  Bradycardic stop amiodarone and BB.     CAD: ASA, BRILINTA, statin, BB/ACE contraindicated hypotension and bradycardia.     Resp: O2 support. Incentive. CXR today, small ptx on prior examination.      Pain: Tylenol. OxyIR. Toradol    Rehab: OOB. Ambulate. PT/OT    DM/Transient Post Op Hyperglycemia:  SSI coverage. Levemir    Depression: Zoloft    Acute Blood loss Anemia: Stable, iron supplementation in the form of prenatal.      Disp: Possible DC home today with home health            This document has been electronically signed by DUC Marr on March 11, 2019 9:27 AM          Electronically signed by Archie Moore APRN at 3/11/2019  9:30 AM       Consult Notes (most recent note)     No notes of this  type exist for this encounter.

## 2019-03-11 NOTE — PLAN OF CARE
Problem: Patient Care Overview  Goal: Plan of Care Review   03/11/19 1600   Coping/Psychosocial   Plan of Care Reviewed With patient   Plan of Care Review   Progress improving   OTHER   Outcome Summary sit-sup sba,sit-stand-sit sba of 1,amb 260' with rw and sba of 1-no goals met-if d/c would benefit from hh pt followed by cardiac rehab-step training next rx     Goal: Discharge Needs Assessment   03/06/19 1100 03/06/19 1543   Discharge Needs Assessment   Readmission Within the Last 30 Days --  no previous admission in last 30 days   Concerns to be Addressed --  adjustment to diagnosis/illness   Patient/Family Anticipates Transition to --  home with family   Patient/Family Anticipated Services at Transition --  home health care   Transportation Concerns car, none --    Transportation Anticipated --  family or friend will provide   Anticipated Changes Related to Illness --  none   Equipment Needed After Discharge --  none   Discharge Facility/Level of Care Needs --  home with home health   Current Discharge Risk --  chronically ill;dependent with mobility/activities of daily living;physical impairment   Disability   Equipment Currently Used at Home --  none

## 2019-03-11 NOTE — PLAN OF CARE
Problem: Patient Care Overview  Goal: Plan of Care Review  Outcome: Ongoing (interventions implemented as appropriate)   03/11/19 1417   Coping/Psychosocial   Plan of Care Reviewed With patient   Plan of Care Review   Progress improving   OTHER   Outcome Summary Pt participated well in OT this date however required several RB's during functional mobility task due to decrease in O2 saturation to 82% in hallway with nasal cannula 1 liter support. Pt was able to recover well this date in 2 minutes to 92% O2 and BP remained stable throughout session. Pt was able to meet 3 goals during todays session with increased independence with toileting however still demo's decreased endurance and overall safety with functional tasks. Recommend home with HH OT and assistance.

## 2019-03-11 NOTE — PLAN OF CARE
Problem: Patient Care Overview  Goal: Plan of Care Review  Outcome: Ongoing (interventions implemented as appropriate)   03/11/19 0447   Coping/Psychosocial   Plan of Care Reviewed With patient   Plan of Care Review   Progress improving     Goal: Discharge Needs Assessment  Outcome: Ongoing (interventions implemented as appropriate)    Goal: Interprofessional Rounds/Family Conf  Outcome: Ongoing (interventions implemented as appropriate)      Problem: Skin Injury Risk (Adult)  Goal: Skin Health and Integrity  Outcome: Ongoing (interventions implemented as appropriate)      Problem: Fall Risk (Adult)  Goal: Absence of Fall  Outcome: Ongoing (interventions implemented as appropriate)      Problem: Cardiac Surgery (Adult)  Goal: Signs and Symptoms of Listed Potential Problems Will be Absent, Minimized or Managed (Cardiac Surgery)  Outcome: Ongoing (interventions implemented as appropriate)      Problem: Diabetes, Type 2 (Adult)  Goal: Signs and Symptoms of Listed Potential Problems Will be Absent, Minimized or Managed (Diabetes, Type 2)  Outcome: Ongoing (interventions implemented as appropriate)

## 2019-03-12 ENCOUNTER — DOCUMENTATION (OUTPATIENT)
Dept: CARDIAC REHAB | Facility: HOSPITAL | Age: 49
End: 2019-03-12

## 2019-03-12 VITALS
TEMPERATURE: 97.2 F | SYSTOLIC BLOOD PRESSURE: 130 MMHG | OXYGEN SATURATION: 93 % | RESPIRATION RATE: 18 BRPM | HEART RATE: 64 BPM | DIASTOLIC BLOOD PRESSURE: 83 MMHG | HEIGHT: 65 IN | BODY MASS INDEX: 42.85 KG/M2 | WEIGHT: 257.2 LBS

## 2019-03-12 LAB — GLUCOSE BLDC GLUCOMTR-MCNC: 115 MG/DL (ref 70–130)

## 2019-03-12 PROCEDURE — 97530 THERAPEUTIC ACTIVITIES: CPT

## 2019-03-12 PROCEDURE — 97116 GAIT TRAINING THERAPY: CPT

## 2019-03-12 PROCEDURE — 82962 GLUCOSE BLOOD TEST: CPT

## 2019-03-12 PROCEDURE — 99024 POSTOP FOLLOW-UP VISIT: CPT | Performed by: NURSE PRACTITIONER

## 2019-03-12 PROCEDURE — 97535 SELF CARE MNGMENT TRAINING: CPT

## 2019-03-12 RX ORDER — LISINOPRIL 5 MG/1
5 TABLET ORAL DAILY
Status: DISCONTINUED | OUTPATIENT
Start: 2019-03-12 | End: 2019-03-12 | Stop reason: HOSPADM

## 2019-03-12 RX ORDER — PRENATAL VIT/IRON FUM/FOLIC AC 27MG-0.8MG
1 TABLET ORAL DAILY
Start: 2019-03-13 | End: 2019-07-16

## 2019-03-12 RX ORDER — ONDANSETRON 4 MG/1
4 TABLET, FILM COATED ORAL DAILY PRN
Qty: 30 TABLET | Refills: 1 | Status: SHIPPED | OUTPATIENT
Start: 2019-03-12 | End: 2019-04-23

## 2019-03-12 RX ORDER — SENNA AND DOCUSATE SODIUM 50; 8.6 MG/1; MG/1
1 TABLET, FILM COATED ORAL 2 TIMES DAILY
Start: 2019-03-12 | End: 2019-05-08

## 2019-03-12 RX ORDER — CARVEDILOL 3.12 MG/1
3.12 TABLET ORAL EVERY 12 HOURS SCHEDULED
Status: DISCONTINUED | OUTPATIENT
Start: 2019-03-12 | End: 2019-03-12 | Stop reason: HOSPADM

## 2019-03-12 RX ORDER — ASCORBIC ACID 500 MG
500 TABLET ORAL 2 TIMES DAILY
Start: 2019-03-12 | End: 2019-05-08

## 2019-03-12 RX ORDER — CARVEDILOL 3.12 MG/1
3.12 TABLET ORAL EVERY 12 HOURS SCHEDULED
Qty: 60 TABLET | Refills: 3 | Status: SHIPPED | OUTPATIENT
Start: 2019-03-12 | End: 2021-07-19 | Stop reason: SDUPTHER

## 2019-03-12 RX ORDER — OXYCODONE HYDROCHLORIDE AND ACETAMINOPHEN 5; 325 MG/1; MG/1
1-2 TABLET ORAL EVERY 4 HOURS PRN
Qty: 36 TABLET | Refills: 0 | Status: SHIPPED | OUTPATIENT
Start: 2019-03-12 | End: 2019-04-23

## 2019-03-12 RX ORDER — LISINOPRIL 5 MG/1
5 TABLET ORAL DAILY
Qty: 30 TABLET | Refills: 0 | Status: SHIPPED | OUTPATIENT
Start: 2019-03-12 | End: 2019-04-08 | Stop reason: SDUPTHER

## 2019-03-12 RX ADMIN — FAMOTIDINE 20 MG: 20 TABLET ORAL at 08:44

## 2019-03-12 RX ADMIN — ACETAMINOPHEN 1000 MG: 500 TABLET ORAL at 04:02

## 2019-03-12 RX ADMIN — LEVOTHYROXINE SODIUM 200 MCG: 100 TABLET ORAL at 06:37

## 2019-03-12 RX ADMIN — ASPIRIN 81 MG: 81 TABLET, COATED ORAL at 08:44

## 2019-03-12 RX ADMIN — PRENATAL VIT W/ FE FUMARATE-FA TAB 27-0.8 MG 1 TABLET: 27-0.8 TAB at 08:44

## 2019-03-12 RX ADMIN — SODIUM CHLORIDE, PRESERVATIVE FREE 3 ML: 5 INJECTION INTRAVENOUS at 08:46

## 2019-03-12 RX ADMIN — OXYCODONE HYDROCHLORIDE AND ACETAMINOPHEN 500 MG: 500 TABLET ORAL at 08:44

## 2019-03-12 RX ADMIN — Medication 400 MG: at 08:44

## 2019-03-12 RX ADMIN — GUAIFENESIN 1200 MG: 600 TABLET, EXTENDED RELEASE ORAL at 08:44

## 2019-03-12 RX ADMIN — ACETAMINOPHEN 1000 MG: 500 TABLET ORAL at 08:44

## 2019-03-12 RX ADMIN — OXYCODONE HYDROCHLORIDE 5 MG: 5 TABLET ORAL at 06:45

## 2019-03-12 RX ADMIN — CARVEDILOL 3.12 MG: 3.12 TABLET, FILM COATED ORAL at 13:03

## 2019-03-12 RX ADMIN — LISINOPRIL 5 MG: 5 TABLET ORAL at 13:03

## 2019-03-12 NOTE — DISCHARGE SUMMARY
CVTS DISCHARGE SUMMARY  Date of Admission: 3/5/2019  8:00 AM  Date of Discharge:  3/12/2019    Admission Diagnosis:   Coronary artery disease of native artery of native heart with stable angina pectoris (CMS/[facility]) [I25.118]    Discharge Diagnosis:   Post-Op Diagnosis Codes:     * Coronary artery disease of native artery of native heart with stable angina pectoris (CMS/[facility]) [I25.118]    Consults:   Consults     Date and Time Order Name Status Description    3/5/2019 1907 Inpatient Consult to Cardiology            Procedures Performed  Procedure(s):  CORONARY ARTERY BYPASS GRAFTING x4  ENDOSCOPIC VEIN HARVEST           LEFT INTERNAL MAMMARY ARTERY TO LEFT ANTERIOR DESCENDING CORONARY ARTERY  SAPHENOUS VEIN GRAFT FROM ASCENDING AORTA TO FIRST DIAGONAL BRANCH  SAPHENOUS VEIN GRAFT FROM ASCENDING AORTA TO THIRD MARGINAL BRANCH  SAPHENOUS VEIN GRAFT FROM ASCENDING AORTA TO POSTERIOR DESCENDING CORONARY ARTERY   Negative pressure wound therapy (prevena)      Discharge Medications     Discharge Medications      New Medications      Instructions Start Date   ascorbic acid 500 MG tablet  Commonly known as:  VITAMIN C   500 mg, Oral, 2 Times Daily      magnesium oxide 400 (241.3 Mg) MG tablet tablet  Commonly known as:  MAGOX   400 mg, Oral, 2 Times Daily      ondansetron 4 MG tablet  Commonly known as:  ZOFRAN   4 mg, Oral, Daily PRN      oxyCODONE-acetaminophen 5-325 MG per tablet  Commonly known as:  PERCOCET   1-2 tablets, Oral, Every 4 Hours PRN      prenatal vitamin 27-0.8 27-0.8 MG tablet tablet   1 tablet, Oral, Daily      sennosides-docusate sodium 8.6-50 MG tablet  Commonly known as:  SENOKOT-S   1 tablet, Oral, 2 Times Daily         Changes to Medications      Instructions Start Date   carvedilol 3.125 MG tablet  Commonly known as:  COREG  What changed:    · medication strength  · how much to take  · when to take this   3.125 mg, Oral, Every 12 Hours Scheduled      lisinopril 5 MG tablet  Commonly known as:   SNEHA JEFFERSON  What changed:    · medication strength  · how much to take   5 mg, Oral, Daily         Continue These Medications      Instructions Start Date   aspirin 81 MG chewable tablet   81 mg, Oral, Daily      atorvastatin 40 MG tablet  Commonly known as:  LIPITOR   40 mg, Oral, Nightly      cholecalciferol 1000 units tablet  Commonly known as:  VITAMIN D3   1,000 Units, Oral, Daily, 5 capsules daily      Dulaglutide 0.75 MG/0.5ML solution pen-injector  Notes to patient:  Previously taken. Take as directed    Subcutaneous, Weekly      Ertugliflozin L-PyroglutamicAc 5 MG tablet  Commonly known as:  STEGLATRO   5 mg, Oral, Every Morning      Evolocumab 140 MG/ML solution auto-injector   140 mg, Subcutaneous, Every 14 Days      exenatide er 2 MG/0.85ML auto-injector injection  Commonly known as:  BYDUREON  Notes to patient:  Previously taken. Take as directed    2 mg, Subcutaneous, Weekly      FREESTYLE LITE device   No dose, route, or frequency recorded.      FREESTYLE LITE test strip  Generic drug:  glucose blood   No dose, route, or frequency recorded.      levothyroxine 200 MCG tablet  Commonly known as:  SYNTHROID   200 mcg, Oral, Daily      nitroglycerin 0.4 MG SL tablet  Commonly known as:  NITROSTAT   1 under the tongue as needed for angina, may repeat q5mins for up three doses      sertraline 50 MG tablet  Commonly known as:  ZOLOFT   Daily PRN, daily      SINUS RELIEF EXTRA STRENGTH 1 % nasal spray  Generic drug:  phenylephrine   1 spray, Nasal, Every 4 Hours PRN         Stop These Medications    CORICIDIN HBP COUGH/COLD PO     HYDROcodone-acetaminophen 7.5-325 MG per tablet  Commonly known as:  NORCO     mupirocin 2 % ointment  Commonly known as:  BACTROBAN          Kalyn Field Reviewed risks, benefits, and habit forming potential and weaning from narcotic medication. Patient understands and wishes to receive prescription.  Prescription written for 36 percocet for post-surgical pain after James  placed on file.    Discharge Diet:   Diet Instructions     Diet: Regular, Consistent Carbohydrate, Cardiac      Discharge Diet:   Regular  Consistent Carbohydrate  Cardiac             Discharge Disposition: Home in stable condition with follow up appointments with CVTS Nurse Practitioner 1 week, Dr. Vilchis 2 weeks, Cardiology/PCP as scheduled.     History of Present Illness/Hospital Course:   48 y.o. female with HTN(stable, increased risk stroke, rupture), Hyperlipidemia(stable, increased risk cardiovascular events), Diabetes Mellitus(stable, increased risk cardiovascular events), Obesity(uncontrolled, increased risk cardiovascular events) and Chronic Kidney Disease(stable, increased risk renal failure) , CAD(stable, PCI 2017, 2019, increase risk cardiovascular events).  former smoker.  multiple stents in the right coronary artery, circumflex artery and also diffuse disease in the LAD and last cardiac catheterization was in 2070 October came with sudden onset of chest pain with diaphoresis or shortness of breath was found to have acute inferior wall MI, she has been on medical therapy with statins, PCSK9 Inhibitors, Brilinta. She underwent successful PCI to proximal RCA. Additional diffuse disease of the left system and in stent stenosis of PDA.. head lice treated permethrin.  No other associated signs, symptoms or modifying factors.      After adequate preop studies completed he was scheduled electively. Day of surgery he was admitted through [facility], taken to the operating suite placed under general anesthesia underwent said procedure without complications or difficulty.  Weaned easily from cardiopulmonary bypass with the assistance in drips of amio, lidia, nitro, precedex/ and transferred to CCU in stable condition.  They began weaning mechanical ventilation and was extubated in 7 hrs of intubation and placed on oxygen per nasal cannula.  A total of 0 in blood products were given during their hospital stay.  POD1  "they were out of bed to the chair and tolerated breakfast with stable glucose between the hours of 18-24 postoperatively.  Insulin drip was discontinued and they were continued on sliding scale insulin coverage as well as Lantus nightly.  Hemodynamics and neuro status remained stable for transfer to 3W telemetry after 24hr stay in the CCU. They continued with PT/OT participation to satisfactory levels with activities of daily living. Chest tubes were without air leak and removed with satisfactory post removal CXR viewed on 3/10.  Hemodynamics remained stable, they remained afebrile postoperatively, and in regular sinus rhythm for satisfactory discharge home in stable condition on POD 7.      Vital Signs  Temp:  [97 °F (36.1 °C)-98.1 °F (36.7 °C)] 97 °F (36.1 °C)  Heart Rate:  [56-70] 63  Resp:  [16-20] 18  BP: (112-158)/(68-88) 144/80      03/10/19  0616 03/11/19  0624 03/12/19  0407   Weight: 118 kg (260 lb 12.8 oz) 117 kg (257 lb 6.4 oz) 117 kg (257 lb 3.2 oz)       Pertinent Test Results:  Lab Results   Component Value Date    WBC 12.17 (H) 03/09/2019    HGB 9.0 (L) 03/09/2019    HCT 28.2 (L) 03/09/2019    MCV 90.7 03/09/2019     03/09/2019     Lab Results   Component Value Date    GLUCOSE 123 (H) 03/08/2019    BUN 17 03/08/2019    CREATININE 0.80 03/08/2019    EGFRIFNONA 77 03/08/2019    BCR 21.3 03/08/2019    K 4.3 03/08/2019    CO2 23.0 03/08/2019    CALCIUM 7.8 (L) 03/08/2019    ALBUMIN 3.20 (L) 03/08/2019    AST 73 (H) 03/08/2019    ALT 67 (H) 03/08/2019     Physical Exam   Objective:  General Appearance:  Comfortable.    Vital signs: (most recent): Blood pressure 144/80, pulse 63, temperature 97 °F (36.1 °C), temperature source Temporal, resp. rate 18, height 165.1 cm (65\"), weight 117 kg (257 lb 3.2 oz), SpO2 92 %, not currently breastfeeding.  Vital signs are normal.  No fever.    Output: Producing urine and producing stool.    Lungs:  Normal effort and normal respiratory rate.  There are decreased " breath sounds (bases).    Heart: Normal rate.  Regular rhythm.    Chest: (AV wires removed)  Abdomen: Abdomen is soft.  Hypoactive bowel sounds.     Extremities: Decreased range of motion.  There is dependent edema (generalized).    Pulses: Distal pulses are intact.    Neurological: Patient is alert and oriented to person, place and time.    Pupils:  Pupils are equal, round, and reactive to light.    Skin:  Warm and dry.  (Prevena removed)      Additional Instructions for the Follow-ups that You Need to Schedule     Call MD With Problems / Concerns   As directed      Contact provider for fever, drainage at surgical incision site/ signs of infection, or uncontrolled pain.    Order Comments:  Contact provider for fever, drainage at surgical incision site/ signs of infection, or uncontrolled pain.          Discharge Follow-up with PCP   As directed       Currently Documented PCP:    Anderson Abbott MD    PCP Phone Number:    [phone]     Follow Up Details:  10 weeks         Discharge Follow-up with Specified Provider: Cardiology; 3 Months   As directed      To:  Cardiology    Follow Up:  3 Months         Discharge Follow-up with Specified Provider: Daryl XAVIER; 1 Week   As directed      To:  Daryl XAVIER    Follow Up:  1 Week    Follow Up Details:  Monday 2:00 3/18/2019         Referral to Home Health   As directed      Face to Face Visit Date:  3/12/2019    Follow-up Provider for Plan of Care?:  I will be treating the patient on an ongoing basis.  Please send me the Plan of Care for signature.    Follow-up Provider:  NETTA PENG [[id number]]    Reason/Clinical Findings:  CABG    Describe mobility limitations that make leaving home difficult:  Restrictions    Nursing/Therapeutic Services Requested:  Other (transition visit)    Frequency:  1 Week 1               Discharge Instructions: Discharge instructions include no heavy lifting anything greater than 10lbs for approximately 12 weeks.  No sex  or driving for 3-6 weeks. Printed information given to the patient with advancement of activities weekly.  Risks and benefits of narcotic medications and weaning postoperatively have been discussed. Clean operative site with antibacterial soap/water, pat dry. Keep open to air unless draining, then may apply dry dressing.  No ointments or creams unless prescribed by provider. Signs and symptoms of infection including drainage from operative site, redness, swelling, with associated fever and/or chills notify [facility] and V[facility] center immediately for wound check.  Patient verbalizes understanding of discharge instructions, all questions are answered, follow up appointments have been made, they are discharged home in stable condition.         Follow-up Appointments  Future Appointments   Date Time Provider Department Center   3/21/2019 11:15 AM Chuck Thompson APRN [facility] None   6/12/2019  2:30 PM Chuck Thompson APRN [facility] None   6/14/2019 11:15 AM Mian Montana MD [facility] None       Test Results Pending at Discharge           This document has been electronically signed by NATALI Dickerson @  On March 12, 2019 2:24 PM      Time: Discharge 45 min

## 2019-03-12 NOTE — DISCHARGE PLACEMENT REQUEST
"Chris Davies (48 y.o. Female)     Date of Birth Social Security Number Address Home Phone MRN    1970  P O  [address] [place] [address] [phone] [id number]    Yazdanism Marital Status          Buddhist        Admission Date Admission Type Admitting Provider Attending Provider Department, Room/Bed    3/5/19 Elective Jaren Vilchis MD [place], Thomas Mark, MD [address], 310/1    Discharge Date Discharge Disposition Discharge Destination         [place] [facility] [place]ending Provider:  Jaren Vilchis MD    Allergies:  Penicillins, Adhesive Tape, Ciprofloxacin, Coconut Flavor, Latex    Isolation:  None   Infection:  None   Code Status:  CPR    Ht:  165.1 cm (65\")   Wt:  117 kg (257 lb 3.2 oz)    Admission Cmt:  None   Principal Problem:  Coronary artery disease of native artery of native heart with stable angina pectoris (CMS/[facility]) [I25.118] More...                 Active Insurance as of 3/5/2019     Primary Coverage     Payor Plan Insurance Group Employer/Plan Group    [employer] [place] [employer] [place]      Payor Plan Address Payor Plan Phone Number Payor Plan Fax Number Effective Dates    [address] [address] [phone]  8/11/2016 - None Entered    [facility] [id number]       Subscriber Name Subscriber Birth Date Member ID       CHRIS DAVIES 1970 [id number]                 Emergency Contacts      (Rel.) Home Phone Work Phone Mobile Phone    Machuca,Danielle (Grandparent) [phone] -- --            Emergency Contact Information     Name Relation Home Work Mobile    Danielle Machuca Grandparent [phone]            Insurance Information                [facility] MEDICAID Phone: [phone]    Subscriber: Chris Davies Subscriber#: [id number]    Group#:  Precert#:            03/12/19 1008 03/12/19 1010 03/12/19 1013   Oxygen Therapy   SpO2 (!) 89 %  (at " "rest) (!) 87 %  (while ambulating) 92 %  (while ambulating)   Device (Oxygen Therapy) room air  (at rest) room air  (while ambulating) nasal cannula  (while ambulating)   Flow (L/min) --  --  1  (while ambulating)     [address] [address]  Dept. Phone:  [phone]  Dept. Fax:   Date Ordered: Mar 12, 2019         Patient:  Rhea Sutton MRN:  [id number]   P O   [address] [place] [address] :  1970  SSN:    Phone: [phone] Sex:  F     Weight: 117 kg (257 lb 3.2 oz)         Ht Readings from Last 1 Encounters:   19 165.1 cm (65\")         Walker               (Order ID: [id number])    Diagnosis:  Coronary artery disease of native artery of native heart with stable angina pectoris (CMS/[facility]) (I25.118 [ICD-10-CM] 414.01,413.9 [ICD-9-CM])   Quantity:  1     Equipment:  Walker Folding with Wheels  Length of Need (99 Months = Lifetime): 99 Months = Lifetime        Authorizing Provider's Phone: [phone]   Authorizing Provider:Archie Moore APRN  Authorizing Provider's NPI: [id number]  Order Entered By: Archie Moore APRN 3/12/2019  9:57 AM     Electronically signed by: Archie Moore APRN 3/12/2019  9:57 AM        [address] [address]  Dept. Phone:  [phone]  Dept. Fax:   Date Ordered: Mar 12, 2019         Patient:  Rhea Sutton MRN:  [id number]   P O   [address] [place] [address] :  1970  SSN:    Phone: [phone] Sex:  F     Weight: 117 kg (257 lb 3.2 oz)         Ht Readings from Last 1 Encounters:   19 165.1 cm (65\")         Home Oxygen Therapy          (Order ID: [id number])    Diagnosis:  Coronary artery disease of native artery of native heart with stable angina pectoris (CMS/HCC) (I25.118 [ICD-10-CM] 414.01,413.9 [ICD-9-CM])   Quantity:  1     Delivery Modality: Nasal " "Cannula  Liters Per Minute: 2  Duration: Continuous  Equipment:  Oxygen Concentrator &  &  Portable Gaseous Oxygen System & Portable Oxygen Contents Gaseous  Length of Need (99 Months = Lifetime): 3 Months        Authorizing Provider's Phone: [phone]   Authorizing Provider:Archie Moore APRN  Authorizing Provider's NPI: [id number]  Order Entered By: Archie Moore APRN 3/12/2019  9:57 AM     Electronically signed by: Archie Moore APRN 3/12/2019  9:57 AM        [address] [address]  Dept. Phone:  [phone]  Dept. Fax:   Date Ordered: Mar 12, 2019         Patient:  Rhea Sutton MRN:  [id number]   P O   [address] [place] [address] :  1970  SSN:    Phone: [phone] Sex:  F     Weight: 117 kg (257 lb 3.2 oz)         Ht Readings from Last 1 Encounters:   19 165.1 cm (65\")         Walker               (Order ID: [id number])    Diagnosis:  Coronary artery disease of native artery of native heart with stable angina pectoris (CMS/HCC) (I25.118 [ICD-10-CM] 414.01,413.9 [ICD-9-CM])   Quantity:  1     Equipment:  Walker Folding with Wheels  Length of Need (99 Months = Lifetime): 99 Months = Lifetime        Authorizing Provider's Phone: [phone]   Authorizing Provider:Archie Moore APRN  Authorizing Provider's NPI: [id number]  Order Entered By: Archie Moore APRN 3/12/2019  9:57 AM     Electronically signed by: Archie Moore APRN 3/12/2019  9:57 AM           19 1008 19 1010 19 1013   Oxygen Therapy   SpO2 (!) 89 %  (at rest) (!) 87 %  (while ambulating) 92 %  (while ambulating)   Device (Oxygen Therapy) room air  (at rest) room air  (while ambulating) nasal cannula  (while ambulating)   Flow (L/min) --  --  1  (while ambulating)     "

## 2019-03-12 NOTE — THERAPY TREATMENT NOTE
Acute Care - Physical Therapy Treatment Note  [facility]     Patient Name: Rhea Sutton  : 1970  MRN: [id number]  Today's Date: 3/12/2019  Onset of Illness/Injury or Date of Surgery: 19  Date of Referral to PT: 19  Referring Physician: DUC High    Admit Date: 3/5/2019    Visit Dx:    ICD-10-CM ICD-9-CM   1. Coronary artery disease of native artery of native heart with stable angina pectoris (CMS/HCC) I25.118 414.01     413.9   2. Coronary artery disease involving native heart without angina pectoris, unspecified vessel or lesion type I25.10 414.01   3. Impaired functional mobility, balance, gait, and endurance Z74.09 V49.89   4. Impaired mobility and ADLs Z74.09 799.89     Patient Active Problem List   Diagnosis   • Postsurgical hypothyroidism   • Impaired fasting glucose   • Vitamin D deficiency   • Type 2 diabetes mellitus with hyperglycemia (CMS/[facility])   • Hypertension   • Hyperlipidemia   • DDD (degenerative disc disease), lumbar   • Lumbar radiculopathy   • Cervicalgia   • NSTEMI (non-ST elevated myocardial infarction) (CMS/[facility])   • CAD (coronary artery disease)   • AMI inferior wall (CMS/[facility])   • Head lice infestation   • Diabetes mellitus (CMS/[facility])   • Uncontrolled diabetes mellitus (CMS/[facility])   • Coronary artery disease of native artery of native heart with stable angina pectoris (CMS/[facility])   • Chronic kidney disease, stage 3 (moderate) (CMS/[facility])       Therapy Treatment    Rehabilitation Treatment Summary     Row Name 19 0930             Treatment Time/Intention    Discipline  physical therapy assistant  -LN      Document Type  therapy note (daily note)  -LN      Subjective Information  complains of;pain  -LN      Mode of Treatment  physical therapy  -LN      Therapy Frequency (PT Clinical Impression)  other (see comments) 5-14 times per week  -LN      Therapy Frequency (OT Eval)  daily  -LN      Patient Effort  adequate  -LN      Existing  Precautions/Restrictions  cardiac;oxygen therapy device and L/min;sternal  -LN      Recorded by [LN] Margarette Nava, [facility] 03/12/19 1025      Row Name 03/12/19 0930             Vital Signs    Pre Systolic BP Rehab  110  -LN      Pre Treatment Diastolic BP  60  -LN      Post Systolic BP Rehab  104  -LN      Post Treatment Diastolic BP  78  -LN      Pretreatment Heart Rate (beats/min)  63  -LN      Intratreatment Heart Rate (beats/min)  91  -LN      Posttreatment Heart Rate (beats/min)  93  -LN      Pre SpO2 (%)  95  -LN      O2 Delivery Pre Treatment  supplemental O2  -LN      Intra SpO2 (%)  87  -LN      O2 Delivery Intra Treatment  room air  -LN      Post SpO2 (%)  95  -LN      Pre Patient Position  Sitting  -LN      Intra Patient Position  Standing  -LN      Post Patient Position  Sitting  -LN      Recorded by [LN] Margarette Nava [facility] 03/12/19 1025      Row Name 03/12/19 0961             Cognitive Assessment/Intervention- PT/OT    Affect/Mental Status (Cognitive)  WFL  -LN      Orientation Status (Cognition)  oriented x 4  -LN      Follows Commands (Cognition)  WFL  -LN      Recorded by [LN] Margarette Nava, [facility] 03/12/19 1025      Row Name 03/12/19 0960             Safety Issues, Functional Mobility    Impairments Affecting Function (Mobility)  balance;endurance/activity tolerance;pain;strength  -LN      Recorded by [LN] Margarette Nava, [facility] 03/12/19 1025      Row Name 03/12/19 0930             Bed Mobility Assessment/Treatment    Supine-Sit [place] (Bed Mobility)  not tested  -LN      Sit-Supine [place] (Bed Mobility)  not tested  -LN      Assistive Device (Bed Mobility)  head of bed elevated;bed rails  -LN      Recorded by [LN] Margarette Nava [facility] 03/12/19 1025      Row Name 03/12/19 0930             Functional Mobility    Functional Mobility- Ind. Level  contact guard assist;supervision required  -LN      Functional Mobility- Device  rolling walker  -LN      Recorded by [LN] Margarette Nava, [facility] 03/12/19 1022       Row Name 03/12/19 0930             Transfer Assessment/Treatment    Transfer Assessment/Treatment  sit-stand transfer;stand-sit transfer  -LN      Recorded by [LN] Margarette Nava, PTA 03/12/19 1025      Row Name 03/12/19 0930             Bed-Chair Transfer    Bed-Chair [place] (Transfers)  not tested  -LN      Assistive Device (Bed-Chair Transfers)  walker, front-wheeled  -LN      Recorded by [LN] Margarette Nava PTA 03/12/19 1025      Row Name 03/12/19 0930             Chair-Bed Transfer    Chair-Bed [place] (Transfers)  not tested  -LN      Assistive Device (Chair-Bed Transfers)  walker, front-wheeled  -LN      Recorded by [LN] Margarette Nava PTA 03/12/19 1025      Row Name 03/12/19 0930             Sit-Stand Transfer    Sit-Stand [place] (Transfers)  supervision;conditional independence  -LN      Assistive Device (Sit-Stand Transfers)  walker, front-wheeled  -LN      Recorded by [LN] Margarette Nava PTA 03/12/19 1025      Row Name 03/12/19 0930             Stand-Sit Transfer    Stand-Sit [place] (Transfers)  supervision;conditional independence  -LN      Assistive Device (Stand-Sit Transfers)  walker, front-wheeled  -LN      Recorded by [LN] Margarette Nava PTA 03/12/19 1025      Row Name 03/12/19 0930             Toilet Transfer    Type (Toilet Transfer)  sit-stand;stand-sit  -LN      [place] Level (Toilet Transfer)  supervision;conditional independence  -LN      Assistive Device (Toilet Transfer)  walker, front-wheeled  -LN      Recorded by [LN] Margarette Nava, PTA 03/12/19 1025      Row Name 03/12/19 0930             Gait/Stairs Assessment/Training    Gait/Stairs Assessment/Training  gait/ambulation independence  -LN      [place] Level (Gait)  supervision  -LN      Assistive Device (Gait)  walker, front-wheeled  -LN      Distance in Feet (Gait)  112x2,200  -LN      Handrail Location (Stairs)  left side (ascending)  -LN      Number of Steps (Stairs)  2  -LN      Ascending Technique  (Stairs)  step-over-step  -LN      Descending Technique (Stairs)  step-over-step  -LN      Recorded by [LN] Margarette Nava, PTA 03/12/19 1025      Row Name 03/12/19 0930             Lower Extremity Seated Therapeutic Exercise    Comment, Seated Lower Extremity (Therapeutic Exercise)  declined ex  -LN      Recorded by [LN] Margarette Nava, PTA 03/12/19 1025      Row Name 03/12/19 0930             Positioning and Restraints    Post Treatment Position  chair  -LN      In Chair  reclined;call light within reach;encouraged to call for assist;legs elevated  -LN      Recorded by [LN] Margarette Nava, PTA 03/12/19 1025      Row Name 03/12/19 0930             Pain Scale: Numbers Pre/Post-Treatment    Pain Scale: Numbers, Pretreatment  9/10  -LN      Pain Scale: Numbers, Post-Treatment  8/10  -LN      Pain Location  back chest before 0/10 after 5/10  -LN      Pain Intervention(s)  -- meds not due  -LN      Recorded by [LN] Margarette Nava, PTA 03/12/19 1025      Row Name 03/12/19 0930             Sensory Assessment/Intervention    Sensory General Assessment  -- to light touch in LE's  -LN      Recorded by [LN] Margarette Nava, PTA 03/12/19 1025      Row Name 03/12/19 0930             Vision Assessment/Intervention    Visual Impairment/Limitations  corrective lenses full time  -LN      Recorded by [LN] Margarette Nava, PTA 03/12/19 1025      Row Name                Wound 03/05/19 1414 Other (See comments) midline sternal incision    Wound - Properties Group Date first assessed: 03/05/19 [CF] Time first assessed: 1414 [CF] Present On Admission : no [CF] Side: Other (See comments) [CF], midline sternal incision  Orientation: midline [CF] Location: sternal [CF] Type: incision [CF] Additional Comments: closed incision. dressings applied [CF] Recorded by:  [CF] Aparna Eason RN 03/05/19 1414    Row Name                Wound 03/05/19 1414 Left leg incision    Wound - Properties Group Date first assessed: 03/05/19 [CF] Time first assessed:  1414 [CF] Present On Admission : no [CF] Side: Left [CF] Location: leg [CF] Type: incision [CF] Additional Comments: closed incisions. dressings applied (skin affix, kerlex, ace) [CF] Recorded by:  [CF] Aparna Eason RN 03/05/19 1412    Row Name 03/12/19 0930             Coping    Observed Emotional State  calm;cooperative  -LN      Verbalized Emotional State  acceptance  -LN      Recorded by [LN] Margarette Nava PTA 03/12/19 1025      Row Name 03/12/19 0930             Plan of Care Review    Plan of Care Reviewed With  patient  -LN      Recorded by [LN] Margarette Nava, PTA 03/12/19 1025      Row Name 03/12/19 0930             Outcome Summary/Treatment Plan (OT)    Anticipated Discharge Disposition (OT)  home with home health;home with OP services;home with assist  -LN      Recorded by [LN] Margarette Nava, PTA 03/12/19 1025      Row Name 03/12/19 0930             Outcome Summary/Treatment Plan (PT)    Plan for Continued Treatment (PT)  cont  -LN      Anticipated Discharge Disposition (PT)  home with 24/7 care;home with home health Progression to cardiac rehab as allowed by MD  -LN      Recorded by [LN] Margarette Nava, PTA 03/12/19 1020        User Key  (r) = Recorded By, (t) = Taken By, (c) = Cosigned By    Initials Name Effective Dates Discipline    LN Margarette Nava, PTA 03/07/18 -  PT    CF Aparna Eason RN 10/17/16 -  Nurse          Wound 03/05/19 4903 Other (See comments) midline sternal incision (Active)   Dressing Appearance open to air 3/12/2019  7:53 AM   Closure Approximated 3/12/2019  7:53 AM   Base clean;dry;scab 3/12/2019  7:53 AM   Periwound dry;intact 3/12/2019  7:53 AM   Periwound Temperature warm 3/12/2019  7:53 AM   Periwound Skin Turgor soft 3/12/2019  7:53 AM   Edges rolled/closed 3/12/2019  7:53 AM   Drainage Amount none 3/12/2019  7:53 AM   Care, Wound cleansed with;antimicrobial agent applied 3/11/2019  9:18 PM   Dressing Care, Wound open to air 3/12/2019  7:53 AM       Wound 03/05/19 1414 Left  leg incision (Active)   Dressing Appearance open to air 3/12/2019  7:53 AM   Closure Liquid skin adhesive 3/12/2019  7:53 AM   Base clean;dry 3/12/2019  7:53 AM   Periwound dry;ecchymotic 3/12/2019  7:53 AM   Periwound Temperature warm 3/12/2019  7:53 AM   Periwound Skin Turgor soft 3/12/2019  7:53 AM   Edges rolled/closed 3/12/2019  7:53 AM   Drainage Amount none 3/12/2019  7:53 AM       Rehab Goal Summary     Row Name 03/12/19 0930             Physical Therapy Goals    Bed Mobility Goal Selection (PT)  bed mobility, PT goal 1  -LN      Transfer Goal Selection (PT)  transfer, PT goal 1  -LN      Gait Training Goal Selection (PT)  gait training, PT goal 1  -LN      Stairs Goal Selection (PT)  stairs, PT goal 1  -LN         Bed Mobility Goal 1 (PT)    Activity/Assistive Device (Bed Mobility Goal 1, PT)  rolling to left;rolling to right;sit to supine;supine to sit HOB flat;no rails  -LN      [place] Level/Cues Needed (Bed Mobility Goal 1, PT)  standby assist;independent  -LN      Time Frame (Bed Mobility Goal 1, PT)  by discharge  -LN      Progress/Outcomes (Bed Mobility Goal 1, PT)  goal not met  -LN         Transfer Goal 1 (PT)    Activity/Assistive Device (Transfer Goal 1, PT)  sit-to-stand/stand-to-sit;bed-to-chair/chair-to-bed  -LN      [place] Level/Cues Needed (Transfer Goal 1, PT)  standby assist;conditional independence  -LN      Time Frame (Transfer Goal 1, PT)  by discharge  -LN      Progress/Outcome (Transfer Goal 1, PT)  goal not met  -LN         Gait Training Goal 1 (PT)    Activity/Assistive Device (Gait Training Goal 1, PT)  gait (walking locomotion)  -LN      [place] Level (Gait Training Goal 1, PT)  standby assist  -LN      Distance (Gait Goal 1, PT)  300ft  -LN      Time Frame (Gait Training Goal 1, PT)  by discharge  -LN      Progress/Outcome (Gait Training Goal 1, PT)  goal not met  -LN         Stairs Goal 1 (PT)    Activity/Assistive Device (Stairs Goal 1, PT)  ascending  stairs;descending stairs  -LN      [place] Level/Cues Needed (Stairs Goal 1, PT)  contact guard assist  -LN      Number of Stairs (Stairs Goal 1, PT)  2  -LN      Time Frame (Stairs Goal 1, PT)  by discharge  -LN      Progress/Outcome (Stairs Goal 1, PT)  goal met  -LN        User Key  (r) = Recorded By, (t) = Taken By, (c) = Cosigned By    Initials Name Provider Type Discipline    Margarette Poon PTA Physical Therapy Assistant PT          Physical Therapy Education     Title: PT OT SLP Therapies (Done)     Topic: Physical Therapy (Done)     Point: Mobility training (Done)     Learning Progress Summary           Patient Acceptance, E,TB, VU by LN at 3/12/2019 10:27 AM    Comment:  sternal/cardiac precautions    Acceptance, E,TB, VU by LN at 3/11/2019  4:54 PM    Comment:  copies given of hep and cardiac/sternal precautions    Acceptance, E,TB, VU,DU by LN at 3/8/2019  1:05 PM    Acceptance, E,TB, VU by LN at 3/7/2019  3:53 PM    Comment:  cardia/sternal precautions    Acceptance, E, NR by JOLLY at 3/6/2019  1:29 PM                   Point: Home exercise program (Done)     Learning Progress Summary           Patient Acceptance, E,TB, VU by LN at 3/11/2019  4:54 PM    Comment:  copies given of hep and cardiac/sternal precautions    Acceptance, E,TB, VU,DU by LN at 3/8/2019  1:05 PM    Acceptance, E,TB, VU by LN at 3/7/2019  3:53 PM    Comment:  cardia/sternal precautions                   Point: Body mechanics (Done)     Learning Progress Summary           Patient Acceptance, E,TB, VU by LN at 3/12/2019 10:27 AM    Comment:  sternal/cardiac precautions    Acceptance, E,TB, VU by LN at 3/11/2019  4:54 PM    Comment:  copies given of hep and cardiac/sternal precautions    Acceptance, E,TB, VU,DU by LN at 3/8/2019  1:05 PM    Acceptance, E,TB, VU by LN at 3/7/2019  3:53 PM    Comment:  cardia/sternal precautions    Acceptance, E, NR by JOLLY at 3/6/2019  1:29 PM                   Point: Precautions (Done)     Learning  Progress Summary           Patient Acceptance, E,TB, VU by INDIA at 3/12/2019 10:27 AM    Comment:  sternal/cardiac precautions    Acceptance, E,TB, VU by INDIA at 3/11/2019  4:54 PM    Comment:  copies given of hep and cardiac/sternal precautions    Acceptance, E,TB, VU,DU by INDIA at 3/8/2019  1:05 PM    Acceptance, E,TB, VU by INDIA at 3/7/2019  3:53 PM    Comment:  cardia/sternal precautions    Acceptance, E, NR by JOLLY at 3/6/2019  1:29 PM                               User Key     Initials Effective Dates Name Provider Type Discipline    JOLLY 04/03/18 -  Pamela Del Valle, PT Physical Therapist PT    INDIA 03/07/18 -  Margarette Nava, PTA Physical Therapy Assistant PT                PT Recommendation and Plan  Anticipated Discharge Disposition (PT): home with 24/7 care, home with home health(Progression to cardiac rehab as allowed by MD)  Therapy Frequency (PT Clinical Impression): other (see comments)(5-14 times per week)  Outcome Summary/Treatment Plan (PT)  Plan for Continued Treatment (PT): cont  Anticipated Discharge Disposition (PT): home with 24/7 care, home with home health(Progression to cardiac rehab as allowed by MD)  Plan of Care Reviewed With: patient  Progress: improving  Outcome Summary: sit-stand-sit sba/cond ind,amb 112,200 with rw and sba of 1,2nd gt amb w/o 02 and sats dropped to 87%-reapplied 1 1/2 L while pt walking and sats increased to 92%-1 new goal met-if d/c would benefit from  pt and assist as needed followed by cardiac rehab  Outcome Measures     Row Name 03/12/19 0930 03/11/19 1600 03/11/19 0843       How much help from another person do you currently need...    Turning from your back to your side while in flat bed without using bedrails?  3  -LN  3  -LN  --    Moving from lying on back to sitting on the side of a flat bed without bedrails?  3  -LN  3  -LN  --    Moving to and from a bed to a chair (including a wheelchair)?  3  -LN  3  -LN  --    Standing up from a chair using your arms (e.g.,  wheelchair, bedside chair)?  3  -LN  3  -LN  --    Climbing 3-5 steps with a railing?  3  -LN  3  -LN  --    To walk in hospital room?  3  -LN  3  -LN  --    AM-PAC 6 Clicks Score  18  -LN  18  -LN  --       How much help from another is currently needed...    Putting on and taking off regular lower body clothing?  --  --  2  -BL    Bathing (including washing, rinsing, and drying)  --  --  2  -BL    Toileting (which includes using toilet bed pan or urinal)  --  --  3  -BL    Putting on and taking off regular upper body clothing  --  --  3  -BL    Taking care of personal grooming (such as brushing teeth)  --  --  4  -BL    Eating meals  --  --  4  -BL    Score  --  --  18  -BL       Functional Assessment    Outcome Measure Options  AM-PAC 6 Clicks Basic Mobility (PT)  -LN  AM-PAC 6 Clicks Basic Mobility (PT)  -LN  AM-PAC 6 Clicks Daily Activity (OT)  -BL    Row Name 03/10/19 1400 03/10/19 0905          How much help from another person do you currently need...    Turning from your back to your side while in flat bed without using bedrails?  3  -RW  --     Moving from lying on back to sitting on the side of a flat bed without bedrails?  3  -RW  --     Moving to and from a bed to a chair (including a wheelchair)?  3  -RW  --     Standing up from a chair using your arms (e.g., wheelchair, bedside chair)?  3  -RW  --     Climbing 3-5 steps with a railing?  3  -RW  --     To walk in hospital room?  3  -RW  --     AM-PAC 6 Clicks Score  18  -RW  --        How much help from another is currently needed...    Putting on and taking off regular lower body clothing?  --  2  -JH     Bathing (including washing, rinsing, and drying)  --  2  -JH     Toileting (which includes using toilet bed pan or urinal)  --  3  -JH     Putting on and taking off regular upper body clothing  --  3  -JH     Taking care of personal grooming (such as brushing teeth)  --  3  -JH     Eating meals  --  4  -JH     Score  --  17  -JH       User Key  (r) =  Recorded By, (t) = Taken By, (c) = Cosigned By    Initials Name Provider Type    LN Margarette Nava S, PTA Physical Therapy Assistant    RW Abhi Cherry, PTA Physical Therapy Assistant    Fang Pandya, CUTLER/L Occupational Therapy Assistant    Kristi Carlisle, CUTLER/L Occupational Therapy Assistant         Time Calculation:   PT Charges     Row Name 03/12/19 0930             Time Calculation    Start Time  0930  -LN      Stop Time  1015  -LN      Time Calculation (min)  45 min  -LN      PT Received On  03/12/19  -LN         Time Calculation- PT    Total Timed Code Minutes- PT  45 minute(s)  -LN        User Key  (r) = Recorded By, (t) = Taken By, (c) = Cosigned By    Initials Name Provider Type    LN Margarette Nava S, PTA Physical Therapy Assistant        Therapy Suggested Charges     Code   Minutes Charges    None           Therapy Charges for Today     Code Description Service Date Service Provider Modifiers Qty    41489113844 HC GAIT TRAINING EA 15 MIN 3/11/2019 Margarette Nava S, PTA GP 1    83982495196 HC PT THERAPEUTIC ACT EA 15 MIN 3/11/2019 Laina Navai S, PTA GP 1    16240114980 HC PT SELF CARE/MGMT/TRAIN EA 15 MIN 3/11/2019 BrandyLaina ceballosi S, PTA GP 1    58725508458 HC GAIT TRAINING EA 15 MIN 3/12/2019 Laina Navai S, PTA GP 1    11147648652 HC PT THERAPEUTIC ACT EA 15 MIN 3/12/2019 Laina Navai S, PTA GP 1    70472367087 HC PT SELF CARE/MGMT/TRAIN EA 15 MIN 3/12/2019 Margarette Nava S, PTA GP 1          PT G-Codes  Outcome Measure Options: AM-PAC 6 Clicks Basic Mobility (PT)  AM-PAC 6 Clicks Score: 18  Score: 18    Margarette Nava PTA  3/12/2019

## 2019-03-12 NOTE — PROGRESS NOTES
"  Cardiothoracic - Vascular Surgery Daily Note        LOS: 7 days   Patient Care Team:  Anderson Abbott MD as PCP - General (Family Medicine)    POD#7    CORONARY ARTERY BYPASS GRAFTING x4  ENDOSCOPIC VEIN HARVEST           LEFT INTERNAL MAMMARY ARTERY TO LEFT ANTERIOR DESCENDING CORONARY ARTERY  SAPHENOUS VEIN GRAFT FROM ASCENDING AORTA TO FIRST DIAGONAL BRANCH  SAPHENOUS VEIN GRAFT FROM ASCENDING AORTA TO THIRD MARGINAL BRANCH  SAPHENOUS VEIN GRAFT FROM ASCENDING AORTA TO POSTERIOR DESCENDING CORONARY ARTERY       Chief Complaint: CAD, Surgical Discomfort      Subjective     The following portions of the patient's history were reviewed and updated as appropriate: allergies, current medications, past family history, past medical history, past social history, past surgical history and problem list.     Subjective:  Symptoms:  Stable.  She reports shortness of breath and chest pain (surgical).    Diet:  Adequate intake.    Activity level: Impaired due to pain.    Pain:  She complains of pain that is moderate.  Pain is requiring pain medication.          Objective     Vital Signs  Temp:  [97 °F (36.1 °C)-98.1 °F (36.7 °C)] 97 °F (36.1 °C)  Heart Rate:  [56-70] 63  Resp:  [16-20] 18  BP: (112-158)/(68-88) 144/80  Body mass index is 42.8 kg/m².    Intake/Output Summary (Last 24 hours) at 3/12/2019 0938  Last data filed at 3/11/2019 2230  Gross per 24 hour   Intake 240 ml   Output 650 ml   Net -410 ml     No intake/output data recorded.    Wt Readings from Last 3 Encounters:   03/12/19 117 kg (257 lb 3.2 oz)   02/28/19 112 kg (248 lb)   02/12/19 112 kg (248 lb)         Physical Exam   Objective:  General Appearance:  Comfortable.    Vital signs: (most recent): Blood pressure 144/80, pulse 63, temperature 97 °F (36.1 °C), temperature source Temporal, resp. rate 18, height 165.1 cm (65\"), weight 117 kg (257 lb 3.2 oz), SpO2 92 %, not currently breastfeeding.  Vital signs are normal.  No fever.    Output: Producing " urine and producing stool.    Lungs:  Normal effort and normal respiratory rate.  There are decreased breath sounds (bases).    Heart: Normal rate.  Regular rhythm.    Chest: (AV wires removed)  Abdomen: Abdomen is soft.  Hypoactive bowel sounds.     Extremities: Decreased range of motion.  There is dependent edema (generalized).    Pulses: Distal pulses are intact.    Neurological: Patient is alert and oriented to person, place and time.    Pupils:  Pupils are equal, round, and reactive to light.    Skin:  Warm and dry.  (Prevena removed)              Results Review:    Lab Results   Component Value Date    WBC 12.17 (H) 03/09/2019    HGB 9.0 (L) 03/09/2019    HCT 28.2 (L) 03/09/2019    MCV 90.7 03/09/2019     03/09/2019     Lab Results   Component Value Date    GLUCOSE 123 (H) 03/08/2019    BUN 17 03/08/2019    CREATININE 0.80 03/08/2019    EGFRIFNONA 77 03/08/2019    BCR 21.3 03/08/2019    K 4.3 03/08/2019    CO2 23.0 03/08/2019    CALCIUM 7.8 (L) 03/08/2019    ALBUMIN 3.20 (L) 03/08/2019    AST 73 (H) 03/08/2019    ALT 67 (H) 03/08/2019       Calcium No results found for: CALCIUM   Magnesium No results found for: MG     Imaging Results (last 24 hours)     Procedure Component Value Units Date/Time    XR Chest 2 View [[id number]] Collected:  03/11/19 1010     Updated:  03/11/19 1242    Narrative:       PROCEDURE: Two-view chest    COMPARISON: 3/10/19    HISTORY: POD 6 CABG follow up CXR, I25.118 Atherosclerotic heart  disease of native coronary artery with other forms of angina  pectoris I25.10 Atherosclerotic heart disease of native coronary  artery without angina pectoris Z74.09 Other reduced mobility  Z74.09 Other reduced mobility    FINDINGS: Frontal and lateral views of the chest are obtained.     Devices: None    Lungs/Pleura: Airspace opacity in the left lung base.  Small left  pleural effusion.  Otherwise clear.    Cardiomediastinal structures: Stable in appearance.        Impression:        CONCLUSION:    Airspace opacity in the left lung base.  Small left pleural  effusion.    Electronically signed by:  Igor Hart MD  3/11/2019 12:41 PM  CDT Workstation: [id number]                                  acetaminophen 1,000 mg Oral Q6H   aspirin 81 mg Oral Daily   atorvastatin 40 mg Oral Nightly   famotidine 20 mg Oral BID   guaiFENesin 1,200 mg Oral Q12H   insulin aspart 0-24 Units Subcutaneous 4x Daily AC & at Bedtime   insulin detemir 30 Units Subcutaneous Nightly   ipratropium-albuterol 3 mL Nebulization 4x Daily - RT   levothyroxine 200 mcg Oral QAM   magnesium oxide 400 mg Oral BID   prenatal vitamin 27-0.8 1 tablet Oral Daily   sennosides-docusate sodium 1 tablet Oral BID   sertraline 50 mg Oral Nightly   sodium chloride 3 mL Intravenous Q12H   vitamin C 500 mg Oral BID        CXR 3/11/2019        ASSESSMENT/PLAN       Coronary artery disease of native artery of native heart with stable angina pectoris (CMS/HCC)      Assessment & Plan    Stable Post op CABG: Progressing well, up in chair, return of bodily functions.      CAD: ASA, BRILINTA, statin, initiate ACE, carvedilol.     Resp: O2 support. Incentive. CXR 3/11/2019 stable.    Pain: Tylenol. OxyIR. Toradol    Rehab: OOB. Ambulate. PT/OT    DM/Transient Post Op Hyperglycemia:  SSI coverage. Levemir    Depression: Zoloft    Acute Blood loss Anemia: Stable, iron supplementation in the form of prenatal.      Disp: DC home today with home health transition visit.            This document has been electronically signed by DUC Marr on March 12, 2019 9:38 AM

## 2019-03-12 NOTE — THERAPY DISCHARGE NOTE
Acute Care - Physical Therapy Discharge Summary  [facility]       Patient Name: Rhea Sutton  : 1970  MRN: [id number]    Today's Date: 3/12/2019  Onset of Illness/Injury or Date of Surgery: 19    Date of Referral to PT: 19  Referring Physician: DUC High      Admit Date: 3/5/2019      PT Recommendation and Plan    Visit Dx:    ICD-10-CM ICD-9-CM   1. Coronary artery disease of native artery of native heart with stable angina pectoris (CMS/[facility]) I25.118 414.01     413.9   2. Coronary artery disease involving native heart without angina pectoris, unspecified vessel or lesion type I25.10 414.01   3. Impaired functional mobility, balance, gait, and endurance Z74.09 V49.89   4. Impaired mobility and ADLs Z74.09 799.89       Outcome Measures     Row Name 19 0930 19 1600 19 0843       How much help from another person do you currently need...    Turning from your back to your side while in flat bed without using bedrails?  3  -LN  3  -LN  --    Moving from lying on back to sitting on the side of a flat bed without bedrails?  3  -LN  3  -LN  --    Moving to and from a bed to a chair (including a wheelchair)?  3  -LN  3  -LN  --    Standing up from a chair using your arms (e.g., wheelchair, bedside chair)?  3  -LN  3  -LN  --    Climbing 3-5 steps with a railing?  3  -LN  3  -LN  --    To walk in hospital room?  3  -LN  3  -LN  --    AM-PAC 6 Clicks Score  18  -LN  18  -LN  --       How much help from another is currently needed...    Putting on and taking off regular lower body clothing?  --  --  2  -BL    Bathing (including washing, rinsing, and drying)  --  --  2  -BL    Toileting (which includes using toilet bed pan or urinal)  --  --  3  -BL    Putting on and taking off regular upper body clothing  --  --  3  -BL    Taking care of personal grooming (such as brushing teeth)  --  --  4  -BL    Eating meals  --  --  4  -BL    Score  --  --  18  -BL        Functional Assessment    Outcome Measure Options  AM-PAC 6 Clicks Basic Mobility (PT)  -LN  AM-PAC 6 Clicks Basic Mobility (PT)  -LN  AM-PAC 6 Clicks Daily Activity (OT)  -BL    Row Name 03/10/19 1400 03/10/19 0905          How much help from another person do you currently need...    Turning from your back to your side while in flat bed without using bedrails?  3  -RW  --     Moving from lying on back to sitting on the side of a flat bed without bedrails?  3  -RW  --     Moving to and from a bed to a chair (including a wheelchair)?  3  -RW  --     Standing up from a chair using your arms (e.g., wheelchair, bedside chair)?  3  -RW  --     Climbing 3-5 steps with a railing?  3  -RW  --     To walk in hospital room?  3  -RW  --     AM-PAC 6 Clicks Score  18  -RW  --        How much help from another is currently needed...    Putting on and taking off regular lower body clothing?  --  2  -JH     Bathing (including washing, rinsing, and drying)  --  2  -JH     Toileting (which includes using toilet bed pan or urinal)  --  3  -JH     Putting on and taking off regular upper body clothing  --  3  -JH     Taking care of personal grooming (such as brushing teeth)  --  3  -JH     Eating meals  --  4  -JH     Score  --  17  -JH       User Key  (r) = Recorded By, (t) = Taken By, (c) = Cosigned By    Initials Name Provider Type    LN Margarette Nava, PTA Physical Therapy Assistant    RW Abhi Cherry PTA Physical Therapy Assistant     Fang Irvin CUTLER/L Occupational Therapy Assistant     Kristi Mahoney, CUTLER/L Occupational Therapy Assistant          PT Charges     Row Name 03/12/19 0930             Time Calculation    Start Time  0930  -LN      Stop Time  1015  -LN      Time Calculation (min)  45 min  -LN      PT Received On  03/12/19  -LN         Time Calculation- PT    Total Timed Code Minutes- PT  45 minute(s)  -LN        User Key  (r) = Recorded By, (t) = Taken By, (c) = Cosigned By    Initials Name Provider Type     LN Margarette Nava, PTA Physical Therapy Assistant        Therapy Suggested Charges     Code   Minutes Charges    None             Rehab Goal Summary     Row Name 03/12/19 7293             Physical Therapy Goals    Bed Mobility Goal Selection (PT)  bed mobility, PT goal 1  -LN      Transfer Goal Selection (PT)  transfer, PT goal 1  -LN      Gait Training Goal Selection (PT)  gait training, PT goal 1  -LN      Stairs Goal Selection (PT)  stairs, PT goal 1  -LN         Bed Mobility Goal 1 (PT)    Activity/Assistive Device (Bed Mobility Goal 1, PT)  rolling to left;rolling to right;sit to supine;supine to sit HOB flat;no rails  -LN      [place] Level/Cues Needed (Bed Mobility Goal 1, PT)  standby assist;independent  -LN      Time Frame (Bed Mobility Goal 1, PT)  by discharge  -LN      Progress/Outcomes (Bed Mobility Goal 1, PT)  goal not met  -LN         Transfer Goal 1 (PT)    Activity/Assistive Device (Transfer Goal 1, PT)  sit-to-stand/stand-to-sit;bed-to-chair/chair-to-bed  -LN      [place] Level/Cues Needed (Transfer Goal 1, PT)  standby assist;conditional independence  -LN      Time Frame (Transfer Goal 1, PT)  by discharge  -LN      Progress/Outcome (Transfer Goal 1, PT)  goal not met  -LN         Gait Training Goal 1 (PT)    Activity/Assistive Device (Gait Training Goal 1, PT)  gait (walking locomotion)  -LN      [place] Level (Gait Training Goal 1, PT)  standby assist  -LN      Distance (Gait Goal 1, PT)  300ft  -LN      Time Frame (Gait Training Goal 1, PT)  by discharge  -LN      Progress/Outcome (Gait Training Goal 1, PT)  goal not met  -LN         Stairs Goal 1 (PT)    Activity/Assistive Device (Stairs Goal 1, PT)  ascending stairs;descending stairs  -LN      [place] Level/Cues Needed (Stairs Goal 1, PT)  contact guard assist  -LN      Number of Stairs (Stairs Goal 1, PT)  2  -LN      Time Frame (Stairs Goal 1, PT)  by discharge  -LN      Progress/Outcome (Stairs Goal 1, PT)  goal met   -LN        User Key  (r) = Recorded By, (t) = Taken By, (c) = Cosigned By    Initials Name Provider Type Discipline    Margarette Poon, PTA Physical Therapy Assistant PT              PT Discharge Summary  Anticipated Discharge Disposition (PT): home with 24/7 care, home with home health  Reason for Discharge: Discharge from facility, Per MD order  Outcomes Achieved: Patient able to partially acheive established goals, Refer to plan of care for updates on goals achieved  Discharge Destination: Home with home health      Rimma Escalera, PT   3/12/2019

## 2019-03-12 NOTE — PLAN OF CARE
Problem: Patient Care Overview  Goal: Plan of Care Review  Outcome: Ongoing (interventions implemented as appropriate)   03/12/19 0333   Coping/Psychosocial   Plan of Care Reviewed With patient   Plan of Care Review   Progress improving   OTHER   Outcome Summary pacer wires and prevena wound vac d/c'd via APRN, still on 1L NC, pt has remained NSR on telemetry, will continue to monitor        Problem: Skin Injury Risk (Adult)  Goal: Skin Health and Integrity  Outcome: Ongoing (interventions implemented as appropriate)      Problem: Fall Risk (Adult)  Goal: Absence of Fall  Outcome: Ongoing (interventions implemented as appropriate)      Problem: Cardiac Surgery (Adult)  Goal: Signs and Symptoms of Listed Potential Problems Will be Absent, Minimized or Managed (Cardiac Surgery)  Outcome: Ongoing (interventions implemented as appropriate)      Problem: Diabetes, Type 2 (Adult)  Goal: Signs and Symptoms of Listed Potential Problems Will be Absent, Minimized or Managed (Diabetes, Type 2)  Outcome: Ongoing (interventions implemented as appropriate)

## 2019-03-12 NOTE — PLAN OF CARE
Problem: Patient Care Overview  Goal: Plan of Care Review   03/12/19 0333 03/12/19 0930   Coping/Psychosocial   Plan of Care Reviewed With --  patient   Plan of Care Review   Progress improving --    OTHER   Outcome Summary --  sit-stand-sit sba/cond ind,amb 112,200 with rw and sba of 1,2nd gt amb w/o 02 and sats dropped to 87%-reapplied 1 1/2 L while pt walking and sats increased to 92%-1 new goal met-if d/c would benefit from  pt and assist as needed followed by cardiac rehab     Goal: Discharge Needs Assessment   03/06/19 1100 03/06/19 1543   Discharge Needs Assessment   Readmission Within the Last 30 Days --  no previous admission in last 30 days   Concerns to be Addressed --  adjustment to diagnosis/illness   Patient/Family Anticipates Transition to --  home with family   Patient/Family Anticipated Services at Transition --  home health care   Transportation Concerns car, none --    Transportation Anticipated --  family or friend will provide   Anticipated Changes Related to Illness --  none   Equipment Needed After Discharge --  none   Discharge Facility/Level of Care Needs --  home with home health   Current Discharge Risk --  chronically ill;dependent with mobility/activities of daily living;physical impairment   Disability   Equipment Currently Used at Home --  none

## 2019-03-13 ENCOUNTER — READMISSION MANAGEMENT (OUTPATIENT)
Dept: CALL CENTER | Facility: HOSPITAL | Age: 49
End: 2019-03-13

## 2019-03-13 ENCOUNTER — TELEPHONE (OUTPATIENT)
Dept: FAMILY MEDICINE CLINIC | Facility: CLINIC | Age: 49
End: 2019-03-13

## 2019-03-13 DIAGNOSIS — E89.0 POSTSURGICAL HYPOTHYROIDISM: Primary | Chronic | ICD-10-CM

## 2019-03-13 DIAGNOSIS — E11.65 TYPE 2 DIABETES MELLITUS WITH HYPERGLYCEMIA, WITHOUT LONG-TERM CURRENT USE OF INSULIN (HCC): Chronic | ICD-10-CM

## 2019-03-13 NOTE — THERAPY DISCHARGE NOTE
Acute Care - Occupational Therapy Discharge Summary  [facility]     Patient Name: Rhea Sutton  : 1970  MRN: [id number]    Today's Date: 3/13/2019  Onset of Illness/Injury or Date of Surgery: 19    Date of Referral to OT: 19  Referring Physician: DUC High      Admit Date: 3/5/2019        OT Recommendation and Plan    Visit Dx:    ICD-10-CM ICD-9-CM   1. Coronary artery disease of native artery of native heart with stable angina pectoris (CMS/[facility]) I25.118 414.01     413.9   2. Coronary artery disease involving native heart without angina pectoris, unspecified vessel or lesion type I25.10 414.01   3. Impaired functional mobility, balance, gait, and endurance Z74.09 V49.89   4. Impaired mobility and ADLs Z74.09 799.89                   Outcome Measures     Row Name 19 0930 19 1600 19 0843       How much help from another person do you currently need...    Turning from your back to your side while in flat bed without using bedrails?  3  -LN  3  -LN  --    Moving from lying on back to sitting on the side of a flat bed without bedrails?  3  -LN  3  -LN  --    Moving to and from a bed to a chair (including a wheelchair)?  3  -LN  3  -LN  --    Standing up from a chair using your arms (e.g., wheelchair, bedside chair)?  3  -LN  3  -LN  --    Climbing 3-5 steps with a railing?  3  -LN  3  -LN  --    To walk in hospital room?  3  -LN  3  -LN  --    AM-PAC 6 Clicks Score  18  -LN  18  -LN  --       How much help from another is currently needed...    Putting on and taking off regular lower body clothing?  --  --  2  -BL    Bathing (including washing, rinsing, and drying)  --  --  2  -BL    Toileting (which includes using toilet bed pan or urinal)  --  --  3  -BL    Putting on and taking off regular upper body clothing  --  --  3  -BL    Taking care of personal grooming (such as brushing teeth)  --  --  4  -BL    Eating meals  --  --  4  -BL    Score  --  --  18  -BL        Functional Assessment    Outcome Measure Options  AM-PAC 6 Clicks Basic Mobility (PT)  -LN  AM-PAC 6 Clicks Basic Mobility (PT)  -LN  AM-PAC 6 Clicks Daily Activity (OT)  -BL      User Key  (r) = Recorded By, (t) = Taken By, (c) = Cosigned By    Initials Name Provider Type    LN Margarette Nava, PTA Physical Therapy Assistant    BL Kristi Mahoney, HE/L Occupational Therapy Assistant          Therapy Suggested Charges     Code   Minutes Charges    None                 OT Discharge Summary  Anticipated Discharge Disposition (OT): home with 24/7 care, home with home health, skilled nursing facility  Reason for Discharge: Discharge from facility, Per MD order  Outcomes Achieved: Patient able to partially acheive established goals, Other(Pt met 4 of 7 LTG prior to D/C.)  Discharge Destination: Home with assist      Francisco Spencer, OT  3/13/2019

## 2019-03-13 NOTE — TELEPHONE ENCOUNTER
DUSTY/ [facility] CALLED ON CHRISJOSHUA DAVIES..SHE HAS APPT 3/21 WITH [facility] BUT HAS TO BE HERE ON THE 18TH TO SEE CARDIOLOGIST..THEY WANTED TO GET THIS APPT WITH [facility] MOVED BUT THERE WERE NO OPENINGS..SO NOW ASKING IF SHE NEEDS TO GET LABS/WILL NEED ORDERS PUT IN -SHE CAN GET THEM DONE ON THE 18TH WHEN SHE COMES TO OTHER APPT...PLEASE CALL DUSTY BACK  [phone]

## 2019-03-13 NOTE — OUTREACH NOTE
Prep Survey      Responses   Facility patient discharged from?  [facility]   Is patient eligible?  Yes   Discharge diagnosis  CABG X 4   Does the patient have one of the following disease processes/diagnoses(primary or secondary)?  Cardiothoracic surgery   Does the patient have Home health ordered?  Yes   What is the Home health agency?   [facility]   Is there a DME ordered?  Yes   What DME was ordered?  [employer] - walker and [facility]   Comments regarding appointments  see AVS   Prep survey completed?  Yes          Gloria Garrison RN

## 2019-03-13 NOTE — PAYOR COMM NOTE
"Chris Davies (48 y.o. Female)     Date of Birth Social Security Number Address Home Phone MRN    1970  P O  [address] [place] [address] [phone] [id number]    Yarsanism Marital Status          Quaker        Admission Date Admission Type Admitting Provider Attending Provider Department, Room/Bed    3/5/19 Elective Jaren Vilchis MD  [address], 310/1    Discharge Date Discharge Disposition Discharge Destination        3/12/2019 Home-Health Care Svc              Attending Provider:  (none)   Allergies:  Penicillins, Adhesive Tape, Ciprofloxacin, Coconut Flavor, Latex    Isolation:  None   Infection:  None   Code Status:  Prior    Ht:  165.1 cm (65\")   Wt:  117 kg (257 lb 3.2 oz)    Admission Cmt:  None   Principal Problem:  Coronary artery disease of native artery of native heart with stable angina pectoris (CMS/HCC) [I25.118] More...                 Active Insurance as of 3/5/2019     Primary Coverage     Payor Plan Insurance Group Employer/Plan Group    [employer] [place] [employer] [place]      Payor Plan Address Payor Plan Phone Number Payor Plan Fax Number Effective Dates    [address] [address] [phone]  8/11/2016 - None Entered    [facility] [id number]       Subscriber Name Subscriber Birth Date Member ID       CHRIS DAVIES 1970 [id number]                 Emergency Contacts      (Rel.) Home Phone Work Phone Mobile Phone    Machuca,Danielle (Grandparent) [phone] -- --               Discharge Summary      Archie Moore APRN at 3/12/2019  2:22 PM          CVTS DISCHARGE SUMMARY  Date of Admission: 3/5/2019  8:00 AM  Date of Discharge:  3/12/2019    Admission Diagnosis:   Coronary artery disease of native artery of native heart with stable angina pectoris (CMS/HCC) [I25.118]    Discharge Diagnosis:   Post-Op Diagnosis Codes:     * Coronary artery disease of native artery of native heart with stable " angina pectoris (CMS/[facility]) [I25.118]    Consults:   Consults     Date and Time Order Name Status Description    3/5/2019 1907 Inpatient Consult to Cardiology            Procedures Performed  Procedure(s):  CORONARY ARTERY BYPASS GRAFTING x4  ENDOSCOPIC VEIN HARVEST           LEFT INTERNAL MAMMARY ARTERY TO LEFT ANTERIOR DESCENDING CORONARY ARTERY  SAPHENOUS VEIN GRAFT FROM ASCENDING AORTA TO FIRST DIAGONAL BRANCH  SAPHENOUS VEIN GRAFT FROM ASCENDING AORTA TO THIRD MARGINAL BRANCH  SAPHENOUS VEIN GRAFT FROM ASCENDING AORTA TO POSTERIOR DESCENDING CORONARY ARTERY   Negative pressure wound therapy (prevena)      Discharge Medications     Discharge Medications      New Medications      Instructions Start Date   ascorbic acid 500 MG tablet  Commonly known as:  VITAMIN C   500 mg, Oral, 2 Times Daily      magnesium oxide 400 (241.3 Mg) MG tablet tablet  Commonly known as:  MAGOX   400 mg, Oral, 2 Times Daily      ondansetron 4 MG tablet  Commonly known as:  ZOFRAN   4 mg, Oral, Daily PRN      oxyCODONE-acetaminophen 5-325 MG per tablet  Commonly known as:  PERCOCET   1-2 tablets, Oral, Every 4 Hours PRN      prenatal vitamin 27-0.8 27-0.8 MG tablet tablet   1 tablet, Oral, Daily      sennosides-docusate sodium 8.6-50 MG tablet  Commonly known as:  SENOKOT-S   1 tablet, Oral, 2 Times Daily         Changes to Medications      Instructions Start Date   carvedilol 3.125 MG tablet  Commonly known as:  COREG  What changed:    · medication strength  · how much to take  · when to take this   3.125 mg, Oral, Every 12 Hours Scheduled      lisinopril 5 MG tablet  Commonly known as:  PRINIVIL,ZESTRIL  What changed:    · medication strength  · how much to take   5 mg, Oral, Daily         Continue These Medications      Instructions Start Date   aspirin 81 MG chewable tablet   81 mg, Oral, Daily      atorvastatin 40 MG tablet  Commonly known as:  LIPITOR   40 mg, Oral, Nightly      cholecalciferol 1000 units tablet  Commonly known as:   VITAMIN D3   1,000 Units, Oral, Daily, 5 capsules daily      Dulaglutide 0.75 MG/0.5ML solution pen-injector  Notes to patient:  Previously taken. Take as directed    Subcutaneous, Weekly      Ertugliflozin L-PyroglutamicAc 5 MG tablet  Commonly known as:  STEGLATRO   5 mg, Oral, Every Morning      Evolocumab 140 MG/ML solution auto-injector   140 mg, Subcutaneous, Every 14 Days      exenatide er 2 MG/0.85ML auto-injector injection  Commonly known as:  BYDUREON  Notes to patient:  Previously taken. Take as directed    2 mg, Subcutaneous, Weekly      FREESTYLE LITE device   No dose, route, or frequency recorded.      FREESTYLE LITE test strip  Generic drug:  glucose blood   No dose, route, or frequency recorded.      levothyroxine 200 MCG tablet  Commonly known as:  SYNTHROID   200 mcg, Oral, Daily      nitroglycerin 0.4 MG SL tablet  Commonly known as:  NITROSTAT   1 under the tongue as needed for angina, may repeat q5mins for up three doses      sertraline 50 MG tablet  Commonly known as:  ZOLOFT   Daily PRN, daily      SINUS RELIEF EXTRA STRENGTH 1 % nasal spray  Generic drug:  phenylephrine   1 spray, Nasal, Every 4 Hours PRN         Stop These Medications    CORICIDIN HBP COUGH/COLD PO     HYDROcodone-acetaminophen 7.5-325 MG per tablet  Commonly known as:  NORCO     mupirocin 2 % ointment  Commonly known as:  BACTROBAN          Kalyn Emir Reviewed risks, benefits, and habit forming potential and weaning from narcotic medication. Patient understands and wishes to receive prescription.  Prescription written for 36 percocet for post-surgical pain after James placed on file.    Discharge Diet:   Diet Instructions     Diet: Regular, Consistent Carbohydrate, Cardiac      Discharge Diet:   Regular  Consistent Carbohydrate  Cardiac             Discharge Disposition: Home in stable condition with follow up appointments with CVTS Nurse Practitioner 1 week, Dr. Vilchis 2 weeks, Cardiology/PCP as scheduled.      History of Present Illness/Hospital Course:   48 y.o. female with HTN(stable, increased risk stroke, rupture), Hyperlipidemia(stable, increased risk cardiovascular events), Diabetes Mellitus(stable, increased risk cardiovascular events), Obesity(uncontrolled, increased risk cardiovascular events) and Chronic Kidney Disease(stable, increased risk renal failure) , CAD(stable, PCI 2017, 2019, increase risk cardiovascular events).  former smoker.  multiple stents in the right coronary artery, circumflex artery and also diffuse disease in the LAD and last cardiac catheterization was in 2070 October came with sudden onset of chest pain with diaphoresis or shortness of breath was found to have acute inferior wall MI, she has been on medical therapy with statins, PCSK9 Inhibitors, Brilinta. She underwent successful PCI to proximal RCA. Additional diffuse disease of the left system and in stent stenosis of PDA.. head lice treated permethrin.  No other associated signs, symptoms or modifying factors.      After adequate preop studies completed he was scheduled electively. Day of surgery he was admitted through [facility], taken to the operating suite placed under general anesthesia underwent said procedure without complications or difficulty.  Weaned easily from cardiopulmonary bypass with the assistance in drips of amio, lidia, nitro, precedex/ and transferred to CCU in stable condition.  They began weaning mechanical ventilation and was extubated in 7 hrs of intubation and placed on oxygen per nasal cannula.  A total of 0 in blood products were given during their hospital stay.  POD1 they were out of bed to the chair and tolerated breakfast with stable glucose between the hours of 18-24 postoperatively.  Insulin drip was discontinued and they were continued on sliding scale insulin coverage as well as Lantus nightly.  Hemodynamics and neuro status remained stable for transfer to 3W telemetry after 24hr stay in the CCU. They  "continued with PT/OT participation to satisfactory levels with activities of daily living. Chest tubes were without air leak and removed with satisfactory post removal CXR viewed on 3/10.  Hemodynamics remained stable, they remained afebrile postoperatively, and in regular sinus rhythm for satisfactory discharge home in stable condition on POD 7.      Vital Signs  Temp:  [97 °F (36.1 °C)-98.1 °F (36.7 °C)] 97 °F (36.1 °C)  Heart Rate:  [56-70] 63  Resp:  [16-20] 18  BP: (112-158)/(68-88) 144/80      03/10/19  0616 03/11/19  0624 03/12/19  0407   Weight: 118 kg (260 lb 12.8 oz) 117 kg (257 lb 6.4 oz) 117 kg (257 lb 3.2 oz)       Pertinent Test Results:  Lab Results   Component Value Date    WBC 12.17 (H) 03/09/2019    HGB 9.0 (L) 03/09/2019    HCT 28.2 (L) 03/09/2019    MCV 90.7 03/09/2019     03/09/2019     Lab Results   Component Value Date    GLUCOSE 123 (H) 03/08/2019    BUN 17 03/08/2019    CREATININE 0.80 03/08/2019    EGFRIFNONA 77 03/08/2019    BCR 21.3 03/08/2019    K 4.3 03/08/2019    CO2 23.0 03/08/2019    CALCIUM 7.8 (L) 03/08/2019    ALBUMIN 3.20 (L) 03/08/2019    AST 73 (H) 03/08/2019    ALT 67 (H) 03/08/2019     Physical Exam   Objective:  General Appearance:  Comfortable.    Vital signs: (most recent): Blood pressure 144/80, pulse 63, temperature 97 °F (36.1 °C), temperature source Temporal, resp. rate 18, height 165.1 cm (65\"), weight 117 kg (257 lb 3.2 oz), SpO2 92 %, not currently breastfeeding.  Vital signs are normal.  No fever.    Output: Producing urine and producing stool.    Lungs:  Normal effort and normal respiratory rate.  There are decreased breath sounds (bases).    Heart: Normal rate.  Regular rhythm.    Chest: (AV wires removed)  Abdomen: Abdomen is soft.  Hypoactive bowel sounds.     Extremities: Decreased range of motion.  There is dependent edema (generalized).    Pulses: Distal pulses are intact.    Neurological: Patient is alert and oriented to person, place and time.  "   Pupils:  Pupils are equal, round, and reactive to light.    Skin:  Warm and dry.  (Prevena removed)      Additional Instructions for the Follow-ups that You Need to Schedule     Call MD With Problems / Concerns   As directed      Contact provider for fever, drainage at surgical incision site/ signs of infection, or uncontrolled pain.    Order Comments:  Contact provider for fever, drainage at surgical incision site/ signs of infection, or uncontrolled pain.          Discharge Follow-up with PCP   As directed       Currently Documented PCP:    Anderson Abbott MD    PCP Phone Number:    [phone]     Follow Up Details:  10 weeks         Discharge Follow-up with Specified Provider: Cardiology; 3 Months   As directed      To:  Cardiology    Follow Up:  3 Months         Discharge Follow-up with Specified Provider: Daryl XAVIER; 1 Week   As directed      To:  Daryl XAVIER    Follow Up:  1 Week    Follow Up Details:  Monday 2:00 3/18/2019         Referral to Home Health   As directed      Face to Face Visit Date:  3/12/2019    Follow-up Provider for Plan of Care?:  I will be treating the patient on an ongoing basis.  Please send me the Plan of Care for signature.    Follow-up Provider:  NETTA PENG [[id number]]    Reason/Clinical Findings:  CABG    Describe mobility limitations that make leaving home difficult:  Restrictions    Nursing/Therapeutic Services Requested:  Other (transition visit)    Frequency:  1 Week 1               Discharge Instructions: Discharge instructions include no heavy lifting anything greater than 10lbs for approximately 12 weeks.  No sex or driving for 3-6 weeks. Printed information given to the patient with advancement of activities weekly.  Risks and benefits of narcotic medications and weaning postoperatively have been discussed. Clean operative site with antibacterial soap/water, pat dry. Keep open to air unless draining, then may apply dry dressing.  No ointments or  creams unless prescribed by provider. Signs and symptoms of infection including drainage from operative site, redness, swelling, with associated fever and/or chills notify Heart and Vascular center immediately for wound check.  Patient verbalizes understanding of discharge instructions, all questions are answered, follow up appointments have been made, they are discharged home in stable condition.         Follow-up Appointments  Future Appointments   Date Time Provider Department Center   3/21/2019 11:15 AM Chuck Thompson APRN [facility] None   6/12/2019  2:30 PM Chuck Thompson APRN [facility] None   6/14/2019 11:15 AM Mian Montana MD [facility] None       Test Results Pending at Discharge           This document has been electronically signed by MARIA ESTHER DickersonCNP-BC @  On March 12, 2019 2:24 PM      Time: Discharge 45 min      Electronically signed by Jaren Vilchis MD at 3/13/2019  7:25 AM

## 2019-03-13 NOTE — TELEPHONE ENCOUNTER
DUSTY/ [facility] CALLED ON CHRISJOSHUA DAVIES..SHE HAS APPT 3/21 WITH JORGE BUT HAS TO BE HERE ON THE 18TH TO SEE CARDIOLOGIST..THEY WANTED TO GET THIS APPT WITH [facility] MOVED BUT THERE WERE NO OPENINGS..SO NOW ASKING IF SHE NEEDS TO GET LABS/WILL NEED ORDERS PUT IN -SHE CAN GET THEM DONE ON THE 18TH WHEN SHE COMES TO OTHER APPT...PLEASE CALL DUSTY BACK  [phone]

## 2019-03-14 ENCOUNTER — READMISSION MANAGEMENT (OUTPATIENT)
Dept: CALL CENTER | Facility: HOSPITAL | Age: 49
End: 2019-03-14

## 2019-03-14 NOTE — OUTREACH NOTE
CT Surgery Week 1 Survey      Responses   Facility patient discharged from?  [facility]   Does the patient have one of the following disease processes/diagnoses(primary or secondary)?  Cardiothoracic surgery   Is there a successful TCM telephone encounter documented?  No   Week 1 attempt successful?  Yes   Call start time  1233   Call end time  1247   Discharge diagnosis  CABG X 4   Meds reviewed with patient/caregiver?  Yes   Is the patient having any side effects they believe may be caused by any medication additions or changes?  No   Does the patient have all medications related to this admission filled (includes all antibiotics, pain medications, cardiac medications, etc.)  Yes   Is the patient taking all medications as directed (includes completed medication regime)?  Yes   Does the patient have a primary care provider?   Yes   Does the patient have an appointment scheduled with their C/T surgeon?  Yes   Comments regarding PCP  Will not see PCP until May but sees CT surgery on Monday   Has the patient kept scheduled appointments due by today?  N/A   What is the Home health agency?   [facility]   Has home health visited the patient within 72 hours of discharge?  No   Home health comments  Pt had transition nurse stop in but not HH yet   What DME was ordered?  [employer] - walker and O2   Has all DME been delivered?  Yes   DME comments  pt requesting a hospital bed. I will contact CM and I have also told her to ask her PCP for assistance as well as contact her insurance to see if they will pay for a bed.    Psychosocial issues?  No   Did the patient receive a copy of their discharge instructions?  Yes   Nursing interventions  Reviewed instructions with patient   What is the patient's perception of their health status since discharge?  Improving   Nursing interventions  Nurse provided patient education   Is the patient/caregiver able to teach back normal signs of recovery?  Nausea and lack of appetite   Nursing interventions   Reassured on normal signs of recovery   Is the patient /caregiver able to teach back basic post-op care?  Drive as instructed by MD in discharge instructions, Take showers only when approved by MD-sponge bathe until then, No tub bath, swimming, or hot tub until instructed by MD, Keep incision areas clean, dry and protected, Lifting as instructed by MD in discharge instructions   Is the patient/caregiver able to teach back signs and symptoms of incisional infection?  Increased redness, swelling or pain at the incisonal site, Increased drainage or bleeding, Incisional warmth, Pus or odor from incision, Fever   Is the patient/caregiver able to teach back steps to recovery at home?  Set small, achievable goals for return to baseline health, Rest and rebuild strength, gradually increase activity, Eat a well-balance diet   Is the patient/caregiver able to teach back the hierarchy of who to call/visit for symptoms/problems? PCP, Specialist, Home health nurse, Urgent Care, ED, 911  Yes   Additional teach back comments  Enc pt to drink Boost or Ensure for protein as she does not have much of an appetite.   Week 1 call completed?  Yes            Delia Reyes RN

## 2019-03-18 ENCOUNTER — APPOINTMENT (OUTPATIENT)
Dept: LAB | Facility: HOSPITAL | Age: 49
End: 2019-03-18

## 2019-03-18 ENCOUNTER — OFFICE VISIT (OUTPATIENT)
Dept: CARDIAC SURGERY | Facility: CLINIC | Age: 49
End: 2019-03-18

## 2019-03-18 VITALS
OXYGEN SATURATION: 98 % | TEMPERATURE: 97.8 F | SYSTOLIC BLOOD PRESSURE: 126 MMHG | HEIGHT: 65 IN | WEIGHT: 247.2 LBS | HEART RATE: 71 BPM | BODY MASS INDEX: 41.19 KG/M2 | DIASTOLIC BLOOD PRESSURE: 74 MMHG

## 2019-03-18 DIAGNOSIS — I25.10 CORONARY ARTERY DISEASE INVOLVING NATIVE CORONARY ARTERY OF NATIVE HEART WITHOUT ANGINA PECTORIS: Primary | Chronic | ICD-10-CM

## 2019-03-18 DIAGNOSIS — Z48.812 SURGICAL AFTERCARE, CIRCULATORY SYSTEM: ICD-10-CM

## 2019-03-18 DIAGNOSIS — E03.8 OTHER SPECIFIED HYPOTHYROIDISM: ICD-10-CM

## 2019-03-18 PROBLEM — E03.9 HYPOTHYROIDISM: Status: ACTIVE | Noted: 2019-03-18

## 2019-03-18 LAB
HBA1C MFR BLD: 7.4 % (ref 4–5.6)
T4 FREE SERPL-MCNC: 1.15 NG/DL (ref 0.78–2.19)
TSH SERPL DL<=0.05 MIU/L-ACNC: 91.9 MIU/ML (ref 0.46–4.68)

## 2019-03-18 PROCEDURE — 84443 ASSAY THYROID STIM HORMONE: CPT | Performed by: NURSE PRACTITIONER

## 2019-03-18 PROCEDURE — 99024 POSTOP FOLLOW-UP VISIT: CPT | Performed by: NURSE PRACTITIONER

## 2019-03-18 PROCEDURE — 83036 HEMOGLOBIN GLYCOSYLATED A1C: CPT | Performed by: NURSE PRACTITIONER

## 2019-03-18 PROCEDURE — 84439 ASSAY OF FREE THYROXINE: CPT | Performed by: NURSE PRACTITIONER

## 2019-03-18 PROCEDURE — 36415 COLL VENOUS BLD VENIPUNCTURE: CPT | Performed by: NURSE PRACTITIONER

## 2019-03-18 NOTE — PATIENT INSTRUCTIONS
"Post-Operative Activities after heart surgery are as follows.  No driving until 2-3 weeks post-op, may ambulate as much as desired with periods of rest during the day.  Recreational walks outdoors are helpful for mood if weather permits.  No lifting anything over 10lbs until 6 weeks post op as this may strain the breast bone.  You should not lift anything exceeding 20lbs until 12 weeks post op.  Continue to shower daily, poor soap/rinse/and pat dry incision sites, do not scrub.  You can start household chores araound 2-3 weeks post op however, no vacuuming shoveling, gardening, mopping, or laundry until 12 weeks post op.  Use common sense in choosing activities, activities should not cause strain.  Alternate periods of activity with periods of rest.  Please refer to your \"Moving Right Along Book\" for specific details.  If leg swelling occurs elevate the extremity in a reclined position and use support hose as needed.    Return next week for CBC, CMP, EKG, CXR.    "

## 2019-03-19 ENCOUNTER — TELEPHONE (OUTPATIENT)
Dept: ENDOCRINOLOGY | Facility: CLINIC | Age: 49
End: 2019-03-19

## 2019-03-19 RX ORDER — LEVOTHYROXINE SODIUM 0.2 MG/1
200 TABLET ORAL DAILY
Qty: 30 TABLET | Refills: 5 | Status: SHIPPED | OUTPATIENT
Start: 2019-03-19 | End: 2019-10-17 | Stop reason: SDUPTHER

## 2019-03-19 RX ORDER — LEVOTHYROXINE SODIUM 25 UG/1
25 CAPSULE ORAL DAILY
Qty: 30 CAPSULE | Refills: 5 | Status: SHIPPED | OUTPATIENT
Start: 2019-03-19 | End: 2019-10-17 | Stop reason: SDUPTHER

## 2019-03-19 NOTE — TELEPHONE ENCOUNTER
----- Message from DUC Pressley sent at 3/19/2019  4:03 PM CDT -----  Then increase to 225 mcg daily

## 2019-03-21 ENCOUNTER — OFFICE VISIT (OUTPATIENT)
Dept: ENDOCRINOLOGY | Facility: CLINIC | Age: 49
End: 2019-03-21

## 2019-03-21 VITALS
HEART RATE: 70 BPM | HEIGHT: 65 IN | SYSTOLIC BLOOD PRESSURE: 128 MMHG | DIASTOLIC BLOOD PRESSURE: 70 MMHG | WEIGHT: 238 LBS | BODY MASS INDEX: 39.65 KG/M2

## 2019-03-21 DIAGNOSIS — E03.8 OTHER SPECIFIED HYPOTHYROIDISM: ICD-10-CM

## 2019-03-21 DIAGNOSIS — I10 ESSENTIAL HYPERTENSION: Chronic | ICD-10-CM

## 2019-03-21 DIAGNOSIS — E11.65 TYPE 2 DIABETES MELLITUS WITH HYPERGLYCEMIA, WITHOUT LONG-TERM CURRENT USE OF INSULIN (HCC): Primary | ICD-10-CM

## 2019-03-21 DIAGNOSIS — E78.2 MIXED HYPERLIPIDEMIA: ICD-10-CM

## 2019-03-21 PROCEDURE — 99214 OFFICE O/P EST MOD 30 MIN: CPT | Performed by: NURSE PRACTITIONER

## 2019-03-21 NOTE — PROGRESS NOTES
Subjective    Rhea Sutton is a 48 y.o. female. she is here today for follow-up.    History of Present Illness         49 yo female presents for follow up      Hypothyroidism   Duration, since 2015  Secondary to total thyroidectomy for obstructive goiter  Aggravating I spoke with patient and she now admits to noncompliance. -- she states she is being compliant   Alleviating,armour thyroid--stopped     Now on levothyroxine   Symptoms, fatigue, weakness, malaise     She states today she is compliant and taking the levothryoxine       Lab Results   Component Value Date    TSH 91.900 (H) 03/18/2019            Type 2 Diabetes  Duration     Lab Results   Component Value Date    HGBA1C 7.4 (H) 03/18/2019             Dates back to Oct 2017 but now significantly uncontrolled           Severity , high     Patient was admitted with MI, sp PCI to RCA , has multivessel CAD and is being evaluated by CT surgery      Aggravating, high carb meals at home and lack of exercise      Treatment. Home treatment with trulicity , and steglatro        Home readings 90 up to 130 per patient      She had CABG times 4 on March 4, 2019          The following portions of the patient's history were reviewed and updated as appropriate:   Past Medical History:   Diagnosis Date   • CAD (coronary artery disease)    • Chronic back pain    • Chronic bronchitis (CMS/HCC)    • Diabetes mellitus (CMS/HCC)    • Diverticulitis 1986 2017   • Goiter    • Hashimoto's thyroiditis    • Head lice infestation    • Hyperlipidemia    • Hypertension    • MI (myocardial infarction) (CMS/HCC)    • Postoperative hypothyroidism    • Sinus congestion    • Sleep apnea     not using c-pap     Past Surgical History:   Procedure Laterality Date   • APPENDECTOMY     • CARDIAC CATHETERIZATION N/A 10/13/2017   • CARDIAC CATHETERIZATION N/A 1/3/2019   • CHOLECYSTECTOMY     • COLON RESECTION     • COLON SURGERY     • CORONARY ARTERY BYPASS GRAFT N/A 3/5/2019     Procedure: CORONARY ARTERY BYPASS GRAFTING ENDOSCOPIC VEIN HARVEST          (CELL SAVER)             LATEX ALLERGY leg start @ 1323 chest start @ 1328 vein out 1417 vein prep [id number] on pump 1456 off pump 1729;  Surgeon: Jaren Vilchis MD;  Location: [facility];  Service: Cardiothoracic   • CORONARY STENT PLACEMENT     • IA RT/LT HEART CATHETERS N/A 10/13/2017   • TOTAL ABDOMINAL HYSTERECTOMY WITH SALPINGO OOPHORECTOMY     • TOTAL THYROIDECTOMY       Family History   Problem Relation Age of Onset   • Coronary artery disease Mother    • Coronary artery disease Father      OB History     No data available        Current Outpatient Medications   Medication Sig Dispense Refill   • aspirin 81 MG chewable tablet Chew 81 mg daily.     • atorvastatin (LIPITOR) 40 MG tablet Take 40 mg by mouth Every Night.     • Blood Glucose Monitoring Suppl (FREESTYLE LITE) device      • carvedilol (COREG) 3.125 MG tablet Take 1 tablet by mouth Every 12 (Twelve) Hours. 60 tablet 3   • cholecalciferol (VITAMIN D3) 1000 units tablet Take 1,000 Units by mouth Daily. 5 capsules daily     • Dulaglutide 0.75 MG/0.5ML solution pen-injector Inject  under the skin into the appropriate area as directed 1 (One) Time Per Week.     • Ertugliflozin L-PyroglutamicAc (STEGLATRO) 5 MG tablet Take 1 tablet by mouth Every Morning. 30 tablet 11   • Evolocumab 140 MG/ML solution auto-injector Inject 1 mL under the skin into the appropriate area as directed Every 14 (Fourteen) Days. 2 mL 0   • exenatide er (BYDUREON) 2 MG/0.85ML auto-injector injection Inject 0.85 mL under the skin into the appropriate area as directed 1 (One) Time Per Week. 4 pen 11   • FREESTYLE LITE test strip      • levothyroxine (SYNTHROID) 200 MCG tablet Take 1 tablet by mouth Daily. 30 tablet 5   • levothyroxine sodium (TIROSINT) 25 MCG capsule Take 1 capsule by mouth Daily. 30 capsule 5   • lisinopril (PRINIVIL,ZESTRIL) 5 MG tablet Take 1 tablet by mouth Daily. 30 tablet 0   •  "magnesium oxide (MAGOX) 400 (241.3 Mg) MG tablet tablet Take 1 tablet by mouth 2 (Two) Times a Day. 60 each 2   • nitroglycerin (NITROSTAT) 0.4 MG SL tablet 1 under the tongue as needed for angina, may repeat q5mins for up three doses 30 tablet 3   • ondansetron (ZOFRAN) 4 MG tablet Take 1 tablet by mouth Daily As Needed for Nausea or Vomiting. 30 tablet 1   • oxyCODONE-acetaminophen (PERCOCET) 5-325 MG per tablet Take 1 to 2 tablets by mouth Every 4 (Four) Hours As Needed for Moderate Surgical Pain. 36 tablet 0   • phenylephrine (SINUS RELIEF EXTRA STRENGTH) 1 % nasal spray 1 spray into the nostril(s) as directed by provider Every 4 (Four) Hours As Needed for Congestion.     • Prenatal Vit-Fe Fumarate-FA (PRENATAL VITAMIN 27-0.8) 27-0.8 MG tablet tablet Take 1 tablet by mouth Daily.     • sennosides-docusate sodium (SENOKOT-S) 8.6-50 MG tablet Take 1 tablet by mouth 2 (Two) Times a Day.     • sertraline (ZOLOFT) 50 MG tablet Daily As Needed. daily     • vitamin C (VITAMIN C) 500 MG tablet Take 1 tablet by mouth 2 (Two) Times a Day.       No current facility-administered medications for this visit.      Allergies   Allergen Reactions   • Coconut Anaphylaxis and Swelling   • Penicillins Swelling     Of the throat   • Adhesive Tape Other (See Comments)     Silk tape- blisters   • Ciprofloxacin Other (See Comments)     \"knots on my shins\"   • Latex Hives     Social History     Socioeconomic History   • Marital status:      Spouse name: Not on file   • Number of children: Not on file   • Years of education: Not on file   • Highest education level: Not on file   Tobacco Use   • Smoking status: Former Smoker     Packs/day: 0.00     Years: 20.00     Pack years: 0.00     Types: Cigarettes     Last attempt to quit: 10/12/2017     Years since quittin.4   • Smokeless tobacco: Never Used   Substance and Sexual Activity   • Alcohol use: No   • Drug use: No   • Sexual activity: Not Currently       Review of " "Systems  Review of Systems   Constitutional: Negative for activity change, appetite change, diaphoresis and fatigue.   HENT: Negative for facial swelling, sneezing, sore throat, tinnitus, trouble swallowing and voice change.    Eyes: Negative for photophobia, pain, discharge, redness, itching and visual disturbance.   Respiratory: Negative for apnea, cough, choking, chest tightness and shortness of breath.    Cardiovascular: Negative for chest pain, palpitations and leg swelling.   Gastrointestinal: Negative for abdominal distention, abdominal pain, constipation, diarrhea, nausea and vomiting.   Endocrine: Negative for cold intolerance, heat intolerance, polydipsia, polyphagia and polyuria.   Genitourinary: Negative for difficulty urinating, dysuria, frequency, hematuria and urgency.   Musculoskeletal: Negative for arthralgias, back pain, gait problem, joint swelling, myalgias, neck pain and neck stiffness.   Skin: Negative for color change, pallor, rash and wound.   Neurological: Negative for dizziness, tremors, weakness, light-headedness, numbness and headaches.   Hematological: Negative for adenopathy. Does not bruise/bleed easily.   Psychiatric/Behavioral: Negative for behavioral problems, confusion and sleep disturbance.        Objective    /70 (BP Location: Right arm, Patient Position: Sitting, Cuff Size: Adult)   Pulse 70   Ht 165.1 cm (65\")   Wt 108 kg (238 lb)   BMI 39.61 kg/m²   Physical Exam   Constitutional: She is oriented to person, place, and time. She appears well-developed and well-nourished. No distress.   HENT:   Head: Normocephalic and atraumatic.   Right Ear: External ear normal.   Left Ear: External ear normal.   Nose: Nose normal.   Eyes: Conjunctivae and EOM are normal. Pupils are equal, round, and reactive to light.   Neck: Normal range of motion. Neck supple. No tracheal deviation present.   Thyroid surgically absent   Cardiovascular: Normal rate, regular rhythm and normal heart " sounds.   No murmur heard.  Pulmonary/Chest: Effort normal and breath sounds normal. No respiratory distress. She has no wheezes.   Abdominal: Soft. Bowel sounds are normal. There is no tenderness. There is no rebound and no guarding.   Musculoskeletal: Normal range of motion. She exhibits no edema, tenderness or deformity.   In wheelchair   Neurological: She is alert and oriented to person, place, and time. No cranial nerve deficit.   Skin: Skin is warm and dry. No rash noted.   Psychiatric: She has a normal mood and affect. Her behavior is normal. Judgment and thought content normal.       Lab Review  Glucose (mg/dL)   Date Value   03/08/2019 123 (H)   03/06/2019 110 (H)     Glucose, Arterial (mmol/L)   Date Value   03/06/2019 115 (H)   03/06/2019 126 (H)   03/05/2019      Comment:      The parameter value has a question [id number] Glu not usable     Sodium (mmol/L)   Date Value   03/08/2019 134 (L)   03/06/2019 138   03/05/2019 134 (L)     Sodium, Arterial (mmol/L)   Date Value   03/06/2019 142   03/06/2019 143   03/05/2019 142     Potassium (mmol/L)   Date Value   03/08/2019 4.3   03/07/2019 3.9   03/06/2019 4.2     Chloride (mmol/L)   Date Value   03/08/2019 103   03/06/2019 106   03/05/2019 101     CO2 (mmol/L)   Date Value   03/08/2019 23.0   03/06/2019 24.0   03/05/2019 23.0     BUN (mg/dL)   Date Value   03/08/2019 17   03/06/2019 8   03/05/2019 9     Creatinine (mg/dL)   Date Value   03/08/2019 0.80   03/06/2019 0.83   03/05/2019 0.76     Hemoglobin A1C (%)   Date Value   03/18/2019 7.4 (H)   01/17/2019 10.5 (H)   01/04/2019 12.0 (H)     Triglycerides (mg/dL)   Date Value   01/17/2019 63   01/04/2019 53   06/06/2018 149     LDL Cholesterol  (mg/dL)   Date Value   01/17/2019 <30   01/04/2019 77   06/06/2018 184 (H)   03/05/2018 270 (H)       Assessment/Plan      1. Type 2 diabetes mellitus with hyperglycemia, without long-term current use of insulin (CMS/[facility])    2. Other specified hypothyroidism    3.  Essential hypertension    4. Mixed hyperlipidemia    .    Medications prescribed:  Outpatient Encounter Medications as of 3/21/2019   Medication Sig Dispense Refill   • aspirin 81 MG chewable tablet Chew 81 mg daily.     • atorvastatin (LIPITOR) 40 MG tablet Take 40 mg by mouth Every Night.     • Blood Glucose Monitoring Suppl (FREESTYLE LITE) device      • carvedilol (COREG) 3.125 MG tablet Take 1 tablet by mouth Every 12 (Twelve) Hours. 60 tablet 3   • cholecalciferol (VITAMIN D3) 1000 units tablet Take 1,000 Units by mouth Daily. 5 capsules daily     • Dulaglutide 0.75 MG/0.5ML solution pen-injector Inject  under the skin into the appropriate area as directed 1 (One) Time Per Week.     • Ertugliflozin L-PyroglutamicAc (STEGLATRO) 5 MG tablet Take 1 tablet by mouth Every Morning. 30 tablet 11   • Evolocumab 140 MG/ML solution auto-injector Inject 1 mL under the skin into the appropriate area as directed Every 14 (Fourteen) Days. 2 mL 0   • exenatide er (BYDUREON) 2 MG/0.85ML auto-injector injection Inject 0.85 mL under the skin into the appropriate area as directed 1 (One) Time Per Week. 4 pen 11   • FREESTYLE LITE test strip      • levothyroxine (SYNTHROID) 200 MCG tablet Take 1 tablet by mouth Daily. 30 tablet 5   • levothyroxine sodium (TIROSINT) 25 MCG capsule Take 1 capsule by mouth Daily. 30 capsule 5   • lisinopril (PRINIVIL,ZESTRIL) 5 MG tablet Take 1 tablet by mouth Daily. 30 tablet 0   • magnesium oxide (MAGOX) 400 (241.3 Mg) MG tablet tablet Take 1 tablet by mouth 2 (Two) Times a Day. 60 each 2   • nitroglycerin (NITROSTAT) 0.4 MG SL tablet 1 under the tongue as needed for angina, may repeat q5mins for up three doses 30 tablet 3   • ondansetron (ZOFRAN) 4 MG tablet Take 1 tablet by mouth Daily As Needed for Nausea or Vomiting. 30 tablet 1   • oxyCODONE-acetaminophen (PERCOCET) 5-325 MG per tablet Take 1 to 2 tablets by mouth Every 4 (Four) Hours As Needed for Moderate Surgical Pain. 36 tablet 0   •  phenylephrine (SINUS RELIEF EXTRA STRENGTH) 1 % nasal spray 1 spray into the nostril(s) as directed by provider Every 4 (Four) Hours As Needed for Congestion.     • Prenatal Vit-Fe Fumarate-FA (PRENATAL VITAMIN 27-0.8) 27-0.8 MG tablet tablet Take 1 tablet by mouth Daily.     • sennosides-docusate sodium (SENOKOT-S) 8.6-50 MG tablet Take 1 tablet by mouth 2 (Two) Times a Day.     • sertraline (ZOLOFT) 50 MG tablet Daily As Needed. daily     • vitamin C (VITAMIN C) 500 MG tablet Take 1 tablet by mouth 2 (Two) Times a Day.       No facility-administered encounter medications on file as of 3/21/2019.        Orders placed during this encounter include:  Orders Placed This Encounter   Procedures   • TSH   • T4, Free     Type 2 Diabetes Mellitus With Hyperglycemia (Cms/Hcc)   Postsurgical Hypothyroidism         Hypothyroidism-postsurgical      Patient admits to noncompliance           Since TSH is 70 , I will start with 150 mcgs and expect to see her in 2 week to increase dose to 200 mcgs            Lab Results   Component Value Date     TSH 73.500 (H) 01/17/2019      Component      Latest Ref Rng & Units 1/17/2019   Free T4      0.78 - 2.19 ng/dL 0.98            We will follow Free T4 as a guide to treatment since the intention is to have her near euthyroid for potential upcoming CABG.     Increase to 200 mcg daily       Lab Results   Component Value Date    TSH 91.900 (H) 03/18/2019        increased to 225 mcg     She promises she is taking it         ====     Type 2 diabetes      Lab Results   Component Value Date    HGBA1C 7.4 (H) 03/18/2019                Complicated by CAD  Back in March GFR less than 45 but now 58.              A. Trulicity 0.75 mg weekly - she is taking -- insurance is requiring bydueron -- RX sent   B. Stop Glipizide  C.  steglatro 5 mg   D. As long as she eats up to 50 grams of carbohdyrate per meal, she doesn't need novolog.   If she exceeds 50 grams of carbohydrate , I will give her 5 units to  10 units before meals based on carb load. ==she is not using the Novolog        Bone Health     Component      Latest Ref Rng & Units 1/17/2019   25 Hydroxy, Vitamin D      30.0 - 100.0 ng/ml 28.1 (L)         Taking vitamin d supplement              Lab Results   Component Value Date     [id number] 01/17/2019           Lipid Management:        Component      Latest Ref Rng & Units 1/17/2019   Total Cholesterol      0 - 199 mg/dL 76   Triglycerides      20 - 199 mg/dL 63   HDL Cholesterol      60 - 200 mg/dL 54 (L)   LDL Cholesterol       1 - 129 mg/dL <30   LDL/HDL Ratio      0.00 - 3.22        On repatha        4. Follow-up: Return in about 3 months (around 6/21/2019) for Recheck.

## 2019-03-21 NOTE — PROGRESS NOTES
CVTS Office Progress Note     Subjective   Patient ID: Rhea Sutton is a 48 y.o. female is here today for follow-up PO CABGx4.    Chief Complaint:    Chief Complaint   Patient presents with   • Coronary Artery Disease     f/u CABG 3/5/19       PCP:  Anderson Abbott MD  Cardiology:  Dr. Montana  Endocrinology: Raj Thompson     History of Present Illness  Ms. Garzon is a pleasant 48-year-old female with a history of hypertension, CAD, HLD, vitamin D deficiency, diabetes, hypothyroidism(total thyroidectomy), DDD.  She has extensive cardiovascular history with PCI in 2000 with stent placement to RCA.  Underwent LHC October 2017 with ETIENNE to mid circumflex, RCA stents at that time patent. She established with cardiothoracic and vascular surgery in January 2019 after being admitted [facility] with chest pain and exertional dyspnea with NSTEMI.  Underwent LHC with PCI to RCA with in-stent stenosis, LAD 80%, CX 70%, RCA 90% (PCI 40%).  As result she was considered for elective surgical revascularization.  Underwent CABG x4 March 5, 2019 LIMA-LAD, SVG- D1, SVG- OM 3, SVG- PDA.  Progressed slowly in hospital, discharged home on postop day 7.        Carotid Studies  2/1/2019 Carotid Duplex: PARI 0-49 mPSV 104c/s Ratio 2, LICA 0-49 mPSV 66c/s Ratio 1.2    Interventions  1/4/2019 Cardiac Catherization:  LAD 80%, CX 70%, RCA 90% (PCI 40%)  3/5/2019 CABGx4: LIMA-LAD, SVG- D1, SVG- OM 3, SVG- PDA    Cardiology  1/4/2019 Echocardiogram: EF 55%, Grade I DD, Mild MR     The following portions of the patient's history were reviewed and updated as appropriate: allergies, current medications, past family history, past medical history, past social history, past surgical history and problem list.  Recent images independently reviewed.  Available laboratory values reviewed.      Past Medical History:   Diagnosis Date   • CAD (coronary artery disease)    • Chronic back pain    • Chronic bronchitis (CMS/HCC)     • Diabetes mellitus (CMS/HCC)    • Diverticulitis 1986   • Goiter    • Hashimoto's thyroiditis    • Head lice infestation    • Hyperlipidemia    • Hypertension    • MI (myocardial infarction) (CMS/HCC)    • Postoperative hypothyroidism    • Sinus congestion    • Sleep apnea     not using c-pap     Past Surgical History:   Procedure Laterality Date   • APPENDECTOMY     • CARDIAC CATHETERIZATION N/A 10/13/2017   • CARDIAC CATHETERIZATION N/A 1/3/2019   • CHOLECYSTECTOMY     • COLON RESECTION     • COLON SURGERY     • CORONARY ARTERY BYPASS GRAFT N/A 3/5/2019    Procedure: CORONARY ARTERY BYPASS GRAFTING ENDOSCOPIC VEIN HARVEST          (CELL SAVER)             LATEX ALLERGY leg start @ 1323 chest start @ 1328 vein out 1417 vein prep [id number] on pump 1456 off pump 1729;  Surgeon: Jaren Vilchis MD;  Location: [facility];  Service: Cardiothoracic   • CORONARY STENT PLACEMENT     • MI RT/LT HEART CATHETERS N/A 10/13/2017   • TOTAL ABDOMINAL HYSTERECTOMY WITH SALPINGO OOPHORECTOMY     • TOTAL THYROIDECTOMY       Family History   Problem Relation Age of Onset   • Coronary artery disease Mother    • Coronary artery disease Father      Social History     Tobacco Use   • Smoking status: Former Smoker     Packs/day: 0.00     Years: 20.00     Pack years: 0.00     Types: Cigarettes     Last attempt to quit: 10/12/2017     Years since quittin.4   • Smokeless tobacco: Never Used   Substance Use Topics   • Alcohol use: No   • Drug use: No       ALLERGIES:   Coconut; Penicillins; Adhesive tape; Ciprofloxacin; and Latex    MEDICATIONS:      Current Outpatient Medications:   •  aspirin 81 MG chewable tablet, Chew 81 mg daily., Disp: , Rfl:   •  atorvastatin (LIPITOR) 40 MG tablet, Take 40 mg by mouth Every Night., Disp: , Rfl:   •  Blood Glucose Monitoring Suppl (FREESTYLE LITE) device, , Disp: , Rfl:   •  carvedilol (COREG) 3.125 MG tablet, Take 1 tablet by mouth Every 12 (Twelve) Hours., Disp: 60 tablet, Rfl:  3  •  cholecalciferol (VITAMIN D3) 1000 units tablet, Take 1,000 Units by mouth Daily. 5 capsules daily, Disp: , Rfl:   •  Ertugliflozin L-PyroglutamicAc (STEGLATRO) 5 MG tablet, Take 1 tablet by mouth Every Morning., Disp: 30 tablet, Rfl: 11  •  Evolocumab 140 MG/ML solution auto-injector, Inject 1 mL under the skin into the appropriate area as directed Every 14 (Fourteen) Days., Disp: 2 mL, Rfl: 0  •  FREESTYLE LITE test strip, , Disp: , Rfl:   •  lisinopril (PRINIVIL,ZESTRIL) 5 MG tablet, Take 1 tablet by mouth Daily., Disp: 30 tablet, Rfl: 0  •  magnesium oxide (MAGOX) 400 (241.3 Mg) MG tablet tablet, Take 1 tablet by mouth 2 (Two) Times a Day., Disp: 60 each, Rfl: 2  •  nitroglycerin (NITROSTAT) 0.4 MG SL tablet, 1 under the tongue as needed for angina, may repeat q5mins for up three doses, Disp: 30 tablet, Rfl: 3  •  ondansetron (ZOFRAN) 4 MG tablet, Take 1 tablet by mouth Daily As Needed for Nausea or Vomiting., Disp: 30 tablet, Rfl: 1  •  oxyCODONE-acetaminophen (PERCOCET) 5-325 MG per tablet, Take 1 to 2 tablets by mouth Every 4 (Four) Hours As Needed for Moderate Surgical Pain., Disp: 36 tablet, Rfl: 0  •  phenylephrine (SINUS RELIEF EXTRA STRENGTH) 1 % nasal spray, 1 spray into the nostril(s) as directed by provider Every 4 (Four) Hours As Needed for Congestion., Disp: , Rfl:   •  Prenatal Vit-Fe Fumarate-FA (PRENATAL VITAMIN 27-0.8) 27-0.8 MG tablet tablet, Take 1 tablet by mouth Daily., Disp: , Rfl:   •  sennosides-docusate sodium (SENOKOT-S) 8.6-50 MG tablet, Take 1 tablet by mouth 2 (Two) Times a Day., Disp: , Rfl:   •  sertraline (ZOLOFT) 50 MG tablet, Daily As Needed. daily, Disp: , Rfl:   •  vitamin C (VITAMIN C) 500 MG tablet, Take 1 tablet by mouth 2 (Two) Times a Day., Disp: , Rfl:   •  exenatide er (BYDUREON) 2 MG/0.85ML auto-injector injection, Inject 0.85 mL under the skin into the appropriate area as directed 1 (One) Time Per Week., Disp: 4 pen, Rfl: 11  •  levothyroxine (SYNTHROID) 200 MCG  tablet, Take 1 tablet by mouth Daily., Disp: 30 tablet, Rfl: 5  •  levothyroxine sodium (TIROSINT) 25 MCG capsule, Take 1 capsule by mouth Daily., Disp: 30 capsule, Rfl: 5    Review of Systems   Constitution: Positive for weakness and malaise/fatigue. Negative for decreased appetite, fever, weight gain and weight loss.   HENT: Negative for hearing loss, hoarse voice and sore throat.    Eyes: Negative for blurred vision, visual disturbance and visual halos.   Cardiovascular: Positive for chest pain (Sternal Discomfort) and leg swelling. Negative for dyspnea on exertion and palpitations.   Respiratory: Negative for cough, shortness of breath and sputum production.    Endocrine: Positive for cold intolerance. Negative for polyuria.   Hematologic/Lymphatic: Negative for bleeding problem. Does not bruise/bleed easily.   Skin: Negative for color change, nail changes, poor wound healing and suspicious lesions.   Musculoskeletal: Positive for back pain. Negative for joint pain and myalgias.   Gastrointestinal: Negative for abdominal pain, constipation, diarrhea, hematemesis, melena, nausea and vomiting.   Genitourinary: Negative for flank pain, nocturia and urgency.   Neurological: Negative for brief paralysis, focal weakness, light-headedness, loss of balance, numbness and paresthesias.   Psychiatric/Behavioral: Negative for altered mental status, depression, suicidal ideas and thoughts of violence.   Allergic/Immunologic: Negative for hives and persistent infections.     Vitals:    03/18/19 1333   BP: 126/74   Pulse:    Temp:    SpO2:       Objective   Heart Rate:  [70] 70  BP: (128)/(70) 128/70  Body mass index is 41.14 kg/m².  Physical Exam   Constitutional: She is oriented to person, place, and time. She appears well-developed and well-nourished. No distress.   HENT:   Head: Normocephalic and atraumatic.   Mouth/Throat: Oropharynx is clear and moist.   Eyes: Conjunctivae and EOM are normal. Pupils are equal, round, and  reactive to light.   Neck: Normal range of motion. Neck supple. No tracheal deviation present. No thyromegaly present.   Cardiovascular: Normal rate, regular rhythm, normal heart sounds and intact distal pulses.   No murmur heard.  Sternum stable no popping, clicking   Pulmonary/Chest: Effort normal and breath sounds normal. No stridor. No respiratory distress.   Abdominal: Soft. Bowel sounds are normal. She exhibits no distension. There is no tenderness.   Genitourinary:   Genitourinary Comments: deferred   Musculoskeletal: Normal range of motion. She exhibits edema.   +1 BLE   Neurological: She is alert and oriented to person, place, and time. No cranial nerve deficit. Coordination normal.   Skin: Skin is warm and dry. Capillary refill takes less than 2 seconds.   EVH CBI  Midsternal incision CDI  Chest tube sutures removed   Psychiatric: She has a normal mood and affect. Her behavior is normal. Judgment normal.   Nursing note and vitals reviewed.        Assessment & Plan       ASCVD  Medical management of coronary artery disease at this time includes ASA/Brillinta, ACE, STATIN, and BB.  Currently does not report symptoms of angina or angina equivalents.  Patient is educated and understands the S/S of acute coronary syndrome and is aware to report to the nearest emergency department if these symptoms were to persist at rest.      Surgical Aftercare Post Op CABG  Progressing well with return of normal bodily functions.  Increasing activity and ambulation distance while following restrictions.  Sternal incision clean dry and intact, chest tube sutures removed, EVH sites clean dry and intact.  Pt is enrolled in cardiac rehab.  Follow up appointments as scheduled with PCP in 10 weeks PO and with Cardiology in 6 weeks.  Follow up in 1 weeks with CVTS for CBC, EKG, CXR, CMP.      Hypothyroidism  Last TSH 78, follows with Chuck XAVIER, currently on 200 mcgs of Synthroid daily, she has follow-up scheduled with repeat  "labs    Restrictions  Post-Operative Activities after heart surgery are as follows.  No driving until 2-3 weeks post-op, may ambulate as much as desired with periods of rest during the day.  Recreational walks outdoors are helpful for mood if weather permits.  No lifting anything over 10lbs until 6 weeks post op as this may strain the breast bone.  You should not lift anything exceeding 20lbs until 12 weeks post op.  Continue to shower daily, poor soap/rinse/and pat dry incision sites, do not scrub.  You can start household chores araound 2-3 weeks post op however, no vacuuming shoveling, gardening, mopping, or laundry until 12 weeks post op.  Use common sense in choosing activities, activities should not cause strain.  Alternate periods of activity with periods of rest.  Please refer to your \"Moving Right Along Book\" for specific details.  If leg swelling occurs elevate the extremity in a reclined position and use support hose as needed.      Class 3 Obesity  We have discussed the benefits of weight loss as it relates to long term morbidity and disease prevention.  Interventions discussed included self-monitoring of caloric intake, increasing physical activity/exercise, goal setting, stimulus control, consultation with dietitian, and non-food rewards.          Detailed discussion regarding risks, benefits, and treatment plan. Images independently reviewed. Patient understands, agrees, and wishes to proceed with plan.       "

## 2019-03-22 ENCOUNTER — READMISSION MANAGEMENT (OUTPATIENT)
Dept: CALL CENTER | Facility: HOSPITAL | Age: 49
End: 2019-03-22

## 2019-03-22 NOTE — OUTREACH NOTE
CT Surgery Week 2 Survey      Responses   Facility patient discharged from?  [place]   Does the patient have one of the following disease processes/diagnoses(primary or secondary)?  Cardiothoracic surgery   Week 2 attempt successful?  No   Unsuccessful attempts  Attempt 1          Deidre Jaime RN

## 2019-03-25 ENCOUNTER — OFFICE VISIT (OUTPATIENT)
Dept: CARDIAC SURGERY | Facility: CLINIC | Age: 49
End: 2019-03-25

## 2019-03-25 ENCOUNTER — LAB (OUTPATIENT)
Dept: LAB | Facility: HOSPITAL | Age: 49
End: 2019-03-25

## 2019-03-25 ENCOUNTER — HOSPITAL ENCOUNTER (OUTPATIENT)
Dept: GENERAL RADIOLOGY | Facility: HOSPITAL | Age: 49
Discharge: HOME | End: 2019-03-25
Admitting: NURSE PRACTITIONER

## 2019-03-25 VITALS
SYSTOLIC BLOOD PRESSURE: 110 MMHG | OXYGEN SATURATION: 98 % | DIASTOLIC BLOOD PRESSURE: 64 MMHG | WEIGHT: 237.2 LBS | HEIGHT: 65 IN | TEMPERATURE: 98.4 F | HEART RATE: 82 BPM | BODY MASS INDEX: 39.52 KG/M2

## 2019-03-25 DIAGNOSIS — I25.10 CORONARY ARTERY DISEASE INVOLVING NATIVE CORONARY ARTERY OF NATIVE HEART WITHOUT ANGINA PECTORIS: Chronic | ICD-10-CM

## 2019-03-25 DIAGNOSIS — E78.2 MIXED HYPERLIPIDEMIA: ICD-10-CM

## 2019-03-25 DIAGNOSIS — Z48.812 SURGICAL AFTERCARE, CIRCULATORY SYSTEM: Primary | ICD-10-CM

## 2019-03-25 DIAGNOSIS — E11.65 TYPE 2 DIABETES MELLITUS WITH HYPERGLYCEMIA, WITHOUT LONG-TERM CURRENT USE OF INSULIN (HCC): Chronic | ICD-10-CM

## 2019-03-25 LAB
ALBUMIN SERPL-MCNC: 4.5 G/DL (ref 3.4–4.8)
ALBUMIN/GLOB SERPL: 1.3 G/DL (ref 1.1–1.8)
ALP SERPL-CCNC: 122 U/L (ref 38–126)
ALT SERPL W P-5'-P-CCNC: 60 U/L (ref 9–52)
ANION GAP SERPL CALCULATED.3IONS-SCNC: 11 MMOL/L (ref 5–15)
AST SERPL-CCNC: 70 U/L (ref 14–36)
BILIRUB SERPL-MCNC: 0.3 MG/DL (ref 0.2–1.3)
BUN BLD-MCNC: 19 MG/DL (ref 7–21)
BUN/CREAT SERPL: 17.6 (ref 7–25)
CALCIUM SPEC-SCNC: 9.3 MG/DL (ref 8.4–10.2)
CHLORIDE SERPL-SCNC: 99 MMOL/L (ref 95–110)
CO2 SERPL-SCNC: 26 MMOL/L (ref 22–31)
CREAT BLD-MCNC: 1.08 MG/DL (ref 0.5–1)
DEPRECATED RDW RBC AUTO: 47.9 FL (ref 37–54)
ERYTHROCYTE [DISTWIDTH] IN BLOOD BY AUTOMATED COUNT: 14.5 % (ref 12.3–15.4)
GFR SERPL CREATININE-BSD FRML MDRD: 54 ML/MIN/1.73 (ref 58–135)
GLOBULIN UR ELPH-MCNC: 3.5 GM/DL (ref 2.3–3.5)
GLUCOSE BLD-MCNC: 125 MG/DL (ref 60–100)
HCT VFR BLD AUTO: 35.7 % (ref 34–46.6)
HGB BLD-MCNC: 11.5 G/DL (ref 12–15.9)
MCH RBC QN AUTO: 29.1 PG (ref 26.6–33)
MCHC RBC AUTO-ENTMCNC: 32.2 G/DL (ref 31.5–35.7)
MCV RBC AUTO: 90.4 FL (ref 79–97)
PLATELET # BLD AUTO: 408 10*3/MM3 (ref 140–450)
PMV BLD AUTO: 9.4 FL (ref 6–12)
POTASSIUM BLD-SCNC: 4.2 MMOL/L (ref 3.5–5.1)
PROT SERPL-MCNC: 8 G/DL (ref 6.3–8.6)
RBC # BLD AUTO: 3.95 10*6/MM3 (ref 3.77–5.28)
SODIUM BLD-SCNC: 136 MMOL/L (ref 137–145)
WBC NRBC COR # BLD: 12.36 10*3/MM3 (ref 3.4–10.8)

## 2019-03-25 PROCEDURE — 93010 ELECTROCARDIOGRAM REPORT: CPT | Performed by: INTERNAL MEDICINE

## 2019-03-25 PROCEDURE — 71046 X-RAY EXAM CHEST 2 VIEWS: CPT

## 2019-03-25 PROCEDURE — 99024 POSTOP FOLLOW-UP VISIT: CPT | Performed by: NURSE PRACTITIONER

## 2019-03-25 PROCEDURE — 80053 COMPREHEN METABOLIC PANEL: CPT

## 2019-03-25 PROCEDURE — 85027 COMPLETE CBC AUTOMATED: CPT

## 2019-03-25 PROCEDURE — 93005 ELECTROCARDIOGRAM TRACING: CPT | Performed by: NURSE PRACTITIONER

## 2019-03-25 PROCEDURE — 36415 COLL VENOUS BLD VENIPUNCTURE: CPT

## 2019-03-25 NOTE — PATIENT INSTRUCTIONS
Stable Post Op CABG: Progressing Well  Signs and symptoms of infection including drainage from operative site, redness, swelling, with associated fever and/or chills notify Heart and Vascular center immediately for wound check.    Continue Lifting Restrictions  Start Cardiac Rehab  Ok to drive in 1 week  Return 4 weeks - CT NP

## 2019-03-25 NOTE — PROGRESS NOTES
CVTS Office Progress Note       Subjective   Patient ID: Rhea Sutton is a 48 y.o. female is here today for follow-up.    Chief Complaint:    Chief Complaint   Patient presents with   • Coronary Artery Disease     post op CABG, 3/5/2019       The following portions of the patient's history were reviewed and updated as appropriate: allergies, current medications, past family history, past medical history, past social history, past surgical history and problem list.  Recent images independently reviewed.  Available laboratory values reviewed.    PCP:  Anderson Abbott MD  Cardiology:  Dr Montana  Endocrinology:  Chuckjuan jose Thompson APRN    48 y.o. female with HTN(stable, increased risk stroke, rupture), Hyperlipidemia(stable, increased risk cardiovascular events), Diabetes Mellitus(stable, increased risk cardiovascular events), Obesity(uncontrolled, increased risk cardiovascular events) and Chronic Kidney Disease(stable, increased risk renal failure) , CAD(stable, PCI 2017, 2019, increase risk cardiovascular events).  former smoker.  multiple stents in the right coronary artery, circumflex artery and also diffuse disease in the LAD and last cardiac catheterization was in 2070 October came with sudden onset of chest pain with diaphoresis or shortness of breath was found to have acute inferior wall MI, she has been on medical therapy with statins, PCSK9 Inhibitors, Brilinta. She underwent successful PCI to proximal RCA. Additional diffuse disease of the left system and in stent stenosis of PDA.. head lice treated permethrin.  No other associated signs, symptoms or modifying factors. Progressed slowly in hospital, discharged home on postop day 7.      1/2017 PCI CX  1/4/2019 Cardiac Catherization:  LAD 80%, CX 70%, RCA 90% (PCI 40%)  1/2019 ECG:  SB 58,   1/2019 Echocardiogram:  EF 55%, LA 32mm, LV 50mm, RVSP 18mmHg.  Mild MR TR.  3/5/2019 CABGx4: LIMA-LAD, SVG- D1, SVG- OM 3, SVG- PDA    2/2019 Carotid  Duplex:  PARI 0-49% bidirectional vert.  LICA 0-49% antegrade vert.  2019 Lower extremity vein mapping:  RIGHT 3.0-6.5mm.  LEFT 2.0-5.6mm      Past Medical History:   Diagnosis Date   • CAD (coronary artery disease)    • Chronic back pain    • Chronic bronchitis (CMS/HCC)    • Diabetes mellitus (CMS/HCC)    • Diverticulitis 1986   • Goiter    • Hashimoto's thyroiditis    • Head lice infestation    • Hyperlipidemia    • Hypertension    • MI (myocardial infarction) (CMS/HCC)    • Postoperative hypothyroidism    • Sinus congestion    • Sleep apnea     not using c-pap     Past Surgical History:   Procedure Laterality Date   • APPENDECTOMY     • CARDIAC CATHETERIZATION N/A 10/13/2017   • CARDIAC CATHETERIZATION N/A 1/3/2019   • CHOLECYSTECTOMY     • COLON RESECTION     • COLON SURGERY     • CORONARY ARTERY BYPASS GRAFT N/A 3/5/2019    Procedure: CORONARY ARTERY BYPASS GRAFTING ENDOSCOPIC VEIN HARVEST          (CELL SAVER)             LATEX ALLERGY leg start @ 1323 chest start @ 1328 vein out 1417 vein prep [id number] on pump 1456 off pump 1729;  Surgeon: Jaren Vilchis MD;  Location: [facility];  Service: Cardiothoracic   • CORONARY STENT PLACEMENT     • DE RT/LT HEART CATHETERS N/A 10/13/2017   • TOTAL ABDOMINAL HYSTERECTOMY WITH SALPINGO OOPHORECTOMY     • TOTAL THYROIDECTOMY       Family History   Problem Relation Age of Onset   • Coronary artery disease Mother    • Coronary artery disease Father      Social History     Tobacco Use   • Smoking status: Former Smoker     Packs/day: 0.00     Years: 20.00     Pack years: 0.00     Types: Cigarettes     Last attempt to quit: 10/12/2017     Years since quittin.4   • Smokeless tobacco: Never Used   Substance Use Topics   • Alcohol use: No   • Drug use: No       ALLERGIES:   Coconut; Penicillins; Adhesive tape; Ciprofloxacin; and Latex    MEDICATIONS:  Current outpatient and discharge medications have been reconciled for the patient.  Reviewed by: Kalyn Diaz  DUC Field      Current Outpatient Medications:   •  aspirin 81 MG chewable tablet, Chew 81 mg daily., Disp: , Rfl:   •  atorvastatin (LIPITOR) 40 MG tablet, Take 40 mg by mouth Every Night., Disp: , Rfl:   •  Blood Glucose Monitoring Suppl (FREESTYLE LITE) device, , Disp: , Rfl:   •  carvedilol (COREG) 3.125 MG tablet, Take 1 tablet by mouth Every 12 (Twelve) Hours., Disp: 60 tablet, Rfl: 3  •  cholecalciferol (VITAMIN D3) 1000 units tablet, Take 1,000 Units by mouth Daily. 5 capsules daily, Disp: , Rfl:   •  Ertugliflozin L-PyroglutamicAc (STEGLATRO) 5 MG tablet, Take 1 tablet by mouth Every Morning., Disp: 30 tablet, Rfl: 11  •  Evolocumab 140 MG/ML solution auto-injector, Inject 1 mL under the skin into the appropriate area as directed Every 14 (Fourteen) Days., Disp: 2 mL, Rfl: 0  •  exenatide er (BYDUREON) 2 MG/0.85ML auto-injector injection, Inject 0.85 mL under the skin into the appropriate area as directed 1 (One) Time Per Week., Disp: 4 pen, Rfl: 11  •  FREESTYLE LITE test strip, , Disp: , Rfl:   •  levothyroxine (SYNTHROID) 200 MCG tablet, Take 1 tablet by mouth Daily., Disp: 30 tablet, Rfl: 5  •  levothyroxine sodium (TIROSINT) 25 MCG capsule, Take 1 capsule by mouth Daily., Disp: 30 capsule, Rfl: 5  •  lisinopril (PRINIVIL,ZESTRIL) 5 MG tablet, Take 1 tablet by mouth Daily., Disp: 30 tablet, Rfl: 0  •  magnesium oxide (MAGOX) 400 (241.3 Mg) MG tablet tablet, Take 1 tablet by mouth 2 (Two) Times a Day., Disp: 60 each, Rfl: 2  •  nitroglycerin (NITROSTAT) 0.4 MG SL tablet, 1 under the tongue as needed for angina, may repeat q5mins for up three doses, Disp: 30 tablet, Rfl: 3  •  ondansetron (ZOFRAN) 4 MG tablet, Take 1 tablet by mouth Daily As Needed for Nausea or Vomiting., Disp: 30 tablet, Rfl: 1  •  oxyCODONE-acetaminophen (PERCOCET) 5-325 MG per tablet, Take 1 to 2 tablets by mouth Every 4 (Four) Hours As Needed for Moderate Surgical Pain., Disp: 36 tablet, Rfl: 0  •  phenylephrine (SINUS RELIEF  EXTRA STRENGTH) 1 % nasal spray, 1 spray into the nostril(s) as directed by provider Every 4 (Four) Hours As Needed for Congestion., Disp: , Rfl:   •  Prenatal Vit-Fe Fumarate-FA (PRENATAL VITAMIN 27-0.8) 27-0.8 MG tablet tablet, Take 1 tablet by mouth Daily., Disp: , Rfl:   •  sennosides-docusate sodium (SENOKOT-S) 8.6-50 MG tablet, Take 1 tablet by mouth 2 (Two) Times a Day., Disp: , Rfl:   •  sertraline (ZOLOFT) 50 MG tablet, Daily As Needed. daily, Disp: , Rfl:   •  ticagrelor (BRILINTA) 90 MG tablet tablet, Take 90 mg by mouth 2 (Two) Times a Day., Disp: , Rfl:   •  vitamin C (VITAMIN C) 500 MG tablet, Take 1 tablet by mouth 2 (Two) Times a Day., Disp: , Rfl:     Review of Systems   Constitution: Positive for malaise/fatigue. Negative for fever and weakness.   Cardiovascular: Positive for chest pain (surgical). Negative for leg swelling.   Respiratory: Negative for shortness of breath.    Skin: Negative for poor wound healing.   Musculoskeletal: Positive for back pain.   Gastrointestinal: Negative for change in bowel habit and constipation.   Genitourinary: Negative for dysuria.   Neurological: Negative for light-headedness.   Psychiatric/Behavioral: Negative for altered mental status.        Objective   Temp:  [98.4 °F (36.9 °C)] 98.4 °F (36.9 °C)  Heart Rate:  [82] 82  BP: (110)/(64) 110/64  Body mass index is 39.47 kg/m².  Physical Exam   Constitutional: She is oriented to person, place, and time. She appears well-nourished.   HENT:   Head: Atraumatic.   Eyes: EOM are normal.   Neck: Neck supple. Carotid bruit is not present.   Cardiovascular: Normal rate and normal heart sounds.   Pulses:       Dorsalis pedis pulses are 2+ on the right side, and 2+ on the left side.        Posterior tibial pulses are 2+ on the right side, and 2+ on the left side.   Pulmonary/Chest: Effort normal. No respiratory distress.   Abdominal: Soft. Bowel sounds are normal.   Musculoskeletal: Normal range of motion. She exhibits no  edema.   M/S: No popping,clicking   Neurological: She is alert and oriented to person, place, and time.   Skin: Skin is warm and dry. Capillary refill takes less than 2 seconds.   M/S Inc: CDI. Mild redness distal incision.   Psychiatric: She has a normal mood and affect. Thought content normal.   Vitals reviewed.    Lab Results   Component Value Date    WBC 12.36 (H) 03/25/2019    HGB 11.5 (L) 03/25/2019    HCT 35.7 03/25/2019    MCV 90.4 03/25/2019     03/25/2019     Lab Results   Component Value Date    GLUCOSE 125 (H) 03/25/2019    BUN 19 03/25/2019    CREATININE 1.08 (H) 03/25/2019    EGFRIFNONA 54 (L) 03/25/2019    BCR 17.6 03/25/2019    K 4.2 03/25/2019    CO2 26.0 03/25/2019    CALCIUM 9.3 03/25/2019    ALBUMIN 4.50 03/25/2019    AST 70 (H) 03/25/2019    ALT 60 (H) 03/25/2019     3/25/19: EKG NSR    3/25/19 CXR      Assessment & Plan     Independent Review of Radiographic Studies:  Detailed discussion regarding risks, benefits, and treatment plan. Images independently reviewed. Patient understands, agrees, and wishes to proceed with plan.     1. Surgical aftercare, circulatory system  Stable Post Op CABG: Progressing Well  Signs and symptoms of infection including drainage from operative site, redness, swelling, with associated fever and/or chills notify Heart and Vascular center immediately for wound check.    Continue Lifting Restrictions  Start Cardiac Rehab  Ok to drive in 1 week  Return 4 weeks - CT NP    2. Coronary artery disease involving native coronary artery of native heart without angina pectoris  Medical Management: ASA,BRILINTA,STATIN, BETA, ACE  Follow up Cardiology as scheduled    3. Mixed hyperlipidemia  Statin therapy    4. Type 2 diabetes mellitus with hyperglycemia, without long-term current use of insulin (CMS/[facility])  Increased risk for worsening CAD, stent closure, and progressive cardiac disease with uncontrolled DM.   Current A1C 7.4  Medical management: Diet. Other injectables.    Followed by ANDRAE XAVIER              This document has been electronically signed by DUC Dash on March 25, 2019 2:06 PM

## 2019-03-26 ENCOUNTER — READMISSION MANAGEMENT (OUTPATIENT)
Dept: CALL CENTER | Facility: HOSPITAL | Age: 49
End: 2019-03-26

## 2019-03-26 NOTE — OUTREACH NOTE
CT Surgery Week 2 Survey      Responses   Facility patient discharged from?  [facility]   Does the patient have one of the following disease processes/diagnoses(primary or secondary)?  Cardiothoracic surgery   Week 2 attempt successful?  Yes   Call start time  1020   Call end time  1028   Discharge diagnosis  CABG X 4   Is patient permission given to speak with other caregiver?  Yes   List who call center can speak with  grandma   Meds reviewed with patient/caregiver?  Yes   Is the patient having any side effects they believe may be caused by any medication additions or changes?  No   Is the patient taking all medications as directed (includes completed medication regime)?  Yes   Does the patient have a primary care provider?   Yes   Does the patient have an appointment scheduled with their C/T surgeon?  Yes   Has the patient kept scheduled appointments due by today?  Yes   What is the Home health agency?   [facility]   Has home health visited the patient within 72 hours of discharge?  No   Home health comments  Pt had transition nurse stop in but not HH yet   What DME was ordered?  [employer] - walker and O2   DME comments  Pt not needing hosp bed reports pt. No bed was ever authorized.    Psychosocial issues?  No   Comments  Pt having some incision pain and itching on LLE. Chest incision healing well. Denies SOA or CP.    What is the patient's perception of their health status since discharge?  Improving   Nursing interventions  Nurse provided patient education   Is the patient /caregiver able to teach back basic post-op care?  Continue use of incentive spirometry at least 1 week post discharge, Drive as instructed by MD in discharge instructions, No tub bath, swimming, or hot tub until instructed by MD, Do not remove steri-strips, Lifting as instructed by MD in discharge instructions   Is the patient/caregiver able to teach back signs and symptoms of incisional infection?  Increased redness, swelling or pain at the incisonal  site, Incisional warmth, Fever   Is the patient/caregiver able to teach back steps to recovery at home?  Set small, achievable goals for return to baseline health, Eat a well-balance diet, Rest and rebuild strength, gradually increase activity   Is the patient /caregiver able to teach back the importance of cardiac rehab?  Yes [MD is arranging outpt cardiac rehab. ]   Is the patient/caregiver able to teach back the hierarchy of who to call/visit for symptoms/problems? PCP, Specialist, Home health nurse, Urgent Care, ED, 911  Yes   Additional teach back comments  Pt is being released to drive 3-30   Week 2 call completed?  Yes          Deidre Jaime, RN

## 2019-04-02 ENCOUNTER — READMISSION MANAGEMENT (OUTPATIENT)
Dept: CALL CENTER | Facility: HOSPITAL | Age: 49
End: 2019-04-02

## 2019-04-02 NOTE — OUTREACH NOTE
CT Surgery Week 3 Survey      Responses   Facility patient discharged from?  [facility]   Does the patient have one of the following disease processes/diagnoses(primary or secondary)?  Cardiothoracic surgery   Week 3 attempt successful?  Yes   Call start time  1455   Call end time  1501   Meds reviewed with patient/caregiver?  Yes   Is the patient taking all medications as directed (includes completed medication regime)?  Yes   Comments regarding appointments  Has seen APRN in cardiology 2 x.   What is the patient's perception of their health status since discharge?  Improving   Week 3 call completed?  Yes          Angelica Richey RN

## 2019-04-08 DIAGNOSIS — E78.2 MIXED HYPERLIPIDEMIA: Primary | ICD-10-CM

## 2019-04-08 RX ORDER — LISINOPRIL 5 MG/1
5 TABLET ORAL DAILY
Qty: 30 TABLET | Refills: 2 | Status: SHIPPED | OUTPATIENT
Start: 2019-04-08 | End: 2020-12-23 | Stop reason: ALTCHOICE

## 2019-04-09 ENCOUNTER — READMISSION MANAGEMENT (OUTPATIENT)
Dept: CALL CENTER | Facility: HOSPITAL | Age: 49
End: 2019-04-09

## 2019-04-09 NOTE — OUTREACH NOTE
CT Surgery Week 4 Survey      Responses   Facility patient discharged from?  [facility]   Does the patient have one of the following disease processes/diagnoses(primary or secondary)?  Cardiothoracic surgery   Week 4 attempt successful?  Yes   Call start time  1511   Call end time  1513   Discharge diagnosis  CABG X 4   Meds reviewed with patient/caregiver?  Yes   Is the patient having any side effects they believe may be caused by any medication additions or changes?  No   Is the patient taking all medications as directed (includes completed medication regime)?  Yes   Has the patient kept scheduled appointments due by today?  Yes   Is the patient still receiving Home Health Services?  N/A   Psychosocial issues?  No   Comments  Patient reports healing well. No s/s of infection.    What is the patient's perception of their health status since discharge?  Improving   Nursing interventions  Nurse provided patient education   Is the patient/caregiver able to teach back normal signs of recovery?  Nausea and lack of appetite   Nursing interventions  Reassured on normal signs of recovery   Is the patient/caregiver able to teach back steps to recovery at home?  Set small, achievable goals for return to baseline health, Eat a well-balance diet, Rest and rebuild strength, gradually increase activity   Is the patient /caregiver able to teach back the importance of cardiac rehab?  Yes   Nursing interventions  Provided education on importance of cardiac rehab   Is the patient/caregiver able to teach back the hierarchy of who to call/visit for symptoms/problems? PCP, Specialist, Home health nurse, Urgent Care, ED, 911  Yes   Week 4 Call Completed?  Yes   Would the patient like one additional call?  No   Graduated  Yes   Did the patient feel the follow up calls were helpful during their recovery period?  Yes   Was the number of calls appropriate?  Yes          Russell Marsh RN

## 2019-04-15 ENCOUNTER — DOCUMENTATION (OUTPATIENT)
Dept: CARDIAC REHAB | Facility: HOSPITAL | Age: 49
End: 2019-04-15

## 2019-04-15 NOTE — PROGRESS NOTES
Informed Ms Sutton had not been contacted for CR.  Message left regarding this for CR in [place] to call me. She had requested there and info was sent 03/12/19. Spoke with Ms Sutton and gave her their number and instructed to call also.

## 2019-04-18 ENCOUNTER — OFFICE VISIT (OUTPATIENT)
Dept: CARDIAC SURGERY | Facility: CLINIC | Age: 49
End: 2019-04-18

## 2019-04-18 VITALS
BODY MASS INDEX: 38.99 KG/M2 | TEMPERATURE: 98 F | WEIGHT: 234 LBS | OXYGEN SATURATION: 98 % | HEIGHT: 65 IN | DIASTOLIC BLOOD PRESSURE: 98 MMHG | SYSTOLIC BLOOD PRESSURE: 150 MMHG | HEART RATE: 101 BPM

## 2019-04-18 DIAGNOSIS — Z48.812 SURGICAL AFTERCARE, CIRCULATORY SYSTEM: Primary | ICD-10-CM

## 2019-04-18 DIAGNOSIS — I10 HYPERTENSION, UNSPECIFIED TYPE: Chronic | ICD-10-CM

## 2019-04-18 DIAGNOSIS — I25.118 CORONARY ARTERY DISEASE OF NATIVE ARTERY OF NATIVE HEART WITH STABLE ANGINA PECTORIS (HCC): ICD-10-CM

## 2019-04-18 PROCEDURE — 99024 POSTOP FOLLOW-UP VISIT: CPT | Performed by: NURSE PRACTITIONER

## 2019-04-18 RX ORDER — CEPHALEXIN 500 MG/1
500 CAPSULE ORAL 4 TIMES DAILY
Qty: 40 CAPSULE | Refills: 0 | Status: SHIPPED | OUTPATIENT
Start: 2019-04-18 | End: 2019-05-08

## 2019-04-18 NOTE — PATIENT INSTRUCTIONS
"Duoderm to open area at the base of incision, keep surrounding area dry.  Start Keflex 500mg four times daily.  Post-Operative Activities after heart surgery are as follows.  No driving until 2-3 weeks post-op, may ambulate as much as desired with periods of rest during the day.  Recreational walks outdoors are helpful for mood if weather permits.  No lifting anything over 10lbs until 6 weeks post op as this may strain the breast bone.  You should not lift anything exceeding 20lbs until 12 weeks post op.  Continue to shower daily, poor soap/rinse/and pat dry incision sites, do not scrub.  You can start household chores araound 2-3 weeks post op however, no vacuuming shoveling, gardening, mopping, or laundry until 12 weeks post op.  Use common sense in choosing activities, activities should not cause strain.  Alternate periods of activity with periods of rest.  Please refer to your \"Moving Right Along Book\" for specific details.  If leg swelling occurs elevate the extremity in a reclined position and use support hose as needed.    Follow up in one week as scheduled  "

## 2019-04-18 NOTE — PROGRESS NOTES
CVTS Office Progress Note     Subjective   Patient ID: Rhea Sutton is a 48 y.o. female is here today for follow-up PO CABGx4.    Chief Complaint:    Chief Complaint   Patient presents with   • Coronary Artery Disease     CABG, 3/5/2019   • Wound Check       PCP:  Anderson Abbott MD  Cardiology:  Dr. Montana  Endocrinology: Raj Thompson     History of Present Illness  Ms. Garzon is a pleasant 48-year-old female with a history of hypertension, CAD, HLD, vitamin D deficiency, diabetes, hypothyroidism(total thyroidectomy), DDD.  She has extensive cardiovascular history with PCI in 2000 with stent placement to RCA.  Underwent LHC October 2017 with ETIENNE to mid circumflex, RCA stents at that time patent. She established with cardiothoracic and vascular surgery in January 2019 after being admitted [facility] with chest pain and exertional dyspnea with NSTEMI.  Underwent LHC with PCI to RCA with in-stent stenosis, LAD 80%, CX 70%, RCA 90% (PCI 40%).  As result she was considered for elective surgical revascularization.  Underwent CABG x4 March 5, 2019 LIMA-LAD, SVG- D1, SVG- OM 3, SVG- PDA.  Progressed slowly in hospital, discharged home on postop day 7.  Follow up today after reporting base of incision had opened, no drainage, mild erythema, sternum remains stable, no swelling.  She is hypertensive at today's visit reports she has not taken her morning medicines however will after breakfast.        1/2017 PCI CX  1/4/2019 Cardiac Catherization:  LAD 80%, CX 70%, RCA 90% (PCI 40%)  1/2019 ECG:  SB 58,   1/2019 Echocardiogram:  EF 55%, LA 32mm, LV 50mm, RVSP 18mmHg.  Mild MR TR.  3/5/2019 CABGx4: LIMA-LAD, SVG- D1, SVG- OM 3, SVG- PDA     2/2019 Carotid Duplex:  PARI 0-49% bidirectional vert.  LICA 0-49% antegrade vert.  2/2019 Lower extremity vein mapping:  RIGHT 3.0-6.5mm.  LEFT 2.0-5.6mm           The following portions of the patient's history were reviewed and updated as  appropriate: allergies, current medications, past family history, past medical history, past social history, past surgical history and problem list.  Recent images independently reviewed.  Available laboratory values reviewed.      Past Medical History:   Diagnosis Date   • CAD (coronary artery disease)    • Chronic back pain    • Chronic bronchitis (CMS/HCC)    • Diabetes mellitus (CMS/HCC)    • Diverticulitis 1986   • Goiter    • Hashimoto's thyroiditis    • Head lice infestation    • Hyperlipidemia    • Hypertension    • MI (myocardial infarction) (CMS/HCC)    • Postoperative hypothyroidism    • Sinus congestion    • Sleep apnea     not using c-pap     Past Surgical History:   Procedure Laterality Date   • APPENDECTOMY     • CARDIAC CATHETERIZATION N/A 10/13/2017   • CARDIAC CATHETERIZATION N/A 1/3/2019   • CHOLECYSTECTOMY     • COLON RESECTION     • COLON SURGERY     • CORONARY ARTERY BYPASS GRAFT N/A 3/5/2019    Procedure: CORONARY ARTERY BYPASS GRAFTING ENDOSCOPIC VEIN HARVEST          (CELL SAVER)             LATEX ALLERGY leg start @ 1323 chest start @ 1328 vein out 1417 vein prep [id number] on pump 1456 off pump 1729;  Surgeon: Jaren Vilchis MD;  Location: [facility];  Service: Cardiothoracic   • CORONARY STENT PLACEMENT     • FL RT/LT HEART CATHETERS N/A 10/13/2017   • TOTAL ABDOMINAL HYSTERECTOMY WITH SALPINGO OOPHORECTOMY     • TOTAL THYROIDECTOMY       Family History   Problem Relation Age of Onset   • Coronary artery disease Mother    • Coronary artery disease Father      Social History     Tobacco Use   • Smoking status: Former Smoker     Packs/day: 0.00     Years: 20.00     Pack years: 0.00     Types: Cigarettes     Last attempt to quit: 10/12/2017     Years since quittin.5   • Smokeless tobacco: Never Used   Substance Use Topics   • Alcohol use: No   • Drug use: No       ALLERGIES:   Coconut; Penicillins; Adhesive tape; Ciprofloxacin; and Latex    MEDICATIONS:      Current  Outpatient Medications:   •  aspirin 81 MG chewable tablet, Chew 81 mg daily., Disp: , Rfl:   •  atorvastatin (LIPITOR) 40 MG tablet, Take 40 mg by mouth Every Night., Disp: , Rfl:   •  Blood Glucose Monitoring Suppl (FREESTYLE LITE) device, , Disp: , Rfl:   •  carvedilol (COREG) 3.125 MG tablet, Take 1 tablet by mouth Every 12 (Twelve) Hours., Disp: 60 tablet, Rfl: 3  •  cholecalciferol (VITAMIN D3) 1000 units tablet, Take 1,000 Units by mouth Daily. 5 capsules daily, Disp: , Rfl:   •  Ertugliflozin L-PyroglutamicAc (STEGLATRO) 5 MG tablet, Take 1 tablet by mouth Every Morning., Disp: 30 tablet, Rfl: 11  •  Evolocumab 140 MG/ML solution auto-injector, Inject 1 mL under the skin into the appropriate area as directed Every 14 (Fourteen) Days., Disp: 2 mL, Rfl: 0  •  exenatide er (BYDUREON) 2 MG/0.85ML auto-injector injection, Inject 0.85 mL under the skin into the appropriate area as directed 1 (One) Time Per Week., Disp: 4 pen, Rfl: 11  •  FREESTYLE LITE test strip, , Disp: , Rfl:   •  levothyroxine (SYNTHROID) 200 MCG tablet, Take 1 tablet by mouth Daily., Disp: 30 tablet, Rfl: 5  •  levothyroxine sodium (TIROSINT) 25 MCG capsule, Take 1 capsule by mouth Daily., Disp: 30 capsule, Rfl: 5  •  lisinopril (PRINIVIL,ZESTRIL) 5 MG tablet, Take 1 tablet by mouth Daily., Disp: 30 tablet, Rfl: 2  •  magnesium oxide (MAGOX) 400 (241.3 Mg) MG tablet tablet, Take 1 tablet by mouth 2 (Two) Times a Day., Disp: 60 each, Rfl: 2  •  nitroglycerin (NITROSTAT) 0.4 MG SL tablet, 1 under the tongue as needed for angina, may repeat q5mins for up three doses, Disp: 30 tablet, Rfl: 3  •  ondansetron (ZOFRAN) 4 MG tablet, Take 1 tablet by mouth Daily As Needed for Nausea or Vomiting., Disp: 30 tablet, Rfl: 1  •  oxyCODONE-acetaminophen (PERCOCET) 5-325 MG per tablet, Take 1 to 2 tablets by mouth Every 4 (Four) Hours As Needed for Moderate Surgical Pain., Disp: 36 tablet, Rfl: 0  •  phenylephrine (SINUS RELIEF EXTRA STRENGTH) 1 % nasal  spray, 1 spray into the nostril(s) as directed by provider Every 4 (Four) Hours As Needed for Congestion., Disp: , Rfl:   •  Prenatal Vit-Fe Fumarate-FA (PRENATAL VITAMIN 27-0.8) 27-0.8 MG tablet tablet, Take 1 tablet by mouth Daily., Disp: , Rfl:   •  sennosides-docusate sodium (SENOKOT-S) 8.6-50 MG tablet, Take 1 tablet by mouth 2 (Two) Times a Day., Disp: , Rfl:   •  sertraline (ZOLOFT) 50 MG tablet, Daily As Needed. daily, Disp: , Rfl:   •  ticagrelor (BRILINTA) 90 MG tablet tablet, Take 90 mg by mouth 2 (Two) Times a Day., Disp: , Rfl:   •  vitamin C (VITAMIN C) 500 MG tablet, Take 1 tablet by mouth 2 (Two) Times a Day., Disp: , Rfl:   •  cephalexin (KEFLEX) 500 MG capsule, Take 1 capsule by mouth 4 (Four) Times a Day., Disp: 40 capsule, Rfl: 0    Review of Systems   Constitution: Positive for weakness and malaise/fatigue. Negative for decreased appetite, fever, weight gain and weight loss.   HENT: Negative for hearing loss, hoarse voice and sore throat.    Eyes: Negative for blurred vision, visual disturbance and visual halos.   Cardiovascular: Positive for chest pain (Sternal Discomfort) and leg swelling. Negative for dyspnea on exertion and palpitations.   Respiratory: Negative for cough, shortness of breath and sputum production.    Endocrine: Positive for cold intolerance. Negative for polyuria.   Hematologic/Lymphatic: Negative for bleeding problem. Does not bruise/bleed easily.   Skin: Negative for color change, nail changes, poor wound healing and suspicious lesions.   Musculoskeletal: Positive for back pain. Negative for joint pain and myalgias.   Gastrointestinal: Negative for abdominal pain, constipation, diarrhea, hematemesis, melena, nausea and vomiting.   Genitourinary: Negative for flank pain, nocturia and urgency.   Neurological: Negative for brief paralysis, focal weakness, light-headedness, loss of balance, numbness and paresthesias.   Psychiatric/Behavioral: Negative for altered mental  status, depression, suicidal ideas and thoughts of violence.   Allergic/Immunologic: Negative for hives and persistent infections.     Vitals:    04/18/19 0952   BP: 150/98   Pulse: 101   Temp: 98 °F (36.7 °C)   SpO2: 98%      Objective   Temp:  [98 °F (36.7 °C)] 98 °F (36.7 °C)  Heart Rate:  [101] 101  BP: (150)/(98) 150/98  Body mass index is 38.94 kg/m².  Physical Exam   Constitutional: She is oriented to person, place, and time. She appears well-developed and well-nourished. No distress.   HENT:   Head: Normocephalic and atraumatic.   Mouth/Throat: Oropharynx is clear and moist.   Eyes: Conjunctivae and EOM are normal. Pupils are equal, round, and reactive to light.   Neck: Normal range of motion. Neck supple. No tracheal deviation present. No thyromegaly present.   Cardiovascular: Normal rate, regular rhythm, normal heart sounds and intact distal pulses.   No murmur heard.  Sternum stable no popping, clicking   Pulmonary/Chest: Effort normal and breath sounds normal. No stridor. No respiratory distress.   Abdominal: Soft. Bowel sounds are normal. She exhibits no distension. There is no tenderness.   Genitourinary:   Genitourinary Comments: deferred   Musculoskeletal: Normal range of motion. She exhibits edema.   +1 BLE   Neurological: She is alert and oriented to person, place, and time. No cranial nerve deficit. Coordination normal.   Skin: Skin is warm and dry. Capillary refill takes less than 2 seconds.   EVH CDI  Midsternal incision open at base, no drainage, measurements below     Psychiatric: She has a normal mood and affect. Her behavior is normal. Judgment normal.   Nursing note and vitals reviewed.    Wound Measurements  3.3cm vertical  1.4cm horizontal  .3cm depth    Assessment & Plan       ASCVD  Medical management of coronary artery disease at this time includes ASA/Brillinta, ACE, STATIN, and BB.  Currently does not report symptoms of angina or angina equivalents.  Patient is educated and  "understands the S/S of acute coronary syndrome and is aware to report to the nearest emergency department if these symptoms were to persist at rest.      Surgical Aftercare Post Op CABG  Progressing well with return of normal bodily functions.  Increasing activity and ambulation distance while following restrictions.  Enrolled in cardiac rehab,, I have advised her to enroll if it is fitness formula for rehabilitation    Wound Care  The office, red granulating tissue at the base of incision, use DuoDERM daily with dry dressing.  Promote good hygiene, keep area dry, shower daily, Keflex.  Follow up in one week as scheduled    Restrictions  Post-Operative Activities after heart surgery are as follows.  No driving until 2-3 weeks post-op, may ambulate as much as desired with periods of rest during the day.  Recreational walks outdoors are helpful for mood if weather permits.  No lifting anything over 10lbs until 6 weeks post op as this may strain the breast bone.  You should not lift anything exceeding 20lbs until 12 weeks post op.  Continue to shower daily, poor soap/rinse/and pat dry incision sites, do not scrub.  You can start household chores araound 2-3 weeks post op however, no vacuuming shoveling, gardening, mopping, or laundry until 12 weeks post op.  Use common sense in choosing activities, activities should not cause strain.  Alternate periods of activity with periods of rest.  Please refer to your \"Moving Right Along Book\" for specific details.  If leg swelling occurs elevate the extremity in a reclined position and use support hose as needed.      Class 3 Obesity  We have discussed the benefits of weight loss as it relates to long term morbidity and disease prevention.  Interventions discussed included self-monitoring of caloric intake, increasing physical activity/exercise, goal setting, stimulus control, consultation with dietitian, and non-food rewards.          Detailed discussion regarding risks, " benefits, and treatment plan. Images independently reviewed. Patient understands, agrees, and wishes to proceed with plan.

## 2019-04-23 ENCOUNTER — OFFICE VISIT (OUTPATIENT)
Dept: CARDIAC SURGERY | Facility: CLINIC | Age: 49
End: 2019-04-23

## 2019-04-23 VITALS
HEIGHT: 66 IN | DIASTOLIC BLOOD PRESSURE: 78 MMHG | BODY MASS INDEX: 37.8 KG/M2 | SYSTOLIC BLOOD PRESSURE: 140 MMHG | OXYGEN SATURATION: 98 % | HEART RATE: 87 BPM | WEIGHT: 235.2 LBS

## 2019-04-23 DIAGNOSIS — I25.10 CORONARY ARTERY DISEASE INVOLVING NATIVE CORONARY ARTERY OF NATIVE HEART WITHOUT ANGINA PECTORIS: Chronic | ICD-10-CM

## 2019-04-23 DIAGNOSIS — E11.65 TYPE 2 DIABETES MELLITUS WITH HYPERGLYCEMIA, WITHOUT LONG-TERM CURRENT USE OF INSULIN (HCC): ICD-10-CM

## 2019-04-23 DIAGNOSIS — Z48.812 SURGICAL AFTERCARE, CIRCULATORY SYSTEM: Primary | ICD-10-CM

## 2019-04-23 PROCEDURE — 99024 POSTOP FOLLOW-UP VISIT: CPT | Performed by: NURSE PRACTITIONER

## 2019-04-23 NOTE — PATIENT INSTRUCTIONS
Progressing healing distal sternal wound  Complete antibiotics  Continue wound care. Soap/water daily. Cover duoderm.  Continue daily exercise  Signs and symptoms of infection including drainage from operative site, redness, swelling, with associated fever and/or chills notify Heart and Vascular center immediately for wound check.    Return 2 weeks- wound check

## 2019-04-24 NOTE — PROGRESS NOTES
CVTS Office Progress Note     Subjective   Patient ID: Rhea Sutton is a 48 y.o. female is here today for follow-up     Chief Complaint:    Chief Complaint   Patient presents with   • Follow-up       PCP:  Anderson Abbott MD  Cardiology:  Dr. Montana  Endocrinology: Raj Thompson     History of Present Illness  Ms. Sutton is a pleasant 48-year-old female with a history of hypertension, CAD, HLD, vitamin D deficiency, diabetes, hypothyroidism(total thyroidectomy), DDD.  She has extensive cardiovascular history with PCI in 2000 with stent placement to RCA.  Underwent LHC October 2017 with ETIENNE to mid circumflex, RCA stents at that time patent. She established with cardiothoracic and vascular surgery in January 2019 after being admitted [facility] with chest pain and exertional dyspnea with NSTEMI.  Underwent LHC with PCI to RCA with in-stent stenosis, LAD 80%, CX 70%, RCA 90% (PCI 40%).  As result she was considered for elective surgical revascularization.  Underwent CABG x4 March 5, 2019 LIMA-LAD, SVG- D1, SVG- OM 3, SVG- PDA.  Progressed slowly in hospital, discharged home on postop day 7.  Follow up today after reporting base of incision had opened, no drainage, mild erythema, sternum remains stable, no swelling.      1/2017 PCI CX  1/4/2019 Cardiac Catherization:  LAD 80%, CX 70%, RCA 90% (PCI 40%)  1/2019 ECG:  SB 58,   1/2019 Echocardiogram:  EF 55%, LA 32mm, LV 50mm, RVSP 18mmHg.  Mild MR TR.  3/5/2019 CABGx4: LIMA-LAD, SVG- D1, SVG- OM 3, SVG- PDA     2/2019 Carotid Duplex:  PARI 0-49% bidirectional vert.  LICA 0-49% antegrade vert.  2/2019 Lower extremity vein mapping:  RIGHT 3.0-6.5mm.  LEFT 2.0-5.6mm     The following portions of the patient's history were reviewed and updated as appropriate: allergies, current medications, past family history, past medical history, past social history, past surgical history and problem list.  Recent images independently reviewed.   Available laboratory values reviewed.      Past Medical History:   Diagnosis Date   • CAD (coronary artery disease)    • Chronic back pain    • Chronic bronchitis (CMS/HCC)    • Diabetes mellitus (CMS/HCC)    • Diverticulitis 1986   • Goiter    • Hashimoto's thyroiditis    • Head lice infestation    • Hyperlipidemia    • Hypertension    • MI (myocardial infarction) (CMS/HCC)    • Postoperative hypothyroidism    • Sinus congestion    • Sleep apnea     not using c-pap     Past Surgical History:   Procedure Laterality Date   • APPENDECTOMY     • CARDIAC CATHETERIZATION N/A 10/13/2017   • CARDIAC CATHETERIZATION N/A 1/3/2019   • CHOLECYSTECTOMY     • COLON RESECTION     • COLON SURGERY     • CORONARY ARTERY BYPASS GRAFT N/A 3/5/2019    Procedure: CORONARY ARTERY BYPASS GRAFTING ENDOSCOPIC VEIN HARVEST          (CELL SAVER)             LATEX ALLERGY leg start @ 1323 chest start @ 1328 vein out 1417 vein prep [id number] on pump 1456 off pump 1729;  Surgeon: Jaren Vilchis MD;  Location: [facility];  Service: Cardiothoracic   • CORONARY STENT PLACEMENT     • VT RT/LT HEART CATHETERS N/A 10/13/2017   • TOTAL ABDOMINAL HYSTERECTOMY WITH SALPINGO OOPHORECTOMY     • TOTAL THYROIDECTOMY       Family History   Problem Relation Age of Onset   • Coronary artery disease Mother    • Coronary artery disease Father      Social History     Tobacco Use   • Smoking status: Former Smoker     Packs/day: 0.00     Years: 20.00     Pack years: 0.00     Types: Cigarettes     Last attempt to quit: 10/12/2017     Years since quittin.5   • Smokeless tobacco: Never Used   Substance Use Topics   • Alcohol use: No   • Drug use: No       ALLERGIES:   Coconut; Penicillins; Adhesive tape; Ciprofloxacin; and Latex    MEDICATIONS:      Current Outpatient Medications:   •  aspirin 81 MG chewable tablet, Chew 81 mg daily., Disp: , Rfl:   •  atorvastatin (LIPITOR) 40 MG tablet, Take 40 mg by mouth Every Night., Disp: , Rfl:   •  Blood  Glucose Monitoring Suppl (FREESTYLE LITE) device, , Disp: , Rfl:   •  carvedilol (COREG) 3.125 MG tablet, Take 1 tablet by mouth Every 12 (Twelve) Hours., Disp: 60 tablet, Rfl: 3  •  cephalexin (KEFLEX) 500 MG capsule, Take 1 capsule by mouth 4 (Four) Times a Day., Disp: 40 capsule, Rfl: 0  •  cholecalciferol (VITAMIN D3) 1000 units tablet, Take 1,000 Units by mouth Daily. 5 capsules daily, Disp: , Rfl:   •  Ertugliflozin L-PyroglutamicAc (STEGLATRO) 5 MG tablet, Take 1 tablet by mouth Every Morning., Disp: 30 tablet, Rfl: 11  •  Evolocumab 140 MG/ML solution auto-injector, Inject 1 mL under the skin into the appropriate area as directed Every 14 (Fourteen) Days., Disp: 2 mL, Rfl: 0  •  exenatide er (BYDUREON) 2 MG/0.85ML auto-injector injection, Inject 0.85 mL under the skin into the appropriate area as directed 1 (One) Time Per Week., Disp: 4 pen, Rfl: 11  •  FREESTYLE LITE test strip, , Disp: , Rfl:   •  levothyroxine (SYNTHROID) 200 MCG tablet, Take 1 tablet by mouth Daily., Disp: 30 tablet, Rfl: 5  •  levothyroxine sodium (TIROSINT) 25 MCG capsule, Take 1 capsule by mouth Daily., Disp: 30 capsule, Rfl: 5  •  lisinopril (PRINIVIL,ZESTRIL) 5 MG tablet, Take 1 tablet by mouth Daily., Disp: 30 tablet, Rfl: 2  •  nitroglycerin (NITROSTAT) 0.4 MG SL tablet, 1 under the tongue as needed for angina, may repeat q5mins for up three doses, Disp: 30 tablet, Rfl: 3  •  phenylephrine (SINUS RELIEF EXTRA STRENGTH) 1 % nasal spray, 1 spray into the nostril(s) as directed by provider Every 4 (Four) Hours As Needed for Congestion., Disp: , Rfl:   •  Prenatal Vit-Fe Fumarate-FA (PRENATAL VITAMIN 27-0.8) 27-0.8 MG tablet tablet, Take 1 tablet by mouth Daily., Disp: , Rfl:   •  sennosides-docusate sodium (SENOKOT-S) 8.6-50 MG tablet, Take 1 tablet by mouth 2 (Two) Times a Day., Disp: , Rfl:   •  sertraline (ZOLOFT) 50 MG tablet, Daily As Needed. daily, Disp: , Rfl:   •  ticagrelor (BRILINTA) 90 MG tablet tablet, Take 90 mg by mouth  2 (Two) Times a Day., Disp: , Rfl:   •  vitamin C (VITAMIN C) 500 MG tablet, Take 1 tablet by mouth 2 (Two) Times a Day., Disp: , Rfl:     Review of Systems   Constitution: Positive for malaise/fatigue. Negative for decreased appetite, fever, weight gain and weight loss.   HENT: Negative for hearing loss, hoarse voice and sore throat.    Eyes: Negative for blurred vision, visual disturbance and visual halos.   Cardiovascular: Positive for chest pain (Sternal Discomfort) and leg swelling. Negative for dyspnea on exertion and palpitations.   Respiratory: Negative for cough, shortness of breath and sputum production.    Endocrine: Positive for cold intolerance. Negative for polyuria.   Hematologic/Lymphatic: Negative for bleeding problem. Does not bruise/bleed easily.   Skin: Negative for color change, nail changes, poor wound healing and suspicious lesions.   Musculoskeletal: Positive for back pain. Negative for joint pain and myalgias.   Gastrointestinal: Negative for abdominal pain, constipation, diarrhea, hematemesis, melena, nausea and vomiting.   Genitourinary: Negative for flank pain, nocturia and urgency.   Neurological: Negative for brief paralysis, focal weakness, light-headedness, loss of balance, numbness and paresthesias.   Psychiatric/Behavioral: Negative for altered mental status, depression, suicidal ideas and thoughts of violence.   Allergic/Immunologic: Negative for hives and persistent infections.     Vitals:    04/23/19 1039   BP: 140/78   Pulse: 87   SpO2: 98%      Objective      Body mass index is 37.96 kg/m².  Physical Exam   Constitutional: She is oriented to person, place, and time. She appears well-developed and well-nourished. No distress.   HENT:   Head: Normocephalic and atraumatic.   Mouth/Throat: Oropharynx is clear and moist.   Eyes: Conjunctivae and EOM are normal. Pupils are equal, round, and reactive to light.   Neck: Normal range of motion. Neck supple. No tracheal deviation present.  No thyromegaly present.   Cardiovascular: Normal rate, regular rhythm, normal heart sounds and intact distal pulses.   No murmur heard.  Sternum stable no popping, clicking   Pulmonary/Chest: Effort normal and breath sounds normal. No stridor. No respiratory distress.   Abdominal: Soft. Bowel sounds are normal. She exhibits no distension. There is no tenderness.   Genitourinary:   Genitourinary Comments: deferred   Musculoskeletal: Normal range of motion. She exhibits edema.   +1 BLE   Neurological: She is alert and oriented to person, place, and time. No cranial nerve deficit. Coordination normal.   Skin: Skin is warm and dry. Capillary refill takes less than 2 seconds.   EVH CDI  Midsternal incision open at base, no drainage, measurements below     Psychiatric: She has a normal mood and affect. Her behavior is normal. Judgment normal.   Nursing note and vitals reviewed.    Wound Measurements  3x1x0    Assessment & Plan       1. Surgical aftercare, circulatory system  Progressing healing distal sternal wound  Complete antibiotics  Continue wound care. Soap/water daily. Cover duoderm.  Continue daily exercise  Signs and symptoms of infection including drainage from operative site, redness, swelling, with associated fever and/or chills notify Heart and Vascular center immediately for wound check.    Return 2 weeks- wound check    2. Coronary artery disease involving native coronary artery of native heart without angina pectoris  Medical Management: ASA,PLAVIX,STATIN, BETA, ACE    3. Type 2 diabetes mellitus with hyperglycemia, without long-term current use of insulin (CMS/[facility])  Increased risk for worsening CAD  Current A1C 7.4  Medical management: Diet. Insulin. Other injectables.   Followed by Pedro XAVIER            This document has been electronically signed by DUC Dash on April 24, 2019 4:52 PM

## 2019-05-08 ENCOUNTER — OFFICE VISIT (OUTPATIENT)
Dept: CARDIAC SURGERY | Facility: CLINIC | Age: 49
End: 2019-05-08

## 2019-05-08 VITALS
HEIGHT: 66 IN | TEMPERATURE: 97.9 F | OXYGEN SATURATION: 98 % | BODY MASS INDEX: 36.64 KG/M2 | HEART RATE: 77 BPM | SYSTOLIC BLOOD PRESSURE: 124 MMHG | WEIGHT: 228 LBS | DIASTOLIC BLOOD PRESSURE: 78 MMHG

## 2019-05-08 DIAGNOSIS — Z48.812 SURGICAL AFTERCARE, CIRCULATORY SYSTEM: ICD-10-CM

## 2019-05-08 DIAGNOSIS — I25.118 CORONARY ARTERY DISEASE OF NATIVE ARTERY OF NATIVE HEART WITH STABLE ANGINA PECTORIS (HCC): Primary | ICD-10-CM

## 2019-05-08 PROCEDURE — 99024 POSTOP FOLLOW-UP VISIT: CPT | Performed by: NURSE PRACTITIONER

## 2019-05-08 RX ORDER — FLUCONAZOLE 200 MG/1
200 TABLET ORAL ONCE
Qty: 1 TABLET | Refills: 0 | Status: SHIPPED | OUTPATIENT
Start: 2019-05-08 | End: 2019-05-08

## 2019-05-08 NOTE — PATIENT INSTRUCTIONS
"Post-Operative Activities after heart surgery are as follows.  No driving until 2-3 weeks post-op, may ambulate as much as desired with periods of rest during the day.  Recreational walks outdoors are helpful for mood if weather permits.  No lifting anything over 10lbs until 6 weeks post op as this may strain the breast bone.  You should not lift anything exceeding 20lbs until 12 weeks post op.  Continue to shower daily, poor soap/rinse/and pat dry incision sites, do not scrub.  You can start household chores araound 2-3 weeks post op however, no vacuuming shoveling, gardening, mopping, or laundry until 12 weeks post op.  Use common sense in choosing activities, activities should not cause strain.  Alternate periods of activity with periods of rest.  Please refer to your \"Moving Right Along Book\" for specific details.  If leg swelling occurs elevate the extremity in a reclined position and use support hose as needed.  Diflucan for one dose.  We will contact cardio fit at [facility] in [place] to enroll you in exercise program.      "

## 2019-05-13 NOTE — PROGRESS NOTES
CVTS Office Progress Note     Subjective   Patient ID: Rhea Sutton is a 48 y.o. female is here today for follow-up PO CABGx4.    Chief Complaint:    Chief Complaint   Patient presents with   • Wound Check   • Coronary Artery Disease       PCP:  Anderson Abbott MD  Cardiology:  Dr. Montana  Endocrinology: Raj Thompson     History of Present Illness  Ms. Garzon is a pleasant 48-year-old female with a history of hypertension, CAD, HLD, vitamin D deficiency, diabetes, hypothyroidism(total thyroidectomy), DDD.  She has extensive cardiovascular history with PCI in 2000 with stent placement to RCA.  Underwent LHC October 2017 with ETIENNE to mid circumflex, RCA stents at that time patent. She established with cardiothoracic and vascular surgery in January 2019 after being admitted [facility] with chest pain and exertional dyspnea with NSTEMI.  Underwent LHC with PCI to RCA with in-stent stenosis, LAD 80%, CX 70%, RCA 90% (PCI 40%).  As result she was considered for elective surgical revascularization.  Underwent CABG x4 March 5, 2019 LIMA-LAD, SVG- D1, SVG- OM 3, SVG- PDA.  Progressed slowly in hospital, discharged home on postop day 7.  Follow up wound check at the base of the sternum, incisions have now healed, operative incisions clean dry and intact.  Reports improvement in symptoms, has not started cardiac rehab, will refer to cardio fit.       1/2017 PCI CX  1/4/2019 Cardiac Catherization:  LAD 80%, CX 70%, RCA 90% (PCI 40%)  1/2019 ECG:  SB 58,   1/2019 Echocardiogram:  EF 55%, LA 32mm, LV 50mm, RVSP 18mmHg.  Mild MR TR.  3/5/2019 CABGx4: LIMA-LAD, SVG- D1, SVG- OM 3, SVG- PDA     2/2019 Carotid Duplex:  PARI 0-49% bidirectional vert.  LICA 0-49% antegrade vert.  2/2019 Lower extremity vein mapping:  RIGHT 3.0-6.5mm.  LEFT 2.0-5.6mm           The following portions of the patient's history were reviewed and updated as appropriate: allergies, current medications, past family  history, past medical history, past social history, past surgical history and problem list.  Recent images independently reviewed.  Available laboratory values reviewed.      Past Medical History:   Diagnosis Date   • CAD (coronary artery disease)    • Chronic back pain    • Chronic bronchitis (CMS/HCC)    • Diabetes mellitus (CMS/HCC)    • Diverticulitis 1986   • Goiter    • Hashimoto's thyroiditis    • Head lice infestation    • Hyperlipidemia    • Hypertension    • MI (myocardial infarction) (CMS/HCC)    • Postoperative hypothyroidism    • Sinus congestion    • Sleep apnea     not using c-pap     Past Surgical History:   Procedure Laterality Date   • APPENDECTOMY     • CARDIAC CATHETERIZATION N/A 10/13/2017   • CARDIAC CATHETERIZATION N/A 1/3/2019   • CHOLECYSTECTOMY     • COLON RESECTION     • COLON SURGERY     • CORONARY ARTERY BYPASS GRAFT N/A 3/5/2019    Procedure: CORONARY ARTERY BYPASS GRAFTING ENDOSCOPIC VEIN HARVEST          (CELL SAVER)             LATEX ALLERGY leg start @ 1323 chest start @ 1328 vein out 1417 vein prep [id number] on pump 1456 off pump 1729;  Surgeon: Jaren Vilchis MD;  Location: [facility];  Service: Cardiothoracic   • CORONARY STENT PLACEMENT     • MT RT/LT HEART CATHETERS N/A 10/13/2017   • TOTAL ABDOMINAL HYSTERECTOMY WITH SALPINGO OOPHORECTOMY     • TOTAL THYROIDECTOMY       Family History   Problem Relation Age of Onset   • Coronary artery disease Mother    • Coronary artery disease Father      Social History     Tobacco Use   • Smoking status: Former Smoker     Packs/day: 0.00     Years: 20.00     Pack years: 0.00     Types: Cigarettes     Last attempt to quit: 10/12/2017     Years since quittin.5   • Smokeless tobacco: Never Used   Substance Use Topics   • Alcohol use: No   • Drug use: No       ALLERGIES:   Coconut; Penicillins; Adhesive tape; Ciprofloxacin; and Latex    MEDICATIONS:      Current Outpatient Medications:   •  aspirin 81 MG chewable tablet, Chew  81 mg daily., Disp: , Rfl:   •  atorvastatin (LIPITOR) 40 MG tablet, Take 40 mg by mouth Every Night., Disp: , Rfl:   •  Blood Glucose Monitoring Suppl (FREESTYLE LITE) device, , Disp: , Rfl:   •  carvedilol (COREG) 3.125 MG tablet, Take 1 tablet by mouth Every 12 (Twelve) Hours., Disp: 60 tablet, Rfl: 3  •  cholecalciferol (VITAMIN D3) 1000 units tablet, Take 1,000 Units by mouth Daily. 5 capsules daily, Disp: , Rfl:   •  Ertugliflozin L-PyroglutamicAc (STEGLATRO) 5 MG tablet, Take 1 tablet by mouth Every Morning., Disp: 30 tablet, Rfl: 11  •  Evolocumab 140 MG/ML solution auto-injector, Inject 1 mL under the skin into the appropriate area as directed Every 14 (Fourteen) Days., Disp: 2 mL, Rfl: 0  •  exenatide er (BYDUREON) 2 MG/0.85ML auto-injector injection, Inject 0.85 mL under the skin into the appropriate area as directed 1 (One) Time Per Week., Disp: 4 pen, Rfl: 11  •  FREESTYLE LITE test strip, , Disp: , Rfl:   •  levothyroxine (SYNTHROID) 200 MCG tablet, Take 1 tablet by mouth Daily., Disp: 30 tablet, Rfl: 5  •  levothyroxine sodium (TIROSINT) 25 MCG capsule, Take 1 capsule by mouth Daily., Disp: 30 capsule, Rfl: 5  •  lisinopril (PRINIVIL,ZESTRIL) 5 MG tablet, Take 1 tablet by mouth Daily., Disp: 30 tablet, Rfl: 2  •  nitroglycerin (NITROSTAT) 0.4 MG SL tablet, 1 under the tongue as needed for angina, may repeat q5mins for up three doses, Disp: 30 tablet, Rfl: 3  •  phenylephrine (SINUS RELIEF EXTRA STRENGTH) 1 % nasal spray, 1 spray into the nostril(s) as directed by provider Every 4 (Four) Hours As Needed for Congestion., Disp: , Rfl:   •  Prenatal Vit-Fe Fumarate-FA (PRENATAL VITAMIN 27-0.8) 27-0.8 MG tablet tablet, Take 1 tablet by mouth Daily., Disp: , Rfl:   •  ticagrelor (BRILINTA) 90 MG tablet tablet, Take 90 mg by mouth 2 (Two) Times a Day., Disp: , Rfl:   •  sertraline (ZOLOFT) 50 MG tablet, Daily As Needed. daily, Disp: , Rfl:     Review of Systems   Constitution: Positive for weakness and  malaise/fatigue. Negative for decreased appetite, fever, weight gain and weight loss.   HENT: Negative for hearing loss, hoarse voice and sore throat.    Eyes: Negative for blurred vision, visual disturbance and visual halos.   Cardiovascular: Positive for chest pain (Sternal Discomfort) and leg swelling. Negative for dyspnea on exertion and palpitations.   Respiratory: Negative for cough, shortness of breath and sputum production.    Endocrine: Positive for cold intolerance. Negative for polyuria.   Hematologic/Lymphatic: Negative for bleeding problem. Does not bruise/bleed easily.   Skin: Negative for color change, nail changes, poor wound healing and suspicious lesions.   Musculoskeletal: Positive for back pain. Negative for joint pain and myalgias.   Gastrointestinal: Negative for abdominal pain, constipation, diarrhea, hematemesis, melena, nausea and vomiting.   Genitourinary: Negative for flank pain, nocturia and urgency.   Neurological: Negative for brief paralysis, focal weakness, light-headedness, loss of balance, numbness and paresthesias.   Psychiatric/Behavioral: Negative for altered mental status, depression, suicidal ideas and thoughts of violence.   Allergic/Immunologic: Negative for hives and persistent infections.     Vitals:    05/08/19 1322   BP: 124/78   Pulse: 77   Temp: 97.9 °F (36.6 °C)   SpO2: 98%      Objective      Body mass index is 36.8 kg/m².  Physical Exam   Constitutional: She is oriented to person, place, and time. She appears well-developed and well-nourished. No distress.   HENT:   Head: Normocephalic and atraumatic.   Mouth/Throat: Oropharynx is clear and moist.   Eyes: Conjunctivae and EOM are normal. Pupils are equal, round, and reactive to light.   Neck: Normal range of motion. Neck supple. No tracheal deviation present. No thyromegaly present.   Cardiovascular: Normal rate, regular rhythm, normal heart sounds and intact distal pulses.   No murmur heard.  Sternum stable no  popping, clicking   Pulmonary/Chest: Effort normal and breath sounds normal. No stridor. No respiratory distress.   Abdominal: Soft. Bowel sounds are normal. She exhibits no distension. There is no tenderness.   Genitourinary:   Genitourinary Comments: deferred   Musculoskeletal: Normal range of motion. She exhibits edema.   +1 BLE   Neurological: She is alert and oriented to person, place, and time. No cranial nerve deficit. Coordination normal.   Skin: Skin is warm and dry. Capillary refill takes less than 2 seconds.   EVH CDI  Midsternal incision open at base, no drainage, measurements below     Psychiatric: She has a normal mood and affect. Her behavior is normal. Judgment normal.   Nursing note and vitals reviewed.    Wound Measurements  Resolved    Assessment & Plan       ASCVD  Medical management of coronary artery disease at this time includes ASA/Brillinta, ACE, STATIN, and BB.  Currently does not report symptoms of angina or angina equivalents.  Patient is educated and understands the S/S of acute coronary syndrome and is aware to report to the nearest emergency department if these symptoms were to persist at rest.      Surgical Aftercare Post Op CABG  Progressing well with return of normal bodily functions.  Increasing activity and ambulation distance while following restrictions.  Enroll in [facility] [employer].  Follow up in 4 weeks for release from surgical restrictions.    Wound Care  Resolved    Restrictions  Post-Operative Activities after heart surgery are as follows.  No driving until 2-3 weeks post-op, may ambulate as much as desired with periods of rest during the day.  Recreational walks outdoors are helpful for mood if weather permits.  No lifting anything over 10lbs until 6 weeks post op as this may strain the breast bone.  You should not lift anything exceeding 20lbs until 12 weeks post op.  Continue to shower daily, poor soap/rinse/and pat dry incision sites, do not scrub.  You can start household  "chores araound 2-3 weeks post op however, no vacuuming shoveling, gardening, mopping, or laundry until 12 weeks post op.  Use common sense in choosing activities, activities should not cause strain.  Alternate periods of activity with periods of rest.  Please refer to your \"Moving Right Along Book\" for specific details.  If leg swelling occurs elevate the extremity in a reclined position and use support hose as needed.      Class 3 Obesity  We have discussed the benefits of weight loss as it relates to long term morbidity and disease prevention.  Interventions discussed included self-monitoring of caloric intake, increasing physical activity/exercise, goal setting, stimulus control, consultation with dietitian, and non-food rewards.          Detailed discussion regarding risks, benefits, and treatment plan. Images independently reviewed. Patient understands, agrees, and wishes to proceed with plan.       "

## 2019-07-16 ENCOUNTER — OFFICE VISIT (OUTPATIENT)
Dept: CARDIAC SURGERY | Facility: CLINIC | Age: 49
End: 2019-07-16

## 2019-07-16 ENCOUNTER — OFFICE VISIT (OUTPATIENT)
Dept: CARDIOLOGY | Facility: CLINIC | Age: 49
End: 2019-07-16

## 2019-07-16 VITALS
HEART RATE: 75 BPM | DIASTOLIC BLOOD PRESSURE: 76 MMHG | BODY MASS INDEX: 39.82 KG/M2 | HEIGHT: 66 IN | SYSTOLIC BLOOD PRESSURE: 122 MMHG | OXYGEN SATURATION: 98 % | WEIGHT: 247.8 LBS

## 2019-07-16 VITALS
SYSTOLIC BLOOD PRESSURE: 116 MMHG | DIASTOLIC BLOOD PRESSURE: 80 MMHG | WEIGHT: 246.91 LBS | OXYGEN SATURATION: 98 % | HEIGHT: 66 IN | BODY MASS INDEX: 39.68 KG/M2 | HEART RATE: 61 BPM

## 2019-07-16 DIAGNOSIS — I21.4 NSTEMI (NON-ST ELEVATED MYOCARDIAL INFARCTION) (HCC): Primary | ICD-10-CM

## 2019-07-16 DIAGNOSIS — E78.2 MIXED HYPERLIPIDEMIA: ICD-10-CM

## 2019-07-16 DIAGNOSIS — I25.118 CORONARY ARTERY DISEASE OF NATIVE ARTERY OF NATIVE HEART WITH STABLE ANGINA PECTORIS (HCC): Primary | ICD-10-CM

## 2019-07-16 DIAGNOSIS — I65.23 CAROTID STENOSIS, ASYMPTOMATIC, BILATERAL: ICD-10-CM

## 2019-07-16 DIAGNOSIS — I10 ESSENTIAL HYPERTENSION: Chronic | ICD-10-CM

## 2019-07-16 DIAGNOSIS — I25.118 CORONARY ARTERY DISEASE OF NATIVE ARTERY OF NATIVE HEART WITH STABLE ANGINA PECTORIS (HCC): ICD-10-CM

## 2019-07-16 PROCEDURE — 99024 POSTOP FOLLOW-UP VISIT: CPT | Performed by: NURSE PRACTITIONER

## 2019-07-16 PROCEDURE — 99214 OFFICE O/P EST MOD 30 MIN: CPT | Performed by: INTERNAL MEDICINE

## 2019-07-16 RX ORDER — HYDROCODONE BITARTRATE AND ACETAMINOPHEN 5; 325 MG/1; MG/1
TABLET ORAL
Refills: 0 | COMMUNITY
Start: 2019-07-10 | End: 2021-07-19

## 2019-07-16 RX ORDER — SERTRALINE HYDROCHLORIDE 100 MG/1
100 TABLET, FILM COATED ORAL DAILY
Refills: 3 | COMMUNITY
Start: 2019-06-22 | End: 2021-07-19

## 2019-07-16 NOTE — PROGRESS NOTES
Rhea Sutton  48 y.o. female    07/16/2019  1. NSTEMI (non-ST elevated myocardial infarction) (CMS/[facility])    2. Essential hypertension    3. Mixed hyperlipidemia    4. Coronary artery disease of native artery of native heart with stable angina pectoris (CMS/[facility])        History of Present Illness  Ms. Sutton is a 48-year-old  lady who was admitted to [facility] with prolonged chest pain with EKG evidence of acute inferior wall myocardial infarction in 1/ 2019.  She underwent emergent cardiac catheterization with PCI to right coronary artery by Dr. Gonzalez.  She was also noted to have significant disease in the LAD territory.  Catheterization showed:  Dominance: Right dominant  Left Main coronary artery: Large caliber vessel divides and left circumflex artery and LAD  Left anterior descending artery: Diffusely disease that is proximally there is 80-90% stenosis and distal LAD goes all the way to the apex and there is diffuse disease in the LAD  Left circumflex:  Large caliber vessel previously placed in the left circumflex artery is widely patent  Right coronary artery:  Proximal to mid was 100% inside the stent.  Percutaneous coronary intervention: After giving Angiomax bolus and drip a right guide was placed in the right coronary artery and a run-through wire was crossed through the lesion and was dilated with a 2.0 X 20 Trek balloon and subsequently distal RCA was stented with a 2.75 x 23 Xience alma stent, mid to distal was stented with a 3.0 x 33 stent and proximally with a 3.5 x 18 Xience alma stent and subsequently postdilated with a 3.0 x 35 noncompliant mozec balloon and subsequently there was a ostial stenosis of the PDA and it was attempted to place a stent but stent was getting stuck in the previously placed stent struts hence balloon angioplasty was done into the ostial PDA reducing 90% stenosis to 30-40%.  Conclusion : Successful PCI to the proximal to mid right coronary artery  "reducing 100% stenosis to 0% obtaining BEA 3 flow with Xience Kelley stents    CORONARY ARTERY BYPASS GRAFTING x4 in March 2019:  ENDOSCOPIC VEIN HARVEST           LEFT INTERNAL MAMMARY ARTERY TO LEFT ANTERIOR DESCENDING CORONARY ARTERY  SAPHENOUS VEIN GRAFT FROM ASCENDING AORTA TO FIRST DIAGONAL BRANCH  SAPHENOUS VEIN GRAFT FROM ASCENDING AORTA TO THIRD MARGINAL BRANCH  SAPHENOUS VEIN GRAFT FROM ASCENDING AORTA TO POSTERIOR DESCENDING CORONARY ARTERY     The patient has done well symptomatically with no recurrence of chest pain.  She has been compliant with all her medication but unfortunately does not watch her diet closely.  We had a discussion about appropriate diet and weight reduction.  Blood pressure was in the normal range.  Her lipid profiles have optimized with PCSK9 inhibitors.  She is a non-smoker at the present time.        SUBJECTIVE    Allergies   Allergen Reactions   • Coconut Anaphylaxis and Swelling   • Penicillins Swelling     Of the throat   • Adhesive Tape Other (See Comments)     Silk tape- blisters   • Ciprofloxacin Other (See Comments)     \"knots on my shins\"   • Latex Hives         Past Medical History:   Diagnosis Date   • CAD (coronary artery disease)    • Chronic back pain    • Chronic bronchitis (CMS/HCC)    • Diabetes mellitus (CMS/HCC)    • Diverticulitis 1986 2017   • Goiter    • Hashimoto's thyroiditis    • Head lice infestation    • Hyperlipidemia    • Hypertension    • MI (myocardial infarction) (CMS/HCC)    • Postoperative hypothyroidism    • Sinus congestion    • Sleep apnea     not using c-pap         Past Surgical History:   Procedure Laterality Date   • APPENDECTOMY     • CARDIAC CATHETERIZATION N/A 10/13/2017   • CARDIAC CATHETERIZATION N/A 1/3/2019   • CHOLECYSTECTOMY     • COLON RESECTION     • COLON SURGERY     • CORONARY ARTERY BYPASS GRAFT N/A 3/5/2019    Procedure: CORONARY ARTERY BYPASS GRAFTING ENDOSCOPIC VEIN HARVEST          (CELL SAVER)             LATEX ALLERGY " leg start @ 1323 chest start @ 1328 vein out 1417 vein prep [id number] on pump 1456 off pump 1729;  Surgeon: Jaren Vilchis MD;  Location: [facility];  Service: Cardiothoracic   • CORONARY STENT PLACEMENT     • MN RT/LT HEART CATHETERS N/A 10/13/2017   • TOTAL ABDOMINAL HYSTERECTOMY WITH SALPINGO OOPHORECTOMY     • TOTAL THYROIDECTOMY           Family History   Problem Relation Age of Onset   • Coronary artery disease Mother    • Coronary artery disease Father          Social History     Socioeconomic History   • Marital status:      Spouse name: Not on file   • Number of children: Not on file   • Years of education: Not on file   • Highest education level: Not on file   Tobacco Use   • Smoking status: Former Smoker     Packs/day: 0.00     Years: 20.00     Pack years: 0.00     Types: Cigarettes     Last attempt to quit: 10/12/2017     Years since quittin.7   • Smokeless tobacco: Never Used   Substance and Sexual Activity   • Alcohol use: No   • Drug use: No   • Sexual activity: Not Currently         Current Outpatient Medications   Medication Sig Dispense Refill   • aspirin 81 MG chewable tablet Chew 81 mg daily.     • atorvastatin (LIPITOR) 40 MG tablet Take 40 mg by mouth Every Night.     • Blood Glucose Monitoring Suppl (FREESTYLE LITE) device      • carvedilol (COREG) 3.125 MG tablet Take 1 tablet by mouth Every 12 (Twelve) Hours. 60 tablet 3   • cholecalciferol (VITAMIN D3) 1000 units tablet Take 1,000 Units by mouth Daily. 5 capsules daily     • Ertugliflozin L-PyroglutamicAc (STEGLATRO) 5 MG tablet Take 1 tablet by mouth Every Morning. 30 tablet 11   • Evolocumab 140 MG/ML solution auto-injector Inject 1 mL under the skin into the appropriate area as directed Every 14 (Fourteen) Days. 2 mL 0   • exenatide er (BYDUREON) 2 MG/0.85ML auto-injector injection Inject 0.85 mL under the skin into the appropriate area as directed 1 (One) Time Per Week. 4 pen 11   • FREESTYLE LITE test strip      •  "HYDROcodone-acetaminophen (NORCO) 5-325 MG per tablet TAKE 1 2 TO 1 (ONE HALF TO ONE) TABLET BY MOUTH ONCE DAILY  0   • levothyroxine (SYNTHROID) 200 MCG tablet Take 1 tablet by mouth Daily. 30 tablet 5   • levothyroxine sodium (TIROSINT) 25 MCG capsule Take 1 capsule by mouth Daily. 30 capsule 5   • lisinopril (PRINIVIL,ZESTRIL) 5 MG tablet Take 1 tablet by mouth Daily. 30 tablet 2   • nitroglycerin (NITROSTAT) 0.4 MG SL tablet 1 under the tongue as needed for angina, may repeat q5mins for up three doses 30 tablet 3   • phenylephrine (SINUS RELIEF EXTRA STRENGTH) 1 % nasal spray 1 spray into the nostril(s) as directed by provider Every 4 (Four) Hours As Needed for Congestion.     • sertraline (ZOLOFT) 100 MG tablet Take 100 mg by mouth Daily. FOR 30 DAYS  3   • ticagrelor (BRILINTA) 90 MG tablet tablet Take 90 mg by mouth 2 (Two) Times a Day.       No current facility-administered medications for this visit.          OBJECTIVE    /80 (BP Location: Left arm, Patient Position: Sitting, Cuff Size: Large Adult)   Pulse 61   Ht 167.6 cm (66\")   Wt 112 kg (246 lb 14.6 oz)   SpO2 98%   BMI 39.85 kg/m²         Review of Systems     Constitutional:  Denies recent weight loss, weight gain, fever or chills, no change in exercise tolerance     HENT:  Denies any hearing loss, epistaxis, hoarseness, or difficulty speaking.     Eyes: Wears eyeglasses or contact lenses     Respiratory:  Denies dyspnea with exertion,no cough, wheezing, or hemoptysis.     Cardiovascular: Negative for palpations, chest pain, orthopnea, PND    Gastrointestinal:  Denies change in bowel habits, dyspepsia, ulcer disease, hematochezia, or melena.     Endocrine: Negative for cold intolerance, heat intolerance, polydipsia, polyphagia and polyuria. Hyperlipidemia, hypothyroidism    Genitourinary: Negative.      Musculoskeletal: Denies any history of arthritic symptoms or back problems     Skin:  Denies any change in hair or nails, rashes, or skin " lesions.     Allergic/Immunologic: Negative.  Negative for environmental allergies, food allergies and immunocompromised state.     Neurological:  Denies any history of recurrent headaches, strokes, TIA, or seizure disorder.     Hematological: Denies any food allergies, seasonal allergies, bleeding disorders, or lymphadenopathy.     Psychiatric/Behavioral: history of depression, no substance abuse, or change in cognitive function.         Physical Exam     Constitutional: Cooperative, alert and oriented,  in no acute distress.     HENT:   Head: Normocephalic, normal hair patterns, no masses or tenderness.  Ears, Nose, and Throat: No gross abnormalities. No pallor or cyanosis.   Eyes: EOMS intact, PERRL, conjunctivae and lids unremarkable. Fundoscopic exam and visual fields not performed.   Neck: No palpable masses or adenopathy, no thyromegaly, no JVD, carotid pulses are full and equal bilaterally and without  Bruits.     Cardiovascular: Regular rhythm, S1 and S2 normal, no S3 or S4.  No murmurs, gallops, or rubs detected.     Pulmonary/Chest: Chest: normal symmetry, normal respiratory excursion, no intercostal retraction, no use of accessory muscles.            Pulmonary: Normal breath sounds. No rales or ronchi.    Abdominal: Abdomen soft, bowel sounds normoactive, no masses, no hepatosplenomegaly, non-tender, no bruits.     Musculoskeletal: No deformities, clubbing, cyanosis, erythema, or edema observed.     Neurological: No gross motor or sensory deficits noted, affect appropriate, oriented to time, person, place.     Skin: Warm and dry to the touch, no apparent skin lesions or masses noted.     Psychiatric: She has a normal mood and affect. Her behavior is normal. Judgment and thought content normal.         Procedures      Lab Results   Component Value Date    WBC 12.36 (H) 03/25/2019    HGB 11.5 (L) 03/25/2019    HCT 35.7 03/25/2019    MCV 90.4 03/25/2019     03/25/2019     Lab Results   Component  Value Date    GLUCOSE 125 (H) 03/25/2019    BUN 19 03/25/2019    CREATININE 1.08 (H) 03/25/2019    EGFRIFNONA 54 (L) 03/25/2019    BCR 17.6 03/25/2019    CO2 26.0 03/25/2019    CALCIUM 9.3 03/25/2019    ALBUMIN 4.50 03/25/2019    AST 70 (H) 03/25/2019    ALT 60 (H) 03/25/2019     Lab Results   Component Value Date    CHOL 76 01/17/2019    CHOL 137 01/04/2019    CHOL 278 (H) 06/06/2018     Lab Results   Component Value Date    TRIG 63 01/17/2019    TRIG 53 01/04/2019    TRIG 149 06/06/2018     Lab Results   Component Value Date    HDL 54 (L) 01/17/2019    HDL 49 (L) 01/04/2019    HDL 67 06/06/2018     No components found for: LDLCALC  Lab Results   Component Value Date    LDL <30 01/17/2019    LDL 77 01/04/2019     (H) 06/06/2018     No results found for: HDLLDLRATIO  No components found for: CHOLHDL  Lab Results   Component Value Date    HGBA1C 7.4 (H) 03/18/2019     Lab Results   Component Value Date    TSH 91.900 (H) 03/18/2019    [id number] 24.1 (L) 06/08/2017           ASSESSMENT AND PLAN  Rhea aCmacho is progressing well following her coronary artery bypass surgery.  No angina, arrhythmia or congestive heart failure is noted.   Lipid lowering therapy with atorvastatin, Repatha, antihypertensive therapy with carvedilol, lisinopril has been continued.  Antiplatelet therapy with aspirin and Brilinta and antihypertensive therapy with lisinopril and carvedilol has been continued.  Aggressive risk factor modification and weight reduction recommended.    Rhea was seen today for coronary artery disease.    Diagnoses and all orders for this visit:    NSTEMI (non-ST elevated myocardial infarction) (CMS/[facility])    Essential hypertension    Mixed hyperlipidemia    Coronary artery disease of native artery of native heart with stable angina pectoris (CMS/[facility])        Patient's Body mass index is 39.85 kg/m². BMI is above normal parameters. Recommendations include: exercise counseling and nutrition counseling.  Patient  is a non smoker      Mian Montana MD  7/16/2019  10:31 AM

## 2019-07-16 NOTE — PATIENT INSTRUCTIONS
Follow up in 2 years for repeat carotid ultrasound.    Recommended lifestyle modifications to reduce the progression of vascular disease include increasing moderate activity to 150 minutes per week, avoidance of smoking, optimizing blood pressure control, strict control of diabetes, and management of hyperlipidemia.  Choose a diet that emphasizes intake of vegetables, fruits, and whole grains; includes low-fat dairy products, poultry, fish, legumes, nontropical vegetable oils, and nuts; and limits intake of sweets, sugar-sweetened beverages, and red meats.  Limit alcohol intake to one drink per day in females and 2 drinks per day in men.

## 2019-07-17 ENCOUNTER — OFFICE VISIT (OUTPATIENT)
Dept: ENDOCRINOLOGY | Facility: CLINIC | Age: 49
End: 2019-07-17

## 2019-07-17 VITALS
DIASTOLIC BLOOD PRESSURE: 88 MMHG | BODY MASS INDEX: 41.46 KG/M2 | WEIGHT: 258 LBS | SYSTOLIC BLOOD PRESSURE: 124 MMHG | HEART RATE: 70 BPM | HEIGHT: 66 IN

## 2019-07-17 DIAGNOSIS — E55.9 VITAMIN D DEFICIENCY: ICD-10-CM

## 2019-07-17 DIAGNOSIS — E11.65 UNCONTROLLED TYPE 2 DIABETES MELLITUS WITH HYPERGLYCEMIA (HCC): Primary | ICD-10-CM

## 2019-07-17 DIAGNOSIS — E89.0 POSTSURGICAL HYPOTHYROIDISM: ICD-10-CM

## 2019-07-17 DIAGNOSIS — E78.2 MIXED HYPERLIPIDEMIA: ICD-10-CM

## 2019-07-17 PROCEDURE — 99214 OFFICE O/P EST MOD 30 MIN: CPT | Performed by: NURSE PRACTITIONER

## 2019-07-17 NOTE — PROGRESS NOTES
Subjective    Rhea Sutton is a 48 y.o. female. she is here today for follow-up.    History of Present Illness       49 yo female presents for follow up      Hypothyroidism   Duration, since 2015  Secondary to total thyroidectomy for obstructive goiter  Aggravating I spoke with patient and she now admits to noncompliance. -- she states she is being compliant   Alleviating,armour thyroid--stopped     Now on levothyroxine   Symptoms, fatigue, weakness, malaise     She states today she is compliant and taking the levothryoxine               Lab Results   Component Value Date     TSH 91.900 (H) 03/18/2019               Type 2 Diabetes  Duration           Lab Results   Component Value Date     HGBA1C 7.4 (H) 03/18/2019               Dates back to Oct 2017 but now significantly uncontrolled           Severity , high     Patient was admitted with MI, sp PCI to RCA , has multivessel CAD and is being evaluated by CT surgery      Aggravating, high carb meals at home and lack of exercise      Treatment. Home treatment with trulicity , and steglatro        Home readings 90 up to 130 per patient        She had CABG times 4 on March 4, 2019               The following portions of the patient's history were reviewed and updated as appropriate:   Past Medical History:   Diagnosis Date   • CAD (coronary artery disease)    • Chronic back pain    • Chronic bronchitis (CMS/HCC)    • Diabetes mellitus (CMS/HCC)    • Diverticulitis 1986 2017   • Goiter    • Hashimoto's thyroiditis    • Head lice infestation    • Hyperlipidemia    • Hypertension    • MI (myocardial infarction) (CMS/HCC)    • Postoperative hypothyroidism    • Sinus congestion    • Sleep apnea     not using c-pap     Past Surgical History:   Procedure Laterality Date   • APPENDECTOMY     • CARDIAC CATHETERIZATION N/A 10/13/2017   • CARDIAC CATHETERIZATION N/A 1/3/2019   • CHOLECYSTECTOMY     • COLON RESECTION     • COLON SURGERY     • CORONARY ARTERY BYPASS GRAFT  N/A 3/5/2019    Procedure: CORONARY ARTERY BYPASS GRAFTING ENDOSCOPIC VEIN HARVEST          (CELL SAVER)             LATEX ALLERGY leg start @ 1323 chest start @ 1328 vein out 1417 vein prep [id number] on pump 1456 off pump 1729;  Surgeon: Jaren Vilchis MD;  Location: [facility];  Service: Cardiothoracic   • CORONARY STENT PLACEMENT     • RI RT/LT HEART CATHETERS N/A 10/13/2017   • TOTAL ABDOMINAL HYSTERECTOMY WITH SALPINGO OOPHORECTOMY     • TOTAL THYROIDECTOMY       Family History   Problem Relation Age of Onset   • Coronary artery disease Mother    • Coronary artery disease Father      OB History     No data available        Current Outpatient Medications   Medication Sig Dispense Refill   • aspirin 81 MG chewable tablet Chew 81 mg daily.     • atorvastatin (LIPITOR) 40 MG tablet Take 40 mg by mouth Every Night.     • Blood Glucose Monitoring Suppl (FREESTYLE LITE) device      • carvedilol (COREG) 3.125 MG tablet Take 1 tablet by mouth Every 12 (Twelve) Hours. 60 tablet 3   • cholecalciferol (VITAMIN D3) 1000 units tablet Take 1,000 Units by mouth Daily. 5 capsules daily     • Ertugliflozin L-PyroglutamicAc (STEGLATRO) 5 MG tablet Take 1 tablet by mouth Every Morning. 30 tablet 11   • Evolocumab 140 MG/ML solution auto-injector Inject 1 mL under the skin into the appropriate area as directed Every 14 (Fourteen) Days. 2 mL 0   • exenatide er (BYDUREON) 2 MG/0.85ML auto-injector injection Inject 0.85 mL under the skin into the appropriate area as directed 1 (One) Time Per Week. 4 pen 11   • FREESTYLE LITE test strip      • HYDROcodone-acetaminophen (NORCO) 5-325 MG per tablet TAKE 1 2 TO 1 (ONE HALF TO ONE) TABLET BY MOUTH ONCE DAILY  0   • levothyroxine (SYNTHROID) 200 MCG tablet Take 1 tablet by mouth Daily. 30 tablet 5   • levothyroxine sodium (TIROSINT) 25 MCG capsule Take 1 capsule by mouth Daily. 30 capsule 5   • lisinopril (PRINIVIL,ZESTRIL) 5 MG tablet Take 1 tablet by mouth Daily. 30 tablet 2   •  "nitroglycerin (NITROSTAT) 0.4 MG SL tablet 1 under the tongue as needed for angina, may repeat q5mins for up three doses 30 tablet 3   • phenylephrine (SINUS RELIEF EXTRA STRENGTH) 1 % nasal spray 1 spray into the nostril(s) as directed by provider Every 4 (Four) Hours As Needed for Congestion.     • sertraline (ZOLOFT) 100 MG tablet Take 100 mg by mouth Daily. FOR 30 DAYS  3   • ticagrelor (BRILINTA) 90 MG tablet tablet Take 90 mg by mouth 2 (Two) Times a Day.       No current facility-administered medications for this visit.      Allergies   Allergen Reactions   • Coconut Anaphylaxis and Swelling   • Penicillins Swelling     Of the throat   • Adhesive Tape Other (See Comments)     Silk tape- blisters   • Ciprofloxacin Other (See Comments)     \"knots on my shins\"   • Latex Hives     Social History     Socioeconomic History   • Marital status:      Spouse name: Not on file   • Number of children: Not on file   • Years of education: Not on file   • Highest education level: Not on file   Tobacco Use   • Smoking status: Former Smoker     Packs/day: 0.00     Years: 20.00     Pack years: 0.00     Types: Cigarettes     Last attempt to quit: 10/12/2017     Years since quittin.7   • Smokeless tobacco: Never Used   Substance and Sexual Activity   • Alcohol use: No   • Drug use: No   • Sexual activity: Not Currently       Review of Systems  Review of Systems   Constitutional: Negative for activity change, appetite change, diaphoresis and fatigue.   HENT: Negative for facial swelling, sneezing, sore throat, tinnitus, trouble swallowing and voice change.    Eyes: Negative for photophobia, pain, discharge, redness, itching and visual disturbance.   Respiratory: Negative for apnea, cough, choking, chest tightness and shortness of breath.    Cardiovascular: Negative for chest pain, palpitations and leg swelling.   Gastrointestinal: Negative for abdominal distention, abdominal pain, constipation, diarrhea, nausea and " "vomiting.   Endocrine: Negative for cold intolerance, heat intolerance, polydipsia, polyphagia and polyuria.   Genitourinary: Negative for difficulty urinating, dysuria, frequency, hematuria and urgency.   Musculoskeletal: Negative for arthralgias, back pain, gait problem, joint swelling, myalgias, neck pain and neck stiffness.   Skin: Negative for color change, pallor, rash and wound.   Neurological: Negative for dizziness, tremors, weakness, light-headedness, numbness and headaches.   Hematological: Negative for adenopathy. Does not bruise/bleed easily.   Psychiatric/Behavioral: Negative for behavioral problems, confusion and sleep disturbance.        Objective    /88 (BP Location: Right arm, Patient Position: Sitting, Cuff Size: Adult)   Pulse 70   Ht 167.6 cm (66\")   Wt 117 kg (258 lb)   BMI 41.64 kg/m²   Physical Exam   Constitutional: She is oriented to person, place, and time. She appears well-developed and well-nourished. No distress.   HENT:   Head: Normocephalic and atraumatic.   Right Ear: External ear normal.   Left Ear: External ear normal.   Nose: Nose normal.   Eyes: Conjunctivae and EOM are normal. Pupils are equal, round, and reactive to light.   Neck: Normal range of motion. Neck supple. No tracheal deviation present.   Thyroid surgically absent   Cardiovascular: Normal rate, regular rhythm and normal heart sounds.   No murmur heard.  Pulmonary/Chest: Effort normal and breath sounds normal. No respiratory distress. She has no wheezes.   Abdominal: Soft. Bowel sounds are normal. There is no tenderness. There is no rebound and no guarding.   Musculoskeletal: Normal range of motion. She exhibits no edema, tenderness or deformity.   Neurological: She is alert and oriented to person, place, and time. No cranial nerve deficit.   Skin: Skin is warm and dry. No rash noted.   Psychiatric: She has a normal mood and affect. Her behavior is normal. Judgment and thought content normal.       Lab " Review  Glucose (mg/dL)   Date Value   03/25/2019 125 (H)   03/08/2019 123 (H)   03/06/2019 110 (H)     Glucose, Arterial (mmol/L)   Date Value   03/06/2019 115 (H)   03/06/2019 126 (H)   03/05/2019      Comment:      The parameter value has a question [id number] Glu not usable     Sodium (mmol/L)   Date Value   03/25/2019 136 (L)   03/08/2019 134 (L)   03/06/2019 138     Potassium (mmol/L)   Date Value   03/25/2019 4.2   03/08/2019 4.3   03/07/2019 3.9     Chloride (mmol/L)   Date Value   03/25/2019 99   03/08/2019 103   03/06/2019 106     CO2 (mmol/L)   Date Value   03/25/2019 26.0   03/08/2019 23.0   03/06/2019 24.0     BUN (mg/dL)   Date Value   03/25/2019 19   03/08/2019 17   03/06/2019 8     Creatinine (mg/dL)   Date Value   03/25/2019 1.08 (H)   03/08/2019 0.80   03/06/2019 0.83     Hemoglobin A1C (%)   Date Value   03/18/2019 7.4 (H)   01/17/2019 10.5 (H)   01/04/2019 12.0 (H)     Triglycerides (mg/dL)   Date Value   01/17/2019 63   01/04/2019 53   06/06/2018 149     LDL Cholesterol  (mg/dL)   Date Value   01/17/2019 <30   01/04/2019 77   06/06/2018 184 (H)   03/05/2018 270 (H)       Assessment/Plan      1. Uncontrolled type 2 diabetes mellitus with hyperglycemia (CMS/[facility])    2. Postsurgical hypothyroidism    3. Mixed hyperlipidemia    4. Vitamin D deficiency    .    Medications prescribed:  Outpatient Encounter Medications as of 7/17/2019   Medication Sig Dispense Refill   • aspirin 81 MG chewable tablet Chew 81 mg daily.     • atorvastatin (LIPITOR) 40 MG tablet Take 40 mg by mouth Every Night.     • Blood Glucose Monitoring Suppl (FREESTYLE LITE) device      • carvedilol (COREG) 3.125 MG tablet Take 1 tablet by mouth Every 12 (Twelve) Hours. 60 tablet 3   • cholecalciferol (VITAMIN D3) 1000 units tablet Take 1,000 Units by mouth Daily. 5 capsules daily     • Ertugliflozin L-PyroglutamicAc (STEGLATRO) 5 MG tablet Take 1 tablet by mouth Every Morning. 30 tablet 11   • Evolocumab 140 MG/ML solution  auto-injector Inject 1 mL under the skin into the appropriate area as directed Every 14 (Fourteen) Days. 2 mL 0   • exenatide er (BYDUREON) 2 MG/0.85ML auto-injector injection Inject 0.85 mL under the skin into the appropriate area as directed 1 (One) Time Per Week. 4 pen 11   • FREESTYLE LITE test strip      • HYDROcodone-acetaminophen (NORCO) 5-325 MG per tablet TAKE 1 2 TO 1 (ONE HALF TO ONE) TABLET BY MOUTH ONCE DAILY  0   • levothyroxine (SYNTHROID) 200 MCG tablet Take 1 tablet by mouth Daily. 30 tablet 5   • levothyroxine sodium (TIROSINT) 25 MCG capsule Take 1 capsule by mouth Daily. 30 capsule 5   • lisinopril (PRINIVIL,ZESTRIL) 5 MG tablet Take 1 tablet by mouth Daily. 30 tablet 2   • nitroglycerin (NITROSTAT) 0.4 MG SL tablet 1 under the tongue as needed for angina, may repeat q5mins for up three doses 30 tablet 3   • phenylephrine (SINUS RELIEF EXTRA STRENGTH) 1 % nasal spray 1 spray into the nostril(s) as directed by provider Every 4 (Four) Hours As Needed for Congestion.     • sertraline (ZOLOFT) 100 MG tablet Take 100 mg by mouth Daily. FOR 30 DAYS  3   • ticagrelor (BRILINTA) 90 MG tablet tablet Take 90 mg by mouth 2 (Two) Times a Day.       No facility-administered encounter medications on file as of 7/17/2019.        Orders placed during this encounter include:  No orders of the defined types were placed in this encounter.    Type 2 Diabetes Mellitus With Hyperglycemia ([facility])   Postsurgical Hypothyroidism         Hypothyroidism-postsurgical      Patient admits to noncompliance           Since TSH is 70 , I will start with 150 mcgs and expect to see her in 2 week to increase dose to 200 mcgs                Lab Results   Component Value Date     TSH 73.500 (H) 01/17/2019      Component      Latest Ref Rng & Units 1/17/2019   Free T4      0.78 - 2.19 ng/dL 0.98            We will follow Free T4 as a guide to treatment since the intention is to have her near euthyroid for potential upcoming CABG.      Increase to 200 mcg daily               Lab Results   Component Value Date     TSH 91.900 (H) 03/18/2019          increased to 225 mcg      She promises she is taking it         ====     Type 2 diabetes              Lab Results   Component Value Date     HGBA1C 7.4 (H) 03/18/2019                  Complicated by CAD  Back in March GFR less than 45 but now 58.               Trulicity 0.75 mg weekly - she is taking -- insurance is requiring bydueron -- RX sent --- never started    Resubmit today        Stop Glipizide  .  steglatro 5 mg     As long as she eats up to 50 grams of carbohdyrate per meal, she doesn't need novolog.   If she exceeds 50 grams of carbohydrate , I will give her 5 units to 10 units before meals based on carb load. ==she is not using the Novolog        Bone Health     Component      Latest Ref Rng & Units 1/17/2019   25 Hydroxy, Vitamin D      30.0 - 100.0 ng/ml 28.1 (L)         Taking vitamin d supplement                  Lab Results   Component Value Date     [id number] 01/17/2019           Lipid Management:        Component      Latest Ref Rng & Units 1/17/2019   Total Cholesterol      0 - 199 mg/dL 76   Triglycerides      20 - 199 mg/dL 63   HDL Cholesterol      60 - 200 mg/dL 54 (L)   LDL Cholesterol       1 - 129 mg/dL <30   LDL/HDL Ratio      0.00 - 3.22        On repatha         4. Follow-up: No Follow-up on file.

## 2019-07-23 NOTE — PROGRESS NOTES
CVTS Office Progress Note     Subjective   Patient ID: Rhea Sutton is a 48 y.o. female is here today for follow-up PO CABGx4.    Chief Complaint:    Chief Complaint   Patient presents with   • Coronary Artery Disease     3/5/2019 procedure cabg       PCP:  Anderson Abbott MD  Cardiology:  Dr. Montana  Endocrinology: Raj Thompson     History of Present Illness  Ms. Garzon is a pleasant 48-year-old female with a history of hypertension, CAD, HLD, vitamin D deficiency, diabetes, hypothyroidism(total thyroidectomy), DDD.  She has extensive cardiovascular history with PCI in 2000 with stent placement to RCA.  Underwent LHC October 2017 with ETIENNE to mid circumflex, RCA stents at that time patent. She established with cardiothoracic and vascular surgery in January 2019 after being admitted [facility] with chest pain and exertional dyspnea with NSTEMI.  Underwent LHC with PCI to RCA with in-stent stenosis, LAD 80%, CX 70%, RCA 90% (PCI 40%).  As result she was considered for elective surgical revascularization.  Underwent CABG x4 March 5, 2019 LIMA-LAD, SVG- D1, SVG- OM 3, SVG- PDA.  Progressed slowly in hospital, discharged home on postop day 7.  Follow up wound check at the base of the sternum, incisions have now healed, operative incisions clean dry and intact.  Reports improvement in symptoms, has not started cardiac rehab, will refer to cardio fit.       1/2017 PCI CX  1/4/2019 Cardiac Catherization:  LAD 80%, CX 70%, RCA 90% (PCI 40%)  1/2019 ECG:  SB 58,   1/2019 Echocardiogram:  EF 55%, LA 32mm, LV 50mm, RVSP 18mmHg.  Mild MR TR.  3/5/2019 CABGx4: LIMA-LAD, SVG- D1, SVG- OM 3, SVG- PDA     2/2019 Carotid Duplex:  PARI 0-49% bidirectional vert.  LICA 0-49% antegrade vert.  2/2019 Lower extremity vein mapping:  RIGHT 3.0-6.5mm.  LEFT 2.0-5.6mm           The following portions of the patient's history were reviewed and updated as appropriate: allergies, current medications,  past family history, past medical history, past social history, past surgical history and problem list.  Recent images independently reviewed.  Available laboratory values reviewed.      Past Medical History:   Diagnosis Date   • CAD (coronary artery disease)    • Chronic back pain    • Chronic bronchitis (CMS/HCC)    • Diabetes mellitus (CMS/HCC)    • Diverticulitis 1986   • Goiter    • Hashimoto's thyroiditis    • Head lice infestation    • Hyperlipidemia    • Hypertension    • MI (myocardial infarction) (CMS/HCC)    • Postoperative hypothyroidism    • Sinus congestion    • Sleep apnea     not using c-pap     Past Surgical History:   Procedure Laterality Date   • APPENDECTOMY     • CARDIAC CATHETERIZATION N/A 10/13/2017   • CARDIAC CATHETERIZATION N/A 1/3/2019   • CHOLECYSTECTOMY     • COLON RESECTION     • COLON SURGERY     • CORONARY ARTERY BYPASS GRAFT N/A 3/5/2019    Procedure: CORONARY ARTERY BYPASS GRAFTING ENDOSCOPIC VEIN HARVEST          (CELL SAVER)             LATEX ALLERGY leg start @ 1323 chest start @ 1328 vein out 1417 vein prep [id number] on pump 1456 off pump 1729;  Surgeon: Jaren Vilchis MD;  Location: [facility];  Service: Cardiothoracic   • CORONARY STENT PLACEMENT     • DC RT/LT HEART CATHETERS N/A 10/13/2017   • TOTAL ABDOMINAL HYSTERECTOMY WITH SALPINGO OOPHORECTOMY     • TOTAL THYROIDECTOMY       Family History   Problem Relation Age of Onset   • Coronary artery disease Mother    • Coronary artery disease Father      Social History     Tobacco Use   • Smoking status: Former Smoker     Packs/day: 0.00     Years: 20.00     Pack years: 0.00     Types: Cigarettes     Last attempt to quit: 10/12/2017     Years since quittin.7   • Smokeless tobacco: Never Used   Substance Use Topics   • Alcohol use: No   • Drug use: No       ALLERGIES:   Coconut; Penicillins; Adhesive tape; Ciprofloxacin; and Latex    MEDICATIONS:      Current Outpatient Medications:   •  aspirin 81 MG chewable  tablet, Chew 81 mg daily., Disp: , Rfl:   •  atorvastatin (LIPITOR) 40 MG tablet, Take 40 mg by mouth Every Night., Disp: , Rfl:   •  Blood Glucose Monitoring Suppl (FREESTYLE LITE) device, , Disp: , Rfl:   •  carvedilol (COREG) 3.125 MG tablet, Take 1 tablet by mouth Every 12 (Twelve) Hours., Disp: 60 tablet, Rfl: 3  •  cholecalciferol (VITAMIN D3) 1000 units tablet, Take 1,000 Units by mouth Daily. 5 capsules daily, Disp: , Rfl:   •  Ertugliflozin L-PyroglutamicAc (STEGLATRO) 5 MG tablet, Take 1 tablet by mouth Every Morning., Disp: 30 tablet, Rfl: 11  •  Evolocumab 140 MG/ML solution auto-injector, Inject 1 mL under the skin into the appropriate area as directed Every 14 (Fourteen) Days., Disp: 2 mL, Rfl: 0  •  exenatide er (BYDUREON BCISE) 2 MG/0.85ML auto-injector injection, Inject 0.85 mL under the skin into the appropriate area as directed 1 (One) Time Per Week., Disp: 4 pen, Rfl: 11  •  FREESTYLE LITE test strip, , Disp: , Rfl:   •  HYDROcodone-acetaminophen (NORCO) 5-325 MG per tablet, TAKE 1 2 TO 1 (ONE HALF TO ONE) TABLET BY MOUTH ONCE DAILY, Disp: , Rfl: 0  •  levothyroxine (SYNTHROID) 200 MCG tablet, Take 1 tablet by mouth Daily., Disp: 30 tablet, Rfl: 5  •  levothyroxine sodium (TIROSINT) 25 MCG capsule, Take 1 capsule by mouth Daily., Disp: 30 capsule, Rfl: 5  •  lisinopril (PRINIVIL,ZESTRIL) 5 MG tablet, Take 1 tablet by mouth Daily., Disp: 30 tablet, Rfl: 2  •  nitroglycerin (NITROSTAT) 0.4 MG SL tablet, 1 under the tongue as needed for angina, may repeat q5mins for up three doses, Disp: 30 tablet, Rfl: 3  •  phenylephrine (SINUS RELIEF EXTRA STRENGTH) 1 % nasal spray, 1 spray into the nostril(s) as directed by provider Every 4 (Four) Hours As Needed for Congestion., Disp: , Rfl:   •  sertraline (ZOLOFT) 100 MG tablet, Take 100 mg by mouth Daily. FOR 30 DAYS, Disp: , Rfl: 3  •  ticagrelor (BRILINTA) 90 MG tablet tablet, Take 90 mg by mouth 2 (Two) Times a Day., Disp: , Rfl:     Review of Systems    Constitution: Positive for weakness and malaise/fatigue. Negative for decreased appetite, fever, weight gain and weight loss.   HENT: Negative for hearing loss, hoarse voice and sore throat.    Eyes: Negative for blurred vision, visual disturbance and visual halos.   Cardiovascular: Positive for chest pain (Sternal Discomfort) and leg swelling. Negative for dyspnea on exertion and palpitations.        Patient does not report signs or symptoms of sternal malunion, denies popping, clicking, shifting, or uncontrolled pain.     Respiratory: Negative for cough, shortness of breath and sputum production.    Endocrine: Positive for cold intolerance. Negative for polyuria.   Hematologic/Lymphatic: Negative for bleeding problem. Does not bruise/bleed easily.   Skin: Negative for color change, nail changes, poor wound healing and suspicious lesions.   Musculoskeletal: Positive for back pain. Negative for joint pain and myalgias.   Gastrointestinal: Negative for abdominal pain, constipation, diarrhea, hematemesis, melena, nausea and vomiting.   Genitourinary: Negative for flank pain, nocturia and urgency.   Neurological: Negative for brief paralysis, focal weakness, light-headedness, loss of balance, numbness and paresthesias.   Psychiatric/Behavioral: Negative for altered mental status, depression, suicidal ideas and thoughts of violence.   Allergic/Immunologic: Negative for hives and persistent infections.     Vitals:    07/16/19 0849   BP: 122/76   Pulse: 75   SpO2: 98%      Objective      Body mass index is 40 kg/m².  Physical Exam   Constitutional: She is oriented to person, place, and time. She appears well-developed and well-nourished. No distress.   HENT:   Head: Normocephalic and atraumatic.   Mouth/Throat: Oropharynx is clear and moist.   Eyes: Conjunctivae and EOM are normal. Pupils are equal, round, and reactive to light.   Neck: Normal range of motion. Neck supple. No tracheal deviation present. No thyromegaly  present.   Cardiovascular: Normal rate, regular rhythm, normal heart sounds and intact distal pulses.   No murmur heard.  Sternum stable with moderate pressure and cough.  No popping, clicking, shifting, or play noted     Pulmonary/Chest: Effort normal and breath sounds normal. No stridor. No respiratory distress.   Abdominal: Soft. Bowel sounds are normal. She exhibits no distension. There is no tenderness.   Genitourinary:   Genitourinary Comments: deferred   Musculoskeletal: Normal range of motion. She exhibits edema.   +1 BLE   Neurological: She is alert and oriented to person, place, and time. No cranial nerve deficit. Coordination normal.   Skin: Skin is warm and dry. Capillary refill takes less than 2 seconds.   EVH CDI  Midsternal incision open at base, no drainage, measurements below     Psychiatric: She has a normal mood and affect. Her behavior is normal. Judgment normal.   Nursing note and vitals reviewed.    Wound Measurements  Resolved    Assessment & Plan       ASCVD  Medical management of coronary artery disease at this time includes ASA/Brillinta, ACE, STATIN, and BB.  Currently does not report symptoms of angina or angina equivalents.  Patient is educated and understands the S/S of acute coronary syndrome and is aware to report to the nearest emergency department if these symptoms were to persist at rest.      Surgical Aftercare Post Op CABG  Progressing well with return of normal bodily functions.  Increasing activity and ambulation distance while following restrictions.  Enroll in cardiofit.  Released from surgical restrictions.      Wound Care  Resolved    Class 3 Obesity  We have discussed the benefits of weight loss as it relates to long term morbidity and disease prevention.  Interventions discussed included self-monitoring of caloric intake, increasing physical activity/exercise, goal setting, stimulus control, consultation with dietitian, and non-food rewards.    Carotid Artery  Disease  Mild bilateral carotid stenosis on preoperative work-up.  Follow-up in 2 years with repeat carotid ultrasound.    Detailed discussion regarding risks, benefits, and treatment plan. Images independently reviewed. Patient understands, agrees, and wishes to proceed with plan.

## 2019-08-28 ENCOUNTER — TELEPHONE (OUTPATIENT)
Dept: ENDOCRINOLOGY | Facility: CLINIC | Age: 49
End: 2019-08-28

## 2019-08-28 NOTE — TELEPHONE ENCOUNTER
Rhea Sutton (Key: [id number])   Rx #: [id number]   Trulicity 0.75MG/0.5ML pen-injectors   Form  [employer] [place] Medicaid Coverage Determination Form   Plan Contact  [phone] phone   [phone] fax   Created   1 day ago   Sent to Plan   now   Determination   Wait for Determination  Please wait for the plan to return a determination.

## 2019-09-11 ENCOUNTER — TRANSCRIBE ORDERS (OUTPATIENT)
Dept: PHYSICAL THERAPY | Facility: HOSPITAL | Age: 49
End: 2019-09-11

## 2019-09-11 DIAGNOSIS — R01.0 FUNCTIONAL CARDIAC MURMUR: Primary | ICD-10-CM

## 2019-10-02 RX ORDER — EVOLOCUMAB 140 MG/ML
INJECTION, SOLUTION SUBCUTANEOUS
Qty: 2 PEN | Refills: 3 | Status: SHIPPED | OUTPATIENT
Start: 2019-10-02 | End: 2020-12-23 | Stop reason: SDUPTHER

## 2019-10-14 ENCOUNTER — APPOINTMENT (OUTPATIENT)
Dept: LAB | Facility: HOSPITAL | Age: 49
End: 2019-10-14

## 2019-10-14 LAB
25(OH)D3 SERPL-MCNC: 17.4 NG/ML (ref 30–100)
ALBUMIN SERPL-MCNC: 4.6 G/DL (ref 3.5–5.2)
ALBUMIN/GLOB SERPL: 1.3 G/DL
ALP SERPL-CCNC: 105 U/L (ref 39–117)
ALT SERPL W P-5'-P-CCNC: 40 U/L (ref 1–33)
ANION GAP SERPL CALCULATED.3IONS-SCNC: 12.8 MMOL/L (ref 5–15)
AST SERPL-CCNC: 56 U/L (ref 1–32)
BASOPHILS # BLD AUTO: 0.08 10*3/MM3 (ref 0–0.2)
BASOPHILS NFR BLD AUTO: 0.9 % (ref 0–1.5)
BILIRUB SERPL-MCNC: 0.3 MG/DL (ref 0.2–1.2)
BUN BLD-MCNC: 12 MG/DL (ref 6–20)
BUN/CREAT SERPL: 7 (ref 7–25)
CALCIUM SPEC-SCNC: 9 MG/DL (ref 8.6–10.5)
CHLORIDE SERPL-SCNC: 93 MMOL/L (ref 98–107)
CO2 SERPL-SCNC: 28.2 MMOL/L (ref 22–29)
CREAT BLD-MCNC: 1.72 MG/DL (ref 0.57–1)
DEPRECATED RDW RBC AUTO: 47.1 FL (ref 37–54)
EOSINOPHIL # BLD AUTO: 0.17 10*3/MM3 (ref 0–0.4)
EOSINOPHIL NFR BLD AUTO: 2 % (ref 0.3–6.2)
ERYTHROCYTE [DISTWIDTH] IN BLOOD BY AUTOMATED COUNT: 14.3 % (ref 12.3–15.4)
GFR SERPL CREATININE-BSD FRML MDRD: 32 ML/MIN/1.73
GLOBULIN UR ELPH-MCNC: 3.5 GM/DL
GLUCOSE BLD-MCNC: 112 MG/DL (ref 65–99)
HBA1C MFR BLD: 7.1 % (ref 4.8–5.6)
HCT VFR BLD AUTO: 42.4 % (ref 34–46.6)
HGB BLD-MCNC: 14.9 G/DL (ref 12–15.9)
IMM GRANULOCYTES # BLD AUTO: 0.08 10*3/MM3 (ref 0–0.05)
IMM GRANULOCYTES NFR BLD AUTO: 0.9 % (ref 0–0.5)
LYMPHOCYTES # BLD AUTO: 2.71 10*3/MM3 (ref 0.7–3.1)
LYMPHOCYTES NFR BLD AUTO: 31.5 % (ref 19.6–45.3)
MCH RBC QN AUTO: 32 PG (ref 26.6–33)
MCHC RBC AUTO-ENTMCNC: 35.1 G/DL (ref 31.5–35.7)
MCV RBC AUTO: 91 FL (ref 79–97)
MONOCYTES # BLD AUTO: 0.45 10*3/MM3 (ref 0.1–0.9)
MONOCYTES NFR BLD AUTO: 5.2 % (ref 5–12)
NEUTROPHILS # BLD AUTO: 5.11 10*3/MM3 (ref 1.7–7)
NEUTROPHILS NFR BLD AUTO: 59.5 % (ref 42.7–76)
NRBC BLD AUTO-RTO: 0 /100 WBC (ref 0–0.2)
PLATELET # BLD AUTO: 290 10*3/MM3 (ref 140–450)
PMV BLD AUTO: 10.5 FL (ref 6–12)
POTASSIUM BLD-SCNC: 4.1 MMOL/L (ref 3.5–5.2)
PROT SERPL-MCNC: 8.1 G/DL (ref 6–8.5)
RBC # BLD AUTO: 4.66 10*6/MM3 (ref 3.77–5.28)
SODIUM BLD-SCNC: 134 MMOL/L (ref 136–145)
T4 FREE SERPL-MCNC: 0.32 NG/DL (ref 0.93–1.7)
TSH SERPL DL<=0.05 MIU/L-ACNC: 210 UIU/ML (ref 0.27–4.2)
VIT B12 BLD-MCNC: 431 PG/ML (ref 211–946)
WBC NRBC COR # BLD: 8.6 10*3/MM3 (ref 3.4–10.8)

## 2019-10-14 PROCEDURE — 82306 VITAMIN D 25 HYDROXY: CPT | Performed by: NURSE PRACTITIONER

## 2019-10-14 PROCEDURE — 80050 GENERAL HEALTH PANEL: CPT | Performed by: NURSE PRACTITIONER

## 2019-10-14 PROCEDURE — 82607 VITAMIN B-12: CPT | Performed by: NURSE PRACTITIONER

## 2019-10-14 PROCEDURE — 83036 HEMOGLOBIN GLYCOSYLATED A1C: CPT | Performed by: NURSE PRACTITIONER

## 2019-10-14 PROCEDURE — 84681 ASSAY OF C-PEPTIDE: CPT | Performed by: NURSE PRACTITIONER

## 2019-10-14 PROCEDURE — 84439 ASSAY OF FREE THYROXINE: CPT | Performed by: NURSE PRACTITIONER

## 2019-10-15 LAB — C PEPTIDE SERPL-MCNC: 5.9 NG/ML (ref 1.1–4.4)

## 2019-10-17 ENCOUNTER — OFFICE VISIT (OUTPATIENT)
Dept: ENDOCRINOLOGY | Facility: CLINIC | Age: 49
End: 2019-10-17

## 2019-10-17 VITALS
DIASTOLIC BLOOD PRESSURE: 86 MMHG | BODY MASS INDEX: 42.25 KG/M2 | HEIGHT: 66 IN | WEIGHT: 262.9 LBS | HEART RATE: 69 BPM | OXYGEN SATURATION: 98 % | SYSTOLIC BLOOD PRESSURE: 148 MMHG

## 2019-10-17 DIAGNOSIS — E89.0 POSTSURGICAL HYPOTHYROIDISM: Primary | Chronic | ICD-10-CM

## 2019-10-17 DIAGNOSIS — E11.65 TYPE 2 DIABETES MELLITUS WITH HYPERGLYCEMIA, WITHOUT LONG-TERM CURRENT USE OF INSULIN (HCC): Chronic | ICD-10-CM

## 2019-10-17 DIAGNOSIS — E55.9 VITAMIN D DEFICIENCY: ICD-10-CM

## 2019-10-17 PROCEDURE — 99214 OFFICE O/P EST MOD 30 MIN: CPT | Performed by: NURSE PRACTITIONER

## 2019-10-17 RX ORDER — LEVOTHYROXINE SODIUM 0.2 MG/1
200 TABLET ORAL DAILY
Qty: 30 TABLET | Refills: 5 | Status: SHIPPED | OUTPATIENT
Start: 2019-10-17 | End: 2019-10-18 | Stop reason: DRUGHIGH

## 2019-10-17 RX ORDER — LEVOTHYROXINE SODIUM 25 UG/1
25 CAPSULE ORAL DAILY
Qty: 30 CAPSULE | Refills: 5 | Status: SHIPPED | OUTPATIENT
Start: 2019-10-17 | End: 2019-10-18 | Stop reason: DRUGHIGH

## 2019-10-17 RX ORDER — ERGOCALCIFEROL 1.25 MG/1
50000 CAPSULE ORAL
Qty: 5 CAPSULE | Refills: 11 | Status: SHIPPED | OUTPATIENT
Start: 2019-10-17 | End: 2021-07-19

## 2019-10-17 NOTE — PROGRESS NOTES
Subjective    Rhea Sutton is a 48 y.o. female. she is here today for follow-up.    History of Present Illness       47 yo female presents for follow up      Hypothyroidism   Duration, since 2015  Secondary to total thyroidectomy for obstructive goiter  Aggravating I spoke with patient and she now admits to noncompliance. -- she states she is being compliant   Alleviating,armour thyroid--stopped     Now on levothyroxine   Symptoms, fatigue, weakness, malaise     She states today she is compliant and taking the levothryoxine       Lab Results   Component Value Date    .000 (H) 10/14/2019                   Type 2 Diabetes  Duration               Lab Results   Component Value Date     HGBA1C 7.4 (H) 03/18/2019               Dates back to Oct 2017 but now significantly uncontrolled           Severity , high     Patient was admitted with MI, sp PCI to RCA , has multivessel CAD and is being evaluated by CT surgery      Aggravating, high carb meals at home and lack of exercise      Treatment. Home treatment with trulicity , and steglatro        Home readings 90 up to 130 per patient        She had CABG times 4 on March 4, 2019           The following portions of the patient's history were reviewed and updated as appropriate:   Past Medical History:   Diagnosis Date   • CAD (coronary artery disease)    • Chronic back pain    • Chronic bronchitis (CMS/HCC)    • Diabetes mellitus (CMS/HCC)    • Diverticulitis 1986 2017   • Goiter    • Hashimoto's thyroiditis    • Head lice infestation    • Hyperlipidemia    • Hypertension    • MI (myocardial infarction) (CMS/HCC)    • Postoperative hypothyroidism    • Sinus congestion    • Sleep apnea     not using c-pap     Past Surgical History:   Procedure Laterality Date   • APPENDECTOMY     • CARDIAC CATHETERIZATION N/A 10/13/2017   • CARDIAC CATHETERIZATION N/A 1/3/2019   • CHOLECYSTECTOMY     • COLON RESECTION     • COLON SURGERY     • CORONARY ARTERY BYPASS GRAFT N/A  3/5/2019    Procedure: CORONARY ARTERY BYPASS GRAFTING ENDOSCOPIC VEIN HARVEST          (CELL SAVER)             LATEX ALLERGY leg start @ 1323 chest start @ 1328 vein out 1417 vein prep [id number] on pump 1456 off pump 1729;  Surgeon: Jaren Vilchis MD;  Location: [facility];  Service: Cardiothoracic   • CORONARY STENT PLACEMENT     • KS RT/LT HEART CATHETERS N/A 10/13/2017   • TOTAL ABDOMINAL HYSTERECTOMY WITH SALPINGO OOPHORECTOMY     • TOTAL THYROIDECTOMY       Family History   Problem Relation Age of Onset   • Coronary artery disease Mother    • Coronary artery disease Father      OB History     No data available        Current Outpatient Medications   Medication Sig Dispense Refill   • aspirin 81 MG chewable tablet Chew 81 mg daily.     • atorvastatin (LIPITOR) 40 MG tablet Take 40 mg by mouth Every Night.     • Blood Glucose Monitoring Suppl (FREESTYLE LITE) device      • carvedilol (COREG) 3.125 MG tablet Take 1 tablet by mouth Every 12 (Twelve) Hours. 60 tablet 3   • cholecalciferol (VITAMIN D3) 1000 units tablet Take 1,000 Units by mouth Daily. 5 capsules daily     • Dulaglutide 0.75 MG/0.5ML solution pen-injector Inject 0.75 mg under the skin into the appropriate area as directed 1 (One) Time Per Week. 4 pen 11   • Ertugliflozin L-PyroglutamicAc (STEGLATRO) 5 MG tablet Take 1 tablet by mouth Every Morning. 30 tablet 11   • FREESTYLE LITE test strip      • HYDROcodone-acetaminophen (NORCO) 5-325 MG per tablet TAKE 1 2 TO 1 (ONE HALF TO ONE) TABLET BY MOUTH ONCE DAILY  0   • levothyroxine (SYNTHROID) 200 MCG tablet Take 1 tablet by mouth Daily. 30 tablet 5   • levothyroxine sodium (TIROSINT) 25 MCG capsule Take 1 capsule by mouth Daily. 30 capsule 5   • lisinopril (PRINIVIL,ZESTRIL) 5 MG tablet Take 1 tablet by mouth Daily. 30 tablet 2   • nitroglycerin (NITROSTAT) 0.4 MG SL tablet 1 under the tongue as needed for angina, may repeat q5mins for up three doses 30 tablet 3   • phenylephrine (SINUS  "RELIEF EXTRA STRENGTH) 1 % nasal spray 1 spray into the nostril(s) as directed by provider Every 4 (Four) Hours As Needed for Congestion.     • REPATHA SURECLICK 140 MG/ML solution auto-injector INJECT 1 PEN SUBCUTANEOUSLY EVERY 14 DAYS 2 pen 3   • sertraline (ZOLOFT) 100 MG tablet Take 100 mg by mouth Daily. FOR 30 DAYS  3   • ticagrelor (BRILINTA) 90 MG tablet tablet Take 90 mg by mouth 2 (Two) Times a Day.     • Dulaglutide 1.5 MG/0.5ML solution pen-injector Inject 1.5 mg under the skin into the appropriate area as directed 1 (One) Time Per Week. 4 pen 11   • vitamin D (ERGOCALCIFEROL) 14033 units capsule capsule Take 1 capsule by mouth Every 7 (Seven) Days. 5 capsule 11     No current facility-administered medications for this visit.      Allergies   Allergen Reactions   • Coconut Anaphylaxis and Swelling   • Penicillins Swelling     Of the throat   • Adhesive Tape Other (See Comments)     Silk tape- blisters   • Ciprofloxacin Other (See Comments)     \"knots on my shins\"   • Latex Hives     Social History     Socioeconomic History   • Marital status:      Spouse name: Not on file   • Number of children: Not on file   • Years of education: Not on file   • Highest education level: Not on file   Tobacco Use   • Smoking status: Former Smoker     Packs/day: 0.00     Years: 20.00     Pack years: 0.00     Types: Cigarettes     Last attempt to quit: 10/12/2017     Years since quittin.0   • Smokeless tobacco: Never Used   Substance and Sexual Activity   • Alcohol use: No   • Drug use: No   • Sexual activity: Not Currently       Review of Systems  Review of Systems   Constitutional: Negative for activity change, appetite change, diaphoresis and fatigue.   HENT: Negative for facial swelling, sneezing, sore throat, tinnitus, trouble swallowing and voice change.    Eyes: Negative for photophobia, pain, discharge, redness, itching and visual disturbance.   Respiratory: Negative for apnea, cough, choking, chest " "tightness and shortness of breath.    Cardiovascular: Negative for chest pain, palpitations and leg swelling.   Gastrointestinal: Negative for abdominal distention, abdominal pain, constipation, diarrhea, nausea and vomiting.   Endocrine: Negative for cold intolerance, heat intolerance, polydipsia, polyphagia and polyuria.   Genitourinary: Negative for difficulty urinating, dysuria, frequency, hematuria and urgency.   Musculoskeletal: Negative for arthralgias, back pain, gait problem, joint swelling, myalgias, neck pain and neck stiffness.   Skin: Negative for color change, pallor, rash and wound.   Neurological: Negative for dizziness, tremors, weakness, light-headedness, numbness and headaches.   Hematological: Negative for adenopathy. Does not bruise/bleed easily.   Psychiatric/Behavioral: Negative for behavioral problems, confusion and sleep disturbance.        Objective    /86   Pulse 69   Ht 167.6 cm (66\")   Wt 119 kg (262 lb 14.4 oz)   SpO2 98%   BMI 42.43 kg/m²   Physical Exam   Constitutional: She is oriented to person, place, and time. She appears well-developed and well-nourished. No distress.   HENT:   Head: Normocephalic and atraumatic.   Right Ear: External ear normal.   Left Ear: External ear normal.   Nose: Nose normal.   Eyes: Conjunctivae and EOM are normal. Pupils are equal, round, and reactive to light.   Neck: Normal range of motion. Neck supple. No tracheal deviation present.   Thyroid surgically absent    Cardiovascular: Normal rate, regular rhythm and normal heart sounds.   No murmur heard.  Pulmonary/Chest: Effort normal and breath sounds normal. No respiratory distress. She has no wheezes.   Abdominal: Soft. Bowel sounds are normal. There is no tenderness. There is no rebound and no guarding.   Musculoskeletal: Normal range of motion. She exhibits no edema, tenderness or deformity.   Neurological: She is alert and oriented to person, place, and time. No cranial nerve deficit. "   Skin: Skin is warm and dry. No rash noted.   Psychiatric: She has a normal mood and affect. Her behavior is normal. Judgment and thought content normal.       Lab Review  Glucose (mg/dL)   Date Value   10/14/2019 112 (H)   03/25/2019 125 (H)   03/08/2019 123 (H)     Glucose, Arterial (mmol/L)   Date Value   03/06/2019 115 (H)   03/06/2019 126 (H)   03/05/2019      Comment:      The parameter value has a question [id number] Glu not usable     Sodium (mmol/L)   Date Value   10/14/2019 134 (L)   03/25/2019 136 (L)   03/08/2019 134 (L)     Potassium (mmol/L)   Date Value   10/14/2019 4.1   03/25/2019 4.2   03/08/2019 4.3     Chloride (mmol/L)   Date Value   10/14/2019 93 (L)   03/25/2019 99   03/08/2019 103     CO2 (mmol/L)   Date Value   10/14/2019 28.2   03/25/2019 26.0   03/08/2019 23.0     BUN (mg/dL)   Date Value   10/14/2019 12   03/25/2019 19   03/08/2019 17     Creatinine (mg/dL)   Date Value   10/14/2019 1.72 (H)   03/25/2019 1.08 (H)   03/08/2019 0.80     Hemoglobin A1C (%)   Date Value   10/14/2019 7.10 (H)   03/18/2019 7.4 (H)   01/17/2019 10.5 (H)   01/04/2019 12.0 (H)     Triglycerides (mg/dL)   Date Value   01/17/2019 63   01/04/2019 53   06/06/2018 149     LDL Cholesterol  (mg/dL)   Date Value   01/17/2019 <30   01/04/2019 77   06/06/2018 184 (H)   03/05/2018 270 (H)       Assessment/Plan      1. Postsurgical hypothyroidism    2. Type 2 diabetes mellitus with hyperglycemia, without long-term current use of insulin (CMS/[facility])    3. Vitamin D deficiency    .    Medications prescribed:  Outpatient Encounter Medications as of 10/17/2019   Medication Sig Dispense Refill   • aspirin 81 MG chewable tablet Chew 81 mg daily.     • atorvastatin (LIPITOR) 40 MG tablet Take 40 mg by mouth Every Night.     • Blood Glucose Monitoring Suppl (FREESTYLE LITE) device      • carvedilol (COREG) 3.125 MG tablet Take 1 tablet by mouth Every 12 (Twelve) Hours. 60 tablet 3   • cholecalciferol (VITAMIN D3) 1000 units tablet Take  1,000 Units by mouth Daily. 5 capsules daily     • Dulaglutide 0.75 MG/0.5ML solution pen-injector Inject 0.75 mg under the skin into the appropriate area as directed 1 (One) Time Per Week. 4 pen 11   • Ertugliflozin L-PyroglutamicAc (STEGLATRO) 5 MG tablet Take 1 tablet by mouth Every Morning. 30 tablet 11   • FREESTYLE LITE test strip      • HYDROcodone-acetaminophen (NORCO) 5-325 MG per tablet TAKE 1 2 TO 1 (ONE HALF TO ONE) TABLET BY MOUTH ONCE DAILY  0   • levothyroxine (SYNTHROID) 200 MCG tablet Take 1 tablet by mouth Daily. 30 tablet 5   • levothyroxine sodium (TIROSINT) 25 MCG capsule Take 1 capsule by mouth Daily. 30 capsule 5   • lisinopril (PRINIVIL,ZESTRIL) 5 MG tablet Take 1 tablet by mouth Daily. 30 tablet 2   • nitroglycerin (NITROSTAT) 0.4 MG SL tablet 1 under the tongue as needed for angina, may repeat q5mins for up three doses 30 tablet 3   • phenylephrine (SINUS RELIEF EXTRA STRENGTH) 1 % nasal spray 1 spray into the nostril(s) as directed by provider Every 4 (Four) Hours As Needed for Congestion.     • REPATHA SURECLICK 140 MG/ML solution auto-injector INJECT 1 PEN SUBCUTANEOUSLY EVERY 14 DAYS 2 pen 3   • sertraline (ZOLOFT) 100 MG tablet Take 100 mg by mouth Daily. FOR 30 DAYS  3   • ticagrelor (BRILINTA) 90 MG tablet tablet Take 90 mg by mouth 2 (Two) Times a Day.     • [DISCONTINUED] exenatide er (BYDUREON BCISE) 2 MG/0.85ML auto-injector injection Inject 0.85 mL under the skin into the appropriate area as directed 1 (One) Time Per Week. 4 pen 11   • [DISCONTINUED] levothyroxine (SYNTHROID) 200 MCG tablet Take 1 tablet by mouth Daily. 30 tablet 5   • [DISCONTINUED] levothyroxine sodium (TIROSINT) 25 MCG capsule Take 1 capsule by mouth Daily. 30 capsule 5   • Dulaglutide 1.5 MG/0.5ML solution pen-injector Inject 1.5 mg under the skin into the appropriate area as directed 1 (One) Time Per Week. 4 pen 11   • vitamin D (ERGOCALCIFEROL) 33445 units capsule capsule Take 1 capsule by mouth Every 7  (Seven) Days. 5 capsule 11     No facility-administered encounter medications on file as of 10/17/2019.        Orders placed during this encounter include:  Orders Placed This Encounter   Procedures   • TSH   • T4, Free     Type 2 Diabetes Mellitus With Hyperglycemia (Cms/[facility])   Postsurgical Hypothyroidism         Hypothyroidism-postsurgical      Patient admitted in the past of noncompliance but states taking faithfully              Since TSH is 70 , I will start with 150 mcgs and expect to see her in 2 week to increase dose to 200 mcgs                Lab Results   Component Value Date     TSH 73.500 (H) 01/17/2019      Component      Latest Ref Rng & Units 1/17/2019   Free T4      0.78 - 2.19 ng/dL 0.98            We will follow Free T4 as a guide to treatment since the intention is to have her near euthyroid for potential upcoming CABG.     Increase to 200 mcg daily                   Lab Results   Component Value Date     TSH 91.900 (H) 03/18/2019          increased to 225 mcg      She promises she is taking it       Lab Results   Component Value Date    .000 (H) 10/14/2019        she is willing to do direct observation to ensure complaince    Will do 700 mcg once weekly tmes 4 then check TSH      ====     Type 2 diabetes                Lab Results   Component Value Date    HGBA1C 7.10 (H) 10/14/2019             Complicated by CAD  Back in March GFR less than 45 but now 58.           [place] caused knots     On Trulicity 0.75 mg weekly ---  Increase to 1.5 mg weekly          Stop Glipizide        .  steglatro 5 mg --- stop GFR 37      As long as she eats up to 50 grams of carbohdyrate per meal, she doesn't need novolog.   If she exceeds 50 grams of carbohydrate , I will give her 5 units to 10 units before meals based on carb load. ==she is not using the Novolog        Bone Health     Component      Latest Ref Rng & Units 1/17/2019   25 Hydroxy, Vitamin D      30.0 - 100.0 ng/ml 28.1 (L)         Taking  vitamin d supplement      Component      Latest Ref Rng & Units 10/14/2019   25 Hydroxy, Vitamin D      30.0 - 100.0 ng/ml 17.4 (L)         Start 50,000 units weekly                   Lab Results   Component Value Date     [id number] 01/17/2019           Lipid Management:        Component      Latest Ref Rng & Units 1/17/2019   Total Cholesterol      0 - 199 mg/dL 76   Triglycerides      20 - 199 mg/dL 63   HDL Cholesterol      60 - 200 mg/dL 54 (L)   LDL Cholesterol       1 - 129 mg/dL <30   LDL/HDL Ratio      0.00 - 3.22        On repatha               4. Follow-up: Return in about 3 months (around 1/17/2020) for Recheck.

## 2019-10-18 ENCOUNTER — TELEPHONE (OUTPATIENT)
Dept: ENDOCRINOLOGY | Facility: CLINIC | Age: 49
End: 2019-10-18

## 2019-10-18 RX ORDER — LEVOTHYROXINE SODIUM 0.03 MG/1
25 TABLET ORAL DAILY
Qty: 30 TABLET | Refills: 6 | Status: SHIPPED | OUTPATIENT
Start: 2019-10-18 | End: 2019-12-04 | Stop reason: DRUGHIGH

## 2019-10-25 ENCOUNTER — LAB (OUTPATIENT)
Dept: LAB | Facility: HOSPITAL | Age: 49
End: 2019-10-25

## 2019-10-25 ENCOUNTER — TRANSCRIBE ORDERS (OUTPATIENT)
Dept: LAB | Facility: HOSPITAL | Age: 49
End: 2019-10-25

## 2019-10-25 DIAGNOSIS — N18.30 CHRONIC KIDNEY DISEASE, STAGE III (MODERATE) (HCC): Primary | ICD-10-CM

## 2019-10-25 DIAGNOSIS — N18.30 CHRONIC KIDNEY DISEASE, STAGE III (MODERATE) (HCC): ICD-10-CM

## 2019-10-25 LAB
ALBUMIN SERPL-MCNC: 4.4 G/DL (ref 3.5–5.2)
ANION GAP SERPL CALCULATED.3IONS-SCNC: 13.8 MMOL/L (ref 5–15)
BUN BLD-MCNC: 12 MG/DL (ref 6–20)
BUN/CREAT SERPL: 9.1 (ref 7–25)
CALCIUM SPEC-SCNC: 8.7 MG/DL (ref 8.6–10.5)
CHLORIDE SERPL-SCNC: 96 MMOL/L (ref 98–107)
CO2 SERPL-SCNC: 28.2 MMOL/L (ref 22–29)
CREAT BLD-MCNC: 1.32 MG/DL (ref 0.57–1)
CREAT UR-MCNC: 117.1 MG/DL
GFR SERPL CREATININE-BSD FRML MDRD: 43 ML/MIN/1.73
GLUCOSE BLD-MCNC: 131 MG/DL (ref 65–99)
HCT VFR BLD AUTO: 40 % (ref 34–46.6)
HGB BLD-MCNC: 13.7 G/DL (ref 12–15.9)
MAGNESIUM SERPL-MCNC: 2.6 MG/DL (ref 1.6–2.6)
PHOSPHATE SERPL-MCNC: 3.7 MG/DL (ref 2.5–4.5)
POTASSIUM BLD-SCNC: 4 MMOL/L (ref 3.5–5.2)
PROT UR-MCNC: 11 MG/DL
PROT/CREAT UR: 93.9 MG/G CREA (ref 0–200)
PTH-INTACT SERPL-MCNC: 40.8 PG/ML (ref 15–65)
SODIUM BLD-SCNC: 138 MMOL/L (ref 136–145)

## 2019-10-25 PROCEDURE — 80069 RENAL FUNCTION PANEL: CPT

## 2019-10-25 PROCEDURE — 85014 HEMATOCRIT: CPT

## 2019-10-25 PROCEDURE — 85018 HEMOGLOBIN: CPT

## 2019-10-25 PROCEDURE — 84156 ASSAY OF PROTEIN URINE: CPT

## 2019-10-25 PROCEDURE — 82570 ASSAY OF URINE CREATININE: CPT

## 2019-10-25 PROCEDURE — 83735 ASSAY OF MAGNESIUM: CPT

## 2019-10-25 PROCEDURE — 83970 ASSAY OF PARATHORMONE: CPT

## 2019-10-28 RX ORDER — LEVOTHYROXINE SODIUM 0.1 MG/1
100 TABLET ORAL DAILY
Qty: 30 TABLET | Refills: 1 | Status: SHIPPED | OUTPATIENT
Start: 2019-10-28 | End: 2020-08-31 | Stop reason: DRUGHIGH

## 2019-11-15 ENCOUNTER — APPOINTMENT (OUTPATIENT)
Dept: LAB | Facility: HOSPITAL | Age: 49
End: 2019-11-15

## 2019-11-15 DIAGNOSIS — E89.0 POSTSURGICAL HYPOTHYROIDISM: Primary | Chronic | ICD-10-CM

## 2019-11-15 LAB
T4 FREE SERPL-MCNC: 0.25 NG/DL (ref 0.93–1.7)
TSH SERPL DL<=0.05 MIU/L-ACNC: 179 UIU/ML (ref 0.27–4.2)

## 2019-11-15 PROCEDURE — 84439 ASSAY OF FREE THYROXINE: CPT | Performed by: NURSE PRACTITIONER

## 2019-11-15 PROCEDURE — 36415 COLL VENOUS BLD VENIPUNCTURE: CPT | Performed by: NURSE PRACTITIONER

## 2019-11-15 PROCEDURE — 84443 ASSAY THYROID STIM HORMONE: CPT | Performed by: NURSE PRACTITIONER

## 2019-11-16 LAB — T4 FREE SERPL-MCNC: 1.15 NG/DL (ref 0.93–1.7)

## 2019-11-22 ENCOUNTER — APPOINTMENT (OUTPATIENT)
Dept: LAB | Facility: HOSPITAL | Age: 49
End: 2019-11-22

## 2019-11-22 DIAGNOSIS — E89.0 POSTSURGICAL HYPOTHYROIDISM: Primary | Chronic | ICD-10-CM

## 2019-11-22 LAB
T3 SERPL-MCNC: 65.9 NG/DL (ref 80–200)
T4 FREE SERPL-MCNC: 0.46 NG/DL (ref 0.93–1.7)
TSH SERPL DL<=0.05 MIU/L-ACNC: 113 UIU/ML (ref 0.27–4.2)

## 2019-11-22 PROCEDURE — 84482 T3 REVERSE: CPT | Performed by: NURSE PRACTITIONER

## 2019-11-22 PROCEDURE — 84480 ASSAY TRIIODOTHYRONINE (T3): CPT | Performed by: NURSE PRACTITIONER

## 2019-11-22 PROCEDURE — 84439 ASSAY OF FREE THYROXINE: CPT | Performed by: NURSE PRACTITIONER

## 2019-11-22 PROCEDURE — 84443 ASSAY THYROID STIM HORMONE: CPT | Performed by: NURSE PRACTITIONER

## 2019-11-22 PROCEDURE — 36415 COLL VENOUS BLD VENIPUNCTURE: CPT | Performed by: NURSE PRACTITIONER

## 2019-11-23 LAB
T3 SERPL-MCNC: 69.8 NG/DL (ref 80–200)
T4 FREE SERPL-MCNC: 1.06 NG/DL (ref 0.93–1.7)
TSH SERPL DL<=0.05 MIU/L-ACNC: 101 UIU/ML (ref 0.27–4.2)

## 2019-11-26 ENCOUNTER — TELEPHONE (OUTPATIENT)
Dept: FAMILY MEDICINE CLINIC | Facility: CLINIC | Age: 49
End: 2019-11-26

## 2019-11-27 ENCOUNTER — APPOINTMENT (OUTPATIENT)
Dept: LAB | Facility: HOSPITAL | Age: 49
End: 2019-11-27

## 2019-11-27 DIAGNOSIS — E89.0 POSTSURGICAL HYPOTHYROIDISM: Primary | Chronic | ICD-10-CM

## 2019-11-27 LAB
T3 SERPL-MCNC: 77.6 NG/DL (ref 80–200)
T3REVERSE SERPL-MCNC: 11.3 NG/DL (ref 9.2–24.1)
T3REVERSE SERPL-MCNC: 8.1 NG/DL (ref 9.2–24.1)
T4 FREE SERPL-MCNC: 0.68 NG/DL (ref 0.93–1.7)
TSH SERPL DL<=0.05 MIU/L-ACNC: 93.5 UIU/ML (ref 0.27–4.2)

## 2019-11-27 PROCEDURE — 84482 T3 REVERSE: CPT | Performed by: NURSE PRACTITIONER

## 2019-11-27 PROCEDURE — 84443 ASSAY THYROID STIM HORMONE: CPT | Performed by: NURSE PRACTITIONER

## 2019-11-27 PROCEDURE — 84439 ASSAY OF FREE THYROXINE: CPT | Performed by: NURSE PRACTITIONER

## 2019-11-27 PROCEDURE — 36415 COLL VENOUS BLD VENIPUNCTURE: CPT | Performed by: NURSE PRACTITIONER

## 2019-11-27 PROCEDURE — 84480 ASSAY TRIIODOTHYRONINE (T3): CPT | Performed by: NURSE PRACTITIONER

## 2019-11-28 LAB
T3 SERPL-MCNC: 83.1 NG/DL (ref 80–200)
T4 FREE SERPL-MCNC: 1.68 NG/DL (ref 0.93–1.7)
TSH SERPL DL<=0.05 MIU/L-ACNC: 84.3 UIU/ML (ref 0.27–4.2)

## 2019-12-02 LAB
T3REVERSE SERPL-MCNC: 14.1 NG/DL (ref 9.2–24.1)
T3REVERSE SERPL-MCNC: 17.7 NG/DL (ref 9.2–24.1)

## 2019-12-04 RX ORDER — LEVOTHYROXINE SODIUM 112 UG/1
TABLET ORAL
Qty: 30 TABLET | Refills: 11 | Status: SHIPPED | OUTPATIENT
Start: 2019-12-04 | End: 2020-08-31 | Stop reason: DRUGHIGH

## 2019-12-06 ENCOUNTER — OFFICE VISIT (OUTPATIENT)
Dept: CARDIOLOGY | Facility: CLINIC | Age: 49
End: 2019-12-06

## 2019-12-06 VITALS
OXYGEN SATURATION: 100 % | DIASTOLIC BLOOD PRESSURE: 88 MMHG | BODY MASS INDEX: 42.11 KG/M2 | SYSTOLIC BLOOD PRESSURE: 128 MMHG | HEART RATE: 69 BPM | HEIGHT: 66 IN | WEIGHT: 262 LBS

## 2019-12-06 DIAGNOSIS — E78.2 MIXED HYPERLIPIDEMIA: ICD-10-CM

## 2019-12-06 DIAGNOSIS — I10 ESSENTIAL HYPERTENSION: Chronic | ICD-10-CM

## 2019-12-06 DIAGNOSIS — I25.118 CORONARY ARTERY DISEASE OF NATIVE ARTERY OF NATIVE HEART WITH STABLE ANGINA PECTORIS (HCC): Primary | ICD-10-CM

## 2019-12-06 PROCEDURE — 99214 OFFICE O/P EST MOD 30 MIN: CPT | Performed by: INTERNAL MEDICINE

## 2019-12-06 RX ORDER — CLOPIDOGREL BISULFATE 75 MG/1
75 TABLET ORAL DAILY
Qty: 90 TABLET | Refills: 3 | Status: SHIPPED | OUTPATIENT
Start: 2019-12-06 | End: 2021-07-12 | Stop reason: SDUPTHER

## 2019-12-06 RX ORDER — LISINOPRIL 40 MG/1
40 TABLET ORAL DAILY
Refills: 6 | COMMUNITY
Start: 2019-11-06 | End: 2021-07-19 | Stop reason: SDUPTHER

## 2019-12-06 NOTE — PROGRESS NOTES
Rhea Sutton  49 y.o. female    12/06/2019  1. Coronary artery disease of native artery of native heart with stable angina pectoris (CMS/HCC)    2. Mixed hyperlipidemia    3. Essential hypertension        History of Present Illness  Ms. Sutton is a 48-year-old  lady who was admitted to [facility] with prolonged chest pain with EKG evidence of acute inferior wall myocardial infarction in 1/ 2019.  She underwent emergent cardiac catheterization with PCI to right coronary artery by Dr. Gonzalez.  She was also noted to have significant disease in the LAD territory.  Catheterization showed:  Dominance: Right dominant  Left Main coronary artery: Large caliber vessel divides and left circumflex artery and LAD  Left anterior descending artery: Diffusely disease that is proximally there is 80-90% stenosis and distal LAD goes all the way to the apex and there is diffuse disease in the LAD  Left circumflex:  Large caliber vessel previously placed in the left circumflex artery is widely patent  Right coronary artery:  Proximal to mid was 100% inside the stent.  Percutaneous coronary intervention: After giving Angiomax bolus and drip a right guide was placed in the right coronary artery and a run-through wire was crossed through the lesion and was dilated with a 2.0 X 20 Trek balloon and subsequently distal RCA was stented with a 2.75 x 23 Xience alma stent, mid to distal was stented with a 3.0 x 33 stent and proximally with a 3.5 x 18 Xience alma stent and subsequently postdilated with a 3.0 x 35 noncompliant mozec balloon and subsequently there was a ostial stenosis of the PDA and it was attempted to place a stent but stent was getting stuck in the previously placed stent struts hence balloon angioplasty was done into the ostial PDA reducing 90% stenosis to 30-40%.  Conclusion : Successful PCI to the proximal to mid right coronary artery reducing 100% stenosis to 0% obtaining BEA 3 flow with Xience  "Kelley stents     CORONARY ARTERY BYPASS GRAFTING x4 in March 2019:  ENDOSCOPIC VEIN HARVEST           LEFT INTERNAL MAMMARY ARTERY TO LEFT ANTERIOR DESCENDING CORONARY ARTERY  SAPHENOUS VEIN GRAFT FROM ASCENDING AORTA TO FIRST DIAGONAL BRANCH  SAPHENOUS VEIN GRAFT FROM ASCENDING AORTA TO THIRD MARGINAL BRANCH  SAPHENOUS VEIN GRAFT FROM ASCENDING AORTA TO POSTERIOR DESCENDING CORONARY ARTERY      The patient has done well symptomatically with no recurrence of chest pain.  She has been compliant with all her medication. We had a discussion about appropriate diet and weight reduction.  Blood pressure was in the normal range.  Her lipid profiles have optimized with PCSK9 inhibitors.  She is a non-smoker at the present time.    SUBJECTIVE    Allergies   Allergen Reactions   • Coconut Anaphylaxis and Swelling   • Penicillins Swelling     Of the throat   • Adhesive Tape Other (See Comments)     Silk tape- blisters   • Ciprofloxacin Other (See Comments)     \"knots on my shins\"   • Latex Hives         Past Medical History:   Diagnosis Date   • CAD (coronary artery disease)    • Chronic back pain    • Chronic bronchitis (CMS/HCC)    • Diabetes mellitus (CMS/HCC)    • Diverticulitis 1986 2017   • Goiter    • Hashimoto's thyroiditis    • Head lice infestation    • Hyperlipidemia    • Hypertension    • MI (myocardial infarction) (CMS/HCC)    • Postoperative hypothyroidism    • Sinus congestion    • Sleep apnea     not using c-pap         Past Surgical History:   Procedure Laterality Date   • APPENDECTOMY     • CARDIAC CATHETERIZATION N/A 10/13/2017   • CARDIAC CATHETERIZATION N/A 1/3/2019   • CHOLECYSTECTOMY     • COLON RESECTION     • COLON SURGERY     • CORONARY ARTERY BYPASS GRAFT N/A 3/5/2019    Procedure: CORONARY ARTERY BYPASS GRAFTING ENDOSCOPIC VEIN HARVEST          (CELL SAVER)             LATEX ALLERGY leg start @ 1323 chest start @ 1328 vein out 1417 vein prep 3320-2199 on pump 1456 off pump 1729;  Surgeon: " Jaren Vilchis MD;  Location: [facility];  Service: Cardiothoracic   • CORONARY STENT PLACEMENT     • MS RT/LT HEART CATHETERS N/A 10/13/2017   • TOTAL ABDOMINAL HYSTERECTOMY WITH SALPINGO OOPHORECTOMY     • TOTAL THYROIDECTOMY           Family History   Problem Relation Age of Onset   • Coronary artery disease Mother    • Coronary artery disease Father          Social History     Socioeconomic History   • Marital status:      Spouse name: Not on file   • Number of children: Not on file   • Years of education: Not on file   • Highest education level: Not on file   Tobacco Use   • Smoking status: Former Smoker     Packs/day: 0.00     Years: 20.00     Pack years: 0.00     Types: Cigarettes     Last attempt to quit: 10/12/2017     Years since quittin.1   • Smokeless tobacco: Never Used   Substance and Sexual Activity   • Alcohol use: No   • Drug use: No   • Sexual activity: Not Currently         Current Outpatient Medications   Medication Sig Dispense Refill   • aspirin 81 MG chewable tablet Chew 81 mg daily.     • atorvastatin (LIPITOR) 40 MG tablet Take 40 mg by mouth Every Night.     • Blood Glucose Monitoring Suppl (FREESTYLE LITE) device      • carvedilol (COREG) 3.125 MG tablet Take 1 tablet by mouth Every 12 (Twelve) Hours. 60 tablet 3   • cholecalciferol (VITAMIN D3) 1000 units tablet Take 1,000 Units by mouth Daily. 5 capsules daily     • Dulaglutide 0.75 MG/0.5ML solution pen-injector Inject 0.75 mg under the skin into the appropriate area as directed 1 (One) Time Per Week. 4 pen 11   • Dulaglutide 1.5 MG/0.5ML solution pen-injector Inject 1.5 mg under the skin into the appropriate area as directed 1 (One) Time Per Week. 4 pen 11   • Ertugliflozin L-PyroglutamicAc (STEGLATRO) 5 MG tablet Take 1 tablet by mouth Every Morning. 30 tablet 11   • FREESTYLE LITE test strip      • HYDROcodone-acetaminophen (NORCO) 5-325 MG per tablet TAKE 1 2 TO 1 (ONE HALF TO ONE) TABLET BY MOUTH ONCE DAILY  0  "  • levothyroxine (SYNTHROID) 112 MCG tablet Take seven tablets once weekly. 30 tablet 11   • lisinopril (PRINIVIL,ZESTRIL) 40 MG tablet Take 40 mg by mouth Daily.  6   • nitroglycerin (NITROSTAT) 0.4 MG SL tablet 1 under the tongue as needed for angina, may repeat q5mins for up three doses 30 tablet 3   • phenylephrine (SINUS RELIEF EXTRA STRENGTH) 1 % nasal spray 1 spray into the nostril(s) as directed by provider Every 4 (Four) Hours As Needed for Congestion.     • REPATHA SURECLICK 140 MG/ML solution auto-injector INJECT 1 PEN SUBCUTANEOUSLY EVERY 14 DAYS 2 pen 3   • sertraline (ZOLOFT) 100 MG tablet Take 100 mg by mouth Daily. FOR 30 DAYS  3   • ticagrelor (BRILINTA) 90 MG tablet tablet Take 90 mg by mouth 2 (Two) Times a Day.     • vitamin D (ERGOCALCIFEROL) 29045 units capsule capsule Take 1 capsule by mouth Every 7 (Seven) Days. 5 capsule 11   • levothyroxine (SYNTHROID, LEVOTHROID) 100 MCG tablet Take 1 tablet by mouth Daily. 30 tablet 1   • lisinopril (PRINIVIL,ZESTRIL) 5 MG tablet Take 1 tablet by mouth Daily. 30 tablet 2     No current facility-administered medications for this visit.          OBJECTIVE    /88   Pulse 69   Ht 167.6 cm (65.98\")   Wt 119 kg (262 lb)   SpO2 100%   BMI 42.31 kg/m²         Review of Systems     Constitutional:  Denies recent weight loss, weight gain, fever or chills     HENT:  Denies any hearing loss, epistaxis, hoarseness, or difficulty speaking.     Eyes: Wears eyeglasses or contact lenses     Respiratory:  Denies dyspnea with exertion,no cough, wheezing, or hemoptysis.     Cardiovascular: Negative for palpations, chest pain, orthopnea, PND    Gastrointestinal:  Denies change in bowel habits, dyspepsia, ulcer disease, hematochezia, or melena.     Endocrine: Negative for cold intolerance, heat intolerance, polydipsia, polyphagia and polyuria.     Genitourinary: Negative.      Musculoskeletal: Denies any history of arthritic symptoms or back problems     Skin:  " Denies any change in hair or nails, rashes, or skin lesions.     Allergic/Immunologic: Negative.  Negative for environmental allergies, food allergies and immunocompromised state.     Neurological:  Denies any history of recurrent headaches, strokes, TIA, or seizure disorder.     Hematological: Denies any food allergies, seasonal allergies, bleeding disorders, or lymphadenopathy.     Psychiatric/Behavioral: Denies any history of depression, substance abuse, or change in cognitive function.         Physical Exam     Constitutional: Cooperative, alert and oriented, in no acute distress.     HENT:   Head: Normocephalic, normal hair patterns, no masses or tenderness.  Ears, Nose, and Throat: No gross abnormalities. No pallor or cyanosis.   Eyes: EOMS intact, PERRL, conjunctivae and lids unremarkable. Fundoscopic exam and visual fields not performed.   Neck: No palpable masses or adenopathy, no thyromegaly, no JVD, carotid pulses are full and equal bilaterally and without  Bruits.     Cardiovascular: Regular rhythm, S1 and S2 normal, no S3 or S4. No murmurs, gallops, or rubs detected.     Pulmonary/Chest: Chest: normal symmetry,  normal respiratory excursion, no intercostal retraction, no use of accessory muscles.            Pulmonary: Normal breath sounds. No rales or ronchi.    Abdominal: Abdomen soft, bowel sounds normoactive, no masses, no hepatosplenomegaly, non-tender, no bruits.     Musculoskeletal: No deformities, clubbing, cyanosis, erythema, or edema observed.     Neurological: No gross motor or sensory deficits noted, affect appropriate, oriented to time, person, place.     Skin: Warm and dry to the touch, no apparent skin lesions or masses noted.     Psychiatric: She has a normal mood and affect. Her behavior is normal. Judgment and thought content normal.         Procedures      Lab Results   Component Value Date    WBC 8.60 10/14/2019    HGB 13.7 10/25/2019    HCT 40.0 10/25/2019    MCV 91.0 10/14/2019      10/14/2019     Lab Results   Component Value Date    GLUCOSE 131 (H) 10/25/2019    BUN 12 10/25/2019    CREATININE 1.32 (H) 10/25/2019    EGFRIFNONA 43 (L) 10/25/2019    BCR 9.1 10/25/2019    CO2 28.2 10/25/2019    CALCIUM 8.7 10/25/2019    ALBUMIN 4.40 10/25/2019    AST 56 (H) 10/14/2019    ALT 40 (H) 10/14/2019     Lab Results   Component Value Date    CHOL 76 01/17/2019    CHOL 137 01/04/2019    CHOL 278 (H) 06/06/2018     Lab Results   Component Value Date    TRIG 63 01/17/2019    TRIG 53 01/04/2019    TRIG 149 06/06/2018     Lab Results   Component Value Date    HDL 54 (L) 01/17/2019    HDL 49 (L) 01/04/2019    HDL 67 06/06/2018     No components found for: LDLCALC  Lab Results   Component Value Date    LDL <30 01/17/2019    LDL 77 01/04/2019     (H) 06/06/2018     No results found for: HDLLDLRATIO  No components found for: CHOLHDL  Lab Results   Component Value Date    HGBA1C 7.10 (H) 10/14/2019     Lab Results   Component Value Date    TSH 84.300 (H) 11/27/2019    F9NJLCO 83.1 11/27/2019           ASSESSMENT AND PLAN  Ms. Sutton is progressing reasonably well with no recurrence of angina.  No arrhythmia or congestive heart failure was noted.  She has been compliant with her medications.  Her LDL was less than 30 in January of this year.  She will be seeing her primary care physician in the next few days and will have lab work done.  I have continued antiplatelet therapy with aspirin and have changed Brilinta to Plavix 75 mg daily.  Lipid-lowering therapy with atorvastatin and Repatha, antihypertensive therapy with carvedilol, lisinopril have been continued.    Rhea was seen today for follow-up.    Diagnoses and all orders for this visit:    Coronary artery disease of native artery of native heart with stable angina pectoris (CMS/HCC)    Mixed hyperlipidemia    Essential hypertension        Patient's Body mass index is 42.31 kg/m². BMI is above normal parameters. Recommendations include:  exercise counseling and nutrition counseling.  Patient is a non-smoker    Mian Montana MD  12/6/2019  10:02 AM

## 2019-12-12 ENCOUNTER — TRANSCRIBE ORDERS (OUTPATIENT)
Dept: PHYSICAL THERAPY | Facility: CLINIC | Age: 49
End: 2019-12-12

## 2019-12-12 DIAGNOSIS — M54.16 RADICULOPATHY, LUMBAR REGION: ICD-10-CM

## 2019-12-12 DIAGNOSIS — M54.50 LOW BACK PAIN, UNSPECIFIED BACK PAIN LATERALITY, UNSPECIFIED CHRONICITY, UNSPECIFIED WHETHER SCIATICA PRESENT: Primary | ICD-10-CM

## 2019-12-30 ENCOUNTER — TREATMENT (OUTPATIENT)
Dept: PHYSICAL THERAPY | Facility: CLINIC | Age: 49
End: 2019-12-30

## 2019-12-30 DIAGNOSIS — M54.50 CHRONIC LOW BACK PAIN, UNSPECIFIED BACK PAIN LATERALITY, UNSPECIFIED WHETHER SCIATICA PRESENT: Primary | ICD-10-CM

## 2019-12-30 DIAGNOSIS — M54.16 LUMBAR RADICULOPATHY: ICD-10-CM

## 2019-12-30 DIAGNOSIS — G89.29 CHRONIC LOW BACK PAIN, UNSPECIFIED BACK PAIN LATERALITY, UNSPECIFIED WHETHER SCIATICA PRESENT: Primary | ICD-10-CM

## 2019-12-30 PROCEDURE — 97162 PT EVAL MOD COMPLEX 30 MIN: CPT | Performed by: PHYSICAL THERAPIST

## 2019-12-30 PROCEDURE — 97110 THERAPEUTIC EXERCISES: CPT | Performed by: PHYSICAL THERAPIST

## 2019-12-30 NOTE — PROGRESS NOTES
Physical Therapy Initial Evaluation and Plan of Care        Patient: Rhea Sutton   : 1970  Diagnosis/ICD-10 Code:  Chronic low back pain, unspecified back pain laterality, unspecified whether sciatica present [M54.5, G89.29]  Referring practitioner: Shiloh Mora MD  Date of Initial Visit: 2019  Today's Date: 2019  Patient seen for 1 sessions    Next MD appt: 2020.  Recertification: 2020    LATEX ALLERGY         Subjective Questionnaire: Oswestry: 36/50 = 72%      Subjective Evaluation    History of Present Illness  Onset date: .  Mechanism of injury: No known injury.    Subjective comment: Patient erports she has had low back pain since . She reprots she has buldging discs, DDD, and tears in her discs. She reports it has been getting worse. She reports she has had PT today at [facility] which didn't help at all. She reports she has been going to pain management since . She reports she has had injections also which also didn't help.  Patient Occupation: Unemployed, used to work at [employer]   Precautions and Work Restrictions: Hasn't worked since Quality of life: good    Pain  Current pain ratin  At best pain ratin  At worst pain rating: 10  Location: Low back  Quality: knife-like and sharp  Relieving factors: relaxation (fetal position)  Aggravating factors: prolonged positioning, standing, movement, lifting, ambulation, squatting and repetitive movement  Progression: worsening    Social Support  Lives in: one-story house  Lives with: stays with grandmother.    Diagnostic Tests  X-ray: abnormal (within the last 1 year)  MRI studies: abnormal (within the last 1 year)    Treatments  Previous treatment: physical therapy, medication and injection treatment  Current treatment: medication  Patient Goals  Patient goals for therapy: decreased pain             Objective       Static Posture     Head  Forward.    Shoulders  Rounded.    Pelvis   Anterior pelvic  "tilt    Hip   Hip (Left): Increased flexion.   Hip (Right): Increased flexion.     Comments  Pelvis/sacrum are level, no LLD to note.    Postural Observations  Seated posture: poor  Standing posture: poor  Correction of posture: has no consistent effect        Palpation     Additional Palpation Details  Non-specific hypersensitivity, reacts prior to touch.    Neurological Testing     Sensation     Lumbar   Left   Intact: light touch  Paresthesia: light touch    Right   Intact: light touch  Paresthesia: light touch    Additional Neurological Details  Reports entire LE numbness L>R, \"dentist office numb where I have to crawl to the bathroom.\"    Active Range of Motion     Lumbar   Flexion: 45 degrees   Extension: Active lumbar extension: -10° from upright/neutral.   Left lateral flexion: Active left lumbar lateral flexion: 25% of range.   Right lateral flexion: Active right lumbar lateral flexion: 25% of range.   Left rotation: Active left lumbar rotation: 75% of range.   Right rotation: Active right lumbar rotation: 50% of range.     Strength/Myotome Testing     Left Hip   Planes of Motion   Flexion: 5  Extension: 5  Abduction: 5  Adduction: 5    Right Hip   Planes of Motion   Flexion: 5  Extension: 5  Abduction: 5  Adduction: 5    Left Knee   Flexion: 5  Extension: 5    Right Knee   Flexion: 5  Extension: 4+    Left Ankle/Foot   Dorsiflexion: 5  Plantar flexion: 5    Right Ankle/Foot   Dorsiflexion: 5  Plantar flexion: 5    Tests     Lumbar     Left   Positive Cordero.   Negative crossed SLR, passive SLR, quadrant and valsalva.     Right   Positive Cordero.   Negative crossed SLR, passive SLR, quadrant and valsalva.     Left Pelvic Girdle/Sacrum   Negative: sacrum compression, gapping and sacral spring.     Right Pelvic Girdle/Sacrum   Negative: sacrum compression, gapping and sacral spring.     Left Hip   Negative BISI, FADIR, piriformis, SI compression and SI distraction.   Jaren: Positive.   SLR: Negative. "     Right Hip   Negative BISI, FADIR, piriformis, SI compression and SI distraction.   Jaren: Positive.   SLR: Negative.     Additional Tests Details  Shailesh's Symptoms  1) Tenderness- positive  2) Simulation- positive  3) Distraction- positive  4) Regional- positive  5) Overreaction- positive    Ambulation   Weight-Bearing Status   Assistive device used: none    Ambulation: Level Surfaces   Ambulation without assistive device: independent    Observational Gait   Left stance time, right stance time, left swing time, right swing time, left step length and right step length within functional limits. Decreased walking speed and stride length.   Left foot contact pattern: heel to toe  Right foot contact pattern: heel to toe  Left arm swing: within functional limits  Right arm swing: within functional limits  Base of support: normal    Comments   FWB, non-antalgic gait, no distress, no sistive device, no significant gait deviation, normal arm swing with gait, slowed gait.         Assessment & Plan     Assessment  Impairments: abnormal gait, abnormal or restricted ROM, activity intolerance, lacks appropriate home exercise program and pain with function  Assessment details: Patient presents with chronic low back pain, poor tolerance and shows some s/s of magnification/malingering. Would benefit from PT for exercise and for teaching of spinal protection.  Prognosis: fair  Prognosis details: Barriers to Rehab: Include significant or possible arthritic/degenerative changes that have occurred within the spine, The chronicity of this issue, The patient's obesity, The patient's generally deconditioned state, Possible poor/questionable compliance with HEP, Possible poor overall attendance/compliance, Possible monetary/secondary gain.    Safety Issues: Latex allergy    Functional Limitations: carrying objects, lifting, sleeping, uncomfortable because of pain, sitting, standing and unable to perform repetitive tasks  Goals  Plan  "Goals: Short Term Goals:  1) I with HEP and have additions/changes by next recertification    2) Patient able to show proper log roll technique.    3) Patient to be more aware of posture and posture correction techniques    4) AROM for lumbar extension >= 15°.     5) AROm B Lumbar SB/ROT >= 75% of range.    6) Patient able to ambulate aquatically F/R/L 5 min each with no increase in pain.      Long Term Goals:  1) Patient to have AROM for the lumbar spine all WNL, no increase in pain.    2) B LE 5/5.    3) Patient able to perform 20 Bridges with UE \"X\"  with no increase in pain.    4) Patient able to perform 20 reps of each aquatic there ex with no increase in pain.    5) Patient able to show proper lifting technique floor to waist with no increase in pain.    6) Patient able to show proper ergonomics for mopping/sweeping/vacumming.    7) Patient to report radicular symptoms are now come and go versus constant.    8) I with final land and aquatic HEP.    9) D/C with a final HEP and free 30 day fitness formula membership.      Plan  Therapy options: will be seen for skilled physical therapy services  Planned modality interventions: cryotherapy, high voltage pulsed current (pain management), hydrotherapy and thermotherapy (hydrocollator packs)  Planned therapy interventions: abdominal trunk stabilization, body mechanics training, flexibility, functional ROM exercises, home exercise program, transfer training, therapeutic activities, stretching, strengthening, spinal/joint mobilization, soft tissue mobilization, postural training, manual therapy, gait training, IADL retraining and neuromuscular re-education  Frequency: Land/Pool.  Duration in visits: 20  Treatment plan discussed with: patient  Plan details: Progress ROM, strength, core stab, body mechanics to an I HEP that the patient can continue for long term pain management and spinal protection.     Other therapeutic activities and/or exercises will be prescribed " depending on the patients progress or lack there of.      Visit Diagnoses:    ICD-10-CM ICD-9-CM   1. Chronic low back pain, unspecified back pain laterality, unspecified whether sciatica present M54.5 724.2    G89.29 338.29   2. Lumbar radiculopathy M54.16 724.4       Timed:  Manual Therapy:         mins  28925;  Therapeutic Exercise:    10     mins  34750;     Neuromuscular Lata:        mins  54341;    Therapeutic Activity:          mins  96205;     Gait Training:           mins  99765;     Ultrasound:          mins  76467;    Electrical Stimulation:         mins  34564 ( );    Untimed:  Electrical Stimulation:         mins  79776 ( );  Mechanical Traction:         mins  07129;     Timed Treatment:   10   mins   Total Treatment:     36   mins    PT SIGNATURE: Codie Laureano, PT DPT, [facility]   DATE TREATMENT INITIATED: 12/30/2019    Initial Certification  Certification Period: 3/29/2020  I certify that the therapy services are furnished while this patient is under my care.  The services outlined above are required by this patient, and will be reviewed every 90 days.     PHYSICIAN: Shiloh Mora MD      DATE:     Please sign and return via fax to  .. Thank you, [facility].

## 2020-01-02 DIAGNOSIS — E55.9 VITAMIN D DEFICIENCY: Primary | ICD-10-CM

## 2020-01-02 DIAGNOSIS — E89.0 POSTSURGICAL HYPOTHYROIDISM: ICD-10-CM

## 2020-01-02 DIAGNOSIS — E11.65 UNCONTROLLED TYPE 2 DIABETES MELLITUS WITH HYPERGLYCEMIA (HCC): ICD-10-CM

## 2020-01-06 ENCOUNTER — TREATMENT (OUTPATIENT)
Dept: PHYSICAL THERAPY | Facility: CLINIC | Age: 50
End: 2020-01-06

## 2020-01-06 DIAGNOSIS — M54.50 CHRONIC LOW BACK PAIN, UNSPECIFIED BACK PAIN LATERALITY, UNSPECIFIED WHETHER SCIATICA PRESENT: Primary | ICD-10-CM

## 2020-01-06 DIAGNOSIS — G89.29 CHRONIC LOW BACK PAIN, UNSPECIFIED BACK PAIN LATERALITY, UNSPECIFIED WHETHER SCIATICA PRESENT: Primary | ICD-10-CM

## 2020-01-06 DIAGNOSIS — M54.16 LUMBAR RADICULOPATHY: ICD-10-CM

## 2020-01-06 PROCEDURE — 97113 AQUATIC THERAPY/EXERCISES: CPT | Performed by: PHYSICAL THERAPIST

## 2020-01-06 NOTE — PROGRESS NOTES
"Physical Therapy Daily Progress Note    Patient: Rhea Sutton   : 1970  Diagnosis/ICD-10 Code:     Diagnosis Plan   1. Chronic low back pain, unspecified back pain laterality, unspecified whether sciatica present     2. Lumbar radiculopathy       Referring practitioner: Shiloh Mora MD  Date of Initial Visit: Type: THERAPY  Noted: 2019  Today's Date: 2020  Patient seen for 2 sessions      PT Recheck Due: 2020  PT MD Visit: TBD       Rhea Sutton        Subjective Evaluation    History of Present Illness    Subjective comment: patient states that she has fear of water. will be most comfortable today in water that she can reach the floor of the pool. Pain  Current pain ratin             Objective   See Exercise, Manual, and Modality Logs for complete treatment.       Assessment & Plan     Assessment  Assessment details: Patient has good effort with treatment today. Does not report increased pain with therex today. Able to complete all that is prescribed today.     Goals  Plan Goals: Short Term Goals:  1) I with HEP and have additions/changes by next recertification    2) Patient able to show proper log roll technique.    3) Patient to be more aware of posture and posture correction techniques    4) AROM for lumbar extension >= 15°.     5) AROm B Lumbar SB/ROT >= 75% of range.    6) Patient able to ambulate aquatically F/R/L 5 min each with no increase in pain.      Long Term Goals:  1) Patient to have AROM for the lumbar spine all WNL, no increase in pain.    2) B LE 5/5.    3) Patient able to perform 20 Bridges with UE \"X\"  with no increase in pain.    4) Patient able to perform 20 reps of each aquatic there ex with no increase in pain.    5) Patient able to show proper lifting technique floor to waist with no increase in pain.    6) Patient able to show proper ergonomics for mopping/sweeping/vacumming.    7) Patient to report radicular symptoms are now come and go " versus constant.    8) I with final land and aquatic HEP.    9) D/C with a final HEP and free 30 day fitness formula membership.    Plan  Plan details: Next visit add marching and cookie push down      Progress strengthening /stabilization /functional activity            Timed:  Manual Therapy:         mins  00042;  Therapeutic Exercise:         mins  79638;   Aquatic Therex :    40    mins  47440    Neuromuscular Lata:        mins  82200;    Therapeutic Activity:          mins  16509;     Gait Training:           mins  07575;     Ultrasound:          mins  58820;    Electrical Stimulation:         mins  89141 ( );    Untimed:  Electrical Stimulation:         mins  92412 ( );  Mechanical Traction:         mins  81263;   Orthotic fit/train:         mins  46922    Timed Treatment:   40   mins   Total Treatment:     40   mins  Delphine Canchola PTA  Physical Therapist Assistant

## 2020-01-08 ENCOUNTER — TREATMENT (OUTPATIENT)
Dept: PHYSICAL THERAPY | Facility: CLINIC | Age: 50
End: 2020-01-08

## 2020-01-08 ENCOUNTER — TELEPHONE (OUTPATIENT)
Dept: CARDIOLOGY | Facility: CLINIC | Age: 50
End: 2020-01-08

## 2020-01-08 DIAGNOSIS — M54.16 LUMBAR RADICULOPATHY: ICD-10-CM

## 2020-01-08 DIAGNOSIS — G89.29 CHRONIC LOW BACK PAIN, UNSPECIFIED BACK PAIN LATERALITY, UNSPECIFIED WHETHER SCIATICA PRESENT: Primary | ICD-10-CM

## 2020-01-08 DIAGNOSIS — M54.50 CHRONIC LOW BACK PAIN, UNSPECIFIED BACK PAIN LATERALITY, UNSPECIFIED WHETHER SCIATICA PRESENT: Primary | ICD-10-CM

## 2020-01-08 PROCEDURE — 97110 THERAPEUTIC EXERCISES: CPT | Performed by: PHYSICAL THERAPIST

## 2020-01-08 NOTE — TELEPHONE ENCOUNTER
----- Message from Chloe Hooks sent at 1/8/2020  9:20 AM CST -----  Regarding: smiley pt  [facility] pharmacy called stating that the pt's REPATHA SURECLICK 140 MG/ML solution auto-injector is needing a PA. They are requesting a call back at [phone]

## 2020-01-08 NOTE — PROGRESS NOTES
Physical Therapy Daily Progress Note    Patient: Rhea Sutton   : 1970  Diagnosis/ICD-10 Code:     Diagnosis Plan   1. Chronic low back pain, unspecified back pain laterality, unspecified whether sciatica present     2. Lumbar radiculopathy       Referring practitioner: Shiloh Mora MD  Date of Initial Visit: Type: THERAPY  Noted: 2019  Today's Date: 2020  Patient seen for 3 sessions      PT Recheck Due: 2020  PT MD Visit:        Rhea Sutton      Subjective Evaluation    History of Present Illness    Subjective comment: patient states that she has some pain today. doesn't know if its because she didn't sleep well last night or what. Pain  Current pain rating: 10             Objective       Static Posture     Comments  Patient presents in no acute distress. No facial grimacing or writhing noted at initiation of treatment. Patient is not tearful.       See Exercise, Manual, and Modality Logs for complete treatment.       Assessment & Plan     Assessment  Assessment details: Proceeded with therex despite patient's significant reports of pain today secondary to patient having no visual signs of distress and able to move fluently. Patient defers emergent care based on her significant complaints of pain. Pt independently demonstrates log roll technique for supine to sit to supine transfers with no cueing or instruction from clinician.     Goals  Plan Goals: Short Term Goals:  1) I with HEP and have additions/changes by next recertification (progressing)    2) Patient able to show proper log roll technique. (met)    3) Patient to be more aware of posture and posture correction techniques    4) AROM for lumbar extension >= 15°.     5) AROm B Lumbar SB/ROT >= 75% of range.    6) Patient able to ambulate aquatically F/R/L 5 min each with no increase in pain.      Long Term Goals:  1) Patient to have AROM for the lumbar spine all WNL, no increase in pain.    2) B LE  "5/5.    3) Patient able to perform 20 Bridges with UE \"X\"  with no increase in pain.    4) Patient able to perform 20 reps of each aquatic there ex with no increase in pain.    5) Patient able to show proper lifting technique floor to waist with no increase in pain.    6) Patient able to show proper ergonomics for mopping/sweeping/vacumming.    7) Patient to report radicular symptoms are now come and go versus constant.    8) I with final land and aquatic HEP.    9) D/C with a final HEP and free 30 day fitness formula membership.    Plan  Plan details: Next aquatics add standing marching and cookie push down, next land add BKLL      Progress strengthening /stabilization /functional activity            Timed:  Manual Therapy:         mins  51781;  Therapeutic Exercise:    40     mins  31740;   Aquatic Therex :        mins  96546    Neuromuscular Lata:        mins  25988;    Therapeutic Activity:          mins  85902;     Gait Training:           mins  06780;     Ultrasound:          mins  65798;    Electrical Stimulation:         mins  81283 ( );    Untimed:  Electrical Stimulation:         mins  14852 ( );  Mechanical Traction:         mins  16291;   Orthotic fit/train:         mins  40674    Timed Treatment:   40   mins   Total Treatment:     40   mins  Delphine Canchola PTA  Physical Therapist Assistant  "

## 2020-01-13 ENCOUNTER — TREATMENT (OUTPATIENT)
Dept: PHYSICAL THERAPY | Facility: CLINIC | Age: 50
End: 2020-01-13

## 2020-01-13 PROCEDURE — 97113 AQUATIC THERAPY/EXERCISES: CPT | Performed by: PHYSICAL THERAPIST
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
## 2020-01-14 ENCOUNTER — APPOINTMENT (OUTPATIENT)
Dept: LAB | Facility: HOSPITAL | Age: 50
End: 2020-01-14

## 2020-01-14 PROCEDURE — 84439 ASSAY OF FREE THYROXINE: CPT | Performed by: NURSE PRACTITIONER

## 2020-01-14 PROCEDURE — 80050 GENERAL HEALTH PANEL: CPT | Performed by: NURSE PRACTITIONER

## 2020-01-14 PROCEDURE — 83036 HEMOGLOBIN GLYCOSYLATED A1C: CPT | Performed by: NURSE PRACTITIONER

## 2020-01-15 ENCOUNTER — TREATMENT (OUTPATIENT)
Dept: PHYSICAL THERAPY | Facility: CLINIC | Age: 50
End: 2020-01-15

## 2020-01-15 DIAGNOSIS — M54.50 CHRONIC LOW BACK PAIN, UNSPECIFIED BACK PAIN LATERALITY, UNSPECIFIED WHETHER SCIATICA PRESENT: Primary | ICD-10-CM

## 2020-01-15 DIAGNOSIS — G89.29 CHRONIC LOW BACK PAIN, UNSPECIFIED BACK PAIN LATERALITY, UNSPECIFIED WHETHER SCIATICA PRESENT: Primary | ICD-10-CM

## 2020-01-15 DIAGNOSIS — M54.16 LUMBAR RADICULOPATHY: ICD-10-CM

## 2020-01-15 LAB
ALBUMIN SERPL-MCNC: 3.8 G/DL (ref 3.5–5.2)
ALBUMIN/GLOB SERPL: 1.2 G/DL
ALP SERPL-CCNC: 86 U/L (ref 39–117)
ALT SERPL W P-5'-P-CCNC: 39 U/L (ref 1–33)
ANION GAP SERPL CALCULATED.3IONS-SCNC: 12.4 MMOL/L (ref 5–15)
AST SERPL-CCNC: 32 U/L (ref 1–32)
BASOPHILS # BLD AUTO: 0.07 10*3/MM3 (ref 0–0.2)
BASOPHILS NFR BLD AUTO: 0.8 % (ref 0–1.5)
BILIRUB SERPL-MCNC: 0.2 MG/DL (ref 0.2–1.2)
BUN BLD-MCNC: 13 MG/DL (ref 6–20)
BUN/CREAT SERPL: 10.8 (ref 7–25)
CALCIUM SPEC-SCNC: 8.9 MG/DL (ref 8.6–10.5)
CHLORIDE SERPL-SCNC: 95 MMOL/L (ref 98–107)
CO2 SERPL-SCNC: 26.6 MMOL/L (ref 22–29)
CREAT BLD-MCNC: 1.2 MG/DL (ref 0.57–1)
DEPRECATED RDW RBC AUTO: 38.9 FL (ref 37–54)
EOSINOPHIL # BLD AUTO: 0.13 10*3/MM3 (ref 0–0.4)
EOSINOPHIL NFR BLD AUTO: 1.5 % (ref 0.3–6.2)
ERYTHROCYTE [DISTWIDTH] IN BLOOD BY AUTOMATED COUNT: 12.1 % (ref 12.3–15.4)
GFR SERPL CREATININE-BSD FRML MDRD: 48 ML/MIN/1.73
GLOBULIN UR ELPH-MCNC: 3.3 GM/DL
GLUCOSE BLD-MCNC: 255 MG/DL (ref 65–99)
HBA1C MFR BLD: 10.3 % (ref 4.8–5.6)
HCT VFR BLD AUTO: 40.5 % (ref 34–46.6)
HGB BLD-MCNC: 13.9 G/DL (ref 12–15.9)
IMM GRANULOCYTES # BLD AUTO: 0.08 10*3/MM3 (ref 0–0.05)
IMM GRANULOCYTES NFR BLD AUTO: 0.9 % (ref 0–0.5)
LYMPHOCYTES # BLD AUTO: 2.28 10*3/MM3 (ref 0.7–3.1)
LYMPHOCYTES NFR BLD AUTO: 25.8 % (ref 19.6–45.3)
MCH RBC QN AUTO: 30.6 PG (ref 26.6–33)
MCHC RBC AUTO-ENTMCNC: 34.3 G/DL (ref 31.5–35.7)
MCV RBC AUTO: 89.2 FL (ref 79–97)
MONOCYTES # BLD AUTO: 0.53 10*3/MM3 (ref 0.1–0.9)
MONOCYTES NFR BLD AUTO: 6 % (ref 5–12)
NEUTROPHILS # BLD AUTO: 5.75 10*3/MM3 (ref 1.7–7)
NEUTROPHILS NFR BLD AUTO: 65 % (ref 42.7–76)
NRBC BLD AUTO-RTO: 0 /100 WBC (ref 0–0.2)
PLATELET # BLD AUTO: 255 10*3/MM3 (ref 140–450)
PMV BLD AUTO: 11.4 FL (ref 6–12)
POTASSIUM BLD-SCNC: 4.2 MMOL/L (ref 3.5–5.2)
PROT SERPL-MCNC: 7.1 G/DL (ref 6–8.5)
RBC # BLD AUTO: 4.54 10*6/MM3 (ref 3.77–5.28)
SODIUM BLD-SCNC: 134 MMOL/L (ref 136–145)
T4 FREE SERPL-MCNC: 0.52 NG/DL (ref 0.93–1.7)
TSH SERPL DL<=0.05 MIU/L-ACNC: 92.3 UIU/ML (ref 0.27–4.2)
WBC NRBC COR # BLD: 8.84 10*3/MM3 (ref 3.4–10.8)

## 2020-01-15 PROCEDURE — 97110 THERAPEUTIC EXERCISES: CPT | Performed by: PHYSICAL THERAPIST

## 2020-01-15 NOTE — PROGRESS NOTES
"Physical Therapy Daily Progress Note    Patient: Rhea Sutton   : 1970  Diagnosis/ICD-10 Code:     Diagnosis Plan   1. Chronic low back pain, unspecified back pain laterality, unspecified whether sciatica present     2. Lumbar radiculopathy       Referring practitioner: Shiloh Mora MD  Date of Initial Visit: Type: THERAPY  Noted: 2019  Today's Date: 1/15/2020  Patient seen for 5 sessions      PT Recheck Due: 2020  PT MD Visit:        Rhea Sutton reports: that she still hurts.         Subjective Evaluation    History of Present Illness    Subjective comment: having pain today. Pain  Current pain ratin             Objective   See Exercise, Manual, and Modality Logs for complete treatment.       Assessment & Plan     Assessment  Assessment details: Patient continues with difficulty wth bridging at this time. Good effort with therex but requires cues for appropriate technique.     Goals  Plan Goals: Short Term Goals:  1) I with HEP and have additions/changes by next recertification (progressing)    2) Patient able to show proper log roll technique. (met)    3) Patient to be more aware of posture and posture correction techniques    4) AROM for lumbar extension >= 15°.     5) AROm B Lumbar SB/ROT >= 75% of range.    6) Patient able to ambulate aquatically F/R/L 5 min each with no increase in pain. (met)      Long Term Goals:  1) Patient to have AROM for the lumbar spine all WNL, no increase in pain.    2) B LE 5/5.    3) Patient able to perform 20 Bridges with UE \"X\"  with no increase in pain.    4) Patient able to perform 20 reps of each aquatic there ex with no increase in pain.    5) Patient able to show proper lifting technique floor to waist with no increase in pain.    6) Patient able to show proper ergonomics for mopping/sweeping/vacumming.    7) Patient to report radicular symptoms are now come and go versus constant.    8) I with final land and aquatic " HEP.    9) D/C with a final HEP and free 30 day fitness formula membership.    Plan  Plan details: Attempt to progress to bridge next land visit      Progress strengthening /stabilization /functional activity            Timed:  Manual Therapy:         mins  77054;  Therapeutic Exercise:    40     mins  96791;   Aquatic Therex :        mins  42156    Neuromuscular Lata:        mins  22569;    Therapeutic Activity:         mins  83938;     Gait Training:           mins  05839;     Ultrasound:          mins  08722;    Electrical Stimulation:         mins  58772 ( );    Untimed:  Electrical Stimulation:         mins  20857 ( );  Mechanical Traction:         mins  14768;   Orthotic fit/train:         mins  95981    Timed Treatment:   40   mins   Total Treatment:     40   mins  Rose Canchola PTA  Physical Therapist Assistant

## 2020-01-20 ENCOUNTER — TREATMENT (OUTPATIENT)
Dept: PHYSICAL THERAPY | Facility: CLINIC | Age: 50
End: 2020-01-20

## 2020-01-20 DIAGNOSIS — M54.50 CHRONIC LOW BACK PAIN, UNSPECIFIED BACK PAIN LATERALITY, UNSPECIFIED WHETHER SCIATICA PRESENT: Primary | ICD-10-CM

## 2020-01-20 DIAGNOSIS — G89.29 CHRONIC LOW BACK PAIN, UNSPECIFIED BACK PAIN LATERALITY, UNSPECIFIED WHETHER SCIATICA PRESENT: Primary | ICD-10-CM

## 2020-01-20 DIAGNOSIS — M54.16 LUMBAR RADICULOPATHY: ICD-10-CM

## 2020-01-20 PROCEDURE — 97113 AQUATIC THERAPY/EXERCISES: CPT | Performed by: PHYSICAL THERAPIST

## 2020-01-20 NOTE — PROGRESS NOTES
Physical Therapy Daily Progress Note    Patient: Rhea Sutton   : 1970  Diagnosis/ICD-10 Code:     Diagnosis Plan   1. Chronic low back pain, unspecified back pain laterality, unspecified whether sciatica present     2. Lumbar radiculopathy       Referring practitioner: Shiloh Mora MD  Date of Initial Visit: Type: THERAPY  Noted: 2019  Today's Date: 2020  Patient seen for 6 sessions      PT Recheck Due: 2020  PT MD Visit:        Rhea Sutton reports: that she cannot tell that therapy has made any difference. States that this is her fourth episode of having physical therapy for her back      Subjective Questionnaire: Oswestry: 33/50 = 66%      Subjective Evaluation    History of Present Illness    Subjective comment: sates that this being her 4th episode of physical therapy for her back she really doesn't see any change or benefit. continues to have chronic pain. Pain  Current pain ratin             Objective   See Exercise, Manual, and Modality Logs for complete treatment.       Assessment & Plan     Assessment  Assessment details: Patient able to complete 20 reps of all aquatics completed today. Ambulates 5 minutes each direction with no reports of increased pain. Patient is fearful of water and unable to complete deep water activities but Deep ab tucks are modified today to complete trans ab leg lifts.     Goals  Plan Goals: Short Term Goals:  1) I with HEP and have additions/changes by next recertification (met)    2) Patient able to show proper log roll technique. (met)    3) Patient to be more aware of posture and posture correction techniques (progressing)    4) AROM for lumbar extension >= 15°.     5) AROm B Lumbar SB/ROT >= 75% of range.    6) Patient able to ambulate aquatically F/R/L 5 min each with no increase in pain. (met)      Long Term Goals:  1) Patient to have AROM for the lumbar spine all WNL, no increase in pain.    2) B LE 5/5.    3)  "Patient able to perform 20 Bridges with UE \"X\"  with no increase in pain.    4) Patient able to perform 20 reps of each aquatic there ex with no increase in pain. (met)    5) Patient able to show proper lifting technique floor to waist with no increase in pain.    6) Patient able to show proper ergonomics for mopping/sweeping/vacumming.    7) Patient to report radicular symptoms are now come and go versus constant.    8) I with final land and aquatic HEP. (partially met)    9) D/C with a final HEP and free 30 day fitness formula membership.    Plan  Plan details: Recheck next visit with anticipated discharge and free 30 days fitness at that time.       Anticipate DC next Visit and Other: Recheck            Timed:  Manual Therapy:         mins  44874;  Therapeutic Exercise:         mins  21289;   Aquatic Therex :    50    mins  10179    Neuromuscular Lata:        mins  99982;    Therapeutic Activity:          mins  44804;     Gait Training:           mins  42141;     Ultrasound:          mins  61861;    Electrical Stimulation:         mins  70988 ( );    Untimed:  Electrical Stimulation:         mins  60513 ( );  Mechanical Traction:         mins  98564;   Orthotic fit/train:         mins  82268    Timed Treatment:   50   mins   Total Treatment:     50   mins  Rose Canchola PTA  Physical Therapist Assistant  "

## 2020-01-23 ENCOUNTER — TREATMENT (OUTPATIENT)
Dept: PHYSICAL THERAPY | Facility: CLINIC | Age: 50
End: 2020-01-23

## 2020-01-23 DIAGNOSIS — M54.50 CHRONIC LOW BACK PAIN, UNSPECIFIED BACK PAIN LATERALITY, UNSPECIFIED WHETHER SCIATICA PRESENT: Primary | ICD-10-CM

## 2020-01-23 DIAGNOSIS — G89.29 CHRONIC LOW BACK PAIN, UNSPECIFIED BACK PAIN LATERALITY, UNSPECIFIED WHETHER SCIATICA PRESENT: Primary | ICD-10-CM

## 2020-01-23 DIAGNOSIS — M54.16 LUMBAR RADICULOPATHY: ICD-10-CM

## 2020-01-23 PROCEDURE — 97110 THERAPEUTIC EXERCISES: CPT | Performed by: PHYSICAL THERAPIST

## 2020-01-23 NOTE — PROGRESS NOTES
"Re-Assessment / Re-Certification/Discharge        Patient: Rhea Sutton   : 1970  Diagnosis/ICD-10 Code:  Chronic low back pain, unspecified back pain laterality, unspecified whether sciatica present [M54.5, G89.29]  Referring practitioner: Shiloh Mora MD  Date of Initial Visit: Type: THERAPY  Noted: 2019  Today's Date: 2020  Patient seen for 7 sessions    Next MD appt: 2020.  Recertification: N/A     [employer]      Subjective:   Rhea Sutton reports: 0% imporvement  Subjective Questionnaire: Oswestry: 38/50 = 76%  Clinical Progress: improved  Home Program Compliance: Questionable  Treatment has included: therapeutic exercise, neuromuscular re-education and therapeutic activity    Subjective   Objective       Static Posture     Head  Forward.    Shoulders  Rounded.    Pelvis   Anterior pelvic tilt    Hip   Hip (Left): No increased flexion.   Hip (Right): No increased flexion.    Comments  Pelvis/sacrum are level, no LLD to note.    Postural Observations  Seated posture: poor  Standing posture: poor  Correction of posture: has no consistent effect        Palpation     Additional Palpation Details  Non-specific hypersensitivity, reacts prior to touch.    Neurological Testing     Sensation     Lumbar   Left   Intact: light touch  Paresthesia: light touch    Right   Intact: light touch  Paresthesia: light touch    Additional Neurological Details  Reports entire LE numbness L>R, \"dentist office numb where I have to crawl to the bathroom.\" She reports the L foot has pins and needles feeling and hurts.    Active Range of Motion     Lumbar   Flexion: 55 degrees   Extension: 4 degrees   Left lateral flexion: Active left lumbar lateral flexion: 25% of range.   Right lateral flexion: Active right lumbar lateral flexion: 25% of range.   Left rotation: Active left lumbar rotation: 50% of range.   Right rotation: Active right lumbar rotation: 25% of range.     Strength/Myotome " Testing     Left Hip   Planes of Motion   Flexion: 5  Extension: 5  Abduction: 5  Adduction: 5    Right Hip   Planes of Motion   Flexion: 5  Extension: 5  Abduction: 5  Adduction: 5    Left Knee   Flexion: 5  Extension: 5    Right Knee   Flexion: 5  Extension: 5    Left Ankle/Foot   Dorsiflexion: 5  Plantar flexion: 5    Right Ankle/Foot   Dorsiflexion: 5  Plantar flexion: 5    Tests     Lumbar     Left   Positive Cordero.   Negative crossed SLR, passive SLR, quadrant and valsalva.     Right   Positive Cordero.   Negative crossed SLR, passive SLR, quadrant and valsalva.     Left Pelvic Girdle/Sacrum   Negative: sacrum compression, gapping and sacral spring.     Right Pelvic Girdle/Sacrum   Negative: sacrum compression, gapping and sacral spring.     Left Hip   Negative BISI, FADIR, piriformis, SI compression and SI distraction.   Jaren: Positive.   SLR: Negative.     Right Hip   Negative BISI, FADIR, piriformis, SI compression and SI distraction.   Jaren: Positive.   SLR: Negative.     Additional Tests Details  Shailesh's Symptoms  1) Tenderness- positive  2) Simulation- positive  3) Distraction- positive  4) Regional- positive  5) Overreaction- positive    Ambulation   Weight-Bearing Status   Assistive device used: none    Ambulation: Level Surfaces   Ambulation without assistive device: independent    Observational Gait   Left stance time, right stance time, left swing time, right swing time, left step length and right step length within functional limits. Decreased walking speed and stride length.   Left foot contact pattern: heel to toe  Right foot contact pattern: heel to toe  Left arm swing: within functional limits  Right arm swing: within functional limits  Base of support: normal    Comments   FWB, non-antalgic gait, no distress, no sistive device, no significant gait deviation, normal arm swing with gait, slowed gait.     Assessment & Plan     Assessment  Assessment details: Patient has no significant  "change with AROM, but improved strength. Still poor overall posture. I with aquatics and land final HEP.  Prognosis: fair  Prognosis details: Barriers to Rehab: Include significant or possible arthritic/degenerative changes that have occurred within the spine, The chronicity of this issue, The patient's obesity, The patient's generally deconditioned state, Possible poor/questionable compliance with HEP, Possible poor overall attendance/compliance, Possible monetary/secondary gain.    Safety Issues: Latex allergy    Goals  Plan Goals: Short Term Goals:  1) I with HEP and have additions/changes by next recertification (met)    2) Patient able to show proper log roll technique. (met)    3) Patient to be more aware of posture and posture correction techniques (not met, still requires cueing for posture)    4) AROM for lumbar extension >= 15°. (not met)    5) AROm B Lumbar SB/ROT >= 75% of range. (not met)    6) Patient able to ambulate aquatically F/R/L 5 min each with no increase in pain. (met)      Long Term Goals:  1) Patient to have AROM for the lumbar spine all WNL, no increase in pain. (not met)    2) B LE 5/5. (met)    3) Patient able to perform 20 Bridges with UE \"X\"  with no increase in pain. (partially met; arms were at her side)    4) Patient able to perform 20 reps of each aquatic there ex with no increase in pain. (met)    5) Patient able to show proper lifting technique floor to waist with no increase in pain. (not met)    6) Patient able to show proper ergonomics for mopping/sweeping/vacumming. (met)    7) Patient to report radicular symptoms are now come and go versus constant. (not met)    8) I with final land and aquatic HEP. (met)    9) D/C with a final HEP and free 30 day fitness formula membership. (met)    Plan  Therapy options: will not be seen for skilled physical therapy services  Plan details: Discharge today with a final HEP and free 30 days fitness at that time.         Visit Diagnoses:    " ICD-10-CM ICD-9-CM   1. Chronic low back pain, unspecified back pain laterality, unspecified whether sciatica present M54.5 724.2    G89.29 338.29   2. Lumbar radiculopathy M54.16 724.4       Progress toward previous goals: Partially Met        Recommendations: Discharge    Prognosis to achieve goals: fair    PT Signature: Codie Laureano, PT DPT, CSCS        Timed:  Manual Therapy:         mins  86730;  Therapeutic Exercise:    42     mins  28703;     Neuromuscular Lata:        mins  15104;    Therapeutic Activity:          mins  68320;     Gait Training:           mins  19386;     Ultrasound:          mins  24663;    Electrical Stimulation:         mins  52756 ( );    Untimed:  Electrical Stimulation:         mins  99348 ( );  Mechanical Traction:         mins  26142;     Timed Treatment:   42   mins   Total Treatment:     42   mins

## 2020-02-27 ENCOUNTER — OFFICE VISIT (OUTPATIENT)
Dept: ENDOCRINOLOGY | Facility: CLINIC | Age: 50
End: 2020-02-27

## 2020-02-27 VITALS
DIASTOLIC BLOOD PRESSURE: 72 MMHG | BODY MASS INDEX: 40.24 KG/M2 | HEIGHT: 66 IN | WEIGHT: 250.4 LBS | HEART RATE: 78 BPM | SYSTOLIC BLOOD PRESSURE: 130 MMHG | OXYGEN SATURATION: 98 %

## 2020-02-27 DIAGNOSIS — E55.9 VITAMIN D DEFICIENCY: Primary | ICD-10-CM

## 2020-02-27 DIAGNOSIS — E11.65 TYPE 2 DIABETES MELLITUS WITH HYPERGLYCEMIA, WITHOUT LONG-TERM CURRENT USE OF INSULIN (HCC): ICD-10-CM

## 2020-02-27 DIAGNOSIS — E89.0 POSTSURGICAL HYPOTHYROIDISM: ICD-10-CM

## 2020-02-27 PROCEDURE — 99214 OFFICE O/P EST MOD 30 MIN: CPT | Performed by: NURSE PRACTITIONER

## 2020-02-27 RX ORDER — LEVOTHYROXINE SODIUM 0.15 MG/1
150 TABLET ORAL DAILY
Qty: 30 TABLET | Refills: 11 | Status: SHIPPED | OUTPATIENT
Start: 2020-02-27 | End: 2020-08-31 | Stop reason: DRUGHIGH

## 2020-02-27 NOTE — PROGRESS NOTES
Subjective    Rhea Sutton is a 49 y.o. female. she is here today for follow-up.    History of Present Illness       48 yo female presents for follow up      Hypothyroidism   Duration, since 2015  Secondary to total thyroidectomy for obstructive goiter  Aggravating I spoke with patient and she now admits to noncompliance. -- she states she is being compliant   Alleviating,armour thyroid--stopped     Now on levothyroxine   Symptoms, fatigue, weakness, malaise     She states today she is compliant and taking the levothryoxine      Lab Results   Component Value Date    TSH 92.300 (H) 01/14/2020                         Type 2 Diabetes  Duration             Lab Results   Component Value Date    HGBA1C 10.30 (H) 01/14/2020                  Severity , high     Patient was admitted with MI, sp PCI to RCA , has multivessel CAD and is being evaluated by CT surgery      Aggravating, high carb meals at home and lack of exercise      Treatment. Home treatment with trulicity , and steglatro        Home readings 90 up to 130 per patient        She had CABG times 4 on March 4, 2019                  The following portions of the patient's history were reviewed and updated as appropriate:   Past Medical History:   Diagnosis Date   • Allergic    • Anxiety    • CAD (coronary artery disease)    • CAD (coronary artery disease), bypass graft transplanted heart 03/05/2019    x4 bypass   • CHF (congestive heart failure) (CMS/HCC)    • Chronic back pain    • Chronic bronchitis (CMS/HCC)    • Chronic kidney disease    • Depression    • Diabetes mellitus (CMS/HCC)    • Diverticulitis 1986 2017   • Goiter    • H/O heart artery stent 01/03/2019   • Hashimoto's thyroiditis    • Head lice infestation    • Hyperlipidemia    • Hypertension    • Injury of back    • MI (myocardial infarction) (CMS/HCC)    • MI (myocardial infarction) (CMS/HCC)    • Postoperative hypothyroidism    • Sinus congestion    • Sleep apnea     not using c-pap      Past Surgical History:   Procedure Laterality Date   • APPENDECTOMY     • CARDIAC CATHETERIZATION N/A 10/13/2017   • CARDIAC CATHETERIZATION N/A 1/3/2019   • CHOLECYSTECTOMY     • COLON RESECTION     • COLON SURGERY     • CORONARY ARTERY BYPASS GRAFT N/A 3/5/2019    Procedure: CORONARY ARTERY BYPASS GRAFTING ENDOSCOPIC VEIN HARVEST          (CELL SAVER)             LATEX ALLERGY leg start @ 1323 chest start @ 1328 vein out 1417 vein prep [id number] on pump 1456 off pump 1729;  Surgeon: Jaren Vilchis MD;  Location: [facility];  Service: Cardiothoracic   • CORONARY STENT PLACEMENT     • HI RT/LT HEART CATHETERS N/A 10/13/2017   • TOTAL ABDOMINAL HYSTERECTOMY WITH SALPINGO OOPHORECTOMY     • TOTAL THYROIDECTOMY       Family History   Problem Relation Age of Onset   • Coronary artery disease Mother    • Coronary artery disease Father      OB History    None       Current Outpatient Medications   Medication Sig Dispense Refill   • aspirin 81 MG chewable tablet Chew 81 mg daily.     • atorvastatin (LIPITOR) 40 MG tablet Take 40 mg by mouth Every Night.     • Blood Glucose Monitoring Suppl (FREESTYLE LITE) device      • carvedilol (COREG) 3.125 MG tablet Take 1 tablet by mouth Every 12 (Twelve) Hours. 60 tablet 3   • cholecalciferol (VITAMIN D3) 1000 units tablet Take 1,000 Units by mouth Daily. 5 capsules daily     • clopidogrel (PLAVIX) 75 MG tablet Take 1 tablet by mouth Daily. 90 tablet 3   • Dulaglutide 0.75 MG/0.5ML solution pen-injector Inject 0.75 mg under the skin into the appropriate area as directed 1 (One) Time Per Week. 4 pen 11   • Dulaglutide 1.5 MG/0.5ML solution pen-injector Inject 1.5 mg under the skin into the appropriate area as directed 1 (One) Time Per Week. 4 pen 11   • Ertugliflozin L-PyroglutamicAc (STEGLATRO) 5 MG tablet Take 1 tablet by mouth Every Morning. 30 tablet 11   • FREESTYLE LITE test strip      • HYDROcodone-acetaminophen (NORCO) 5-325 MG per tablet TAKE 1 2 TO 1 (ONE HALF  "TO ONE) TABLET BY MOUTH ONCE DAILY  0   • levothyroxine (SYNTHROID) 112 MCG tablet Take seven tablets once weekly. 30 tablet 11   • lisinopril (PRINIVIL,ZESTRIL) 40 MG tablet Take 40 mg by mouth Daily.  6   • nitroglycerin (NITROSTAT) 0.4 MG SL tablet 1 under the tongue as needed for angina, may repeat q5mins for up three doses 30 tablet 3   • phenylephrine (SINUS RELIEF EXTRA STRENGTH) 1 % nasal spray 1 spray into the nostril(s) as directed by provider Every 4 (Four) Hours As Needed for Congestion.     • REPATHA SURECLICK 140 MG/ML solution auto-injector INJECT 1 PEN SUBCUTANEOUSLY EVERY 14 DAYS 2 pen 3   • sertraline (ZOLOFT) 100 MG tablet Take 100 mg by mouth Daily. FOR 30 DAYS  3   • vitamin D (ERGOCALCIFEROL) 57601 units capsule capsule Take 1 capsule by mouth Every 7 (Seven) Days. 5 capsule 11   • levothyroxine (SYNTHROID) 150 MCG tablet Take 1 tablet by mouth Daily. 30 tablet 11   • levothyroxine (SYNTHROID, LEVOTHROID) 100 MCG tablet Take 1 tablet by mouth Daily. 30 tablet 1   • lisinopril (PRINIVIL,ZESTRIL) 5 MG tablet Take 1 tablet by mouth Daily. 30 tablet 2     No current facility-administered medications for this visit.      Allergies   Allergen Reactions   • Coconut Anaphylaxis and Swelling   • Penicillins Swelling     Of the throat   • Adhesive Tape Other (See Comments)     Silk tape- blisters   • Ciprofloxacin Other (See Comments)     \"knots on my shins\"   • Latex Hives     Social History     Socioeconomic History   • Marital status:      Spouse name: Not on file   • Number of children: Not on file   • Years of education: Not on file   • Highest education level: Not on file   Tobacco Use   • Smoking status: Former Smoker     Packs/day: 0.00     Years: 20.00     Pack years: 0.00     Types: Cigarettes     Start date:      Last attempt to quit: 10/12/2017     Years since quittin.3   • Smokeless tobacco: Never Used   Substance and Sexual Activity   • Alcohol use: No   • Drug use: No   • " "Sexual activity: Not Currently       Review of Systems  Review of Systems   Constitutional: Negative for activity change, appetite change, diaphoresis and fatigue.   HENT: Negative for facial swelling, sneezing, sore throat, tinnitus, trouble swallowing and voice change.    Eyes: Negative for photophobia, pain, discharge, redness, itching and visual disturbance.   Respiratory: Negative for apnea, cough, choking, chest tightness and shortness of breath.    Cardiovascular: Negative for chest pain, palpitations and leg swelling.   Gastrointestinal: Negative for abdominal distention, abdominal pain, constipation, diarrhea, nausea and vomiting.   Endocrine: Negative for cold intolerance, heat intolerance, polydipsia, polyphagia and polyuria.   Genitourinary: Negative for difficulty urinating, dysuria, frequency, hematuria and urgency.   Musculoskeletal: Negative for arthralgias, back pain, gait problem, joint swelling, myalgias, neck pain and neck stiffness.   Skin: Negative for color change, pallor, rash and wound.   Neurological: Negative for dizziness, tremors, weakness, light-headedness, numbness and headaches.   Hematological: Negative for adenopathy. Does not bruise/bleed easily.   Psychiatric/Behavioral: Negative for behavioral problems, confusion and sleep disturbance.        Objective    /72   Pulse 78   Ht 167.6 cm (66\")   Wt 114 kg (250 lb 6.4 oz)   SpO2 98%   BMI 40.42 kg/m²   Physical Exam   Constitutional: She is oriented to person, place, and time. She appears well-developed and well-nourished. No distress.   HENT:   Head: Normocephalic and atraumatic.   Right Ear: External ear normal.   Left Ear: External ear normal.   Nose: Nose normal.   Eyes: Pupils are equal, round, and reactive to light. Conjunctivae and EOM are normal.   Neck: Normal range of motion. Neck supple. No tracheal deviation present. No thyromegaly present.   Cardiovascular: Normal rate, regular rhythm and normal heart sounds. "   No murmur heard.  Pulmonary/Chest: Effort normal and breath sounds normal. No respiratory distress. She has no wheezes.   Abdominal: Soft. Bowel sounds are normal. There is no tenderness. There is no rebound and no guarding.   Musculoskeletal: Normal range of motion. She exhibits no edema, tenderness or deformity.   Neurological: She is alert and oriented to person, place, and time. No cranial nerve deficit.   Skin: Skin is warm and dry. No rash noted.   Psychiatric: She has a normal mood and affect. Her behavior is normal. Judgment and thought content normal.       Lab Review  Glucose (mg/dL)   Date Value   01/14/2020 255 (H)   10/25/2019 131 (H)   10/14/2019 112 (H)     Glucose, Arterial (mmol/L)   Date Value   03/06/2019 115 (H)   03/06/2019 126 (H)   03/05/2019      Comment:      The parameter value has a question [id number] Glu not usable     Sodium (mmol/L)   Date Value   01/14/2020 134 (L)   10/25/2019 138   10/14/2019 134 (L)     Potassium (mmol/L)   Date Value   01/14/2020 4.2   10/25/2019 4.0   10/14/2019 4.1     Chloride (mmol/L)   Date Value   01/14/2020 95 (L)   10/25/2019 96 (L)   10/14/2019 93 (L)     CO2 (mmol/L)   Date Value   01/14/2020 26.6   10/25/2019 28.2   10/14/2019 28.2     BUN (mg/dL)   Date Value   01/14/2020 13   10/25/2019 12   10/14/2019 12     Creatinine (mg/dL)   Date Value   01/14/2020 1.20 (H)   10/25/2019 1.32 (H)   10/14/2019 1.72 (H)     Hemoglobin A1C (%)   Date Value   01/14/2020 10.30 (H)   10/14/2019 7.10 (H)   03/18/2019 7.4 (H)   01/17/2019 10.5 (H)   01/04/2019 12.0 (H)     Triglycerides (mg/dL)   Date Value   01/17/2019 63   01/04/2019 53   06/06/2018 149     LDL Cholesterol  (mg/dL)   Date Value   01/17/2019 <30   01/04/2019 77   06/06/2018 184 (H)   03/05/2018 270 (H)       Assessment/Plan      1. Vitamin D deficiency    2. Postsurgical hypothyroidism    3. Type 2 diabetes mellitus with hyperglycemia, without long-term current use of insulin (CMS/[facility])     .    Medications prescribed:  Outpatient Encounter Medications as of 2/27/2020   Medication Sig Dispense Refill   • aspirin 81 MG chewable tablet Chew 81 mg daily.     • atorvastatin (LIPITOR) 40 MG tablet Take 40 mg by mouth Every Night.     • Blood Glucose Monitoring Suppl (FREESTYLE LITE) device      • carvedilol (COREG) 3.125 MG tablet Take 1 tablet by mouth Every 12 (Twelve) Hours. 60 tablet 3   • cholecalciferol (VITAMIN D3) 1000 units tablet Take 1,000 Units by mouth Daily. 5 capsules daily     • clopidogrel (PLAVIX) 75 MG tablet Take 1 tablet by mouth Daily. 90 tablet 3   • Dulaglutide 0.75 MG/0.5ML solution pen-injector Inject 0.75 mg under the skin into the appropriate area as directed 1 (One) Time Per Week. 4 pen 11   • Dulaglutide 1.5 MG/0.5ML solution pen-injector Inject 1.5 mg under the skin into the appropriate area as directed 1 (One) Time Per Week. 4 pen 11   • Ertugliflozin L-PyroglutamicAc (STEGLATRO) 5 MG tablet Take 1 tablet by mouth Every Morning. 30 tablet 11   • FREESTYLE LITE test strip      • HYDROcodone-acetaminophen (NORCO) 5-325 MG per tablet TAKE 1 2 TO 1 (ONE HALF TO ONE) TABLET BY MOUTH ONCE DAILY  0   • levothyroxine (SYNTHROID) 112 MCG tablet Take seven tablets once weekly. 30 tablet 11   • lisinopril (PRINIVIL,ZESTRIL) 40 MG tablet Take 40 mg by mouth Daily.  6   • nitroglycerin (NITROSTAT) 0.4 MG SL tablet 1 under the tongue as needed for angina, may repeat q5mins for up three doses 30 tablet 3   • phenylephrine (SINUS RELIEF EXTRA STRENGTH) 1 % nasal spray 1 spray into the nostril(s) as directed by provider Every 4 (Four) Hours As Needed for Congestion.     • REPATHA SURECLICK 140 MG/ML solution auto-injector INJECT 1 PEN SUBCUTANEOUSLY EVERY 14 DAYS 2 pen 3   • sertraline (ZOLOFT) 100 MG tablet Take 100 mg by mouth Daily. FOR 30 DAYS  3   • vitamin D (ERGOCALCIFEROL) 92196 units capsule capsule Take 1 capsule by mouth Every 7 (Seven) Days. 5 capsule 11   • levothyroxine  (SYNTHROID) 150 MCG tablet Take 1 tablet by mouth Daily. 30 tablet 11   • levothyroxine (SYNTHROID, LEVOTHROID) 100 MCG tablet Take 1 tablet by mouth Daily. 30 tablet 1   • lisinopril (PRINIVIL,ZESTRIL) 5 MG tablet Take 1 tablet by mouth Daily. 30 tablet 2     No facility-administered encounter medications on file as of 2/27/2020.        Orders placed during this encounter include:  Orders Placed This Encounter   Procedures   • Comprehensive Metabolic Panel   • Hemoglobin A1c   • Lipid Panel   • Protein / Creatinine Ratio, Urine - Urine, Clean Catch   • Microalbumin / Creatinine Urine Ratio - Urine, Clean Catch   • TSH   • Vitamin B12   • Vitamin D 25 Hydroxy   • CBC & Differential     Order Specific Question:   Manual Differential     Answer:   No     Type 2 Diabetes Mellitus With Hyperglycemia (Cms/[facility])   Postsurgical Hypothyroidism         Hypothyroidism-postsurgical      Patient admitted in the past of noncompliance but states taking faithfully               Since TSH is 70 , I will start with 150 mcgs and expect to see her in 2 week to increase dose to 200 mcgs                Lab Results   Component Value Date     TSH 73.500 (H) 01/17/2019      Component      Latest Ref Rng & Units 1/17/2019   Free T4      0.78 - 2.19 ng/dL 0.98            We will follow Free T4 as a guide to treatment since the intention is to have her near euthyroid for potential upcoming CABG.     Increase to 200 mcg daily         She is absorbing we did the labs that showed absorption    On levothryoxinew 112 mcg daily    Lab Results   Component Value Date    TSH 92.300 (H) 01/14/2020     Increase to 150 mcg daily      ====     Type 2 diabetes        Lab Results   Component Value Date    HGBA1C 10.30 (H) 01/14/2020                   Complicated by CAD  Back in March GFR less than 45 but now 58.           [place] caused knots      On Trulicity 1.5 mg weekly     Refusing insulin fast and long acting insuilin     Will have to control diet      Do not go over 45 grams of carbohydrate              Stop Glipizide           .  steglatro 5 mg --- stop GFR 37        As long as she eats up to 50 grams of carbohdyrate per meal, she doesn't need novolog.   If she exceeds 50 grams of carbohydrate , I will give her 5 units to 10 units before meals based on carb load. ==she is not using the Novolog        Bone Health     Component      Latest Ref Rng & Units 1/17/2019   25 Hydroxy, Vitamin D      30.0 - 100.0 ng/ml 28.1 (L)         Taking vitamin d supplement        Component      Latest Ref Rng & Units 10/14/2019   25 Hydroxy, Vitamin D      30.0 - 100.0 ng/ml 17.4 (L)            Start 50,000 units weekly      Lab Results   Component Value Date    [id number] 10/14/2019               Lipid Management:        Component      Latest Ref Rng & Units 1/17/2019   Total Cholesterol      0 - 199 mg/dL 76   Triglycerides      20 - 199 mg/dL 63   HDL Cholesterol      60 - 200 mg/dL 54 (L)   LDL Cholesterol       1 - 129 mg/dL <30   LDL/HDL Ratio      0.00 - 3.22        On repatha                     4. Follow-up: Return in about 3 months (around 5/27/2020) for Recheck.

## 2020-03-03 ENCOUNTER — TRANSCRIBE ORDERS (OUTPATIENT)
Dept: LAB | Facility: HOSPITAL | Age: 50
End: 2020-03-03

## 2020-03-03 ENCOUNTER — LAB (OUTPATIENT)
Dept: LAB | Facility: HOSPITAL | Age: 50
End: 2020-03-03

## 2020-03-03 DIAGNOSIS — E78.49 OTHER HYPERLIPIDEMIA: Primary | ICD-10-CM

## 2020-03-03 DIAGNOSIS — E78.49 OTHER HYPERLIPIDEMIA: ICD-10-CM

## 2020-03-03 LAB
ALBUMIN SERPL-MCNC: 4.4 G/DL (ref 3.5–5.2)
ANION GAP SERPL CALCULATED.3IONS-SCNC: 15.1 MMOL/L (ref 5–15)
BUN BLD-MCNC: 12 MG/DL (ref 6–20)
BUN/CREAT SERPL: 10.3 (ref 7–25)
CALCIUM SPEC-SCNC: 8.9 MG/DL (ref 8.6–10.5)
CHLORIDE SERPL-SCNC: 94 MMOL/L (ref 98–107)
CO2 SERPL-SCNC: 25.9 MMOL/L (ref 22–29)
CREAT BLD-MCNC: 1.16 MG/DL (ref 0.57–1)
GFR SERPL CREATININE-BSD FRML MDRD: 50 ML/MIN/1.73
GLUCOSE BLD-MCNC: 454 MG/DL (ref 65–99)
HCT VFR BLD AUTO: 45.4 % (ref 34–46.6)
HGB BLD-MCNC: 15.2 G/DL (ref 12–15.9)
MAGNESIUM SERPL-MCNC: 2.1 MG/DL (ref 1.6–2.6)
PHOSPHATE SERPL-MCNC: 3.5 MG/DL (ref 2.5–4.5)
POTASSIUM BLD-SCNC: 4 MMOL/L (ref 3.5–5.2)
SODIUM BLD-SCNC: 135 MMOL/L (ref 136–145)

## 2020-03-03 PROCEDURE — 85014 HEMATOCRIT: CPT

## 2020-03-03 PROCEDURE — 85018 HEMOGLOBIN: CPT

## 2020-03-03 PROCEDURE — 80069 RENAL FUNCTION PANEL: CPT

## 2020-03-03 PROCEDURE — 83735 ASSAY OF MAGNESIUM: CPT

## 2020-05-27 ENCOUNTER — TELEMEDICINE (OUTPATIENT)
Dept: ENDOCRINOLOGY | Facility: CLINIC | Age: 50
End: 2020-05-27

## 2020-05-27 DIAGNOSIS — E55.9 VITAMIN D DEFICIENCY: ICD-10-CM

## 2020-05-27 DIAGNOSIS — I10 ESSENTIAL HYPERTENSION: Primary | Chronic | ICD-10-CM

## 2020-05-27 DIAGNOSIS — E11.65 TYPE 2 DIABETES MELLITUS WITH HYPERGLYCEMIA, WITHOUT LONG-TERM CURRENT USE OF INSULIN (HCC): ICD-10-CM

## 2020-05-27 DIAGNOSIS — E78.2 MIXED HYPERLIPIDEMIA: ICD-10-CM

## 2020-05-27 DIAGNOSIS — E89.0 POSTSURGICAL HYPOTHYROIDISM: Chronic | ICD-10-CM

## 2020-05-27 PROCEDURE — 99214 OFFICE O/P EST MOD 30 MIN: CPT | Performed by: NURSE PRACTITIONER

## 2020-05-27 NOTE — PROGRESS NOTES
Subjective    Rhea Sutton is a 49 y.o. female. she is here today for follow-up.    History of Present Illness       You have chosen to receive care through a telehealth visit.  Do you consent to use a video/audio connection for your medical care today? Yes          TELEHEALTH VIDEO VISIT     This a video visit due to [facility] current guidelines for social distancing due to the COVID 19 pandemic      History of Present Illness         48 yo female presents for follow up      Hypothyroidism   Duration, since 2015  Secondary to total thyroidectomy for obstructive goiter  Aggravating I spoke with patient and she now admits to noncompliance. -- she states she is being compliant   Alleviating,armour thyroid--stopped     Now on levothyroxine   Symptoms, fatigue, weakness, malaise     She states today she is compliant and taking the levothryoxine            Lab Results   Component Value Date     TSH 92.300 (H) 01/14/2020                           Type 2 Diabetes  Duration                    Lab Results   Component Value Date     HGBA1C 10.30 (H) 01/14/2020                   Severity , high     Patient was admitted with MI, sp PCI to RCA , has multivessel CAD and is being evaluated by CT surgery      Aggravating, high carb meals at home and lack of exercise      Treatment. Home treatment with trulicity , and steglatro        Home readings 98 up to 170           She had CABG times 4 on March 4, 2019                           The following portions of the patient's history were reviewed and updated as appropriate:   Past Medical History:   Diagnosis Date   • Allergic    • Anxiety    • CAD (coronary artery disease)    • CAD (coronary artery disease), bypass graft transplanted heart 03/05/2019    x4 bypass   • CHF (congestive heart failure) (CMS/[facility])    • Chronic back pain    • Chronic bronchitis (CMS/[facility])    • Chronic kidney disease    • Depression    • Diabetes mellitus (CMS/[facility])    • Diverticulitis 1986 2017   • Goiter     • H/O heart artery stent 01/03/2019   • Hashimoto's thyroiditis    • Head lice infestation    • Hyperlipidemia    • Hypertension    • Injury of back    • MI (myocardial infarction) (CMS/HCC)    • MI (myocardial infarction) (CMS/HCC)    • Postoperative hypothyroidism    • Sinus congestion    • Sleep apnea     not using c-pap     Past Surgical History:   Procedure Laterality Date   • APPENDECTOMY     • CARDIAC CATHETERIZATION N/A 10/13/2017   • CARDIAC CATHETERIZATION N/A 1/3/2019   • CHOLECYSTECTOMY     • COLON RESECTION     • COLON SURGERY     • CORONARY ARTERY BYPASS GRAFT N/A 3/5/2019    Procedure: CORONARY ARTERY BYPASS GRAFTING ENDOSCOPIC VEIN HARVEST          (CELL SAVER)             LATEX ALLERGY leg start @ 1323 chest start @ 1328 vein out 1417 vein prep [id number] on pump 1456 off pump 1729;  Surgeon: Jaren Vilchis MD;  Location: [facility];  Service: Cardiothoracic   • CORONARY STENT PLACEMENT     • NM RT/LT HEART CATHETERS N/A 10/13/2017   • TOTAL ABDOMINAL HYSTERECTOMY WITH SALPINGO OOPHORECTOMY     • TOTAL THYROIDECTOMY       Family History   Problem Relation Age of Onset   • Coronary artery disease Mother    • Coronary artery disease Father      OB History    None       Current Outpatient Medications   Medication Sig Dispense Refill   • aspirin 81 MG chewable tablet Chew 81 mg daily.     • atorvastatin (LIPITOR) 40 MG tablet Take 40 mg by mouth Every Night.     • Blood Glucose Monitoring Suppl (FREESTYLE LITE) device      • carvedilol (COREG) 3.125 MG tablet Take 1 tablet by mouth Every 12 (Twelve) Hours. 60 tablet 3   • cholecalciferol (VITAMIN D3) 1000 units tablet Take 1,000 Units by mouth Daily. 5 capsules daily     • clopidogrel (PLAVIX) 75 MG tablet Take 1 tablet by mouth Daily. 90 tablet 3   • Dulaglutide 0.75 MG/0.5ML solution pen-injector Inject 0.75 mg under the skin into the appropriate area as directed 1 (One) Time Per Week. 4 pen 11   • Dulaglutide 1.5 MG/0.5ML solution  "pen-injector Inject 1.5 mg under the skin into the appropriate area as directed 1 (One) Time Per Week. 4 pen 11   • Ertugliflozin L-PyroglutamicAc (STEGLATRO) 5 MG tablet Take 1 tablet by mouth Every Morning. 30 tablet 11   • FREESTYLE LITE test strip      • HYDROcodone-acetaminophen (NORCO) 5-325 MG per tablet TAKE 1 2 TO 1 (ONE HALF TO ONE) TABLET BY MOUTH ONCE DAILY  0   • levothyroxine (SYNTHROID) 112 MCG tablet Take seven tablets once weekly. 30 tablet 11   • levothyroxine (SYNTHROID) 150 MCG tablet Take 1 tablet by mouth Daily. 30 tablet 11   • levothyroxine (SYNTHROID, LEVOTHROID) 100 MCG tablet Take 1 tablet by mouth Daily. 30 tablet 1   • lisinopril (PRINIVIL,ZESTRIL) 40 MG tablet Take 40 mg by mouth Daily.  6   • lisinopril (PRINIVIL,ZESTRIL) 5 MG tablet Take 1 tablet by mouth Daily. 30 tablet 2   • nitroglycerin (NITROSTAT) 0.4 MG SL tablet 1 under the tongue as needed for angina, may repeat q5mins for up three doses 30 tablet 3   • phenylephrine (SINUS RELIEF EXTRA STRENGTH) 1 % nasal spray 1 spray into the nostril(s) as directed by provider Every 4 (Four) Hours As Needed for Congestion.     • REPATHA SURECLICK 140 MG/ML solution auto-injector INJECT 1 PEN SUBCUTANEOUSLY EVERY 14 DAYS 2 pen 3   • sertraline (ZOLOFT) 100 MG tablet Take 100 mg by mouth Daily. FOR 30 DAYS  3   • vitamin D (ERGOCALCIFEROL) 42963 units capsule capsule Take 1 capsule by mouth Every 7 (Seven) Days. 5 capsule 11     No current facility-administered medications for this visit.      Allergies   Allergen Reactions   • Coconut Anaphylaxis and Swelling   • Penicillins Swelling     Of the throat   • Adhesive Tape Other (See Comments)     Silk tape- blisters   • Ciprofloxacin Other (See Comments)     \"knots on my shins\"   • Latex Hives     Social History     Socioeconomic History   • Marital status:      Spouse name: Not on file   • Number of children: Not on file   • Years of education: Not on file   • Highest education level: " Not on file   Tobacco Use   • Smoking status: Former Smoker     Packs/day: 0.00     Years: 20.00     Pack years: 0.00     Types: Cigarettes     Start date:      Last attempt to quit: 10/12/2017     Years since quittin.6   • Smokeless tobacco: Never Used   Substance and Sexual Activity   • Alcohol use: No   • Drug use: No   • Sexual activity: Not Currently       Review of Systems  Review of Systems   Constitutional: Negative for activity change, appetite change, diaphoresis and fatigue.   HENT: Negative for facial swelling, sneezing, sore throat, tinnitus, trouble swallowing and voice change.    Eyes: Negative for photophobia, pain, discharge, redness, itching and visual disturbance.   Respiratory: Negative for apnea, cough, choking, chest tightness and shortness of breath.    Cardiovascular: Negative for chest pain, palpitations and leg swelling.   Gastrointestinal: Negative for abdominal distention, abdominal pain, constipation, diarrhea, nausea and vomiting.   Endocrine: Negative for cold intolerance, heat intolerance, polydipsia, polyphagia and polyuria.   Genitourinary: Negative for difficulty urinating, dysuria, frequency, hematuria and urgency.   Musculoskeletal: Negative for arthralgias, back pain, gait problem, joint swelling, myalgias, neck pain and neck stiffness.   Skin: Negative for color change, pallor, rash and wound.   Neurological: Negative for dizziness, tremors, weakness, light-headedness, numbness and headaches.   Hematological: Negative for adenopathy. Does not bruise/bleed easily.   Psychiatric/Behavioral: Negative for behavioral problems, confusion and sleep disturbance.        Objective    There were no vitals taken for this visit.  Physical Exam   Constitutional: She is oriented to person, place, and time. She appears well-developed and well-nourished. No distress.   HENT:   Head: Normocephalic and atraumatic.   Right Ear: External ear normal.   Left Ear: External ear normal.   Nose:  Nose normal.   Mouth/Throat: Oropharynx is clear and moist.   Eyes: Pupils are equal, round, and reactive to light. Conjunctivae and EOM are normal.   Neck: Normal range of motion.   Pulmonary/Chest: Effort normal. No respiratory distress.   Neurological: She is alert and oriented to person, place, and time.   Skin: No pallor.   Psychiatric: She has a normal mood and affect. Her behavior is normal. Judgment and thought content normal.       Lab Review  Glucose (mg/dL)   Date Value   03/03/2020 454 (C)   01/14/2020 255 (H)   10/25/2019 131 (H)     Glucose, Arterial (mmol/L)   Date Value   03/06/2019 115 (H)   03/06/2019 126 (H)   03/05/2019      Comment:      The parameter value has a question [id number] Glu not usable     Sodium (mmol/L)   Date Value   03/03/2020 135 (L)   01/14/2020 134 (L)   10/25/2019 138     Potassium (mmol/L)   Date Value   03/03/2020 4.0   01/14/2020 4.2   10/25/2019 4.0     Chloride (mmol/L)   Date Value   03/03/2020 94 (L)   01/14/2020 95 (L)   10/25/2019 96 (L)     CO2 (mmol/L)   Date Value   03/03/2020 25.9   01/14/2020 26.6   10/25/2019 28.2     BUN (mg/dL)   Date Value   03/03/2020 12   01/14/2020 13   10/25/2019 12     Creatinine (mg/dL)   Date Value   03/03/2020 1.16 (H)   01/14/2020 1.20 (H)   10/25/2019 1.32 (H)     Hemoglobin A1C (%)   Date Value   01/14/2020 10.30 (H)   10/14/2019 7.10 (H)   03/18/2019 7.4 (H)   01/17/2019 10.5 (H)   01/04/2019 12.0 (H)     Triglycerides (mg/dL)   Date Value   01/17/2019 63   01/04/2019 53   06/06/2018 149     LDL Cholesterol  (mg/dL)   Date Value   01/17/2019 <30   01/04/2019 77   06/06/2018 184 (H)   03/05/2018 270 (H)       Assessment/Plan      1. Essential hypertension    2. Mixed hyperlipidemia    3. Vitamin D deficiency    4. Postsurgical hypothyroidism    5. Type 2 diabetes mellitus with hyperglycemia, without long-term current use of insulin (CMS/[facility])    .    Medications prescribed:  Outpatient Encounter Medications as of 5/27/2020    Medication Sig Dispense Refill   • aspirin 81 MG chewable tablet Chew 81 mg daily.     • atorvastatin (LIPITOR) 40 MG tablet Take 40 mg by mouth Every Night.     • Blood Glucose Monitoring Suppl (FREESTYLE LITE) device      • carvedilol (COREG) 3.125 MG tablet Take 1 tablet by mouth Every 12 (Twelve) Hours. 60 tablet 3   • cholecalciferol (VITAMIN D3) 1000 units tablet Take 1,000 Units by mouth Daily. 5 capsules daily     • clopidogrel (PLAVIX) 75 MG tablet Take 1 tablet by mouth Daily. 90 tablet 3   • Dulaglutide 0.75 MG/0.5ML solution pen-injector Inject 0.75 mg under the skin into the appropriate area as directed 1 (One) Time Per Week. 4 pen 11   • Dulaglutide 1.5 MG/0.5ML solution pen-injector Inject 1.5 mg under the skin into the appropriate area as directed 1 (One) Time Per Week. 4 pen 11   • Ertugliflozin L-PyroglutamicAc (STEGLATRO) 5 MG tablet Take 1 tablet by mouth Every Morning. 30 tablet 11   • FREESTYLE LITE test strip      • HYDROcodone-acetaminophen (NORCO) 5-325 MG per tablet TAKE 1 2 TO 1 (ONE HALF TO ONE) TABLET BY MOUTH ONCE DAILY  0   • levothyroxine (SYNTHROID) 112 MCG tablet Take seven tablets once weekly. 30 tablet 11   • levothyroxine (SYNTHROID) 150 MCG tablet Take 1 tablet by mouth Daily. 30 tablet 11   • levothyroxine (SYNTHROID, LEVOTHROID) 100 MCG tablet Take 1 tablet by mouth Daily. 30 tablet 1   • lisinopril (PRINIVIL,ZESTRIL) 40 MG tablet Take 40 mg by mouth Daily.  6   • lisinopril (PRINIVIL,ZESTRIL) 5 MG tablet Take 1 tablet by mouth Daily. 30 tablet 2   • nitroglycerin (NITROSTAT) 0.4 MG SL tablet 1 under the tongue as needed for angina, may repeat q5mins for up three doses 30 tablet 3   • phenylephrine (SINUS RELIEF EXTRA STRENGTH) 1 % nasal spray 1 spray into the nostril(s) as directed by provider Every 4 (Four) Hours As Needed for Congestion.     • REPATHA SURECLICK 140 MG/ML solution auto-injector INJECT 1 PEN SUBCUTANEOUSLY EVERY 14 DAYS 2 pen 3   • sertraline (ZOLOFT) 100  MG tablet Take 100 mg by mouth Daily. FOR 30 DAYS  3   • vitamin D (ERGOCALCIFEROL) 82438 units capsule capsule Take 1 capsule by mouth Every 7 (Seven) Days. 5 capsule 11     No facility-administered encounter medications on file as of 5/27/2020.        Orders placed during this encounter include:  Orders Placed This Encounter   Procedures   • Comprehensive Metabolic Panel   • Hemoglobin A1c   • Lipid Panel   • Vitamin D 25 Hydroxy   • Vitamin B12   • TSH   • Protein / Creatinine Ratio, Urine - Urine, Clean Catch   • Microalbumin / Creatinine Urine Ratio - Urine, Clean Catch   • CBC & Differential     Order Specific Question:   Manual Differential     Answer:   No     Type 2 Diabetes Mellitus With Hyperglycemia ([facility])   Postsurgical Hypothyroidism         Hypothyroidism-postsurgical      Patient admitted in the past of noncompliance but states taking faithfully               Since TSH is 70 , I will start with 150 mcgs and expect to see her in 2 week to increase dose to 200 mcgs                Lab Results   Component Value Date     TSH 73.500 (H) 01/17/2019      Component      Latest Ref Rng & Units 1/17/2019   Free T4      0.78 - 2.19 ng/dL 0.98            We will follow Free T4 as a guide to treatment since the intention is to have her near euthyroid for potential upcoming CABG.             She is absorbing we did the labs that showed absorption              Lab Results   Component Value Date     TSH 92.300 (H) 01/14/2020      Increase to 150 mcg daily      ====     Type 2 diabetes        Lab Results   Component Value Date     HGBA1C 10.30 (H) 01/14/2020                     Complicated by CAD  Back in March GFR less than 45 but now 58.           [place] caused knots      On Trulicity 1.5 mg weekly      Refusing insulin fast and long acting insuilin      Will have to control diet      Do not go over 45 grams of carbohydrate       metformin stopped due to kidney            Stop Glipizide           .  steglatro 5  mg --- stop GFR 37         As long as she eats up to 50 grams of carbohdyrate per meal, she doesn't need novolog.   If she exceeds 50 grams of carbohydrate , I will give her 5 units to 10 units before meals based on carb load. ==she is not using the Novolog        Bone Health     Component      Latest Ref Rng & Units 1/17/2019   25 Hydroxy, Vitamin D      30.0 - 100.0 ng/ml 28.1 (L)         Taking vitamin d supplement        Component      Latest Ref Rng & Units 10/14/2019   25 Hydroxy, Vitamin D      30.0 - 100.0 ng/ml 17.4 (L)            Start 50,000 units weekly            Lab Results   Component Value Date     [id number] 10/14/2019                 Lipid Management:          On repatha        Total Cholesterol   Date Value Ref Range Status   01/17/2019 76 0 - 199 mg/dL Final     Triglycerides   Date Value Ref Range Status   01/17/2019 63 20 - 199 mg/dL Final     HDL Cholesterol   Date Value Ref Range Status   01/17/2019 54 (L) 60 - 200 mg/dL Final     LDL Cholesterol    Date Value Ref Range Status   01/17/2019 <30 1 - 129 mg/dL Final   03/05/2018 270 (H) 0 - 129 mg/dL Final       We spent 25 minutes including reviewing the patients electronic chart, reviewing medications, reviewing labs, active problems    I provided advice regarding diabetes management, dietary changes, adjustments of medication, self titration of insulin, refilled medications, ordering labs, future appointments    Patient was advised to contact the office if there are unanswered questions, trouble with blood glucose management, or any concerns   4. Follow-up: Return in about 3 months (around 8/27/2020) for Recheck.

## 2020-06-23 ENCOUNTER — OFFICE VISIT (OUTPATIENT)
Dept: CARDIOLOGY | Facility: CLINIC | Age: 50
End: 2020-06-23

## 2020-06-23 VITALS — HEIGHT: 66 IN | WEIGHT: 232 LBS | BODY MASS INDEX: 37.28 KG/M2

## 2020-06-23 DIAGNOSIS — I25.118 CORONARY ARTERY DISEASE OF NATIVE ARTERY OF NATIVE HEART WITH STABLE ANGINA PECTORIS (HCC): Primary | ICD-10-CM

## 2020-06-23 DIAGNOSIS — Z95.1 S/P CABG (CORONARY ARTERY BYPASS GRAFT): ICD-10-CM

## 2020-06-23 DIAGNOSIS — I10 ESSENTIAL HYPERTENSION: Chronic | ICD-10-CM

## 2020-06-23 DIAGNOSIS — E78.2 MIXED HYPERLIPIDEMIA: ICD-10-CM

## 2020-06-23 PROCEDURE — 99442 PR PHYS/QHP TELEPHONE EVALUATION 11-20 MIN: CPT | Performed by: INTERNAL MEDICINE

## 2020-06-23 NOTE — PROGRESS NOTES
Rhea Sutton  49 y.o. female    1. Coronary artery disease of native artery of native heart with stable angina pectoris (CMS/HCC)    2. Mixed hyperlipidemia    3. Essential hypertension    4. S/P CABG (coronary artery bypass graft)        History of Present Illness:  This visit has been rescheduled as a phone visit to comply with patient safety concerns in accordance with CDC recommendations. Total time of discussion was 15 minutes.    You have chosen to receive care through a telephone visit. Do you consent to use a telephone visit for your medical care today? Yes    Ms. Sutton is a 49-year-old  lady who was admitted to [facility] with prolonged chest pain with EKG evidence of acute inferior wall myocardial infarction in 1/ 2019.  She underwent emergent cardiac catheterization with PCI to right coronary artery by Dr. Gonzalez.  She was also noted to have significant disease in the LAD territory.  Catheterization showed:  Dominance: Right dominant  Left Main coronary artery: Large caliber vessel divides and left circumflex artery and LAD  Left anterior descending artery: Diffusely disease that is proximally there is 80-90% stenosis and distal LAD goes all the way to the apex and there is diffuse disease in the LAD  Left circumflex:  Large caliber vessel previously placed in the left circumflex artery is widely patent  Right coronary artery:  Proximal to mid was 100% inside the stent.  Percutaneous coronary intervention: After giving Angiomax bolus and drip a right guide was placed in the right coronary artery and a run-through wire was crossed through the lesion and was dilated with a 2.0 X 20 Trek balloon and subsequently distal RCA was stented with a 2.75 x 23 Xience alma stent, mid to distal was stented with a 3.0 x 33 stent and proximally with a 3.5 x 18 Xience alma stent and subsequently postdilated with a 3.0 x 35 noncompliant mozec balloon and subsequently there was a ostial  "stenosis of the PDA and it was attempted to place a stent but stent was getting stuck in the previously placed stent struts hence balloon angioplasty was done into the ostial PDA reducing 90% stenosis to 30-40%.  Conclusion : Successful PCI to the proximal to mid right coronary artery reducing 100% stenosis to 0% obtaining BEA 3 flow with Xience Kelley stents     CORONARY ARTERY BYPASS GRAFTING x4 in March 2019:  ENDOSCOPIC VEIN HARVEST           LEFT INTERNAL MAMMARY ARTERY TO LEFT ANTERIOR DESCENDING CORONARY ARTERY  SAPHENOUS VEIN GRAFT FROM ASCENDING AORTA TO FIRST DIAGONAL BRANCH  SAPHENOUS VEIN GRAFT FROM ASCENDING AORTA TO THIRD MARGINAL BRANCH  SAPHENOUS VEIN GRAFT FROM ASCENDING AORTA TO POSTERIOR DESCENDING CORONARY ARTERY      The patient has done well symptomatically with no recurrence of chest pain.  She has been compliant with all her medication.  The patient was unable to check her blood pressure today. Her lipid profiles have optimized with PCSK9 inhibitors.  She is a non-smoker at the present time.  She has lab work ordered and she will have this done in the near future.      SUBJECTIVE    Allergies   Allergen Reactions   • Coconut Anaphylaxis and Swelling   • Penicillins Swelling     Of the throat   • Adhesive Tape Other (See Comments)     Silk tape- blisters   • Ciprofloxacin Other (See Comments)     \"knots on my shins\"   • Latex Hives         Past Medical History:   Diagnosis Date   • Allergic    • Anxiety    • CAD (coronary artery disease)    • CAD (coronary artery disease), bypass graft transplanted heart 03/05/2019    x4 bypass   • CHF (congestive heart failure) (CMS/[facility])    • Chronic back pain    • Chronic bronchitis (CMS/[facility])    • Chronic kidney disease    • Depression    • Diabetes mellitus (CMS/[facility])    • Diverticulitis 1986 2017   • Goiter    • H/O heart artery stent 01/03/2019   • Hashimoto's thyroiditis    • Head lice infestation    • Hyperlipidemia    • Hypertension    • Injury of " back    • MI (myocardial infarction) (CMS/HCC)    • MI (myocardial infarction) (CMS/HCC)    • Postoperative hypothyroidism    • Sinus congestion    • Sleep apnea     not using c-pap         Past Surgical History:   Procedure Laterality Date   • APPENDECTOMY     • CARDIAC CATHETERIZATION N/A 10/13/2017   • CARDIAC CATHETERIZATION N/A 1/3/2019   • CHOLECYSTECTOMY     • COLON RESECTION     • COLON SURGERY     • CORONARY ARTERY BYPASS GRAFT N/A 3/5/2019    Procedure: CORONARY ARTERY BYPASS GRAFTING ENDOSCOPIC VEIN HARVEST          (CELL SAVER)             LATEX ALLERGY leg start @ 1323 chest start @ 1328 vein out 1417 vein prep [id number] on pump 1456 off pump 1729;  Surgeon: Jaren Vilchis MD;  Location: [facility];  Service: Cardiothoracic   • CORONARY STENT PLACEMENT     • NE RT/LT HEART CATHETERS N/A 10/13/2017   • TOTAL ABDOMINAL HYSTERECTOMY WITH SALPINGO OOPHORECTOMY     • TOTAL THYROIDECTOMY           Family History   Problem Relation Age of Onset   • Coronary artery disease Mother    • Coronary artery disease Father          Social History     Socioeconomic History   • Marital status:      Spouse name: Not on file   • Number of children: Not on file   • Years of education: Not on file   • Highest education level: Not on file   Tobacco Use   • Smoking status: Former Smoker     Packs/day: 0.00     Years: 20.00     Pack years: 0.00     Types: Cigarettes     Start date:      Last attempt to quit: 10/12/2017     Years since quittin.6   • Smokeless tobacco: Never Used   Substance and Sexual Activity   • Alcohol use: No   • Drug use: No   • Sexual activity: Not Currently         Current Outpatient Medications   Medication Sig Dispense Refill   • aspirin 81 MG chewable tablet Chew 81 mg daily.     • atorvastatin (LIPITOR) 40 MG tablet Take 40 mg by mouth Every Night.     • Blood Glucose Monitoring Suppl (FREESTYLE LITE) device      • carvedilol (COREG) 3.125 MG tablet Take 1 tablet by mouth Every  "12 (Twelve) Hours. 60 tablet 3   • cholecalciferol (VITAMIN D3) 1000 units tablet Take 1,000 Units by mouth Daily. 5 capsules daily     • clopidogrel (PLAVIX) 75 MG tablet Take 1 tablet by mouth Daily. 90 tablet 3   • Dulaglutide 0.75 MG/0.5ML solution pen-injector Inject 0.75 mg under the skin into the appropriate area as directed 1 (One) Time Per Week. 4 pen 11   • Dulaglutide 1.5 MG/0.5ML solution pen-injector Inject 1.5 mg under the skin into the appropriate area as directed 1 (One) Time Per Week. 4 pen 11   • Ertugliflozin L-PyroglutamicAc (STEGLATRO) 5 MG tablet Take 1 tablet by mouth Every Morning. 30 tablet 11   • FREESTYLE LITE test strip      • HYDROcodone-acetaminophen (NORCO) 5-325 MG per tablet TAKE 1 2 TO 1 (ONE HALF TO ONE) TABLET BY MOUTH ONCE DAILY  0   • levothyroxine (SYNTHROID) 112 MCG tablet Take seven tablets once weekly. 30 tablet 11   • levothyroxine (SYNTHROID) 150 MCG tablet Take 1 tablet by mouth Daily. 30 tablet 11   • levothyroxine (SYNTHROID, LEVOTHROID) 100 MCG tablet Take 1 tablet by mouth Daily. 30 tablet 1   • lisinopril (PRINIVIL,ZESTRIL) 40 MG tablet Take 40 mg by mouth Daily.  6   • lisinopril (PRINIVIL,ZESTRIL) 5 MG tablet Take 1 tablet by mouth Daily. 30 tablet 2   • nitroglycerin (NITROSTAT) 0.4 MG SL tablet 1 under the tongue as needed for angina, may repeat q5mins for up three doses 30 tablet 3   • phenylephrine (SINUS RELIEF EXTRA STRENGTH) 1 % nasal spray 1 spray into the nostril(s) as directed by provider Every 4 (Four) Hours As Needed for Congestion.     • REPATHA SURECLICK 140 MG/ML solution auto-injector INJECT 1 PEN SUBCUTANEOUSLY EVERY 14 DAYS 2 pen 3   • sertraline (ZOLOFT) 100 MG tablet Take 100 mg by mouth Daily. FOR 30 DAYS  3   • vitamin D (ERGOCALCIFEROL) 45694 units capsule capsule Take 1 capsule by mouth Every 7 (Seven) Days. 5 capsule 11     No current facility-administered medications for this visit.          OBJECTIVE    Ht 167.6 cm (66\")   Wt 105 kg (232 " lb)   BMI 37.45 kg/m²         Review of Systems     Constitutional:  Denies recent weight loss, weight gain, fever or chills     HENT:  Denies any hearing loss, epistaxis, hoarseness, or difficulty speaking.     Eyes: Wears eyeglasses or contact lenses     Respiratory:  Denies dyspnea with exertion,no cough, wheezing, or hemoptysis.     Cardiovascular: Negative for palpations, chest pain, orthopnea, PND    Gastrointestinal:  Denies change in bowel habits, dyspepsia, ulcer disease, hematochezia, or melena.     Endocrine: Negative for cold intolerance, heat intolerance, polydipsia, polyphagia and polyuria.     Genitourinary: Negative.      Musculoskeletal: Denies any history of arthritic symptoms or back problems      Neurological:  Denies any history of recurrent headaches, strokes, TIA, or seizure disorder.     Hematological: Denies any food allergies, seasonal allergies, bleeding disorders, or lymphadenopathy.     Psychiatric/Behavioral: Denies any history of depression, substance abuse, or change in cognitive function.       Lab Results   Component Value Date    WBC 8.84 01/14/2020    HGB 15.2 03/03/2020    HCT 45.4 03/03/2020    MCV 89.2 01/14/2020     01/14/2020     Lab Results   Component Value Date    GLUCOSE 454 (C) 03/03/2020    BUN 12 03/03/2020    CREATININE 1.16 (H) 03/03/2020    EGFRIFNONA 50 (L) 03/03/2020    BCR 10.3 03/03/2020    CO2 25.9 03/03/2020    CALCIUM 8.9 03/03/2020    ALBUMIN 4.40 03/03/2020    AST 32 01/14/2020    ALT 39 (H) 01/14/2020     Lab Results   Component Value Date    CHOL 76 01/17/2019    CHOL 137 01/04/2019    CHOL 278 (H) 06/06/2018     Lab Results   Component Value Date    TRIG 63 01/17/2019    TRIG 53 01/04/2019    TRIG 149 06/06/2018     Lab Results   Component Value Date    HDL 54 (L) 01/17/2019    HDL 49 (L) 01/04/2019    HDL 67 06/06/2018     No components found for: LDLCALC  Lab Results   Component Value Date    LDL <30 01/17/2019    LDL 77 01/04/2019      (H) 06/06/2018     No results found for: HDLLDLRATIO  No components found for: CHOLHDL  Lab Results   Component Value Date    HGBA1C 10.30 (H) 01/14/2020     Lab Results   Component Value Date    TSH 92.300 (H) 01/14/2020    Q7SDZGV 83.1 11/27/2019           ASSESSMENT AND PLAN  Ms. Sutton is progressing well with no recurrence of angina. She has been compliant with her medications. I have continued antiplatelet therapy with aspirin and have changed Brilinta to Plavix 75 mg daily.  Lipid-lowering therapy with atorvastatin and Repatha, antihypertensive therapy with carvedilol, lisinopril have been continued.  Patient will have lab tests in the near future.    Rhea was seen today for follow-up.    Diagnoses and all orders for this visit:    Coronary artery disease of native artery of native heart with stable angina pectoris (CMS/HCC)    Mixed hyperlipidemia    Essential hypertension    S/P CABG (coronary artery bypass graft)        Patient's Body mass index is 37.45 kg/m². BMI is above normal parameters. Recommendations include: exercise counseling and nutrition counseling.  Patient is a non-smoker    Mian Montana MD  6/23/2020  12:19

## 2020-08-28 ENCOUNTER — TELEMEDICINE (OUTPATIENT)
Dept: ENDOCRINOLOGY | Facility: CLINIC | Age: 50
End: 2020-08-28

## 2020-08-28 ENCOUNTER — LAB (OUTPATIENT)
Dept: LAB | Facility: HOSPITAL | Age: 50
End: 2020-08-28

## 2020-08-28 DIAGNOSIS — E11.65 TYPE 2 DIABETES MELLITUS WITH HYPERGLYCEMIA, WITH LONG-TERM CURRENT USE OF INSULIN (HCC): ICD-10-CM

## 2020-08-28 DIAGNOSIS — Z79.4 TYPE 2 DIABETES MELLITUS WITH HYPERGLYCEMIA, WITH LONG-TERM CURRENT USE OF INSULIN (HCC): ICD-10-CM

## 2020-08-28 DIAGNOSIS — E89.0 POSTOPERATIVE HYPOTHYROIDISM: Primary | ICD-10-CM

## 2020-08-28 DIAGNOSIS — E55.9 VITAMIN D DEFICIENCY: ICD-10-CM

## 2020-08-28 PROCEDURE — 80050 GENERAL HEALTH PANEL: CPT | Performed by: NURSE PRACTITIONER

## 2020-08-28 PROCEDURE — 82570 ASSAY OF URINE CREATININE: CPT | Performed by: NURSE PRACTITIONER

## 2020-08-28 PROCEDURE — 82607 VITAMIN B-12: CPT | Performed by: NURSE PRACTITIONER

## 2020-08-28 PROCEDURE — 80061 LIPID PANEL: CPT | Performed by: NURSE PRACTITIONER

## 2020-08-28 PROCEDURE — 83036 HEMOGLOBIN GLYCOSYLATED A1C: CPT | Performed by: NURSE PRACTITIONER

## 2020-08-28 PROCEDURE — 82306 VITAMIN D 25 HYDROXY: CPT | Performed by: NURSE PRACTITIONER

## 2020-08-28 PROCEDURE — 84156 ASSAY OF PROTEIN URINE: CPT | Performed by: NURSE PRACTITIONER

## 2020-08-28 PROCEDURE — 82043 UR ALBUMIN QUANTITATIVE: CPT | Performed by: NURSE PRACTITIONER

## 2020-08-28 PROCEDURE — 99214 OFFICE O/P EST MOD 30 MIN: CPT | Performed by: NURSE PRACTITIONER

## 2020-08-28 NOTE — PROGRESS NOTES
Subjective    Rhea Sutton is a 49 y.o. female. she is here today for follow-up.    History of Present Illness     You have chosen to receive care through a telehealth visit.  Do you consent to use a video/audio connection for your medical care today? Yes             TELEHEALTH VIDEO VISIT      This a video visit due to [facility] current guidelines for social distancing due to the COVID 19 pandemic        History of Present Illness         48 yo female presents for follow up      Hypothyroidism     Duration, since 2015  Secondary to total thyroidectomy for obstructive goiter  Aggravating I spoke with patient and she now admits to noncompliance. -- she states she is being compliant   Alleviating,armour thyroid--stopped     Now on levothyroxine   Symptoms, fatigue, weakness, malaise     She states today she is compliant and taking the levothryoxine                Lab Results   Component Value Date     TSH 92.300 (H) 01/14/2020                         Type 2 Diabetes  Duration                        Lab Results   Component Value Date     HGBA1C 10.30 (H) 01/14/2020                     Severity , high     Patient was admitted with MI, sp PCI to RCA , has multivessel CAD and is being evaluated by CT surgery      Aggravating, high carb meals at home and lack of exercise      Treatment. Home treatment with trulicity , and steglatro        Home readings yesterday 98                She had CABG times 4 on March 4, 2019                                The following portions of the patient's history were reviewed and updated as appropriate:   Past Medical History:   Diagnosis Date   • Allergic    • Anxiety    • CAD (coronary artery disease)    • CAD (coronary artery disease), bypass graft transplanted heart 03/05/2019    x4 bypass   • CHF (congestive heart failure) (CMS/Formerly [facility])    • Chronic back pain    • Chronic bronchitis (CMS/Formerly [facility])    • Chronic kidney disease    • Depression    • Diabetes mellitus (CMS/[facility]ly [facility])    •  Diverticulitis 1986 2017   • Goiter    • H/O heart artery stent 01/03/2019   • Hashimoto's thyroiditis    • Head lice infestation    • Hyperlipidemia    • Hypertension    • Injury of back    • MI (myocardial infarction) (CMS/HCC)    • MI (myocardial infarction) (CMS/HCC)    • Postoperative hypothyroidism    • Sinus congestion    • Sleep apnea     not using c-pap     Past Surgical History:   Procedure Laterality Date   • APPENDECTOMY     • CARDIAC CATHETERIZATION N/A 10/13/2017   • CARDIAC CATHETERIZATION N/A 1/3/2019   • CHOLECYSTECTOMY     • COLON RESECTION     • COLON SURGERY     • CORONARY ARTERY BYPASS GRAFT N/A 3/5/2019    Procedure: CORONARY ARTERY BYPASS GRAFTING ENDOSCOPIC VEIN HARVEST          (CELL SAVER)             LATEX ALLERGY leg start @ 1323 chest start @ 1328 vein out 1417 vein prep [id number] on pump 1456 off pump 1729;  Surgeon: Jaren Vilchis MD;  Location: [facility];  Service: Cardiothoracic   • CORONARY STENT PLACEMENT     • [place] RT/LT HEART CATHETERS N/A 10/13/2017   • TOTAL ABDOMINAL HYSTERECTOMY WITH SALPINGO OOPHORECTOMY     • TOTAL THYROIDECTOMY       Family History   Problem Relation Age of Onset   • Coronary artery disease Mother    • Coronary artery disease Father      OB History    None       Current Outpatient Medications   Medication Sig Dispense Refill   • aspirin 81 MG chewable tablet Chew 81 mg daily.     • atorvastatin (LIPITOR) 40 MG tablet Take 40 mg by mouth Every Night.     • Blood Glucose Monitoring Suppl (FREESTYLE LITE) device      • carvedilol (COREG) 3.125 MG tablet Take 1 tablet by mouth Every 12 (Twelve) Hours. 60 tablet 3   • cholecalciferol (VITAMIN D3) 1000 units tablet Take 1,000 Units by mouth Daily. 5 capsules daily     • clopidogrel (PLAVIX) 75 MG tablet Take 1 tablet by mouth Daily. 90 tablet 3   • Dulaglutide 0.75 MG/0.5ML solution pen-injector Inject 0.75 mg under the skin into the appropriate area as directed 1 (One) Time Per Week. 4 pen 11   •  "Dulaglutide 1.5 MG/0.5ML solution pen-injector Inject 1.5 mg under the skin into the appropriate area as directed 1 (One) Time Per Week. 4 pen 11   • Ertugliflozin L-PyroglutamicAc (STEGLATRO) 5 MG tablet Take 1 tablet by mouth Every Morning. 30 tablet 11   • FREESTYLE LITE test strip      • HYDROcodone-acetaminophen (NORCO) 5-325 MG per tablet TAKE 1 2 TO 1 (ONE HALF TO ONE) TABLET BY MOUTH ONCE DAILY  0   • levothyroxine (SYNTHROID) 112 MCG tablet Take seven tablets once weekly. 30 tablet 11   • levothyroxine (SYNTHROID) 150 MCG tablet Take 1 tablet by mouth Daily. 30 tablet 11   • levothyroxine (SYNTHROID, LEVOTHROID) 100 MCG tablet Take 1 tablet by mouth Daily. 30 tablet 1   • lisinopril (PRINIVIL,ZESTRIL) 40 MG tablet Take 40 mg by mouth Daily.  6   • lisinopril (PRINIVIL,ZESTRIL) 5 MG tablet Take 1 tablet by mouth Daily. 30 tablet 2   • nitroglycerin (NITROSTAT) 0.4 MG SL tablet 1 under the tongue as needed for angina, may repeat q5mins for up three doses 30 tablet 3   • phenylephrine (SINUS RELIEF EXTRA STRENGTH) 1 % nasal spray 1 spray into the nostril(s) as directed by provider Every 4 (Four) Hours As Needed for Congestion.     • REPATHA SURECLICK 140 MG/ML solution auto-injector INJECT 1 PEN SUBCUTANEOUSLY EVERY 14 DAYS 2 pen 3   • sertraline (ZOLOFT) 100 MG tablet Take 100 mg by mouth Daily. FOR 30 DAYS  3   • vitamin D (ERGOCALCIFEROL) 77645 units capsule capsule Take 1 capsule by mouth Every 7 (Seven) Days. 5 capsule 11     No current facility-administered medications for this visit.      Allergies   Allergen Reactions   • Coconut Anaphylaxis and Swelling   • Penicillins Swelling     Of the throat   • Adhesive Tape Other (See Comments)     Silk tape- blisters   • Ciprofloxacin Other (See Comments)     \"knots on my shins\"   • Latex Hives     Social History     Socioeconomic History   • Marital status:      Spouse name: Not on file   • Number of children: Not on file   • Years of education: Not on " file   • Highest education level: Not on file   Tobacco Use   • Smoking status: Former Smoker     Packs/day: 0.00     Years: 20.00     Pack years: 0.00     Types: Cigarettes     Start date:      Last attempt to quit: 10/12/2017     Years since quittin.8   • Smokeless tobacco: Never Used   Substance and Sexual Activity   • Alcohol use: No   • Drug use: No   • Sexual activity: Not Currently       Review of Systems  Review of Systems   Constitutional: Negative for activity change, appetite change, diaphoresis and fatigue.   HENT: Negative for facial swelling, sneezing, sore throat, tinnitus, trouble swallowing and voice change.    Eyes: Negative for photophobia, pain, discharge, redness, itching and visual disturbance.   Respiratory: Negative for apnea, cough, choking, chest tightness and shortness of breath.    Cardiovascular: Negative for chest pain, palpitations and leg swelling.   Gastrointestinal: Negative for abdominal distention, abdominal pain, constipation, diarrhea, nausea and vomiting.   Endocrine: Negative for cold intolerance, heat intolerance, polydipsia, polyphagia and polyuria.   Genitourinary: Negative for difficulty urinating, dysuria, frequency, hematuria and urgency.   Musculoskeletal: Negative for arthralgias, back pain, gait problem, joint swelling, myalgias, neck pain and neck stiffness.   Skin: Negative for color change, pallor, rash and wound.   Neurological: Negative for dizziness, tremors, weakness, light-headedness, numbness and headaches.   Hematological: Negative for adenopathy. Does not bruise/bleed easily.   Psychiatric/Behavioral: Negative for behavioral problems, confusion and sleep disturbance.        Objective    There were no vitals taken for this visit.  Physical Exam   Constitutional: She is oriented to person, place, and time. She appears well-developed and well-nourished. No distress.   HENT:   Head: Normocephalic and atraumatic.   Right Ear: External ear normal.   Left  Ear: External ear normal.   Nose: Nose normal.   Eyes: Pupils are equal, round, and reactive to light. Conjunctivae and EOM are normal.   Neck: Normal range of motion.   Cardiovascular: Normal rate, regular rhythm and normal heart sounds.   No murmur heard.  Pulmonary/Chest: Effort normal. No respiratory distress.   Musculoskeletal: Normal range of motion.   Neurological: She is alert and oriented to person, place, and time. A cranial nerve deficit is present.   Skin: No pallor.   Psychiatric: She has a normal mood and affect. Her behavior is normal. Judgment and thought content normal.       Lab Review  Glucose (mg/dL)   Date Value   03/03/2020 454 (C)   01/14/2020 255 (H)   10/25/2019 131 (H)     Glucose, Arterial (mmol/L)   Date Value   03/06/2019 115 (H)   03/06/2019 126 (H)   03/05/2019      Comment:      The parameter value has a question [id number] Glu not usable     Sodium (mmol/L)   Date Value   03/03/2020 135 (L)   01/14/2020 134 (L)   10/25/2019 138     Potassium (mmol/L)   Date Value   03/03/2020 4.0   01/14/2020 4.2   10/25/2019 4.0     Chloride (mmol/L)   Date Value   03/03/2020 94 (L)   01/14/2020 95 (L)   10/25/2019 96 (L)     CO2 (mmol/L)   Date Value   03/03/2020 25.9   01/14/2020 26.6   10/25/2019 28.2     BUN (mg/dL)   Date Value   03/03/2020 12   01/14/2020 13   10/25/2019 12     Creatinine (mg/dL)   Date Value   03/03/2020 1.16 (H)   01/14/2020 1.20 (H)   10/25/2019 1.32 (H)     Hemoglobin A1C (%)   Date Value   01/14/2020 10.30 (H)   10/14/2019 7.10 (H)   03/18/2019 7.4 (H)   01/17/2019 10.5 (H)   01/04/2019 12.0 (H)     Triglycerides (mg/dL)   Date Value   01/17/2019 63   01/04/2019 53   06/06/2018 149     LDL Cholesterol  (mg/dL)   Date Value   01/17/2019 <30   01/04/2019 77   06/06/2018 184 (H)   03/05/2018 270 (H)       Assessment/Plan      1. Postoperative hypothyroidism    2. Type 2 diabetes mellitus with hyperglycemia, with long-term current use of insulin (CMS/[facility])    3. Vitamin D  deficiency    .    Medications prescribed:  Outpatient Encounter Medications as of 8/28/2020   Medication Sig Dispense Refill   • aspirin 81 MG chewable tablet Chew 81 mg daily.     • atorvastatin (LIPITOR) 40 MG tablet Take 40 mg by mouth Every Night.     • Blood Glucose Monitoring Suppl (FREESTYLE LITE) device      • carvedilol (COREG) 3.125 MG tablet Take 1 tablet by mouth Every 12 (Twelve) Hours. 60 tablet 3   • cholecalciferol (VITAMIN D3) 1000 units tablet Take 1,000 Units by mouth Daily. 5 capsules daily     • clopidogrel (PLAVIX) 75 MG tablet Take 1 tablet by mouth Daily. 90 tablet 3   • Dulaglutide 0.75 MG/0.5ML solution pen-injector Inject 0.75 mg under the skin into the appropriate area as directed 1 (One) Time Per Week. 4 pen 11   • Dulaglutide 1.5 MG/0.5ML solution pen-injector Inject 1.5 mg under the skin into the appropriate area as directed 1 (One) Time Per Week. 4 pen 11   • Ertugliflozin L-PyroglutamicAc (STEGLATRO) 5 MG tablet Take 1 tablet by mouth Every Morning. 30 tablet 11   • FREESTYLE LITE test strip      • HYDROcodone-acetaminophen (NORCO) 5-325 MG per tablet TAKE 1 2 TO 1 (ONE HALF TO ONE) TABLET BY MOUTH ONCE DAILY  0   • levothyroxine (SYNTHROID) 112 MCG tablet Take seven tablets once weekly. 30 tablet 11   • levothyroxine (SYNTHROID) 150 MCG tablet Take 1 tablet by mouth Daily. 30 tablet 11   • levothyroxine (SYNTHROID, LEVOTHROID) 100 MCG tablet Take 1 tablet by mouth Daily. 30 tablet 1   • lisinopril (PRINIVIL,ZESTRIL) 40 MG tablet Take 40 mg by mouth Daily.  6   • lisinopril (PRINIVIL,ZESTRIL) 5 MG tablet Take 1 tablet by mouth Daily. 30 tablet 2   • nitroglycerin (NITROSTAT) 0.4 MG SL tablet 1 under the tongue as needed for angina, may repeat q5mins for up three doses 30 tablet 3   • phenylephrine (SINUS RELIEF EXTRA STRENGTH) 1 % nasal spray 1 spray into the nostril(s) as directed by provider Every 4 (Four) Hours As Needed for Congestion.     • REPATHA SURECLICK 140 MG/ML solution  auto-injector INJECT 1 PEN SUBCUTANEOUSLY EVERY 14 DAYS 2 pen 3   • sertraline (ZOLOFT) 100 MG tablet Take 100 mg by mouth Daily. FOR 30 DAYS  3   • vitamin D (ERGOCALCIFEROL) 68152 units capsule capsule Take 1 capsule by mouth Every 7 (Seven) Days. 5 capsule 11     No facility-administered encounter medications on file as of 8/28/2020.        Orders placed during this encounter include:  No orders of the defined types were placed in this encounter.    Type 2 Diabetes Mellitus With Hyperglycemia ([facility]/formerly [facility])   Postsurgical Hypothyroidism         Hypothyroidism-postsurgical      Patient admitted in the past of noncompliance but states taking faithfully               Since TSH is 70 , I will start with 150 mcgs and expect to see her in 2 week to increase dose to 200 mcgs                Lab Results   Component Value Date     TSH 73.500 (H) 01/17/2019      Component      Latest Ref Rng & Units 1/17/2019   Free T4      0.78 - 2.19 ng/dL 0.98            We will follow Free T4 as a guide to treatment since the intention is to have her near euthyroid for potential upcoming CABG.              She is absorbing we did the labs that showed absorption                  Lab Results   Component Value Date     TSH 92.300 (H) 01/14/2020      Increase to 150 mcg daily      ====     Type 2 diabetes              Lab Results   Component Value Date     HGBA1C 10.30 (H) 01/14/2020                     Complicated by CAD  Back in March GFR less than 45 but now 58.           [place] caused knots      On Trulicity 1.5 mg weekly      Refusing insulin fast and long acting insuilin      Will have to control diet      Do not go over 45 grams of carbohydrate       metformin stopped due to kidney             Stop Glipizide           .  steglatro 5 mg --- stop GFR 37         As long as she eats up to 50 grams of carbohdyrate per meal, she doesn't need novolog.   If she exceeds 50 grams of carbohydrate , I will give her 5 units to 10 units before meals  based on carb load. ==she is not using the Novolog        Bone Health     Component      Latest Ref Rng & Units 1/17/2019   25 Hydroxy, Vitamin D      30.0 - 100.0 ng/ml 28.1 (L)         Taking vitamin d supplement        Component      Latest Ref Rng & Units 10/14/2019   25 Hydroxy, Vitamin D      30.0 - 100.0 ng/ml 17.4 (L)            Start 50,000 units weekly                Lab Results   Component Value Date     [id number] 10/14/2019                 Lipid Management:           On repatha        Total Cholesterol   Date Value Ref Range Status   01/17/2019 76 0 - 199 mg/dL Final            Triglycerides   Date Value Ref Range Status   01/17/2019 63 20 - 199 mg/dL Final            HDL Cholesterol   Date Value Ref Range Status   01/17/2019 54 (L) 60 - 200 mg/dL Final            LDL Cholesterol    Date Value Ref Range Status   01/17/2019 <30 1 - 129 mg/dL Final   03/05/2018 270 (H) 0 - 129 mg/dL Final           Labs pending at time of visit         We spent 25 minutes including reviewing the patients electronic chart, reviewing medications, reviewing labs, active problems     I provided advice regarding diabetes management, dietary changes, adjustments of medication, self titration of insulin, refilled medications, ordering labs, future appointments     Patient was advised to contact the office if there are unanswered questions, trouble with blood glucose management, or any concerns                  4. Follow-up: No follow-ups on file.

## 2020-08-29 LAB
25(OH)D3 SERPL-MCNC: 20.3 NG/ML (ref 30–100)
ALBUMIN SERPL-MCNC: 4.5 G/DL (ref 3.5–5.2)
ALBUMIN UR-MCNC: 2.9 MG/DL
ALBUMIN/GLOB SERPL: 1.6 G/DL
ALP SERPL-CCNC: 69 U/L (ref 39–117)
ALT SERPL W P-5'-P-CCNC: 35 U/L (ref 1–33)
ANION GAP SERPL CALCULATED.3IONS-SCNC: 13.8 MMOL/L (ref 5–15)
AST SERPL-CCNC: 46 U/L (ref 1–32)
BASOPHILS # BLD AUTO: 0.08 10*3/MM3 (ref 0–0.2)
BASOPHILS NFR BLD AUTO: 0.8 % (ref 0–1.5)
BILIRUB SERPL-MCNC: 0.3 MG/DL (ref 0–1.2)
BUN SERPL-MCNC: 14 MG/DL (ref 6–20)
BUN/CREAT SERPL: 8.3 (ref 7–25)
CALCIUM SPEC-SCNC: 9.3 MG/DL (ref 8.6–10.5)
CHLORIDE SERPL-SCNC: 100 MMOL/L (ref 98–107)
CHOLEST SERPL-MCNC: 397 MG/DL (ref 0–200)
CO2 SERPL-SCNC: 25.2 MMOL/L (ref 22–29)
CREAT SERPL-MCNC: 1.69 MG/DL (ref 0.57–1)
CREAT UR-MCNC: 115.2 MG/DL
CREAT UR-MCNC: 115.2 MG/DL
CREAT UR-MCNC: 117 MG/DL
DEPRECATED RDW RBC AUTO: 51.8 FL (ref 37–54)
EOSINOPHIL # BLD AUTO: 0.24 10*3/MM3 (ref 0–0.4)
EOSINOPHIL NFR BLD AUTO: 2.4 % (ref 0.3–6.2)
ERYTHROCYTE [DISTWIDTH] IN BLOOD BY AUTOMATED COUNT: 15.4 % (ref 12.3–15.4)
GFR SERPL CREATININE-BSD FRML MDRD: 32 ML/MIN/1.73
GLOBULIN UR ELPH-MCNC: 2.8 GM/DL
GLUCOSE SERPL-MCNC: 91 MG/DL (ref 65–99)
HBA1C MFR BLD: 7.24 % (ref 4.8–5.6)
HCT VFR BLD AUTO: 44 % (ref 34–46.6)
HDLC SERPL-MCNC: 87 MG/DL (ref 40–60)
HGB BLD-MCNC: 14.9 G/DL (ref 12–15.9)
IMM GRANULOCYTES # BLD AUTO: 0.11 10*3/MM3 (ref 0–0.05)
IMM GRANULOCYTES NFR BLD AUTO: 1.1 % (ref 0–0.5)
LDLC SERPL CALC-MCNC: 289 MG/DL (ref 0–100)
LDLC/HDLC SERPL: 3.32 {RATIO}
LYMPHOCYTES # BLD AUTO: 2.96 10*3/MM3 (ref 0.7–3.1)
LYMPHOCYTES NFR BLD AUTO: 29.4 % (ref 19.6–45.3)
MCH RBC QN AUTO: 30.7 PG (ref 26.6–33)
MCHC RBC AUTO-ENTMCNC: 33.9 G/DL (ref 31.5–35.7)
MCV RBC AUTO: 90.5 FL (ref 79–97)
MICROALBUMIN/CREAT UR: 25.2 MG/G
MONOCYTES # BLD AUTO: 0.57 10*3/MM3 (ref 0.1–0.9)
MONOCYTES NFR BLD AUTO: 5.7 % (ref 5–12)
NEUTROPHILS NFR BLD AUTO: 6.11 10*3/MM3 (ref 1.7–7)
NEUTROPHILS NFR BLD AUTO: 60.6 % (ref 42.7–76)
NRBC BLD AUTO-RTO: 0 /100 WBC (ref 0–0.2)
PLATELET # BLD AUTO: 267 10*3/MM3 (ref 140–450)
PMV BLD AUTO: 10.6 FL (ref 6–12)
POTASSIUM SERPL-SCNC: 3.8 MMOL/L (ref 3.5–5.2)
PROT SERPL-MCNC: 7.3 G/DL (ref 6–8.5)
PROT UR-MCNC: 10 MG/DL
PROT UR-MCNC: 10 MG/DL
PROT/CREAT UR: 85.5 MG/G CREA (ref 0–200)
PROT/CREAT UR: 86.8 MG/G CREA (ref 0–200)
RBC # BLD AUTO: 4.86 10*6/MM3 (ref 3.77–5.28)
SODIUM SERPL-SCNC: 139 MMOL/L (ref 136–145)
TRIGL SERPL-MCNC: 106 MG/DL (ref 0–150)
TSH SERPL DL<=0.05 MIU/L-ACNC: 110 UIU/ML (ref 0.27–4.2)
VIT B12 BLD-MCNC: 405 PG/ML (ref 211–946)
VLDLC SERPL-MCNC: 21.2 MG/DL (ref 5–40)
WBC # BLD AUTO: 10.07 10*3/MM3 (ref 3.4–10.8)

## 2020-08-31 ENCOUNTER — TELEPHONE (OUTPATIENT)
Dept: ENDOCRINOLOGY | Facility: CLINIC | Age: 50
End: 2020-08-31

## 2020-08-31 RX ORDER — LEVOTHYROXINE SODIUM 175 UG/1
175 TABLET ORAL DAILY
Qty: 30 TABLET | Refills: 11 | Status: SHIPPED | OUTPATIENT
Start: 2020-08-31 | End: 2021-07-20 | Stop reason: SDUPTHER

## 2020-08-31 NOTE — TELEPHONE ENCOUNTER
Pt aware. Script sent.   ----- Message from DUC Pressley sent at 8/31/2020  8:02 AM CDT -----  A1c is 7.2 good job on diabetes; thyroid is higher at 110; please be compliant -- she takes 150 increase to 175 mcg daily

## 2020-09-14 ENCOUNTER — TRANSCRIBE ORDERS (OUTPATIENT)
Dept: LAB | Facility: HOSPITAL | Age: 50
End: 2020-09-14

## 2020-09-14 ENCOUNTER — LAB (OUTPATIENT)
Dept: LAB | Facility: HOSPITAL | Age: 50
End: 2020-09-14

## 2020-09-14 DIAGNOSIS — I25.10 CORONARY ARTERY DISEASE INVOLVING NATIVE CORONARY ARTERY OF NATIVE HEART WITHOUT ANGINA PECTORIS: ICD-10-CM

## 2020-09-14 DIAGNOSIS — N18.30 CHRONIC KIDNEY DISEASE, STAGE III (MODERATE) (HCC): Primary | ICD-10-CM

## 2020-09-14 PROCEDURE — 83036 HEMOGLOBIN GLYCOSYLATED A1C: CPT

## 2020-09-14 PROCEDURE — 85018 HEMOGLOBIN: CPT

## 2020-09-14 PROCEDURE — 80069 RENAL FUNCTION PANEL: CPT

## 2020-09-14 PROCEDURE — 85014 HEMATOCRIT: CPT

## 2020-09-14 PROCEDURE — 83735 ASSAY OF MAGNESIUM: CPT

## 2020-09-14 PROCEDURE — 84550 ASSAY OF BLOOD/URIC ACID: CPT

## 2020-09-14 PROCEDURE — 82570 ASSAY OF URINE CREATININE: CPT

## 2020-09-14 PROCEDURE — 84156 ASSAY OF PROTEIN URINE: CPT

## 2020-09-15 LAB
ALBUMIN SERPL-MCNC: 4.1 G/DL (ref 3.5–5.2)
ANION GAP SERPL CALCULATED.3IONS-SCNC: 10.5 MMOL/L (ref 5–15)
BUN SERPL-MCNC: 11 MG/DL (ref 6–20)
BUN/CREAT SERPL: 10 (ref 7–25)
CALCIUM SPEC-SCNC: 8.8 MG/DL (ref 8.6–10.5)
CHLORIDE SERPL-SCNC: 103 MMOL/L (ref 98–107)
CO2 SERPL-SCNC: 25.5 MMOL/L (ref 22–29)
CREAT SERPL-MCNC: 1.1 MG/DL (ref 0.57–1)
CREAT UR-MCNC: 196.2 MG/DL
GFR SERPL CREATININE-BSD FRML MDRD: 53 ML/MIN/1.73
GLUCOSE SERPL-MCNC: 167 MG/DL (ref 65–99)
HBA1C MFR BLD: 6.6 % (ref 4.8–5.6)
HCT VFR BLD AUTO: 40.8 % (ref 34–46.6)
HGB BLD-MCNC: 13.9 G/DL (ref 12–15.9)
MAGNESIUM SERPL-MCNC: 2.5 MG/DL (ref 1.6–2.6)
PHOSPHATE SERPL-MCNC: 3.5 MG/DL (ref 2.5–4.5)
POTASSIUM SERPL-SCNC: 4.1 MMOL/L (ref 3.5–5.2)
PROT UR-MCNC: 17 MG/DL
PROT/CREAT UR: 86.6 MG/G CREA (ref 0–200)
SODIUM SERPL-SCNC: 139 MMOL/L (ref 136–145)
URATE SERPL-MCNC: 3.7 MG/DL (ref 2.4–5.7)

## 2020-11-25 ENCOUNTER — TELEMEDICINE (OUTPATIENT)
Dept: ENDOCRINOLOGY | Facility: CLINIC | Age: 50
End: 2020-11-25

## 2020-11-25 DIAGNOSIS — I10 ESSENTIAL HYPERTENSION: Chronic | ICD-10-CM

## 2020-11-25 DIAGNOSIS — E55.9 VITAMIN D DEFICIENCY: ICD-10-CM

## 2020-11-25 DIAGNOSIS — E78.2 MIXED HYPERLIPIDEMIA: Primary | ICD-10-CM

## 2020-11-25 DIAGNOSIS — E89.0 POSTSURGICAL HYPOTHYROIDISM: Chronic | ICD-10-CM

## 2020-11-25 DIAGNOSIS — E11.65 TYPE 2 DIABETES MELLITUS WITH HYPERGLYCEMIA, WITHOUT LONG-TERM CURRENT USE OF INSULIN (HCC): Chronic | ICD-10-CM

## 2020-11-25 PROCEDURE — 99214 OFFICE O/P EST MOD 30 MIN: CPT | Performed by: NURSE PRACTITIONER

## 2020-11-25 NOTE — PROGRESS NOTES
Subjective    Rhea Sutton is a 50 y.o. female. she is here today for follow-up.    History of Present Illness     You have chosen to receive care through a telehealth visit.  Do you consent to use a video/audio connection for your medical care today? Yes        TELEHEALTH VIDEO VISIT     This a video visit due to [facility] current guidelines for social distancing due to the COVID 19 pandemic    History of Present Illness         50-year-old female presents for follow-up reason #1 is hypothyroidism    Duration since 2015    Secondary to a total thyroidectomy for obstructive goiter    Aggravating factors as patient at times is noncompliant with her medication       Now on levothyroxine     Symptoms none today     Lab Results   Component Value Date    .000 (H) 08/28/2020                Type 2 Diabetes      Duration since 2017    Quality now improved control    Severity high      Complications include CAD     CABG times 4 March 2019     Lab Results   Component Value Date    HGBA1C 6.60 (H) 09/14/2020               Aggravating, high carb meals at home and lack of exercise            States high she seen is 180    States blood sugar this morning was 114                      The following portions of the patient's history were reviewed and updated as appropriate:   Past Medical History:   Diagnosis Date   • Allergic    • Anxiety    • CAD (coronary artery disease)    • CAD (coronary artery disease), bypass graft transplanted heart 03/05/2019    x4 bypass   • CHF (congestive heart failure) (CMS/HCC)    • Chronic back pain    • Chronic bronchitis (CMS/HCC)    • Chronic kidney disease    • Depression    • Diabetes mellitus (CMS/HCC)    • Diverticulitis 1986 2017   • Goiter    • H/O heart artery stent 01/03/2019   • Hashimoto's thyroiditis    • Head lice infestation    • Hyperlipidemia    • Hypertension    • Injury of back    • MI (myocardial infarction) (CMS/HCC)    • MI (myocardial infarction) (CMS/HCC)    •  Postoperative hypothyroidism    • Sinus congestion    • Sleep apnea     not using c-pap     Past Surgical History:   Procedure Laterality Date   • APPENDECTOMY     • CARDIAC CATHETERIZATION N/A 10/13/2017   • CARDIAC CATHETERIZATION N/A 1/3/2019   • CHOLECYSTECTOMY     • COLON RESECTION     • COLON SURGERY     • CORONARY ARTERY BYPASS GRAFT N/A 3/5/2019    Procedure: CORONARY ARTERY BYPASS GRAFTING ENDOSCOPIC VEIN HARVEST          (CELL SAVER)             LATEX ALLERGY leg start @ 1323 chest start @ 1328 vein out 1417 vein prep [id number] on pump 1456 off pump 1729;  Surgeon: Jaren Vilchis MD;  Location: [facility];  Service: Cardiothoracic   • CORONARY STENT PLACEMENT     • MS RT/LT HEART CATHETERS N/A 10/13/2017   • TOTAL ABDOMINAL HYSTERECTOMY WITH SALPINGO OOPHORECTOMY     • TOTAL THYROIDECTOMY       Family History   Problem Relation Age of Onset   • Coronary artery disease Mother    • Coronary artery disease Father      OB History    No obstetric history on file.       Current Outpatient Medications   Medication Sig Dispense Refill   • aspirin 81 MG chewable tablet Chew 81 mg daily.     • atorvastatin (LIPITOR) 40 MG tablet Take 40 mg by mouth Every Night.     • Blood Glucose Monitoring Suppl (FREESTYLE LITE) device      • carvedilol (COREG) 3.125 MG tablet Take 1 tablet by mouth Every 12 (Twelve) Hours. 60 tablet 3   • cholecalciferol (VITAMIN D3) 1000 units tablet Take 1,000 Units by mouth Daily. 5 capsules daily     • clopidogrel (PLAVIX) 75 MG tablet Take 1 tablet by mouth Daily. 90 tablet 3   • Dulaglutide 0.75 MG/0.5ML solution pen-injector Inject 0.75 mg under the skin into the appropriate area as directed 1 (One) Time Per Week. 4 pen 11   • Dulaglutide 1.5 MG/0.5ML solution pen-injector Inject 1.5 mg under the skin into the appropriate area as directed 1 (One) Time Per Week. 4 pen 11   • Ertugliflozin L-PyroglutamicAc (STEGLATRO) 5 MG tablet Take 1 tablet by mouth Every Morning. 30 tablet 11   •  "FREESTYLE LITE test strip      • HYDROcodone-acetaminophen (NORCO) 5-325 MG per tablet TAKE 1 2 TO 1 (ONE HALF TO ONE) TABLET BY MOUTH ONCE DAILY  0   • levothyroxine (SYNTHROID, LEVOTHROID) 175 MCG tablet Take 1 tablet by mouth Daily. 30 tablet 11   • lisinopril (PRINIVIL,ZESTRIL) 40 MG tablet Take 40 mg by mouth Daily.  6   • lisinopril (PRINIVIL,ZESTRIL) 5 MG tablet Take 1 tablet by mouth Daily. 30 tablet 2   • nitroglycerin (NITROSTAT) 0.4 MG SL tablet 1 under the tongue as needed for angina, may repeat q5mins for up three doses 30 tablet 3   • phenylephrine (SINUS RELIEF EXTRA STRENGTH) 1 % nasal spray 1 spray into the nostril(s) as directed by provider Every 4 (Four) Hours As Needed for Congestion.     • REPATHA SURECLICK 140 MG/ML solution auto-injector INJECT 1 PEN SUBCUTANEOUSLY EVERY 14 DAYS 2 pen 3   • sertraline (ZOLOFT) 100 MG tablet Take 100 mg by mouth Daily. FOR 30 DAYS  3   • vitamin D (ERGOCALCIFEROL) 95922 units capsule capsule Take 1 capsule by mouth Every 7 (Seven) Days. 5 capsule 11     No current facility-administered medications for this visit.      Allergies   Allergen Reactions   • Coconut Anaphylaxis and Swelling   • Penicillins Swelling     Of the throat   • Adhesive Tape Other (See Comments)     Silk tape- blisters   • Ciprofloxacin Other (See Comments)     \"knots on my shins\"   • Latex Hives     Social History     Socioeconomic History   • Marital status:      Spouse name: Not on file   • Number of children: Not on file   • Years of education: Not on file   • Highest education level: Not on file   Tobacco Use   • Smoking status: Former Smoker     Packs/day: 0.00     Years: 20.00     Pack years: 0.00     Types: Cigarettes     Start date: 1987     Quit date: 10/12/2017     Years since quitting: 3.1   • Smokeless tobacco: Never Used   Substance and Sexual Activity   • Alcohol use: No   • Drug use: No   • Sexual activity: Not Currently       Review of Systems  Review of Systems "   Constitutional: Negative for activity change, appetite change, diaphoresis and fatigue.   HENT: Negative for facial swelling, sneezing, sore throat, tinnitus, trouble swallowing and voice change.    Eyes: Negative for photophobia, pain, discharge, redness, itching and visual disturbance.   Respiratory: Negative for apnea, cough, choking, chest tightness and shortness of breath.    Cardiovascular: Negative for chest pain, palpitations and leg swelling.   Gastrointestinal: Negative for abdominal distention, abdominal pain, constipation, diarrhea, nausea and vomiting.   Endocrine: Negative for cold intolerance, heat intolerance, polydipsia, polyphagia and polyuria.   Genitourinary: Negative for difficulty urinating, dysuria, frequency, hematuria and urgency.   Musculoskeletal: Negative for arthralgias, back pain, gait problem, joint swelling, myalgias, neck pain and neck stiffness.   Skin: Negative for color change, pallor, rash and wound.   Neurological: Negative for dizziness, tremors, weakness, light-headedness, numbness and headaches.   Hematological: Negative for adenopathy. Does not bruise/bleed easily.   Psychiatric/Behavioral: Negative for behavioral problems, confusion and sleep disturbance.        Objective    There were no vitals taken for this visit.  Physical Exam  Constitutional:       Appearance: Normal appearance.   HENT:      Head: Normocephalic and atraumatic.      Right Ear: External ear normal.      Left Ear: External ear normal.      Nose: Nose normal.   Eyes:      Extraocular Movements: Extraocular movements intact.      Conjunctiva/sclera: Conjunctivae normal.      Pupils: Pupils are equal, round, and reactive to light.   Neck:      Musculoskeletal: Normal range of motion.   Pulmonary:      Effort: Pulmonary effort is normal.   Musculoskeletal: Normal range of motion.   Skin:     Coloration: Skin is not pale.   Neurological:      General: No focal deficit present.      Mental Status: She is  alert.   Psychiatric:         Mood and Affect: Mood normal.         Thought Content: Thought content normal.         Judgment: Judgment normal.         Lab Review  Glucose (mg/dL)   Date Value   09/14/2020 167 (H)   08/28/2020 91   03/03/2020 454 (C)     Glucose, Arterial (mmol/L)   Date Value   03/06/2019 115 (H)   03/06/2019 126 (H)   03/05/2019      Comment:      The parameter value has a question [id number] Glu not usable     Sodium (mmol/L)   Date Value   09/14/2020 139   08/28/2020 139   03/03/2020 135 (L)     Potassium (mmol/L)   Date Value   09/14/2020 4.1   08/28/2020 3.8   03/03/2020 4.0     Chloride (mmol/L)   Date Value   09/14/2020 103   08/28/2020 100   03/03/2020 94 (L)     CO2 (mmol/L)   Date Value   09/14/2020 25.5   08/28/2020 25.2   03/03/2020 25.9     BUN (mg/dL)   Date Value   09/14/2020 11   08/28/2020 14   03/03/2020 12     Creatinine (mg/dL)   Date Value   09/14/2020 1.10 (H)   08/28/2020 1.69 (H)   03/03/2020 1.16 (H)     Hemoglobin A1C (%)   Date Value   09/14/2020 6.60 (H)   08/28/2020 7.24 (H)   01/14/2020 10.30 (H)     Triglycerides (mg/dL)   Date Value   08/28/2020 106   01/17/2019 63   01/04/2019 53     LDL Cholesterol  (mg/dL)   Date Value   08/28/2020 289 (H)   01/17/2019 <30   01/04/2019 77   06/06/2018 184 (H)   03/05/2018 270 (H)       Assessment/Plan      1. Mixed hyperlipidemia    2. Essential hypertension    3. Vitamin D deficiency    4. Postsurgical hypothyroidism    5. Type 2 diabetes mellitus with hyperglycemia, without long-term current use of insulin (CMS/[facility])    .    Medications prescribed:  Outpatient Encounter Medications as of 11/25/2020   Medication Sig Dispense Refill   • aspirin 81 MG chewable tablet Chew 81 mg daily.     • atorvastatin (LIPITOR) 40 MG tablet Take 40 mg by mouth Every Night.     • Blood Glucose Monitoring Suppl (FREESTYLE LITE) device      • carvedilol (COREG) 3.125 MG tablet Take 1 tablet by mouth Every 12 (Twelve) Hours. 60 tablet 3   •  cholecalciferol (VITAMIN D3) 1000 units tablet Take 1,000 Units by mouth Daily. 5 capsules daily     • clopidogrel (PLAVIX) 75 MG tablet Take 1 tablet by mouth Daily. 90 tablet 3   • Dulaglutide 0.75 MG/0.5ML solution pen-injector Inject 0.75 mg under the skin into the appropriate area as directed 1 (One) Time Per Week. 4 pen 11   • Dulaglutide 1.5 MG/0.5ML solution pen-injector Inject 1.5 mg under the skin into the appropriate area as directed 1 (One) Time Per Week. 4 pen 11   • Ertugliflozin L-PyroglutamicAc (STEGLATRO) 5 MG tablet Take 1 tablet by mouth Every Morning. 30 tablet 11   • FREESTYLE LITE test strip      • HYDROcodone-acetaminophen (NORCO) 5-325 MG per tablet TAKE 1 2 TO 1 (ONE HALF TO ONE) TABLET BY MOUTH ONCE DAILY  0   • levothyroxine (SYNTHROID, LEVOTHROID) 175 MCG tablet Take 1 tablet by mouth Daily. 30 tablet 11   • lisinopril (PRINIVIL,ZESTRIL) 40 MG tablet Take 40 mg by mouth Daily.  6   • lisinopril (PRINIVIL,ZESTRIL) 5 MG tablet Take 1 tablet by mouth Daily. 30 tablet 2   • nitroglycerin (NITROSTAT) 0.4 MG SL tablet 1 under the tongue as needed for angina, may repeat q5mins for up three doses 30 tablet 3   • phenylephrine (SINUS RELIEF EXTRA STRENGTH) 1 % nasal spray 1 spray into the nostril(s) as directed by provider Every 4 (Four) Hours As Needed for Congestion.     • REPATHA SURECLICK 140 MG/ML solution auto-injector INJECT 1 PEN SUBCUTANEOUSLY EVERY 14 DAYS 2 pen 3   • sertraline (ZOLOFT) 100 MG tablet Take 100 mg by mouth Daily. FOR 30 DAYS  3   • vitamin D (ERGOCALCIFEROL) 21158 units capsule capsule Take 1 capsule by mouth Every 7 (Seven) Days. 5 capsule 11     No facility-administered encounter medications on file as of 11/25/2020.        Orders placed during this encounter include:  Orders Placed This Encounter   Procedures   • Comprehensive Metabolic Panel     Standing Status:   Future     Standing Expiration Date:   11/25/2021   • Hemoglobin A1c     Standing Status:   Future      Standing Expiration Date:   11/25/2021   • Lipid Panel     Standing Status:   Future     Standing Expiration Date:   11/25/2021   • Microalbumin / Creatinine Urine Ratio - Urine, Clean Catch     Standing Status:   Future     Standing Expiration Date:   11/25/2021   • Protein / Creatinine Ratio, Urine - Urine, Clean Catch     Standing Status:   Future     Standing Expiration Date:   11/25/2021   • TSH     Standing Status:   Future     Standing Expiration Date:   11/25/2021   • Vitamin B12     Standing Status:   Future     Standing Expiration Date:   11/25/2021   • Vitamin D 25 Hydroxy     Standing Status:   Future     Standing Expiration Date:   11/25/2021   • CBC & Differential     Standing Status:   Future     Standing Expiration Date:   11/25/2021     Order Specific Question:   Manual Differential     Answer:   No     Type 2 Diabetes Mellitus With Hyperglycemia (Cms/Hcc)   Postsurgical Hypothyroidism         Hypothyroidism-postsurgical      Patient admitted in the past of noncompliance but states taking faithfully               Since TSH is 70 , I will start with 150 mcgs and expect to see her in 2 week to increase dose to 200 mcgs                Lab Results   Component Value Date     TSH 73.500 (H) 01/17/2019      Component      Latest Ref Rng & Units 1/17/2019   Free T4      0.78 - 2.19 ng/dL 0.98            We will follow Free T4 as a guide to treatment since the intention is to have her near euthyroid for potential upcoming CABG.              She is absorbing we did the labs that showed absorption        Levothyroxine Increase to 150 mcg daily       Lab Results   Component Value Date    .000 (H) 08/28/2020     Need new labs will go this week      ====     Type 2 diabetes          Lab Results   Component Value Date    HGBA1C 6.60 (H) 09/14/2020       Complicated by CAD  Back in March GFR less than 45 but now 58.           [place] caused knots      On Trulicity 1.5 mg weekly      Refusing insulin fast  and long acting insuilin      Will have to control diet      Do not go over 45 grams of carbohydrate       metformin stopped due to kidney             Stop Glipizide           .  steglatro 5 mg --- stop GFR 37         As long as she eats up to 50 grams of carbohdyrate per meal, she doesn't need novolog.   If she exceeds 50 grams of carbohydrate , I will give her 5 units to 10 units before meals based on carb load. ==she is not using the Novolog        Keep current medication           Bone Health     Vitamin d def.      Component      Latest Ref Rng & Units 8/28/2020   25 Hydroxy, Vitamin D      30.0 - 100.0 ng/ml 20.3 (L)              50,000 units weekly  keep taking               Lab Results   Component Value Date     [id number] 10/14/2019                 Lipid Management:           On repatha--continue         Total Cholesterol   Date Value Ref Range Status   08/28/2020 397 (H) 0 - 200 mg/dL Final     Triglycerides   Date Value Ref Range Status   08/28/2020 106 0 - 150 mg/dL Final     HDL Cholesterol   Date Value Ref Range Status   08/28/2020 87 (H) 40 - 60 mg/dL Final     LDL Cholesterol    Date Value Ref Range Status   08/28/2020 289 (H) 0 - 100 mg/dL Final                                       4. Follow-up: No follow-ups on file.        We spent 25 minutes including reviewing the patients electronic chart, reviewing medications, reviewing labs, active problems    I provided advice regarding diabetes management, dietary changes, adjustments of medication, self titration of insulin, refilled medications, ordering labs, future appointments    Patient was advised to contact the office if there are unanswered questions, trouble with blood glucose management, or any concerns

## 2020-12-23 ENCOUNTER — OFFICE VISIT (OUTPATIENT)
Dept: CARDIOLOGY | Facility: CLINIC | Age: 50
End: 2020-12-23

## 2020-12-23 VITALS — WEIGHT: 235 LBS | HEIGHT: 66 IN | BODY MASS INDEX: 37.77 KG/M2

## 2020-12-23 DIAGNOSIS — I10 ESSENTIAL HYPERTENSION: Primary | Chronic | ICD-10-CM

## 2020-12-23 DIAGNOSIS — E78.2 MIXED HYPERLIPIDEMIA: ICD-10-CM

## 2020-12-23 DIAGNOSIS — I25.10 CORONARY ARTERY DISEASE INVOLVING NATIVE CORONARY ARTERY OF NATIVE HEART WITHOUT ANGINA PECTORIS: Chronic | ICD-10-CM

## 2020-12-23 DIAGNOSIS — I21.4 NSTEMI (NON-ST ELEVATED MYOCARDIAL INFARCTION) (HCC): ICD-10-CM

## 2020-12-23 DIAGNOSIS — E78.2 MIXED HYPERLIPIDEMIA: Primary | ICD-10-CM

## 2020-12-23 DIAGNOSIS — Z95.1 S/P CABG (CORONARY ARTERY BYPASS GRAFT): ICD-10-CM

## 2020-12-23 DIAGNOSIS — E89.0 POSTOPERATIVE HYPOTHYROIDISM: ICD-10-CM

## 2020-12-23 PROCEDURE — 99442 PR PHYS/QHP TELEPHONE EVALUATION 11-20 MIN: CPT | Performed by: INTERNAL MEDICINE

## 2020-12-23 RX ORDER — FUROSEMIDE 20 MG/1
20 TABLET ORAL DAILY PRN
COMMUNITY
Start: 2020-12-10

## 2020-12-23 RX ORDER — EVOLOCUMAB 140 MG/ML
140 INJECTION, SOLUTION SUBCUTANEOUS
Qty: 2 PEN | Refills: 6 | Status: SHIPPED | OUTPATIENT
Start: 2020-12-23 | End: 2021-07-12 | Stop reason: SDUPTHER

## 2020-12-23 RX ORDER — NITROGLYCERIN 0.4 MG/1
TABLET SUBLINGUAL
Qty: 30 TABLET | Refills: 3 | Status: SHIPPED | OUTPATIENT
Start: 2020-12-23

## 2020-12-23 NOTE — PROGRESS NOTES
Rhea Sutton  50 y.o. female    1. Essential hypertension    2. Coronary artery disease involving native coronary artery of native heart without angina pectoris    3. Mixed hyperlipidemia    4. S/P CABG (coronary artery bypass graft)    5. Postoperative hypothyroidism        History of Present Illness:  This visit has been rescheduled as a phone visit to comply with patient safety concerns in accordance with CDC recommendations. Total time of discussion was 17 minutes.    You have chosen to receive care through a telephone visit. Do you consent to use a telephone visit for your medical care today? Yes    Ms. Sutton is a 50-year-old  lady with known coronary artery disease with history of inferior wall myocardial infarction in January 2019 and status post PCI to right coronary artery by Dr. Gonzalez.  She was noted to have multivessel CAD and underwent coronary artery bypass surgery in March 2019.      CORONARY ARTERY BYPASS GRAFTING x4 in March 2019:  ENDOSCOPIC VEIN HARVEST           LEFT INTERNAL MAMMARY ARTERY TO LEFT ANTERIOR DESCENDING CORONARY ARTERY  SAPHENOUS VEIN GRAFT FROM ASCENDING AORTA TO FIRST DIAGONAL BRANCH  SAPHENOUS VEIN GRAFT FROM ASCENDING AORTA TO THIRD MARGINAL BRANCH  SAPHENOUS VEIN GRAFT FROM ASCENDING AORTA TO POSTERIOR DESCENDING CORONARY ARTERY      The patient has done well symptomatically with no recurrence of chest pain.  She denied any dyspnea or palpitation.  She is a non-smoker.    However it appears that compliance with medications has been a significant issue and she apparently has not been taking her lipid-lowering therapy or Synthroid supplements on a regular basis.     Her total cholesterol was 397 and LDL to 89 in August 2020!  She apparently ran out of Repatha and did not refill it.  Her TSH was 110 in August 2020.  Strongly advised her to be compliant with her medications and follow-up with her primary care on a regular basis.  The patient was not able to  "check her blood pressure today.    SUBJECTIVE    Allergies   Allergen Reactions   • Coconut Anaphylaxis and Swelling   • Penicillins Swelling     Of the throat   • Adhesive Tape Other (See Comments)     Silk tape- blisters   • Ciprofloxacin Other (See Comments)     \"knots on my shins\"   • Latex Hives         Past Medical History:   Diagnosis Date   • Allergic    • Anxiety    • CAD (coronary artery disease)    • CAD (coronary artery disease), bypass graft transplanted heart 03/05/2019    x4 bypass   • CHF (congestive heart failure) (CMS/HCC)    • Chronic back pain    • Chronic bronchitis (CMS/HCC)    • Chronic kidney disease    • Depression    • Diabetes mellitus (CMS/HCC)    • Diverticulitis 1986 2017   • Goiter    • H/O heart artery stent 01/03/2019   • Hashimoto's thyroiditis    • Head lice infestation    • Hyperlipidemia    • Hypertension    • Injury of back    • MI (myocardial infarction) (CMS/HCC)    • MI (myocardial infarction) (CMS/HCC)    • Postoperative hypothyroidism    • Sinus congestion    • Sleep apnea     not using c-pap         Past Surgical History:   Procedure Laterality Date   • APPENDECTOMY     • CARDIAC CATHETERIZATION N/A 10/13/2017   • CARDIAC CATHETERIZATION N/A 1/3/2019   • CHOLECYSTECTOMY     • COLON RESECTION     • COLON SURGERY     • CORONARY ARTERY BYPASS GRAFT N/A 3/5/2019    Procedure: CORONARY ARTERY BYPASS GRAFTING ENDOSCOPIC VEIN HARVEST          (CELL SAVER)             LATEX ALLERGY leg start @ 1323 chest start @ 1328 vein out 1417 vein prep 8085-0764 on pump 1456 off pump 1729;  Surgeon: Jaren Vilchis MD;  Location: [facility];  Service: Cardiothoracic   • CORONARY STENT PLACEMENT     • AL RT/LT HEART CATHETERS N/A 10/13/2017   • TOTAL ABDOMINAL HYSTERECTOMY WITH SALPINGO OOPHORECTOMY     • TOTAL THYROIDECTOMY           Family History   Problem Relation Age of Onset   • Coronary artery disease Mother    • Coronary artery disease Father          Social History "     Socioeconomic History   • Marital status:      Spouse name: Not on file   • Number of children: Not on file   • Years of education: Not on file   • Highest education level: Not on file   Tobacco Use   • Smoking status: Former Smoker     Packs/day: 0.00     Years: 20.00     Pack years: 0.00     Types: Cigarettes     Start date: 1987     Quit date: 10/12/2017     Years since quitting: 3.2   • Smokeless tobacco: Never Used   Substance and Sexual Activity   • Alcohol use: No   • Drug use: No   • Sexual activity: Not Currently         Current Outpatient Medications   Medication Sig Dispense Refill   • aspirin 81 MG chewable tablet Chew 81 mg daily.     • atorvastatin (LIPITOR) 40 MG tablet Take 40 mg by mouth Every Night.     • Blood Glucose Monitoring Suppl (FREESTYLE LITE) device      • carvedilol (COREG) 3.125 MG tablet Take 1 tablet by mouth Every 12 (Twelve) Hours. 60 tablet 3   • cholecalciferol (VITAMIN D3) 1000 units tablet Take 1,000 Units by mouth Daily. 5 capsules daily     • clopidogrel (PLAVIX) 75 MG tablet Take 1 tablet by mouth Daily. 90 tablet 3   • Dulaglutide 1.5 MG/0.5ML solution pen-injector Inject 1.5 mg under the skin into the appropriate area as directed 1 (One) Time Per Week. 4 pen 11   • Ertugliflozin L-PyroglutamicAc (STEGLATRO) 5 MG tablet Take 1 tablet by mouth Every Morning. 30 tablet 11   • FREESTYLE LITE test strip      • furosemide (LASIX) 20 MG tablet Take 20 mg by mouth Daily As Needed.     • HYDROcodone-acetaminophen (NORCO) 5-325 MG per tablet TAKE 1 2 TO 1 (ONE HALF TO ONE) TABLET BY MOUTH ONCE DAILY  0   • levothyroxine (SYNTHROID, LEVOTHROID) 175 MCG tablet Take 1 tablet by mouth Daily. 30 tablet 11   • lisinopril (PRINIVIL,ZESTRIL) 40 MG tablet Take 40 mg by mouth Daily.  6   • sertraline (ZOLOFT) 100 MG tablet Take 100 mg by mouth Daily. FOR 30 DAYS  3   • vitamin D (ERGOCALCIFEROL) 21210 units capsule capsule Take 1 capsule by mouth Every 7 (Seven) Days. 5 capsule 11  "  • Evolocumab (Repatha SureClick) solution auto-injector SureClick injection Inject 1 mL under the skin into the appropriate area as directed Every 14 (Fourteen) Days. 2 pen 6   • nitroglycerin (NITROSTAT) 0.4 MG SL tablet 1 under the tongue as needed for angina, may repeat q5mins for up three doses 30 tablet 3     No current facility-administered medications for this visit.          OBJECTIVE    Ht 167.6 cm (66\")   Wt 107 kg (235 lb)   BMI 37.93 kg/m²         Review of Systems     Constitutional:  Denies recent weight loss, weight gain, fever or chills     HENT:  Denies any hearing loss, epistaxis, hoarseness, or difficulty speaking.     Eyes: Wears eyeglasses or contact lenses     Respiratory:  Denies dyspnea with exertion,no cough, wheezing, or hemoptysis.     Cardiovascular: Negative for palpations, chest pain, orthopnea, PND    Gastrointestinal:  Denies change in bowel habits, dyspepsia, ulcer disease, hematochezia, or melena.     Endocrine: Negative for cold intolerance, heat intolerance, polydipsia, polyphagia and polyuria.     Genitourinary: Negative.      Musculoskeletal: Denies any history of arthritic symptoms or back problems      Neurological:  Denies any history of recurrent headaches, strokes, TIA, or seizure disorder.     Hematological: Denies any food allergies, seasonal allergies, bleeding disorders, or lymphadenopathy.     Psychiatric/Behavioral: Denies any history of depression, substance abuse, or change in cognitive function.       Lab Results   Component Value Date    WBC 10.07 08/28/2020    HGB 13.9 09/14/2020    HCT 40.8 09/14/2020    MCV 90.5 08/28/2020     08/28/2020     Lab Results   Component Value Date    GLUCOSE 167 (H) 09/14/2020    BUN 11 09/14/2020    CREATININE 1.10 (H) 09/14/2020    EGFRIFNONA 53 (L) 09/14/2020    BCR 10.0 09/14/2020    CO2 25.5 09/14/2020    CALCIUM 8.8 09/14/2020    ALBUMIN 4.10 09/14/2020    AST 46 (H) 08/28/2020    ALT 35 (H) 08/28/2020     Lab " Results   Component Value Date    CHOL 397 (H) 08/28/2020    CHOL 76 01/17/2019    CHOL 137 01/04/2019     Lab Results   Component Value Date    TRIG 106 08/28/2020    TRIG 63 01/17/2019    TRIG 53 01/04/2019     Lab Results   Component Value Date    HDL 87 (H) 08/28/2020    HDL 54 (L) 01/17/2019    HDL 49 (L) 01/04/2019     No components found for: LDLCALC  Lab Results   Component Value Date     (H) 08/28/2020    LDL <30 01/17/2019    LDL 77 01/04/2019     No results found for: HDLLDLRATIO  No components found for: CHOLHDL  Lab Results   Component Value Date    HGBA1C 6.60 (H) 09/14/2020     Lab Results   Component Value Date    .000 (H) 08/28/2020    H3FBTZL 83.1 11/27/2019           ASSESSMENT AND PLAN  Ms. Sutton is progressing well from a symptom standpoint with no recurrence of angina.  However compliance with medications has been a significant issue and I did discuss with her her markedly elevated lipids and TSH.  Repatha was refilled.    I have continued antiplatelet therapy with aspirin and Plavix, lipid-lowering therapy with atorvastatin and Repatha, antihypertensive therapy with carvedilol, lisinopril have been continued.  She has been strongly encouraged to follow-up on a regular basis with primary care.    Diagnoses and all orders for this visit:    1. Essential hypertension (Primary)    2. Coronary artery disease involving native coronary artery of native heart without angina pectoris    3. Mixed hyperlipidemia    4. S/P CABG (coronary artery bypass graft)    5. Postoperative hypothyroidism        Patient's Body mass index is 37.93 kg/m². BMI is above normal parameters. Recommendations include: exercise counseling and nutrition counseling.  Patient is a non-smoker    Mian Montana MD  12/23/2020  11:05 CST   oral

## 2021-07-12 DIAGNOSIS — E78.2 MIXED HYPERLIPIDEMIA: ICD-10-CM

## 2021-07-12 RX ORDER — CARVEDILOL 3.12 MG/1
3.12 TABLET ORAL EVERY 12 HOURS SCHEDULED
Qty: 60 TABLET | Refills: 3 | OUTPATIENT
Start: 2021-07-12

## 2021-07-12 RX ORDER — EVOLOCUMAB 140 MG/ML
140 INJECTION, SOLUTION SUBCUTANEOUS
Qty: 2 PEN | Refills: 6 | Status: SHIPPED | OUTPATIENT
Start: 2021-07-12 | End: 2021-08-09 | Stop reason: SDUPTHER

## 2021-07-12 RX ORDER — CLOPIDOGREL BISULFATE 75 MG/1
75 TABLET ORAL DAILY
Qty: 90 TABLET | Refills: 3 | Status: SHIPPED | OUTPATIENT
Start: 2021-07-12 | End: 2021-08-09 | Stop reason: SDUPTHER

## 2021-07-15 DIAGNOSIS — I65.23 CAROTID STENOSIS, ASYMPTOMATIC, BILATERAL: Primary | ICD-10-CM

## 2021-07-19 ENCOUNTER — OFFICE VISIT (OUTPATIENT)
Dept: CARDIAC SURGERY | Facility: CLINIC | Age: 51
End: 2021-07-19

## 2021-07-19 VITALS
OXYGEN SATURATION: 98 % | WEIGHT: 259 LBS | HEART RATE: 73 BPM | HEIGHT: 66 IN | SYSTOLIC BLOOD PRESSURE: 152 MMHG | BODY MASS INDEX: 41.62 KG/M2 | DIASTOLIC BLOOD PRESSURE: 86 MMHG

## 2021-07-19 DIAGNOSIS — I65.23 CAROTID STENOSIS, ASYMPTOMATIC, BILATERAL: ICD-10-CM

## 2021-07-19 DIAGNOSIS — Z95.1 S/P CABG (CORONARY ARTERY BYPASS GRAFT): Primary | ICD-10-CM

## 2021-07-19 DIAGNOSIS — E78.2 MIXED HYPERLIPIDEMIA: ICD-10-CM

## 2021-07-19 DIAGNOSIS — I10 ESSENTIAL HYPERTENSION: Chronic | ICD-10-CM

## 2021-07-19 DIAGNOSIS — E89.0 POSTOPERATIVE HYPOTHYROIDISM: ICD-10-CM

## 2021-07-19 PROCEDURE — 99214 OFFICE O/P EST MOD 30 MIN: CPT | Performed by: NURSE PRACTITIONER

## 2021-07-19 RX ORDER — LISINOPRIL 40 MG/1
40 TABLET ORAL DAILY
Qty: 30 TABLET | Refills: 0 | Status: SHIPPED | OUTPATIENT
Start: 2021-07-19 | End: 2021-08-09 | Stop reason: SDUPTHER

## 2021-07-19 RX ORDER — CARVEDILOL 3.12 MG/1
3.12 TABLET ORAL EVERY 12 HOURS SCHEDULED
Qty: 60 TABLET | Refills: 0 | Status: SHIPPED | OUTPATIENT
Start: 2021-07-19 | End: 2021-08-09

## 2021-07-19 NOTE — PROGRESS NOTES
CVTS Office Progress Note     Subjective   Patient ID: Rhea Sutton is a 50 y.o. female is here today for follow-up PO CABGx4.    Chief Complaint:    Carotid Stenosis    HPI    PCP:  Anderson Abbott MD  Cardiology:  Dr. Montana  Endocrinology: Raj Thompson     Ms. Sutton is a pleasant 50-year-old female with a history of hypertension, CAD, HLD, vitamin D deficiency, diabetes, hypothyroidism(total thyroidectomy), DDD.  She has extensive cardiovascular history with PCI in 2000 with stent placement to RCA.  Underwent LHC October 2017 with ETIENNE to mid circumflex, RCA stents at that time patent. She established with cardiothoracic and vascular surgery in January 2019 after being admitted [facility] with chest pain and exertional dyspnea with NSTEMI.  Underwent LHC with PCI to RCA with in-stent stenosis, LAD 80%, CX 70%, RCA 90% (PCI 40%).  As result she was considered for elective surgical revascularization.  Underwent CABG x4 March 5, 2019 LIMA-LAD, SVG- D1, SVG- OM 3, SVG- PDA.   Remains established with [facility] for mild carotid stenosis.  Recently out of all medications, establishing with new PCP.      1/2017 PCI CX  1/4/2019 Cardiac Catherization:  LAD 80%, CX 70%, RCA 90% (PCI 40%)  1/2019 ECG:  SB 58,   1/2019 Echocardiogram:  EF 55%, LA 32mm, LV 50mm, RVSP 18mmHg.  Mild MR TR.  3/5/2019 CABGx4: LIMA-LAD, SVG- D1, SVG- OM 3, SVG- PDA     2/2019 Carotid Duplex:  PARI 0-49% bidirectional vert.  LICA 0-49% antegrade vert.  2/2019 Lower extremity vein mapping:  RIGHT 3.0-6.5mm.  LEFT 2.0-5.6mm  7/2021 Carotid Duplex: PARI 0-49% mPSV 60c/s Ratio 1.5, LICA 0-49% mPSV 55c/s Ratio 1.7, Antegrade vertebrals           The following portions of the patient's history were reviewed and updated as appropriate: allergies, current medications, past family history, past medical history, past social history, past surgical history and problem list.  Recent images independently reviewed.   Available laboratory values reviewed.      Past Medical History:   Diagnosis Date   • Allergic    • Anxiety    • CAD (coronary artery disease)    • CAD (coronary artery disease), bypass graft transplanted heart 03/05/2019    x4 bypass   • CHF (congestive heart failure) (CMS/HCC)    • Chronic back pain    • Chronic bronchitis (CMS/HCC)    • Chronic kidney disease    • Depression    • Diabetes mellitus (CMS/HCC)    • Diverticulitis 1986 2017   • Goiter    • H/O heart artery stent 01/03/2019   • Hashimoto's thyroiditis    • Head lice infestation    • Hyperlipidemia    • Hypertension    • Injury of back    • MI (myocardial infarction) (CMS/HCC)    • MI (myocardial infarction) (CMS/HCC)    • Postoperative hypothyroidism    • Sinus congestion    • Sleep apnea     not using c-pap     Past Surgical History:   Procedure Laterality Date   • APPENDECTOMY     • CARDIAC CATHETERIZATION N/A 10/13/2017   • CARDIAC CATHETERIZATION N/A 1/3/2019   • CHOLECYSTECTOMY     • COLON RESECTION     • COLON SURGERY     • CORONARY ARTERY BYPASS GRAFT N/A 3/5/2019    Procedure: CORONARY ARTERY BYPASS GRAFTING ENDOSCOPIC VEIN HARVEST          (CELL SAVER)             LATEX ALLERGY leg start @ 1323 chest start @ 1328 vein out 1417 vein prep [id number] on pump 1456 off pump 1729;  Surgeon: Jaren Vilchis MD;  Location: [facility];  Service: Cardiothoracic   • CORONARY STENT PLACEMENT     • SC RT/LT HEART CATHETERS N/A 10/13/2017   • TOTAL ABDOMINAL HYSTERECTOMY WITH SALPINGO OOPHORECTOMY     • TOTAL THYROIDECTOMY       Family History   Problem Relation Age of Onset   • Coronary artery disease Mother    • Coronary artery disease Father      Social History     Tobacco Use   • Smoking status: Former Smoker     Packs/day: 0.00     Years: 20.00     Pack years: 0.00     Types: Cigarettes     Start date: 1987     Quit date: 10/12/2017     Years since quitting: 3.7   • Smokeless tobacco: Never Used   Substance Use Topics   • Alcohol use: No   •  Drug use: No       ALLERGIES:   Coconut, Penicillins, Adhesive tape, Ciprofloxacin, and Latex    MEDICATIONS:      Current Outpatient Medications:   •  aspirin 81 MG chewable tablet, Chew 81 mg daily., Disp: , Rfl:   •  Blood Glucose Monitoring Suppl (FREESTYLE LITE) device, , Disp: , Rfl:   •  carvedilol (COREG) 3.125 MG tablet, Take 1 tablet by mouth Every 12 (Twelve) Hours., Disp: 60 tablet, Rfl: 0  •  cholecalciferol (VITAMIN D3) 1000 units tablet, Take 1,000 Units by mouth Daily. 5 capsules daily, Disp: , Rfl:   •  clopidogrel (PLAVIX) 75 MG tablet, Take 1 tablet by mouth Daily., Disp: 90 tablet, Rfl: 3  •  Dulaglutide 1.5 MG/0.5ML solution pen-injector, Inject 1.5 mg under the skin into the appropriate area as directed 1 (One) Time Per Week., Disp: 4 pen, Rfl: 11  •  Ertugliflozin L-PyroglutamicAc (STEGLATRO) 5 MG tablet, Take 1 tablet by mouth Every Morning., Disp: 30 tablet, Rfl: 11  •  Evolocumab (Repatha SureClick) solution auto-injector SureClick injection, Inject 1 mL under the skin into the appropriate area as directed Every 14 (Fourteen) Days., Disp: 2 pen, Rfl: 6  •  FREESTYLE LITE test strip, , Disp: , Rfl:   •  furosemide (LASIX) 20 MG tablet, Take 20 mg by mouth Daily As Needed., Disp: , Rfl:   •  levothyroxine (SYNTHROID, LEVOTHROID) 175 MCG tablet, Take 1 tablet by mouth Daily., Disp: 30 tablet, Rfl: 11  •  lisinopril (PRINIVIL,ZESTRIL) 40 MG tablet, Take 1 tablet by mouth Daily., Disp: 30 tablet, Rfl: 0  •  nitroglycerin (NITROSTAT) 0.4 MG SL tablet, 1 under the tongue as needed for angina, may repeat q5mins for up three doses, Disp: 30 tablet, Rfl: 3    Review of Systems   Constitutional: Positive for malaise/fatigue. Negative for decreased appetite, fever, weight gain and weight loss.   HENT: Negative for hearing loss, hoarse voice and sore throat.    Eyes: Negative for blurred vision, visual disturbance and visual halos.   Cardiovascular: Positive for leg swelling. Negative for chest pain,  dyspnea on exertion and palpitations.        Patient does not report signs or symptoms of sternal malunion, denies popping, clicking, shifting, or uncontrolled pain.     Respiratory: Negative for cough, shortness of breath and sputum production.    Endocrine: Positive for cold intolerance. Negative for polyuria.   Hematologic/Lymphatic: Negative for bleeding problem. Does not bruise/bleed easily.   Skin: Negative for color change, nail changes, poor wound healing and suspicious lesions.   Musculoskeletal: Positive for back pain. Negative for joint pain and myalgias.   Gastrointestinal: Negative for abdominal pain, constipation, diarrhea, hematemesis, melena, nausea and vomiting.   Genitourinary: Negative for flank pain, nocturia and urgency.   Neurological: Positive for weakness. Negative for brief paralysis, focal weakness, light-headedness, loss of balance, numbness and paresthesias.   Psychiatric/Behavioral: Negative for altered mental status, depression, suicidal ideas and thoughts of violence.   Allergic/Immunologic: Negative for hives and persistent infections.     Vitals:    07/19/21 1044   BP: 152/86   Pulse: 73   SpO2: 98%      Objective   Heart Rate:  [73] 73  BP: (152)/(86) 152/86  Body mass index is 41.8 kg/m².  Physical Exam   Constitutional: She is oriented to person, place, and time. She appears well-developed. No distress.   HENT:   Head: Normocephalic and atraumatic.   Eyes: Pupils are equal, round, and reactive to light. Conjunctivae are normal.   Neck: No tracheal deviation present. No thyromegaly present.   Cardiovascular: Normal rate, regular rhythm and normal heart sounds.   No murmur heard.  Sternum stable with moderate pressure and cough.  No popping, clicking, shifting, or play noted     Pulmonary/Chest: Effort normal and breath sounds normal. No stridor. No respiratory distress.   Abdominal: Soft. Bowel sounds are normal. She exhibits no distension. There is no abdominal tenderness.    Musculoskeletal: Normal range of motion.      Comments: +1 BLE   Neurological: She is alert and oriented to person, place, and time. No cranial nerve deficit. Coordination normal.   Skin: Skin is warm and dry. Capillary refill takes less than 2 seconds.        Psychiatric: Her behavior is normal. Judgment normal.   Nursing note and vitals reviewed.        Assessment & Plan   7/2021 Carotid Duplex: PARI 0-49% mPSV 60c/s Ratio 1.5, LICA 0-49% mPSV 55c/s Ratio 1.7, Antegrade vertebrals    1. S/P CABG (coronary artery bypass graft)  ASA, Statin, Plavix, BB  Continued follow up with cardiology    2. Mixed hyperlipidemia  Lipid-lowering therapy has been proven beneficial in patients with cardio-vascular disease. Current guidelines recommend statin treatment for all patients with PAD,CAD and carotid stenosis. Statins are beneficial in preventing cardiovascular events, increasing functional capacity and lower the risk of adverse limb loss in PAD. Statins decrease the progression of plaque formation and may improve peripheral vessel lining, and aid in reversing atherosclerosis.    3. Carotid stenosis, asymptomatic, bilateral  Mild bilateral asymptomatic disease   Repeat duplex in 2 years    - Duplex Carotid Ultrasound CAR; Future    4. Postoperative hypothyroidism  Status post resection of thyroid, missed endocrinology appt in February.      Educated importance of compliance and follow up.  Appt rescheduled with labs.     5. Essential hypertension  Add lisinopril. Continued follow up with PCP once established.         Detailed discussion regarding risks, benefits, and treatment plan. Images independently reviewed. Patient understands, agrees, and wishes to proceed with plan.

## 2021-07-19 NOTE — PATIENT INSTRUCTIONS
The results of your carotid ultrasound shows mild disease of the right carotid and is stable from previous exam, ultrasound of the left carotid shows mild disease and is stable from previous exam.    Follow up in 2 years    If you should experience any neurological symptoms including but not limited to visual or speech disturbances confusion, seizures, or weakness of limbs of one side of your body notify Heart and Vascular center immediately for evaluation or if after hours present to the nearest Emergency Department.

## 2021-07-20 RX ORDER — LEVOTHYROXINE SODIUM 175 UG/1
175 TABLET ORAL DAILY
Qty: 30 TABLET | Refills: 5 | Status: SHIPPED | OUTPATIENT
Start: 2021-07-20 | End: 2022-01-19

## 2021-08-09 ENCOUNTER — OFFICE VISIT (OUTPATIENT)
Dept: CARDIOLOGY | Facility: CLINIC | Age: 51
End: 2021-08-09

## 2021-08-09 VITALS
OXYGEN SATURATION: 95 % | DIASTOLIC BLOOD PRESSURE: 110 MMHG | HEART RATE: 69 BPM | BODY MASS INDEX: 41.88 KG/M2 | HEIGHT: 66 IN | SYSTOLIC BLOOD PRESSURE: 156 MMHG | WEIGHT: 260.6 LBS | TEMPERATURE: 96 F

## 2021-08-09 DIAGNOSIS — E78.2 MIXED HYPERLIPIDEMIA: ICD-10-CM

## 2021-08-09 DIAGNOSIS — I10 ESSENTIAL HYPERTENSION: Primary | ICD-10-CM

## 2021-08-09 DIAGNOSIS — I65.23 CAROTID STENOSIS, ASYMPTOMATIC, BILATERAL: ICD-10-CM

## 2021-08-09 DIAGNOSIS — Z95.1 S/P CABG (CORONARY ARTERY BYPASS GRAFT): ICD-10-CM

## 2021-08-09 DIAGNOSIS — I25.10 CORONARY ARTERY DISEASE INVOLVING NATIVE CORONARY ARTERY OF NATIVE HEART WITHOUT ANGINA PECTORIS: Chronic | ICD-10-CM

## 2021-08-09 PROCEDURE — 99214 OFFICE O/P EST MOD 30 MIN: CPT | Performed by: INTERNAL MEDICINE

## 2021-08-09 PROCEDURE — 93000 ELECTROCARDIOGRAM COMPLETE: CPT | Performed by: INTERNAL MEDICINE

## 2021-08-09 RX ORDER — LISINOPRIL 40 MG/1
40 TABLET ORAL DAILY
Qty: 90 TABLET | Refills: 3 | Status: SHIPPED | OUTPATIENT
Start: 2021-08-09 | End: 2022-09-03 | Stop reason: SDUPTHER

## 2021-08-09 RX ORDER — EVOLOCUMAB 140 MG/ML
140 INJECTION, SOLUTION SUBCUTANEOUS
Qty: 2 PEN | Refills: 6 | Status: SHIPPED | OUTPATIENT
Start: 2021-08-09 | End: 2022-04-11

## 2021-08-09 RX ORDER — GLIPIZIDE 5 MG/1
TABLET ORAL
COMMUNITY
End: 2021-10-15

## 2021-08-09 RX ORDER — CLOPIDOGREL BISULFATE 75 MG/1
75 TABLET ORAL DAILY
Qty: 90 TABLET | Refills: 3 | Status: SHIPPED | OUTPATIENT
Start: 2021-08-09 | End: 2021-08-09 | Stop reason: SDUPTHER

## 2021-08-09 RX ORDER — CARVEDILOL 6.25 MG/1
6.25 TABLET ORAL 2 TIMES DAILY
Qty: 180 TABLET | Refills: 3 | Status: SHIPPED | OUTPATIENT
Start: 2021-08-09 | End: 2022-09-03 | Stop reason: SDUPTHER

## 2021-08-09 RX ORDER — CLOPIDOGREL BISULFATE 75 MG/1
75 TABLET ORAL DAILY
Qty: 90 TABLET | Refills: 3 | Status: SHIPPED | OUTPATIENT
Start: 2021-08-09 | End: 2022-09-03 | Stop reason: SDUPTHER

## 2021-08-09 NOTE — PROGRESS NOTES
Rhea Sutton  50 y.o. female    1. Essential hypertension    2. S/P CABG (coronary artery bypass graft)    3. Carotid stenosis, asymptomatic, bilateral    4. Mixed hyperlipidemia    5. Coronary artery disease involving native coronary artery of native heart without angina pectoris        History of Present Illness:  Ms. Sutton is a 50-year-old  lady with known coronary artery disease with history of inferior wall myocardial infarction in January 2019 and status post PCI to right coronary artery by Dr. Gonzalez.  She was noted to have multivessel CAD and underwent coronary artery bypass surgery in March 2019.      CORONARY ARTERY BYPASS GRAFTING x4 in March 2019 when she received LIMA to LAD, SVG to diagonal 1, obtuse marginal 3 and PDA.    The patient denied any cardiac symptoms and no chest pain, shortness of breath or palpitation was reported.  Blood pressure however today was noted to be markedly elevated at 156/110 mmHg.  The patient had not been taking any antihypertensive medication during the past 3 months.  She apparently ran out and has not yet got an appointment to see her primary care physician.  Compliant with medications has been an issue.  She has appointments coming up with both her primary care physician and the diabetologist.     Her total cholesterol was 397 and LDL to 289 in August 2020!  She apparently had issues refilling her Repatha and never got started on the medication.  This however was restarted by DUC Dickerson a couple weeks prior.    Carotid Doppler studies and 7/2021 showed: PARI 0-49% mPSV 60c/s Ratio 1.5, LICA 0-49% mPSV 55c/s Ratio 1.7, Antegrade vertebrals     EKG today showed sinus rhythm with heart rate of 69 bpm.  Old inferior wall myocardial infarction.  Mild IVCD.  Poor R wave progression anteroseptal leads.  Nonspecific ST-T changes.    Allergies   Allergen Reactions   • Coconut Anaphylaxis and Swelling   • Penicillins Swelling     Of the throat   •  "Adhesive Tape Other (See Comments)     Silk tape- blisters   • Ciprofloxacin Other (See Comments)     \"knots on my shins\"   • Latex Hives         Past Medical History:   Diagnosis Date   • Allergic    • Anxiety    • CAD (coronary artery disease)    • CAD (coronary artery disease), bypass graft transplanted heart 03/05/2019    x4 bypass   • CHF (congestive heart failure) (CMS/HCC)    • Chronic back pain    • Chronic bronchitis (CMS/HCC)    • Chronic kidney disease    • Depression    • Diabetes mellitus (CMS/HCC)    • Diverticulitis 1986 2017   • Goiter    • H/O heart artery stent 01/03/2019   • Hashimoto's thyroiditis    • Head lice infestation    • Hyperlipidemia    • Hypertension    • Injury of back    • MI (myocardial infarction) (CMS/HCC)    • MI (myocardial infarction) (CMS/HCC)    • Postoperative hypothyroidism    • Sinus congestion    • Sleep apnea     not using c-pap         Past Surgical History:   Procedure Laterality Date   • APPENDECTOMY     • CARDIAC CATHETERIZATION N/A 10/13/2017   • CARDIAC CATHETERIZATION N/A 1/3/2019   • CHOLECYSTECTOMY     • COLON RESECTION     • COLON SURGERY     • CORONARY ARTERY BYPASS GRAFT N/A 3/5/2019    Procedure: CORONARY ARTERY BYPASS GRAFTING ENDOSCOPIC VEIN HARVEST          (CELL SAVER)             LATEX ALLERGY leg start @ 1323 chest start @ 1328 vein out 1417 vein prep 1211-0217 on pump 1456 off pump 1729;  Surgeon: Jaren Vilchis MD;  Location: [facility];  Service: Cardiothoracic   • CORONARY STENT PLACEMENT     • NH RT/LT HEART CATHETERS N/A 10/13/2017   • TOTAL ABDOMINAL HYSTERECTOMY WITH SALPINGO OOPHORECTOMY     • TOTAL THYROIDECTOMY           Family History   Problem Relation Age of Onset   • Coronary artery disease Mother    • Coronary artery disease Father          Social History     Socioeconomic History   • Marital status:      Spouse name: Not on file   • Number of children: Not on file   • Years of education: Not on file   • Highest " education level: Not on file   Tobacco Use   • Smoking status: Former Smoker     Packs/day: 0.00     Years: 20.00     Pack years: 0.00     Types: Cigarettes     Start date: 1987     Quit date: 10/12/2017     Years since quitting: 3.8   • Smokeless tobacco: Never Used   Substance and Sexual Activity   • Alcohol use: No   • Drug use: No   • Sexual activity: Not Currently         Current Outpatient Medications   Medication Sig Dispense Refill   • aspirin 81 MG chewable tablet Chew 81 mg daily.     • cholecalciferol (VITAMIN D3) 1000 units tablet Take 1,000 Units by mouth Daily. 5 capsules daily     • Dulaglutide 1.5 MG/0.5ML solution pen-injector Inject 1.5 mg under the skin into the appropriate area as directed 1 (One) Time Per Week. 4 pen 5   • Ertugliflozin L-PyroglutamicAc (STEGLATRO) 5 MG tablet Take 1 tablet by mouth Every Morning. 30 tablet 11   • FREESTYLE LITE test strip      • furosemide (LASIX) 20 MG tablet Take 20 mg by mouth Daily As Needed.     • glipizide (GLUCOTROL) 5 MG tablet 2 (two) times a day     • levothyroxine (SYNTHROID, LEVOTHROID) 175 MCG tablet Take 1 tablet by mouth Daily. 30 tablet 5   • lisinopril (PRINIVIL,ZESTRIL) 40 MG tablet Take 1 tablet by mouth Daily. 90 tablet 3   • nitroglycerin (NITROSTAT) 0.4 MG SL tablet 1 under the tongue as needed for angina, may repeat q5mins for up three doses 30 tablet 3   • Blood Glucose Monitoring Suppl (FREESTYLE LITE) device      • carvedilol (COREG) 6.25 MG tablet Take 1 tablet by mouth 2 (Two) Times a Day. 180 tablet 3   • clopidogrel (PLAVIX) 75 MG tablet Take 1 tablet by mouth Daily. 90 tablet 3   • Evolocumab (Repatha SureClick) solution auto-injector SureClick injection Inject 1 mL under the skin into the appropriate area as directed Every 14 (Fourteen) Days. 2 pen 6   • sertraline (ZOLOFT) 50 MG tablet 1 (one) time each day       No current facility-administered medications for this visit.         OBJECTIVE    BP (!) 156/110 (BP Location: Right  "arm, Patient Position: Sitting, Cuff Size: Adult)   Pulse 69   Temp 96 °F (35.6 °C)   Ht 167.6 cm (66\")   Wt 118 kg (260 lb 9.6 oz)   SpO2 95%   BMI 42.06 kg/m²       Review of Systems : The following systems are reviewed and no changes noted    Constitutional:  Denies recent weight loss, weight gain, fever or chills     HENT:  Denies any hearing loss, epistaxis, hoarseness, or difficulty speaking.     Eyes: Wears eyeglasses or contact lenses     Respiratory:  Denies dyspnea with exertion,no cough, wheezing, or hemoptysis.     Cardiovascular: Negative for palpations, chest pain, orthopnea, PND    Gastrointestinal:  Denies change in bowel habits, dyspepsia, ulcer disease, hematochezia, or melena.     Endocrine: Negative for cold intolerance, heat intolerance, polydipsia, polyphagia and polyuria.     Genitourinary: Negative.      Musculoskeletal: Denies any history of arthritic symptoms or back problems      Neurological:  Denies any history of recurrent headaches, strokes, TIA, or seizure disorder.     Hematological: Denies any food allergies, seasonal allergies, bleeding disorders, or lymphadenopathy.     Psychiatric/Behavioral: Denies any history of depression, substance abuse, or change in cognitive function.        Physical Exam : The following systems are reviewed and no changes noted     Constitutional: Cooperative, alert and oriented,  in no acute distress.      HENT:   Head: Normocephalic, normal hair patterns, no masses or tenderness.  Ears, Nose, and Throat: No gross abnormalities. No pallor or cyanosis.   Eyes: EOMS intact, PERRL, conjunctivae and lids unremarkable. Fundoscopic exam and visual fields not performed.   Neck: No palpable masses or adenopathy, no thyromegaly, no JVD, carotid pulses are full and equal bilaterally and without  Bruits.      Cardiovascular: Regular rhythm, S1 and S2 normal, no S3 or S4.  No murmurs, gallops, or rubs detected.      Pulmonary/Chest: Chest: normal symmetry, " normal respiratory excursion, no intercostal retraction, no use of accessory muscles.                                  Pulmonary: Normal breath sounds. No rales or ronchi.     Abdominal: Abdomen soft, bowel sounds normoactive, no masses, no hepatosplenomegaly, non-tender, no bruits.     Musculoskeletal: No deformities, clubbing, cyanosis, erythema, or edema observed.     Neurological: No gross motor or sensory deficits noted, affect appropriate, oriented to time, person, place.      Skin: Warm and dry to the touch, no apparent skin lesions or masses noted.     Psychiatric: She has a normal mood and affect. Her behavior is normal. Judgment and thought content normal.          Lab Results   Component Value Date    WBC 10.07 08/28/2020    HGB 13.9 09/14/2020    HCT 40.8 09/14/2020    MCV 90.5 08/28/2020     08/28/2020     Lab Results   Component Value Date    GLUCOSE 167 (H) 09/14/2020    BUN 11 09/14/2020    CREATININE 1.10 (H) 09/14/2020    EGFRIFNONA 53 (L) 09/14/2020    BCR 10.0 09/14/2020    CO2 25.5 09/14/2020    CALCIUM 8.8 09/14/2020    ALBUMIN 4.10 09/14/2020    AST 46 (H) 08/28/2020    ALT 35 (H) 08/28/2020     Lab Results   Component Value Date    CHOL 397 (H) 08/28/2020    CHOL 76 01/17/2019    CHOL 137 01/04/2019     Lab Results   Component Value Date    TRIG 106 08/28/2020    TRIG 63 01/17/2019    TRIG 53 01/04/2019     Lab Results   Component Value Date    HDL 87 (H) 08/28/2020    HDL 54 (L) 01/17/2019    HDL 49 (L) 01/04/2019     No components found for: LDLCALC  Lab Results   Component Value Date     (H) 08/28/2020    LDL <30 01/17/2019    LDL 77 01/04/2019     No results found for: HDLLDLRATIO  No components found for: CHOLHDL  Lab Results   Component Value Date    HGBA1C 6.60 (H) 09/14/2020     Lab Results   Component Value Date    .000 (H) 08/28/2020    G6HNUSL 83.1 11/27/2019           ASSESSMENT AND PLAN  Ms. Sutton has multiple medical issues as discussed in detail under  history of present illness with documented coronary artery disease status post CABG, uncontrolled hypertension, uncontrolled hyperlipidemia, hypothyroidism, diabetes mellitus.  I have increased the dose of carvedilol to 6.25 mg twice a day and lisinopril 40 mg daily has been continued.  Antiplatelet therapy with Plavix and aspirin and lipid-lowering therapy with Repatha has been continued.  I have once again stressed the importance of optimization of lipid profiles and control of blood pressure.    She will have lab work done for both her primary care physician and diabetologist within the next few days.  A copy of the results will be obtained for our records.    Diagnoses and all orders for this visit:    1. Essential hypertension (Primary)  -     ECG 12 Lead    2. S/P CABG (coronary artery bypass graft)    3. Carotid stenosis, asymptomatic, bilateral    4. Mixed hyperlipidemia    5. Coronary artery disease involving native coronary artery of native heart without angina pectoris    Other orders  -     lisinopril (PRINIVIL,ZESTRIL) 40 MG tablet; Take 1 tablet by mouth Daily.  Dispense: 90 tablet; Refill: 3  -     clopidogrel (PLAVIX) 75 MG tablet; Take 1 tablet by mouth Daily.  Dispense: 90 tablet; Refill: 3  -     carvedilol (COREG) 6.25 MG tablet; Take 1 tablet by mouth 2 (Two) Times a Day.  Dispense: 180 tablet; Refill: 3        Patient's Body mass index is 42.06 kg/m². BMI is above normal parameters. Recommendations include: exercise counseling and nutrition counseling.  Patient is a non-smoker    Mian Montana MD  8/9/2021  10:28 CDT

## 2021-08-10 LAB
QT INTERVAL: 428 MS
QTC INTERVAL: 458 MS

## 2021-10-11 ENCOUNTER — LAB (OUTPATIENT)
Dept: LAB | Facility: HOSPITAL | Age: 51
End: 2021-10-11

## 2021-10-11 DIAGNOSIS — E11.65 TYPE 2 DIABETES MELLITUS WITH HYPERGLYCEMIA, WITHOUT LONG-TERM CURRENT USE OF INSULIN (HCC): ICD-10-CM

## 2021-10-11 DIAGNOSIS — E89.0 POSTSURGICAL HYPOTHYROIDISM: ICD-10-CM

## 2021-10-11 DIAGNOSIS — E78.2 MIXED HYPERLIPIDEMIA: ICD-10-CM

## 2021-10-11 DIAGNOSIS — E55.9 VITAMIN D DEFICIENCY: ICD-10-CM

## 2021-10-11 DIAGNOSIS — I10 ESSENTIAL HYPERTENSION: ICD-10-CM

## 2021-10-11 LAB
25(OH)D3 SERPL-MCNC: 17.9 NG/ML
ALBUMIN SERPL-MCNC: 4.2 G/DL (ref 3.5–5.2)
ALBUMIN UR-MCNC: 1.6 MG/DL
ALBUMIN/GLOB SERPL: 1.3 G/DL
ALP SERPL-CCNC: 115 U/L (ref 39–117)
ALT SERPL W P-5'-P-CCNC: 44 U/L (ref 1–33)
ANION GAP SERPL CALCULATED.3IONS-SCNC: 11.7 MMOL/L (ref 5–15)
AST SERPL-CCNC: 34 U/L (ref 1–32)
BASOPHILS # BLD AUTO: 0.06 10*3/MM3 (ref 0–0.2)
BASOPHILS NFR BLD AUTO: 0.6 % (ref 0–1.5)
BILIRUB SERPL-MCNC: 0.3 MG/DL (ref 0–1.2)
BUN SERPL-MCNC: 12 MG/DL (ref 6–20)
BUN/CREAT SERPL: 9.9 (ref 7–25)
CALCIUM SPEC-SCNC: 8.8 MG/DL (ref 8.6–10.5)
CHLORIDE SERPL-SCNC: 97 MMOL/L (ref 98–107)
CHOLEST SERPL-MCNC: 288 MG/DL (ref 0–200)
CO2 SERPL-SCNC: 29.3 MMOL/L (ref 22–29)
CREAT SERPL-MCNC: 1.21 MG/DL (ref 0.57–1)
CREAT UR-MCNC: 88.7 MG/DL
CREAT UR-MCNC: 88.7 MG/DL
DEPRECATED RDW RBC AUTO: 39 FL (ref 37–54)
EOSINOPHIL # BLD AUTO: 0.19 10*3/MM3 (ref 0–0.4)
EOSINOPHIL NFR BLD AUTO: 1.9 % (ref 0.3–6.2)
ERYTHROCYTE [DISTWIDTH] IN BLOOD BY AUTOMATED COUNT: 11.9 % (ref 12.3–15.4)
GFR SERPL CREATININE-BSD FRML MDRD: 47 ML/MIN/1.73
GLOBULIN UR ELPH-MCNC: 3.3 GM/DL
GLUCOSE SERPL-MCNC: 378 MG/DL (ref 65–99)
HBA1C MFR BLD: 10.8 % (ref 4.8–5.6)
HCT VFR BLD AUTO: 44.6 % (ref 34–46.6)
HDLC SERPL-MCNC: 77 MG/DL (ref 40–60)
HGB BLD-MCNC: 15 G/DL (ref 12–15.9)
IMM GRANULOCYTES # BLD AUTO: 0.1 10*3/MM3 (ref 0–0.05)
IMM GRANULOCYTES NFR BLD AUTO: 1 % (ref 0–0.5)
LDLC SERPL CALC-MCNC: 188 MG/DL (ref 0–100)
LDLC/HDLC SERPL: 2.39 {RATIO}
LYMPHOCYTES # BLD AUTO: 2.77 10*3/MM3 (ref 0.7–3.1)
LYMPHOCYTES NFR BLD AUTO: 27.6 % (ref 19.6–45.3)
MCH RBC QN AUTO: 30.2 PG (ref 26.6–33)
MCHC RBC AUTO-ENTMCNC: 33.6 G/DL (ref 31.5–35.7)
MCV RBC AUTO: 89.7 FL (ref 79–97)
MICROALBUMIN/CREAT UR: 18 MG/G
MONOCYTES # BLD AUTO: 0.61 10*3/MM3 (ref 0.1–0.9)
MONOCYTES NFR BLD AUTO: 6.1 % (ref 5–12)
NEUTROPHILS NFR BLD AUTO: 6.29 10*3/MM3 (ref 1.7–7)
NEUTROPHILS NFR BLD AUTO: 62.8 % (ref 42.7–76)
NRBC BLD AUTO-RTO: 0 /100 WBC (ref 0–0.2)
PLATELET # BLD AUTO: 246 10*3/MM3 (ref 140–450)
PMV BLD AUTO: 11.2 FL (ref 6–12)
POTASSIUM SERPL-SCNC: 4.2 MMOL/L (ref 3.5–5.2)
PROT SERPL-MCNC: 7.5 G/DL (ref 6–8.5)
PROT UR-MCNC: 9 MG/DL
PROT/CREAT UR: 101.5 MG/G CREA (ref 0–200)
RBC # BLD AUTO: 4.97 10*6/MM3 (ref 3.77–5.28)
SODIUM SERPL-SCNC: 138 MMOL/L (ref 136–145)
TRIGL SERPL-MCNC: 133 MG/DL (ref 0–150)
TSH SERPL DL<=0.05 MIU/L-ACNC: 84.7 UIU/ML (ref 0.27–4.2)
VIT B12 BLD-MCNC: 548 PG/ML (ref 211–946)
VLDLC SERPL-MCNC: 23 MG/DL (ref 5–40)
WBC # BLD AUTO: 10.02 10*3/MM3 (ref 3.4–10.8)

## 2021-10-11 PROCEDURE — 82570 ASSAY OF URINE CREATININE: CPT

## 2021-10-11 PROCEDURE — 82043 UR ALBUMIN QUANTITATIVE: CPT

## 2021-10-11 PROCEDURE — 83036 HEMOGLOBIN GLYCOSYLATED A1C: CPT

## 2021-10-11 PROCEDURE — 80050 GENERAL HEALTH PANEL: CPT

## 2021-10-11 PROCEDURE — 82607 VITAMIN B-12: CPT

## 2021-10-11 PROCEDURE — 80061 LIPID PANEL: CPT

## 2021-10-11 PROCEDURE — 84156 ASSAY OF PROTEIN URINE: CPT

## 2021-10-11 PROCEDURE — 82306 VITAMIN D 25 HYDROXY: CPT

## 2021-10-14 ENCOUNTER — OFFICE VISIT (OUTPATIENT)
Dept: NEUROSURGERY | Facility: CLINIC | Age: 51
End: 2021-10-14

## 2021-10-14 VITALS — HEIGHT: 66 IN | WEIGHT: 249.6 LBS | BODY MASS INDEX: 40.11 KG/M2

## 2021-10-14 DIAGNOSIS — M54.59 MECHANICAL LOW BACK PAIN: Primary | ICD-10-CM

## 2021-10-14 DIAGNOSIS — E66.01 CLASS 3 SEVERE OBESITY DUE TO EXCESS CALORIES WITH BODY MASS INDEX (BMI) OF 40.0 TO 44.9 IN ADULT, UNSPECIFIED WHETHER SERIOUS COMORBIDITY PRESENT (HCC): ICD-10-CM

## 2021-10-14 DIAGNOSIS — Z87.891 HISTORY OF TOBACCO ABUSE: ICD-10-CM

## 2021-10-14 PROCEDURE — 99214 OFFICE O/P EST MOD 30 MIN: CPT | Performed by: NURSE PRACTITIONER

## 2021-10-14 NOTE — PATIENT INSTRUCTIONS
"https://www.nhlbi.nih.gov/files/docs/public/heart/dash_brief.pdf\">   DASH Eating Plan  DASH stands for Dietary Approaches to Stop Hypertension. The DASH eating plan is a healthy eating plan that has been shown to:  · Reduce high blood pressure (hypertension).  · Reduce your risk for type 2 diabetes, heart disease, and stroke.  · Help with weight loss.  What are tips for following this plan?  Reading food labels  · Check food labels for the amount of salt (sodium) per serving. Choose foods with less than 5 percent of the Daily Value of sodium. Generally, foods with less than 300 milligrams (mg) of sodium per serving fit into this eating plan.  · To find whole grains, look for the word \"whole\" as the first word in the ingredient list.  Shopping  · Buy products labeled as \"low-sodium\" or \"no salt added.\"  · Buy fresh foods. Avoid canned foods and pre-made or frozen meals.  Cooking  · Avoid adding salt when cooking. Use salt-free seasonings or herbs instead of table salt or sea salt. Check with your health care provider or pharmacist before using salt substitutes.  · Do not juan foods. Cook foods using healthy methods such as baking, boiling, grilling, roasting, and broiling instead.  · Cook with heart-healthy oils, such as olive, canola, avocado, soybean, or sunflower oil.  Meal planning    · Eat a balanced diet that includes:  ? 4 or more servings of fruits and 4 or more servings of vegetables each day. Try to fill one-half of your plate with fruits and vegetables.  ? 6-8 servings of whole grains each day.  ? Less than 6 oz (170 g) of lean meat, poultry, or fish each day. A 3-oz (85-g) serving of meat is about the same size as a deck of cards. One egg equals 1 oz (28 g).  ? 2-3 servings of low-fat dairy each day. One serving is 1 cup (237 mL).  ? 1 serving of nuts, seeds, or beans 5 times each week.  ? 2-3 servings of heart-healthy fats. Healthy fats called omega-3 fatty acids are found in foods such as walnuts, " flaxseeds, fortified milks, and eggs. These fats are also found in cold-water fish, such as sardines, salmon, and mackerel.  · Limit how much you eat of:  ? Canned or prepackaged foods.  ? Food that is high in trans fat, such as some fried foods.  ? Food that is high in saturated fat, such as fatty meat.  ? Desserts and other sweets, sugary drinks, and other foods with added sugar.  ? Full-fat dairy products.  · Do not salt foods before eating.  · Do not eat more than 4 egg yolks a week.  · Try to eat at least 2 vegetarian meals a week.  · Eat more home-cooked food and less restaurant, buffet, and fast food.    Lifestyle  · When eating at a restaurant, ask that your food be prepared with less salt or no salt, if possible.  · If you drink alcohol:  ? Limit how much you use to:  § 0-1 drink a day for women who are not pregnant.  § 0-2 drinks a day for men.  ? Be aware of how much alcohol is in your drink. In the [place] one drink equals one 12 oz bottle of beer (355 mL), one 5 oz glass of wine (148 mL), or one 1½ oz glass of hard liquor (44 mL).  General information  · Avoid eating more than 2,300 mg of salt a day. If you have hypertension, you may need to reduce your sodium intake to 1,500 mg a day.  · Work with your health care provider to maintain a healthy body weight or to lose weight. Ask what an ideal weight is for you.  · Get at least 30 minutes of exercise that causes your heart to beat faster (aerobic exercise) most days of the week. Activities may include walking, swimming, or biking.  · Work with your health care provider or dietitian to adjust your eating plan to your individual calorie needs.  What foods should I eat?  Fruits  All fresh, dried, or frozen fruit. Canned fruit in natural juice (without added sugar).  Vegetables  Fresh or frozen vegetables (raw, steamed, roasted, or grilled). Low-sodium or reduced-sodium tomato and vegetable juice. Low-sodium or reduced-sodium tomato sauce and tomato paste.  Low-sodium or reduced-sodium canned vegetables.  Grains  Whole-grain or whole-wheat bread. Whole-grain or whole-wheat pasta. Brown rice. Oatmeal. Quinoa. Bulgur. Whole-grain and low-sodium cereals. Darling bread. Low-fat, low-sodium crackers. Whole-wheat flour tortillas.  Meats and other proteins  Skinless chicken or turkey. Ground chicken or turkey. Pork with fat trimmed off. Fish and seafood. Egg whites. Dried beans, peas, or lentils. Unsalted nuts, nut butters, and seeds. Unsalted canned beans. Lean cuts of beef with fat trimmed off. Low-sodium, lean precooked or cured meat, such as sausages or meat loaves.  Dairy  Low-fat (1%) or fat-free (skim) milk. Reduced-fat, low-fat, or fat-free cheeses. Nonfat, low-sodium ricotta or cottage cheese. Low-fat or nonfat yogurt. Low-fat, low-sodium cheese.  Fats and oils  Soft margarine without trans fats. Vegetable oil. Reduced-fat, low-fat, or light mayonnaise and salad dressings (reduced-sodium). Canola, safflower, olive, avocado, soybean, and sunflower oils. Avocado.  Seasonings and condiments  Herbs. Spices. Seasoning mixes without salt.  Other foods  Unsalted popcorn and pretzels. Fat-free sweets.  The items listed above may not be a complete list of foods and beverages you can eat. Contact a dietitian for more information.  What foods should I avoid?  Fruits  Canned fruit in a light or heavy syrup. Fried fruit. Fruit in cream or butter sauce.  Vegetables  Creamed or fried vegetables. Vegetables in a cheese sauce. Regular canned vegetables (not low-sodium or reduced-sodium). Regular canned tomato sauce and paste (not low-sodium or reduced-sodium). Regular tomato and vegetable juice (not low-sodium or reduced-sodium). Pickles. Olives.  Grains  Baked goods made with fat, such as croissants, muffins, or some breads. Dry pasta or rice meal packs.  Meats and other proteins  Fatty cuts of meat. Ribs. Fried meat. Payton. Bologna, salami, and other precooked or cured meats, such as  sausages or meat loaves. Fat from the back of a pig (fatback). Bratwurst. Salted nuts and seeds. Canned beans with added salt. Canned or smoked fish. Whole eggs or egg yolks. Chicken or turkey with skin.  Dairy  Whole or 2% milk, cream, and half-and-half. Whole or full-fat cream cheese. Whole-fat or sweetened yogurt. Full-fat cheese. Nondairy creamers. Whipped toppings. Processed cheese and cheese spreads.  Fats and oils  Butter. Stick margarine. Lard. Shortening. Ghee. Payton fat. Tropical oils, such as coconut, palm kernel, or palm oil.  Seasonings and condiments  Onion salt, garlic salt, seasoned salt, table salt, and sea salt. Worcestershire sauce. Tartar sauce. Barbecue sauce. Teriyaki sauce. Soy sauce, including reduced-sodium. Steak sauce. Canned and packaged gravies. Fish sauce. Oyster sauce. Cocktail sauce. Store-bought horseradish. Ketchup. Mustard. Meat flavorings and tenderizers. Bouillon cubes. Hot sauces. Pre-made or packaged marinades. Pre-made or packaged taco seasonings. Relishes. Regular salad dressings.  Other foods  Salted popcorn and pretzels.  The items listed above may not be a complete list of foods and beverages you should avoid. Contact a dietitian for more information.  Where to find more information  · National Heart, Lung, and Blood [place]: www.nhlbi.nih.gov  · American Heart Association: www.heart.org  · Academy of Nutrition and Dietetics: www.eatright.org  · National Kidney Foundation: www.kidney.org  Summary  · The DASH eating plan is a healthy eating plan that has been shown to reduce high blood pressure (hypertension). It may also reduce your risk for type 2 diabetes, heart disease, and stroke.  · When on the DASH eating plan, aim to eat more fresh fruits and vegetables, whole grains, lean proteins, low-fat dairy, and heart-healthy fats.  · With the DASH eating plan, you should limit salt (sodium) intake to 2,300 mg a day. If you have hypertension, you may need to reduce your  sodium intake to 1,500 mg a day.  · Work with your health care provider or dietitian to adjust your eating plan to your individual calorie needs.  This information is not intended to replace advice given to you by your health care provider. Make sure you discuss any questions you have with your health care provider.  Document Revised: 11/20/2020 Document Reviewed: 11/20/2020  [employer] Patient Education © 2021 [employer]      Tobacco Use Disorder  Tobacco use disorder (TUD) occurs when a person craves, seeks, and uses tobacco, regardless of the consequences. This disorder can cause problems with mental and physical health. It can affect your ability to have healthy relationships, and it can keep you from meeting your responsibilities at work, home, or school.  Tobacco may be:  · Smoked as a cigarette or cigar.  · Inhaled using e-cigarettes.  · Smoked in a pipe or hookah.  · Chewed as smokeless tobacco.  · Inhaled into the nostrils as snuff.  Tobacco products contain a dangerous chemical called nicotine, which is very addictive. Nicotine triggers hormones that make the body feel stimulated and works on areas of the brain that make you feel good. These effects can make it hard for people to quit nicotine.  Tobacco contains many other unsafe chemicals that can damage almost every organ in the body. Smoking tobacco also puts others in danger due to fire risk and possible health problems caused by breathing in secondhand smoke.  What are the signs or symptoms?  Symptoms of TUD may include:  · Being unable to slow down or stop your tobacco use.  · Spending an abnormal amount of time getting or using tobacco.  · Craving tobacco. Cravings may last for up to 6 months after quitting.  · Tobacco use that:  ? Interferes with your work, school, or home life.  ? Interferes with your personal and social relationships.  ? Makes you give up activities that you once enjoyed or found important.  · Using tobacco even though you know  that it is:  ? Dangerous or bad for your health or someone else's health.  ? Causing problems in your life.  · Needing more and more of the substance to get the same effect (developing tolerance).  · Experiencing unpleasant symptoms if you do not use the substance (withdrawal). Withdrawal symptoms may include:  ? Depressed, anxious, or irritable mood.  ? Difficulty concentrating.  ? Increased appetite.  ? Restlessness or trouble sleeping.  · Using the substance to avoid withdrawal.  How is this diagnosed?  This condition may be diagnosed based on:  · Your current and past tobacco use. Your health care provider may ask questions about how your tobacco use affects your life.  · A physical exam.  You may be diagnosed with TUD if you have at least two symptoms within a 12-month period.  How is this treated?  This condition is treated by stopping tobacco use. Many people are unable to quit on their own and need help. Treatment may include:  · Nicotine replacement therapy (NRT). NRT provides nicotine without the other harmful chemicals in tobacco. NRT gradually lowers the dosage of nicotine in the body and reduces withdrawal symptoms. NRT is available as:  ? Over-the-counter gums, lozenges, and skin patches.  ? Prescription mouth inhalers and nasal sprays.  · Medicine that acts on the brain to reduce cravings and withdrawal symptoms.  · A type of talk therapy that examines your triggers for tobacco use, how to avoid them, and how to cope with cravings (behavioral therapy).  · Hypnosis. This may help with withdrawal symptoms.  · Joining a support group for others coping with TUD.  The best treatment for TUD is usually a combination of medicine, talk therapy, and support groups. Recovery can be a long process. Many people start using tobacco again after stopping (relapse). If you relapse, it does not mean that treatment will not work.  Follow these instructions at home:    Lifestyle  · Do not use any products that contain  nicotine or tobacco, such as cigarettes and e-cigarettes.  · Avoid things that trigger tobacco use as much as you can. Triggers include people and situations that usually cause you to use tobacco.  · Avoid drinks that contain caffeine, including coffee. These may worsen some withdrawal symptoms.  · Find ways to manage stress. Wanting to smoke may cause stress, and stress can make you want to smoke. Relaxation techniques such as deep breathing, meditation, and yoga may help.  · Attend support groups as needed. These groups are an important part of long-term recovery for many people.  General instructions  · Take over-the-counter and prescription medicines only as told by your health care provider.  · Check with your health care provider before taking any new prescription or over-the-counter medicines.  · Decide on a friend, family member, or smoking quit-line (such as 1-800-QUIT-NOW in the [place] that you can call or text when you feel the urge to smoke or when you need help coping with cravings.  · Keep all follow-up visits as told by your health care provider and therapist. This is important.  Contact a health care provider if:  · You are not able to take your medicines as prescribed.  · Your symptoms get worse, even with treatment.  Summary  · Tobacco use disorder (TUD) occurs when a person craves, seeks, and uses tobacco regardless of the consequences.  · This condition may be diagnosed based on your current and past tobacco use and a physical exam.  · Many people are unable to quit on their own and need help. Recovery can be a long process.  · The most effective treatment for TUD is usually a combination of medicine, talk therapy, and support groups.  This information is not intended to replace advice given to you by your health care provider. Make sure you discuss any questions you have with your health care provider.  Document Revised: 12/05/2018 Document Reviewed: 12/05/2018  [employer] Patient Education © 2021  [employer]

## 2021-10-14 NOTE — PROGRESS NOTES
Primary Care Provider: Anderson Abbott MD  Requesting Provider: Anderson Abbott, *    Chief Complaint:   Chief Complaint   Patient presents with   • Back Pain     Pt is here for back pain and bilateral leg pain.       History of Present Illness  Consultation today at the request of Anderson Abbott MD    HISTORY/ HPI:  Rhea Sutton is a 50 y.o.  female who present today with complaints of lumbar back and bilateral leg pain, 80% back, 20% legs.  No previous spine surgeries.  Previously evaluated by Dr. Lincoln out of [place].  No surgical intervention performed.    Gradual onset of lumbar back pain over the past 6 years has progressively worsened over the past 1.5 years.  She currently complains of constant lower lumbar back pain that waxes and wanes in severity.  She additionally reports intermittent radicular pain to the bilateral posterior thighs that extends to the mid leg, as well as intermittent numbness and tingling in the same attributions.  Her back discomfort worsens with prolonged sitting, standing, walking, and physical activity.  Alleviating factors include lying in a fetal position.  She denies gait or balance abnormalities, need for assist while ambulating, or falls.  She additionally denies fevers, chills, night sweats, unexplained weight loss, saddle anesthesia, or bowel bladder dysfunction.  She currently rates the severity of her symptoms 5/10.  No additional concerns at this time.    Ms. Sutton has not recently completed nor participated in a dedicated course of physician directed physical therapy, massage care, nor chiropractic care.  Has trialed both epidural and lumbar facet injections with Dr. Grimes out of [place] with no relief in her discomfort.    Oswestry Disability Index = 64%   Score   Pain Intensity Fairly severe pain-3   Personal Care I can look after myself but it is slow and painful-2   Lifting Medium weights off a table-3   Walking  Pain prevents > 1/4 mile-2   Sitting Pain prevents sitting > 30 min-3   Standing Pain limits standing to < 10 min-4   Sleeping Can only sleep < 4 hrs-3   Sex Life (if applicable) Pain prevents any sex-6   Social Life I do not go out as often because of pain-3   Traveling Pain restricts to < 1 hr-3   (Zaria et al, 1980)    SCORE INTERPRETATION OF THE OSWESTRY LBP DISABILITY QUESTIONNAIRE     60-80% Crippled Back pain impinges on all aspects of these patients' lives both at home and at work. Positive intervention is required.     ROS:  Review of Systems   Constitutional: Positive for appetite change.   HENT: Positive for sinus pressure and sneezing.    Eyes: Negative.    Respiratory: Positive for cough.    Cardiovascular: Positive for leg swelling.   Gastrointestinal: Negative.    Endocrine: Negative.    Genitourinary: Negative.    Musculoskeletal: Positive for back pain and neck pain.   Skin: Negative.    Allergic/Immunologic: Positive for food allergies.   Neurological: Negative.    Hematological: Bruises/bleeds easily.   Psychiatric/Behavioral: Positive for sleep disturbance.   All other systems reviewed and are negative.    Past Medical History:   Diagnosis Date   • Allergic    • Anxiety    • CAD (coronary artery disease)    • CAD (coronary artery disease), bypass graft transplanted heart 03/05/2019    x4 bypass   • CHF (congestive heart failure) ([facility])    • Chronic back pain    • Chronic bronchitis (HCC)    • Chronic kidney disease    • Depression    • Diabetes mellitus (HCC)    • Diverticulitis 1986 2017   • Goiter    • H/O heart artery stent 01/03/2019   • Hashimoto's thyroiditis    • Head lice infestation    • Hyperlipidemia    • Hypertension    • Injury of back    • MI (myocardial infarction) (HCC)    • MI (myocardial infarction) (HCC)    • Postoperative hypothyroidism    • Sinus congestion    • Sleep apnea     not using c-pap     Past Surgical History:   Procedure Laterality Date   • APPENDECTOMY     •  CARDIAC CATHETERIZATION N/A 10/13/2017   • CARDIAC CATHETERIZATION N/A 1/3/2019   • CHOLECYSTECTOMY     • COLON RESECTION     • COLON SURGERY     • CORONARY ARTERY BYPASS GRAFT N/A 3/5/2019    Procedure: CORONARY ARTERY BYPASS GRAFTING ENDOSCOPIC VEIN HARVEST          (CELL SAVER)             LATEX ALLERGY leg start @ 1323 chest start @ 1328 vein out 1417 vein prep [id number] on pump 1456 off pump 1729;  Surgeon: Jaren Vilchis MD;  Location: [facility];  Service: Cardiothoracic   • CORONARY STENT PLACEMENT     • AK RT/LT HEART CATHETERS N/A 10/13/2017   • TOTAL ABDOMINAL HYSTERECTOMY WITH SALPINGO OOPHORECTOMY     • TOTAL THYROIDECTOMY       Family History: family history includes Coronary artery disease in her father and mother.    Social History:  reports that she quit smoking about 4 years ago. Her smoking use included cigarettes. She started smoking about 34 years ago. She smoked 0.00 packs per day for 20.00 years. She has never used smokeless tobacco. She reports that she does not drink alcohol and does not use drugs.    Medications:    Current Outpatient Medications:   •  aspirin 81 MG chewable tablet, Chew 81 mg daily., Disp: , Rfl:   •  Blood Glucose Monitoring Suppl (FREESTYLE LITE) device, , Disp: , Rfl:   •  carvedilol (COREG) 6.25 MG tablet, Take 1 tablet by mouth 2 (Two) Times a Day., Disp: 180 tablet, Rfl: 3  •  cholecalciferol (VITAMIN D3) 1000 units tablet, Take 1,000 Units by mouth Daily. 5 capsules daily, Disp: , Rfl:   •  clopidogrel (PLAVIX) 75 MG tablet, Take 1 tablet by mouth Daily., Disp: 90 tablet, Rfl: 3  •  Dulaglutide 1.5 MG/0.5ML solution pen-injector, Inject 1.5 mg under the skin into the appropriate area as directed 1 (One) Time Per Week., Disp: 4 pen, Rfl: 5  •  Ertugliflozin L-PyroglutamicAc (STEGLATRO) 5 MG tablet, Take 1 tablet by mouth Every Morning., Disp: 30 tablet, Rfl: 11  •  Evolocumab (Repatha SureClick) solution auto-injector SureClick injection, Inject 1 mL under  "the skin into the appropriate area as directed Every 14 (Fourteen) Days., Disp: 2 pen, Rfl: 6  •  FREESTYLE LITE test strip, , Disp: , Rfl:   •  furosemide (LASIX) 20 MG tablet, Take 20 mg by mouth Daily As Needed., Disp: , Rfl:   •  glipizide (GLUCOTROL) 5 MG tablet, 2 (two) times a day, Disp: , Rfl:   •  levothyroxine (SYNTHROID, LEVOTHROID) 175 MCG tablet, Take 1 tablet by mouth Daily., Disp: 30 tablet, Rfl: 5  •  lisinopril (PRINIVIL,ZESTRIL) 40 MG tablet, Take 1 tablet by mouth Daily., Disp: 90 tablet, Rfl: 3  •  nitroglycerin (NITROSTAT) 0.4 MG SL tablet, 1 under the tongue as needed for angina, may repeat q5mins for up three doses, Disp: 30 tablet, Rfl: 3  •  sertraline (ZOLOFT) 50 MG tablet, 1 (one) time each day, Disp: , Rfl:     Allergies:  Coconut, Penicillins, Adhesive tape, Ciprofloxacin, and Latex    OBJECTIVE:  Objective   Ht 167.6 cm (66\") Comment: pt reports  Wt 113 kg (249 lb 9.6 oz)   Breastfeeding No   BMI 40.29 kg/m²   Physical Exam  Vitals and nursing note reviewed.   Constitutional:       General: She is not in acute distress.     Appearance: Normal appearance. She is well-developed and well-groomed. She is morbidly obese. She is not ill-appearing, toxic-appearing or diaphoretic.      Comments: BMI 40.29   HENT:      Head: Normocephalic and atraumatic.      Right Ear: Hearing normal.      Left Ear: Hearing normal.   Eyes:      Extraocular Movements: EOM normal.      Conjunctiva/sclera: Conjunctivae normal.      Pupils: Pupils are equal, round, and reactive to light.   Neck:      Trachea: Trachea normal.   Cardiovascular:      Rate and Rhythm: Normal rate and regular rhythm.   Pulmonary:      Effort: Pulmonary effort is normal. No tachypnea, bradypnea, accessory muscle usage or respiratory distress.   Abdominal:      Palpations: Abdomen is soft.   Musculoskeletal:      Cervical back: Full passive range of motion without pain and neck supple.   Skin:     General: Skin is warm and dry. "   Neurological:      Mental Status: She is alert and oriented to person, place, and time.      GCS: GCS eye subscore is 4. GCS verbal subscore is 5. GCS motor subscore is 6.      Gait: Gait is intact.      Deep Tendon Reflexes:      Reflex Scores:       Tricep reflexes are 1+ on the right side and 1+ on the left side.       Bicep reflexes are 1+ on the right side and 1+ on the left side.       Brachioradialis reflexes are 1+ on the right side and 1+ on the left side.       Patellar reflexes are 1+ on the right side and 1+ on the left side.       Achilles reflexes are 1+ on the right side and 1+ on the left side.  Psychiatric:         Speech: Speech normal.         Behavior: Behavior normal. Behavior is cooperative.       Neurologic Exam     Mental Status   Oriented to person, place, and time.   Attention: normal. Concentration: normal.   Speech: speech is normal   Level of consciousness: alert    Cranial Nerves     CN II   Visual fields full to confrontation.     CN III, IV, VI   Pupils are equal, round, and reactive to light.  Extraocular motions are normal.     CN V   Facial sensation intact.     CN VII   Facial expression full, symmetric.     CN VIII   CN VIII normal.     CN IX, X   CN IX normal.     CN XI   CN XI normal.     Motor Exam   Right arm tone: normal  Left arm tone: normal  Right leg tone: normal  Left leg tone: normal    Strength   Right deltoid: 5/5  Left deltoid: 5/5  Right biceps: 5/5  Left biceps: 5/5  Right triceps: 5/5  Left triceps: 5/5  Right wrist extension: 5/5  Left wrist extension: 5/5  Right iliopsoas: 5/5  Left iliopsoas: 5/5  Right quadriceps: 5/5  Left quadriceps: 5/5  Right anterior tibial: 5/5  Left anterior tibial: 5/5  Right gastroc: 5/5  Left gastroc: 5/5  Right EHL 5/5  Left EHL 5/5       Sensory Exam   Right arm light touch: normal  Left arm light touch: normal  Right leg light touch: normal  Left leg light touch: normal    Gait, Coordination, and Reflexes     Gait  Gait:  normal    Tremor   Resting tremor: absent  Intention tremor: absent  Action tremor: absent    Reflexes   Right brachioradialis: 1+  Left brachioradialis: 1+  Right biceps: 1+  Left biceps: 1+  Right triceps: 1+  Left triceps: 1+  Right patellar: 1+  Left patellar: 1+  Right achilles: 1+  Left achilles: 1+  Right : 4+  Left : 4+  Right plantar: normal  Left plantar: normal  Right Carranza: absent  Left Carranza: absent  Right ankle clonus: absent  Left ankle clonus: absent  Right pendular knee jerk: absent  Left pendular knee jerk: absent    Imaging: (independent review and interpretation)  9/13/2021      ASSESSMENT/ PLAN:  Rhea Sutton is a 50 y.o. female with a significant medical history of CAD, MI, CHF, CABG currently on Plavix and aspirin, vitamin D deficiency, hypertension, hyperlipidemia, CKD, diabetes, depression, anxiety, hypothyroidism, sleep apnea, former smoker, and morbid obesity.  OTTO: 64.  She presents today with complaint of mechanical low back pain.  Physical exam findings of gross hyporeflexia, otherwise neurologically intact.  Her imaging shows multilevel degenerative changes that appear worse at L5-S1.    TREATMENT RECOMMENDATIONS ...  Mechanical Low Back Pain:    Defined back pain as worsened with sitting and standing and nearly relieved with rest.  This can include referred pain radiating down posterior thighs without descent below the knees.     DDX:  spondylolisthesis with mechanical instability  compression fracture  degenerative facet arthropathy  coronal or sagittal spinal malalignment  failed back syndrome after surgery  flat back syndrome.   MUCH LESS LIKELY  myofascial pain  drug induced myalgias (statins, Neulasta, or phosphodiesterase type V inhibitors such as tadalafil).  Infectious discitis, vertebral osteomyelitis, spinal epidural abscess,   Neoplastic: spinal tumor (intradural or extradural), multiple myeloma, sacroiliitis  Degenerative spine: lumbar stenosis    Spondyloarthropathies including ankylosis spondylitis (HLA-B27), Paget's disease.  Fibromyalgia or other rheumatological disease. Malingering, conversion disorder and secondary gain are diagnoses of exclusion.    As a means of first-line conservative management for Lumbar back pain, we will send Rhea for a dedicated course of physician directed physical therapy; Rx provided.  I additionally recommended chiropractic and/or massage care as tolerated.  Unless contraindicated, Tylenol and ibuprofen or naproxen PRN per package instructions for pain, or may continue current pain medications as previously prescribed by outside clinician.  B/R/AE and use discussed.  We will have her return for reassessment with me after completion of physical therapy.  I advised the patient to call to return sooner for new or worsening complaints of weakness, paresthesias, gait disturbances, or any additional concerns.  Treatment options discussed in detail with Rhea and she voiced understanding.  Ms. Sutton agrees with this plan of care.    Obese Class III extreme obesity: > or equal to 40kg/m2  Body mass index is 40.29 kg/m².  Information on the DASH diet provided in the AVS.  We will continue to provided diet and exercise information with the goal of weight loss at each scheduled appointment.     Diagnoses and all orders for this visit:    1. Mechanical low back pain (Primary)  -     Ambulatory Referral to Physical Therapy Evaluate and treat (2 to 3 times weekly for 6 weeks)    2. Class 3 severe obesity due to excess calories with body mass index (BMI) of 40.0 to 44.9 in adult, unspecified whether serious comorbidity present (HCC)    3. History of tobacco abuse      Return in about 7 weeks (around 12/2/2021) for FOLLOW WITH WITH THEO AFTER COMPLETION OF PT.    Thank you for this Consultation and the opportunity to participate in Rhea's care.    Sincerely,  Theo Rivas, DUC    Level of Risk: Moderate due to: undiagnosed new  problem  MDM: Moderate  (Mod = 21827, High = 40254)

## 2021-10-15 ENCOUNTER — OFFICE VISIT (OUTPATIENT)
Dept: ENDOCRINOLOGY | Facility: CLINIC | Age: 51
End: 2021-10-15

## 2021-10-15 VITALS
DIASTOLIC BLOOD PRESSURE: 112 MMHG | BODY MASS INDEX: 39.68 KG/M2 | HEIGHT: 66 IN | OXYGEN SATURATION: 97 % | SYSTOLIC BLOOD PRESSURE: 158 MMHG | HEART RATE: 82 BPM | WEIGHT: 246.9 LBS

## 2021-10-15 DIAGNOSIS — I10 PRIMARY HYPERTENSION: ICD-10-CM

## 2021-10-15 DIAGNOSIS — E55.9 VITAMIN D DEFICIENCY: ICD-10-CM

## 2021-10-15 DIAGNOSIS — E78.2 MIXED HYPERLIPIDEMIA: ICD-10-CM

## 2021-10-15 DIAGNOSIS — E11.65 TYPE 2 DIABETES MELLITUS WITH HYPERGLYCEMIA, WITHOUT LONG-TERM CURRENT USE OF INSULIN (HCC): ICD-10-CM

## 2021-10-15 DIAGNOSIS — E89.0 POSTSURGICAL HYPOTHYROIDISM: Primary | ICD-10-CM

## 2021-10-15 PROCEDURE — 99214 OFFICE O/P EST MOD 30 MIN: CPT | Performed by: NURSE PRACTITIONER

## 2021-10-15 RX ORDER — DULAGLUTIDE 3 MG/.5ML
3 INJECTION, SOLUTION SUBCUTANEOUS WEEKLY
Qty: 4 PEN | Refills: 11 | Status: SHIPPED | OUTPATIENT
Start: 2021-10-15 | End: 2022-01-19 | Stop reason: SDUPTHER

## 2021-10-15 RX ORDER — ATORVASTATIN CALCIUM 40 MG/1
40 TABLET, FILM COATED ORAL DAILY
Qty: 30 TABLET | Refills: 11 | Status: SHIPPED | OUTPATIENT
Start: 2021-10-15 | End: 2022-09-03 | Stop reason: SDUPTHER

## 2021-10-15 RX ORDER — LEVOTHYROXINE SODIUM 0.2 MG/1
200 TABLET ORAL DAILY
Qty: 30 TABLET | Refills: 11 | Status: SHIPPED | OUTPATIENT
Start: 2021-10-15 | End: 2022-01-19 | Stop reason: SDUPTHER

## 2021-10-15 NOTE — PROGRESS NOTES
"Chief Complaint  Hypothyroidism    Subjective          Rhea Sutton presents to [facility] END[facility]  History of Present Illness     In office visit    50 year old female presents for follow up     Has not been seen in one year       postoperative hypothyroidism     Total thyroidectomy 2015 due to obstructive goiter       Timing constant         Quality not controlled    Severity moderate    Aggravating factors -- missing medication      Lab Results   Component Value Date    TSH 84.700 (H) 10/11/2021                   Type 2 Diabetes        Duration since 2017     Quality not controlled       Severity high      Complications include CAD      CABG times 4 March 2019    Microvascular complications neuropathy       Has been eating macaroni and cheese for 3 months she states            Lab Results   Component Value Date    HGBA1C 10.80 (H) 10/11/2021          Review of Systems - General ROS: negative               Objective   Vital Signs:   BP (!) 158/112   Pulse 82   Ht 167.6 cm (66\")   Wt 112 kg (246 lb 14.4 oz)   SpO2 97%   BMI 39.85 kg/m²     Physical Exam  Constitutional:       Appearance: Normal appearance.   Neck:      Comments: Thyroid surgically absent  Cardiovascular:      Rate and Rhythm: Regular rhythm.      Heart sounds: Normal heart sounds.   Pulmonary:      Breath sounds: Normal breath sounds.   Musculoskeletal:      Cervical back: Normal range of motion.   Neurological:      Mental Status: She is alert.        Result Review :   The following data was reviewed by: DUC Pressley on 10/15/2021:  Common labs    Common Labsle 10/11/21 10/11/21 10/11/21 10/11/21 10/11/21    0833 0833 0833 0833 0833   Glucose     378 (A)   BUN     12   Creatinine     1.21 (A)   eGFR Non African Am     47 (A)   Sodium     138   Potassium     4.2   Chloride     97 (A)   Calcium     8.8   Albumin     4.20   Total Bilirubin     0.3   Alkaline Phosphatase     115   AST " (SGOT)     34 (A)   ALT (SGPT)     44 (A)   WBC   10.02     Hemoglobin   15.0     Hematocrit   44.6     Platelets   246     Total Cholesterol  288 (A)      Triglycerides  133      HDL Cholesterol  77 (A)      LDL Cholesterol   188 (A)      Hemoglobin A1C 10.80 (A)       Microalbumin, Urine    1.6    (A) Abnormal value                      Assessment and Plan    Diagnoses and all orders for this visit:    1. Postsurgical hypothyroidism (Primary)  -     CBC & Differential; Future  -     Comprehensive Metabolic Panel; Future  -     Hemoglobin A1c; Future  -     Lipid Panel; Future  -     TSH; Future  -     Vitamin B12; Future  -     Vitamin D 25 Hydroxy; Future    2. Primary hypertension  -     CBC & Differential; Future  -     Comprehensive Metabolic Panel; Future  -     Hemoglobin A1c; Future  -     Lipid Panel; Future  -     TSH; Future  -     Vitamin B12; Future  -     Vitamin D 25 Hydroxy; Future    3. Type 2 diabetes mellitus with hyperglycemia, without long-term current use of insulin (HCC)  -     CBC & Differential; Future  -     Comprehensive Metabolic Panel; Future  -     Hemoglobin A1c; Future  -     Lipid Panel; Future  -     TSH; Future  -     Vitamin B12; Future  -     Vitamin D 25 Hydroxy; Future    4. Vitamin D deficiency  -     CBC & Differential; Future  -     Comprehensive Metabolic Panel; Future  -     Hemoglobin A1c; Future  -     Lipid Panel; Future  -     TSH; Future  -     Vitamin B12; Future  -     Vitamin D 25 Hydroxy; Future    5. Mixed hyperlipidemia  -     CBC & Differential; Future  -     Comprehensive Metabolic Panel; Future  -     Hemoglobin A1c; Future  -     Lipid Panel; Future  -     TSH; Future  -     Vitamin B12; Future  -     Vitamin D 25 Hydroxy; Future    Other orders  -     Dapagliflozin Propanediol 10 MG tablet; One tablet daily before breakfast  Dispense: 30 tablet; Refill: 11  -     Dulaglutide (Trulicity) 3 MG/0.5ML solution pen-injector; Inject 3 mg under the skin into the  appropriate area as directed 1 (One) Time Per Week.  Dispense: 4 pen; Refill: 11  -     Dapagliflozin Propanediol 10 MG tablet; One tablet daily before breakfast  Dispense: 30 tablet; Refill: 11  -     atorvastatin (Lipitor) 40 MG tablet; Take 1 tablet by mouth Daily.  Dispense: 30 tablet; Refill: 11  -     levothyroxine (Synthroid) 200 MCG tablet; Take 1 tablet by mouth Daily.  Dispense: 30 tablet; Refill: 11           Hypothyroidism-postsurgical      Patient admitted in the past of noncompliance but states taking faithfully                  Lab Results   Component Value Date    TSH 84.700 (H) 10/11/2021             Levothyroxine I 175 mcg daily --increase to 200 mcg daily           ====     Type 2 diabetes                 Lab Results   Component Value Date     HGBA1C 6.60 (H) 09/14/2020         Complicated by CAD  Back in March GFR less than 45 but now 58.           [place] caused knots      On Trulicity 1.5 mg weekly --increase to 3 mg once weekly      Refusing insulin fast and long acting insuilin      Will have to control diet      Do not go over 45 grams of carbohydrate       metformin stopped due to kidney             Stop Glipizide        Steglatro change to farxiga         As long as she eats up to 50 grams of carbohdyrate per meal, she doesn't need novolog.   If she exceeds 50 grams of carbohydrate , I will give her 5 units to 10 units before meals based on carb load. ==she is not using the Novolog            Did discuss that she at least needs a fast acting insulin to have his rescue she still refusing this    She is on have to change her diet if she is only on a meal and the Trulicity and Farxiga product she has to decrease her carbs to 45 g per meal 15 max on snacking         Bone Health     Vitamin d def.           50,000 units weekly  keep taking         Lipid Management:         was on lipitor 40 mg --ran out --call in   On repatha--taking         Total Cholesterol   Date Value Ref Range Status    10/11/2021 288 (H) 0 - 200 mg/dL Final     Triglycerides   Date Value Ref Range Status   10/11/2021 133 0 - 150 mg/dL Final     HDL Cholesterol   Date Value Ref Range Status   10/11/2021 77 (H) 40 - 60 mg/dL Final     LDL Cholesterol    Date Value Ref Range Status   10/11/2021 188 (H) 0 - 100 mg/dL Final                                       Follow Up   Return in about 3 months (around 1/15/2022) for Recheck, Video visit.  Patient was given instructions and counseling regarding her condition or for health maintenance advice. Please see specific information pulled into the AVS if appropriate.         This document has been electronically signed by DUC Pressley on October 15, 2021 11:06 CDT.

## 2021-10-21 ENCOUNTER — HOSPITAL ENCOUNTER (OUTPATIENT)
Dept: PHYSICAL THERAPY | Facility: HOSPITAL | Age: 51
Setting detail: THERAPIES SERIES
Discharge: HOME | End: 2021-10-21

## 2021-10-21 DIAGNOSIS — M54.59 MECHANICAL LOW BACK PAIN: Primary | ICD-10-CM

## 2021-10-21 DIAGNOSIS — G89.29 CHRONIC LOW BACK PAIN, UNSPECIFIED BACK PAIN LATERALITY, UNSPECIFIED WHETHER SCIATICA PRESENT: ICD-10-CM

## 2021-10-21 DIAGNOSIS — M54.50 CHRONIC LOW BACK PAIN, UNSPECIFIED BACK PAIN LATERALITY, UNSPECIFIED WHETHER SCIATICA PRESENT: ICD-10-CM

## 2021-10-21 PROCEDURE — 97162 PT EVAL MOD COMPLEX 30 MIN: CPT | Performed by: PHYSICAL THERAPIST

## 2021-10-21 NOTE — THERAPY EVALUATION
Outpatient Physical Therapy Ortho Initial Evaluation  [facility]     Patient Name: Rhea Sutton  : 1970  MRN: [id number]  Today's Date: 10/21/2021      Visit Date: 10/21/2021     Patient seen for 1 PT sessions.  Patient reports N/A% of improvement.  Next MD appt: 2021.  Recertification: 2021.    Therapy Diagnosis: Mechanical Low back pain        Patient Active Problem List   Diagnosis   • Postsurgical hypothyroidism   • Impaired fasting glucose   • Vitamin D deficiency   • Type 2 diabetes mellitus with hyperglycemia ([facility])   • Hypertension   • Hyperlipidemia   • DDD (degenerative disc disease), lumbar   • Lumbar radiculopathy   • Cervicalgia   • NSTEMI (non-ST elevated myocardial infarction) ([facility])   • CAD (coronary artery disease)   • AMI inferior wall ([facility])   • Head lice infestation   • Diabetes mellitus ([facility])   • Uncontrolled diabetes mellitus ([facility])   • Coronary artery disease of native artery of native heart with stable angina pectoris ([facility])   • Chronic kidney disease, stage 3 (moderate)   • Hypothyroidism   • Surgical aftercare, circulatory system   • S/P CABG (coronary artery bypass graft)   • Carotid stenosis, asymptomatic, bilateral        Past Medical History:   Diagnosis Date   • Allergic    • Anxiety    • CAD (coronary artery disease)    • CAD (coronary artery disease), bypass graft transplanted heart 03/05/2019    x4 bypass   • CHF (congestive heart failure) ([facility])    • Chronic back pain    • Chronic bronchitis ([facility])    • Chronic kidney disease    • Depression    • Diabetes mellitus ([facility])    • Diverticulitis 1986   • Goiter    • H/O heart artery stent 2019   • Hashimoto's thyroiditis    • Head lice infestation    • Hyperlipidemia    • Hypertension    • Injury of back    • MI (myocardial infarction) ([facility])    • MI (myocardial infarction) ([facility])    • Postoperative hypothyroidism    • Sinus congestion    • Sleep apnea     not using c-pap        Past Surgical  History:   Procedure Laterality Date   • APPENDECTOMY     • CARDIAC CATHETERIZATION N/A 10/13/2017   • CARDIAC CATHETERIZATION N/A 1/3/2019   • CHOLECYSTECTOMY     • COLON RESECTION     • COLON SURGERY     • CORONARY ARTERY BYPASS GRAFT N/A 3/5/2019    Procedure: CORONARY ARTERY BYPASS GRAFTING ENDOSCOPIC VEIN HARVEST          (CELL SAVER)             LATEX ALLERGY leg start @ 1323 chest start @ 1328 vein out 1417 vein prep 2405-4481 on pump 1456 off pump 1729;  Surgeon: Jaren Vilchis MD;  Location: [facility];  Service: Cardiothoracic   • CORONARY STENT PLACEMENT     • [place] RT/LT HEART CATHETERS N/A 10/13/2017   • TOTAL ABDOMINAL HYSTERECTOMY WITH SALPINGO OOPHORECTOMY     • TOTAL THYROIDECTOMY         Visit Dx:     ICD-10-CM ICD-9-CM   1. Mechanical low back pain  M54.59 724.2   2. Chronic low back pain, unspecified back pain laterality, unspecified whether sciatica present  M54.50 724.2    G89.29 338.29          Patient History     Row Name 10/21/21 1000             History    Chief Complaint Pain  -AJ      Type of Pain Back pain  -AJ      Date Current Problem(s) Began --  Chronic, years  -AJ      Brief Description of Current Complaint Patient reports her back is still hurting the same as before but worse. SHe reports it is just right across the low back. SHe reports when it is really abd it radiated pain down the B posterior LE to mid-calf. Patient reprots she just started back with pain management. She rpeorts this is the first time she has seen the neurosurgeon though  -AJ      Previous treatment for THIS PROBLEM Pain Management; Rehabilitation; Medication  -AJ      Patient/Caregiver Goals Comment Patient wishes to get an MRI and insurance requires PT first.  -AJ      Current Tobacco Use None  -AJ      Smoking Status Former smoker  -AJ      Patient's Rating of General Health Good  -AJ      Occupation/sports/leisure activities Occupation: unemployed and disabled due to low back; Hobbies: read, watch  tressa ruiz.  -AJ      Patient seeing anyone else for problem(s)? neurosurgeon  -AJ      What clinical tests have you had for this problem? X-ray  -AJ      History of Previous Related Injuries No known injuries.  -AJ              Pain     Pain Location Back  -AJ      Pain at Present 8  -AJ      Pain at Best 5  -AJ      Pain at Worst 10  -AJ      Pain Frequency Constant/continuous  -AJ      Pain Description Stabbing; Radiating; Pressure; Throbbing  -AJ      What Performance Factors Make the Current Problem(s) WORSE? prolonged positions  -AJ      What Performance Factors Make the Current Problem(s) BETTER? change in positions  -AJ      Tolerance Time- Standing 15-20 minutes  -AJ      Tolerance Time- Sitting 30 minutes  -AJ      Tolerance Time- Walking 10-15 minutes  -AJ      Is your sleep disturbed? Yes  -AJ            User Key  (r) = Recorded By, (t) = Taken By, (c) = Cosigned By    Initials Name Provider Type    AJ Codie Laureano, PT DPT Physical Therapist                 PT Ortho     Row Name 10/21/21 1000       Subjective Comments    Subjective Comments see history  -AJ       Precautions and Contraindications    Precautions/Limitations no known precautions/limitations  -AJ       Subjective Pain    Able to rate subjective pain? yes  -AJ    Pre-Treatment Pain Level 8  -AJ    Post-Treatment Pain Level 8  -AJ       Posture/Observations    Alignment Options Forward head; Cervical lordosis; Thoracic kyphosis; Rounded shoulders; Lumbar lordosis; Iliac crests  -AJ    Forward Head Mild; Increased  -AJ    Cervical Lordosis Mild; Increased  -AJ    Thoracic Kyphosis Mild; Moderate; Increased  -AJ    Rounded Shoulders Bilateral:; Mild; Increased  -AJ    Lumbar lordosis Normal  -AJ    Iliac crests Bilateral:; Normal  pelvis is level, no LLD to note.  -AJ    Posture/Observations Comments No distress.  -AJ       DTR- Lower Quarter Clearing    Patellar tendon (L2-4) Bilateral:; 2- Normal response  -AJ     Achilles tendon (S1-2) Bilateral:; 2- Normal response  -AJ       Lumbar/SI Special Tests    Standing Flexion Test (SI Dysfunction) Bilateral:; Negative  -AJ    Stork Test (SI Dysfunction) Bilateral:; Negative  -AJ    Trendelenburg Test (Gluteus Medius Weakness) Bilateral:; Negative  -AJ    Slump Test (Neural Tension) Bilateral:; Negative  -AJ    Fara Osmar Test (HNP) Negative  -AJ    SLR (Neural Tension) Bilateral:; Negative  -AJ    SI Compression Test (SI Dysfunction) Bilateral:; Negative  -AJ    SI Distraction Test (SI Dysfunction) Bilateral:; Negative  -AJ    BISI (hip vs. SI Dysfunction) Bilateral:; Negative  -AJ       Shailesh's Signs    Superficial and non-anatomical tenderness Positive  -AJ    Simulation test Positive  -AJ    Distraction straight leg raise test (sitting vs supine) Positive  -AJ    Regional disturbances Negative  -AJ    Overreaction to examination Positive  -AJ       Head/Neck/Trunk    Trunk Extension AROM -4°  -AJ    Trunk Flexion AROM 40°  -AJ    Trunk Lt Lateral Flexion AROM 25% of range  -AJ    Trunk Rt Lateral Flexion AROM 25% of range  -AJ    Trunk Lt Rotation AROM 50% of range  -AJ    Trunk Rt Rotation AROM 25% of range  -AJ       MMT (Manual Muscle Testing)    General MMT Comments B LE 5/5, except B hamstrings 4+/5  -AJ       Sensation    Sensation WNL? WNL  -AJ    Light Touch No apparent deficits  -AJ    Additional Comments Reports on/off radicular pain to B mid-calf on/off, none currently.  -AJ       Lower Extremity Flexibility    Hamstrings Bilateral:; Moderately limited  -AJ    Hip Flexors Bilateral:; Mildly limited  -AJ    LE Other Flexibility Bilateral:; Moderately limited  piriformis  -AJ       Pathomechanics    Spine Pathomechanics Excessive thoracic kyphosis with forward bend; Limited lumbar flattening with forward bend  -AJ       Transfers    Comment (Transfers) I with al transfers, fair log roll technique.  -AJ       Gait/Stairs (Locomotion)    Comment (Gait/Stairs) FWB,  "non-antalgic gait, no assistive device, no significant deviations noted, normal arm swing with gait.  -          User Key  (r) = Recorded By, (t) = Taken By, (c) = Cosigned By    Initials Name Provider Type    Codie Rangel, PT DPT Physical Therapist                      Therapy Education  Given: HEP, Symptoms/condition management, Posture/body mechanics (POC)  Program: New  How Provided: Verbal, Demonstration  Provided to: Patient  Level of Understanding: Verbalized, Demonstrated      PT OP Goals     Row Name 10/21/21 1000          PT Short Term Goals    STG 1 I with HEP and have additions/changes by next recertification  -     STG 2 Patient able to show proper log roll technique.  -     STG 3 Patient to be more aware of posture and posture correction techniques  -     STG 4 AROM for lumbar extension >= 10°  -     STG 5 AROM B Lumbar SB >=50% of range.  -     STG 6 AROM B Lumbar ROT >=50% of range  -            Long Term Goals    LTG 1 Patient to have AROM for the lumbar spine all WFL, no increase in pain, (ext >= 20°, flex >= 70°, SB/ROT >= 75% of range).  -     LTG 2 B LE 5/5.  -     LTG 3 Patient able to perform 20 Bridges with UE \"X\"  with no increase in pain.  -     LTG 4 Patient able to show proper lifting technique floor to waist with no increase in pain.  -     LTG 5 Patient able to show proper ergonomics for mopping/sweeping/vacuuming.  -     LTG 6 I with final HEP.  -            Time Calculation    PT Goal Re-Cert Due Date 11/11/21  -           User Key  (r) = Recorded By, (t) = Taken By, (c) = Cosigned By    Initials Name Provider Type    Codie Rangel, PT DPT Physical Therapist              Barriers to Rehab: Include significant or possible arthritic/degenerative changes that have occurred within the spine, The chronicity of this issue, The patient's obesity.    Safety Issues: Latex allergy         PT Assessment/Plan     Row Name 10/21/21 1000          " PT Assessment    Functional Limitations Limitation in home management; Limitations in community activities; Performance in leisure activities; Performance in self-care ADL  -AJ     Impairments Endurance; Gait; Impaired flexibility; Impaired muscle endurance; Impaired muscle length; Impaired muscle power; Range of motion; Poor body mechanics; Posture; Pain; Muscle strength; Joint mobility; Joint integrity  -     Assessment Comments Patient is a 51yo female who presentsto the clinic today with loss of ROM, flexibility, and poor overall ergonomics. She has had multiple bouts of PT, injections, and chiropractic care all unsucessful. SHe is referred back for PT for getting MRI approval. Patient's insurance does not allow treatment to begin on the initial evaluation. Small HEP was established.  -AJ     Rehab Potential Poor  -AJ     Patient/caregiver participated in establishment of treatment plan and goals Yes  -AJ     Patient would benefit from skilled therapy intervention Yes  -AJ            PT Plan    PT Frequency 2x/week  -AJ     Predicted Duration of Therapy Intervention (PT) 6-10 visits  -AJ     Planned CPT's? PT EVAL MOD COMPLELITY: 97118; PT RE-EVAL: 05788; PT THER PROC EA 15 MIN: 75147; PT THER ACT EA 15 MIN: 37906; PT MANUAL THERAPY EA 15 MIN: 19482; PT NEUROMUSC RE-EDUCATION EA 15 MIN: 09011; PT GAIT TRAINING EA 15 MIN: 98070; PT SELF CARE/HOME MGMT/TRAIN EA 15: 46048; PT ELECTRICAL STIM UNATTEND: ; PT THER SUPP EA 15 MIN  -     PT Plan Comments progress overall ROM, strength, flexibility, spinal protection, core stab, posture, and ergonomics.  -ALLISON           User Key  (r) = Recorded By, (t) = Taken By, (c) = Cosigned By    Initials Name Provider Type    Codie Rangel, PT DPT Physical Therapist            Other therapeutic activities and/or exercises will be prescribed depending on the patient's progress or lack thereof.         OP Exercises     Row Name 10/21/21 1000             Subjective  "Comments    Subjective Comments see history  -AJ              Subjective Pain    Able to rate subjective pain? yes  -AJ      Pre-Treatment Pain Level 8  -AJ      Post-Treatment Pain Level 8  -AJ              Exercise 1    Exercise Name 1 LTR  -AJ      Reps 1 10  -AJ      Time 1 10\" hold  -              Exercise 2    Exercise Name 2 log roll instruction  -            User Key  (r) = Recorded By, (t) = Taken By, (c) = Cosigned By    Initials Name Provider Type     Codie Laureano, PT DPT Physical Therapist                              Outcome Measure Options: Modifed Owestry  Modified Oswestry  Modified Oswestry Score/Comments: 37/50 = 74%      Time Calculation:     Start Time: 1045  Stop Time: 1124  Time Calculation (min): 39 min     Therapy Charges for Today     Code Description Service Date Service Provider Modifiers Qty    97313720737 HC PT EVAL MOD COMPLEXITY 3 10/21/2021 Codie Laureano, PT DPT GP 1    69283141191 HC PT THER SUPP EA 15 MIN 10/21/2021 Codie Laureano, PT DPT GP 2          PT G-Codes  Outcome Measure Options: Modifed Owestry  Modified Oswestry Score/Comments: 37/50 = 74%         This document has been electronically signed by Codie Laureano PT DPT, [facility] on October 21, 2021 11:34 CDT       "

## 2021-10-26 ENCOUNTER — HOSPITAL ENCOUNTER (OUTPATIENT)
Dept: PHYSICAL THERAPY | Facility: HOSPITAL | Age: 51
Setting detail: THERAPIES SERIES
Discharge: HOME | End: 2021-10-26

## 2021-10-26 DIAGNOSIS — M54.16 LUMBAR RADICULOPATHY: ICD-10-CM

## 2021-10-26 DIAGNOSIS — M54.50 CHRONIC LOW BACK PAIN, UNSPECIFIED BACK PAIN LATERALITY, UNSPECIFIED WHETHER SCIATICA PRESENT: ICD-10-CM

## 2021-10-26 DIAGNOSIS — G89.29 CHRONIC LOW BACK PAIN, UNSPECIFIED BACK PAIN LATERALITY, UNSPECIFIED WHETHER SCIATICA PRESENT: ICD-10-CM

## 2021-10-26 DIAGNOSIS — M54.59 MECHANICAL LOW BACK PAIN: Primary | ICD-10-CM

## 2021-10-26 PROCEDURE — 97110 THERAPEUTIC EXERCISES: CPT | Performed by: PHYSICAL THERAPIST

## 2021-10-26 NOTE — THERAPY TREATMENT NOTE
Outpatient Physical Therapy Ortho Treatment Note  [facility]     Patient Name: Rhea Sutton  : 1970  MRN: [id number]  Today's Date: 10/26/2021      Visit Date: 10/26/2021     Patient seen for 2 PT sessions.  Patient reports N/A% of improvement.  Next MD appt: 2021.  Recertification: 2021.     Therapy Diagnosis: Mechanical Low back pain      Visit Dx:    ICD-10-CM ICD-9-CM   1. Mechanical low back pain  M54.59 724.2   2. Chronic low back pain, unspecified back pain laterality, unspecified whether sciatica present  M54.50 724.2    G89.29 338.29   3. Lumbar radiculopathy  M54.16 724.4       Patient Active Problem List   Diagnosis   • Postsurgical hypothyroidism   • Impaired fasting glucose   • Vitamin D deficiency   • Type 2 diabetes mellitus with hyperglycemia ([facility])   • Hypertension   • Hyperlipidemia   • DDD (degenerative disc disease), lumbar   • Lumbar radiculopathy   • Cervicalgia   • NSTEMI (non-ST elevated myocardial infarction) ([facility])   • CAD (coronary artery disease)   • AMI inferior wall ([facility])   • Head lice infestation   • Diabetes mellitus ([facility])   • Uncontrolled diabetes mellitus ([facility])   • Coronary artery disease of native artery of native heart with stable angina pectoris ([facility])   • Chronic kidney disease, stage 3 (moderate)   • Hypothyroidism   • Surgical aftercare, circulatory system   • S/P CABG (coronary artery bypass graft)   • Carotid stenosis, asymptomatic, bilateral        Past Medical History:   Diagnosis Date   • Allergic    • Anxiety    • CAD (coronary artery disease)    • CAD (coronary artery disease), bypass graft transplanted heart 03/05/2019    x4 bypass   • CHF (congestive heart failure) ([facility])    • Chronic back pain    • Chronic bronchitis ([facility])    • Chronic kidney disease    • Depression    • Diabetes mellitus ([facility])    • Diverticulitis 1986   • Goiter    • H/O heart artery stent 2019   • Hashimoto's thyroiditis    • Head lice infestation    •  Hyperlipidemia    • Hypertension    • Injury of back    • MI (myocardial infarction) ([facility])    • MI (myocardial infarction) ([facility])    • Postoperative hypothyroidism    • Sinus congestion    • Sleep apnea     not using c-pap        Past Surgical History:   Procedure Laterality Date   • APPENDECTOMY     • CARDIAC CATHETERIZATION N/A 10/13/2017   • CARDIAC CATHETERIZATION N/A 1/3/2019   • CHOLECYSTECTOMY     • COLON RESECTION     • COLON SURGERY     • CORONARY ARTERY BYPASS GRAFT N/A 3/5/2019    Procedure: CORONARY ARTERY BYPASS GRAFTING ENDOSCOPIC VEIN HARVEST          (CELL SAVER)             LATEX ALLERGY leg start @ 1323 chest start @ 1328 vein out 1417 vein prep 1303-8102 on pump 1456 off pump 1729;  Surgeon: Jaren Vilchis MD;  Location: [facility];  Service: Cardiothoracic   • CORONARY STENT PLACEMENT     • [place] RT/LT HEART CATHETERS N/A 10/13/2017   • TOTAL ABDOMINAL HYSTERECTOMY WITH SALPINGO OOPHORECTOMY     • TOTAL THYROIDECTOMY          PT Ortho     Row Name 10/26/21 1300       Subjective Comments    Subjective Comments Patient reports she stil has no pain medications so she is still hurting pretty good.  -       Precautions and Contraindications    Precautions/Limitations no known precautions/limitations  -ALLISON       Subjective Pain    Able to rate subjective pain? yes  -ALLISON    Pre-Treatment Pain Level 6  -AJ       Posture/Observations    Posture/Observations Comments No distress.  -          User Key  (r) = Recorded By, (t) = Taken By, (c) = Cosigned By    Initials Name Provider Type    Codie Rangel, PT DPT Physical Therapist                             PT Assessment/Plan     Row Name 10/26/21 1300          PT Assessment    Assessment Comments Patient did well with ther ex today. No major complaints. Did require cues to stay in pain-free range. Added St. HS S, piriformis S, and HLM to HEP.  -AJ            PT Plan    PT Frequency 2x/week  -     PT Plan Comments Add PPT and or bridges  "next session. Add Tband to hip abd and HLM.  -AJ           User Key  (r) = Recorded By, (t) = Taken By, (c) = Cosigned By    Initials Name Provider Type    Codie Rangel, PT DPT Physical Therapist                   OP Exercises     Row Name 10/26/21 1300             Subjective Comments    Subjective Comments Patient reports she candy has no pain medications so she is still hurting pretty good.  -AJ              Subjective Pain    Able to rate subjective pain? yes  -AJ      Pre-Treatment Pain Level 6  -AJ              Exercise 1    Exercise Name 1 pro II LE- strength/endurance  -AJ      Time 1 10 minutes  -AJ      Additional Comments L 4.0  -AJ              Exercise 2    Exercise Name 2 B St. HS S  -AJ      Reps 2 2  -AJ      Time 2 30 seconds  -AJ              Exercise 3    Exercise Name 3 B supine piriformis S  -AJ      Reps 3 2  -AJ      Time 3 30 seconds  -AJ              Exercise 4    Exercise Name 4 HLM  -AJ      Time 4 5\" holds alternating for 3 minutes  -AJ              Exercise 5    Exercise Name 5 HL B Hip abd  -AJ      Reps 5 20  -AJ      Time 5 5\" hold  -AJ              Exercise 6    Exercise Name 6 HL B hip add  -AJ      Reps 6 20  -AJ      Time 6 5\" hold  -AJ              Exercise 7    Exercise Name 7 LTR  -AJ      Reps 7 10  -AJ      Time 7 10\" hold  -AJ              Exercise 8    Exercise Name 8 DKTC S  -AJ      Reps 8 20  -AJ      Time 8 5\" hold  -AJ            User Key  (r) = Recorded By, (t) = Taken By, (c) = Cosigned By    Initials Name Provider Type    Codie Rangel, PT DPT Physical Therapist                              PT OP Goals     Row Name 10/26/21 1300          Time Calculation    PT Goal Re-Cert Due Date 11/11/21  -AJ           User Key  (r) = Recorded By, (t) = Taken By, (c) = Cosigned By    Initials Name Provider Type    Codie Rangel, PT DPT Physical Therapist                Therapy Education  Education Details: Added St. HS S, piriformis S, and HLM to " HEP.  Given: HEP  Program: New  How Provided: Verbal, Demonstration, Written  Provided to: Patient  Level of Understanding: Verbalized, Demonstrated              Time Calculation:   Start Time: 1301  Stop Time: 1343  Time Calculation (min): 42 min  Total Timed Code Minutes- PT: 42 minute(s)  Therapy Charges for Today     Code Description Service Date Service Provider Modifiers Qty    53900017465  PT THER SUPP EA 15 MIN 10/26/2021 Codie Laureano, PT DPT GP 1    50721094093 HC PT THER PROC EA 15 MIN 10/26/2021 Codie Laureano, PT DPT GP 3                  This document has been electronically signed by Codie Laureano, PT DPT, [facility] on October 26, 2021 14:23 CDT

## 2021-10-28 ENCOUNTER — HOSPITAL ENCOUNTER (OUTPATIENT)
Dept: PHYSICAL THERAPY | Facility: HOSPITAL | Age: 51
Setting detail: THERAPIES SERIES
Discharge: HOME | End: 2021-10-28

## 2021-10-28 DIAGNOSIS — M54.16 LUMBAR RADICULOPATHY: ICD-10-CM

## 2021-10-28 DIAGNOSIS — M54.59 MECHANICAL LOW BACK PAIN: Primary | ICD-10-CM

## 2021-10-28 DIAGNOSIS — M54.50 CHRONIC LOW BACK PAIN, UNSPECIFIED BACK PAIN LATERALITY, UNSPECIFIED WHETHER SCIATICA PRESENT: ICD-10-CM

## 2021-10-28 DIAGNOSIS — G89.29 CHRONIC LOW BACK PAIN, UNSPECIFIED BACK PAIN LATERALITY, UNSPECIFIED WHETHER SCIATICA PRESENT: ICD-10-CM

## 2021-10-28 PROCEDURE — 97110 THERAPEUTIC EXERCISES: CPT | Performed by: PHYSICAL THERAPIST

## 2021-10-28 NOTE — THERAPY TREATMENT NOTE
Outpatient Physical Therapy Ortho Treatment Note  [facility]     Patient Name: Rhea Sutton  : 1970  MRN: [id number]  Today's Date: 10/28/2021      Visit Date: 10/28/2021    Patient seen for 3 PT sessions.  Patient reports N/A% of improvement.  Next MD appt: 2021.  Recertification: 2021.     Therapy Diagnosis: Mechanical Low back pain    Visit Dx:    ICD-10-CM ICD-9-CM   1. Mechanical low back pain  M54.59 724.2   2. Chronic low back pain, unspecified back pain laterality, unspecified whether sciatica present  M54.50 724.2    G89.29 338.29   3. Lumbar radiculopathy  M54.16 724.4       Patient Active Problem List   Diagnosis   • Postsurgical hypothyroidism   • Impaired fasting glucose   • Vitamin D deficiency   • Type 2 diabetes mellitus with hyperglycemia ([facility])   • Hypertension   • Hyperlipidemia   • DDD (degenerative disc disease), lumbar   • Lumbar radiculopathy   • Cervicalgia   • NSTEMI (non-ST elevated myocardial infarction) ([facility])   • CAD (coronary artery disease)   • AMI inferior wall ([facility])   • Head lice infestation   • Diabetes mellitus ([facility])   • Uncontrolled diabetes mellitus ([facility])   • Coronary artery disease of native artery of native heart with stable angina pectoris ([facility])   • Chronic kidney disease, stage 3 (moderate)   • Hypothyroidism   • Surgical aftercare, circulatory system   • S/P CABG (coronary artery bypass graft)   • Carotid stenosis, asymptomatic, bilateral        Past Medical History:   Diagnosis Date   • Allergic    • Anxiety    • CAD (coronary artery disease)    • CAD (coronary artery disease), bypass graft transplanted heart 03/05/2019    x4 bypass   • CHF (congestive heart failure) ([facility])    • Chronic back pain    • Chronic bronchitis ([facility])    • Chronic kidney disease    • Depression    • Diabetes mellitus ([facility])    • Diverticulitis 1986   • Goiter    • H/O heart artery stent 2019   • Hashimoto's thyroiditis    • Head lice infestation    •  Hyperlipidemia    • Hypertension    • Injury of back    • MI (myocardial infarction) ([facility])    • MI (myocardial infarction) ([facility])    • Postoperative hypothyroidism    • Sinus congestion    • Sleep apnea     not using c-pap        Past Surgical History:   Procedure Laterality Date   • APPENDECTOMY     • CARDIAC CATHETERIZATION N/A 10/13/2017   • CARDIAC CATHETERIZATION N/A 1/3/2019   • CHOLECYSTECTOMY     • COLON RESECTION     • COLON SURGERY     • CORONARY ARTERY BYPASS GRAFT N/A 3/5/2019    Procedure: CORONARY ARTERY BYPASS GRAFTING ENDOSCOPIC VEIN HARVEST          (CELL SAVER)             LATEX ALLERGY leg start @ 1323 chest start @ 1328 vein out 1417 vein prep [id number] on pump 1456 off pump 1729;  Surgeon: Jaren Vilchis MD;  Location: [facility];  Service: Cardiothoracic   • CORONARY STENT PLACEMENT     • MI RT/LT HEART CATHETERS N/A 10/13/2017   • TOTAL ABDOMINAL HYSTERECTOMY WITH SALPINGO OOPHORECTOMY     • TOTAL THYROIDECTOMY          PT Ortho     Row Name 10/28/21 1300       Subjective Comments    Subjective Comments Patient reports her back is about the same.  -       Precautions and Contraindications    Precautions/Limitations no known precautions/limitations  -       Subjective Pain    Able to rate subjective pain? yes  -ALLISON    Pre-Treatment Pain Level 6  -       Posture/Observations    Posture/Observations Comments No distress, poor overall postural awareness.  -          User Key  (r) = Recorded By, (t) = Taken By, (c) = Cosigned By    Initials Name Provider Type    Codie Rangel, PT DPT Physical Therapist                             PT Assessment/Plan     Row Name 10/28/21 1300          PT Assessment    Assessment Comments Patient did well with increase in ther ex. Issued RTB for HLM for HEP and added bridges. Also shown sitting versus supine piriformis S for alternative way t perform same stretch.  -            PT Plan    PT Frequency 2x/week  -     PT Plan Comments Add  "step up with opp hip ext next session.  -AJ           User Key  (r) = Recorded By, (t) = Taken By, (c) = Cosigned By    Initials Name Provider Type    Codie Rangel, PT DPT Physical Therapist                   OP Exercises     Row Name 10/28/21 1300             Subjective Comments    Subjective Comments Patient reports her back is about the same.  -AJ              Subjective Pain    Able to rate subjective pain? yes  -AJ      Pre-Treatment Pain Level 6  -AJ              Exercise 1    Exercise Name 1 pro II LE- strength/endurance  -AJ      Time 1 10 minutes  -AJ      Additional Comments L 4.5  -AJ              Exercise 2    Exercise Name 2 B St. HS S  -AJ      Reps 2 2  -AJ      Time 2 30 seconds  -AJ              Exercise 3    Exercise Name 3 B sitting piriformis S  -AJ      Reps 3 2  -AJ      Time 3 30 seconds  -AJ              Exercise 4    Exercise Name 4 HL B hip abd  -AJ      Reps 4 20  -AJ      Time 4 5\" holds  -AJ      Additional Comments RTB  -AJ              Exercise 5    Exercise Name 5 HLM  -AJ      Time 5 5\" holds alternating for 3 minutes  -AJ      Additional Comments RTB  -AJ              Exercise 6    Exercise Name 6 DKTC with Pball  -AJ      Reps 6 20  -AJ      Time 6 5\" hold  -AJ              Exercise 7    Exercise Name 7 Bridges  -AJ      Sets 7 2  -AJ      Reps 7 10  -AJ      Time 7 5\" hold  -AJ              Exercise 8    Exercise Name 8 LTR  -AJ      Reps 8 10  -AJ      Time 8 10\" hold  -AJ            User Key  (r) = Recorded By, (t) = Taken By, (c) = Cosigned By    Initials Name Provider Type    Codie Rangel, PT DPT Physical Therapist                              PT OP Goals     Row Name 10/28/21 1300          PT Short Term Goals    STG 1 I with HEP and have additions/changes by next recertification  -AJ     STG 1 Progress Partially Met; Ongoing  -AJ     STG 2 Patient able to show proper log roll technique.  -AJ     STG 2 Progress Met  -AJ     STG 3 Patient to be more " "aware of posture and posture correction techniques  -     STG 4 AROM for lumbar extension >= 10°  -     STG 5 AROM B Lumbar SB >=50% of range.  -     STG 6 AROM B Lumbar ROT >=50% of range  -            Long Term Goals    LTG 1 Patient to have AROM for the lumbar spine all WFL, no increase in pain, (ext >= 20°, flex >= 70°, SB/ROT >= 75% of range).  -     LTG 2 B LE 5/5.  -     LTG 3 Patient able to perform 20 Bridges with UE \"X\"  with no increase in pain.  -     LTG 4 Patient able to show proper lifting technique floor to waist with no increase in pain.  -     LTG 5 Patient able to show proper ergonomics for mopping/sweeping/vacuuming.  -     LTG 6 I with final HEP.  -            Time Calculation    PT Goal Re-Cert Due Date 11/11/21  -           User Key  (r) = Recorded By, (t) = Taken By, (c) = Cosigned By    Initials Name Provider Type    Codie Rangel, PT NADIRAT Physical Therapist                Therapy Education  Education Details: HEP: added bridges  Given: HEP, Posture/body mechanics  Program: New, Reinforced  How Provided: Verbal  Provided to: Patient  Level of Understanding: Verbalized, Demonstrated              Time Calculation:   Start Time: 1300  Stop Time: 1344  Time Calculation (min): 44 min  Total Timed Code Minutes- PT: 44 minute(s)  Therapy Charges for Today     Code Description Service Date Service Provider Modifiers Qty    81798574190 HC PT THER SUPP EA 15 MIN 10/28/2021 Codie Laureano, PT DPT GP 1    11089731898 HC PT THER PROC EA 15 MIN 10/28/2021 Codie Laureano, PT DPT GP 3                  This document has been electronically signed by Codie Laureano PT DPT, [facility] on October 28, 2021 13:46 CDT      "

## 2021-11-02 ENCOUNTER — HOSPITAL ENCOUNTER (OUTPATIENT)
Dept: PHYSICAL THERAPY | Facility: HOSPITAL | Age: 51
Setting detail: THERAPIES SERIES
Discharge: HOME | End: 2021-11-02

## 2021-11-02 DIAGNOSIS — M54.16 LUMBAR RADICULOPATHY: ICD-10-CM

## 2021-11-02 DIAGNOSIS — G89.29 CHRONIC LOW BACK PAIN, UNSPECIFIED BACK PAIN LATERALITY, UNSPECIFIED WHETHER SCIATICA PRESENT: ICD-10-CM

## 2021-11-02 DIAGNOSIS — M54.50 CHRONIC LOW BACK PAIN, UNSPECIFIED BACK PAIN LATERALITY, UNSPECIFIED WHETHER SCIATICA PRESENT: ICD-10-CM

## 2021-11-02 DIAGNOSIS — M54.59 MECHANICAL LOW BACK PAIN: Primary | ICD-10-CM

## 2021-11-02 PROCEDURE — 97110 THERAPEUTIC EXERCISES: CPT

## 2021-11-02 NOTE — THERAPY TREATMENT NOTE
Outpatient Physical Therapy Ortho Treatment Note  [facility]     Patient Name: Rhea Sutton  : 1970  MRN: [id number]  Today's Date: 2021      Visit Date: 2021     Patient has attended 4 visits  0% Improvement  MD Visit: 2021  Recheck Date: 2021    Therapy Diagnosis: Mechanical Low back pain    Visit Dx:    ICD-10-CM ICD-9-CM   1. Mechanical low back pain  M54.59 724.2   2. Chronic low back pain, unspecified back pain laterality, unspecified whether sciatica present  M54.50 724.2    G89.29 338.29   3. Lumbar radiculopathy  M54.16 724.4       Patient Active Problem List   Diagnosis   • Postsurgical hypothyroidism   • Impaired fasting glucose   • Vitamin D deficiency   • Type 2 diabetes mellitus with hyperglycemia ([facility])   • Hypertension   • Hyperlipidemia   • DDD (degenerative disc disease), lumbar   • Lumbar radiculopathy   • Cervicalgia   • NSTEMI (non-ST elevated myocardial infarction) ([facility])   • CAD (coronary artery disease)   • AMI inferior wall ([facility])   • Head lice infestation   • Diabetes mellitus ([facility])   • Uncontrolled diabetes mellitus ([facility])   • Coronary artery disease of native artery of native heart with stable angina pectoris ([facility])   • Chronic kidney disease, stage 3 (moderate)   • Hypothyroidism   • Surgical aftercare, circulatory system   • S/P CABG (coronary artery bypass graft)   • Carotid stenosis, asymptomatic, bilateral        Past Medical History:   Diagnosis Date   • Allergic    • Anxiety    • CAD (coronary artery disease)    • CAD (coronary artery disease), bypass graft transplanted heart 03/05/2019    x4 bypass   • CHF (congestive heart failure) ([facility])    • Chronic back pain    • Chronic bronchitis ([facility])    • Chronic kidney disease    • Depression    • Diabetes mellitus ([facility])    • Diverticulitis 1986   • Goiter    • H/O heart artery stent 2019   • Hashimoto's thyroiditis    • Head lice infestation    • Hyperlipidemia    • Hypertension    •  Injury of back    • MI (myocardial infarction) ([facility])    • MI (myocardial infarction) ([facility])    • Postoperative hypothyroidism    • Sinus congestion    • Sleep apnea     not using c-pap        Past Surgical History:   Procedure Laterality Date   • APPENDECTOMY     • CARDIAC CATHETERIZATION N/A 10/13/2017   • CARDIAC CATHETERIZATION N/A 1/3/2019   • CHOLECYSTECTOMY     • COLON RESECTION     • COLON SURGERY     • CORONARY ARTERY BYPASS GRAFT N/A 3/5/2019    Procedure: CORONARY ARTERY BYPASS GRAFTING ENDOSCOPIC VEIN HARVEST          (CELL SAVER)             LATEX ALLERGY leg start @ 1323 chest start @ 1328 vein out 1417 vein prep [id number] on pump 1456 off pump 1729;  Surgeon: Jaren Vilchis MD;  Location: [facility];  Service: Cardiothoracic   • CORONARY STENT PLACEMENT     • CT RT/LT HEART CATHETERS N/A 10/13/2017   • TOTAL ABDOMINAL HYSTERECTOMY WITH SALPINGO OOPHORECTOMY     • TOTAL THYROIDECTOMY          PT Ortho     Row Name 11/02/21 1300       Subjective Comments    Subjective Comments Pt reports she has been sitting on hard benches.  No real improvement yet.  Pt reports she just received approval for her pain med and is going to pick it up today.  -MP       Precautions and Contraindications    Precautions/Limitations no known precautions/limitations  -MP       Subjective Pain    Able to rate subjective pain? yes  -MP    Pre-Treatment Pain Level 6  -MP    Post-Treatment Pain Level 8  -MP          User Key  (r) = Recorded By, (t) = Taken By, (c) = Cosigned By    Initials Name Provider Type    Valeria Bansal PTA Physical Therapy Assistant                             PT Assessment/Plan     Row Name 11/02/21 1300          PT Assessment    Assessment Comments Pt exhibited proper log rolling technique.  Pt seems skeptical about therapy being able to help her.  Pt states this is the 7th time she has had therapy for her back.  Pt reports she has met with the surgeon and is following all the steps she needs to  "since nothing has helped thus far.  -MP            PT Plan    PT Frequency 2x/week  -MP     PT Plan Comments May try seated DD core stab ex's.  Review RTB ex's next visit.  -MP           User Key  (r) = Recorded By, (t) = Taken By, (c) = Cosigned By    Initials Name Provider Type    Valeria Bansal PTA Physical Therapy Assistant                 Modalities     Row Name 11/02/21 1300             Ice    Patient reports will apply ice at home to involved area Yes  -MP            User Key  (r) = Recorded By, (t) = Taken By, (c) = Cosigned By    Initials Name Provider Type    Valeria Bansal PTA Physical Therapy Assistant               OP Exercises     Row Name 11/02/21 1300             Subjective Comments    Subjective Comments Pt reports she has been sitting on hard benches.  No real improvement yet.  Pt reports she just received approval for her pain med and is going to pick it up today.  -MP              Subjective Pain    Able to rate subjective pain? yes  -MP      Pre-Treatment Pain Level 6  -MP      Post-Treatment Pain Level 8  -MP              Exercise 1    Exercise Name 1 pro II LE- strength/endurance  -MP      Time 1 10 minutes  -MP      Additional Comments L5  -MP              Exercise 2    Exercise Name 2 B St. HS S  -MP      Reps 2 2  -MP      Time 2 30 seconds  -MP              Exercise 3    Exercise Name 3 B sitting piriformis S  -MP      Reps 3 2  -MP      Time 3 30 seconds  -MP              Exercise 4    Exercise Name 4 B step ups with opp hip ext  -MP      Cueing 4 Verbal; Demo  -MP      Reps 4 10  -MP      Additional Comments 4\" step  -MP              Exercise 5    Exercise Name 5 Reviewed log roll technique  -MP              Exercise 6    Exercise Name 6 DKTC with Pball  -MP      Reps 6 20  -MP      Time 6 5\" hold  -MP              Exercise 7    Exercise Name 7 Bridges  -MP      Sets 7 2  -MP      Reps 7 10  -MP      Time 7 5\" hold  -MP            User Key  (r) = Recorded By, (t) = Taken By, (c) = " "Cosigned By    Initials Name Provider Type    Valeria Bansal PTA Physical Therapy Assistant                              PT OP Goals     Row Name 11/02/21 1300          PT Short Term Goals    STG 1 I with HEP and have additions/changes by next recertification  -     STG 1 Progress Partially Met; Ongoing  -MP     STG 2 Patient able to show proper log roll technique.  -MP     STG 2 Progress Met  -MP     STG 3 Patient to be more aware of posture and posture correction techniques  -MP     STG 4 AROM for lumbar extension >= 10°  -MP     STG 5 AROM B Lumbar SB >=50% of range.  -MP     STG 6 AROM B Lumbar ROT >=50% of range  -            Long Term Goals    LTG 1 Patient to have AROM for the lumbar spine all WFL, no increase in pain, (ext >= 20°, flex >= 70°, SB/ROT >= 75% of range).  -     LTG 2 B LE 5/5.  -     LTG 3 Patient able to perform 20 Bridges with UE \"X\"  with no increase in pain.  -     LTG 4 Patient able to show proper lifting technique floor to waist with no increase in pain.  -MP     LTG 5 Patient able to show proper ergonomics for mopping/sweeping/vacuuming.  -     LTG 6 I with final HEP.  -            Time Calculation    PT Goal Re-Cert Due Date 11/11/21  -           User Key  (r) = Recorded By, (t) = Taken By, (c) = Cosigned By    Initials Name Provider Type    Valeria Bansal PTA Physical Therapy Assistant                Therapy Education  Education Details: Gave pt some cues to improve her technique with ex's.  Given: HEP, Symptoms/condition management, Pain management, Posture/body mechanics  Program: Reinforced  How Provided: Verbal  Provided to: Patient  Level of Understanding: Verbalized, Demonstrated              Time Calculation:   Start Time: 1305  Stop Time: 1347  Time Calculation (min): 42 min  Therapy Charges for Today     Code Description Service Date Service Provider Modifiers Qty    90403680930  PT THER PROC EA 15 MIN 11/2/2021 Valeria Ceja PTA GP 3            "         Valeria Ceja, PTA  11/2/2021

## 2021-11-04 ENCOUNTER — HOSPITAL ENCOUNTER (OUTPATIENT)
Dept: PHYSICAL THERAPY | Facility: HOSPITAL | Age: 51
Setting detail: THERAPIES SERIES
Discharge: HOME | End: 2021-11-04

## 2021-11-04 DIAGNOSIS — G89.29 CHRONIC LOW BACK PAIN, UNSPECIFIED BACK PAIN LATERALITY, UNSPECIFIED WHETHER SCIATICA PRESENT: ICD-10-CM

## 2021-11-04 DIAGNOSIS — M54.16 LUMBAR RADICULOPATHY: ICD-10-CM

## 2021-11-04 DIAGNOSIS — M54.50 CHRONIC LOW BACK PAIN, UNSPECIFIED BACK PAIN LATERALITY, UNSPECIFIED WHETHER SCIATICA PRESENT: ICD-10-CM

## 2021-11-04 DIAGNOSIS — M54.59 MECHANICAL LOW BACK PAIN: Primary | ICD-10-CM

## 2021-11-04 PROCEDURE — 97112 NEUROMUSCULAR REEDUCATION: CPT

## 2021-11-04 PROCEDURE — 97110 THERAPEUTIC EXERCISES: CPT

## 2021-11-04 NOTE — THERAPY TREATMENT NOTE
Outpatient Physical Therapy Ortho Treatment Note  [facility]     Patient Name: Rhea Sutton  : 1970  MRN: [id number]  Today's Date: 2021      Visit Date: 2021     Patient has attended 5 visits  0% Improvement  MD Visit: 2021  Recheck Date: 2021    Therapy Diagnosis: Mechanical Low Back Pain    Visit Dx:    ICD-10-CM ICD-9-CM   1. Mechanical low back pain  M54.59 724.2   2. Chronic low back pain, unspecified back pain laterality, unspecified whether sciatica present  M54.50 724.2    G89.29 338.29   3. Lumbar radiculopathy  M54.16 724.4       Patient Active Problem List   Diagnosis   • Postsurgical hypothyroidism   • Impaired fasting glucose   • Vitamin D deficiency   • Type 2 diabetes mellitus with hyperglycemia ([facility])   • Hypertension   • Hyperlipidemia   • DDD (degenerative disc disease), lumbar   • Lumbar radiculopathy   • Cervicalgia   • NSTEMI (non-ST elevated myocardial infarction) ([facility])   • CAD (coronary artery disease)   • AMI inferior wall ([facility])   • Head lice infestation   • Diabetes mellitus ([facility])   • Uncontrolled diabetes mellitus ([facility])   • Coronary artery disease of native artery of native heart with stable angina pectoris ([facility])   • Chronic kidney disease, stage 3 (moderate)   • Hypothyroidism   • Surgical aftercare, circulatory system   • S/P CABG (coronary artery bypass graft)   • Carotid stenosis, asymptomatic, bilateral        Past Medical History:   Diagnosis Date   • Allergic    • Anxiety    • CAD (coronary artery disease)    • CAD (coronary artery disease), bypass graft transplanted heart 03/05/2019    x4 bypass   • CHF (congestive heart failure) ([facility])    • Chronic back pain    • Chronic bronchitis ([facility])    • Chronic kidney disease    • Depression    • Diabetes mellitus ([facility])    • Diverticulitis 1986   • Goiter    • H/O heart artery stent 2019   • Hashimoto's thyroiditis    • Head lice infestation    • Hyperlipidemia    • Hypertension    •  Injury of back    • MI (myocardial infarction) ([facility])    • MI (myocardial infarction) ([facility])    • Postoperative hypothyroidism    • Sinus congestion    • Sleep apnea     not using c-pap        Past Surgical History:   Procedure Laterality Date   • APPENDECTOMY     • CARDIAC CATHETERIZATION N/A 10/13/2017   • CARDIAC CATHETERIZATION N/A 1/3/2019   • CHOLECYSTECTOMY     • COLON RESECTION     • COLON SURGERY     • CORONARY ARTERY BYPASS GRAFT N/A 3/5/2019    Procedure: CORONARY ARTERY BYPASS GRAFTING ENDOSCOPIC VEIN HARVEST          (CELL SAVER)             LATEX ALLERGY leg start @ 1323 chest start @ 1328 vein out 1417 vein prep [id number] on pump 1456 off pump 1729;  Surgeon: Jaren Vilchis MD;  Location: [facility];  Service: Cardiothoracic   • CORONARY STENT PLACEMENT     • MN RT/LT HEART CATHETERS N/A 10/13/2017   • TOTAL ABDOMINAL HYSTERECTOMY WITH SALPINGO OOPHORECTOMY     • TOTAL THYROIDECTOMY          PT Ortho     Row Name 11/04/21 1300       Subjective Comments    Subjective Comments pt reports that she has been walking around [facility]. states that they put her on her new pain medication and it doesn't work, it keeps me up at night. have been on it two days.  -       Precautions and Contraindications    Precautions/Limitations no known precautions/limitations  -       Subjective Pain    Able to rate subjective pain? yes  -    Pre-Treatment Pain Level 6  -    Post-Treatment Pain Level 8  -       Posture/Observations    Posture/Observations Comments no distress  -          User Key  (r) = Recorded By, (t) = Taken By, (c) = Cosigned By    Initials Name Provider Type     Rose Canchola PTA Physical Therapy Assistant                             PT Assessment/Plan     Row Name 11/04/21 1300          PT Assessment    Assessment Comments patient is able to complkete 20 reps of bridges with her arms across her chest. does have increase in reports of pain post treatment. gives good effort with  current HEP. challenged with core stab on alysa disc this treatment and needs multiple postural cues.  -            PT Plan    PT Frequency 2x/week  -     PT Plan Comments continue core stab on alysa disc  -           User Key  (r) = Recorded By, (t) = Taken By, (c) = Cosigned By    Initials Name Provider Type     Rose Canchola, PTA Physical Therapy Assistant                   OP Exercises     Row Name 11/04/21 1300             Subjective Comments    Subjective Comments pt reports that she has been walking around [facility]. states that they put her on her new pain medication and it doesn't work, it keeps me up at night. have been on it two days.  -              Subjective Pain    Able to rate subjective pain? yes  -      Pre-Treatment Pain Level 6  -      Post-Treatment Pain Level 8  -              Exercise 1    Exercise Name 1 Pro II LE's for posture, strength, endurance  -      Time 1 10 minutes  -      Additional Comments L 6.0  -              Exercise 2    Exercise Name 2 B St. HS S  -      Reps 2 2  -      Time 2 30 seconds  -              Exercise 3    Exercise Name 3 B Sitting Piriformis S  -      Reps 3 2  -      Time 3 30 sec hold  -              Exercise 4    Exercise Name 4 DD seated Core Stab: Alt Marching  -      Time 4 alternating every 5 seconds for 3 minutes  -              Exercise 5    Exercise Name 5 DD seated core stab: Alt LAQ  -      Time 5 alternatiing every 5 seconds for 3 minutes  -              Exercise 6    Exercise Name 6 DD seated core strength: Wand Chest Press out and press up  -      Time 6 alternating every 5 seconds for 3 minutes  -      Additional Comments 2# therawand  -              Exercise 7    Exercise Name 7 DD Seated core stab: Trunk Rotation  -      Time 7 alternating every 5 seconds for 3 minutes  -      Additional Comments 2# therawand  -              Exercise 8    Exercise Name 8 Bridges  -      Reps 8 20  -       "Time 8 5 sec hold  -              Exercise 9    Exercise Name 9 DKTC Physioball Rolls  -      Reps 9 20  -      Time 9 5 sec hold  -              Exercise 10    Exercise Name 10 LTR  -      Reps 10 10  -      Time 10 10 sec hold  -            User Key  (r) = Recorded By, (t) = Taken By, (c) = Cosigned By    Initials Name Provider Type     Rose Canchola PTA Physical Therapy Assistant                              PT OP Goals     Row Name 11/04/21 1300          PT Short Term Goals    STG 1 I with HEP and have additions/changes by next recertification  -     STG 1 Progress Partially Met; Ongoing  -     STG 2 Patient able to show proper log roll technique.  -     STG 2 Progress Met  -     STG 3 Patient to be more aware of posture and posture correction techniques  -     STG 3 Progress Progressing  -     STG 4 AROM for lumbar extension >= 10°  -     STG 5 AROM B Lumbar SB >=50% of range.  -     STG 6 AROM B Lumbar ROT >=50% of range  -            Long Term Goals    LTG 1 Patient to have AROM for the lumbar spine all WFL, no increase in pain, (ext >= 20°, flex >= 70°, SB/ROT >= 75% of range).  -     LTG 2 B LE 5/5.  -     LTG 3 Patient able to perform 20 Bridges with UE \"X\"  with no increase in pain.  -     LTG 3 Progress Partially Met  -     LTG 4 Patient able to show proper lifting technique floor to waist with no increase in pain.  -     LTG 5 Patient able to show proper ergonomics for mopping/sweeping/vacuuming.  -     LTG 6 I with final HEP.  -            Time Calculation    PT Goal Re-Cert Due Date 11/11/21  -           User Key  (r) = Recorded By, (t) = Taken By, (c) = Cosigned By    Initials Name Provider Type     Rose Canchola PTA Physical Therapy Assistant                Therapy Education  Given: HEP, Symptoms/condition management, Pain management, Posture/body mechanics  Program: Reinforced  How Provided: Verbal, Demonstration  Provided to: Patient  Level " of Understanding: Teach back education performed, Verbalized, Demonstrated              Time Calculation:   Start Time: 1303  Stop Time: 1348  Time Calculation (min): 45 min  Total Timed Code Minutes- PT: 45 minute(s)  Therapy Charges for Today     Code Description Service Date Service Provider Modifiers Qty    98466245073 HC PT THER PROC EA 15 MIN 11/4/2021 Rose Canchola PTA GP 2    04194597518 HC PT THER SUPP EA 15 MIN 11/4/2021 Rose Canchola PTA GP 1    60779729307 HC PT NEUROMUSC RE EDUCATION EA 15 MIN 11/4/2021 Rose Canchola PTA GP 1                    Rose Canchola PTA  11/4/2021       This document has been electronically signed by Rose Canchola PTA on November 4, 2021 13:54 CDT

## 2021-11-09 ENCOUNTER — HOSPITAL ENCOUNTER (OUTPATIENT)
Dept: PHYSICAL THERAPY | Facility: HOSPITAL | Age: 51
Setting detail: THERAPIES SERIES
Discharge: HOME | End: 2021-11-09

## 2021-11-09 DIAGNOSIS — M54.16 LUMBAR RADICULOPATHY: ICD-10-CM

## 2021-11-09 DIAGNOSIS — M54.50 CHRONIC LOW BACK PAIN, UNSPECIFIED BACK PAIN LATERALITY, UNSPECIFIED WHETHER SCIATICA PRESENT: ICD-10-CM

## 2021-11-09 DIAGNOSIS — G89.29 CHRONIC LOW BACK PAIN, UNSPECIFIED BACK PAIN LATERALITY, UNSPECIFIED WHETHER SCIATICA PRESENT: ICD-10-CM

## 2021-11-09 DIAGNOSIS — M54.59 MECHANICAL LOW BACK PAIN: Primary | ICD-10-CM

## 2021-11-09 PROCEDURE — 97530 THERAPEUTIC ACTIVITIES: CPT | Performed by: PHYSICAL THERAPIST

## 2021-11-09 PROCEDURE — 97110 THERAPEUTIC EXERCISES: CPT | Performed by: PHYSICAL THERAPIST
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
## 2021-11-11 ENCOUNTER — SPECIALTY PHARMACY (OUTPATIENT)
Dept: PHARMACY | Facility: HOSPITAL | Age: 51
End: 2021-11-11

## 2021-11-12 ENCOUNTER — APPOINTMENT (OUTPATIENT)
Dept: PHYSICAL THERAPY | Facility: HOSPITAL | Age: 51
End: 2021-11-12

## 2021-11-16 ENCOUNTER — APPOINTMENT (OUTPATIENT)
Dept: PHYSICAL THERAPY | Facility: HOSPITAL | Age: 51
End: 2021-11-16

## 2021-11-18 ENCOUNTER — APPOINTMENT (OUTPATIENT)
Dept: PHYSICAL THERAPY | Facility: HOSPITAL | Age: 51
End: 2021-11-18

## 2021-12-07 ENCOUNTER — OFFICE VISIT (OUTPATIENT)
Dept: NEUROSURGERY | Facility: CLINIC | Age: 51
End: 2021-12-07

## 2021-12-07 VITALS — WEIGHT: 246 LBS | HEIGHT: 66 IN | BODY MASS INDEX: 39.53 KG/M2

## 2021-12-07 DIAGNOSIS — M79.605 PAIN IN BOTH LOWER EXTREMITIES: ICD-10-CM

## 2021-12-07 DIAGNOSIS — M79.604 PAIN IN BOTH LOWER EXTREMITIES: ICD-10-CM

## 2021-12-07 DIAGNOSIS — E66.09 CLASS 2 OBESITY DUE TO EXCESS CALORIES WITH BODY MASS INDEX (BMI) OF 39.0 TO 39.9 IN ADULT, UNSPECIFIED WHETHER SERIOUS COMORBIDITY PRESENT: ICD-10-CM

## 2021-12-07 DIAGNOSIS — M54.59 MECHANICAL LOW BACK PAIN: Primary | ICD-10-CM

## 2021-12-07 PROCEDURE — 99214 OFFICE O/P EST MOD 30 MIN: CPT | Performed by: NURSE PRACTITIONER

## 2021-12-07 RX ORDER — DULOXETIN HYDROCHLORIDE 60 MG/1
CAPSULE, DELAYED RELEASE ORAL
COMMUNITY
Start: 2021-12-02

## 2021-12-07 RX ORDER — PREGABALIN 50 MG/1
CAPSULE ORAL
COMMUNITY
Start: 2021-11-02

## 2021-12-07 RX ORDER — ERTUGLIFLOZIN 5 MG/1
TABLET, FILM COATED ORAL
COMMUNITY
Start: 2021-11-10

## 2021-12-07 NOTE — PATIENT INSTRUCTIONS
"https://www.nhlbi.nih.gov/files/docs/public/heart/dash_brief.pdf\">   DASH Eating Plan  DASH stands for Dietary Approaches to Stop Hypertension. The DASH eating plan is a healthy eating plan that has been shown to:  · Reduce high blood pressure (hypertension).  · Reduce your risk for type 2 diabetes, heart disease, and stroke.  · Help with weight loss.  What are tips for following this plan?  Reading food labels  · Check food labels for the amount of salt (sodium) per serving. Choose foods with less than 5 percent of the Daily Value of sodium. Generally, foods with less than 300 milligrams (mg) of sodium per serving fit into this eating plan.  · To find whole grains, look for the word \"whole\" as the first word in the ingredient list.  Shopping  · Buy products labeled as \"low-sodium\" or \"no salt added.\"  · Buy fresh foods. Avoid canned foods and pre-made or frozen meals.  Cooking  · Avoid adding salt when cooking. Use salt-free seasonings or herbs instead of table salt or sea salt. Check with your health care provider or pharmacist before using salt substitutes.  · Do not juan foods. Cook foods using healthy methods such as baking, boiling, grilling, roasting, and broiling instead.  · Cook with heart-healthy oils, such as olive, canola, avocado, soybean, or sunflower oil.  Meal planning    · Eat a balanced diet that includes:  ? 4 or more servings of fruits and 4 or more servings of vegetables each day. Try to fill one-half of your plate with fruits and vegetables.  ? 6-8 servings of whole grains each day.  ? Less than 6 oz (170 g) of lean meat, poultry, or fish each day. A 3-oz (85-g) serving of meat is about the same size as a deck of cards. One egg equals 1 oz (28 g).  ? 2-3 servings of low-fat dairy each day. One serving is 1 cup (237 mL).  ? 1 serving of nuts, seeds, or beans 5 times each week.  ? 2-3 servings of heart-healthy fats. Healthy fats called omega-3 fatty acids are found in foods such as walnuts, " flaxseeds, fortified milks, and eggs. These fats are also found in cold-water fish, such as sardines, salmon, and mackerel.  · Limit how much you eat of:  ? Canned or prepackaged foods.  ? Food that is high in trans fat, such as some fried foods.  ? Food that is high in saturated fat, such as fatty meat.  ? Desserts and other sweets, sugary drinks, and other foods with added sugar.  ? Full-fat dairy products.  · Do not salt foods before eating.  · Do not eat more than 4 egg yolks a week.  · Try to eat at least 2 vegetarian meals a week.  · Eat more home-cooked food and less restaurant, buffet, and fast food.    Lifestyle  · When eating at a restaurant, ask that your food be prepared with less salt or no salt, if possible.  · If you drink alcohol:  ? Limit how much you use to:  § 0-1 drink a day for women who are not pregnant.  § 0-2 drinks a day for men.  ? Be aware of how much alcohol is in your drink. In the [place] one drink equals one 12 oz bottle of beer (355 mL), one 5 oz glass of wine (148 mL), or one 1½ oz glass of hard liquor (44 mL).  General information  · Avoid eating more than 2,300 mg of salt a day. If you have hypertension, you may need to reduce your sodium intake to 1,500 mg a day.  · Work with your health care provider to maintain a healthy body weight or to lose weight. Ask what an ideal weight is for you.  · Get at least 30 minutes of exercise that causes your heart to beat faster (aerobic exercise) most days of the week. Activities may include walking, swimming, or biking.  · Work with your health care provider or dietitian to adjust your eating plan to your individual calorie needs.  What foods should I eat?  Fruits  All fresh, dried, or frozen fruit. Canned fruit in natural juice (without added sugar).  Vegetables  Fresh or frozen vegetables (raw, steamed, roasted, or grilled). Low-sodium or reduced-sodium tomato and vegetable juice. Low-sodium or reduced-sodium tomato sauce and tomato paste.  Low-sodium or reduced-sodium canned vegetables.  Grains  Whole-grain or whole-wheat bread. Whole-grain or whole-wheat pasta. Brown rice. Oatmeal. Quinoa. Bulgur. Whole-grain and low-sodium cereals. Darling bread. Low-fat, low-sodium crackers. Whole-wheat flour tortillas.  Meats and other proteins  Skinless chicken or turkey. Ground chicken or turkey. Pork with fat trimmed off. Fish and seafood. Egg whites. Dried beans, peas, or lentils. Unsalted nuts, nut butters, and seeds. Unsalted canned beans. Lean cuts of beef with fat trimmed off. Low-sodium, lean precooked or cured meat, such as sausages or meat loaves.  Dairy  Low-fat (1%) or fat-free (skim) milk. Reduced-fat, low-fat, or fat-free cheeses. Nonfat, low-sodium ricotta or cottage cheese. Low-fat or nonfat yogurt. Low-fat, low-sodium cheese.  Fats and oils  Soft margarine without trans fats. Vegetable oil. Reduced-fat, low-fat, or light mayonnaise and salad dressings (reduced-sodium). Canola, safflower, olive, avocado, soybean, and sunflower oils. Avocado.  Seasonings and condiments  Herbs. Spices. Seasoning mixes without salt.  Other foods  Unsalted popcorn and pretzels. Fat-free sweets.  The items listed above may not be a complete list of foods and beverages you can eat. Contact a dietitian for more information.  What foods should I avoid?  Fruits  Canned fruit in a light or heavy syrup. Fried fruit. Fruit in cream or butter sauce.  Vegetables  Creamed or fried vegetables. Vegetables in a cheese sauce. Regular canned vegetables (not low-sodium or reduced-sodium). Regular canned tomato sauce and paste (not low-sodium or reduced-sodium). Regular tomato and vegetable juice (not low-sodium or reduced-sodium). Pickles. Olives.  Grains  Baked goods made with fat, such as croissants, muffins, or some breads. Dry pasta or rice meal packs.  Meats and other proteins  Fatty cuts of meat. Ribs. Fried meat. Payton. Bologna, salami, and other precooked or cured meats, such as  sausages or meat loaves. Fat from the back of a pig (fatback). Bratwurst. Salted nuts and seeds. Canned beans with added salt. Canned or smoked fish. Whole eggs or egg yolks. Chicken or turkey with skin.  Dairy  Whole or 2% milk, cream, and half-and-half. Whole or full-fat cream cheese. Whole-fat or sweetened yogurt. Full-fat cheese. Nondairy creamers. Whipped toppings. Processed cheese and cheese spreads.  Fats and oils  Butter. Stick margarine. Lard. Shortening. Ghee. Payton fat. Tropical oils, such as coconut, palm kernel, or palm oil.  Seasonings and condiments  Onion salt, garlic salt, seasoned salt, table salt, and sea salt. Worcestershire sauce. Tartar sauce. Barbecue sauce. Teriyaki sauce. Soy sauce, including reduced-sodium. Steak sauce. Canned and packaged gravies. Fish sauce. Oyster sauce. Cocktail sauce. Store-bought horseradish. Ketchup. Mustard. Meat flavorings and tenderizers. Bouillon cubes. Hot sauces. Pre-made or packaged marinades. Pre-made or packaged taco seasonings. Relishes. Regular salad dressings.  Other foods  Salted popcorn and pretzels.  The items listed above may not be a complete list of foods and beverages you should avoid. Contact a dietitian for more information.  Where to find more information  · National Heart, Lung, and Blood [place]: www.nhlbi.nih.gov  · American Heart Association: www.heart.org  · Academy of Nutrition and Dietetics: www.eatright.org  · National Kidney Foundation: www.kidney.org  Summary  · The DASH eating plan is a healthy eating plan that has been shown to reduce high blood pressure (hypertension). It may also reduce your risk for type 2 diabetes, heart disease, and stroke.  · When on the DASH eating plan, aim to eat more fresh fruits and vegetables, whole grains, lean proteins, low-fat dairy, and heart-healthy fats.  · With the DASH eating plan, you should limit salt (sodium) intake to 2,300 mg a day. If you have hypertension, you may need to reduce your  sodium intake to 1,500 mg a day.  · Work with your health care provider or dietitian to adjust your eating plan to your individual calorie needs.  This information is not intended to replace advice given to you by your health care provider. Make sure you discuss any questions you have with your health care provider.  Document Revised: 11/20/2020 Document Reviewed: 11/20/2020  [employer] Patient Education © 2021 [employer]      Tobacco Use Disorder  Tobacco use disorder (TUD) occurs when a person craves, seeks, and uses tobacco, regardless of the consequences. This disorder can cause problems with mental and physical health. It can affect your ability to have healthy relationships, and it can keep you from meeting your responsibilities at work, home, or school.  Tobacco may be:  · Smoked as a cigarette or cigar.  · Inhaled using e-cigarettes.  · Smoked in a pipe or hookah.  · Chewed as smokeless tobacco.  · Inhaled into the nostrils as snuff.  Tobacco products contain a dangerous chemical called nicotine, which is very addictive. Nicotine triggers hormones that make the body feel stimulated and works on areas of the brain that make you feel good. These effects can make it hard for people to quit nicotine.  Tobacco contains many other unsafe chemicals that can damage almost every organ in the body. Smoking tobacco also puts others in danger due to fire risk and possible health problems caused by breathing in secondhand smoke.  What are the signs or symptoms?  Symptoms of TUD may include:  · Being unable to slow down or stop your tobacco use.  · Spending an abnormal amount of time getting or using tobacco.  · Craving tobacco. Cravings may last for up to 6 months after quitting.  · Tobacco use that:  ? Interferes with your work, school, or home life.  ? Interferes with your personal and social relationships.  ? Makes you give up activities that you once enjoyed or found important.  · Using tobacco even though you know  that it is:  ? Dangerous or bad for your health or someone else's health.  ? Causing problems in your life.  · Needing more and more of the substance to get the same effect (developing tolerance).  · Experiencing unpleasant symptoms if you do not use the substance (withdrawal). Withdrawal symptoms may include:  ? Depressed, anxious, or irritable mood.  ? Difficulty concentrating.  ? Increased appetite.  ? Restlessness or trouble sleeping.  · Using the substance to avoid withdrawal.  How is this diagnosed?  This condition may be diagnosed based on:  · Your current and past tobacco use. Your health care provider may ask questions about how your tobacco use affects your life.  · A physical exam.  You may be diagnosed with TUD if you have at least two symptoms within a 12-month period.  How is this treated?  This condition is treated by stopping tobacco use. Many people are unable to quit on their own and need help. Treatment may include:  · Nicotine replacement therapy (NRT). NRT provides nicotine without the other harmful chemicals in tobacco. NRT gradually lowers the dosage of nicotine in the body and reduces withdrawal symptoms. NRT is available as:  ? Over-the-counter gums, lozenges, and skin patches.  ? Prescription mouth inhalers and nasal sprays.  · Medicine that acts on the brain to reduce cravings and withdrawal symptoms.  · A type of talk therapy that examines your triggers for tobacco use, how to avoid them, and how to cope with cravings (behavioral therapy).  · Hypnosis. This may help with withdrawal symptoms.  · Joining a support group for others coping with TUD.  The best treatment for TUD is usually a combination of medicine, talk therapy, and support groups. Recovery can be a long process. Many people start using tobacco again after stopping (relapse). If you relapse, it does not mean that treatment will not work.  Follow these instructions at home:    Lifestyle  · Do not use any products that contain  nicotine or tobacco, such as cigarettes and e-cigarettes.  · Avoid things that trigger tobacco use as much as you can. Triggers include people and situations that usually cause you to use tobacco.  · Avoid drinks that contain caffeine, including coffee. These may worsen some withdrawal symptoms.  · Find ways to manage stress. Wanting to smoke may cause stress, and stress can make you want to smoke. Relaxation techniques such as deep breathing, meditation, and yoga may help.  · Attend support groups as needed. These groups are an important part of long-term recovery for many people.  General instructions  · Take over-the-counter and prescription medicines only as told by your health care provider.  · Check with your health care provider before taking any new prescription or over-the-counter medicines.  · Decide on a friend, family member, or smoking quit-line (such as 1-800-QUIT-NOW in the [place] that you can call or text when you feel the urge to smoke or when you need help coping with cravings.  · Keep all follow-up visits as told by your health care provider and therapist. This is important.  Contact a health care provider if:  · You are not able to take your medicines as prescribed.  · Your symptoms get worse, even with treatment.  Summary  · Tobacco use disorder (TUD) occurs when a person craves, seeks, and uses tobacco regardless of the consequences.  · This condition may be diagnosed based on your current and past tobacco use and a physical exam.  · Many people are unable to quit on their own and need help. Recovery can be a long process.  · The most effective treatment for TUD is usually a combination of medicine, talk therapy, and support groups.  This information is not intended to replace advice given to you by your health care provider. Make sure you discuss any questions you have with your health care provider.  Document Revised: 12/05/2018 Document Reviewed: 12/05/2018  [employer] Patient Education © 2021  [employer]

## 2021-12-07 NOTE — PROGRESS NOTES
Chief complaint:   Chief Complaint   Patient presents with   • Back Pain     Pt is here for follow up after PT     Subjective     HPI:   Last encounter: 10/14/2021    Interval History: Rhea Sutton is a 51 y.o.  female who presents today for follow-up of lumbar back pain, 90% back, 10% legs.  Ms. Sutton recently completed 6 sessions of physical therapy with no significant relief in her discomfort.  She continues to complain of constant lumbar back pain that waxes and wanes in severity.  She additionally reports intermittent radicular pain to the bilateral posterior thighs that occasionally extends to the mid leg with prolonged sitting and walking.  She denies lower extremity weakness, numbness, or tingling.  Her discomfort worsens with prolonged sitting, standing, and walking.  Alleviating factors include lying in a fetal position and use of Lyrica.  She denies gait or balance abnormalities, need for assist while ambulating, or falls.  She additionally denies fevers, chills, night sweats, unexplained weight loss, saddle anesthesia, bowel bladder dysfunction.  She currently rates the severity of her symptoms 8/10.  No additional concerns at this time.    Oswestry Disability Index = 62%   Score   Pain Intensity Very severe pain-4   Personal Care Look after myslef but very painful-1   Lifting Only very light weights-4   Walking Pain prevents > 1/4 mile-2   Sitting Pain prevents sitting > 30 min-3   Standing Pain limits standing to < 1/2 hr-3   Sleeping Can only sleep < 2 hrs-4   Sex Life (if applicable) Sex is nearly absent due to pain-5   Social Life I do not go out as often because of pain-3   Traveling Pain restricts to < 1 hr-3   (Zaria et al, 1980)    SCORE INTERPRETATION OF THE OSWESTRY LBP DISABILITY QUESTIONNAIRE     60-80% Crippled Back pain impinges on all aspects of these patients' lives both at home and at work. Positive intervention is required.     ROS  Review of Systems    Constitutional: Negative.    HENT: Negative.    Eyes: Negative.    Respiratory: Negative.    Cardiovascular: Negative.    Gastrointestinal: Negative.    Endocrine: Negative.    Genitourinary: Negative.    Musculoskeletal: Positive for back pain.   Skin: Negative.    Allergic/Immunologic: Negative.    Neurological: Negative.    Hematological: Negative.    Psychiatric/Behavioral: Negative.    All other systems reviewed and are negative.    PFSH:  Past Medical History:   Diagnosis Date   • Allergic    • Anxiety    • CAD (coronary artery disease)    • CAD (coronary artery disease), bypass graft transplanted heart 03/05/2019    x4 bypass   • CHF (congestive heart failure) (HCC)    • Chronic back pain    • Chronic bronchitis (HCC)    • Chronic kidney disease    • Depression    • Diabetes mellitus (HCC)    • Diverticulitis 1986 2017   • Goiter    • H/O heart artery stent 01/03/2019   • Hashimoto's thyroiditis    • Head lice infestation    • Hyperlipidemia    • Hypertension    • Injury of back    • MI (myocardial infarction) (HCC)    • MI (myocardial infarction) (HCC)    • Postoperative hypothyroidism    • Sinus congestion    • Sleep apnea     not using c-pap     Past Surgical History:   Procedure Laterality Date   • APPENDECTOMY     • CARDIAC CATHETERIZATION N/A 10/13/2017   • CARDIAC CATHETERIZATION N/A 1/3/2019   • CHOLECYSTECTOMY     • COLON RESECTION     • COLON SURGERY     • CORONARY ARTERY BYPASS GRAFT N/A 3/5/2019    Procedure: CORONARY ARTERY BYPASS GRAFTING ENDOSCOPIC VEIN HARVEST          (CELL SAVER)             LATEX ALLERGY leg start @ 1323 chest start @ 1328 vein out 1417 vein prep [id number] on pump 1456 off pump 1729;  Surgeon: Jaren Vilchis MD;  Location: [facility];  Service: Cardiothoracic   • CORONARY STENT PLACEMENT     • LA RT/LT HEART CATHETERS N/A 10/13/2017   • TOTAL ABDOMINAL HYSTERECTOMY WITH SALPINGO OOPHORECTOMY     • TOTAL THYROIDECTOMY       Objective      Current Outpatient  "Medications   Medication Sig Dispense Refill   • aspirin 81 MG chewable tablet Chew 81 mg daily.     • atorvastatin (Lipitor) 40 MG tablet Take 1 tablet by mouth Daily. 30 tablet 11   • Blood Glucose Monitoring Suppl (FREESTYLE LITE) device      • carvedilol (COREG) 6.25 MG tablet Take 1 tablet by mouth 2 (Two) Times a Day. 180 tablet 3   • cholecalciferol (VITAMIN D3) 1000 units tablet Take 1,000 Units by mouth Daily. 5 capsules daily     • clopidogrel (PLAVIX) 75 MG tablet Take 1 tablet by mouth Daily. 90 tablet 3   • Dulaglutide (Trulicity) 3 MG/0.5ML solution pen-injector Inject 3 mg under the skin into the appropriate area as directed 1 (One) Time Per Week. 4 pen 11   • Dulaglutide 1.5 MG/0.5ML solution pen-injector Inject 1.5 mg under the skin into the appropriate area as directed 1 (One) Time Per Week. 4 pen 5   • DULoxetine (CYMBALTA) 60 MG capsule      • Evolocumab (Repatha SureClick) solution auto-injector SureClick injection Inject 1 mL under the skin into the appropriate area as directed Every 14 (Fourteen) Days. 2 pen 6   • FREESTYLE LITE test strip      • furosemide (LASIX) 20 MG tablet Take 20 mg by mouth Daily As Needed.     • levothyroxine (Synthroid) 200 MCG tablet Take 1 tablet by mouth Daily. 30 tablet 11   • levothyroxine (SYNTHROID, LEVOTHROID) 175 MCG tablet Take 1 tablet by mouth Daily. 30 tablet 5   • lisinopril (PRINIVIL,ZESTRIL) 40 MG tablet Take 1 tablet by mouth Daily. 90 tablet 3   • nitroglycerin (NITROSTAT) 0.4 MG SL tablet 1 under the tongue as needed for angina, may repeat q5mins for up three doses 30 tablet 3   • pregabalin (LYRICA) 50 MG capsule      • Steglatro 5 MG tablet      • Dapagliflozin Propanediol 10 MG tablet One tablet daily before breakfast 30 tablet 11   • Dapagliflozin Propanediol 10 MG tablet One tablet daily before breakfast 30 tablet 11     No current facility-administered medications for this visit.     Vital Signs  Ht 167.6 cm (66\")   Wt 112 kg (246 lb)   " Breastfeeding No   BMI 39.71 kg/m²   Physical Exam  Vitals and nursing note reviewed.   Constitutional:       General: She is not in acute distress.     Appearance: Normal appearance. She is well-developed and well-groomed. She is obese. She is not ill-appearing, toxic-appearing or diaphoretic.      Comments: BMI 39.71   HENT:      Head: Normocephalic and atraumatic.      Right Ear: Hearing normal.      Left Ear: Hearing normal.   Eyes:      Extraocular Movements: EOM normal.      Conjunctiva/sclera: Conjunctivae normal.      Pupils: Pupils are equal, round, and reactive to light.   Neck:      Trachea: Trachea normal.   Cardiovascular:      Rate and Rhythm: Normal rate and regular rhythm.   Pulmonary:      Effort: Pulmonary effort is normal. No tachypnea, bradypnea, accessory muscle usage or respiratory distress.   Abdominal:      Palpations: Abdomen is soft.   Musculoskeletal:      Cervical back: Full passive range of motion without pain and neck supple.   Skin:     General: Skin is warm and dry.   Neurological:      Mental Status: She is alert and oriented to person, place, and time.      GCS: GCS eye subscore is 4. GCS verbal subscore is 5. GCS motor subscore is 6.      Gait: Gait is intact.      Deep Tendon Reflexes:      Reflex Scores:       Tricep reflexes are 1+ on the right side and 1+ on the left side.       Bicep reflexes are 1+ on the right side and 1+ on the left side.       Brachioradialis reflexes are 1+ on the right side and 1+ on the left side.       Patellar reflexes are 1+ on the right side and 1+ on the left side.       Achilles reflexes are 1+ on the right side and 1+ on the left side.  Psychiatric:         Speech: Speech normal.         Behavior: Behavior normal. Behavior is cooperative.       Neurologic Exam     Mental Status   Oriented to person, place, and time.   Attention: normal. Concentration: normal.   Speech: speech is normal   Level of consciousness: alert    Cranial Nerves     CN II    Visual fields full to confrontation.     CN III, IV, VI   Pupils are equal, round, and reactive to light.  Extraocular motions are normal.     CN V   Facial sensation intact.     CN VII   Facial expression full, symmetric.     CN VIII   CN VIII normal.     CN IX, X   CN IX normal.     CN XI   CN XI normal.     Motor Exam   Right arm tone: normal  Left arm tone: normal  Right leg tone: normal  Left leg tone: normal    Strength   Right deltoid: 5/5  Left deltoid: 5/5  Right biceps: 5/5  Left biceps: 5/5  Right triceps: 5/5  Left triceps: 5/5  Right wrist extension: 5/5  Left wrist extension: 5/5  Right iliopsoas: 5/5  Left iliopsoas: 5/5  Right quadriceps: 5/5  Left quadriceps: 5/5  Right anterior tibial: 5/5  Left anterior tibial: 5/5  Right gastroc: 5/5  Left gastroc: 5/5  Right EHL 5/5  Left EHL 5/5       Sensory Exam   Right arm light touch: normal  Left arm light touch: normal  Right leg light touch: normal  Left leg light touch: normal    Gait, Coordination, and Reflexes     Gait  Gait: normal    Tremor   Resting tremor: absent  Intention tremor: absent  Action tremor: absent    Reflexes   Right brachioradialis: 1+  Left brachioradialis: 1+  Right biceps: 1+  Left biceps: 1+  Right triceps: 1+  Left triceps: 1+  Right patellar: 1+  Left patellar: 1+  Right achilles: 1+  Left achilles: 1+  Right plantar: normal  Left plantar: normal  Right Carranza: absent  Left Carranza: absent  Right ankle clonus: absent  Left ankle clonus: absent  Right pendular knee jerk: absent  Left pendular knee jerk: absent  (12 bullet pts)    Results Review:   9/13/2021      Assessment/Plan: Rhea Sutton is a 51 y.o. female with a significant medical history of CAD, MI, CHF, CABG currently on Plavix and aspirin, vitamin D deficiency, hypertension, hyperlipidemia, CKD, diabetes, depression, anxiety, hypothyroidism, sleep apnea, former smoker, and morbid obesity.  OTTO: 64,62.  She presents today with complaint of mechanical low  back pain and intermittent radicular pain to the posterior thighs, occasionally extending to the mid leg, 90% back, 10% legs.  Physical exam findings of gross hyporeflexia, otherwise neurologically intact.  Her imaging shows multilevel degenerative changes that appear worse at L5-S1.     Recommendations:  Mechanical low back pain  Bilateral posterior thigh pain    Mechanical Low Back Pain:    Defined back pain as worsened with sitting and standing and nearly relieved with rest.  This can include referred pain radiating down posterior thighs without descent below the knees.      DDX:  spondylolisthesis with mechanical instability  compression fracture  degenerative facet arthropathy  coronal or sagittal spinal malalignment  failed back syndrome after surgery  flat back syndrome.   MUCH LESS LIKELY  myofascial pain  drug induced myalgias (statins, Neulasta, or phosphodiesterase type V inhibitors such as tadalafil).  Infectious discitis, vertebral osteomyelitis, spinal epidural abscess,   Neoplastic: spinal tumor (intradural or extradural), multiple myeloma, sacroiliitis  Degenerative spine: lumbar stenosis   Spondyloarthropathies including ankylosis spondylitis (HLA-B27), Paget's disease.  Fibromyalgia or other rheumatological disease. Malingering, conversion disorder and secondary gain are diagnoses of exclusion.     Ms. Sutton recently completed 6 sessions of physical therapy with no significant relief in her discomfort.  She continues to complain of constant lumbar back pain that waxes and wanes in severity, as well as intermittent radicular pain to the bilateral posterior thighs that extends to the mid leg.  For further evaluation we will send her for a noncontrasted MRI of the lumbar spine to rule out need for surgical intervention.  We will also send her for an EMG and nerve conduction study of the bilateral lower extremities to confirm or refute radiculopathy from the lumbar spine and/or a peripheral  neuropathy as a causative factor for her lower extremity dysesthesias.  She should continue her current pain medication regimen as prescribed by pain management of [place].  We will have her return for reassessment with Dr. Napier after completion of all testing.  I advised the patient to call to return sooner for new or worsening complaints of weakness, paresthesias, gait disturbances, or any additional concerns.  Treatment options discussed in detail with Rhea and she voiced understanding.  Ms. Sutton agrees with this plan of care.     Obese Class II: 35-39.9kg/m2  Body mass index is 39.71 kg/m².  Information on the DASH diet provided in the AVS.  We will continue to provided diet and exercise information with the goal of weight loss at each scheduled appointment.     Diagnoses and all orders for this visit:    1. Mechanical low back pain (Primary)  -     MRI Lumbar Spine Without Contrast; Future    2. Pain in both lower extremities  -     MRI Lumbar Spine Without Contrast; Future  -     EMG & Nerve Conduction Test; Future    3. Class 2 obesity due to excess calories with body mass index (BMI) of 39.0 to 39.9 in adult, unspecified whether serious comorbidity present      Return for Follow-up with Dr. Napier at next avaliable.    Level of Risk: Moderate due to: undiagnosed new problem  MDM: Moderate  (Mod = 30088, High = 94364)    Thank you, for allowing me to continue to participate in the care of this patient.    Sincerely,  DUC Stafford

## 2021-12-09 ENCOUNTER — TELEPHONE (OUTPATIENT)
Dept: NEUROSURGERY | Facility: CLINIC | Age: 51
End: 2021-12-09

## 2021-12-09 NOTE — TELEPHONE ENCOUNTER
Called pt to see if she would like to  her imaging cd that he/she had brought in from [facility] as we have it uploaded into our system and no longer need it, or we can mail it to her or we can throw it into [employer] to diana. She stated she would like it mailed back to her for her records so I verified address and have put it in the mail today.

## 2022-01-05 NOTE — PROGRESS NOTES
Cardiac Rehab Evaluation: to have another PCI.    PAST SURGICAL HISTORY:  H/O:      History of cerclage, currently pregnant

## 2022-01-18 ENCOUNTER — HOSPITAL ENCOUNTER (OUTPATIENT)
Dept: NEUROLOGY | Facility: HOSPITAL | Age: 52
Discharge: HOME | End: 2022-01-18

## 2022-01-18 ENCOUNTER — HOSPITAL ENCOUNTER (OUTPATIENT)
Dept: MRI IMAGING | Facility: HOSPITAL | Age: 52
Discharge: HOME | End: 2022-01-18

## 2022-01-18 DIAGNOSIS — M79.604 PAIN IN BOTH LOWER EXTREMITIES: ICD-10-CM

## 2022-01-18 DIAGNOSIS — M79.605 PAIN IN BOTH LOWER EXTREMITIES: ICD-10-CM

## 2022-01-18 DIAGNOSIS — M54.59 MECHANICAL LOW BACK PAIN: ICD-10-CM

## 2022-01-18 PROCEDURE — 95886 MUSC TEST DONE W/N TEST COMP: CPT

## 2022-01-18 PROCEDURE — 95911 NRV CNDJ TEST 9-10 STUDIES: CPT

## 2022-01-18 PROCEDURE — 72148 MRI LUMBAR SPINE W/O DYE: CPT

## 2022-01-19 ENCOUNTER — TELEMEDICINE (OUTPATIENT)
Dept: ENDOCRINOLOGY | Facility: CLINIC | Age: 52
End: 2022-01-19

## 2022-01-19 DIAGNOSIS — E78.2 MIXED HYPERLIPIDEMIA: ICD-10-CM

## 2022-01-19 DIAGNOSIS — E55.9 VITAMIN D DEFICIENCY: ICD-10-CM

## 2022-01-19 DIAGNOSIS — E89.0 POSTOPERATIVE HYPOTHYROIDISM: Primary | ICD-10-CM

## 2022-01-19 DIAGNOSIS — E11.65 TYPE 2 DIABETES MELLITUS WITH HYPERGLYCEMIA, WITHOUT LONG-TERM CURRENT USE OF INSULIN: ICD-10-CM

## 2022-01-19 PROCEDURE — 99214 OFFICE O/P EST MOD 30 MIN: CPT | Performed by: NURSE PRACTITIONER

## 2022-01-19 RX ORDER — LEVOTHYROXINE SODIUM 0.2 MG/1
200 TABLET ORAL DAILY
Qty: 30 TABLET | Refills: 11 | Status: SHIPPED | OUTPATIENT
Start: 2022-01-19 | End: 2022-09-14 | Stop reason: SDUPTHER

## 2022-01-19 RX ORDER — DULAGLUTIDE 3 MG/.5ML
3 INJECTION, SOLUTION SUBCUTANEOUS WEEKLY
Qty: 4 PEN | Refills: 11 | Status: SHIPPED | OUTPATIENT
Start: 2022-01-19 | End: 2022-09-14 | Stop reason: SDUPTHER

## 2022-01-19 NOTE — PROGRESS NOTES
Chief Complaint  Thyroid Problem and Diabetes    Subjective          Rhea Sutton presents to [facility] ENDOCRINOLOGY  History of Present Illness       You have chosen to receive care through a telehealth visit.  Do you consent to use a video/audio connection for your medical care today? Yes            TELEHEALTH VIDEO VISIT     This a video visit due to [facility] current guidelines for social distancing due to the COVID 19 pandemic      50 year old female presents for follow up            postoperative hypothyroidism      Total thyroidectomy 2015 due to obstructive goiter         Timing constant         Quality not controlled     Severity moderate     Aggravating factors -- missing medication      Lab Results   Component Value Date    TSH 84.700 (H) 10/11/2021               Type 2 Diabetes        Duration since 2017     Quality not controlled       Severity high      Complications include CAD      CABG times 4 March 2019     Microvascular complications neuropathy                            Lab Results   Component Value Date     HGBA1C 10.80 (H) 10/11/2021          bg readings back at goal       Review of Systems - General ROS: positive for back pain             Objective   Vital Signs:   There were no vitals taken for this visit.    Physical Exam  Neurological:      General: No focal deficit present.      Mental Status: She is alert.   Psychiatric:         Mood and Affect: Mood normal.         Thought Content: Thought content normal.         Judgment: Judgment normal.        Result Review :   The following data was reviewed by: DUC Pressley on 01/19/2022:  Common labs    Common Labsle 10/11/21 10/11/21 10/11/21 10/11/21 10/11/21    0833 0833 0833 0833 0833   Glucose     378 (A)   BUN     12   Creatinine     1.21 (A)   eGFR Non African Am     47 (A)   Sodium     138   Potassium     4.2   Chloride     97 (A)   Calcium     8.8   Albumin     4.20   Total Bilirubin     0.3    Alkaline Phosphatase     115   AST (SGOT)     34 (A)   ALT (SGPT)     44 (A)   WBC   10.02     Hemoglobin   15.0     Hematocrit   44.6     Platelets   246     Total Cholesterol  288 (A)      Triglycerides  133      HDL Cholesterol  77 (A)      LDL Cholesterol   188 (A)      Hemoglobin A1C 10.80 (A)       Microalbumin, Urine    1.6    (A) Abnormal value                      Assessment and Plan    Diagnoses and all orders for this visit:    1. Postoperative hypothyroidism (Primary)    2. Mixed hyperlipidemia    3. Vitamin D deficiency    4. Type 2 diabetes mellitus with hyperglycemia, without long-term current use of insulin (HCC)    Other orders  -     Dapagliflozin Propanediol 10 MG tablet; One tablet daily before breakfast  Dispense: 30 tablet; Refill: 11  -     Dulaglutide (Trulicity) 3 MG/0.5ML solution pen-injector; Inject 0.5 mL under the skin into the appropriate area as directed 1 (One) Time Per Week.  Dispense: 4 pen; Refill: 11  -     levothyroxine (Synthroid) 200 MCG tablet; Take 1 tablet by mouth Daily.  Dispense: 30 tablet; Refill: 11         Hypothyroidism-postsurgical      Patient admitted in the past of noncompliance but states taking faithfully                        Lab Results   Component Value Date     TSH 84.700 (H) 10/11/2021               Levothyroxine  200 mcg daily            ====     Type 2 diabetes                     Lab Results   Component Value Date     HGBA1C 6.60 (H) 09/14/2020         Complicated by CAD  Back in March GFR less than 45 but now 58.      taking farxiga 10 mg one daily        [place] caused knots      On Trulicity  3 mg once weekly      Refusing insulin fast and long acting insuilin      Will have to control diet      Do not go over 45 grams of carbohydrate       metformin stopped due to kidney             Stop Glipizide                  As long as she eats up to 50 grams of carbohdyrate per meal, she doesn't need novolog.   If she exceeds 50 grams of carbohydrate , I  will give her 5 units to 10 units before meals based on carb load. ==she is not using the Novolog             Did discuss that she at least needs a fast acting insulin to have his rescue she still refusing this     She is on have to change her diet if she is only on a meal and the Trulicity and Farxiga product she has to decrease her carbs to 45 g per meal 15 max on snacking         Bone Health     Vitamin d def.            50,000 units weekly  keep taking          Lipid Management:         on lipitor 40 mg -  On repatha--taking               Total Cholesterol   Date Value Ref Range Status   10/11/2021 288 (H) 0 - 200 mg/dL Final            Triglycerides   Date Value Ref Range Status   10/11/2021 133 0 - 150 mg/dL Final            HDL Cholesterol   Date Value Ref Range Status   10/11/2021 77 (H) 40 - 60 mg/dL Final            LDL Cholesterol    Date Value Ref Range Status   10/11/2021 188 (H) 0 - 100 mg/dL Final                        Need labs this week and I will call with results           Follow Up   No follow-ups on file.  Patient was given instructions and counseling regarding her condition or for health maintenance advice. Please see specific information pulled into the AVS if appropriate.         This document has been electronically signed by DUC Pressley on January 19, 2022 10:00 CST.

## 2022-02-08 ENCOUNTER — OFFICE VISIT (OUTPATIENT)
Dept: CARDIOLOGY | Facility: CLINIC | Age: 52
End: 2022-02-08

## 2022-02-08 VITALS
SYSTOLIC BLOOD PRESSURE: 129 MMHG | WEIGHT: 232 LBS | DIASTOLIC BLOOD PRESSURE: 92 MMHG | OXYGEN SATURATION: 98 % | HEIGHT: 66 IN | BODY MASS INDEX: 37.28 KG/M2

## 2022-02-08 DIAGNOSIS — Z95.1 S/P CABG (CORONARY ARTERY BYPASS GRAFT): ICD-10-CM

## 2022-02-08 DIAGNOSIS — E11.65 TYPE 2 DIABETES MELLITUS WITH HYPERGLYCEMIA, WITHOUT LONG-TERM CURRENT USE OF INSULIN: ICD-10-CM

## 2022-02-08 DIAGNOSIS — E78.2 MIXED HYPERLIPIDEMIA: ICD-10-CM

## 2022-02-08 DIAGNOSIS — N18.31 STAGE 3A CHRONIC KIDNEY DISEASE: ICD-10-CM

## 2022-02-08 DIAGNOSIS — I10 PRIMARY HYPERTENSION: Chronic | ICD-10-CM

## 2022-02-08 DIAGNOSIS — E89.0 POSTOPERATIVE HYPOTHYROIDISM: ICD-10-CM

## 2022-02-08 DIAGNOSIS — I25.118 CORONARY ARTERY DISEASE OF NATIVE ARTERY OF NATIVE HEART WITH STABLE ANGINA PECTORIS: Primary | ICD-10-CM

## 2022-02-08 PROCEDURE — 99442 PR PHYS/QHP TELEPHONE EVALUATION 11-20 MIN: CPT | Performed by: INTERNAL MEDICINE

## 2022-02-08 NOTE — PROGRESS NOTES
"Rhea Sutton  51 y.o. female    1. Coronary artery disease of native artery of native heart with stable angina pectoris (Formerly [facility])    2. Primary hypertension    3. Mixed hyperlipidemia    4. S/P CABG (coronary artery bypass graft)    5. Stage 3a chronic kidney disease (Formerly [facility])    6. Type 2 diabetes mellitus with hyperglycemia, without long-term current use of insulin (Formerly [facility])        History of Present Illness:  This visit has been rescheduled as a phone visit to comply with patient safety concerns in accordance with CDC recommendations. Total time of discussion was 18 minutes.    You have chosen to receive care through a telephone visit. Do you consent to use a telephone visit for your medical care today? Yes    Ms. Sutton is a 51-year-old  lady with known coronary artery disease with history of inferior wall myocardial infarction in January 2019 and status post PCI to right coronary artery. She was noted to have multivessel CAD and underwent coronary artery bypass surgery in March 2019.      CORONARY ARTERY BYPASS GRAFTING x4 in March 2019 when she received LIMA to LAD, SVG to diagonal 1, obtuse marginal 3 and PDA.    T he patient has progressed well and denied any cardiac symptoms with no chest pain, shortness of breath, palpitation or dizziness.  She has been compliant with her medications.  She indicated that her heart rate and blood pressure were in the normal range but diastolic blood pressure was mildly elevated.    Allergies   Allergen Reactions   • Coconut Anaphylaxis and Swelling   • Penicillins Swelling     Of the throat   • Adhesive Tape Other (See Comments)     Silk tape- blisters   • Ciprofloxacin Other (See Comments)     \"knots on my shins\"   • Latex Hives         Past Medical History:   Diagnosis Date   • Allergic    • Anxiety    • CAD (coronary artery disease)    • CAD (coronary artery disease), bypass graft transplanted heart 03/05/2019    x4 bypass   • CHF (congestive heart failure) (Former[facility])  "   • Chronic back pain    • Chronic bronchitis (HCC)    • Chronic kidney disease    • Depression    • Diabetes mellitus (HCC)    • Diverticulitis 1986   • Goiter    • H/O heart artery stent 2019   • Hashimoto's thyroiditis    • Head lice infestation    • Hyperlipidemia    • Hypertension    • Injury of back    • MI (myocardial infarction) (HCC)    • MI (myocardial infarction) (HCC)    • Postoperative hypothyroidism    • Sinus congestion    • Sleep apnea     not using c-pap         Past Surgical History:   Procedure Laterality Date   • APPENDECTOMY     • CARDIAC CATHETERIZATION N/A 10/13/2017   • CARDIAC CATHETERIZATION N/A 1/3/2019   • CHOLECYSTECTOMY     • COLON RESECTION     • COLON SURGERY     • CORONARY ARTERY BYPASS GRAFT N/A 3/5/2019    Procedure: CORONARY ARTERY BYPASS GRAFTING ENDOSCOPIC VEIN HARVEST          (CELL SAVER)             LATEX ALLERGY leg start @ 1323 chest start @ 1328 vein out 1417 vein prep [id number] on pump 1456 off pump 1729;  Surgeon: Jaren Vilchis MD;  Location: [facility];  Service: Cardiothoracic   • CORONARY STENT PLACEMENT     • WA RT/LT HEART CATHETERS N/A 10/13/2017   • TOTAL ABDOMINAL HYSTERECTOMY WITH SALPINGO OOPHORECTOMY     • TOTAL THYROIDECTOMY           Family History   Problem Relation Age of Onset   • Coronary artery disease Mother    • Coronary artery disease Father          Social History     Socioeconomic History   • Marital status:    Tobacco Use   • Smoking status: Former Smoker     Packs/day: 0.00     Years: 20.00     Pack years: 0.00     Types: Cigarettes     Start date:      Quit date: 10/12/2017     Years since quittin.3   • Smokeless tobacco: Never Used   Vaping Use   • Vaping Use: Former   Substance and Sexual Activity   • Alcohol use: Never   • Drug use: Never   • Sexual activity: Not Currently         Current Outpatient Medications   Medication Sig Dispense Refill   • aspirin 81 MG chewable tablet Chew 81 mg daily.     •  "atorvastatin (Lipitor) 40 MG tablet Take 1 tablet by mouth Daily. 30 tablet 11   • Blood Glucose Monitoring Suppl (FREESTYLE LITE) device      • carvedilol (COREG) 6.25 MG tablet Take 1 tablet by mouth 2 (Two) Times a Day. 180 tablet 3   • cholecalciferol (VITAMIN D3) 1000 units tablet Take 1,000 Units by mouth Daily. 5 capsules daily     • clopidogrel (PLAVIX) 75 MG tablet Take 1 tablet by mouth Daily. 90 tablet 3   • Dapagliflozin Propanediol 10 MG tablet One tablet daily before breakfast 30 tablet 11   • Dapagliflozin Propanediol 10 MG tablet One tablet daily before breakfast 30 tablet 11   • Dulaglutide (Trulicity) 3 MG/0.5ML solution pen-injector Inject 0.5 mL under the skin into the appropriate area as directed 1 (One) Time Per Week. 4 pen 11   • DULoxetine (CYMBALTA) 60 MG capsule      • Evolocumab (Repatha SureClick) solution auto-injector SureClick injection Inject 1 mL under the skin into the appropriate area as directed Every 14 (Fourteen) Days. 2 pen 6   • FREESTYLE LITE test strip      • furosemide (LASIX) 20 MG tablet Take 20 mg by mouth Daily As Needed.     • levothyroxine (Synthroid) 200 MCG tablet Take 1 tablet by mouth Daily. 30 tablet 11   • lisinopril (PRINIVIL,ZESTRIL) 40 MG tablet Take 1 tablet by mouth Daily. 90 tablet 3   • nitroglycerin (NITROSTAT) 0.4 MG SL tablet 1 under the tongue as needed for angina, may repeat q5mins for up three doses 30 tablet 3   • pregabalin (LYRICA) 50 MG capsule      • sertraline (ZOLOFT) 50 MG tablet      • Steglatro 5 MG tablet        No current facility-administered medications for this visit.         OBJECTIVE    /92 (BP Location: Left arm, Patient Position: Sitting, Cuff Size: Adult)   Ht 167.6 cm (66\")   Wt 105 kg (232 lb)   SpO2 98%   BMI 37.45 kg/m²       Review of Systems : The following systems are reviewed and no changes noted    Constitutional:  Denies recent weight loss, weight gain, fever or chills     HENT:  Denies any hearing loss, " epistaxis, hoarseness, or difficulty speaking.     Eyes: Wears eyeglasses or contact lenses     Respiratory:  Denies dyspnea with exertion,no cough, wheezing, or hemoptysis.     Cardiovascular: Negative for palpations, chest pain, orthopnea, PND    Gastrointestinal:  Denies change in bowel habits, dyspepsia, ulcer disease, hematochezia, or melena.     Endocrine: Negative for cold intolerance, heat intolerance, polydipsia, polyphagia and polyuria.     Genitourinary: Negative.      Musculoskeletal: Denies any history of arthritic symptoms or back problems      Neurological:  Denies any history of recurrent headaches, strokes, TIA, or seizure disorder.        Lab Results   Component Value Date    WBC 10.02 10/11/2021    HGB 15.0 10/11/2021    HCT 44.6 10/11/2021    MCV 89.7 10/11/2021     10/11/2021     Lab Results   Component Value Date    GLUCOSE 378 (H) 10/11/2021    BUN 12 10/11/2021    CREATININE 1.21 (H) 10/11/2021    EGFRIFNONA 47 (L) 10/11/2021    BCR 9.9 10/11/2021    CO2 29.3 (H) 10/11/2021    CALCIUM 8.8 10/11/2021    ALBUMIN 4.20 10/11/2021    AST 34 (H) 10/11/2021    ALT 44 (H) 10/11/2021     Lab Results   Component Value Date    CHOL 288 (H) 10/11/2021    CHOL 397 (H) 08/28/2020    CHOL 76 01/17/2019     Lab Results   Component Value Date    TRIG 133 10/11/2021    TRIG 106 08/28/2020    TRIG 63 01/17/2019     Lab Results   Component Value Date    HDL 77 (H) 10/11/2021    HDL 87 (H) 08/28/2020    HDL 54 (L) 01/17/2019     No components found for: LDLCALC  Lab Results   Component Value Date     (H) 10/11/2021     (H) 08/28/2020    LDL <30 01/17/2019     No results found for: HDLLDLRATIO  No components found for: CHOLHDL  Lab Results   Component Value Date    HGBA1C 10.80 (H) 10/11/2021     Lab Results   Component Value Date    TSH 84.700 (H) 10/11/2021    V9ATGNX 83.1 11/27/2019           ASSESSMENT AND PLAN  Ms. Sutton has multiple medical issues as discussed in detail under history  of present illness with documented coronary artery disease status post CABG, hypertension,  hyperlipidemia, hypothyroidism, diabetes mellitus.  She has been using Repatha on a regular basis.  She is coming up for lab testing by her primary care physician and a copy of the results will be reviewed.  Very likely, her LDL has improved.  Her diabetes was not under control with a HbA1c of 10.8 in October 2021.    Antiplatelet therapy with aspirin and Plavix, antihypertensive therapy with carvedilol, lisinopril, lipid-lowering therapy with Repatha and low-dose Lasix has been continued.    Diagnoses and all orders for this visit:    1. Coronary artery disease of native artery of native heart with stable angina pectoris (HCC) (Primary)    2. Primary hypertension    3. Mixed hyperlipidemia    4. S/P CABG (coronary artery bypass graft)    5. Stage 3a chronic kidney disease (HCC)    6. Type 2 diabetes mellitus with hyperglycemia, without long-term current use of insulin (HCC)        Patient's Body mass index is 37.45 kg/m². BMI is above normal parameters. Recommendations include: exercise counseling and nutrition counseling.  Patient is a non-smoker    Mian Montana MD  2/8/2022  10:55 CST

## 2022-04-09 DIAGNOSIS — E78.2 MIXED HYPERLIPIDEMIA: ICD-10-CM

## 2022-04-11 RX ORDER — EVOLOCUMAB 140 MG/ML
INJECTION, SOLUTION SUBCUTANEOUS
Qty: 2 ML | Refills: 6 | Status: SHIPPED | OUTPATIENT
Start: 2022-04-11 | End: 2023-01-18

## 2022-05-18 ENCOUNTER — OFFICE VISIT (OUTPATIENT)
Dept: NEUROSURGERY | Facility: CLINIC | Age: 52
End: 2022-05-18

## 2022-05-18 VITALS — BODY MASS INDEX: 41.3 KG/M2 | WEIGHT: 257 LBS | HEIGHT: 66 IN

## 2022-05-18 DIAGNOSIS — Z87.891 FORMER SMOKER: ICD-10-CM

## 2022-05-18 DIAGNOSIS — M51.36 DDD (DEGENERATIVE DISC DISEASE), LUMBAR: Primary | ICD-10-CM

## 2022-05-18 DIAGNOSIS — E66.01 MORBID OBESITY WITH BMI OF 40.0-44.9, ADULT: ICD-10-CM

## 2022-05-18 DIAGNOSIS — M54.50 LUMBAR PAIN: ICD-10-CM

## 2022-05-18 PROCEDURE — 99213 OFFICE O/P EST LOW 20 MIN: CPT | Performed by: NEUROLOGICAL SURGERY

## 2022-05-20 ENCOUNTER — DOCUMENTATION (OUTPATIENT)
Dept: PHARMACY | Facility: TELEHEALTH | Age: 52
End: 2022-05-20

## 2022-09-06 RX ORDER — CLOPIDOGREL BISULFATE 75 MG/1
75 TABLET ORAL DAILY
Qty: 90 TABLET | Refills: 1 | Status: SHIPPED | OUTPATIENT
Start: 2022-09-06

## 2022-09-06 RX ORDER — LISINOPRIL 40 MG/1
40 TABLET ORAL DAILY
Qty: 90 TABLET | Refills: 1 | Status: SHIPPED | OUTPATIENT
Start: 2022-09-06

## 2022-09-06 RX ORDER — CARVEDILOL 6.25 MG/1
6.25 TABLET ORAL 2 TIMES DAILY
Qty: 180 TABLET | Refills: 1 | Status: SHIPPED | OUTPATIENT
Start: 2022-09-06

## 2022-09-07 RX ORDER — ATORVASTATIN CALCIUM 40 MG/1
40 TABLET, FILM COATED ORAL DAILY
Qty: 30 TABLET | Refills: 11 | Status: SHIPPED | OUTPATIENT
Start: 2022-09-07 | End: 2023-09-07

## 2022-09-09 ENCOUNTER — LAB (OUTPATIENT)
Dept: LAB | Facility: HOSPITAL | Age: 52
End: 2022-09-09

## 2022-09-09 DIAGNOSIS — E11.65 TYPE 2 DIABETES MELLITUS WITH HYPERGLYCEMIA, WITHOUT LONG-TERM CURRENT USE OF INSULIN: ICD-10-CM

## 2022-09-09 DIAGNOSIS — E55.9 VITAMIN D DEFICIENCY: ICD-10-CM

## 2022-09-09 DIAGNOSIS — I10 PRIMARY HYPERTENSION: ICD-10-CM

## 2022-09-09 DIAGNOSIS — E78.2 MIXED HYPERLIPIDEMIA: ICD-10-CM

## 2022-09-09 DIAGNOSIS — E89.0 POSTSURGICAL HYPOTHYROIDISM: ICD-10-CM

## 2022-09-09 PROCEDURE — 83036 HEMOGLOBIN GLYCOSYLATED A1C: CPT

## 2022-09-09 PROCEDURE — 80061 LIPID PANEL: CPT

## 2022-09-09 PROCEDURE — 82607 VITAMIN B-12: CPT

## 2022-09-09 PROCEDURE — 80050 GENERAL HEALTH PANEL: CPT

## 2022-09-09 PROCEDURE — 82306 VITAMIN D 25 HYDROXY: CPT

## 2022-09-10 LAB
25(OH)D3 SERPL-MCNC: 27.3 NG/ML (ref 30–100)
ALBUMIN SERPL-MCNC: 4 G/DL (ref 3.5–5.2)
ALBUMIN/GLOB SERPL: 1.3 G/DL
ALP SERPL-CCNC: 110 U/L (ref 39–117)
ALT SERPL W P-5'-P-CCNC: 33 U/L (ref 1–33)
ANION GAP SERPL CALCULATED.3IONS-SCNC: 11.4 MMOL/L (ref 5–15)
AST SERPL-CCNC: 22 U/L (ref 1–32)
BASOPHILS # BLD AUTO: 0.09 10*3/MM3 (ref 0–0.2)
BASOPHILS NFR BLD AUTO: 0.8 % (ref 0–1.5)
BILIRUB SERPL-MCNC: 0.3 MG/DL (ref 0–1.2)
BUN SERPL-MCNC: 16 MG/DL (ref 6–20)
BUN/CREAT SERPL: 13.7 (ref 7–25)
CALCIUM SPEC-SCNC: 9 MG/DL (ref 8.6–10.5)
CHLORIDE SERPL-SCNC: 105 MMOL/L (ref 98–107)
CHOLEST SERPL-MCNC: 137 MG/DL (ref 0–200)
CO2 SERPL-SCNC: 25.6 MMOL/L (ref 22–29)
CREAT SERPL-MCNC: 1.17 MG/DL (ref 0.57–1)
DEPRECATED RDW RBC AUTO: 41.4 FL (ref 37–54)
EGFRCR SERPLBLD CKD-EPI 2021: 56.6 ML/MIN/1.73
EOSINOPHIL # BLD AUTO: 0.1 10*3/MM3 (ref 0–0.4)
EOSINOPHIL NFR BLD AUTO: 0.8 % (ref 0.3–6.2)
ERYTHROCYTE [DISTWIDTH] IN BLOOD BY AUTOMATED COUNT: 12.8 % (ref 12.3–15.4)
GLOBULIN UR ELPH-MCNC: 3.1 GM/DL
GLUCOSE SERPL-MCNC: 204 MG/DL (ref 65–99)
HBA1C MFR BLD: 8 % (ref 4.8–5.6)
HCT VFR BLD AUTO: 40.1 % (ref 34–46.6)
HDLC SERPL-MCNC: 50 MG/DL (ref 40–60)
HGB BLD-MCNC: 13.6 G/DL (ref 12–15.9)
IMM GRANULOCYTES # BLD AUTO: 0.19 10*3/MM3 (ref 0–0.05)
IMM GRANULOCYTES NFR BLD AUTO: 1.6 % (ref 0–0.5)
LDLC SERPL CALC-MCNC: 70 MG/DL (ref 0–100)
LDLC/HDLC SERPL: 1.39 {RATIO}
LYMPHOCYTES # BLD AUTO: 2.71 10*3/MM3 (ref 0.7–3.1)
LYMPHOCYTES NFR BLD AUTO: 22.9 % (ref 19.6–45.3)
MCH RBC QN AUTO: 29.8 PG (ref 26.6–33)
MCHC RBC AUTO-ENTMCNC: 33.9 G/DL (ref 31.5–35.7)
MCV RBC AUTO: 87.9 FL (ref 79–97)
MONOCYTES # BLD AUTO: 0.73 10*3/MM3 (ref 0.1–0.9)
MONOCYTES NFR BLD AUTO: 6.2 % (ref 5–12)
NEUTROPHILS NFR BLD AUTO: 67.7 % (ref 42.7–76)
NEUTROPHILS NFR BLD AUTO: 8 10*3/MM3 (ref 1.7–7)
NRBC BLD AUTO-RTO: 0 /100 WBC (ref 0–0.2)
PLATELET # BLD AUTO: 315 10*3/MM3 (ref 140–450)
PMV BLD AUTO: 10.7 FL (ref 6–12)
POTASSIUM SERPL-SCNC: 4.2 MMOL/L (ref 3.5–5.2)
PROT SERPL-MCNC: 7.1 G/DL (ref 6–8.5)
RBC # BLD AUTO: 4.56 10*6/MM3 (ref 3.77–5.28)
SODIUM SERPL-SCNC: 142 MMOL/L (ref 136–145)
TRIGL SERPL-MCNC: 88 MG/DL (ref 0–150)
TSH SERPL DL<=0.05 MIU/L-ACNC: 1.26 UIU/ML (ref 0.27–4.2)
VIT B12 BLD-MCNC: 312 PG/ML (ref 211–946)
VLDLC SERPL-MCNC: 17 MG/DL (ref 5–40)
WBC NRBC COR # BLD: 11.82 10*3/MM3 (ref 3.4–10.8)

## 2022-09-14 ENCOUNTER — TELEMEDICINE (OUTPATIENT)
Dept: ENDOCRINOLOGY | Facility: CLINIC | Age: 52
End: 2022-09-14

## 2022-09-14 DIAGNOSIS — I10 PRIMARY HYPERTENSION: ICD-10-CM

## 2022-09-14 DIAGNOSIS — E89.0 POSTSURGICAL HYPOTHYROIDISM: Primary | ICD-10-CM

## 2022-09-14 DIAGNOSIS — E11.65 TYPE 2 DIABETES MELLITUS WITH HYPERGLYCEMIA, WITHOUT LONG-TERM CURRENT USE OF INSULIN: ICD-10-CM

## 2022-09-14 PROCEDURE — 99214 OFFICE O/P EST MOD 30 MIN: CPT | Performed by: NURSE PRACTITIONER

## 2022-09-14 RX ORDER — DULAGLUTIDE 3 MG/.5ML
3 INJECTION, SOLUTION SUBCUTANEOUS WEEKLY
Qty: 9 ML | Refills: 11 | Status: SHIPPED | OUTPATIENT
Start: 2022-09-14

## 2022-09-14 RX ORDER — LEVOTHYROXINE SODIUM 0.2 MG/1
200 TABLET ORAL DAILY
Qty: 30 TABLET | Refills: 11 | Status: SHIPPED | OUTPATIENT
Start: 2022-09-14 | End: 2023-09-14

## 2022-09-14 NOTE — PROGRESS NOTES
Chief Complaint  Diabetes and Thyroid Problem    Subjective          Rhea Sutton presents to [facility]  History of Present Illness         You have chosen to receive care through a telehealth visit.  Do you consent to use a video/audio connection for your medical care today? Yes            TELEHEALTH VIDEO VISIT     This a video visit due to [facility] current guidelines for social distancing due to the COVID 19 pandemic        Mode of Visit: Video  Location of patient: home  You have chosen to receive care through a telehealth visit.  Does the patient consent to use a video/audio connection for your medical care today? No  The visit included audio and video interaction. No technical issues occurred during this visit.     I was in my office for this visit         50 year old female presents for follow up            postoperative hypothyroidism      Total thyroidectomy 2015 due to obstructive goiter         Timing constant         Quality not controlled     Severity moderate            Type 2 Diabetes        Duration since 2017     Quality not controlled       Severity high      Complications include CAD      CABG times 4 March 2019     Microvascular complications neuropathy             Review of Systems - General ROS: negative              Objective   Vital Signs:   There were no vitals taken for this visit.    Physical Exam  Neurological:      General: No focal deficit present.      Mental Status: She is alert.   Psychiatric:         Mood and Affect: Mood normal.         Thought Content: Thought content normal.         Judgment: Judgment normal.        Result Review :   The following data was reviewed by: DUC Pressley on 01/19/2022:  Common labs    Common Labsle 10/11/21 10/11/21 10/11/21 10/11/21 10/11/21 9/9/22 9/9/22 9/9/22 9/9/22    0833 0833 0833 0833 0833 0941 0941 0941 0941   Glucose     378 (A)   204 (A)    BUN     12   16    Creatinine     1.21  (A)   1.17 (A)    eGFR Non  Am     47 (A)       Sodium     138   142    Potassium     4.2   4.2    Chloride     97 (A)   105    Calcium     8.8   9.0    Albumin     4.20   4.00    Total Bilirubin     0.3   0.3    Alkaline Phosphatase     115   110    AST (SGOT)     34 (A)   22    ALT (SGPT)     44 (A)   33    WBC   10.02   11.82 (A)      Hemoglobin   15.0   13.6      Hematocrit   44.6   40.1      Platelets   246   315      Total Cholesterol  288 (A)       137   Triglycerides  133       88   HDL Cholesterol  77 (A)       50   LDL Cholesterol   188 (A)       70   Hemoglobin A1C 10.80 (A)      8.00 (A)     Microalbumin, Urine    1.6        (A) Abnormal value                        Assessment and Plan    Diagnoses and all orders for this visit:    1. Postsurgical hypothyroidism (Primary)  -     CBC & Differential; Future  -     Comprehensive Metabolic Panel; Future  -     Hemoglobin A1c; Future  -     Lipid Panel; Future  -     Microalbumin / Creatinine Urine Ratio - Urine, Clean Catch; Future  -     TSH; Future    2. Primary hypertension  -     CBC & Differential; Future  -     Comprehensive Metabolic Panel; Future  -     Hemoglobin A1c; Future  -     Lipid Panel; Future  -     Microalbumin / Creatinine Urine Ratio - Urine, Clean Catch; Future  -     TSH; Future    3. Type 2 diabetes mellitus with hyperglycemia, without long-term current use of insulin (HCC)  -     CBC & Differential; Future  -     Comprehensive Metabolic Panel; Future  -     Hemoglobin A1c; Future  -     Lipid Panel; Future  -     Microalbumin / Creatinine Urine Ratio - Urine, Clean Catch; Future  -     TSH; Future    Other orders  -     dapagliflozin Propanediol 10 MG tablet; One tablet daily before breakfast  Dispense: 30 tablet; Refill: 11  -     Dulaglutide (Trulicity) 3 MG/0.5ML solution pen-injector; Inject 0.5 mL under the skin into the appropriate area as directed 1 (One) Time Per Week.  Dispense: 9 mL; Refill: 11  -     levothyroxine  (Synthroid) 200 MCG tablet; Take 1 tablet by mouth Daily.  Dispense: 30 tablet; Refill: 11         Hypothyroidism-postsurgical      Patient admitted in the past of noncompliance but states taking faithfully         Lab Results   Component Value Date    TSH 1.260 09/09/2022              Levothyroxine  200 mcg daily            ====     Type 2 diabetes           Lab Results   Component Value Date    HGBA1C 8.00 (H) 09/09/2022       Complicated by CAD              taking farxiga 10 mg one daily        Bydeuron caused knots      On Trulicity  3 mg once weekly      Refusing insulin fast and long acting insuilin      Will have to control diet      Do not go over 45 grams of carbohydrate       metformin stopped due to kidney             Stop Glipizide                   Bone Health     Vitamin d def.            50,000 units weekly  keep taking          Lipid Management:         on lipitor 40 mg -  On repatha--taking         Total Cholesterol   Date Value Ref Range Status   09/09/2022 137 0 - 200 mg/dL Final     Triglycerides   Date Value Ref Range Status   09/09/2022 88 0 - 150 mg/dL Final     HDL Cholesterol   Date Value Ref Range Status   09/09/2022 50 40 - 60 mg/dL Final     LDL Cholesterol    Date Value Ref Range Status   09/09/2022 70 0 - 100 mg/dL Final                     Follow Up   No follow-ups on file.  Patient was given instructions and counseling regarding her condition or for health maintenance advice. Please see specific information pulled into the AVS if appropriate.         This document has been electronically signed by DUC Pressley on September 14, 2022 11:04 CDT.

## 2022-12-27 ENCOUNTER — TELEPHONE (OUTPATIENT)
Dept: NEUROSURGERY | Age: 52
End: 2022-12-27

## 2022-12-27 NOTE — TELEPHONE ENCOUNTER
1st attempt to contact patient.  I left a voicemail instructing patient to call back at [phone] to schedule their new patient appointment     low back pain  2nd opinion

## 2023-01-03 NOTE — TELEPHONE ENCOUNTER
[facility] New Patient Questionnaire    Diagnosis/Reason for Referral?    low back pain  2nd opinion    2. Who is completing questionnaire? Patient  Caregiver Family      3. Has the patient had any previous spinal/brain surgeries? NO      A. If yes, what is the name of the facility in which the surgery was performed? B. Procedure/Surgery performed? C. Who was the surgeon? D. When was the surgery? MM/YY       E. Did the patient improve after the surgery? 4. Is this a second opinion? If yes, Dr. Tha Livingston would like to review patient first before making the appointment. YES, SEAN    5. Have MRI Images been obtain within the last year? Yes  No      XR  CT     If yes, where was the imaging performed? If yes, what part of the body? Lumbar  Cervical  Thoracic  Brain     If yes, when was it obtained? MM/YY    Note: if the scan was performed at a facility other than [facility], the disc will need to be brought to the appointment or we need to reach out to obtain the disc. A. Was the patient instructed to provide the disc? Yes   No      8. Has the patient had a NCV/EMG within the last year? Yes  No     If yes, where was it performed and date? MM/YY  Location:      9. Has the patient been to Physical Therapy? Yes  No     If yes, what location, how long attended, and last visit? Location: [facility]      Therapy Lasted: 6 WEEKS   Date of Last Visit: OVER TWO YEARS AGO      10. Has the patient been to Pain Management? Yes  No     If yes, what location and last visit     Location: [facility]    Last Visit: TWO YEARS AGO   Is it helping?  NO

## 2023-01-10 ENCOUNTER — TELEMEDICINE (OUTPATIENT)
Dept: ENDOCRINOLOGY | Facility: CLINIC | Age: 53
End: 2023-01-10
Payer: COMMERCIAL

## 2023-01-10 DIAGNOSIS — E78.2 MIXED HYPERLIPIDEMIA: ICD-10-CM

## 2023-01-10 DIAGNOSIS — E55.9 VITAMIN D DEFICIENCY: ICD-10-CM

## 2023-01-10 DIAGNOSIS — I10 PRIMARY HYPERTENSION: Chronic | ICD-10-CM

## 2023-01-10 DIAGNOSIS — E11.65 TYPE 2 DIABETES MELLITUS WITH HYPERGLYCEMIA, WITHOUT LONG-TERM CURRENT USE OF INSULIN: ICD-10-CM

## 2023-01-10 DIAGNOSIS — E89.0 POSTSURGICAL HYPOTHYROIDISM: Primary | Chronic | ICD-10-CM

## 2023-01-10 PROCEDURE — 99214 OFFICE O/P EST MOD 30 MIN: CPT | Performed by: NURSE PRACTITIONER

## 2023-01-10 NOTE — PROGRESS NOTES
Chief Complaint  Diabetes and Thyroid Problem    Subjective          Rhea Sutton presents to [facility]  History of Present Illness         You have chosen to receive care through a telehealth visit.  Do you consent to use a video/audio connection for your medical care today? Yes            TELEHEALTH VIDEO VISIT     This a video visit due to [facility] current guidelines for social distancing due to the COVID 19 pandemic        Mode of Visit: Video  Location of patient: home  You have chosen to receive care through a telehealth visit.  Does the patient consent to use a video/audio connection for your medical care today? No  The visit included audio and video interaction. No technical issues occurred during this visit.               52 year old female presents for follow up            postoperative hypothyroidism      Total thyroidectomy 2015 due to obstructive goiter         Timing constant         Quality not controlled     Severity moderate            Type 2 Diabetes        Duration since 2017     Quality not controlled       Severity high      Complications include CAD      CABG times 4 March 2019     Microvascular complications neuropathy           Complaints of back pain, will see a specialist in [place]       Review of Systems - General ROS: positive for  - back pain             Objective   Vital Signs:   There were no vitals taken for this visit.    Physical Exam  Neurological:      General: No focal deficit present.      Mental Status: She is alert.   Psychiatric:         Mood and Affect: Mood normal.         Thought Content: Thought content normal.         Judgment: Judgment normal.        Result Review :   The following data was reviewed by: DUC Pressley on 01/19/2022:  Common labs    Common Labs 9/9/22 9/9/22 9/9/22 9/9/22    0941 0941 0941 0941   Glucose   204 (A)    BUN   16    Creatinine   1.17 (A)    Sodium   142    Potassium   4.2     Chloride   105    Calcium   9.0    Albumin   4.00    Total Bilirubin   0.3    Alkaline Phosphatase   110    AST (SGOT)   22    ALT (SGPT)   33    WBC 11.82 (A)      Hemoglobin 13.6      Hematocrit 40.1      Platelets 315      Total Cholesterol    137   Triglycerides    88   HDL Cholesterol    50   LDL Cholesterol     70   Hemoglobin A1C  8.00 (A)     (A) Abnormal value                        Assessment and Plan    Diagnoses and all orders for this visit:    1. Postsurgical hypothyroidism (Primary)  -     CBC & Differential  -     Comprehensive Metabolic Panel  -     Hemoglobin A1c  -     TSH  -     Microalbumin / Creatinine Urine Ratio - Urine, Clean Catch  -     Lipid Panel  -     Vitamin D,25-Hydroxy    2. Type 2 diabetes mellitus with hyperglycemia, without long-term current use of insulin (HCC)  -     CBC & Differential  -     Comprehensive Metabolic Panel  -     Hemoglobin A1c  -     TSH  -     Microalbumin / Creatinine Urine Ratio - Urine, Clean Catch  -     Lipid Panel  -     Vitamin D,25-Hydroxy    3. Vitamin D deficiency  -     CBC & Differential  -     Comprehensive Metabolic Panel  -     Hemoglobin A1c  -     TSH  -     Microalbumin / Creatinine Urine Ratio - Urine, Clean Catch  -     Lipid Panel  -     Vitamin D,25-Hydroxy    4. Primary hypertension  -     CBC & Differential  -     Comprehensive Metabolic Panel  -     Hemoglobin A1c  -     TSH  -     Microalbumin / Creatinine Urine Ratio - Urine, Clean Catch  -     Lipid Panel  -     Vitamin D,25-Hydroxy    5. Mixed hyperlipidemia  -     CBC & Differential  -     Comprehensive Metabolic Panel  -     Hemoglobin A1c  -     TSH  -     Microalbumin / Creatinine Urine Ratio - Urine, Clean Catch  -     Lipid Panel  -     Vitamin D,25-Hydroxy    Other orders  -     glucose blood test strip; Test once daily , E10.65  Dispense: 90 each; Refill: 5         Hypothyroidism-postsurgical                Lab Results   Component Value Date    TSH 1.260 09/09/2022               Levothyroxine  200 mcg daily             ====     Type 2 diabetes           Lab Results   Component Value Date    HGBA1C 8.00 (H) 09/09/2022       Complicated by CAD              taking farxiga 10 mg one daily        Bydeuron caused knots      On Trulicity  3 mg once weekly      Refusing insulin fast and long acting insuilin      Will have to control diet      Do not go over 45 grams of carbohydrate       metformin stopped due to kidney             Stop Glipizide                   Bone Health     Vitamin d def.            50,000 units weekly  keep taking          Lipid Management:         on lipitor 40 mg -  On repatha--taking         Total Cholesterol   Date Value Ref Range Status   09/09/2022 137 0 - 200 mg/dL Final     Triglycerides   Date Value Ref Range Status   09/09/2022 88 0 - 150 mg/dL Final     HDL Cholesterol   Date Value Ref Range Status   09/09/2022 50 40 - 60 mg/dL Final     LDL Cholesterol    Date Value Ref Range Status   09/09/2022 70 0 - 100 mg/dL Final                     Follow Up   No follow-ups on file.  Patient was given instructions and counseling regarding her condition or for health maintenance advice. Please see specific information pulled into the AVS if appropriate.         This document has been electronically signed by DUC Pressley on January 10, 2023 11:03 CST.

## 2023-01-18 DIAGNOSIS — E78.2 MIXED HYPERLIPIDEMIA: ICD-10-CM

## 2023-01-18 RX ORDER — EVOLOCUMAB 140 MG/ML
INJECTION, SOLUTION SUBCUTANEOUS
Qty: 2 ML | Refills: 3 | Status: SHIPPED | OUTPATIENT
Start: 2023-01-18

## 2023-02-15 ENCOUNTER — OFFICE VISIT (OUTPATIENT)
Dept: NEUROSURGERY | Age: 53
End: 2023-02-15
Payer: MEDICAID

## 2023-02-15 ENCOUNTER — TELEPHONE (OUTPATIENT)
Dept: NEUROSURGERY | Age: 53
End: 2023-02-15

## 2023-02-15 ENCOUNTER — HOSPITAL ENCOUNTER (OUTPATIENT)
Dept: GENERAL RADIOLOGY | Age: 53
Discharge: HOME | End: 2023-02-15
Payer: MEDICAID

## 2023-02-15 VITALS
RESPIRATION RATE: 18 BRPM | HEART RATE: 67 BPM | DIASTOLIC BLOOD PRESSURE: 102 MMHG | BODY MASS INDEX: 37.61 KG/M2 | HEIGHT: 66 IN | WEIGHT: 234 LBS | SYSTOLIC BLOOD PRESSURE: 128 MMHG | OXYGEN SATURATION: 95 %

## 2023-02-15 DIAGNOSIS — M51.36 DDD (DEGENERATIVE DISC DISEASE), LUMBAR: Primary | ICD-10-CM

## 2023-02-15 DIAGNOSIS — M54.50 CHRONIC MIDLINE LOW BACK PAIN WITHOUT SCIATICA: ICD-10-CM

## 2023-02-15 DIAGNOSIS — G89.29 CHRONIC MIDLINE LOW BACK PAIN WITHOUT SCIATICA: ICD-10-CM

## 2023-02-15 DIAGNOSIS — M54.9 BACK PAIN, UNSPECIFIED BACK LOCATION, UNSPECIFIED BACK PAIN LATERALITY, UNSPECIFIED CHRONICITY: ICD-10-CM

## 2023-02-15 PROCEDURE — 72110 X-RAY EXAM L-2 SPINE 4/>VWS: CPT

## 2023-02-15 PROCEDURE — 99204 OFFICE O/P NEW MOD 45 MIN: CPT | Performed by: NURSE PRACTITIONER

## 2023-02-15 RX ORDER — ERGOCALCIFEROL 1.25 MG/1
50000 CAPSULE ORAL WEEKLY
COMMUNITY
Start: 2022-12-24

## 2023-02-15 RX ORDER — NITROGLYCERIN 0.4 MG/1
0.4 TABLET SUBLINGUAL
COMMUNITY
Start: 2020-12-23

## 2023-02-15 RX ORDER — LISINOPRIL 40 MG/1
40 TABLET ORAL DAILY
COMMUNITY
Start: 2022-12-05

## 2023-02-15 RX ORDER — PREGABALIN 50 MG/1
50 CAPSULE ORAL DAILY
COMMUNITY
Start: 2021-11-02 | End: 2023-02-15

## 2023-02-15 RX ORDER — ERTUGLIFLOZIN 5 MG/1
5 TABLET, FILM COATED ORAL DAILY
COMMUNITY
Start: 2019-01-06

## 2023-02-15 RX ORDER — ATORVASTATIN CALCIUM 40 MG/1
40 TABLET, FILM COATED ORAL DAILY
COMMUNITY
Start: 2022-11-27

## 2023-02-15 RX ORDER — DULOXETIN HYDROCHLORIDE 60 MG/1
60 CAPSULE, DELAYED RELEASE ORAL DAILY
COMMUNITY
Start: 2021-12-02

## 2023-02-15 RX ORDER — ASPIRIN 81 MG/1
81 TABLET, CHEWABLE ORAL DAILY
COMMUNITY

## 2023-02-15 RX ORDER — LANCETS 28 GAUGE
EACH MISCELLANEOUS
COMMUNITY
Start: 2023-01-16

## 2023-02-15 RX ORDER — DULAGLUTIDE 3 MG/.5ML
INJECTION, SOLUTION SUBCUTANEOUS
COMMUNITY
Start: 2022-11-27

## 2023-02-15 RX ORDER — DAPAGLIFLOZIN 10 MG/1
10 TABLET, FILM COATED ORAL DAILY
COMMUNITY
Start: 2023-01-17

## 2023-02-15 RX ORDER — LEVOTHYROXINE SODIUM 0.2 MG/1
200 TABLET ORAL DAILY
COMMUNITY
Start: 2023-01-17

## 2023-02-15 RX ORDER — CARVEDILOL 6.25 MG/1
6.25 TABLET ORAL 2 TIMES DAILY
COMMUNITY
Start: 2022-12-05

## 2023-02-15 RX ORDER — ROSUVASTATIN CALCIUM 20 MG/1
20 TABLET, COATED ORAL DAILY
COMMUNITY
Start: 2022-12-23

## 2023-02-15 RX ORDER — EVOLOCUMAB 140 MG/ML
INJECTION, SOLUTION SUBCUTANEOUS
COMMUNITY
Start: 2023-01-19

## 2023-02-15 RX ORDER — AMLODIPINE BESYLATE 10 MG/1
10 TABLET ORAL DAILY
COMMUNITY
Start: 2023-01-31

## 2023-02-15 RX ORDER — GLIPIZIDE 5 MG/1
5 TABLET ORAL 2 TIMES DAILY
COMMUNITY

## 2023-02-15 RX ORDER — AMMONIUM LACTATE 12 G/100G
CREAM TOPICAL
COMMUNITY
Start: 2023-01-26

## 2023-02-15 RX ORDER — BLOOD-GLUCOSE METER
KIT MISCELLANEOUS
COMMUNITY
Start: 2023-01-10

## 2023-02-15 ASSESSMENT — ENCOUNTER SYMPTOMS
GASTROINTESTINAL NEGATIVE: 1
RESPIRATORY NEGATIVE: 1
EYES NEGATIVE: 1
BACK PAIN: 1

## 2023-02-15 NOTE — PROGRESS NOTES
[facility] Neurosurgery  Office Visit      Chief Complaint   Patient presents with    New Patient     Establishing care     Results     XR Lumbar Spine (2/15/2023)    Back Pain     Patient states she has had pain since 2014 and noticed it worsening in the past few years. She has been to PT and received injections/epidurals. She states she doesn't have radiating pain. She states she does have spasms in her buttocks sometimes. She does have trouble walking far distances and sitting/standing for long periods of time. Numbness     Patient denies symptoms of numbness/tingling in her BLE. HISTORY OF PRESENT ILLNESS:    Ihsan Keys is a 46 y.o. female with DM who presents with low back pain that is chronic and worsening. No known specific event. 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 The pain does not radiate into the lower extremities. Occasionally has left buttock cramping. Her pain is mostly located in the back. The patient admits to numbness of the left medial ankle from a vein harvesting. Sitting, standing, or lifting exacerbate the pain. Her pain is not changed when going from a seated to standing position. Her pain is worsened with walking. Her pain is worsened when lying flat. Overall, indicative that the patient does have a mechanical nature to their pain. Of note, she has been evaluated per Dr. Abelino Cherry back in May of 2022 and he did not recommend any surgery. The patient has underwent a non-operative treatment course that has included:  NSAIDs (cannot take)  Muscle Relaxers   Lyrica (not taking)  Gabapentin (cannot tolerate)  Opiates (took oxycodone for severe years, no help)  Oral Steroids (has DM)  Physical Therapy (not in the last 12 months states she did 7 rounds)  Epidural Steroid Injections ([place] - no help)  Trigger point injections - [place]       Of note she does use tobacco and does take blood thinning medications (ASA).                Past Medical History: Diagnosis Date    Hashimoto's disease     HBP (high blood pressure)     Heart attack (HCC)     High cholesterol     Kidney failure        Past Surgical History:   Procedure Laterality Date    APPENDECTOMY       SECTION      GALLBLADDER SURGERY      HYSTERECTOMY, TOTAL ABDOMINAL (CERVIX REMOVED)      THYROIDECTOMY         Current Outpatient Medications   Medication Sig Dispense Refill    amLODIPine (NORVASC) 10 MG tablet Take 10 mg by mouth daily      ammonium lactate (AMLACTIN) 12 % cream APPLY CREAM TOPICALLY TO AFFECTED AREA (DRY SKIN ON BOTH FEET) TWICE DAILY AS NEEDED      aspirin 81 MG chewable tablet Take 81 mg by mouth daily      atorvastatin (LIPITOR) 40 MG tablet Take 40 mg by mouth daily      carvedilol (COREG) 6.25 MG tablet Take 6.25 mg by mouth in the morning and at bedtime      FARXIGA 10 MG tablet Take 10 mg by mouth daily      vitamin D (CHOLECALCIFEROL) 25 MCG (1000 UT) TABS tablet Take 1,000 Units by mouth daily      TRULICITY 3 ZS/7.7GU SOPN INJECT 0.5ML UNDER THE SKIN INTO THE APPROPRIATE AREA AS DIRECTED 1 TIME PER WEEK      DULoxetine (CYMBALTA) 60 MG extended release capsule Take 60 mg by mouth daily      vitamin D (ERGOCALCIFEROL) 1.25 MG (81151 UT) CAPS capsule Take 50,000 Units by mouth once a week      Ertugliflozin L-PyroglutamicAc (STEGLATRO) 5 MG TABS Take 5 mg by mouth daily      REPATHA SURECLICK 062 MG/ML SOAJ INJECT 1 ML UNDER THE SKIN INTO THE APPROPRIATE AREA AS DIRECTED EVERY 14 DAYS      glipiZIDE (GLUCOTROL) 5 MG tablet Take 5 mg by mouth 2 times daily      FREESTYLE LITE strip USE 1 STRIP TO CHECK GLUCOSE ONCE DAILY      FreeStyle Lancets MISC USE 1 TO CHECK GLUCOSE TWICE DAILY      levothyroxine (SYNTHROID) 200 MCG tablet Take 200 mcg by mouth daily      lisinopril (PRINIVIL;ZESTRIL) 40 MG tablet Take 40 mg by mouth daily      nitroGLYCERIN (NITROSTAT) 0.4 MG SL tablet 0.4 mg      rosuvastatin (CRESTOR) 20 MG tablet Take 20 mg by mouth daily       No current facility-administered medications for this visit. Allergies:  Latex, Coconut (cocos nucifera) allergy skin test, Coconut oil, Penicillins, Adhesive tape, and Ciprofloxacin    Social History:   Social History     Tobacco Use   Smoking Status Every Day    Packs/day: 0.25    Years: 35.00    Pack years: 8.75    Types: Cigarettes   Smokeless Tobacco Never     Social History     Substance and Sexual Activity   Alcohol Use None         Family History:   No family history on file. REVIEW OF SYSTEMS:  Constitutional: Negative. HENT: Negative. Eyes: Negative. Respiratory: Negative. Cardiovascular: Negative. Gastrointestinal: Negative. Genitourinary: Negative. Musculoskeletal:  Positive for back pain, joint pain and myalgias. Skin: Negative. Neurological:  Positive for tingling. Endo/Heme/Allergies: Negative. Psychiatric/Behavioral: Negative. PHYSICAL EXAM:  Vitals:    02/15/23 1257   BP: (!) 128/102   Pulse: 67   Resp: 18   SpO2: 95%     Constitutional: appears well-developed and well-nourished. Eyes - conjunctiva normal.  Pupils react to light  Ear, nose, throat - hearing intact to finger rub, No scars, masses, or lesions over external nose or ears, no atrophy oftongue  Neck- symmetric, no masses noted, no jugular vein distension  Respiration- chest wall appears symmetric, good expansion, normal effort without use of accessory muscles  Musculoskeletal - no significant wasting of muscles noted, no bony deformities, gait no gross ataxia  Extremities- no clubbing, cyanosis oredema  Skin - warm, dry, and intact. No rash, erythema, or pallor.   Psychiatric - mood, affect, and behavior appear normal.     Neurologic Examination  Awake, Alert and oriented x 4  Normal speech pattern, following commands    Motor:  RIGHT:     iliopsoas 5/5    knee flexor 5/5    knee extension 5/5    EHL 5/5   dorsiflexion 5/5    plantar flexion 5/5    LEFT:    iliopsoas 5/5    knee flexor 5/5    knee extension 5/5    EHL 5/5   dorsiflexion 5/5    plantar flexion 5/5    No deficits to light touch or pinprick sensation  Reflexes are absent BLE  No myofacial tenderness to palpation  Normal Gait pattern          DATA and IMAGING:    Nursing/pcp notes, imaging, labs, and vitals reviewed. PT,OT and/or speech notes reviewed    No results found for: WBC, HGB, HCT, MCV, PLTNo results found for: NA, K, CL, CO2, BUN, CREATININE, GLUCOSE, CALCIUM, PROT, LABALBU, BILITOT, ALKPHOS, AST, ALT, LABGLOM, GFRAA, AGRATIO, GLOBNo results found for: INR, PROTIME      Narrative   CLINICAL HISTORY: Back pain   TECHNIQUE: 3 views   COMPARISON: None. FINDINGS:   Diffuse osteopenia reduces sensitivity for detecting subtle nondisplaced   fractures. Mild to moderate multi-level degenerative changes, with degenerative   disc disease and facet joint osteoarthritis worst at L5-S1. Vertebral body   heights are maintained with no displaced fracture or significant subluxation. Overlying soft tissues obscure fine osseous detail. Vascular calcifications. Impression   1. No acute osseous abnormality identified. 2.Mild to moderate lumbar degenerative changes. If there is ongoing concern for   neurologic impingement, recommend MRI. I have personally reviewed these images and my interpretation is:  DDD L5-S1   Slight superior anterior endplate compression of the T12 VB  No spondylolisthesis or abnormal motion on flexion or extension         ASSESSMENT:    Hunter Craig. Eileen Bacon is a 46 y.o. female with complaints of back pain that does not radiate. ICD-10-CM    1. DDD (degenerative disc disease), lumbar  M51.36       2. Chronic midline low back pain without sciatica  M54.50     G89.29           PLAN:  I have discussed and reviewed the results of the XR lumbar spine with Ms. Eileen Bacon at length. I explained that she does have significant DDD at L5-S1 that is most likely the culprit of her pain syndrome.   There is no surgical intervention for solely DDD that has been shown to give the relief she would hope for and because of that I do not feel it is necessary to move forward with MRI. 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 I would recommend pain management, however, she does not wish to try tat again. I would recommend maybe tramadol a few times per day since this is the lowest pain medication that may not make her as drowsy.   She cannot take NSAID's.    -TENS unit  -Follow up as needed         Maris Palomo, APRN

## 2023-03-27 NOTE — TELEPHONE ENCOUNTER
----- Message from DUC Pressley sent at 6/8/2017  2:46 PM CDT -----  Please call patient with abnormal result--her TSH is 245 she said today she is not taking; please take thyroid medication daily   Xolair Counseling:  Patient informed of potential adverse effects including but not limited to fever, muscle aches, rash and allergic reactions.  The patient verbalized understanding of the proper use and possible adverse effects of Xolair.  All of the patient's questions and concerns were addressed.

## 2023-04-19 DIAGNOSIS — E78.2 MIXED HYPERLIPIDEMIA: ICD-10-CM

## 2023-04-19 RX ORDER — EVOLOCUMAB 140 MG/ML
INJECTION, SOLUTION SUBCUTANEOUS
Qty: 2 ML | Refills: 6 | Status: SHIPPED | OUTPATIENT
Start: 2023-04-19

## 2023-08-01 ENCOUNTER — OFFICE VISIT (OUTPATIENT)
Dept: CARDIAC SURGERY | Facility: CLINIC | Age: 53
End: 2023-08-01
Payer: COMMERCIAL

## 2023-08-01 VITALS
DIASTOLIC BLOOD PRESSURE: 91 MMHG | BODY MASS INDEX: 40.02 KG/M2 | WEIGHT: 249 LBS | OXYGEN SATURATION: 95 % | HEIGHT: 66 IN | HEART RATE: 73 BPM | SYSTOLIC BLOOD PRESSURE: 172 MMHG

## 2023-08-01 DIAGNOSIS — I65.23 CAROTID STENOSIS, ASYMPTOMATIC, BILATERAL: Primary | ICD-10-CM

## 2023-08-01 DIAGNOSIS — E78.2 MIXED HYPERLIPIDEMIA: ICD-10-CM

## 2023-08-01 DIAGNOSIS — F17.200 SMOKER: ICD-10-CM

## 2023-08-01 DIAGNOSIS — E11.65 TYPE 2 DIABETES MELLITUS WITH HYPERGLYCEMIA, WITHOUT LONG-TERM CURRENT USE OF INSULIN: ICD-10-CM

## 2023-08-01 PROCEDURE — 3080F DIAST BP >= 90 MM HG: CPT | Performed by: NURSE PRACTITIONER

## 2023-08-01 PROCEDURE — 3077F SYST BP >= 140 MM HG: CPT | Performed by: NURSE PRACTITIONER

## 2023-08-01 PROCEDURE — 99214 OFFICE O/P EST MOD 30 MIN: CPT | Performed by: NURSE PRACTITIONER

## (undated) DEVICE — INTRO SHEATH ART/FEM ENGAGE .038 6F12CM

## (undated) DEVICE — SOL NACL 0.9PCT 1000ML

## (undated) DEVICE — DOUBLE MALE LL ADAPTER SPECIAL ADAPTER TO CONVERT A FEMALE FITTING TO MALE LL: Brand: DOUBLE MALE LL ADAPTER

## (undated) DEVICE — MINI TREK CORONARY DILATATION CATHETER 2.0 MM X 20 MM / RAPID-EXCHANGE: Brand: MINI TREK

## (undated) DEVICE — HI-TORQUE PILOT 50 GUIDE WIRE .014 J TIP 3.0 CM X 190 CM: Brand: HI-TORQUE PILOT

## (undated) DEVICE — UNDYED BRAIDED (POLYGLACTIN 910), SYNTHETIC ABSORBABLE SUTURE: Brand: COATED VICRYL

## (undated) DEVICE — MODEL BT2000 P/N 700287-012KIT CONTENTS: MANIFOLD WITH SALINE AND CONTRAST PORTS, SALINE TUBING WITH SPIKE AND HAND SYRINGE, TRANSDUCER: Brand: BT2000 AUTOMATED MANIFOLD KIT

## (undated) DEVICE — CATH F6 ST JR 4 100CM: Brand: SUPERTORQUE

## (undated) DEVICE — SUT PROLN 3/0 SH D/A 36IN 8522H

## (undated) DEVICE — SUT ETHIB 3/0 SH DA 36IN X522H

## (undated) DEVICE — SUT NUROLON 2 0 CT1 18IN CR8 C522D

## (undated) DEVICE — STERILE POLYISOPRENE POWDER-FREE SURGICAL GLOVES WITH EMOLLIENT COATING: Brand: PROTEXIS

## (undated) DEVICE — RUNTHROUGH NS EXTRA FLOPPY PTCA GUIDEWIRE: Brand: RUNTHROUGH

## (undated) DEVICE — SUT MONOCRYL 4/0 PS2 27IN Y426H ETY426H

## (undated) DEVICE — APPL CHLORAPREP W/TINT 26ML ORNG

## (undated) DEVICE — PK OPN HEART 60

## (undated) DEVICE — 28 FR STRAIGHT – SILICONE CATHETER: Brand: SILICONE THORACIC CATHETERS

## (undated) DEVICE — CONTAINER,SPECIMEN,OR STERILE,4OZ: Brand: MEDLINE

## (undated) DEVICE — SUT SILK 0 TIES 18IN A186H BX36

## (undated) DEVICE — DEV INFL MONARCH 20ML

## (undated) DEVICE — 6F .070 XB LAD 3.5 100CM: Brand: VISTA BRITE TIP

## (undated) DEVICE — Device

## (undated) DEVICE — ELECTRODE,RT,STRESS,FOAM,50PK: Brand: MEDLINE

## (undated) DEVICE — MINI TREK CORONARY DILATATION CATHETER 2.0 MM X 15 MM / RAPID-EXCHANGE: Brand: MINI TREK

## (undated) DEVICE — CANN AORT SARNS SFT/FLW VNTD 21F7MM

## (undated) DEVICE — TEMP PACING WIRE: Brand: MYO/WIRE

## (undated) DEVICE — SPNG DRN AMD EXCILON 6PLY 4X4IN PK/2

## (undated) DEVICE — SHEET,DRAPE,53X77,STERILE: Brand: MEDLINE

## (undated) DEVICE — 8 FOOT DISPOSABLE EXTENSION CABLE WITH SAFE CONNECT / ALLIGATOR CLIP

## (undated) DEVICE — SUT PROLENE 7-0 BV175-7 24IN DB ETH8766H

## (undated) DEVICE — SUT ETHIBOND 2/0 SH1 36IN X763H

## (undated) DEVICE — SUT PROLN 4/0 RB1 D/A 36IN 8557H

## (undated) DEVICE — CATH IV JELCO 20GAX1 1/4IN

## (undated) DEVICE — CATH GUIDE LAUNCHER JR4.0 6F 100CM

## (undated) DEVICE — ST PERFUS M/

## (undated) DEVICE — OASIS DRAIN, SINGLE, INLINE & ATS COMPATIBLE: Brand: OASIS

## (undated) DEVICE — PRESSURE MONITORING LINES PRESSURE OPAQUE PE TUBING TUBING 4FT. LINE W/MALE LUER LOCK CONNECTORS ON EACH END: Brand: PRESSURE MONITORING LINES

## (undated) DEVICE — VASOVIEW HEMOPRO: Brand: VASOVIEW HEMOPRO

## (undated) DEVICE — PK CATH LAB 60

## (undated) DEVICE — HI-TORQUE BALANCE MIDDLEWEIGHT GUIDE WIRE .014 J TIP 3.0 CM X 190 CM: Brand: HI-TORQUE BALANCE MIDDLEWEIGHT

## (undated) DEVICE — COPILOT KIT INCLUDES BLEEDBACK CONTROL VALVE / GUIDE WIRE INTRODUCER / TORQUE DEVICE: Brand: ACCESSORIES

## (undated) DEVICE — SUT SILK 1 LBYRNTH TIES 30IN A307H

## (undated) DEVICE — KT INTRO MINISTICK MAX W/GW NITNL/TUNG ECHO 4F 21G 7CM

## (undated) DEVICE — A2000 MULTI-USE SYRINGE KIT, P/N 701277-003KIT CONTENTS: 100ML CONTRAST RESERVOIR AND TUBING WITH CONTRAST SPIKE AND CLAMP: Brand: A2000 MULTI-USE SYRINGE KIT

## (undated) DEVICE — SUT PDS 2 0 CT1 27IN CLR Z259H

## (undated) DEVICE — GOWN,AURORA,NOREINF,RAGLAN,XL,STERILE: Brand: MEDLINE

## (undated) DEVICE — 24 FR EXTENDED LENGTH – SILICONE CATHETER: Brand: SILICONE THORACIC CATHETERS

## (undated) DEVICE — ARTERIAL NEEDLE: Brand: UNBRANDED

## (undated) DEVICE — PRESSURE MONITORING INJECTION LINE 6FT. M/F: Brand: PRESSURE MONITORING INJECTION LINE

## (undated) DEVICE — FOGARTY - HYDRAGRIP SURGICAL - CLAMP INSERTS: Brand: FOGARTY SOFTJAW

## (undated) DEVICE — SUT SILK 0 FSL 18IN 678G

## (undated) DEVICE — MODEL AT P65, P/N 701554-001KIT CONTENTS: HAND CONTROLLER, 3-WAY HIGH-PRESSURE STOPCOCK WITH ROTATING END AND PREMIUM HIGH-PRESSURE TUBING: Brand: ANGIOTOUCH® KIT

## (undated) DEVICE — SUT ETHIBOND RB1 3/0 36IN X558H

## (undated) DEVICE — SUT PROLENE CARDIO C1D 6/0 24IN 8726H

## (undated) DEVICE — TREK CORONARY DILATATION CATHETER 2.50 MM X 20 MM / RAPID-EXCHANGE: Brand: TREK

## (undated) DEVICE — GW PERIPH GUIDERIGHT STD/J/TP PTFE/PCOAT SS 0.038IN 5X150CM

## (undated) DEVICE — CATH DIAG EXPO M/ PK 6FR FL4/FR4 PIG 3PK

## (undated) DEVICE — SUT VIC 3/0 SH 27IN J416H

## (undated) DEVICE — GLV SURG SENSICARE GREEN W/ALOE PF LF 6 STRL

## (undated) DEVICE — USE OF THE SMARTNEEDLE DEVICE IS INDICATED WHEN BLOOD FLOW MUST BE DETECTED FOR PERCUTANEOUS VESSEL CANNULATION. THE VESSEL MUST BE OF A CALIBER WHICH WOULD NORMALLY BE PUNCTURED WITH A NEEDLE AND/OR CATHETER OF THIS SIZE OR LARGER.: Brand: SMARTNEEDLE® VASCULAR ACCESS SYSTEM

## (undated) DEVICE — RADIFOCUS GLIDECATH: Brand: GLIDECATH

## (undated) DEVICE — BRA COMPR CURAD P/OP 2XL

## (undated) DEVICE — STERILE POLYISOPRENE POWDER-FREE SURGICAL GLOVES: Brand: PROTEXIS

## (undated) DEVICE — CANN VESL DLP 1WY BLNT/TP 3MM

## (undated) DEVICE — GLV SURG SENSICARE POLYISPRN W/ALOE PF LF 6.5 GRN STRL